# Patient Record
Sex: FEMALE | Race: BLACK OR AFRICAN AMERICAN | NOT HISPANIC OR LATINO | Employment: OTHER
[De-identification: names, ages, dates, MRNs, and addresses within clinical notes are randomized per-mention and may not be internally consistent; named-entity substitution may affect disease eponyms.]

---

## 2017-01-18 ENCOUNTER — RECORDS - HEALTHEAST (OUTPATIENT)
Dept: ADMINISTRATIVE | Facility: OTHER | Age: 62
End: 2017-01-18

## 2017-01-18 ENCOUNTER — OFFICE VISIT - HEALTHEAST (OUTPATIENT)
Dept: INTERNAL MEDICINE | Facility: CLINIC | Age: 62
End: 2017-01-18

## 2017-01-18 DIAGNOSIS — M25.552 PAIN OF LEFT HIP JOINT: ICD-10-CM

## 2017-01-18 ASSESSMENT — MIFFLIN-ST. JEOR: SCORE: 1541.06

## 2017-01-25 ENCOUNTER — COMMUNICATION - HEALTHEAST (OUTPATIENT)
Dept: INTERNAL MEDICINE | Facility: CLINIC | Age: 62
End: 2017-01-25

## 2017-01-25 DIAGNOSIS — M54.9 BACK PAIN: ICD-10-CM

## 2017-01-31 ENCOUNTER — OFFICE VISIT - HEALTHEAST (OUTPATIENT)
Dept: PHYSICAL THERAPY | Facility: REHABILITATION | Age: 62
End: 2017-01-31

## 2017-01-31 DIAGNOSIS — M25.552 LEFT HIP PAIN: ICD-10-CM

## 2017-01-31 DIAGNOSIS — E66.9 OBESITY, UNSPECIFIED: ICD-10-CM

## 2017-01-31 DIAGNOSIS — M54.42 ACUTE LEFT-SIDED LOW BACK PAIN WITH LEFT-SIDED SCIATICA: ICD-10-CM

## 2017-02-01 ENCOUNTER — OFFICE VISIT - HEALTHEAST (OUTPATIENT)
Dept: INTERNAL MEDICINE | Facility: CLINIC | Age: 62
End: 2017-02-01

## 2017-02-01 DIAGNOSIS — M25.559 HIP PAIN: ICD-10-CM

## 2017-02-01 DIAGNOSIS — J20.9 ACUTE BRONCHITIS, UNSPECIFIED ORGANISM: ICD-10-CM

## 2017-02-01 ASSESSMENT — MIFFLIN-ST. JEOR: SCORE: 1545.6

## 2017-02-15 ENCOUNTER — OFFICE VISIT - HEALTHEAST (OUTPATIENT)
Dept: PHYSICAL THERAPY | Facility: REHABILITATION | Age: 62
End: 2017-02-15

## 2017-02-15 DIAGNOSIS — E66.9 OBESITY, UNSPECIFIED: ICD-10-CM

## 2017-02-15 DIAGNOSIS — M25.552 LEFT HIP PAIN: ICD-10-CM

## 2017-02-15 DIAGNOSIS — M54.42 ACUTE LEFT-SIDED LOW BACK PAIN WITH LEFT-SIDED SCIATICA: ICD-10-CM

## 2017-02-17 ENCOUNTER — OFFICE VISIT - HEALTHEAST (OUTPATIENT)
Dept: PHYSICAL THERAPY | Facility: REHABILITATION | Age: 62
End: 2017-02-17

## 2017-02-17 DIAGNOSIS — M54.42 ACUTE LEFT-SIDED LOW BACK PAIN WITH LEFT-SIDED SCIATICA: ICD-10-CM

## 2017-02-17 DIAGNOSIS — E66.9 OBESITY, UNSPECIFIED: ICD-10-CM

## 2017-02-17 DIAGNOSIS — M25.552 LEFT HIP PAIN: ICD-10-CM

## 2017-03-23 ENCOUNTER — OFFICE VISIT - HEALTHEAST (OUTPATIENT)
Dept: PHYSICAL THERAPY | Facility: REHABILITATION | Age: 62
End: 2017-03-23

## 2017-03-23 DIAGNOSIS — E66.9 OBESITY, UNSPECIFIED: ICD-10-CM

## 2017-03-23 DIAGNOSIS — M54.42 ACUTE LEFT-SIDED LOW BACK PAIN WITH LEFT-SIDED SCIATICA: ICD-10-CM

## 2017-03-23 DIAGNOSIS — M25.552 LEFT HIP PAIN: ICD-10-CM

## 2017-03-27 ENCOUNTER — OFFICE VISIT - HEALTHEAST (OUTPATIENT)
Dept: BEHAVIORAL HEALTH | Facility: CLINIC | Age: 62
End: 2017-03-27

## 2017-03-27 DIAGNOSIS — F43.23 ADJUSTMENT DISORDER WITH MIXED ANXIETY AND DEPRESSED MOOD: ICD-10-CM

## 2017-03-27 DIAGNOSIS — F33.0 MAJOR DEPRESSIVE DISORDER, RECURRENT EPISODE, MILD (H): ICD-10-CM

## 2017-03-29 ENCOUNTER — OFFICE VISIT - HEALTHEAST (OUTPATIENT)
Dept: PHYSICAL THERAPY | Facility: REHABILITATION | Age: 62
End: 2017-03-29

## 2017-03-29 DIAGNOSIS — E66.9 OBESITY, UNSPECIFIED: ICD-10-CM

## 2017-03-29 DIAGNOSIS — M54.42 ACUTE LEFT-SIDED LOW BACK PAIN WITH LEFT-SIDED SCIATICA: ICD-10-CM

## 2017-03-29 DIAGNOSIS — M25.552 LEFT HIP PAIN: ICD-10-CM

## 2017-04-06 ENCOUNTER — OFFICE VISIT - HEALTHEAST (OUTPATIENT)
Dept: PHYSICAL THERAPY | Facility: REHABILITATION | Age: 62
End: 2017-04-06

## 2017-04-06 DIAGNOSIS — M54.42 ACUTE LEFT-SIDED LOW BACK PAIN WITH LEFT-SIDED SCIATICA: ICD-10-CM

## 2017-04-06 DIAGNOSIS — E66.9 OBESITY, UNSPECIFIED: ICD-10-CM

## 2017-04-06 DIAGNOSIS — M25.552 LEFT HIP PAIN: ICD-10-CM

## 2017-04-17 ENCOUNTER — OFFICE VISIT - HEALTHEAST (OUTPATIENT)
Dept: PHYSICAL THERAPY | Facility: REHABILITATION | Age: 62
End: 2017-04-17

## 2017-04-17 DIAGNOSIS — E66.9 OBESITY, UNSPECIFIED: ICD-10-CM

## 2017-04-17 DIAGNOSIS — M54.42 ACUTE LEFT-SIDED LOW BACK PAIN WITH LEFT-SIDED SCIATICA: ICD-10-CM

## 2017-04-17 DIAGNOSIS — M25.552 LEFT HIP PAIN: ICD-10-CM

## 2017-05-04 ENCOUNTER — OFFICE VISIT - HEALTHEAST (OUTPATIENT)
Dept: PHYSICAL THERAPY | Facility: REHABILITATION | Age: 62
End: 2017-05-04

## 2017-05-04 ENCOUNTER — OFFICE VISIT - HEALTHEAST (OUTPATIENT)
Dept: INTERNAL MEDICINE | Facility: CLINIC | Age: 62
End: 2017-05-04

## 2017-05-04 ENCOUNTER — RECORDS - HEALTHEAST (OUTPATIENT)
Dept: GENERAL RADIOLOGY | Facility: CLINIC | Age: 62
End: 2017-05-04

## 2017-05-04 DIAGNOSIS — E66.9 OBESITY, UNSPECIFIED: ICD-10-CM

## 2017-05-04 DIAGNOSIS — M25.552 LEFT HIP PAIN: ICD-10-CM

## 2017-05-04 DIAGNOSIS — M25.572 ACUTE LEFT ANKLE PAIN: ICD-10-CM

## 2017-05-04 DIAGNOSIS — M54.42 ACUTE LEFT-SIDED LOW BACK PAIN WITH LEFT-SIDED SCIATICA: ICD-10-CM

## 2017-05-04 DIAGNOSIS — M25.572 PAIN IN LEFT ANKLE AND JOINTS OF LEFT FOOT: ICD-10-CM

## 2017-05-04 DIAGNOSIS — F43.23 ADJUSTMENT DISORDER WITH MIXED ANXIETY AND DEPRESSED MOOD: ICD-10-CM

## 2017-05-04 DIAGNOSIS — M54.50 LOW BACK PAIN: ICD-10-CM

## 2017-05-04 ASSESSMENT — MIFFLIN-ST. JEOR: SCORE: 1531.99

## 2017-05-18 ENCOUNTER — OFFICE VISIT - HEALTHEAST (OUTPATIENT)
Dept: PHYSICAL THERAPY | Facility: REHABILITATION | Age: 62
End: 2017-05-18

## 2017-05-18 DIAGNOSIS — E66.9 OBESITY, UNSPECIFIED: ICD-10-CM

## 2017-05-18 DIAGNOSIS — M25.552 LEFT HIP PAIN: ICD-10-CM

## 2017-05-18 DIAGNOSIS — M54.42 ACUTE LEFT-SIDED LOW BACK PAIN WITH LEFT-SIDED SCIATICA: ICD-10-CM

## 2017-05-26 ENCOUNTER — COMMUNICATION - HEALTHEAST (OUTPATIENT)
Dept: SCHEDULING | Facility: CLINIC | Age: 62
End: 2017-05-26

## 2017-05-31 ENCOUNTER — COMMUNICATION - HEALTHEAST (OUTPATIENT)
Dept: INTERNAL MEDICINE | Facility: CLINIC | Age: 62
End: 2017-05-31

## 2017-06-29 ENCOUNTER — COMMUNICATION - HEALTHEAST (OUTPATIENT)
Dept: NURSING | Facility: CLINIC | Age: 62
End: 2017-06-29

## 2017-06-29 ENCOUNTER — OFFICE VISIT - HEALTHEAST (OUTPATIENT)
Dept: INTERNAL MEDICINE | Facility: CLINIC | Age: 62
End: 2017-06-29

## 2017-06-29 DIAGNOSIS — R53.83 TIREDNESS: ICD-10-CM

## 2017-06-29 DIAGNOSIS — M25.552 LEFT HIP PAIN: ICD-10-CM

## 2017-06-29 DIAGNOSIS — F43.23 ADJUSTMENT DISORDER WITH MIXED ANXIETY AND DEPRESSED MOOD: ICD-10-CM

## 2017-06-29 DIAGNOSIS — G47.30 SLEEP APNEA: ICD-10-CM

## 2017-06-29 DIAGNOSIS — I10 ESSENTIAL HYPERTENSION: ICD-10-CM

## 2017-06-29 ASSESSMENT — MIFFLIN-ST. JEOR: SCORE: 1522.92

## 2017-07-03 ENCOUNTER — COMMUNICATION - HEALTHEAST (OUTPATIENT)
Dept: CARE COORDINATION | Facility: CLINIC | Age: 62
End: 2017-07-03

## 2017-07-13 ENCOUNTER — COMMUNICATION - HEALTHEAST (OUTPATIENT)
Dept: NURSING | Facility: CLINIC | Age: 62
End: 2017-07-13

## 2017-07-14 ENCOUNTER — COMMUNICATION - HEALTHEAST (OUTPATIENT)
Dept: NURSING | Facility: CLINIC | Age: 62
End: 2017-07-14

## 2017-07-21 ENCOUNTER — AMBULATORY - HEALTHEAST (OUTPATIENT)
Dept: CARE COORDINATION | Facility: CLINIC | Age: 62
End: 2017-07-21

## 2017-07-28 ENCOUNTER — COMMUNICATION - HEALTHEAST (OUTPATIENT)
Dept: CARE COORDINATION | Facility: CLINIC | Age: 62
End: 2017-07-28

## 2017-08-16 ENCOUNTER — COMMUNICATION - HEALTHEAST (OUTPATIENT)
Dept: NURSING | Facility: CLINIC | Age: 62
End: 2017-08-16

## 2017-08-25 ENCOUNTER — COMMUNICATION - HEALTHEAST (OUTPATIENT)
Dept: NURSING | Facility: CLINIC | Age: 62
End: 2017-08-25

## 2017-08-25 ENCOUNTER — OFFICE VISIT - HEALTHEAST (OUTPATIENT)
Dept: INTERNAL MEDICINE | Facility: CLINIC | Age: 62
End: 2017-08-25

## 2017-08-25 DIAGNOSIS — G47.30 SLEEP APNEA: ICD-10-CM

## 2017-08-25 DIAGNOSIS — R21 RASH: ICD-10-CM

## 2017-08-25 DIAGNOSIS — J45.20 MILD INTERMITTENT ASTHMA WITHOUT COMPLICATION: ICD-10-CM

## 2017-08-25 DIAGNOSIS — D56.1 BETA THALASSEMIA (H): ICD-10-CM

## 2017-08-25 DIAGNOSIS — E66.9 OBESITY, UNSPECIFIED: ICD-10-CM

## 2017-08-25 ASSESSMENT — MIFFLIN-ST. JEOR: SCORE: 1509.31

## 2017-09-08 ENCOUNTER — COMMUNICATION - HEALTHEAST (OUTPATIENT)
Dept: NURSING | Facility: CLINIC | Age: 62
End: 2017-09-08

## 2017-09-08 ENCOUNTER — RECORDS - HEALTHEAST (OUTPATIENT)
Dept: ADMINISTRATIVE | Facility: OTHER | Age: 62
End: 2017-09-08

## 2017-09-08 ENCOUNTER — AMBULATORY - HEALTHEAST (OUTPATIENT)
Dept: INTERNAL MEDICINE | Facility: CLINIC | Age: 62
End: 2017-09-08

## 2017-09-08 ENCOUNTER — OFFICE VISIT - HEALTHEAST (OUTPATIENT)
Dept: INTERNAL MEDICINE | Facility: CLINIC | Age: 62
End: 2017-09-08

## 2017-09-08 DIAGNOSIS — M54.50 LOW BACK PAIN: ICD-10-CM

## 2017-09-08 DIAGNOSIS — D56.1 BETA THALASSEMIA (H): ICD-10-CM

## 2017-09-08 DIAGNOSIS — G47.30 SLEEP APNEA: ICD-10-CM

## 2017-09-08 DIAGNOSIS — E66.9 OBESITY, UNSPECIFIED: ICD-10-CM

## 2017-09-08 ASSESSMENT — MIFFLIN-ST. JEOR: SCORE: 1531.99

## 2017-09-19 ENCOUNTER — COMMUNICATION - HEALTHEAST (OUTPATIENT)
Dept: NURSING | Facility: CLINIC | Age: 62
End: 2017-09-19

## 2017-09-20 ENCOUNTER — COMMUNICATION - HEALTHEAST (OUTPATIENT)
Dept: SCHEDULING | Facility: CLINIC | Age: 62
End: 2017-09-20

## 2017-09-22 ENCOUNTER — COMMUNICATION - HEALTHEAST (OUTPATIENT)
Dept: NURSING | Facility: CLINIC | Age: 62
End: 2017-09-22

## 2017-09-29 ENCOUNTER — COMMUNICATION - HEALTHEAST (OUTPATIENT)
Dept: NURSING | Facility: CLINIC | Age: 62
End: 2017-09-29

## 2017-09-29 ENCOUNTER — OFFICE VISIT - HEALTHEAST (OUTPATIENT)
Dept: INTERNAL MEDICINE | Facility: CLINIC | Age: 62
End: 2017-09-29

## 2017-09-29 DIAGNOSIS — J20.9 ACUTE BRONCHITIS, UNSPECIFIED ORGANISM: ICD-10-CM

## 2017-09-29 DIAGNOSIS — Z23 NEED FOR PROPHYLACTIC VACCINATION AND INOCULATION AGAINST INFLUENZA: ICD-10-CM

## 2017-09-29 DIAGNOSIS — F43.23 ADJUSTMENT DISORDER WITH MIXED ANXIETY AND DEPRESSED MOOD: ICD-10-CM

## 2017-09-29 DIAGNOSIS — D56.1 BETA THALASSEMIA (H): ICD-10-CM

## 2017-09-29 DIAGNOSIS — G47.33 OBSTRUCTIVE SLEEP APNEA SYNDROME: ICD-10-CM

## 2017-09-29 DIAGNOSIS — E66.9 OBESITY, UNSPECIFIED: ICD-10-CM

## 2017-09-29 ASSESSMENT — MIFFLIN-ST. JEOR: SCORE: 1509.31

## 2017-10-03 ENCOUNTER — COMMUNICATION - HEALTHEAST (OUTPATIENT)
Dept: NURSING | Facility: CLINIC | Age: 62
End: 2017-10-03

## 2017-10-03 ENCOUNTER — RECORDS - HEALTHEAST (OUTPATIENT)
Dept: ADMINISTRATIVE | Facility: OTHER | Age: 62
End: 2017-10-03

## 2017-10-05 ENCOUNTER — AMBULATORY - HEALTHEAST (OUTPATIENT)
Dept: INTERNAL MEDICINE | Facility: CLINIC | Age: 62
End: 2017-10-05

## 2017-10-05 DIAGNOSIS — G47.33 OBSTRUCTIVE SLEEP APNEA SYNDROME: ICD-10-CM

## 2017-10-18 ENCOUNTER — RECORDS - HEALTHEAST (OUTPATIENT)
Dept: ADMINISTRATIVE | Facility: OTHER | Age: 62
End: 2017-10-18

## 2017-10-19 ENCOUNTER — COMMUNICATION - HEALTHEAST (OUTPATIENT)
Dept: NURSING | Facility: CLINIC | Age: 62
End: 2017-10-19

## 2017-10-24 ENCOUNTER — RECORDS - HEALTHEAST (OUTPATIENT)
Dept: ADMINISTRATIVE | Facility: OTHER | Age: 62
End: 2017-10-24

## 2017-10-25 ENCOUNTER — COMMUNICATION - HEALTHEAST (OUTPATIENT)
Dept: NURSING | Facility: CLINIC | Age: 62
End: 2017-10-25

## 2017-10-25 ENCOUNTER — AMBULATORY - HEALTHEAST (OUTPATIENT)
Dept: INTERNAL MEDICINE | Facility: CLINIC | Age: 62
End: 2017-10-25

## 2017-10-25 ENCOUNTER — COMMUNICATION - HEALTHEAST (OUTPATIENT)
Dept: INTERNAL MEDICINE | Facility: CLINIC | Age: 62
End: 2017-10-25

## 2017-10-25 DIAGNOSIS — J40 BRONCHITIS: ICD-10-CM

## 2017-11-01 ENCOUNTER — RECORDS - HEALTHEAST (OUTPATIENT)
Dept: ADMINISTRATIVE | Facility: OTHER | Age: 62
End: 2017-11-01

## 2017-11-02 ENCOUNTER — COMMUNICATION - HEALTHEAST (OUTPATIENT)
Dept: INTERNAL MEDICINE | Facility: CLINIC | Age: 62
End: 2017-11-02

## 2017-11-06 ENCOUNTER — COMMUNICATION - HEALTHEAST (OUTPATIENT)
Dept: NURSING | Facility: CLINIC | Age: 62
End: 2017-11-06

## 2017-11-20 ENCOUNTER — COMMUNICATION - HEALTHEAST (OUTPATIENT)
Dept: NURSING | Facility: CLINIC | Age: 62
End: 2017-11-20

## 2017-11-20 ENCOUNTER — RECORDS - HEALTHEAST (OUTPATIENT)
Dept: ADMINISTRATIVE | Facility: OTHER | Age: 62
End: 2017-11-20

## 2017-11-22 ENCOUNTER — HOSPITAL ENCOUNTER (OUTPATIENT)
Dept: PALLIATIVE MEDICINE | Facility: OTHER | Age: 62
Discharge: HOME | End: 2017-11-22
Attending: INTERNAL MEDICINE

## 2017-11-22 DIAGNOSIS — M25.552 HIP PAIN, CHRONIC, LEFT: ICD-10-CM

## 2017-11-22 DIAGNOSIS — G89.4 CHRONIC PAIN SYNDROME: ICD-10-CM

## 2017-11-22 DIAGNOSIS — G89.29 CHRONIC MIDLINE LOW BACK PAIN WITHOUT SCIATICA: ICD-10-CM

## 2017-11-22 DIAGNOSIS — G89.29 HIP PAIN, CHRONIC, LEFT: ICD-10-CM

## 2017-11-22 DIAGNOSIS — M54.50 CHRONIC MIDLINE LOW BACK PAIN WITHOUT SCIATICA: ICD-10-CM

## 2017-11-22 ASSESSMENT — MIFFLIN-ST. JEOR: SCORE: 1495.7

## 2017-11-27 ENCOUNTER — COMMUNICATION - HEALTHEAST (OUTPATIENT)
Dept: NURSING | Facility: CLINIC | Age: 62
End: 2017-11-27

## 2017-11-28 ENCOUNTER — COMMUNICATION - HEALTHEAST (OUTPATIENT)
Dept: INTERNAL MEDICINE | Facility: CLINIC | Age: 62
End: 2017-11-28

## 2017-12-06 ENCOUNTER — RECORDS - HEALTHEAST (OUTPATIENT)
Dept: ADMINISTRATIVE | Facility: OTHER | Age: 62
End: 2017-12-06

## 2017-12-13 ENCOUNTER — COMMUNICATION - HEALTHEAST (OUTPATIENT)
Dept: ONCOLOGY | Facility: HOSPITAL | Age: 62
End: 2017-12-13

## 2017-12-13 ENCOUNTER — OFFICE VISIT - HEALTHEAST (OUTPATIENT)
Dept: INTERNAL MEDICINE | Facility: CLINIC | Age: 62
End: 2017-12-13

## 2017-12-13 DIAGNOSIS — F43.23 ADJUSTMENT DISORDER WITH MIXED ANXIETY AND DEPRESSED MOOD: ICD-10-CM

## 2017-12-13 DIAGNOSIS — Z13.220 SCREENING FOR HYPERLIPIDEMIA: ICD-10-CM

## 2017-12-13 DIAGNOSIS — Z13.1 SCREENING FOR DIABETES MELLITUS: ICD-10-CM

## 2017-12-13 DIAGNOSIS — E66.9 OBESITY: ICD-10-CM

## 2017-12-13 DIAGNOSIS — R73.9 HYPERGLYCEMIA: ICD-10-CM

## 2017-12-13 DIAGNOSIS — L98.9 SKIN LESION: ICD-10-CM

## 2017-12-13 DIAGNOSIS — R73.03 PREDIABETES: ICD-10-CM

## 2017-12-13 DIAGNOSIS — G47.33 OBSTRUCTIVE SLEEP APNEA SYNDROME: ICD-10-CM

## 2017-12-13 DIAGNOSIS — Z00.00 ROUTINE GENERAL MEDICAL EXAMINATION AT A HEALTH CARE FACILITY: ICD-10-CM

## 2017-12-13 DIAGNOSIS — D56.1 BETA THALASSEMIA (H): ICD-10-CM

## 2017-12-13 DIAGNOSIS — M54.2 NECK PAIN ON RIGHT SIDE: ICD-10-CM

## 2017-12-13 LAB
CHOLEST SERPL-MCNC: 206 MG/DL
FASTING STATUS PATIENT QL REPORTED: YES
HBA1C MFR BLD: 6 % (ref 3.5–6)
HDLC SERPL-MCNC: 52 MG/DL
LDLC SERPL CALC-MCNC: 137 MG/DL
TRIGL SERPL-MCNC: 87 MG/DL

## 2017-12-13 ASSESSMENT — MIFFLIN-ST. JEOR: SCORE: 1513.84

## 2017-12-14 ENCOUNTER — AMBULATORY - HEALTHEAST (OUTPATIENT)
Dept: ONCOLOGY | Facility: HOSPITAL | Age: 62
End: 2017-12-14

## 2017-12-14 ENCOUNTER — COMMUNICATION - HEALTHEAST (OUTPATIENT)
Dept: ONCOLOGY | Facility: HOSPITAL | Age: 62
End: 2017-12-14

## 2017-12-21 ENCOUNTER — COMMUNICATION - HEALTHEAST (OUTPATIENT)
Dept: ONCOLOGY | Facility: HOSPITAL | Age: 62
End: 2017-12-21

## 2018-01-03 ENCOUNTER — AMBULATORY - HEALTHEAST (OUTPATIENT)
Dept: ONCOLOGY | Facility: HOSPITAL | Age: 63
End: 2018-01-03

## 2018-01-15 ENCOUNTER — COMMUNICATION - HEALTHEAST (OUTPATIENT)
Dept: ONCOLOGY | Facility: HOSPITAL | Age: 63
End: 2018-01-15

## 2018-01-23 ENCOUNTER — COMMUNICATION - HEALTHEAST (OUTPATIENT)
Dept: NURSING | Facility: CLINIC | Age: 63
End: 2018-01-23

## 2018-02-27 ENCOUNTER — COMMUNICATION - HEALTHEAST (OUTPATIENT)
Dept: FAMILY MEDICINE | Facility: CLINIC | Age: 63
End: 2018-02-27

## 2018-03-07 ENCOUNTER — COMMUNICATION - HEALTHEAST (OUTPATIENT)
Dept: FAMILY MEDICINE | Facility: CLINIC | Age: 63
End: 2018-03-07

## 2018-03-08 ENCOUNTER — RECORDS - HEALTHEAST (OUTPATIENT)
Dept: ADMINISTRATIVE | Facility: OTHER | Age: 63
End: 2018-03-08

## 2018-03-13 ENCOUNTER — RECORDS - HEALTHEAST (OUTPATIENT)
Dept: GENERAL RADIOLOGY | Facility: CLINIC | Age: 63
End: 2018-03-13

## 2018-03-13 ENCOUNTER — OFFICE VISIT - HEALTHEAST (OUTPATIENT)
Dept: INTERNAL MEDICINE | Facility: CLINIC | Age: 63
End: 2018-03-13
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
## 2018-03-13 DIAGNOSIS — M25.551 PAIN IN RIGHT HIP: ICD-10-CM

## 2018-03-13 DIAGNOSIS — D56.1 BETA THALASSEMIA (H): ICD-10-CM

## 2018-03-13 DIAGNOSIS — M25.551 RIGHT HIP PAIN: ICD-10-CM

## 2018-03-13 DIAGNOSIS — L98.9 SKIN LESION: ICD-10-CM

## 2018-03-13 ASSESSMENT — MIFFLIN-ST. JEOR: SCORE: 1536.52

## 2018-03-16 ENCOUNTER — COMMUNICATION - HEALTHEAST (OUTPATIENT)
Dept: SCHEDULING | Facility: CLINIC | Age: 63
End: 2018-03-16

## 2018-03-16 ENCOUNTER — OFFICE VISIT - HEALTHEAST (OUTPATIENT)
Dept: INTERNAL MEDICINE | Facility: CLINIC | Age: 63
End: 2018-03-16

## 2018-03-16 DIAGNOSIS — M25.512 BILATERAL SHOULDER PAIN: ICD-10-CM

## 2018-03-16 DIAGNOSIS — M25.511 BILATERAL SHOULDER PAIN: ICD-10-CM
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
## 2018-03-16 DIAGNOSIS — M54.2 NECK PAIN: ICD-10-CM

## 2018-03-16 ASSESSMENT — MIFFLIN-ST. JEOR: SCORE: 1536.52

## 2018-03-22 ENCOUNTER — COMMUNICATION - HEALTHEAST (OUTPATIENT)
Dept: INTERNAL MEDICINE | Facility: CLINIC | Age: 63
End: 2018-03-22

## 2018-03-26 ENCOUNTER — RECORDS - HEALTHEAST (OUTPATIENT)
Dept: ADMINISTRATIVE | Facility: OTHER | Age: 63
End: 2018-03-26

## 2018-03-27 ENCOUNTER — COMMUNICATION - HEALTHEAST (OUTPATIENT)
Dept: NURSING | Facility: CLINIC | Age: 63
End: 2018-03-27

## 2018-03-27 ENCOUNTER — AMBULATORY - HEALTHEAST (OUTPATIENT)
Dept: CARE COORDINATION | Facility: CLINIC | Age: 63
End: 2018-03-27

## 2018-04-05 ENCOUNTER — HOSPITAL ENCOUNTER (OUTPATIENT)
Dept: PALLIATIVE MEDICINE | Facility: OTHER | Age: 63
Discharge: HOME | End: 2018-04-05
Attending: PHYSICIAN ASSISTANT

## 2018-04-05 DIAGNOSIS — M47.816 LUMBAR FACET ARTHROPATHY: ICD-10-CM

## 2018-04-05 DIAGNOSIS — S13.4XXD WHIPLASH INJURY TO NECK, SUBSEQUENT ENCOUNTER: ICD-10-CM

## 2018-04-05 DIAGNOSIS — M47.812 CERVICAL SPONDYLOSIS: ICD-10-CM

## 2018-04-05 DIAGNOSIS — M54.81 BILATERAL OCCIPITAL NEURALGIA: ICD-10-CM

## 2018-04-05 DIAGNOSIS — G89.29 CHRONIC LEFT SI JOINT PAIN: ICD-10-CM

## 2018-04-05 DIAGNOSIS — M53.3 CHRONIC LEFT SI JOINT PAIN: ICD-10-CM

## 2018-04-05 ASSESSMENT — MIFFLIN-ST. JEOR: SCORE: 1536.52

## 2018-04-09 ENCOUNTER — RECORDS - HEALTHEAST (OUTPATIENT)
Dept: ADMINISTRATIVE | Facility: OTHER | Age: 63
End: 2018-04-09

## 2018-04-10 ENCOUNTER — COMMUNICATION - HEALTHEAST (OUTPATIENT)
Dept: PALLIATIVE MEDICINE | Facility: OTHER | Age: 63
End: 2018-04-10

## 2018-04-11 ENCOUNTER — HOSPITAL ENCOUNTER (OUTPATIENT)
Dept: PALLIATIVE MEDICINE | Facility: OTHER | Age: 63
Discharge: HOME | End: 2018-04-11
Attending: PHYSICIAN ASSISTANT | Admitting: PAIN MEDICINE

## 2018-04-11 DIAGNOSIS — G89.29 CHRONIC LEFT SI JOINT PAIN: ICD-10-CM

## 2018-04-11 DIAGNOSIS — M53.3 CHRONIC LEFT SI JOINT PAIN: ICD-10-CM

## 2018-04-11 ASSESSMENT — MIFFLIN-ST. JEOR: SCORE: 1536.52

## 2018-04-12 ENCOUNTER — COMMUNICATION - HEALTHEAST (OUTPATIENT)
Dept: PALLIATIVE MEDICINE | Facility: OTHER | Age: 63
End: 2018-04-12

## 2018-04-16 ENCOUNTER — COMMUNICATION - HEALTHEAST (OUTPATIENT)
Dept: PALLIATIVE MEDICINE | Facility: OTHER | Age: 63
End: 2018-04-16

## 2018-04-19 ENCOUNTER — OFFICE VISIT - HEALTHEAST (OUTPATIENT)
Dept: INTERNAL MEDICINE | Facility: CLINIC | Age: 63
End: 2018-04-19

## 2018-04-19 DIAGNOSIS — R55 POSTURAL DIZZINESS WITH PRESYNCOPE: ICD-10-CM

## 2018-04-19 DIAGNOSIS — R73.03 PREDIABETES: ICD-10-CM

## 2018-04-19 DIAGNOSIS — G47.33 OBSTRUCTIVE SLEEP APNEA SYNDROME: ICD-10-CM

## 2018-04-19 DIAGNOSIS — R63.1 EXCESSIVE THIRST: ICD-10-CM

## 2018-04-19 DIAGNOSIS — D56.1 BETA THALASSEMIA (H): ICD-10-CM

## 2018-04-19 DIAGNOSIS — R00.2 PALPITATIONS: ICD-10-CM

## 2018-04-19 DIAGNOSIS — Z12.31 VISIT FOR SCREENING MAMMOGRAM: ICD-10-CM

## 2018-04-19 DIAGNOSIS — R35.0 FREQUENT URINATION: ICD-10-CM

## 2018-04-19 DIAGNOSIS — R42 POSTURAL DIZZINESS WITH PRESYNCOPE: ICD-10-CM

## 2018-04-19 LAB
ALBUMIN UR-MCNC: NEGATIVE MG/DL
APPEARANCE UR: CLEAR
BILIRUB UR QL STRIP: NEGATIVE
COLOR UR AUTO: YELLOW
FASTING STATUS PATIENT QL REPORTED: YES
GLUCOSE BLD-MCNC: 99 MG/DL (ref 70–125)
GLUCOSE UR STRIP-MCNC: NEGATIVE MG/DL
HBA1C MFR BLD: 6.4 % (ref 3.5–6)
HGB UR QL STRIP: NEGATIVE
KETONES UR STRIP-MCNC: NEGATIVE MG/DL
LEUKOCYTE ESTERASE UR QL STRIP: NEGATIVE
NITRATE UR QL: NEGATIVE
PH UR STRIP: 8 [PH] (ref 5–8)
SP GR UR STRIP: 1.01 (ref 1–1.03)
UROBILINOGEN UR STRIP-ACNC: NORMAL

## 2018-04-19 ASSESSMENT — MIFFLIN-ST. JEOR: SCORE: 1536.52

## 2018-04-23 ENCOUNTER — HOSPITAL ENCOUNTER (OUTPATIENT)
Dept: CARDIOLOGY | Facility: CLINIC | Age: 63
Discharge: HOME | End: 2018-04-23
Attending: INTERNAL MEDICINE

## 2018-04-23 DIAGNOSIS — D56.1 BETA THALASSEMIA (H): ICD-10-CM

## 2018-04-23 DIAGNOSIS — R55 POSTURAL DIZZINESS WITH PRESYNCOPE: ICD-10-CM

## 2018-04-23 DIAGNOSIS — R42 POSTURAL DIZZINESS WITH PRESYNCOPE: ICD-10-CM

## 2018-04-23 DIAGNOSIS — R00.2 PALPITATIONS: ICD-10-CM

## 2018-04-23 LAB
AORTIC ROOT: 2.6 CM
AORTIC VALVE MEAN VELOCITY: 119 CM/S
AV DIMENSIONLESS INDEX VTI: 0.5
AV MEAN GRADIENT: 7 MMHG
AV PEAK GRADIENT: 13.8 MMHG
AV VALVE AREA: 1.3 CM2
AV VELOCITY RATIO: 0.4
BSA FOR ECHO PROCEDURE: 2.13 M2
CV BLOOD PRESSURE: NORMAL MMHG
CV ECHO HEIGHT: 62 IN
CV ECHO WEIGHT: 230 LBS
DOP CALC AO PEAK VEL: 186 CM/S
DOP CALC AO VTI: 41.9 CM
DOP CALC LVOT AREA: 2.83 CM2
DOP CALC LVOT DIAMETER: 1.9 CM
DOP CALC LVOT PEAK VEL: 78.5 CM/S
DOP CALC LVOT STROKE VOLUME: 53.6 CM3
DOP CALCLVOT PEAK VEL VTI: 18.9 CM
EJECTION FRACTION: 72 % (ref 55–75)
FRACTIONAL SHORTENING: 42.6 % (ref 28–44)
INTERVENTRICULAR SEPTUM IN END DIASTOLE: 1.3 CM (ref 0.6–0.9)
IVS/PW RATIO: 1.2
LA AREA 1: 23.8 CM2
LA AREA 2: 18.1 CM2
LEFT ATRIUM LENGTH: 5.53 CM
LEFT ATRIUM SIZE: 4.2 CM
LEFT ATRIUM VOLUME INDEX: 31.1 ML/M2
LEFT ATRIUM VOLUME: 66.2 ML
LEFT VENTRICLE CARDIAC INDEX: 2 L/MIN/M2
LEFT VENTRICLE CARDIAC OUTPUT: 4.3 L/MIN
LEFT VENTRICLE DIASTOLIC VOLUME INDEX: 30 CM3/M2 (ref 34–74)
LEFT VENTRICLE DIASTOLIC VOLUME: 64 CM3 (ref 46–106)
LEFT VENTRICLE HEART RATE: 80 BPM
LEFT VENTRICLE MASS INDEX: 99.5 G/M2
LEFT VENTRICLE SYSTOLIC VOLUME INDEX: 8.5 CM3/M2 (ref 11–31)
LEFT VENTRICLE SYSTOLIC VOLUME: 18 CM3 (ref 14–42)
LEFT VENTRICULAR INTERNAL DIMENSION IN DIASTOLE: 4.7 CM (ref 3.8–5.2)
LEFT VENTRICULAR INTERNAL DIMENSION IN SYSTOLE: 2.7 CM (ref 2.2–3.5)
LEFT VENTRICULAR MASS: 212 G
LEFT VENTRICULAR OUTFLOW TRACT MEAN GRADIENT: 1 MMHG
LEFT VENTRICULAR OUTFLOW TRACT MEAN VELOCITY: 53.1 CM/S
LEFT VENTRICULAR OUTFLOW TRACT PEAK GRADIENT: 2 MMHG
LEFT VENTRICULAR POSTERIOR WALL IN END DIASTOLE: 1.1 CM (ref 0.6–0.9)
LV STROKE VOLUME INDEX: 25.1 ML/M2
MITRAL VALVE E/A RATIO: 1
MV AVERAGE E/E' RATIO: 11.6 CM/S
MV DECELERATION TIME: 232 MS
MV E'TISSUE VEL-LAT: 11 CM/S
MV E'TISSUE VEL-MED: 6.43 CM/S
MV LATERAL E/E' RATIO: 9.2
MV MEDIAL E/E' RATIO: 15.7
MV PEAK A VELOCITY: 103 CM/S
MV PEAK E VELOCITY: 101 CM/S
NUC REST DIASTOLIC VOLUME INDEX: 3680 LBS
NUC REST SYSTOLIC VOLUME INDEX: 62 IN
TRICUSPID REGURGITATION PEAK PRESSURE GRADIENT: 27.5 MMHG
TRICUSPID VALVE ANULAR PLANE SYSTOLIC EXCURSION: 2 CM
TRICUSPID VALVE PEAK REGURGITANT VELOCITY: 262 CM/S

## 2018-04-23 ASSESSMENT — MIFFLIN-ST. JEOR: SCORE: 1536.52

## 2018-04-27 ENCOUNTER — RECORDS - HEALTHEAST (OUTPATIENT)
Dept: ADMINISTRATIVE | Facility: OTHER | Age: 63
End: 2018-04-27

## 2018-05-02 ENCOUNTER — COMMUNICATION - HEALTHEAST (OUTPATIENT)
Dept: PALLIATIVE MEDICINE | Facility: OTHER | Age: 63
End: 2018-05-02

## 2018-05-03 ENCOUNTER — HOSPITAL ENCOUNTER (OUTPATIENT)
Dept: PALLIATIVE MEDICINE | Facility: OTHER | Age: 63
Discharge: HOME | End: 2018-05-03
Attending: PHYSICIAN ASSISTANT

## 2018-05-03 DIAGNOSIS — M47.812 CERVICAL SPONDYLOSIS: ICD-10-CM

## 2018-05-09 ENCOUNTER — COMMUNICATION - HEALTHEAST (OUTPATIENT)
Dept: PALLIATIVE MEDICINE | Facility: OTHER | Age: 63
End: 2018-05-09

## 2018-05-09 ENCOUNTER — HOSPITAL ENCOUNTER (OUTPATIENT)
Dept: MAMMOGRAPHY | Facility: CLINIC | Age: 63
Discharge: HOME | End: 2018-05-09
Attending: INTERNAL MEDICINE

## 2018-05-09 DIAGNOSIS — Z12.31 VISIT FOR SCREENING MAMMOGRAM: ICD-10-CM

## 2018-05-10 ENCOUNTER — HOSPITAL ENCOUNTER (OUTPATIENT)
Dept: PALLIATIVE MEDICINE | Facility: OTHER | Age: 63
Discharge: HOME | End: 2018-05-10
Attending: PHYSICIAN ASSISTANT | Admitting: PAIN MEDICINE

## 2018-05-10 ENCOUNTER — RECORDS - HEALTHEAST (OUTPATIENT)
Dept: ADMINISTRATIVE | Facility: OTHER | Age: 63
End: 2018-05-10

## 2018-05-10 DIAGNOSIS — M47.812 CERVICAL SPONDYLOSIS: ICD-10-CM

## 2018-05-10 ASSESSMENT — MIFFLIN-ST. JEOR: SCORE: 1536.52

## 2018-05-11 ENCOUNTER — COMMUNICATION - HEALTHEAST (OUTPATIENT)
Dept: SCHEDULING | Facility: CLINIC | Age: 63
End: 2018-05-11

## 2018-05-11 ENCOUNTER — COMMUNICATION - HEALTHEAST (OUTPATIENT)
Dept: PALLIATIVE MEDICINE | Facility: OTHER | Age: 63
End: 2018-05-11

## 2018-05-14 ENCOUNTER — COMMUNICATION - HEALTHEAST (OUTPATIENT)
Dept: PALLIATIVE MEDICINE | Facility: OTHER | Age: 63
End: 2018-05-14

## 2018-05-14 DIAGNOSIS — M47.812 CERVICAL SPONDYLOSIS: ICD-10-CM

## 2018-05-15 ENCOUNTER — COMMUNICATION - HEALTHEAST (OUTPATIENT)
Dept: PALLIATIVE MEDICINE | Facility: OTHER | Age: 63
End: 2018-05-15

## 2018-05-29 ENCOUNTER — AMBULATORY - HEALTHEAST (OUTPATIENT)
Dept: INTERNAL MEDICINE | Facility: CLINIC | Age: 63
End: 2018-05-29

## 2018-05-29 DIAGNOSIS — F43.23 ADJUSTMENT DISORDER WITH MIXED ANXIETY AND DEPRESSED MOOD: ICD-10-CM

## 2018-05-30 ENCOUNTER — COMMUNICATION - HEALTHEAST (OUTPATIENT)
Dept: PALLIATIVE MEDICINE | Facility: OTHER | Age: 63
End: 2018-05-30

## 2018-05-31 ENCOUNTER — HOSPITAL ENCOUNTER (OUTPATIENT)
Dept: PALLIATIVE MEDICINE | Facility: OTHER | Age: 63
Discharge: HOME | End: 2018-05-31
Attending: PAIN MEDICINE | Admitting: PAIN MEDICINE

## 2018-05-31 DIAGNOSIS — M47.812 FACET ARTHROPATHY, CERVICAL: ICD-10-CM

## 2018-05-31 DIAGNOSIS — M47.812 CERVICAL SPONDYLOSIS: ICD-10-CM

## 2018-05-31 ASSESSMENT — MIFFLIN-ST. JEOR: SCORE: 1536.52

## 2018-06-01 ENCOUNTER — COMMUNICATION - HEALTHEAST (OUTPATIENT)
Dept: PALLIATIVE MEDICINE | Facility: OTHER | Age: 63
End: 2018-06-01

## 2018-06-05 ENCOUNTER — COMMUNICATION - HEALTHEAST (OUTPATIENT)
Dept: NURSING | Facility: CLINIC | Age: 63
End: 2018-06-05

## 2018-06-07 ENCOUNTER — COMMUNICATION - HEALTHEAST (OUTPATIENT)
Dept: INTERNAL MEDICINE | Facility: CLINIC | Age: 63
End: 2018-06-07

## 2018-06-11 ENCOUNTER — HOSPITAL ENCOUNTER (OUTPATIENT)
Dept: MAMMOGRAPHY | Facility: CLINIC | Age: 63
Discharge: HOME | End: 2018-06-11
Attending: INTERNAL MEDICINE

## 2018-06-11 DIAGNOSIS — N64.89 BREAST ASYMMETRY: ICD-10-CM

## 2018-06-20 ENCOUNTER — COMMUNICATION - HEALTHEAST (OUTPATIENT)
Dept: NURSING | Facility: CLINIC | Age: 63
End: 2018-06-20

## 2018-06-28 ENCOUNTER — COMMUNICATION - HEALTHEAST (OUTPATIENT)
Dept: NURSING | Facility: CLINIC | Age: 63
End: 2018-06-28

## 2018-07-13 ENCOUNTER — COMMUNICATION - HEALTHEAST (OUTPATIENT)
Dept: NURSING | Facility: CLINIC | Age: 63
End: 2018-07-13

## 2018-07-18 ENCOUNTER — COMMUNICATION - HEALTHEAST (OUTPATIENT)
Dept: PALLIATIVE MEDICINE | Facility: OTHER | Age: 63
End: 2018-07-18

## 2018-07-19 ENCOUNTER — HOSPITAL ENCOUNTER (OUTPATIENT)
Dept: PALLIATIVE MEDICINE | Facility: OTHER | Age: 63
Discharge: HOME | End: 2018-07-19
Attending: PAIN MEDICINE | Admitting: PAIN MEDICINE

## 2018-07-19 DIAGNOSIS — M47.812 FACET ARTHROPATHY, CERVICAL: ICD-10-CM

## 2018-07-19 DIAGNOSIS — M12.88 OTHER SPECIFIC ARTHROPATHIES, NOT ELSEWHERE CLASSIFIED, OTHER SPECIFIED SITE: ICD-10-CM

## 2018-07-19 ASSESSMENT — MIFFLIN-ST. JEOR: SCORE: 1536.52

## 2018-07-20 ENCOUNTER — COMMUNICATION - HEALTHEAST (OUTPATIENT)
Dept: NURSING | Facility: CLINIC | Age: 63
End: 2018-07-20

## 2018-07-20 ENCOUNTER — COMMUNICATION - HEALTHEAST (OUTPATIENT)
Dept: PALLIATIVE MEDICINE | Facility: OTHER | Age: 63
End: 2018-07-20

## 2018-07-23 ENCOUNTER — AMBULATORY - HEALTHEAST (OUTPATIENT)
Dept: NURSING | Facility: CLINIC | Age: 63
End: 2018-07-23

## 2018-08-01 ENCOUNTER — COMMUNICATION - HEALTHEAST (OUTPATIENT)
Dept: NURSING | Facility: CLINIC | Age: 63
End: 2018-08-01

## 2018-08-10 ENCOUNTER — AMBULATORY - HEALTHEAST (OUTPATIENT)
Dept: NURSING | Facility: CLINIC | Age: 63
End: 2018-08-10

## 2018-08-14 ENCOUNTER — OFFICE VISIT - HEALTHEAST (OUTPATIENT)
Dept: INTERNAL MEDICINE | Facility: CLINIC | Age: 63
End: 2018-08-14

## 2018-08-14 DIAGNOSIS — E66.01 SEVERE OBESITY (H): ICD-10-CM

## 2018-08-14 DIAGNOSIS — M54.50 LOW BACK PAIN: ICD-10-CM

## 2018-08-14 DIAGNOSIS — R73.09 OTHER ABNORMAL GLUCOSE: ICD-10-CM

## 2018-08-14 DIAGNOSIS — G47.33 OSA (OBSTRUCTIVE SLEEP APNEA): ICD-10-CM

## 2018-08-14 DIAGNOSIS — M54.2 NECK PAIN: ICD-10-CM

## 2018-08-14 ASSESSMENT — MIFFLIN-ST. JEOR: SCORE: 1545.6

## 2018-08-16 ENCOUNTER — COMMUNICATION - HEALTHEAST (OUTPATIENT)
Dept: NURSING | Facility: CLINIC | Age: 63
End: 2018-08-16

## 2018-08-20 ENCOUNTER — COMMUNICATION - HEALTHEAST (OUTPATIENT)
Dept: PALLIATIVE MEDICINE | Facility: OTHER | Age: 63
End: 2018-08-20

## 2018-08-21 ENCOUNTER — HOSPITAL ENCOUNTER (OUTPATIENT)
Dept: PALLIATIVE MEDICINE | Facility: OTHER | Age: 63
Discharge: HOME | End: 2018-08-21
Attending: PAIN MEDICINE | Admitting: PAIN MEDICINE

## 2018-08-21 DIAGNOSIS — M47.812 CERVICAL SPONDYLOSIS: ICD-10-CM

## 2018-08-21 ASSESSMENT — MIFFLIN-ST. JEOR: SCORE: 1536.52

## 2018-08-22 ENCOUNTER — COMMUNICATION - HEALTHEAST (OUTPATIENT)
Dept: PALLIATIVE MEDICINE | Facility: OTHER | Age: 63
End: 2018-08-22

## 2018-08-27 ENCOUNTER — OFFICE VISIT - HEALTHEAST (OUTPATIENT)
Dept: PHYSICAL THERAPY | Facility: REHABILITATION | Age: 63
End: 2018-08-27

## 2018-08-27 DIAGNOSIS — M54.12 CERVICAL RADICULITIS: ICD-10-CM

## 2018-08-27 DIAGNOSIS — M62.81 GENERALIZED MUSCLE WEAKNESS: ICD-10-CM

## 2018-08-27 DIAGNOSIS — M54.5 ACUTE BILATERAL LOW BACK PAIN, WITH SCIATICA PRESENCE UNSPECIFIED: ICD-10-CM

## 2018-08-27 DIAGNOSIS — M54.2 NECK PAIN: ICD-10-CM

## 2018-08-27 DIAGNOSIS — M25.60 STIFFNESS OF MULTIPLE JOINTS: ICD-10-CM

## 2018-09-06 ENCOUNTER — COMMUNICATION - HEALTHEAST (OUTPATIENT)
Dept: NURSING | Facility: CLINIC | Age: 63
End: 2018-09-06

## 2018-09-12 ENCOUNTER — OFFICE VISIT - HEALTHEAST (OUTPATIENT)
Dept: PHYSICAL THERAPY | Facility: REHABILITATION | Age: 63
End: 2018-09-12

## 2018-09-12 DIAGNOSIS — M25.60 STIFFNESS OF MULTIPLE JOINTS: ICD-10-CM

## 2018-09-12 DIAGNOSIS — M62.81 GENERALIZED MUSCLE WEAKNESS: ICD-10-CM

## 2018-09-12 DIAGNOSIS — M54.2 NECK PAIN: ICD-10-CM

## 2018-09-12 DIAGNOSIS — M54.5 ACUTE BILATERAL LOW BACK PAIN, WITH SCIATICA PRESENCE UNSPECIFIED: ICD-10-CM

## 2018-09-12 DIAGNOSIS — M54.12 CERVICAL RADICULITIS: ICD-10-CM

## 2018-09-17 ENCOUNTER — COMMUNICATION - HEALTHEAST (OUTPATIENT)
Dept: NURSING | Facility: CLINIC | Age: 63
End: 2018-09-17

## 2018-09-17 ENCOUNTER — OFFICE VISIT - HEALTHEAST (OUTPATIENT)
Dept: PHYSICAL THERAPY | Facility: REHABILITATION | Age: 63
End: 2018-09-17

## 2018-09-17 DIAGNOSIS — M54.12 CERVICAL RADICULITIS: ICD-10-CM

## 2018-09-17 DIAGNOSIS — M25.552 LEFT HIP PAIN: ICD-10-CM

## 2018-09-17 DIAGNOSIS — M54.5 ACUTE BILATERAL LOW BACK PAIN, WITH SCIATICA PRESENCE UNSPECIFIED: ICD-10-CM

## 2018-09-17 DIAGNOSIS — M25.60 STIFFNESS OF MULTIPLE JOINTS: ICD-10-CM

## 2018-09-17 DIAGNOSIS — M54.2 NECK PAIN: ICD-10-CM

## 2018-09-24 ENCOUNTER — OFFICE VISIT - HEALTHEAST (OUTPATIENT)
Dept: PHYSICAL THERAPY | Facility: REHABILITATION | Age: 63
End: 2018-09-24

## 2018-09-24 DIAGNOSIS — M54.12 CERVICAL RADICULITIS: ICD-10-CM

## 2018-09-24 DIAGNOSIS — M25.60 STIFFNESS OF MULTIPLE JOINTS: ICD-10-CM

## 2018-09-24 DIAGNOSIS — M62.81 GENERALIZED MUSCLE WEAKNESS: ICD-10-CM

## 2018-09-24 DIAGNOSIS — M54.2 NECK PAIN: ICD-10-CM

## 2018-09-24 DIAGNOSIS — M25.552 LEFT HIP PAIN: ICD-10-CM

## 2018-09-24 DIAGNOSIS — M54.5 ACUTE BILATERAL LOW BACK PAIN, WITH SCIATICA PRESENCE UNSPECIFIED: ICD-10-CM

## 2018-09-25 ENCOUNTER — COMMUNICATION - HEALTHEAST (OUTPATIENT)
Dept: PALLIATIVE MEDICINE | Facility: OTHER | Age: 63
End: 2018-09-25

## 2018-09-25 ENCOUNTER — OFFICE VISIT - HEALTHEAST (OUTPATIENT)
Dept: SLEEP MEDICINE | Facility: CLINIC | Age: 63
End: 2018-09-25

## 2018-09-25 DIAGNOSIS — G47.33 OSA (OBSTRUCTIVE SLEEP APNEA): ICD-10-CM

## 2018-09-25 ASSESSMENT — MIFFLIN-ST. JEOR: SCORE: 1554.67

## 2018-10-01 ENCOUNTER — OFFICE VISIT - HEALTHEAST (OUTPATIENT)
Dept: PHYSICAL THERAPY | Facility: REHABILITATION | Age: 63
End: 2018-10-01

## 2018-10-01 DIAGNOSIS — M62.81 GENERALIZED MUSCLE WEAKNESS: ICD-10-CM

## 2018-10-01 DIAGNOSIS — M54.2 NECK PAIN: ICD-10-CM

## 2018-10-01 DIAGNOSIS — M54.12 CERVICAL RADICULITIS: ICD-10-CM

## 2018-10-01 DIAGNOSIS — M25.60 STIFFNESS OF MULTIPLE JOINTS: ICD-10-CM

## 2018-10-01 DIAGNOSIS — M54.5 ACUTE BILATERAL LOW BACK PAIN, WITH SCIATICA PRESENCE UNSPECIFIED: ICD-10-CM

## 2018-10-02 ENCOUNTER — COMMUNICATION - HEALTHEAST (OUTPATIENT)
Dept: NURSING | Facility: CLINIC | Age: 63
End: 2018-10-02

## 2018-10-08 ENCOUNTER — OFFICE VISIT - HEALTHEAST (OUTPATIENT)
Dept: PHYSICAL THERAPY | Facility: REHABILITATION | Age: 63
End: 2018-10-08

## 2018-10-08 DIAGNOSIS — M54.12 CERVICAL RADICULITIS: ICD-10-CM

## 2018-10-08 DIAGNOSIS — M54.2 NECK PAIN: ICD-10-CM

## 2018-10-08 DIAGNOSIS — M62.81 GENERALIZED MUSCLE WEAKNESS: ICD-10-CM

## 2018-10-08 DIAGNOSIS — M54.5 ACUTE BILATERAL LOW BACK PAIN, WITH SCIATICA PRESENCE UNSPECIFIED: ICD-10-CM

## 2018-10-08 DIAGNOSIS — M25.60 STIFFNESS OF MULTIPLE JOINTS: ICD-10-CM

## 2018-10-18 ENCOUNTER — COMMUNICATION - HEALTHEAST (OUTPATIENT)
Dept: SLEEP MEDICINE | Facility: CLINIC | Age: 63
End: 2018-10-18

## 2018-10-18 ENCOUNTER — RECORDS - HEALTHEAST (OUTPATIENT)
Dept: ADMINISTRATIVE | Facility: OTHER | Age: 63
End: 2018-10-18

## 2018-10-24 ENCOUNTER — OFFICE VISIT - HEALTHEAST (OUTPATIENT)
Dept: PHYSICAL THERAPY | Facility: REHABILITATION | Age: 63
End: 2018-10-24

## 2018-10-24 DIAGNOSIS — M54.2 NECK PAIN: ICD-10-CM

## 2018-10-24 DIAGNOSIS — M62.81 GENERALIZED MUSCLE WEAKNESS: ICD-10-CM

## 2018-10-24 DIAGNOSIS — M54.12 CERVICAL RADICULITIS: ICD-10-CM

## 2018-10-24 DIAGNOSIS — M25.60 STIFFNESS OF MULTIPLE JOINTS: ICD-10-CM

## 2018-10-24 DIAGNOSIS — M54.5 ACUTE BILATERAL LOW BACK PAIN, WITH SCIATICA PRESENCE UNSPECIFIED: ICD-10-CM

## 2018-10-29 ENCOUNTER — OFFICE VISIT - HEALTHEAST (OUTPATIENT)
Dept: PHYSICAL THERAPY | Facility: REHABILITATION | Age: 63
End: 2018-10-29

## 2018-10-29 DIAGNOSIS — M62.81 GENERALIZED MUSCLE WEAKNESS: ICD-10-CM

## 2018-10-29 DIAGNOSIS — M54.5 ACUTE BILATERAL LOW BACK PAIN, WITH SCIATICA PRESENCE UNSPECIFIED: ICD-10-CM

## 2018-10-29 DIAGNOSIS — M54.2 NECK PAIN: ICD-10-CM

## 2018-10-29 DIAGNOSIS — M54.12 CERVICAL RADICULITIS: ICD-10-CM

## 2018-10-29 DIAGNOSIS — M25.60 STIFFNESS OF MULTIPLE JOINTS: ICD-10-CM

## 2018-10-31 ENCOUNTER — OFFICE VISIT - HEALTHEAST (OUTPATIENT)
Dept: PHYSICAL THERAPY | Facility: REHABILITATION | Age: 63
End: 2018-10-31

## 2018-10-31 ENCOUNTER — COMMUNICATION - HEALTHEAST (OUTPATIENT)
Dept: INTERNAL MEDICINE | Facility: CLINIC | Age: 63
End: 2018-10-31

## 2018-10-31 DIAGNOSIS — M54.5 ACUTE BILATERAL LOW BACK PAIN, WITH SCIATICA PRESENCE UNSPECIFIED: ICD-10-CM

## 2018-10-31 DIAGNOSIS — R06.2 WHEEZING: ICD-10-CM

## 2018-10-31 DIAGNOSIS — M54.12 CERVICAL RADICULITIS: ICD-10-CM

## 2018-10-31 DIAGNOSIS — M54.2 NECK PAIN: ICD-10-CM

## 2018-10-31 DIAGNOSIS — M62.81 GENERALIZED MUSCLE WEAKNESS: ICD-10-CM

## 2018-10-31 DIAGNOSIS — M25.60 STIFFNESS OF MULTIPLE JOINTS: ICD-10-CM

## 2018-11-05 ENCOUNTER — OFFICE VISIT - HEALTHEAST (OUTPATIENT)
Dept: PHYSICAL THERAPY | Facility: REHABILITATION | Age: 63
End: 2018-11-05

## 2018-11-05 DIAGNOSIS — M54.5 ACUTE BILATERAL LOW BACK PAIN, WITH SCIATICA PRESENCE UNSPECIFIED: ICD-10-CM

## 2018-11-05 DIAGNOSIS — M62.81 GENERALIZED MUSCLE WEAKNESS: ICD-10-CM

## 2018-11-05 DIAGNOSIS — M54.2 NECK PAIN: ICD-10-CM

## 2018-11-05 DIAGNOSIS — M25.60 STIFFNESS OF MULTIPLE JOINTS: ICD-10-CM

## 2018-11-05 DIAGNOSIS — M54.12 CERVICAL RADICULITIS: ICD-10-CM

## 2018-11-07 ENCOUNTER — COMMUNICATION - HEALTHEAST (OUTPATIENT)
Dept: INTERNAL MEDICINE | Facility: CLINIC | Age: 63
End: 2018-11-07

## 2018-11-08 ENCOUNTER — OFFICE VISIT - HEALTHEAST (OUTPATIENT)
Dept: INTERNAL MEDICINE | Facility: CLINIC | Age: 63
End: 2018-11-08

## 2018-11-08 DIAGNOSIS — M54.50 CHRONIC MIDLINE LOW BACK PAIN WITHOUT SCIATICA: ICD-10-CM

## 2018-11-08 DIAGNOSIS — Z13.220 SCREENING FOR HYPERLIPIDEMIA: ICD-10-CM

## 2018-11-08 DIAGNOSIS — G47.33 OSA (OBSTRUCTIVE SLEEP APNEA): ICD-10-CM

## 2018-11-08 DIAGNOSIS — Z23 NEED FOR PROPHYLACTIC VACCINATION AND INOCULATION AGAINST INFLUENZA: ICD-10-CM

## 2018-11-08 DIAGNOSIS — R73.09 OTHER ABNORMAL GLUCOSE: ICD-10-CM

## 2018-11-08 DIAGNOSIS — E66.01 SEVERE OBESITY (H): ICD-10-CM

## 2018-11-08 DIAGNOSIS — G89.29 CHRONIC MIDLINE LOW BACK PAIN WITHOUT SCIATICA: ICD-10-CM

## 2018-11-08 LAB
CHOLEST SERPL-MCNC: 199 MG/DL
FASTING STATUS PATIENT QL REPORTED: YES
FASTING STATUS PATIENT QL REPORTED: YES
GLUCOSE BLD-MCNC: 100 MG/DL (ref 70–125)
HBA1C MFR BLD: 6 % (ref 3.5–6)
HDLC SERPL-MCNC: 57 MG/DL
LDLC SERPL CALC-MCNC: 132 MG/DL
TRIGL SERPL-MCNC: 50 MG/DL

## 2018-11-08 ASSESSMENT — MIFFLIN-ST. JEOR: SCORE: 1640.85

## 2018-11-12 ENCOUNTER — COMMUNICATION - HEALTHEAST (OUTPATIENT)
Dept: NURSING | Facility: CLINIC | Age: 63
End: 2018-11-12

## 2018-11-20 ENCOUNTER — COMMUNICATION - HEALTHEAST (OUTPATIENT)
Dept: NURSING | Facility: CLINIC | Age: 63
End: 2018-11-20

## 2018-11-26 ENCOUNTER — COMMUNICATION - HEALTHEAST (OUTPATIENT)
Dept: SCHEDULING | Facility: CLINIC | Age: 63
End: 2018-11-26

## 2018-12-04 ENCOUNTER — COMMUNICATION - HEALTHEAST (OUTPATIENT)
Dept: INTERNAL MEDICINE | Facility: CLINIC | Age: 63
End: 2018-12-04

## 2018-12-06 ENCOUNTER — OFFICE VISIT - HEALTHEAST (OUTPATIENT)
Dept: INTERNAL MEDICINE | Facility: CLINIC | Age: 63
End: 2018-12-06

## 2018-12-06 DIAGNOSIS — S06.0X0D CONCUSSION WITHOUT LOSS OF CONSCIOUSNESS, SUBSEQUENT ENCOUNTER: ICD-10-CM

## 2018-12-06 DIAGNOSIS — R51.9 NONINTRACTABLE HEADACHE, UNSPECIFIED CHRONICITY PATTERN, UNSPECIFIED HEADACHE TYPE: ICD-10-CM
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
## 2018-12-06 ASSESSMENT — MIFFLIN-ST. JEOR: SCORE: 1522.92

## 2018-12-11 ENCOUNTER — AMBULATORY - HEALTHEAST (OUTPATIENT)
Dept: NURSING | Facility: CLINIC | Age: 63
End: 2018-12-11

## 2018-12-11 ENCOUNTER — COMMUNICATION - HEALTHEAST (OUTPATIENT)
Dept: INTERNAL MEDICINE | Facility: CLINIC | Age: 63
End: 2018-12-11

## 2018-12-11 ENCOUNTER — COMMUNICATION - HEALTHEAST (OUTPATIENT)
Dept: NURSING | Facility: CLINIC | Age: 63
End: 2018-12-11

## 2019-01-02 ENCOUNTER — COMMUNICATION - HEALTHEAST (OUTPATIENT)
Dept: NURSING | Facility: CLINIC | Age: 64
End: 2019-01-02

## 2019-02-05 ENCOUNTER — COMMUNICATION - HEALTHEAST (OUTPATIENT)
Dept: NURSING | Facility: CLINIC | Age: 64
End: 2019-02-05

## 2019-02-05 ENCOUNTER — COMMUNICATION - HEALTHEAST (OUTPATIENT)
Dept: INTERNAL MEDICINE | Facility: CLINIC | Age: 64
End: 2019-02-05

## 2019-02-18 ENCOUNTER — COMMUNICATION - HEALTHEAST (OUTPATIENT)
Dept: INTERNAL MEDICINE | Facility: CLINIC | Age: 64
End: 2019-02-18

## 2019-02-19 ENCOUNTER — OFFICE VISIT - HEALTHEAST (OUTPATIENT)
Dept: INTERNAL MEDICINE | Facility: CLINIC | Age: 64
End: 2019-02-19

## 2019-02-19 DIAGNOSIS — D56.1 BETA THALASSEMIA (H): ICD-10-CM

## 2019-02-19 DIAGNOSIS — R06.2 WHEEZING: ICD-10-CM

## 2019-02-19 DIAGNOSIS — K92.1 HEMATOCHEZIA: ICD-10-CM

## 2019-02-19 DIAGNOSIS — R10.84 GENERALIZED ABDOMINAL PAIN: ICD-10-CM

## 2019-02-19 DIAGNOSIS — E66.01 SEVERE OBESITY (H): ICD-10-CM

## 2019-02-19 DIAGNOSIS — G47.33 OSA (OBSTRUCTIVE SLEEP APNEA): ICD-10-CM

## 2019-02-19 LAB
ERYTHROCYTE [DISTWIDTH] IN BLOOD BY AUTOMATED COUNT: 13.4 % (ref 11–14.5)
HCT VFR BLD AUTO: 35.2 % (ref 35–47)
HGB BLD-MCNC: 11.7 G/DL (ref 12–16)
MCH RBC QN AUTO: 24.7 PG (ref 27–34)
MCHC RBC AUTO-ENTMCNC: 33.3 G/DL (ref 32–36)
MCV RBC AUTO: 74 FL (ref 80–100)
PLATELET # BLD AUTO: 267 THOU/UL (ref 140–440)
PMV BLD AUTO: 9.3 FL (ref 7–10)
RBC # BLD AUTO: 4.74 MILL/UL (ref 3.8–5.4)
WBC: 4.3 THOU/UL (ref 4–11)

## 2019-02-19 ASSESSMENT — MIFFLIN-ST. JEOR: SCORE: 1536.52

## 2019-03-08 ENCOUNTER — COMMUNICATION - HEALTHEAST (OUTPATIENT)
Dept: NURSING | Facility: CLINIC | Age: 64
End: 2019-03-08

## 2019-03-19 ENCOUNTER — OFFICE VISIT - HEALTHEAST (OUTPATIENT)
Dept: INTERNAL MEDICINE | Facility: CLINIC | Age: 64
End: 2019-03-19

## 2019-03-19 DIAGNOSIS — G47.33 OSA (OBSTRUCTIVE SLEEP APNEA): ICD-10-CM

## 2019-03-19 DIAGNOSIS — R41.3 MEMORY LOSS: ICD-10-CM

## 2019-03-19 DIAGNOSIS — R30.9 PAINFUL URINATION: ICD-10-CM

## 2019-03-19 DIAGNOSIS — E66.01 MORBID OBESITY (H): ICD-10-CM

## 2019-03-19 DIAGNOSIS — E11.9 TYPE 2 DIABETES MELLITUS WITHOUT COMPLICATION, WITHOUT LONG-TERM CURRENT USE OF INSULIN (H): ICD-10-CM

## 2019-03-19 DIAGNOSIS — D56.1 BETA THALASSEMIA (H): ICD-10-CM

## 2019-03-19 LAB
ALBUMIN UR-MCNC: NEGATIVE MG/DL
APPEARANCE UR: CLEAR
BILIRUB UR QL STRIP: NEGATIVE
COLOR UR AUTO: YELLOW
GLUCOSE UR STRIP-MCNC: NEGATIVE MG/DL
HBA1C MFR BLD: 6 % (ref 3.5–6)
HGB UR QL STRIP: NEGATIVE
KETONES UR STRIP-MCNC: NEGATIVE MG/DL
LEUKOCYTE ESTERASE UR QL STRIP: NEGATIVE
NITRATE UR QL: NEGATIVE
PH UR STRIP: 6 [PH] (ref 5–8)
SP GR UR STRIP: 1.02 (ref 1–1.03)
UROBILINOGEN UR STRIP-ACNC: NORMAL

## 2019-03-19 ASSESSMENT — MIFFLIN-ST. JEOR: SCORE: 1522.92

## 2019-03-20 LAB
CREAT UR-MCNC: 145.2 MG/DL
MICROALBUMIN UR-MCNC: 1.19 MG/DL (ref 0–1.99)
MICROALBUMIN/CREAT UR: 8.2 MG/G

## 2019-03-28 ENCOUNTER — AMBULATORY - HEALTHEAST (OUTPATIENT)
Dept: INTERNAL MEDICINE | Facility: CLINIC | Age: 64
End: 2019-03-28

## 2019-03-28 ENCOUNTER — COMMUNICATION - HEALTHEAST (OUTPATIENT)
Dept: INTERNAL MEDICINE | Facility: CLINIC | Age: 64
End: 2019-03-28

## 2019-03-28 DIAGNOSIS — S06.0X0D CONCUSSION WITHOUT LOSS OF CONSCIOUSNESS, SUBSEQUENT ENCOUNTER: ICD-10-CM

## 2019-03-28 DIAGNOSIS — S06.9XAA MILD TBI (H): ICD-10-CM

## 2019-04-09 ENCOUNTER — COMMUNICATION - HEALTHEAST (OUTPATIENT)
Dept: NURSING | Facility: CLINIC | Age: 64
End: 2019-04-09

## 2019-04-18 ENCOUNTER — COMMUNICATION - HEALTHEAST (OUTPATIENT)
Dept: NURSING | Facility: CLINIC | Age: 64
End: 2019-04-18

## 2019-04-26 ENCOUNTER — COMMUNICATION - HEALTHEAST (OUTPATIENT)
Dept: NURSING | Facility: CLINIC | Age: 64
End: 2019-04-26

## 2019-05-31 ENCOUNTER — COMMUNICATION - HEALTHEAST (OUTPATIENT)
Dept: NURSING | Facility: CLINIC | Age: 64
End: 2019-05-31

## 2019-06-13 ENCOUNTER — OFFICE VISIT - HEALTHEAST (OUTPATIENT)
Dept: INTERNAL MEDICINE | Facility: CLINIC | Age: 64
End: 2019-06-13

## 2019-06-13 DIAGNOSIS — F43.23 ADJUSTMENT DISORDER WITH MIXED ANXIETY AND DEPRESSED MOOD: ICD-10-CM

## 2019-06-13 DIAGNOSIS — F07.81 POSTCONCUSSION SYNDROME: ICD-10-CM

## 2019-06-13 DIAGNOSIS — G47.33 OSA (OBSTRUCTIVE SLEEP APNEA): ICD-10-CM

## 2019-06-13 DIAGNOSIS — E66.01 MORBID OBESITY (H): ICD-10-CM

## 2019-06-13 DIAGNOSIS — R10.84 GENERALIZED ABDOMINAL PAIN: ICD-10-CM

## 2019-06-13 DIAGNOSIS — Z12.31 VISIT FOR SCREENING MAMMOGRAM: ICD-10-CM

## 2019-06-13 ASSESSMENT — MIFFLIN-ST. JEOR: SCORE: 1550.13

## 2019-07-10 ENCOUNTER — COMMUNICATION - HEALTHEAST (OUTPATIENT)
Dept: NURSING | Facility: CLINIC | Age: 64
End: 2019-07-10

## 2019-07-17 ENCOUNTER — COMMUNICATION - HEALTHEAST (OUTPATIENT)
Dept: NURSING | Facility: CLINIC | Age: 64
End: 2019-07-17

## 2019-07-31 ENCOUNTER — RECORDS - HEALTHEAST (OUTPATIENT)
Dept: ADMINISTRATIVE | Facility: OTHER | Age: 64
End: 2019-07-31

## 2019-08-08 ENCOUNTER — COMMUNICATION - HEALTHEAST (OUTPATIENT)
Dept: CARE COORDINATION | Facility: CLINIC | Age: 64
End: 2019-08-08

## 2019-08-15 ENCOUNTER — OFFICE VISIT - HEALTHEAST (OUTPATIENT)
Dept: INTERNAL MEDICINE | Facility: CLINIC | Age: 64
End: 2019-08-15

## 2019-08-15 ENCOUNTER — COMMUNICATION - HEALTHEAST (OUTPATIENT)
Dept: NURSING | Facility: CLINIC | Age: 64
End: 2019-08-15

## 2019-08-15 DIAGNOSIS — M54.5 LOW BACK PAIN, UNSPECIFIED BACK PAIN LATERALITY, UNSPECIFIED CHRONICITY, WITH SCIATICA PRESENCE UNSPECIFIED: ICD-10-CM

## 2019-08-15 DIAGNOSIS — S09.90XS HEADACHES DUE TO OLD HEAD INJURY: ICD-10-CM

## 2019-08-15 DIAGNOSIS — E66.01 MORBID OBESITY (H): ICD-10-CM

## 2019-08-15 DIAGNOSIS — G44.309 HEADACHES DUE TO OLD HEAD INJURY: ICD-10-CM

## 2019-08-15 DIAGNOSIS — F43.23 ADJUSTMENT DISORDER WITH MIXED ANXIETY AND DEPRESSED MOOD: ICD-10-CM

## 2019-08-15 DIAGNOSIS — F07.81 POSTCONCUSSION SYNDROME: ICD-10-CM

## 2019-08-15 ASSESSMENT — MIFFLIN-ST. JEOR: SCORE: 1518.38

## 2019-08-19 ENCOUNTER — HOSPITAL ENCOUNTER (OUTPATIENT)
Dept: MAMMOGRAPHY | Facility: CLINIC | Age: 64
Discharge: HOME | End: 2019-08-19
Attending: INTERNAL MEDICINE

## 2019-08-19 DIAGNOSIS — Z12.31 VISIT FOR SCREENING MAMMOGRAM: ICD-10-CM

## 2019-08-20 ENCOUNTER — COMMUNICATION - HEALTHEAST (OUTPATIENT)
Dept: PHYSICAL MEDICINE AND REHAB | Facility: CLINIC | Age: 64
End: 2019-08-20

## 2019-08-20 ENCOUNTER — HOSPITAL ENCOUNTER (OUTPATIENT)
Dept: PHYSICAL MEDICINE AND REHAB | Facility: CLINIC | Age: 64
Discharge: HOME | End: 2019-08-20
Attending: INTERNAL MEDICINE

## 2019-08-20 DIAGNOSIS — M54.16 LUMBAR RADICULITIS: ICD-10-CM
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
## 2019-08-23 ENCOUNTER — COMMUNICATION - HEALTHEAST (OUTPATIENT)
Dept: NURSING | Facility: CLINIC | Age: 64
End: 2019-08-23

## 2019-08-28 ENCOUNTER — COMMUNICATION - HEALTHEAST (OUTPATIENT)
Dept: NURSING | Facility: CLINIC | Age: 64
End: 2019-08-28

## 2019-08-29 ENCOUNTER — COMMUNICATION - HEALTHEAST (OUTPATIENT)
Dept: NURSING | Facility: CLINIC | Age: 64
End: 2019-08-29

## 2019-08-29 ENCOUNTER — AMBULATORY - HEALTHEAST (OUTPATIENT)
Dept: NURSING | Facility: CLINIC | Age: 64
End: 2019-08-29

## 2019-09-03 ENCOUNTER — COMMUNICATION - HEALTHEAST (OUTPATIENT)
Dept: NURSING | Facility: CLINIC | Age: 64
End: 2019-09-03

## 2019-09-11 ENCOUNTER — COMMUNICATION - HEALTHEAST (OUTPATIENT)
Dept: CARE COORDINATION | Facility: CLINIC | Age: 64
End: 2019-09-11

## 2019-09-27 ENCOUNTER — COMMUNICATION - HEALTHEAST (OUTPATIENT)
Dept: NURSING | Facility: CLINIC | Age: 64
End: 2019-09-27

## 2019-10-11 ENCOUNTER — COMMUNICATION - HEALTHEAST (OUTPATIENT)
Dept: INTERNAL MEDICINE | Facility: CLINIC | Age: 64
End: 2019-10-11

## 2019-10-11 ENCOUNTER — OFFICE VISIT - HEALTHEAST (OUTPATIENT)
Dept: INTERNAL MEDICINE | Facility: CLINIC | Age: 64
End: 2019-10-11

## 2019-10-11 DIAGNOSIS — E11.9 TYPE 2 DIABETES MELLITUS WITHOUT COMPLICATION, WITHOUT LONG-TERM CURRENT USE OF INSULIN (H): ICD-10-CM

## 2019-10-11 DIAGNOSIS — F43.23 ADJUSTMENT DISORDER WITH MIXED ANXIETY AND DEPRESSED MOOD: ICD-10-CM

## 2019-10-11 DIAGNOSIS — M79.671 CHRONIC HEEL PAIN, RIGHT: ICD-10-CM

## 2019-10-11 DIAGNOSIS — G89.29 OTHER CHRONIC PAIN: ICD-10-CM

## 2019-10-11 DIAGNOSIS — G89.29 CHRONIC HEEL PAIN, RIGHT: ICD-10-CM

## 2019-10-11 DIAGNOSIS — F33.1 MODERATE EPISODE OF RECURRENT MAJOR DEPRESSIVE DISORDER (H): ICD-10-CM

## 2019-10-11 DIAGNOSIS — Z23 NEED FOR PROPHYLACTIC VACCINATION AND INOCULATION AGAINST INFLUENZA: ICD-10-CM

## 2019-10-11 DIAGNOSIS — J30.1 ALLERGIC RHINITIS DUE TO POLLEN, UNSPECIFIED SEASONALITY: ICD-10-CM

## 2019-10-11 DIAGNOSIS — R06.2 WHEEZING: ICD-10-CM

## 2019-10-11 ASSESSMENT — MIFFLIN-ST. JEOR: SCORE: 1541.06

## 2019-10-17 ENCOUNTER — COMMUNICATION - HEALTHEAST (OUTPATIENT)
Dept: NURSING | Facility: CLINIC | Age: 64
End: 2019-10-17

## 2019-10-24 ENCOUNTER — OFFICE VISIT - HEALTHEAST (OUTPATIENT)
Dept: INTERNAL MEDICINE | Facility: CLINIC | Age: 64
End: 2019-10-24

## 2019-10-24 DIAGNOSIS — F07.81 POST CONCUSSION SYNDROME: ICD-10-CM

## 2019-10-24 DIAGNOSIS — E11.9 TYPE 2 DIABETES MELLITUS WITHOUT COMPLICATION, WITHOUT LONG-TERM CURRENT USE OF INSULIN (H): ICD-10-CM

## 2019-10-24 DIAGNOSIS — M54.50 CHRONIC MIDLINE LOW BACK PAIN WITHOUT SCIATICA: ICD-10-CM

## 2019-10-24 DIAGNOSIS — F32.1 MODERATE MAJOR DEPRESSION (H): ICD-10-CM

## 2019-10-24 DIAGNOSIS — E66.01 MORBID OBESITY (H): ICD-10-CM

## 2019-10-24 DIAGNOSIS — G89.29 CHRONIC MIDLINE LOW BACK PAIN WITHOUT SCIATICA: ICD-10-CM

## 2019-10-24 ASSESSMENT — MIFFLIN-ST. JEOR: SCORE: 1545.6

## 2019-10-24 ASSESSMENT — PATIENT HEALTH QUESTIONNAIRE - PHQ9: SUM OF ALL RESPONSES TO PHQ QUESTIONS 1-9: 2

## 2019-11-04 ENCOUNTER — RECORDS - HEALTHEAST (OUTPATIENT)
Dept: ADMINISTRATIVE | Facility: OTHER | Age: 64
End: 2019-11-04

## 2019-11-08 ENCOUNTER — COMMUNICATION - HEALTHEAST (OUTPATIENT)
Dept: CARE COORDINATION | Facility: CLINIC | Age: 64
End: 2019-11-08

## 2019-11-12 ENCOUNTER — HOSPITAL ENCOUNTER (OUTPATIENT)
Dept: MRI IMAGING | Facility: CLINIC | Age: 64
Discharge: HOME | End: 2019-11-12
Attending: PHYSICIAN ASSISTANT
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
## 2019-11-12 DIAGNOSIS — M54.16 LUMBAR RADICULITIS: ICD-10-CM

## 2019-11-14 ENCOUNTER — COMMUNICATION - HEALTHEAST (OUTPATIENT)
Dept: PHYSICAL MEDICINE AND REHAB | Facility: CLINIC | Age: 64
End: 2019-11-14

## 2019-11-18 ENCOUNTER — HOSPITAL ENCOUNTER (OUTPATIENT)
Dept: PHYSICAL MEDICINE AND REHAB | Facility: CLINIC | Age: 64
Discharge: HOME | End: 2019-11-18
Attending: PHYSICIAN ASSISTANT
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
## 2019-11-18 DIAGNOSIS — M47.816 LUMBAR FACET ARTHROPATHY: ICD-10-CM

## 2019-11-18 DIAGNOSIS — M54.16 LUMBAR RADICULITIS: ICD-10-CM

## 2019-11-18 ASSESSMENT — MIFFLIN-ST. JEOR: SCORE: 1540.15

## 2019-11-22 ENCOUNTER — RECORDS - HEALTHEAST (OUTPATIENT)
Dept: ADMINISTRATIVE | Facility: OTHER | Age: 64
End: 2019-11-22

## 2019-11-26 ENCOUNTER — COMMUNICATION - HEALTHEAST (OUTPATIENT)
Dept: NURSING | Facility: CLINIC | Age: 64
End: 2019-11-26

## 2019-12-16 ENCOUNTER — OFFICE VISIT - HEALTHEAST (OUTPATIENT)
Dept: PHYSICAL THERAPY | Facility: CLINIC | Age: 64
End: 2019-12-16

## 2019-12-16 DIAGNOSIS — G89.29 CHRONIC RIGHT-SIDED LOW BACK PAIN WITH RIGHT-SIDED SCIATICA: ICD-10-CM

## 2019-12-16 DIAGNOSIS — M54.41 CHRONIC RIGHT-SIDED LOW BACK PAIN WITH RIGHT-SIDED SCIATICA: ICD-10-CM

## 2019-12-16 DIAGNOSIS — M53.86 DECREASED ROM OF LUMBAR SPINE: ICD-10-CM

## 2019-12-30 ENCOUNTER — COMMUNICATION - HEALTHEAST (OUTPATIENT)
Dept: NURSING | Facility: CLINIC | Age: 64
End: 2019-12-30

## 2020-01-06 ENCOUNTER — OFFICE VISIT - HEALTHEAST (OUTPATIENT)
Dept: PHYSICAL THERAPY | Facility: CLINIC | Age: 65
End: 2020-01-06

## 2020-01-06 DIAGNOSIS — G89.29 CHRONIC RIGHT-SIDED LOW BACK PAIN WITH RIGHT-SIDED SCIATICA: ICD-10-CM

## 2020-01-06 DIAGNOSIS — M53.86 DECREASED ROM OF LUMBAR SPINE: ICD-10-CM

## 2020-01-06 DIAGNOSIS — M54.41 CHRONIC RIGHT-SIDED LOW BACK PAIN WITH RIGHT-SIDED SCIATICA: ICD-10-CM

## 2020-01-07 ENCOUNTER — OFFICE VISIT - HEALTHEAST (OUTPATIENT)
Dept: INTERNAL MEDICINE | Facility: CLINIC | Age: 65
End: 2020-01-07

## 2020-01-07 DIAGNOSIS — D56.1 BETA THALASSEMIA (H): ICD-10-CM

## 2020-01-07 DIAGNOSIS — F32.1 MODERATE MAJOR DEPRESSION (H): ICD-10-CM

## 2020-01-07 DIAGNOSIS — M72.2 PLANTAR FASCIITIS: ICD-10-CM

## 2020-01-07 DIAGNOSIS — E66.01 MORBID OBESITY (H): ICD-10-CM

## 2020-01-07 DIAGNOSIS — E11.9 TYPE 2 DIABETES MELLITUS WITHOUT COMPLICATION, WITHOUT LONG-TERM CURRENT USE OF INSULIN (H): ICD-10-CM

## 2020-01-07 ASSESSMENT — MIFFLIN-ST. JEOR: SCORE: 1540.6

## 2020-01-08 ENCOUNTER — COMMUNICATION - HEALTHEAST (OUTPATIENT)
Dept: CARE COORDINATION | Facility: CLINIC | Age: 65
End: 2020-01-08

## 2020-01-09 ENCOUNTER — COMMUNICATION - HEALTHEAST (OUTPATIENT)
Dept: CARE COORDINATION | Facility: CLINIC | Age: 65
End: 2020-01-09

## 2020-01-09 ENCOUNTER — COMMUNICATION - HEALTHEAST (OUTPATIENT)
Dept: PHYSICAL MEDICINE AND REHAB | Facility: CLINIC | Age: 65
End: 2020-01-09

## 2020-01-09 DIAGNOSIS — M54.16 LUMBAR RADICULAR PAIN: ICD-10-CM

## 2020-01-10 ENCOUNTER — HOSPITAL ENCOUNTER (OUTPATIENT)
Dept: PHYSICAL MEDICINE AND REHAB | Facility: CLINIC | Age: 65
Discharge: HOME | End: 2020-01-10
Attending: PHYSICIAN ASSISTANT

## 2020-01-10 DIAGNOSIS — M47.816 LUMBAR FACET ARTHROPATHY: ICD-10-CM

## 2020-01-13 ENCOUNTER — OFFICE VISIT - HEALTHEAST (OUTPATIENT)
Dept: PHYSICAL THERAPY | Facility: CLINIC | Age: 65
End: 2020-01-13

## 2020-01-13 ENCOUNTER — COMMUNICATION - HEALTHEAST (OUTPATIENT)
Dept: PHYSICAL MEDICINE AND REHAB | Facility: CLINIC | Age: 65
End: 2020-01-13

## 2020-01-13 DIAGNOSIS — M53.86 DECREASED ROM OF LUMBAR SPINE: ICD-10-CM

## 2020-01-13 DIAGNOSIS — G89.29 CHRONIC RIGHT-SIDED LOW BACK PAIN WITH RIGHT-SIDED SCIATICA: ICD-10-CM

## 2020-01-13 DIAGNOSIS — M54.41 CHRONIC RIGHT-SIDED LOW BACK PAIN WITH RIGHT-SIDED SCIATICA: ICD-10-CM

## 2020-01-13 DIAGNOSIS — M54.2 NECK PAIN: ICD-10-CM

## 2020-01-23 ENCOUNTER — COMMUNICATION - HEALTHEAST (OUTPATIENT)
Dept: INTERNAL MEDICINE | Facility: CLINIC | Age: 65
End: 2020-01-23

## 2020-01-24 ENCOUNTER — COMMUNICATION - HEALTHEAST (OUTPATIENT)
Dept: NURSING | Facility: CLINIC | Age: 65
End: 2020-01-24

## 2020-01-26 ASSESSMENT — MIFFLIN-ST. JEOR: SCORE: 1495.24

## 2020-01-29 ENCOUNTER — ANESTHESIA - HEALTHEAST (OUTPATIENT)
Dept: SURGERY | Facility: CLINIC | Age: 65
End: 2020-01-29

## 2020-01-29 ENCOUNTER — SURGERY - HEALTHEAST (OUTPATIENT)
Dept: SURGERY | Facility: CLINIC | Age: 65
End: 2020-01-29

## 2020-01-30 ASSESSMENT — MIFFLIN-ST. JEOR: SCORE: 1496.14

## 2020-01-31 ENCOUNTER — COMMUNICATION - HEALTHEAST (OUTPATIENT)
Dept: NURSING | Facility: CLINIC | Age: 65
End: 2020-01-31

## 2020-01-31 ENCOUNTER — COMMUNICATION - HEALTHEAST (OUTPATIENT)
Dept: INTERNAL MEDICINE | Facility: CLINIC | Age: 65
End: 2020-01-31

## 2020-02-03 ENCOUNTER — AMBULATORY - HEALTHEAST (OUTPATIENT)
Dept: PHARMACY | Facility: CLINIC | Age: 65
End: 2020-02-03

## 2020-02-03 ENCOUNTER — COMMUNICATION - HEALTHEAST (OUTPATIENT)
Dept: CARE COORDINATION | Facility: CLINIC | Age: 65
End: 2020-02-03

## 2020-02-03 ENCOUNTER — HOSPITAL ENCOUNTER (OUTPATIENT)
Dept: NEUROLOGY | Facility: CLINIC | Age: 65
Setting detail: THERAPIES SERIES
Discharge: STILL ADMITTED OR ADMINISTRATIVE | End: 2020-02-03
Attending: INTERNAL MEDICINE

## 2020-02-03 DIAGNOSIS — K59.00 CONSTIPATION, UNSPECIFIED CONSTIPATION TYPE: ICD-10-CM

## 2020-02-03 DIAGNOSIS — E55.9 VITAMIN D DEFICIENCY: ICD-10-CM

## 2020-02-03 DIAGNOSIS — G60.9 HEREDITARY AND IDIOPATHIC PERIPHERAL NEUROPATHY: ICD-10-CM

## 2020-02-03 DIAGNOSIS — H81.399 PERIPHERAL VERTIGO, UNSPECIFIED LATERALITY: ICD-10-CM

## 2020-02-03 DIAGNOSIS — E11.9 TYPE 2 DIABETES MELLITUS WITHOUT COMPLICATION, WITHOUT LONG-TERM CURRENT USE OF INSULIN (H): ICD-10-CM

## 2020-02-03 DIAGNOSIS — F32.1 MODERATE MAJOR DEPRESSION (H): ICD-10-CM

## 2020-02-03 DIAGNOSIS — R06.02 SOB (SHORTNESS OF BREATH): ICD-10-CM

## 2020-02-07 ENCOUNTER — OFFICE VISIT - HEALTHEAST (OUTPATIENT)
Dept: INTERNAL MEDICINE | Facility: CLINIC | Age: 65
End: 2020-02-07

## 2020-02-07 DIAGNOSIS — H81.10 BENIGN PAROXYSMAL POSITIONAL VERTIGO, UNSPECIFIED LATERALITY: ICD-10-CM

## 2020-02-07 DIAGNOSIS — E78.5 HYPERLIPIDEMIA: ICD-10-CM

## 2020-02-07 DIAGNOSIS — R51.9 NONINTRACTABLE EPISODIC HEADACHE, UNSPECIFIED HEADACHE TYPE: ICD-10-CM

## 2020-02-07 DIAGNOSIS — E11.9 TYPE 2 DIABETES MELLITUS WITHOUT COMPLICATION, WITHOUT LONG-TERM CURRENT USE OF INSULIN (H): ICD-10-CM

## 2020-02-07 LAB
CHOLEST SERPL-MCNC: 133 MG/DL
FASTING STATUS PATIENT QL REPORTED: YES
HBA1C MFR BLD: 6.3 % (ref 3.5–6)
HDLC SERPL-MCNC: 49 MG/DL
LDLC SERPL CALC-MCNC: 73 MG/DL
TRIGL SERPL-MCNC: 56 MG/DL

## 2020-02-07 ASSESSMENT — MIFFLIN-ST. JEOR: SCORE: 1508.84

## 2020-02-12 ENCOUNTER — COMMUNICATION - HEALTHEAST (OUTPATIENT)
Dept: NURSING | Facility: CLINIC | Age: 65
End: 2020-02-12

## 2020-02-20 ENCOUNTER — COMMUNICATION - HEALTHEAST (OUTPATIENT)
Dept: NURSING | Facility: CLINIC | Age: 65
End: 2020-02-20

## 2020-02-25 ENCOUNTER — HOSPITAL ENCOUNTER (OUTPATIENT)
Dept: NEUROLOGY | Facility: CLINIC | Age: 65
Setting detail: THERAPIES SERIES
Discharge: STILL ADMITTED OR ADMINISTRATIVE | End: 2020-02-25
Attending: INTERNAL MEDICINE

## 2020-02-25 DIAGNOSIS — R53.83 FATIGUE, UNSPECIFIED TYPE: ICD-10-CM

## 2020-02-25 DIAGNOSIS — F07.81 POST CONCUSSION SYNDROME: ICD-10-CM

## 2020-02-25 DIAGNOSIS — R41.840 ATTENTION AND CONCENTRATION DEFICIT: ICD-10-CM

## 2020-02-25 DIAGNOSIS — G44.309 POST-CONCUSSION HEADACHE: ICD-10-CM

## 2020-02-25 DIAGNOSIS — Z76.89 RETURN TO WORK EVALUATION: ICD-10-CM

## 2020-02-25 DIAGNOSIS — R42 DIZZINESS: ICD-10-CM

## 2020-02-25 DIAGNOSIS — G47.00 INSOMNIA, UNSPECIFIED TYPE: ICD-10-CM

## 2020-02-25 DIAGNOSIS — R41.3 MEMORY DIFFICULTY: ICD-10-CM

## 2020-02-25 DIAGNOSIS — R68.89 SENSITIVITY TO LIGHT: ICD-10-CM

## 2020-02-25 DIAGNOSIS — F06.4 ANXIETY DISORDER DUE TO MEDICAL CONDITION: ICD-10-CM

## 2020-02-25 DIAGNOSIS — F07.81 POSTCONCUSSION SYNDROME: ICD-10-CM

## 2020-03-11 ENCOUNTER — OFFICE VISIT - HEALTHEAST (OUTPATIENT)
Dept: OCCUPATIONAL THERAPY | Facility: REHABILITATION | Age: 65
End: 2020-03-11

## 2020-03-11 DIAGNOSIS — Z78.9 DECREASED ACTIVITIES OF DAILY LIVING (ADL): ICD-10-CM

## 2020-03-11 DIAGNOSIS — R26.81 UNSTEADINESS ON FEET: ICD-10-CM

## 2020-03-11 DIAGNOSIS — R42 DIZZINESS: ICD-10-CM

## 2020-03-13 ENCOUNTER — HOSPITAL ENCOUNTER (OUTPATIENT)
Dept: PHYSICAL THERAPY | Age: 65
Setting detail: THERAPIES SERIES
Discharge: STILL ADMITTED OR ADMINISTRATIVE | End: 2020-03-13
Attending: NURSE PRACTITIONER

## 2020-03-13 ENCOUNTER — COMMUNICATION - HEALTHEAST (OUTPATIENT)
Dept: NURSING | Facility: CLINIC | Age: 65
End: 2020-03-13

## 2020-03-13 DIAGNOSIS — R26.89 UNSTABLE BALANCE: ICD-10-CM

## 2020-03-13 DIAGNOSIS — R29.898 DECREASED STRENGTH OF LOWER EXTREMITY: ICD-10-CM

## 2020-03-13 DIAGNOSIS — R42 DIZZINESS: ICD-10-CM

## 2020-03-13 DIAGNOSIS — F07.81 POST CONCUSSION SYNDROME: ICD-10-CM

## 2020-03-20 ENCOUNTER — COMMUNICATION - HEALTHEAST (OUTPATIENT)
Dept: SCHEDULING | Facility: CLINIC | Age: 65
End: 2020-03-20

## 2020-03-20 ENCOUNTER — COMMUNICATION - HEALTHEAST (OUTPATIENT)
Dept: INTERNAL MEDICINE | Facility: CLINIC | Age: 65
End: 2020-03-20

## 2020-03-20 DIAGNOSIS — H81.399 PERIPHERAL VERTIGO, UNSPECIFIED LATERALITY: ICD-10-CM

## 2020-03-25 ENCOUNTER — COMMUNICATION - HEALTHEAST (OUTPATIENT)
Dept: NURSING | Facility: CLINIC | Age: 65
End: 2020-03-25

## 2020-03-30 ENCOUNTER — COMMUNICATION - HEALTHEAST (OUTPATIENT)
Dept: INTERNAL MEDICINE | Facility: CLINIC | Age: 65
End: 2020-03-30

## 2020-03-31 ENCOUNTER — HOSPITAL ENCOUNTER (OUTPATIENT)
Dept: NEUROLOGY | Facility: CLINIC | Age: 65
Setting detail: THERAPIES SERIES
Discharge: STILL ADMITTED OR ADMINISTRATIVE | End: 2020-03-31
Attending: NURSE PRACTITIONER

## 2020-04-02 ENCOUNTER — COMMUNICATION - HEALTHEAST (OUTPATIENT)
Dept: INTERNAL MEDICINE | Facility: CLINIC | Age: 65
End: 2020-04-02

## 2020-04-02 ENCOUNTER — COMMUNICATION - HEALTHEAST (OUTPATIENT)
Dept: NURSING | Facility: CLINIC | Age: 65
End: 2020-04-02

## 2020-04-02 ENCOUNTER — OFFICE VISIT - HEALTHEAST (OUTPATIENT)
Dept: INTERNAL MEDICINE | Facility: CLINIC | Age: 65
End: 2020-04-02

## 2020-04-02 DIAGNOSIS — R06.2 WHEEZING: ICD-10-CM

## 2020-04-21 ENCOUNTER — COMMUNICATION - HEALTHEAST (OUTPATIENT)
Dept: CARE COORDINATION | Facility: CLINIC | Age: 65
End: 2020-04-21

## 2020-04-22 ENCOUNTER — HOSPITAL ENCOUNTER (OUTPATIENT)
Dept: NEUROLOGY | Facility: CLINIC | Age: 65
Setting detail: THERAPIES SERIES
Discharge: STILL ADMITTED OR ADMINISTRATIVE | End: 2020-04-22
Attending: NURSE PRACTITIONER

## 2020-04-22 ENCOUNTER — COMMUNICATION - HEALTHEAST (OUTPATIENT)
Dept: INTERNAL MEDICINE | Facility: CLINIC | Age: 65
End: 2020-04-22

## 2020-04-22 DIAGNOSIS — F33.1 MAJOR DEPRESSIVE DISORDER, RECURRENT EPISODE, MODERATE (H): ICD-10-CM

## 2020-04-23 ENCOUNTER — OFFICE VISIT - HEALTHEAST (OUTPATIENT)
Dept: INTERNAL MEDICINE | Facility: CLINIC | Age: 65
End: 2020-04-23

## 2020-04-23 ENCOUNTER — COMMUNICATION - HEALTHEAST (OUTPATIENT)
Dept: INTERNAL MEDICINE | Facility: CLINIC | Age: 65
End: 2020-04-23

## 2020-04-23 ENCOUNTER — COMMUNICATION - HEALTHEAST (OUTPATIENT)
Dept: NURSING | Facility: CLINIC | Age: 65
End: 2020-04-23

## 2020-04-23 DIAGNOSIS — E11.9 TYPE 2 DIABETES MELLITUS WITHOUT COMPLICATION, WITHOUT LONG-TERM CURRENT USE OF INSULIN (H): ICD-10-CM

## 2020-04-23 DIAGNOSIS — E66.01 MORBID OBESITY (H): ICD-10-CM

## 2020-04-23 DIAGNOSIS — F06.4 ANXIETY DISORDER DUE TO MEDICAL CONDITION: ICD-10-CM

## 2020-04-23 DIAGNOSIS — G47.33 OSA (OBSTRUCTIVE SLEEP APNEA): ICD-10-CM

## 2020-04-23 DIAGNOSIS — F32.1 MODERATE MAJOR DEPRESSION (H): ICD-10-CM

## 2020-04-23 DIAGNOSIS — H81.399 PERIPHERAL VERTIGO, UNSPECIFIED LATERALITY: ICD-10-CM

## 2020-04-23 DIAGNOSIS — J44.1 COPD EXACERBATION (H): ICD-10-CM

## 2020-04-23 ASSESSMENT — PATIENT HEALTH QUESTIONNAIRE - PHQ9: SUM OF ALL RESPONSES TO PHQ QUESTIONS 1-9: 0

## 2020-04-24 ENCOUNTER — AMBULATORY - HEALTHEAST (OUTPATIENT)
Dept: NEUROLOGY | Facility: CLINIC | Age: 65
End: 2020-04-24

## 2020-04-29 ENCOUNTER — HOSPITAL ENCOUNTER (OUTPATIENT)
Dept: NEUROLOGY | Facility: CLINIC | Age: 65
Setting detail: THERAPIES SERIES
Discharge: STILL ADMITTED OR ADMINISTRATIVE | End: 2020-04-29
Attending: NURSE PRACTITIONER

## 2020-04-29 DIAGNOSIS — F33.1 MAJOR DEPRESSIVE DISORDER, RECURRENT EPISODE, MODERATE (H): ICD-10-CM

## 2020-05-06 ENCOUNTER — HOSPITAL ENCOUNTER (OUTPATIENT)
Dept: NEUROLOGY | Facility: CLINIC | Age: 65
Setting detail: THERAPIES SERIES
Discharge: STILL ADMITTED OR ADMINISTRATIVE | End: 2020-05-06
Attending: NURSE PRACTITIONER

## 2020-05-06 DIAGNOSIS — F33.1 MAJOR DEPRESSIVE DISORDER, RECURRENT EPISODE, MODERATE (H): ICD-10-CM

## 2020-05-12 ENCOUNTER — COMMUNICATION - HEALTHEAST (OUTPATIENT)
Dept: NURSING | Facility: CLINIC | Age: 65
End: 2020-05-12

## 2020-05-21 ENCOUNTER — COMMUNICATION - HEALTHEAST (OUTPATIENT)
Dept: NURSING | Facility: CLINIC | Age: 65
End: 2020-05-21

## 2020-05-26 ENCOUNTER — OFFICE VISIT - HEALTHEAST (OUTPATIENT)
Dept: INTERNAL MEDICINE | Facility: CLINIC | Age: 65
End: 2020-05-26

## 2020-05-26 DIAGNOSIS — E11.9 TYPE 2 DIABETES MELLITUS WITHOUT COMPLICATION, WITHOUT LONG-TERM CURRENT USE OF INSULIN (H): ICD-10-CM

## 2020-05-26 DIAGNOSIS — G47.33 OSA (OBSTRUCTIVE SLEEP APNEA): ICD-10-CM

## 2020-05-26 DIAGNOSIS — F32.1 MODERATE MAJOR DEPRESSION (H): ICD-10-CM

## 2020-05-26 DIAGNOSIS — H81.399 PERIPHERAL VERTIGO, UNSPECIFIED LATERALITY: ICD-10-CM

## 2020-05-26 DIAGNOSIS — E66.01 MORBID OBESITY (H): ICD-10-CM

## 2020-05-26 DIAGNOSIS — J44.1 COPD EXACERBATION (H): ICD-10-CM

## 2020-06-05 ENCOUNTER — COMMUNICATION - HEALTHEAST (OUTPATIENT)
Dept: NURSING | Facility: CLINIC | Age: 65
End: 2020-06-05

## 2020-06-30 ENCOUNTER — COMMUNICATION - HEALTHEAST (OUTPATIENT)
Dept: CARE COORDINATION | Facility: CLINIC | Age: 65
End: 2020-06-30

## 2020-07-02 ENCOUNTER — COMMUNICATION - HEALTHEAST (OUTPATIENT)
Dept: NURSING | Facility: CLINIC | Age: 65
End: 2020-07-02

## 2020-07-10 ENCOUNTER — NURSE TRIAGE (OUTPATIENT)
Dept: NURSING | Facility: CLINIC | Age: 65
End: 2020-07-10

## 2020-07-10 NOTE — TELEPHONE ENCOUNTER
Seen in [facility] ER on June 7th for chest pain. They found nothing. She now has a swollen left ankle. She will contact her MD to get in for evaluation.  Janet Nascimento RN  [facility]      Additional Information    Negative: Chest pain    Negative: Followed an ankle injury    Negative: Ankle pain is main symptom    Negative: Swelling of both ankles (i.e., pedal edema)    Negative: Swelling of calf or leg    Negative: Difficulty breathing    Negative: Entire foot is cool or blue in comparison to other side    Negative: Fever    Negative: Patient sounds very sick or weak to the triager    Negative: [1] SEVERE pain (e.g., excruciating, unable to walk) AND [2] not improved after 2 hours of pain medicine     Pain at 2    Negative: [1] Can't move swollen joint at all AND [2] no fever    Negative: [1] Redness AND [2] painful when touched AND [3] no fever    Negative: [1] Red area or streak [2] large (> 2 in. or 5 cm)    Negative: [1] Thigh or calf pain AND [2] only 1 side AND [3] present > 1 hour    Negative: [1] Thigh, calf, or ankle swelling AND [2] only 1 side    Negative: [1] Thigh, calf, or ankle swelling AND [2] bilateral AND [3] 1 side is more swollen    Negative: [1] Very swollen joint AND [2] no fever    Negative: Looks like a boil, infected sore, deep ulcer or other infected rash (spreading redness, pus)    Negative: [1] MODERATE pain (e.g., interferes with normal activities, limping) AND [2] present > 3 days    Swollen ankle joint  (Exception: area of localized swelling which is itchy)    Protocols used: ANKLE SWELLING-A-AH

## 2020-07-13 ENCOUNTER — OFFICE VISIT - HEALTHEAST (OUTPATIENT)
Dept: INTERNAL MEDICINE | Facility: CLINIC | Age: 65
End: 2020-07-13

## 2020-07-13 DIAGNOSIS — M79.672 LEFT FOOT PAIN: ICD-10-CM

## 2020-07-16 ENCOUNTER — COMMUNICATION - HEALTHEAST (OUTPATIENT)
Dept: NURSING | Facility: CLINIC | Age: 65
End: 2020-07-16

## 2020-07-21 ENCOUNTER — AMBULATORY - HEALTHEAST (OUTPATIENT)
Dept: INTERNAL MEDICINE | Facility: CLINIC | Age: 65
End: 2020-07-21

## 2020-07-27 ENCOUNTER — OFFICE VISIT - HEALTHEAST (OUTPATIENT)
Dept: INTERNAL MEDICINE | Facility: CLINIC | Age: 65
End: 2020-07-27

## 2020-07-27 DIAGNOSIS — M79.672 LEFT FOOT PAIN: ICD-10-CM

## 2020-08-03 ENCOUNTER — HOSPITAL ENCOUNTER (OUTPATIENT)
Dept: MRI IMAGING | Facility: CLINIC | Age: 65
Discharge: HOME | End: 2020-08-03
Attending: INTERNAL MEDICINE

## 2020-08-03 DIAGNOSIS — M79.672 LEFT FOOT PAIN: ICD-10-CM

## 2020-08-05 ENCOUNTER — COMMUNICATION - HEALTHEAST (OUTPATIENT)
Dept: INTERNAL MEDICINE | Facility: CLINIC | Age: 65
End: 2020-08-05

## 2020-08-13 ENCOUNTER — COMMUNICATION - HEALTHEAST (OUTPATIENT)
Dept: NURSING | Facility: CLINIC | Age: 65
End: 2020-08-13

## 2020-08-13 ENCOUNTER — COMMUNICATION - HEALTHEAST (OUTPATIENT)
Dept: INTERNAL MEDICINE | Facility: CLINIC | Age: 65
End: 2020-08-13

## 2020-08-13 DIAGNOSIS — F06.4 ANXIETY DISORDER DUE TO MEDICAL CONDITION: ICD-10-CM

## 2020-09-01 ENCOUNTER — COMMUNICATION - HEALTHEAST (OUTPATIENT)
Dept: NURSING | Facility: CLINIC | Age: 65
End: 2020-09-01

## 2020-09-01 ENCOUNTER — COMMUNICATION - HEALTHEAST (OUTPATIENT)
Dept: CARE COORDINATION | Facility: CLINIC | Age: 65
End: 2020-09-01

## 2020-09-09 ENCOUNTER — COMMUNICATION - HEALTHEAST (OUTPATIENT)
Dept: INTERNAL MEDICINE | Facility: CLINIC | Age: 65
End: 2020-09-09

## 2020-09-09 DIAGNOSIS — E11.9 TYPE 2 DIABETES MELLITUS WITHOUT COMPLICATION, WITHOUT LONG-TERM CURRENT USE OF INSULIN (H): ICD-10-CM

## 2020-09-09 DIAGNOSIS — J44.1 COPD EXACERBATION (H): ICD-10-CM

## 2020-09-10 ENCOUNTER — OFFICE VISIT - HEALTHEAST (OUTPATIENT)
Dept: INTERNAL MEDICINE | Facility: CLINIC | Age: 65
End: 2020-09-10

## 2020-09-10 DIAGNOSIS — G89.29 CHRONIC MIDLINE LOW BACK PAIN WITHOUT SCIATICA: ICD-10-CM

## 2020-09-10 DIAGNOSIS — M54.50 CHRONIC MIDLINE LOW BACK PAIN WITHOUT SCIATICA: ICD-10-CM

## 2020-09-10 DIAGNOSIS — M79.672 LEFT FOOT PAIN: ICD-10-CM

## 2020-09-10 DIAGNOSIS — E66.01 MORBID OBESITY (H): ICD-10-CM

## 2020-09-10 DIAGNOSIS — E11.9 TYPE 2 DIABETES MELLITUS WITHOUT COMPLICATION, WITHOUT LONG-TERM CURRENT USE OF INSULIN (H): ICD-10-CM

## 2020-09-10 DIAGNOSIS — M25.551 HIP PAIN, RIGHT: ICD-10-CM

## 2020-09-11 RX ORDER — AMPICILLIN TRIHYDRATE 500 MG
CAPSULE ORAL
Qty: 90 CAPSULE | Refills: 3 | Status: SHIPPED | OUTPATIENT
Start: 2020-09-11 | End: 2022-08-08

## 2020-09-22 ENCOUNTER — COMMUNICATION - HEALTHEAST (OUTPATIENT)
Dept: NURSING | Facility: CLINIC | Age: 65
End: 2020-09-22

## 2020-09-23 ENCOUNTER — COMMUNICATION - HEALTHEAST (OUTPATIENT)
Dept: NURSING | Facility: CLINIC | Age: 65
End: 2020-09-23

## 2020-09-23 ASSESSMENT — ACTIVITIES OF DAILY LIVING (ADL): DEPENDENT_IADLS:: INDEPENDENT

## 2020-09-24 ENCOUNTER — COMMUNICATION - HEALTHEAST (OUTPATIENT)
Dept: CARE COORDINATION | Facility: CLINIC | Age: 65
End: 2020-09-24

## 2020-09-29 ENCOUNTER — COMMUNICATION - HEALTHEAST (OUTPATIENT)
Dept: CARE COORDINATION | Facility: CLINIC | Age: 65
End: 2020-09-29

## 2020-10-09 ENCOUNTER — COMMUNICATION - HEALTHEAST (OUTPATIENT)
Dept: CARE COORDINATION | Facility: CLINIC | Age: 65
End: 2020-10-09

## 2020-10-13 ENCOUNTER — COMMUNICATION - HEALTHEAST (OUTPATIENT)
Dept: CARE COORDINATION | Facility: CLINIC | Age: 65
End: 2020-10-13

## 2020-10-22 ENCOUNTER — COMMUNICATION - HEALTHEAST (OUTPATIENT)
Dept: CARE COORDINATION | Facility: CLINIC | Age: 65
End: 2020-10-22

## 2020-10-26 ENCOUNTER — COMMUNICATION - HEALTHEAST (OUTPATIENT)
Dept: NURSING | Facility: CLINIC | Age: 65
End: 2020-10-26

## 2020-11-04 ENCOUNTER — COMMUNICATION - HEALTHEAST (OUTPATIENT)
Dept: CARE COORDINATION | Facility: CLINIC | Age: 65
End: 2020-11-04

## 2020-11-10 ENCOUNTER — COMMUNICATION - HEALTHEAST (OUTPATIENT)
Dept: CARE COORDINATION | Facility: CLINIC | Age: 65
End: 2020-11-10

## 2020-11-23 ENCOUNTER — COMMUNICATION - HEALTHEAST (OUTPATIENT)
Dept: CARE COORDINATION | Facility: CLINIC | Age: 65
End: 2020-11-23

## 2020-12-01 ENCOUNTER — COMMUNICATION - HEALTHEAST (OUTPATIENT)
Dept: INTERNAL MEDICINE | Facility: CLINIC | Age: 65
End: 2020-12-01

## 2020-12-01 DIAGNOSIS — F43.23 ADJUSTMENT DISORDER WITH MIXED ANXIETY AND DEPRESSED MOOD: ICD-10-CM

## 2020-12-03 ENCOUNTER — COMMUNICATION - HEALTHEAST (OUTPATIENT)
Dept: NURSING | Facility: CLINIC | Age: 65
End: 2020-12-03

## 2020-12-03 ENCOUNTER — COMMUNICATION - HEALTHEAST (OUTPATIENT)
Dept: CARE COORDINATION | Facility: CLINIC | Age: 65
End: 2020-12-03

## 2020-12-04 ENCOUNTER — COMMUNICATION - HEALTHEAST (OUTPATIENT)
Dept: CARE COORDINATION | Facility: CLINIC | Age: 65
End: 2020-12-04

## 2020-12-08 ENCOUNTER — COMMUNICATION - HEALTHEAST (OUTPATIENT)
Dept: INTERNAL MEDICINE | Facility: CLINIC | Age: 65
End: 2020-12-08

## 2020-12-11 ENCOUNTER — COMMUNICATION - HEALTHEAST (OUTPATIENT)
Dept: CARE COORDINATION | Facility: CLINIC | Age: 65
End: 2020-12-11

## 2020-12-17 ENCOUNTER — COMMUNICATION - HEALTHEAST (OUTPATIENT)
Dept: CARE COORDINATION | Facility: CLINIC | Age: 65
End: 2020-12-17

## 2020-12-29 ENCOUNTER — COMMUNICATION - HEALTHEAST (OUTPATIENT)
Dept: CARE COORDINATION | Facility: CLINIC | Age: 65
End: 2020-12-29

## 2021-01-07 ENCOUNTER — COMMUNICATION - HEALTHEAST (OUTPATIENT)
Dept: CARE COORDINATION | Facility: CLINIC | Age: 66
End: 2021-01-07

## 2021-01-11 ENCOUNTER — COMMUNICATION - HEALTHEAST (OUTPATIENT)
Dept: INTERNAL MEDICINE | Facility: CLINIC | Age: 66
End: 2021-01-11

## 2021-01-11 ENCOUNTER — COMMUNICATION - HEALTHEAST (OUTPATIENT)
Dept: CARE COORDINATION | Facility: CLINIC | Age: 66
End: 2021-01-11

## 2021-01-11 DIAGNOSIS — H81.399 PERIPHERAL VERTIGO, UNSPECIFIED LATERALITY: ICD-10-CM

## 2021-01-11 DIAGNOSIS — F06.4 ANXIETY DISORDER DUE TO MEDICAL CONDITION: ICD-10-CM

## 2021-01-11 DIAGNOSIS — E11.9 TYPE 2 DIABETES MELLITUS WITHOUT COMPLICATION, WITHOUT LONG-TERM CURRENT USE OF INSULIN (H): ICD-10-CM

## 2021-01-11 DIAGNOSIS — K59.01 SLOW TRANSIT CONSTIPATION: ICD-10-CM

## 2021-01-11 DIAGNOSIS — F43.23 ADJUSTMENT DISORDER WITH MIXED ANXIETY AND DEPRESSED MOOD: ICD-10-CM

## 2021-01-11 DIAGNOSIS — M54.16 LUMBAR RADICULAR PAIN: ICD-10-CM

## 2021-01-11 DIAGNOSIS — R06.2 WHEEZING: ICD-10-CM

## 2021-01-11 DIAGNOSIS — J44.1 COPD EXACERBATION (H): ICD-10-CM

## 2021-01-11 RX ORDER — BUPROPION HYDROCHLORIDE 150 MG/1
150 TABLET ORAL DAILY
Qty: 30 TABLET | Refills: 3 | Status: SHIPPED | OUTPATIENT
Start: 2021-01-11 | End: 2021-07-23

## 2021-01-11 RX ORDER — MECLIZINE HYDROCHLORIDE 25 MG/1
25 TABLET ORAL 4 TIMES DAILY PRN
Qty: 90 TABLET | Refills: 3 | Status: SHIPPED | OUTPATIENT
Start: 2021-01-11 | End: 2022-08-08

## 2021-01-11 RX ORDER — POLYETHYLENE GLYCOL 3350 17 G/17G
17 POWDER, FOR SOLUTION ORAL DAILY
Qty: 255 G | Refills: 3 | Status: SHIPPED | OUTPATIENT
Start: 2021-01-11 | End: 2022-08-08

## 2021-01-11 RX ORDER — ALBUTEROL SULFATE 90 UG/1
AEROSOL, METERED RESPIRATORY (INHALATION)
Qty: 3 INHALER | Refills: 1 | Status: SHIPPED | OUTPATIENT
Start: 2021-01-11 | End: 2022-08-08

## 2021-01-11 RX ORDER — GABAPENTIN 100 MG/1
100 CAPSULE ORAL 2 TIMES DAILY
Qty: 180 CAPSULE | Refills: 1 | Status: SHIPPED | OUTPATIENT
Start: 2021-01-11 | End: 2022-05-23

## 2021-01-11 RX ORDER — ALBUTEROL SULFATE 0.83 MG/ML
2.5 SOLUTION RESPIRATORY (INHALATION) EVERY 6 HOURS PRN
Qty: 90 VIAL | Refills: 11 | Status: SHIPPED | OUTPATIENT
Start: 2021-01-11 | End: 2022-08-08

## 2021-01-11 RX ORDER — DULOXETIN HYDROCHLORIDE 30 MG/1
30 CAPSULE, DELAYED RELEASE ORAL 2 TIMES DAILY
Qty: 180 CAPSULE | Refills: 0 | Status: SHIPPED | OUTPATIENT
Start: 2021-01-11 | End: 2021-07-23

## 2021-01-11 RX ORDER — ATORVASTATIN CALCIUM 20 MG/1
20 TABLET, FILM COATED ORAL DAILY
Qty: 90 TABLET | Refills: 3 | Status: SHIPPED | OUTPATIENT
Start: 2021-01-11 | End: 2022-02-17

## 2021-01-12 ENCOUNTER — COMMUNICATION - HEALTHEAST (OUTPATIENT)
Dept: NURSING | Facility: CLINIC | Age: 66
End: 2021-01-12

## 2021-01-14 ENCOUNTER — COMMUNICATION - HEALTHEAST (OUTPATIENT)
Dept: INTERNAL MEDICINE | Facility: CLINIC | Age: 66
End: 2021-01-14

## 2021-01-27 ENCOUNTER — COMMUNICATION - HEALTHEAST (OUTPATIENT)
Dept: CARE COORDINATION | Facility: CLINIC | Age: 66
End: 2021-01-27

## 2021-02-15 ENCOUNTER — COMMUNICATION - HEALTHEAST (OUTPATIENT)
Dept: NURSING | Facility: CLINIC | Age: 66
End: 2021-02-15

## 2021-02-18 ENCOUNTER — COMMUNICATION - HEALTHEAST (OUTPATIENT)
Dept: CARE COORDINATION | Facility: CLINIC | Age: 66
End: 2021-02-18

## 2021-02-22 ENCOUNTER — OFFICE VISIT - HEALTHEAST (OUTPATIENT)
Dept: INTERNAL MEDICINE | Facility: CLINIC | Age: 66
End: 2021-02-22

## 2021-02-22 DIAGNOSIS — Z11.59 NEED FOR HEPATITIS C SCREENING TEST: ICD-10-CM

## 2021-02-22 DIAGNOSIS — Z00.00 ROUTINE GENERAL MEDICAL EXAMINATION AT A HEALTH CARE FACILITY: ICD-10-CM

## 2021-02-22 DIAGNOSIS — Z71.89 ADVANCED CARE PLANNING/COUNSELING DISCUSSION: ICD-10-CM

## 2021-02-22 DIAGNOSIS — E66.01 MORBID OBESITY (H): ICD-10-CM

## 2021-02-22 DIAGNOSIS — Z12.11 SCREEN FOR COLON CANCER: ICD-10-CM

## 2021-02-22 DIAGNOSIS — Z78.0 ASYMPTOMATIC POSTMENOPAUSAL ESTROGEN DEFICIENCY: ICD-10-CM

## 2021-02-22 DIAGNOSIS — G47.33 OSA (OBSTRUCTIVE SLEEP APNEA): ICD-10-CM

## 2021-02-22 DIAGNOSIS — E11.42 DIABETIC POLYNEUROPATHY ASSOCIATED WITH TYPE 2 DIABETES MELLITUS (H): ICD-10-CM

## 2021-02-22 DIAGNOSIS — Z12.31 VISIT FOR SCREENING MAMMOGRAM: ICD-10-CM

## 2021-02-22 DIAGNOSIS — R51.9 NONINTRACTABLE EPISODIC HEADACHE, UNSPECIFIED HEADACHE TYPE: ICD-10-CM

## 2021-02-22 DIAGNOSIS — E11.9 TYPE 2 DIABETES MELLITUS WITHOUT COMPLICATION, WITHOUT LONG-TERM CURRENT USE OF INSULIN (H): ICD-10-CM

## 2021-02-22 DIAGNOSIS — F32.1 MODERATE MAJOR DEPRESSION (H): ICD-10-CM

## 2021-02-22 DIAGNOSIS — D56.1 BETA THALASSEMIA (H): ICD-10-CM

## 2021-02-22 DIAGNOSIS — E55.9 VITAMIN D DEFICIENCY: ICD-10-CM

## 2021-02-22 LAB
ALBUMIN SERPL-MCNC: 3.6 G/DL (ref 3.5–5)
ALP SERPL-CCNC: 81 U/L (ref 45–120)
ALT SERPL W P-5'-P-CCNC: 13 U/L (ref 0–45)
ANION GAP SERPL CALCULATED.3IONS-SCNC: 9 MMOL/L (ref 5–18)
AST SERPL W P-5'-P-CCNC: 15 U/L (ref 0–40)
BILIRUB SERPL-MCNC: 0.6 MG/DL (ref 0–1)
BUN SERPL-MCNC: 11 MG/DL (ref 8–22)
CALCIUM SERPL-MCNC: 9.7 MG/DL (ref 8.5–10.5)
CHLORIDE BLD-SCNC: 105 MMOL/L (ref 98–107)
CHOLEST SERPL-MCNC: 209 MG/DL
CO2 SERPL-SCNC: 28 MMOL/L (ref 22–31)
CREAT SERPL-MCNC: 0.65 MG/DL (ref 0.6–1.1)
CREAT UR-MCNC: 149.5 MG/DL
ERYTHROCYTE [DISTWIDTH] IN BLOOD BY AUTOMATED COUNT: 13.9 % (ref 11–14.5)
FASTING STATUS PATIENT QL REPORTED: YES
GFR SERPL CREATININE-BSD FRML MDRD: >60 ML/MIN/1.73M2
GLUCOSE BLD-MCNC: 90 MG/DL (ref 70–125)
HBA1C MFR BLD: 5.8 %
HCT VFR BLD AUTO: 35.8 % (ref 35–47)
HDLC SERPL-MCNC: 50 MG/DL
HGB BLD-MCNC: 11.3 G/DL (ref 12–16)
LDLC SERPL CALC-MCNC: 145 MG/DL
MCH RBC QN AUTO: 24.8 PG (ref 27–34)
MCHC RBC AUTO-ENTMCNC: 31.6 G/DL (ref 32–36)
MCV RBC AUTO: 79 FL (ref 80–100)
MICROALBUMIN UR-MCNC: 1.79 MG/DL (ref 0–1.99)
MICROALBUMIN/CREAT UR: 12 MG/G
PLATELET # BLD AUTO: 292 THOU/UL (ref 140–440)
PMV BLD AUTO: 10.8 FL (ref 7–10)
POTASSIUM BLD-SCNC: 4 MMOL/L (ref 3.5–5)
PROT SERPL-MCNC: 7.2 G/DL (ref 6–8)
RBC # BLD AUTO: 4.56 MILL/UL (ref 3.8–5.4)
SODIUM SERPL-SCNC: 142 MMOL/L (ref 136–145)
TRIGL SERPL-MCNC: 71 MG/DL
WBC: 4.5 THOU/UL (ref 4–11)

## 2021-02-22 ASSESSMENT — PATIENT HEALTH QUESTIONNAIRE - PHQ9: SUM OF ALL RESPONSES TO PHQ QUESTIONS 1-9: 15

## 2021-02-22 ASSESSMENT — MIFFLIN-ST. JEOR: SCORE: 1435.71

## 2021-02-23 LAB
25(OH)D3 SERPL-MCNC: 32.2 NG/ML (ref 30–80)
25(OH)D3 SERPL-MCNC: 32.2 NG/ML (ref 30–80)
HCV AB SERPL QL IA: NEGATIVE

## 2021-02-26 ENCOUNTER — RECORDS - HEALTHEAST (OUTPATIENT)
Dept: ADMINISTRATIVE | Facility: OTHER | Age: 66
End: 2021-02-26

## 2021-02-27 ENCOUNTER — AMBULATORY - HEALTHEAST (OUTPATIENT)
Dept: GASTROENTEROLOGY | Facility: CLINIC | Age: 66
End: 2021-02-27

## 2021-02-27 DIAGNOSIS — Z11.59 ENCOUNTER FOR SCREENING FOR OTHER VIRAL DISEASES: ICD-10-CM

## 2021-03-02 ENCOUNTER — COMMUNICATION - HEALTHEAST (OUTPATIENT)
Dept: CARE COORDINATION | Facility: CLINIC | Age: 66
End: 2021-03-02

## 2021-03-15 ENCOUNTER — RECORDS - HEALTHEAST (OUTPATIENT)
Dept: MAMMOGRAPHY | Facility: CLINIC | Age: 66
End: 2021-03-15

## 2021-03-15 ENCOUNTER — AMBULATORY - HEALTHEAST (OUTPATIENT)
Dept: NURSING | Facility: CLINIC | Age: 66
End: 2021-03-15

## 2021-03-15 DIAGNOSIS — Z12.31 ENCOUNTER FOR SCREENING MAMMOGRAM FOR MALIGNANT NEOPLASM OF BREAST: ICD-10-CM

## 2021-03-16 ENCOUNTER — COMMUNICATION - HEALTHEAST (OUTPATIENT)
Dept: CARE COORDINATION | Facility: CLINIC | Age: 66
End: 2021-03-16

## 2021-03-23 ENCOUNTER — COMMUNICATION - HEALTHEAST (OUTPATIENT)
Dept: NURSING | Facility: CLINIC | Age: 66
End: 2021-03-23

## 2021-03-24 ENCOUNTER — COMMUNICATION - HEALTHEAST (OUTPATIENT)
Dept: CARE COORDINATION | Facility: CLINIC | Age: 66
End: 2021-03-24

## 2021-03-24 ASSESSMENT — ACTIVITIES OF DAILY LIVING (ADL): DEPENDENT_IADLS:: INDEPENDENT

## 2021-04-05 ENCOUNTER — AMBULATORY - HEALTHEAST (OUTPATIENT)
Dept: NURSING | Facility: CLINIC | Age: 66
End: 2021-04-05

## 2021-04-07 ENCOUNTER — COMMUNICATION - HEALTHEAST (OUTPATIENT)
Dept: CARE COORDINATION | Facility: CLINIC | Age: 66
End: 2021-04-07

## 2021-04-12 ENCOUNTER — COMMUNICATION - HEALTHEAST (OUTPATIENT)
Dept: CARE COORDINATION | Facility: CLINIC | Age: 66
End: 2021-04-12

## 2021-04-14 ENCOUNTER — COMMUNICATION - HEALTHEAST (OUTPATIENT)
Dept: INTERNAL MEDICINE | Facility: CLINIC | Age: 66
End: 2021-04-14

## 2021-04-15 ENCOUNTER — COMMUNICATION - HEALTHEAST (OUTPATIENT)
Dept: CARE COORDINATION | Facility: CLINIC | Age: 66
End: 2021-04-15

## 2021-04-16 ENCOUNTER — AMBULATORY - HEALTHEAST (OUTPATIENT)
Dept: LAB | Facility: CLINIC | Age: 66
End: 2021-04-16

## 2021-04-16 DIAGNOSIS — Z11.59 ENCOUNTER FOR SCREENING FOR OTHER VIRAL DISEASES: ICD-10-CM

## 2021-04-17 LAB
SARS-COV-2 PCR COMMENT: NORMAL
SARS-COV-2 RNA SPEC QL NAA+PROBE: NEGATIVE
SARS-COV-2 VIRUS SPECIMEN SOURCE: NORMAL

## 2021-04-18 ENCOUNTER — COMMUNICATION - HEALTHEAST (OUTPATIENT)
Dept: SCHEDULING | Facility: CLINIC | Age: 66
End: 2021-04-18

## 2021-04-19 ENCOUNTER — RECORDS - HEALTHEAST (OUTPATIENT)
Dept: ADMINISTRATIVE | Facility: OTHER | Age: 66
End: 2021-04-19

## 2021-04-20 ENCOUNTER — AMBULATORY - HEALTHEAST (OUTPATIENT)
Dept: SURGERY | Facility: CLINIC | Age: 66
End: 2021-04-20

## 2021-04-20 ENCOUNTER — RECORDS - HEALTHEAST (OUTPATIENT)
Dept: ADMINISTRATIVE | Facility: OTHER | Age: 66
End: 2021-04-20

## 2021-04-20 DIAGNOSIS — Z11.59 ENCOUNTER FOR SCREENING FOR OTHER VIRAL DISEASES: ICD-10-CM

## 2021-04-29 ENCOUNTER — COMMUNICATION - HEALTHEAST (OUTPATIENT)
Dept: NURSING | Facility: CLINIC | Age: 66
End: 2021-04-29

## 2021-04-30 ENCOUNTER — OFFICE VISIT - HEALTHEAST (OUTPATIENT)
Dept: INTERNAL MEDICINE | Facility: CLINIC | Age: 66
End: 2021-04-30

## 2021-04-30 DIAGNOSIS — G47.33 OSA (OBSTRUCTIVE SLEEP APNEA): ICD-10-CM

## 2021-04-30 DIAGNOSIS — E66.01 MORBID OBESITY (H): ICD-10-CM

## 2021-04-30 DIAGNOSIS — Z12.11 SCREENING FOR COLON CANCER: ICD-10-CM

## 2021-04-30 DIAGNOSIS — M54.6 ACUTE BILATERAL THORACIC BACK PAIN: ICD-10-CM

## 2021-04-30 DIAGNOSIS — E11.9 TYPE 2 DIABETES MELLITUS WITHOUT COMPLICATION, WITHOUT LONG-TERM CURRENT USE OF INSULIN (H): ICD-10-CM

## 2021-04-30 DIAGNOSIS — Z12.11 SCREEN FOR COLON CANCER: ICD-10-CM

## 2021-04-30 RX ORDER — TIZANIDINE HYDROCHLORIDE 4 MG/1
4 CAPSULE, GELATIN COATED ORAL 3 TIMES DAILY PRN
Qty: 30 CAPSULE | Refills: 5 | Status: SHIPPED | OUTPATIENT
Start: 2021-04-30 | End: 2021-07-23

## 2021-04-30 RX ORDER — ACETAMINOPHEN 500 MG
1000 TABLET ORAL
Status: SHIPPED | COMMUNITY
Start: 2021-04-20 | End: 2022-08-29

## 2021-04-30 RX ORDER — NAPROXEN SODIUM 550 MG/1
550 TABLET ORAL 2 TIMES DAILY PRN
Qty: 60 TABLET | Refills: 1 | Status: SHIPPED | OUTPATIENT
Start: 2021-04-30 | End: 2021-07-23

## 2021-04-30 ASSESSMENT — MIFFLIN-ST. JEOR: SCORE: 1457.54

## 2021-05-04 ENCOUNTER — COMMUNICATION - HEALTHEAST (OUTPATIENT)
Dept: CARE COORDINATION | Facility: CLINIC | Age: 66
End: 2021-05-04

## 2021-05-07 ENCOUNTER — HOSPITAL ENCOUNTER (OUTPATIENT)
Dept: PHYSICAL MEDICINE AND REHAB | Facility: CLINIC | Age: 66
Discharge: HOME | End: 2021-05-07
Attending: PHYSICIAN ASSISTANT

## 2021-05-07 ENCOUNTER — RECORDS - HEALTHEAST (OUTPATIENT)
Dept: RADIOLOGY | Facility: CLINIC | Age: 66
End: 2021-05-07

## 2021-05-07 DIAGNOSIS — M62.838 MUSCLE SPASM: ICD-10-CM

## 2021-05-07 DIAGNOSIS — M54.6 PAIN IN THORACIC SPINE: ICD-10-CM

## 2021-05-07 DIAGNOSIS — M54.50 LUMBAR SPINE PAIN: ICD-10-CM

## 2021-05-07 ASSESSMENT — MIFFLIN-ST. JEOR: SCORE: 1466.22

## 2021-05-10 ENCOUNTER — COMMUNICATION - HEALTHEAST (OUTPATIENT)
Dept: INTERNAL MEDICINE | Facility: CLINIC | Age: 66
End: 2021-05-10

## 2021-05-11 ENCOUNTER — COMMUNICATION - HEALTHEAST (OUTPATIENT)
Dept: NURSING | Facility: CLINIC | Age: 66
End: 2021-05-11

## 2021-05-11 DIAGNOSIS — G47.33 OSA (OBSTRUCTIVE SLEEP APNEA): ICD-10-CM

## 2021-05-17 ENCOUNTER — OFFICE VISIT - HEALTHEAST (OUTPATIENT)
Dept: PHYSICAL THERAPY | Facility: CLINIC | Age: 66
End: 2021-05-17

## 2021-05-17 DIAGNOSIS — M54.50 CHRONIC MIDLINE LOW BACK PAIN WITHOUT SCIATICA: ICD-10-CM

## 2021-05-17 DIAGNOSIS — M79.18 MYOFASCIAL PAIN: ICD-10-CM

## 2021-05-17 DIAGNOSIS — G89.29 CHRONIC MIDLINE LOW BACK PAIN WITHOUT SCIATICA: ICD-10-CM

## 2021-05-20 ENCOUNTER — AMBULATORY - HEALTHEAST (OUTPATIENT)
Dept: NEUROLOGY | Facility: CLINIC | Age: 66
End: 2021-05-20

## 2021-05-20 ENCOUNTER — OFFICE VISIT - HEALTHEAST (OUTPATIENT)
Dept: PHYSICAL THERAPY | Facility: CLINIC | Age: 66
End: 2021-05-20

## 2021-05-20 DIAGNOSIS — M79.18 MYOFASCIAL PAIN: ICD-10-CM

## 2021-05-20 DIAGNOSIS — G89.29 CHRONIC MIDLINE LOW BACK PAIN WITHOUT SCIATICA: ICD-10-CM

## 2021-05-20 DIAGNOSIS — M54.50 CHRONIC MIDLINE LOW BACK PAIN WITHOUT SCIATICA: ICD-10-CM

## 2021-05-24 ENCOUNTER — RECORDS - HEALTHEAST (OUTPATIENT)
Dept: ADMINISTRATIVE | Facility: CLINIC | Age: 66
End: 2021-05-24

## 2021-05-25 ENCOUNTER — RECORDS - HEALTHEAST (OUTPATIENT)
Dept: ADMINISTRATIVE | Facility: CLINIC | Age: 66
End: 2021-05-25

## 2021-05-25 ENCOUNTER — COMMUNICATION - HEALTHEAST (OUTPATIENT)
Dept: CARE COORDINATION | Facility: CLINIC | Age: 66
End: 2021-05-25

## 2021-05-26 ENCOUNTER — RECORDS - HEALTHEAST (OUTPATIENT)
Dept: ADMINISTRATIVE | Facility: CLINIC | Age: 66
End: 2021-05-26

## 2021-05-26 VITALS
WEIGHT: 224 LBS | SYSTOLIC BLOOD PRESSURE: 134 MMHG | HEART RATE: 90 BPM | OXYGEN SATURATION: 97 % | DIASTOLIC BLOOD PRESSURE: 80 MMHG | BODY MASS INDEX: 40.97 KG/M2

## 2021-05-26 ASSESSMENT — PATIENT HEALTH QUESTIONNAIRE - PHQ9: SUM OF ALL RESPONSES TO PHQ QUESTIONS 1-9: 2

## 2021-05-27 ENCOUNTER — RECORDS - HEALTHEAST (OUTPATIENT)
Dept: ADMINISTRATIVE | Facility: CLINIC | Age: 66
End: 2021-05-27

## 2021-05-27 ENCOUNTER — OFFICE VISIT - HEALTHEAST (OUTPATIENT)
Dept: PHYSICAL THERAPY | Facility: CLINIC | Age: 66
End: 2021-05-27

## 2021-05-27 VITALS — WEIGHT: 225 LBS | HEIGHT: 59 IN | BODY MASS INDEX: 45.36 KG/M2

## 2021-05-27 DIAGNOSIS — M79.18 MYOFASCIAL PAIN: ICD-10-CM

## 2021-05-27 DIAGNOSIS — M54.50 CHRONIC MIDLINE LOW BACK PAIN WITHOUT SCIATICA: ICD-10-CM

## 2021-05-27 DIAGNOSIS — G89.29 CHRONIC MIDLINE LOW BACK PAIN WITHOUT SCIATICA: ICD-10-CM
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
## 2021-05-27 NOTE — PROGRESS NOTES
Attempt 1-Care Guide called patient.  If this patient is returning our call please transfer to Nancy at ext. [phone]    Next outreach due: 4/16/19

## 2021-05-27 NOTE — PROGRESS NOTES
Attempt 2-Care Guide called patient.  If this patient is returning our call please transfer to Nancy at ext. [phone].     Next outreach due: 4/25/19

## 2021-05-28 ENCOUNTER — HOSPITAL ENCOUNTER (OUTPATIENT)
Dept: PHYSICAL MEDICINE AND REHAB | Facility: CLINIC | Age: 66
Discharge: HOME | End: 2021-05-28
Attending: PHYSICIAN ASSISTANT
Payer: COMMERCIAL

## 2021-05-28 DIAGNOSIS — M54.50 RIGHT-SIDED LOW BACK PAIN WITHOUT SCIATICA, UNSPECIFIED CHRONICITY: ICD-10-CM

## 2021-05-28 DIAGNOSIS — M79.18 MYOFASCIAL PAIN: ICD-10-CM

## 2021-05-28 DIAGNOSIS — M47.816 LUMBAR FACET ARTHROPATHY: ICD-10-CM

## 2021-05-28 ASSESSMENT — MIFFLIN-ST. JEOR: SCORE: 1509.31

## 2021-05-28 NOTE — PROGRESS NOTES
Care Guide called patient.  If this patient is returning our call please transfer to Nancy at ext. [phone].   I have called (patient) 3 times over the past two weeks and have been unsuccessful in reaching them. This patient has also not returned any of my messages.     I will continue attempting to reach out to this patient in one month. I will also check this patient s chart for upcoming appointments, ER reports that may contain a new phone number, or any other recent activity.     Next outreach due: 5/29/19

## 2021-05-29 NOTE — PROGRESS NOTES
Care Guide called patient.  If this patient is returning our call please transfer to Nancy at ext. [phone]  I have called this patient and have been unsuccessful in reaching this patient for 2 months now.  This patient has also not returned any of my messages.    I will continue attempting to reach out to this patient in one month. I will also check this patient s chart for upcoming appointments, ER reports that may contain a new phone number, or any other recent activity    Overdue for office visit     Next outreach due: 7/1/19

## 2021-05-29 NOTE — PROGRESS NOTES
Office Visit - Follow Up   Isabel Biggs   64 y.o. female    Date of Visit: 6/13/2019    Chief Complaint   Patient presents with     Diabetes     Dizziness        Assessment and Plan   1. Adjustment disorder with mixed anxiety and depressed mood  Generally stable- LORazepam (ATIVAN) 1 MG tablet; Take 0.5 tablets (0.5 mg total) by mouth 3 (three) times a day as needed for anxiety.  Dispense: 15 tablet; Refill: 0    2. Postconcussion syndrome  Continue with the concussion clinic, some issues with attention and memory    3. Generalized abdominal pain  Stable  - ondansetron (ZOFRAN) 4 MG tablet; Take 1 tablet (4 mg total) by mouth every 6 (six) hours.  Dispense: 12 tablet; Refill: 0    4. WILFRIDO (obstructive sleep apnea)  Discussed that a lot of her tiredness and cloudiness could be related to untreated obstructive sleep apnea.  Previously note Dr. Shane Isaacs would like to see someone in the [facility] clinic.  Her machine is not working.  - Ambulatory referral to Sleep Medicine    5. Visit for screening mammogram  - Mammo Screening Bilateral; Future    6. Morbid obesity (H)  The following high BMI interventions were performed this visit: encouragement to exercise and lifestyle education regarding diet    Return in about 3 months (around 9/13/2019) for recheck.     History of Present Illness   This 64 y.o. old woman comes in for follow-up.  Her main concern is that she feels tired all the time.  She does have obstructive sleep apnea and is not using CPAP.  She has had multiple concussions and is followed in the concussion clinic.  She is still having some issues with attention and memory.  She is working with the concussion clinic.  She otherwise is generally doing okay.  Has gained some weight.  Trying to eat less and exercise more.  Chest pain or shortness of breath.    Review of Systems: A comprehensive review of systems was negative except as noted.     Medications, Allergies and Problem List   Reviewed,  "reconciled and updated     Physical Exam   General Appearance:   No acute distress    /60 (Patient Site: Left Arm, Patient Position: Sitting, Cuff Size: Adult Regular)   Pulse 72   Ht 5' 2\" (1.575 m)   Wt (!) 233 lb (105.7 kg)   LMP 06/16/1996   SpO2 99%   BMI 42.62 kg/m      HEENT exam is unremarkable  Neck supple no thyromegaly or nodule palpable  Lymphatic no cervical lymphadenopathy  Cardiovascular regular rate and rhythm no murmur gallop or rub  Pulmonary lungs are clear to auscultation bilaterally  Gastrointestinall abdomen soft nontender nondistended no organomegaly  Neurologic exam is non focal  Psychiatric pleasant, no confusion or agitation        Additional Information   Current Outpatient Medications   Medication Sig Dispense Refill     albuterol (VENTOLIN HFA) 90 mcg/actuation inhaler INHALE 1 TO 2 PUFFS BY MOUTH EVERY 4 HOURS AS NEEDED FOR WHEEZING 18 g 3     aspirin 81 MG EC tablet Take 1 tablet (81 mg total) by mouth daily.  0     atorvastatin (LIPITOR) 20 MG tablet Take 1 tablet (20 mg total) by mouth daily. 90 tablet 3     cholecalciferol, vitamin D3, (VITAMIN D3) 1,000 unit capsule Take 1 capsule (1,000 Units total) by mouth daily. 90 capsule 3     fluticasone (FLONASE) 50 mcg/actuation nasal spray 1 spray into each nostril daily. 16 g 11     LORazepam (ATIVAN) 1 MG tablet Take 0.5 tablets (0.5 mg total) by mouth 3 (three) times a day as needed for anxiety. 15 tablet 0     nebulizer accessories Kit Provide one nebulizer and accessories putting tubing and mask 1 kit 0     ondansetron (ZOFRAN) 4 MG tablet Take 1 tablet (4 mg total) by mouth every 6 (six) hours. 12 tablet 0     polyethylene glycol (GLYCOLAX) 17 gram/dose powder Take 17 g by mouth daily. Use as needed. 255 g 3     No current facility-administered medications for this visit.      Allergies   Allergen Reactions     Chloroquine Phosphate      Social History     Tobacco Use     Smoking status: Never Smoker     Smokeless " tobacco: Never Used   Substance Use Topics     Alcohol use: No     Drug use: No       Review and/or order of clinical lab tests:  Review and/or order of radiology tests:  Review and/or order of medicine tests:  Discussion of test results with performing physician:  Decision to obtain old records and/or obtain history from someone other than the patient:  Review and summarization of old records and/or obtaining history from someone other than the patient and.or discussion of case with another health care provider:  Independent visualization of image, tracing or specimen itself:    Time:      Cj Georges MD

## 2021-05-30 ENCOUNTER — RECORDS - HEALTHEAST (OUTPATIENT)
Dept: ADMINISTRATIVE | Facility: CLINIC | Age: 66
End: 2021-05-30

## 2021-05-30 VITALS — WEIGHT: 231 LBS | BODY MASS INDEX: 42.51 KG/M2 | HEIGHT: 62 IN

## 2021-05-30 VITALS — HEIGHT: 62 IN | WEIGHT: 232 LBS | BODY MASS INDEX: 42.69 KG/M2

## 2021-05-30 NOTE — PROGRESS NOTES
[employer] Upgrade    Open Encounter today.  Episodes created, care management status validated and encounters linked
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
## 2021-05-31 VITALS — WEIGHT: 229 LBS | HEIGHT: 62 IN | BODY MASS INDEX: 42.14 KG/M2

## 2021-05-31 VITALS — BODY MASS INDEX: 41.22 KG/M2 | WEIGHT: 224 LBS | HEIGHT: 62 IN

## 2021-05-31 VITALS — WEIGHT: 229 LBS | BODY MASS INDEX: 42.14 KG/M2 | HEIGHT: 62 IN

## 2021-05-31 VITALS — WEIGHT: 224 LBS | BODY MASS INDEX: 41.22 KG/M2 | HEIGHT: 62 IN

## 2021-05-31 VITALS — WEIGHT: 221 LBS | HEIGHT: 62 IN | BODY MASS INDEX: 40.67 KG/M2

## 2021-05-31 VITALS — HEIGHT: 62 IN | BODY MASS INDEX: 41.77 KG/M2 | WEIGHT: 227 LBS

## 2021-05-31 VITALS — HEIGHT: 62 IN | WEIGHT: 225 LBS | BODY MASS INDEX: 41.41 KG/M2
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
## 2021-05-31 NOTE — TELEPHONE ENCOUNTER
Call placed to pt. Pt states she would like to talk through this with her PCP first. She will call back if she would like a referral to be placed.
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
## 2021-05-31 NOTE — PROGRESS NOTES
Scheduled Follow Up Call: Attempt 1 Community [facility] Worker called and left a message for the patient. If the patient is returning my call, please transfer the patient to Nancy at ext. [phone].    Next outreach due: 8/21/19
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
## 2021-05-31 NOTE — TELEPHONE ENCOUNTER
----- Message from Tiesha Fiore PA-C sent at 8/20/2019  2:39 PM CDT -----  Regarding: behavioral health?  This patient checked that she would be interested in behavioral health services on her new patient paperwork.  Can you please call the patient and find out if she already has an individual psychotherapist?  If she does not, I would refer her to the [facility] pain clinic for behavioral health.

## 2021-05-31 NOTE — PATIENT INSTRUCTIONS - HE
Gabapentin 100mg Dosing Chart    DATE  MORNING AFTERNOON BEDTIME    Day 1 0 0 1    Day 2 0 0 1    Day 3 0 0 1    Day 4 1 0 1    Day 5 1 0 1    Day 6 1 0 1    Day 7 1 1 1    Day 8 1 1 1    Day 9 1 1 1    Day 10 1 1 2    Day 11 1 1 2    Day 12 1 1 2    Day 13 2 1 2    Day 14 2 1 2    Day 15 2 1 2    Day 16 2 2 2    Day 17 2 2 2    Day 18 2 2 2    Day 19 2 2 3    Day 20 2 2 3    Day 21 2 2 3    Day 22 3 2 3    Day 23 3 2 3    Day 24 3 2 3    Day 25 3 3 3    Day 26 3 3 3    Day 27 3 3 3     Continue medication, taking 3 capsules three times daily    Please call if you have any questions regarding how to take your medication  Clinic Phone # [phone]
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
## 2021-05-31 NOTE — PROGRESS NOTES
Clinic Care Coordination Contact  Care Team Conversations     Pt was a no show for CCC SW visit.  Please reschedule at next outreach if needed.

## 2021-06-01 VITALS — HEIGHT: 62 IN | WEIGHT: 230 LBS | BODY MASS INDEX: 42.33 KG/M2

## 2021-06-01 VITALS — WEIGHT: 230 LBS | HEIGHT: 62 IN | BODY MASS INDEX: 42.33 KG/M2

## 2021-06-01 VITALS — WEIGHT: 230 LBS | BODY MASS INDEX: 42.33 KG/M2 | HEIGHT: 62 IN

## 2021-06-01 VITALS — BODY MASS INDEX: 42.33 KG/M2 | HEIGHT: 62 IN | WEIGHT: 230 LBS

## 2021-06-01 VITALS — BODY MASS INDEX: 42.69 KG/M2 | HEIGHT: 62 IN | WEIGHT: 232 LBS

## 2021-06-01 VITALS — BODY MASS INDEX: 42.33 KG/M2 | WEIGHT: 230 LBS | HEIGHT: 62 IN

## 2021-06-01 VITALS — HEIGHT: 62 IN | BODY MASS INDEX: 42.33 KG/M2 | WEIGHT: 230 LBS

## 2021-06-01 NOTE — PROGRESS NOTES
Clinic Care Coordination Contact    Situation: Patient chart reviewed by care coordinator.    Background: SW reviewed SW delegations from last encounter    Assessment: SW was going to place [facility] referral if patient qualified for MA because she reported she wanted PCA. Upon review, it appears patient declined applying for MA with [facility] therefore PCA referral cannot be completed.    Plan/Recommendations: SW removed self from care team and will be available if needed in the future.

## 2021-06-01 NOTE — PROGRESS NOTES
Scheduled Follow Up Call: Attempt 1 Community Health Worker called and left a message for the patient. If the patient is returning my call, please transfer the patient to Nancy at ext. [phone].    Next outreach due: 10/4/19

## 2021-06-02 ENCOUNTER — RECORDS - HEALTHEAST (OUTPATIENT)
Dept: ADMINISTRATIVE | Facility: CLINIC | Age: 66
End: 2021-06-02

## 2021-06-02 VITALS — HEIGHT: 62 IN | BODY MASS INDEX: 46.56 KG/M2 | WEIGHT: 253 LBS

## 2021-06-02 VITALS — WEIGHT: 227 LBS | BODY MASS INDEX: 41.77 KG/M2 | HEIGHT: 62 IN

## 2021-06-02 VITALS — BODY MASS INDEX: 41.77 KG/M2 | WEIGHT: 227 LBS | HEIGHT: 62 IN

## 2021-06-02 VITALS — WEIGHT: 234 LBS | BODY MASS INDEX: 43.06 KG/M2 | HEIGHT: 62 IN

## 2021-06-02 VITALS — WEIGHT: 230 LBS | HEIGHT: 62 IN | BODY MASS INDEX: 42.33 KG/M2

## 2021-06-02 NOTE — TELEPHONE ENCOUNTER
RN cannot approve Refill Request    RN can NOT refill this medication Refilled 10/11/2019  . Last office visit: 10/11/2019 Cj Georges MD Last Physical: 12/13/2017 Last MTM visit: Visit date not found Last visit same specialty: 10/11/2019 Cj Georges MD.  Next visit within 3 mo: Visit date not found  Next physical within 3 mo: Visit date not found      Giovanny Araujo, [facility] Connection Triage/Med Refill 10/13/2019    Requested Prescriptions   Pending Prescriptions Disp Refills     DULoxetine (CYMBALTA) 30 MG capsule [Pharmacy Med Name: DULOXETINE DR 30MG CAPSULES] 180 capsule 2     Sig: TAKE 1 CAPSULE BY MOUTH TWICE DAILY       Tricyclics/Misc Antidepressant/Antianxiety Meds Refill Protocol Passed - 10/11/2019  4:05 PM        Passed - PCP or prescribing provider visit in last year     Last office visit with prescriber/PCP: 10/11/2019 Cj Georges MD OR same dept: 10/11/2019 Cj Georges MD OR same specialty: 10/11/2019 Cj Georges MD  Last physical: 12/13/2017 Last MTM visit: Visit date not found   Next visit within 3 mo: Visit date not found  Next physical within 3 mo: Visit date not found  Prescriber OR PCP: Cj Georges MD  Last diagnosis associated with med order: 1. Adjustment disorder with mixed anxiety and depressed mood  - DULoxetine (CYMBALTA) 30 MG capsule [Pharmacy Med Name: DULOXETINE DR 30MG CAPSULES]; TAKE 1 CAPSULE BY MOUTH TWICE DAILY  Dispense: 180 capsule; Refill: 2    If protocol passes may refill for 12 months if within 3 months of last provider visit (or a total of 15 months).
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
## 2021-06-02 NOTE — PROGRESS NOTES
Office Visit - Follow Up   Isabel Bowie   64 y.o. female    Date of Visit: 10/11/2019    Chief Complaint   Patient presents with     Diabetes     Back Pain     Headache        Assessment and Plan   1. Moderate episode of recurrent major depressive disorder (H)  I would like to treat her with medication.  Because of her chronic pain I have recommended Cymbalta starting at 30 mg twice a day.  Would like to see her back in 2 weeks.  Discussed side effects.  - DULoxetine (CYMBALTA) 30 MG capsule; Take 1 capsule (30 mg total) by mouth 2 (two) times a day.  Dispense: 60 capsule; Refill: 2    2. Other chronic pain  As above, also suspect her weight and sleep apnea are contributing    3. Chronic heel pain, right  Not mentioned below, she has heel pain when she walks especially in the morning I suspect this is plantar fasciitis  - Ambulatory referral to Podiatry    4. Type 2 diabetes mellitus without complication, without long-term current use of insulin (H)  Diet controlled  - cholecalciferol, vitamin D3, (VITAMIN D3) 1,000 unit capsule; Take 1 capsule (1,000 Units total) by mouth daily.  Dispense: 90 capsule; Refill: 3  - atorvastatin (LIPITOR) 20 MG tablet; Take 1 tablet (20 mg total) by mouth daily.  Dispense: 90 tablet; Refill: 3  - aspirin 81 MG EC tablet; Take 1 tablet (81 mg total) by mouth daily.  Dispense: 90 tablet; Refill: 3    5. Need for prophylactic vaccination and inoculation against influenza  - Influenza, Seasonal Quad, PF =/> 6months    6. Wheezing  - albuterol (VENTOLIN HFA) 90 mcg/actuation inhaler; INHALE 1 TO 2 PUFFS BY MOUTH EVERY 4 HOURS AS NEEDED FOR WHEEZING  Dispense: 18 g; Refill: 3    7. Allergic rhinitis due to pollen, unspecified seasonality  - fluticasone propionate (FLONASE) 50 mcg/actuation nasal spray; 1 spray into each nostril daily.  Dispense: 16 g; Refill: 11      The following high BMI interventions were performed this visit: encouragement to exercise and lifestyle education  "regarding diet    Return in about 2 weeks (around 10/25/2019) for recheck.     History of Present Illness   This 64 y.o. old woman comes in for follow-up.  She continues to have aching throughout her body back arms legs etc.  She continues to have a low mood with symptoms of depression.  She has lack of energy.  She does have sleep apnea.  Is not entirely clear how reliable she has with CPAP.  She does not have a lot of anxiety.  Chest pain no shortness of breath.    Review of Systems: A comprehensive review of systems was negative except as noted.     Medications, Allergies and Problem List   Reviewed, reconciled and updated  Post Discharge Medication Reconciliation Status:      Physical Exam   General Appearance:   No acute distress    /86 (Patient Site: Left Arm, Patient Position: Sitting, Cuff Size: Adult Regular)   Pulse 87   Ht 5' 2\" (1.575 m)   Wt (!) 231 lb (104.8 kg)   LMP 06/16/1996   SpO2 95%   BMI 42.25 kg/m      HEENT exam is unremarkable  Neck supple no thyromegaly or nodule palpable  Lymphatic no cervical lymphadenopathy  Cardiovascular regular rate and rhythm no murmur gallop or rub  Pulmonary lungs are clear to auscultation bilaterally  Gastrointestinal abdomen soft nontender nondistended no organomegaly  Neurologic exam is non focal  Psychiatric pleasant, no confusion or agitation        Additional Information   Current Outpatient Medications   Medication Sig Dispense Refill     albuterol (VENTOLIN HFA) 90 mcg/actuation inhaler INHALE 1 TO 2 PUFFS BY MOUTH EVERY 4 HOURS AS NEEDED FOR WHEEZING 18 g 3     aspirin 81 MG EC tablet Take 1 tablet (81 mg total) by mouth daily. 90 tablet 3     atorvastatin (LIPITOR) 20 MG tablet Take 1 tablet (20 mg total) by mouth daily. 90 tablet 3     cholecalciferol, vitamin D3, (VITAMIN D3) 1,000 unit capsule Take 1 capsule (1,000 Units total) by mouth daily. 90 capsule 3     fluticasone propionate (FLONASE) 50 mcg/actuation nasal spray 1 spray into each " nostril daily. 16 g 11     nebulizer accessories Kit Provide one nebulizer and accessories putting tubing and mask 1 kit 0     polyethylene glycol (GLYCOLAX) 17 gram/dose powder Take 17 g by mouth daily. Use as needed. 255 g 3     DULoxetine (CYMBALTA) 30 MG capsule Take 1 capsule (30 mg total) by mouth 2 (two) times a day. 60 capsule 2     No current facility-administered medications for this visit.      Allergies   Allergen Reactions     Chloroquine Phosphate      Social History     Tobacco Use     Smoking status: Never Smoker     Smokeless tobacco: Never Used   Substance Use Topics     Alcohol use: No     Drug use: No       Review and/or order of clinical lab tests:  Review and/or order of radiology tests:  Review and/or order of medicine tests:  Discussion of test results with performing physician:  Decision to obtain old records and/or obtain history from someone other than the patient:  Review and summarization of old records and/or obtaining history from someone other than the patient and.or discussion of case with another health care provider:  Independent visualization of image, tracing or specimen itself:    Time:      Cj Georges MD

## 2021-06-03 ENCOUNTER — COMMUNICATION - HEALTHEAST (OUTPATIENT)
Dept: TELEHEALTH | Facility: CLINIC | Age: 66
End: 2021-06-03
Payer: COMMERCIAL

## 2021-06-03 VITALS — BODY MASS INDEX: 41.59 KG/M2 | WEIGHT: 226 LBS | HEIGHT: 62 IN

## 2021-06-03 VITALS — WEIGHT: 233 LBS | HEIGHT: 62 IN | BODY MASS INDEX: 42.88 KG/M2

## 2021-06-03 VITALS
SYSTOLIC BLOOD PRESSURE: 128 MMHG | WEIGHT: 231 LBS | OXYGEN SATURATION: 95 % | HEART RATE: 87 BPM | BODY MASS INDEX: 42.51 KG/M2 | DIASTOLIC BLOOD PRESSURE: 86 MMHG | HEIGHT: 62 IN

## 2021-06-03 VITALS — WEIGHT: 230.5 LBS | BODY MASS INDEX: 42.16 KG/M2

## 2021-06-03 VITALS
BODY MASS INDEX: 42.69 KG/M2 | HEART RATE: 85 BPM | HEIGHT: 62 IN | OXYGEN SATURATION: 97 % | DIASTOLIC BLOOD PRESSURE: 84 MMHG | WEIGHT: 232 LBS | SYSTOLIC BLOOD PRESSURE: 128 MMHG

## 2021-06-03 NOTE — PROGRESS NOTES
Clinic Care Coordination Contact    Follow Up Progress Note      Assessment: CCC RN attempted to reach patient for follow-up from recent ED visit, but was unable to leave a message as her voicemail was full. CCC team will continue to try to reach out to patient to ensure needs and concerns are being addressed.

## 2021-06-03 NOTE — TELEPHONE ENCOUNTER
Phone call to patient to review results and provider's recommendations. Results given and explained. Discussed she could either start the [facility] PT and come back in 4 weeks OR return to clinic to review the findings as well as be reevaluated by PSP. She would like to come back for a follow up appointment. She will call back to make this appointment when has calendar available.

## 2021-06-03 NOTE — TELEPHONE ENCOUNTER
----- Message from Tiesha Fiore PA-C sent at 11/12/2019  4:40 PM CST -----  Please call the patient let her know that I reviewed her MRI lumbar spine.  This shows minimal degenerative change.  There is no focal disc protrusion.  There is slight narrowing around the nerves at L4-5.  There is also mild to moderate arthritis in the joints.  I recommended the patient begin medics physical therapy.  It has been 3 months since I saw this patient.  I am happy to see the patient back in the clinic to reevaluate and review these results.  Otherwise, patient can start the physical therapy and follow-up with me in 4 weeks.
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
## 2021-06-04 VITALS — WEIGHT: 230.8 LBS | BODY MASS INDEX: 42.47 KG/M2 | HEIGHT: 62 IN

## 2021-06-04 NOTE — PROGRESS NOTES
Patient is having some heel pain in the morning or when she gets up to use the restroom during the night. She states it gets better as the day goes on but it's very painful in the morning. She is going to talk to PT about it and if they think she should see PCP she will call to schedule appointment.    Patient does not check her blood sugars or take any meds for diabetes. She manages her blood sugars with diet and exercise.     Next outreach due: 1/20/20
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
## 2021-06-05 NOTE — PROGRESS NOTES
Clinic Care Coordination Contact    Follow Up Progress Note      Assessment: CCC RN attempted to contact patient to follow up after recent ED visit, but was unable to leave a message due to voice mail being full. CCC RN will continue to try to reach out to patient to ensure needs and concerns are being addressed. Per chart review, patient has a follow up appointment with her PCP on 2/7/20 and a scheduled MTM phone appointment on 2/3/20.

## 2021-06-05 NOTE — PROGRESS NOTES
Scheduled Follow Up Call: Attempt 1 Community Health Worker called and left a message for the patient. If the patient is returning my call, please transfer the patient to Nancy at ext. [phone].    Next outreach due: 1/31/2020

## 2021-06-05 NOTE — PROGRESS NOTES
Clinic Care Coordination Contact    Follow Up Progress Note      Assessment: Attempted to reach patient today to discuss current goal, but was only able to leave a message. Per chart review, patient had follow with PCP yesterday for complaints of heal pain/plantar fascitis and has been referred to Podiatry. CCC RN will continue to monitor and will be available as nursing needs arise.

## 2021-06-05 NOTE — PROGRESS NOTES
"  Office Visit - Follow Up   Isabel Bowie   65 y.o. female    Date of Visit: 1/7/2020    Chief Complaint   Patient presents with     Heel Pain     right     Hip Pain     right        Assessment and Plan   1. Plantar fasciitis  I gave her information on home care  - Ambulatory referral to Podiatry    2. Moderate major depression (H)  Stable continue current plan    3. Beta thalassemia (H)  Has been stable, I have previously recommended hematology evaluation    4. Type 2 diabetes mellitus without complication, without long-term current use of insulin (H)  Diet controlled continue current plan    5. Morbid obesity (H)  The following high BMI interventions were performed this visit: encouragement to exercise and lifestyle education regarding diet    Return in about 3 months (around 4/7/2020) for recheck.     History of Present Illness   This 65 y.o. old woman comes in for follow-up.  Overall doing well.  Working with therapy for back and hip pain.  Seems to be improving.  Now has right heel pain.  Worse after she has been sitting especially bad when she wakes up in the morning.  No specific injury.  Has had borderline blood sugars.  Not on medication.  No polyuria or polydipsia.  Trying to lose weight.  Mood generally stable.    Review of Systems: A comprehensive review of systems was negative except as noted.     Medications, Allergies and Problem List   Reviewed, reconciled and updated  Post Discharge Medication Reconciliation Status:      Physical Exam   General Appearance:   No acute distress    /68 (Patient Site: Left Arm, Patient Position: Sitting, Cuff Size: Adult Regular)   Pulse 86   Ht 5' 2\" (1.575 m)   Wt (!) 232 lb (105.2 kg)   LMP 06/16/1996   SpO2 98%   BMI 42.43 kg/m      HEENT exam is unremarkable  Neck supple no thyromegaly or nodule palpable  Lymphatic no cervical lymphadenopathy  Cardiovascular regular rate and rhythm no murmur gallop or rub  Pulmonary lungs are clear to auscultation " bilaterally  Gastrointestinal abdomen soft nontender nondistended no organomegaly  Neurologic exam is non focal  Psychiatric pleasant, no confusion or agitation   Pain with palpation over the plantar fascia of the right heel     Additional Information   Current Outpatient Medications   Medication Sig Dispense Refill     albuterol (VENTOLIN HFA) 90 mcg/actuation inhaler INHALE 1 TO 2 PUFFS BY MOUTH EVERY 4 HOURS AS NEEDED FOR WHEEZING 18 g 3     aspirin 81 MG EC tablet Take 1 tablet (81 mg total) by mouth daily. 90 tablet 3     atorvastatin (LIPITOR) 20 MG tablet Take 1 tablet (20 mg total) by mouth daily. 90 tablet 3     cholecalciferol, vitamin D3, (VITAMIN D3) 1,000 unit capsule Take 1 capsule (1,000 Units total) by mouth daily. 90 capsule 3     DULoxetine (CYMBALTA) 30 MG capsule TAKE 1 CAPSULE BY MOUTH TWICE DAILY 180 capsule 2     fluticasone propionate (FLONASE) 50 mcg/actuation nasal spray 1 spray into each nostril daily. 16 g 11     gabapentin (NEURONTIN) 100 MG capsule Take 100 mg by mouth 2 (two) times a day.       nebulizer accessories Kit Provide one nebulizer and accessories putting tubing and mask 1 kit 0     polyethylene glycol (GLYCOLAX) 17 gram/dose powder Take 17 g by mouth daily. Use as needed. 255 g 3     No current facility-administered medications for this visit.      Allergies   Allergen Reactions     Chloroquine Phosphate      Social History     Tobacco Use     Smoking status: Never Smoker     Smokeless tobacco: Never Used   Substance Use Topics     Alcohol use: No     Drug use: No            Cj Georges MD

## 2021-06-05 NOTE — TELEPHONE ENCOUNTER
Who is calling:  Patient   Reason for Call:  Caller is wanting clarification on what she is suppose to do. Caller stated that she does not remember why she's needing a referral and will like for Cj Georges MD to contact her back to see what is needing to be done first.   Date of last appointment with primary care: n/a  Okay to leave a detailed message: Yes  [phone]

## 2021-06-05 NOTE — TELEPHONE ENCOUNTER
"Voicemail received from pt giving status update post-injection. Pt reports her pain has minimized. It is now a 3/10. \"Thank you so much\".   "

## 2021-06-05 NOTE — TELEPHONE ENCOUNTER
PT STOPPED BY AND WOULD LIKE DR ACRE TO FILL OUT PAPERWORK FOR A LEAVE FROM WORK PLEASE FAX PAPERWORK TO PT -[phone] ATT; STEPHANIE FORDE AND PT WILL ALSO LIKE TO  COPY GIVING PAPERS TO SANDY

## 2021-06-05 NOTE — ANESTHESIA POSTPROCEDURE EVALUATION
Patient: Isabel CAMPBELL Tubman  Procedure(s):  BIOPSY, ARTERY, TEMPORAL (Bilateral)  Anesthesia type: MAC    Patient location: Phase II Recovery  Last vitals:   Vitals Value Taken Time   /81 1/29/2020  5:00 PM   Temp 36.9  C (98.4  F) 1/29/2020  3:56 PM   Pulse 72 1/29/2020  5:00 PM   Resp 16 1/29/2020  4:30 PM   SpO2 100 % 1/29/2020  3:56 PM     Post vital signs: stable  Level of consciousness: awake and responds to simple questions  Post-anesthesia pain: pain controlled  Post-anesthesia nausea and vomiting: no  Pulmonary: unassisted, return to baseline  Cardiovascular: stable and blood pressure at baseline  Hydration: adequate  Anesthetic events: no    QCDR Measures:  ASA# 11 - Maryana-op Cardiac Arrest: ASA11B - Patient did NOT experience unanticipated cardiac arrest  ASA# 12 - Maryana-op Mortality Rate: ASA12B - Patient did NOT die  ASA# 13 - PACU Re-Intubation Rate: NA - No ETT / LMA used for case  ASA# 10 - Composite Anes Safety: ASA10A - No serious adverse event    Additional Notes:

## 2021-06-05 NOTE — ANESTHESIA CARE TRANSFER NOTE
Last vitals:   Vitals:    01/29/20 1515   BP: 142/66   Pulse: 77   Resp: 16   Temp: 36.2  C (97.2  F)   SpO2: 100%     Patient's level of consciousness is drowsy  Spontaneous respirations: yes  Maintains airway independently: yes  Dentition unchanged: yes  Oropharynx: oropharynx clear of all foreign objects    QCDR Measures:  ASA# 20 - Surgical Safety Checklist: WHO surgical safety checklist completed prior to induction    PQRS# 430 - Adult PONV Prevention: 4558F - Pt received => 2 anti-emetic agents (different classes) preop & intraop  ASA# 8 - Peds PONV Prevention: NA - Not pediatric patient, not GA or 2 or more risk factors NOT present  PQRS# 424 - Maryana-op Temp Management: 4559F - At least one body temp DOCUMENTED => 35.5C or 95.9F within required timeframe  PQRS# 426 - PACU Transfer Protocol:NA - Patient did not go to PACU  ASA# 14 - Acute Post-op Pain: ASA14B - Patient did NOT experience pain >= 7 out of 10

## 2021-06-05 NOTE — TELEPHONE ENCOUNTER
Returned call to patient to ask for more information to what she was asking specifically about. Patient stated that she didn't understand what the MTM appointment was for on Monday, February 3rd. I informed the patient what the MTM is, and that this is a fantastic resource providers use to help manage patient's medications. Patient verbalized understanding and had no further questions. Patient did ask for orders for shower chair, walker, and gai belt. Will request for these orders.   Claribel Conner

## 2021-06-05 NOTE — PROGRESS NOTES
MTM Transition of Care Encounter  Assessment & Plan                                                     Post Discharge Medication Reconciliation Status: discharge medications reconciled and changed, per note/orders (see AVS)    Peripheral Vertigo: Some improvement, still occurring. Patient will be seeing [place]. Encouraged her to try meclizine PRN, especially before she sees PCP. Will resend rx since it was sent as OTC.   PLAN:   1. Try meclizine 26 mg four times a day PRN -- new rx sent    Type 2 Diabetes:  well controlled. A1C was at goal of <7%. Due for A1c and microalbuminuria in March. Recommend also checking lipids.   PLAN:   1. Future lipids.     Depression: Patient to continue current regimen. If depression worsens as conditions improve, consider dose adjustment or additional medication.     Neuropathy:  Per chart review patient, neuropathy is on her problem list along with low back pain. Recommend continuing at this time. Reviewed indications.     shortness of breath/Allergies: Stable. Recommended to continue current regimen and use PRN.     Vitamin D Deficiency: Last Vitamin D level was low. Continue current supplement, but recheck in the future to ensure if patient needs more supplementation.   PLAN:   1. Future Vitamin D level     Constipation: Needs improvement. Encouraged her to continue to use Miralax daily, back off if loose stools.     Follow Up  As needed with MTM     Subjective & Objective                                                       Isabel Biggs is a 65 y.o. female called for a transitions of care visit. she was discharged from [facility] on 1/30/20 for peripheral vertigo.    Chief Complaint: reports she is better but not completely.     Peripheral Vertigo: Admitted with vertigo.  She had a negative CT in MRI of the head.  It was felt that she had peripheral vertigo.  She is seen by PT and OT but did not really do well initially with therapy because she could not tolerate it.  She  is complaining of right-sided headache and had a mildly elevated sed rate.  Temporal artery biopsy was performed which was negative for arteritis. Still has the headache but it comes and goes - using APAP and Aleve. Discharged on meclizine 25 mg four times a day PRN -- has not tried meclizine yet, needs someone to get it from the pharmacy. Reports the symptoms are there but she is still having some weakness and loss of balance, blurry vision. Was told it would take some time. No falls, using walker. She missed her appointment today at [place].     Type 2 Diabetes: Currently taking no medication.  Last A1c checked 3/19/19 = 6%.   Microalbumin checked 3/19/19  Is taking atorvastatin 20 mg daily. Last lipids checked 11/8/18  Is taking aspirin 81 mg for primary prevention.  No significant bleeding, except for a hemorroid    Depression: Taking duloxetine 30 mg two times a day. Most of the she does feel sad and wants to be well. No suicidal thoughts.     Neuropathy:  Taking duloxetine 30 mg two times a day and gabapentin 100 mg two times a day. Denies neuropathy and thinks started for pain in her hip. Had an injection and going well.     shortness of breath/Allergies: Taking albuterol HFA and fluticasone nasal spray. Uses albuterol and fluticasone as needed and none since hospital.     Vitamin D Deficiency: Taking Vitamin D 1000 IU daily.   Vitamin D, Total (25-Hydroxy)   Date Value Ref Range Status   06/16/2014 20.5 (L) 30.0 - 80.0 ng/mL Final     Constipation: Taking Miralax daily PRN. Since home bowels are not good, mostly constipated and needs to strain. She used this morning.     PMH: reviewed in EPIC   Allergies/ADRs: reviewed in EPIC   Alcohol: reviewed in EPIC  Tobacco:   Social History     Tobacco Use   Smoking Status Never Smoker   Smokeless Tobacco Never Used     Recent Vitals:   BP Readings from Last 3 Encounters:   01/30/20 132/66   01/10/20 126/76   01/07/20 124/68      Wt Readings from Last 3 Encounters:    01/30/20 (!) 222 lb 3.2 oz (100.8 kg)   01/07/20 (!) 232 lb (105.2 kg)   11/18/19 (!) 230 lb 12.8 oz (104.7 kg)     ----------------    The patient declined an after visit summary    I spent 15 minutes with this patient today;  . All changes were made via collaborative practice agreement with Cj Georges MD. A copy of the visit note was provided to the patient's provider.     Meghann Moody, Pharm.D., BCACP  Medication Therapy Management Pharmacist  [facility]     Current Outpatient Medications   Medication Sig Dispense Refill     albuterol (VENTOLIN HFA) 90 mcg/actuation inhaler INHALE 1 TO 2 PUFFS BY MOUTH EVERY 4 HOURS AS NEEDED FOR WHEEZING 18 g 3     aspirin 81 MG EC tablet Take 1 tablet (81 mg total) by mouth daily. 90 tablet 3     atorvastatin (LIPITOR) 20 MG tablet Take 1 tablet (20 mg total) by mouth daily. 90 tablet 3     cholecalciferol, vitamin D3, (VITAMIN D3) 1,000 unit capsule Take 1 capsule (1,000 Units total) by mouth daily. 90 capsule 3     DULoxetine (CYMBALTA) 30 MG capsule TAKE 1 CAPSULE BY MOUTH TWICE DAILY 180 capsule 2     fluticasone propionate (FLONASE) 50 mcg/actuation nasal spray 1 spray into each nostril daily. (Patient taking differently: 1 spray into each nostril daily as needed. ) 16 g 11     gabapentin (NEURONTIN) 100 MG capsule Take 100 mg by mouth 2 (two) times a day. 180 capsule 1     meclizine (ANTIVERT) 25 mg tablet Take 1 tablet (25 mg total) by mouth 4 (four) times a day as needed for dizziness.  0     nebulizer accessories Kit Provide one nebulizer and accessories putting tubing and mask 1 kit 0     polyethylene glycol (GLYCOLAX) 17 gram/dose powder Take 17 g by mouth daily. Use as needed. 255 g 3     No current facility-administered medications for this visit.

## 2021-06-05 NOTE — ANESTHESIA PREPROCEDURE EVALUATION
Anesthesia Evaluation      Patient summary reviewed   No history of anesthetic complications     Airway   Mallampati: III   Pulmonary - normal exam   (+) sleep apnea on CPAP, ,                          Cardiovascular - negative ROS and normal exam  Exercise tolerance: > or = 4 METS  Rhythm: regular  Rate: normal,         Neuro/Psych    (+) depression,     Endo/Other    (+) diabetes mellitus well controlled, obesity,      GI/Hepatic/Renal - negative ROS      Other findings: Results for DERRELL ROSA (MRN [id number]) as of 1/29/2020 13:50    1/26/2020 09:37  Sodium: 140  Potassium: 3.8  Chloride: 103  CO2: 27  Anion Gap, Calculation: 10  BUN: 16  Creatinine: 0.73  GFR MDRD Af Amer: >60  GFR MDRD Non Af Amer: >60  Results for DERRELL ROSA (MRN [id number]) as of 1/29/2020 13:50    1/26/2020 09:38  WBC: 5.6  RBC: 4.80  Hemoglobin: 11.8 (L)  Hematocrit: 38.4  MCV: 80  MCH: 24.6 (L)  MCHC: 30.7 (L)  RDW: 14.3  Platelets: 304        Dental - normal exam                        Anesthesia Plan  Planned anesthetic: MAC  Planned anesthetic: MAC  GA prn  FiO2 less than 30%  Propofol ggt  Decadron/zofran  ASA 3     Anesthetic plan and risks discussed with: patient and spouse    Post-op plan: routine recovery

## 2021-06-05 NOTE — PATIENT INSTRUCTIONS - HE
DISCHARGE INSTRUCTIONS    During office hours (8:00 a.m.- 4:00 p.m.) questions or concerns may be answered  by calling [facility] Navigation Nurses at  [phone].  Messages received after hours will be returned the following business day.      In the case of an emergency, please dial 911 or seek assistance at the nearest Emergency Room/Urgent Care facility.     All Patients:    ? You may experience an increase in your symptoms for the first 2 days (It may take anywhere between 2 days- 2 weeks for the steroid to have maximum effect).    ? You may use ice on the injection site, as frequently as 20 minutes each hour if needed.    ? You may take your pain medicine.    ? You may continue taking your regular medication after your injection. If you have had a Medial Branch Block you may resume pain medication once your pain diary is completed.    ? You may shower. No swimming, tub bath or hot tub for 48 hours.  You may remove your bandaid/bandage as soon as you are home.    ? You may resume light activities, as tolerated.    ? Resume your usual diet as tolerated.    ? It is strongly advised that you do not drive for 1-3 hours post injection.    ? If you have had oral sedation:  Do not drive for 8 hours post injection.      ? If you have had IV sedation:  Do not drive for 24 hours post injection.  Do not operate hazardous machinery or make important personal/business decisions for 24 hours.      POSSIBLE STEROID SIDE EFFECTS (If steroid/cortisone was used for your procedure)    -If you experience these symptoms, it should only last for a short period      Swelling of the legs                Skin redness (flushing)       Mouth (oral) irritation     Blood sugar (glucose) levels              Sweats                      Mood changes    Headache    Sleeplessness         POSSIBLE PROCEDURE SIDE EFFECTS  -Call the Spine Center if you are concerned    Increased Pain             Increased numbness/tingling         Nausea/Vomiting            Bruising/bleeding at site        Redness or swelling                                                Difficulty walking        Weakness             Fever greater than 100.5    *In the event of a severe headache after an epidural steroid injection that is relieved by lying down, please call the [facility] to speak with a clinical staff member*

## 2021-06-05 NOTE — PROGRESS NOTES
Clinic Care Coordination Contact  Care Team Conversations     SW attempted to reach [place] twice, no answer, left voicemail if she would like to reschedule to please call [facility].

## 2021-06-05 NOTE — PROGRESS NOTES
Office Visit - Follow Up   Isabel Biggs   65 y.o. female    Date of Visit: 2/7/2020    Chief Complaint   Patient presents with     Hospital Visit Follow Up     vertigo        Assessment and Plan   1. Benign paroxysmal positional vertigo, unspecified laterality  She has an appointment at [facility] at the end of the month, I like to get her in sooner given her ongoing symptoms, continue meclizine  - Ambulatory referral to PT/OT    2. Type 2 diabetes mellitus without complication, without long-term current use of insulin (H)  Diet controlled, continue aspirin statin, excellent diabetic foot care and annual diabetic eye exam  - Glycosylated Hemoglobin A1c    3. Hyperlipidemia  Continue atorvastatin  - Lipid Profile    4. Nonintractable episodic headache, unspecified headache type  Reviewed temporal artery pathology, no evidence of arteritis    The following high BMI interventions were performed this visit: encouragement to exercise and lifestyle education regarding diet    Return in about 4 weeks (around 3/6/2020) for recheck.     History of Present Illness   This 65 y.o. old woman comes in for follow-up of hospitalization.  She was admitted with vertigo which was fairly incapacitating.  She also had a headache.  Vertigo was felt to be peripheral.  Brain imaging looked okay.  She had temporal artery biopsy with mild elevation in sed rate.  She is now discharged back to [facility]. follow-up with the dizzy imbalance clinic at [facility].  She continues to have vertigo.  She would like to do some therapy.  Otherwise feeling okay.  Recently lost housing and now living with family.    Review of Systems: A comprehensive review of systems was negative except as noted.     Medications, Allergies and Problem List   Reviewed, reconciled and updated  Post Discharge Medication Reconciliation Status: discharge medications reconciled, continue medications without change     Physical Exam   General Appearance:   No acute  "distress    /76 (Patient Site: Right Arm, Patient Position: Sitting, Cuff Size: Adult Regular)   Pulse 87   Ht 5' 2\" (1.575 m)   Wt (!) 225 lb (102.1 kg)   LMP 06/16/1996   SpO2 99%   BMI 41.15 kg/m      HEENT exam is unremarkable  Neck supple no thyromegaly or nodule palpable  Lymphatic no cervical lymphadenopathy  Cardiovascular regular rate and rhythm no murmur gallop or rub  Pulmonary lungs are clear to auscultation bilaterally  Gastrointestinal abdomen soft nontender nondistended no organomegaly  Neurologic exam is non focal  Psychiatric pleasant, no confusion or agitation        Additional Information   Current Outpatient Medications   Medication Sig Dispense Refill     albuterol (VENTOLIN HFA) 90 mcg/actuation inhaler INHALE 1 TO 2 PUFFS BY MOUTH EVERY 4 HOURS AS NEEDED FOR WHEEZING 18 g 3     aspirin 81 MG EC tablet Take 1 tablet (81 mg total) by mouth daily. 90 tablet 3     atorvastatin (LIPITOR) 20 MG tablet Take 1 tablet (20 mg total) by mouth daily. 90 tablet 3     cholecalciferol, vitamin D3, (VITAMIN D3) 1,000 unit capsule Take 1 capsule (1,000 Units total) by mouth daily. 90 capsule 3     DULoxetine (CYMBALTA) 30 MG capsule TAKE 1 CAPSULE BY MOUTH TWICE DAILY 180 capsule 2     fluticasone propionate (FLONASE) 50 mcg/actuation nasal spray 1 spray into each nostril daily. (Patient taking differently: 1 spray into each nostril daily as needed. ) 16 g 11     gabapentin (NEURONTIN) 100 MG capsule Take 100 mg by mouth 2 (two) times a day. 180 capsule 1     meclizine (ANTIVERT) 25 mg tablet Take 1 tablet (25 mg total) by mouth 4 (four) times a day as needed for dizziness. 28 tablet 0     nebulizer accessories Kit Provide one nebulizer and accessories putting tubing and mask 1 kit 0     polyethylene glycol (GLYCOLAX) 17 gram/dose powder Take 17 g by mouth daily. Use as needed. 255 g 3     No current facility-administered medications for this visit.      Allergies   Allergen Reactions     " Chloroquine Phosphate Other (See Comments)     Blurry vision, itchy skin     Social History     Tobacco Use     Smoking status: Never Smoker     Smokeless tobacco: Never Used   Substance Use Topics     Alcohol use: No     Drug use: No       Review and/or order of clinical lab tests:  Review and/or order of radiology tests:  Review and/or order of medicine tests:  Discussion of test results with performing physician:  Decision to obtain old records and/or obtain history from someone other than the patient:  Review and summarization of old records and/or obtaining history from someone other than the patient and.or discussion of case with another health care provider:  Independent visualization of image, tracing or specimen itself:    Time:      Cj Georges MD
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
## 2021-06-06 NOTE — PROGRESS NOTES
Vertigo:  Patient has not scheduled with the dizzy and balance clinic yet. They called her today and she needs to check her calendar before she can schedule.    She is scheduled to see the neurologist at [facility] on 2/25/20    Housing:  She is currently living with her daughter until June when her grandchildren are done with school. She is looking to move to the [place] or [place] area to be closer to her other children.    Next outreach due: 3/12/2020
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
## 2021-06-06 NOTE — PROGRESS NOTES
Clinic Care Coordination Contact  [facility]/Voicemail       Clinical Data: Care Coordinator Outreach  Outreach attempted x 1.  Left message on patient's voicemail with call back information and requested return call.  Plan:  Care Coordinator will try to reach patient again in 3-5 business days.  Next outreach due: 3/18/2020
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
## 2021-06-06 NOTE — PATIENT INSTRUCTIONS - HE
Employees recovering from concussions often exhibit cognitive symptoms that make attending work and learning difficult. They may not be able to attend work or only partial days. Some symptoms that could affect performance in the work environment  include: sensitivity to light and noise, headache, trouble focusing, concentrating, or remembering, and difficulty looking at a screen. The accommodations below often help reduce the symptoms and allow them to return to work quicker. Compliance with these accommodations allows the brain to recover more quickly even if it appears the employee is symptom free.     1. Limit exposure to computer screens  2. Allow patient to take 10 minute breaks every 60 minutes   3. Allow patient to take a break if she starts to have symptoms, If symptoms continue or worsen go to sleep  4. Allow employee to wear sunglasses and hat to help patient's sensitivity to lights  .  5. Please put a screen filter on the patient's computer   6. If patient wakes with severe headache please allow patient to start work later, it symptoms worsen patient should not attempt to work.    Amitriptyline take 1-2 tablets, I want you to use 1-2 tabs, take less if you feel medicated in the morning, more if you wake feeling still tired.  Wellbutrin one in morning    Stop meds if you don't feel good on them    Light jamal colored glasses for when at home and night time riding in a car    Dark jamal tint glasses for when ezekiel and when it snows
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
## 2021-06-07 NOTE — PROGRESS NOTES
Clinic Care Coordination Contact    Follow Up Progress Note      Assessment: Talked to patient who is living with her daughter and her  in [place].  She has custody of two grand sons, ages 15 and 18. They are not living with her currently and are missing a lot of school. She needs to find housing that can accommodate them. She can afford $800- $1,000. They had lived in public housing, but they need accessible housing now. She wants to remain in the [place] area to be near her two daughters. She would like a three bedroom and has not gotten on any waiting lists.  She does have email and can have family help her with connecting to resources.      Called [employer] and left message asking for a call back as they do have a senior apartment building in her area. Want to learn if patient could live there with her grandsons.  Call back from [employer] and if the building uses [employer] money, she could move there with grandsons.  Sent her options for senior buildings.    Goals addressed this encounter:   Goals Addressed                 This Visit's Progress      Functional   On track     Goal Statement- I would like to have resources for more accessible housing for myself and my son in the next 2 months.     Action steps to achieve this goal    1. I will speak with the [facility] MSW at scheduled phone visit to discuss resources housing resources.   2. I will look for market value apartments on my own in the area I would like to move to.   3. I will stay with my daughter until my grandchildren are done with the school year.       Date goal set:  1/8/20              Intervention/Education provided during outreach: Sent this email to patient:  1. [employer] has buildings and townhomes throughout the area.  This one is in [place] which wouldn t be too far from [place] and they have 2 and 3 bedrooms, and an open waiting list.       [email or web address]_[email or web address]_[email or web address]&_[id number]    2. [facility] has multiple apartments throughout the area:   [address]  [address] [place], [address]  [address]--Get Directions  [phone]  Website   [email or web address]    Description: Affordable rental units in multifamily properties for low-income individuals. Call for availability and eligibility requirements.    [email or web address]  [address] [employer] has multiple apartments in the area:      [email or web address]://[email or web address]/searchlisting.aspx?ftst=&cmbBeds=[email or web address]&txtDistance=50&LocationGeoId=0&zoom=10&GeoLatitude=[email or web address]&GeoLongitude=-[phone]&autoCompleteCorpPropSearchlen=3&renewpg=1&    4. [facility] has several options in the area.    [email or web address]    [email or web address]_search&post_type=rental&custom_search_key_1=3&custom_search_key_3=3  5. [employer] apartments is the last option I could find.    [email or web address]/find-apartment.[email or web address]     Second email sent with senior housing options:  [email or web address]  senior building- has 2-3 bedrooms in [facility].  2 bedrooms  [phone]    [facility] [place], [phone] 2 and 3 bedrooms    [facility]  [email or web address]://[email or web address]/departments/community-development/housing/senior-housing/[facility]    [place]  http://"[employer]/housing-portfolio/property-list/[place]/         Plan:   Will defer to CHW who can contact SW if support is needed.  Will follow up with patient if she can move into [facility] in that area.   Angela Rogel, [facility]  Clinic Care Coordinator  [phone]

## 2021-06-07 NOTE — TELEPHONE ENCOUNTER
I do not think going to the emergency room is unreasonable but she has had peripheral vertigo.  She could try calling [place] dizzy imbalance clinic to see if they have any recommendations prior to heading to the emergency room.  Otherwise she could do some of her exercises for BPPV.

## 2021-06-07 NOTE — PROGRESS NOTES
"Isabel Biggs is a 65 y.o. female who is being evaluated via a billable telephone visit.      The patient has been notified of following:     \"This telephone visit will be conducted via a call between you and your physician/provider. We have found that certain health care needs can be provided without the need for a physical exam.  This service lets us provide the care you need with a short phone conversation.  If a prescription is necessary we can send it directly to your pharmacy.  If lab work is needed we can place an order for that and you can then stop by our lab to have the test done at a later time.    If during the course of the call the physician/provider feels a telephone visit is not appropriate, you will not be charged for this service.\"     Patient has given verbal consent to a Telephone visit? Yes    Isabel Biggs complains of    Chief Complaint   Patient presents with     Sore Throat       sore throat for 3 days nasal drainage   no fever and no cough       I have reviewed and updated the patient's Past Medical History, Social History, Family History and Medication List.    ALLERGIES  Chloroquine phosphate    Additional provider notes: Sore throat    No known drug allergy to penicillin or Z-Bucky.    Slight wheezing the patient denies headache fever or cough.    No blood in stool or urine medication list reviewed well-tolerated normal effects.    No definite symptoms that sound like coronavirus 19.    Assessment/Plan:  Sore throat with slight wheezing without fever cough or headache.  Try Z-Bucky.  No allergy to Z-Bucky or penicillin.      Phone call duration:  11 minutes    Bettina Rg, [facility]  "

## 2021-06-07 NOTE — TELEPHONE ENCOUNTER
"Patient was called for [facility] Follow Up    Patient states she called a few days ago for symptoms of a sinus infection which she gets often with seasonal allergies and was referred to call the [facility] number. She says she doesn't remember if she called or not. She doesn't believe it's COVID-19.  Patient complaining of a very sore throat, she can feel sinus drainage going down the back of her throat. She does not have a fever, she states she is blowing green and brown nasal discharge. Her ears feel \"full\" or plugged up. She doesn't feel like she has facial pain.   She would like a prescription for the infection and the pain, difficult to swallow.    Pls advise  "

## 2021-06-07 NOTE — TELEPHONE ENCOUNTER
RN triage   Call from pt   No travels - no known exposures   Pt states she has had vertigo since yesterday   Was hospitalized w/ vertigo in February -- given meclizine -  Has not taken meclizine today or yesterday -- pt states her medication is all gone   Pt has not fallen or fainted   No chest pain or palpitations   No one side numb/weakness  Pt had headache last night - some better now   Has some nausea - no vomiting   Pt usually uses walker -- feeling more unsteady -- even w/ walker   - pt feels like she might fall -- cannot get around   Per protocol = should be to ED - check w/ PCP first  Please advise = when and where do you want pt seen /  Ayah White RN [facility] RN triage      Reason for Disposition    SEVERE dizziness (e.g., unable to stand, requires support to walk, feels like passing out now)    Protocols used: DIZZINESS-A-[place]
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
## 2021-06-07 NOTE — TELEPHONE ENCOUNTER
Who is calling:  Patient   Reason for Call:  Patient states she have an video appointment today at 8:30 am she got a call but she is unable to operate  microphone . Caller requested a call form clinic .  Date of last appointment with primary care: unknown   Okay to leave a detailed message: No

## 2021-06-07 NOTE — TELEPHONE ENCOUNTER
Patient Returning Call  Reason for call:  Return call  Information relayed to patient:  Patient was informed of the message below. Patient verbalized understanding and has no further questions or concerns at this time. Patient transferred to scheduling.  Patient has additional questions:  No  If YES, what are your questions/concerns:  n/a  Okay to leave a detailed message?: No call back needed

## 2021-06-07 NOTE — TELEPHONE ENCOUNTER
Symptom    Describe your symptoms:   Sore Throat  Mouth pain  Headaches  Brownish mucous  Low grade fever    Any pain: yes PS 5    New/Ongoing: New     How long have you been having symptoms: 3  Day(s)    Have you been seen for this:  No     Appointment offered?: Patient declines     Triage offered?: Yes, declined     Home remedies tried: None    Requested Pharmacy: [id number] - [facility] [address] AT [facility]     Okay to leave a detailed message? Yes     Please call patient with the treatment plan.

## 2021-06-07 NOTE — PROGRESS NOTES
Psychology Progress Note    Date: April 22, 2020    Time length and type of treatment: 45 minutes (1:07 PM to 1:52 PM), individual therapy    After review of the patient's situation, this visit was changed from an in-person visit to a  video visit via [employer] to reduce the risk of COVID 19 exposure. Patient was informed that policies and procedures that govern in-person sessions would also apply to  video sessions. Patient was also informed that  video sessions would be discontinued when COVID 19 exposure is no longer a concern (as determined by [facility]).     Patient location: Patient home in [place], [place]  Provider location: [facility] - Concussion [facility], [place], [place]    Patient was in agreement with proceeding with a  video session.      Necessity: This session is necessary to establish rapport and obtain preliminary information regarding patient concerns.    Intervention: This writer utilized motivational interviewing, active listening, reassurance and support in the context of cognitive behavioral therapy to address the above.      Mental Status:   Grooming: Within broad normal limits  Attire: Unknown due to videocamera focusing on patient face  Age: Appears Stated  Behavior Towards Examiner: Cooperative  Motor Activity: Patient was reclining on a bed throughout the session  Eye Contact: Appropriate  Mood: Depressed  Affect: Tearful  Speech/Language: Soft  Attention: Distractible  Concentration: Brief  Thought Process: Tangential  Thought Content: No evidence of delusions or hallucinations  Orientation: Appeared oriented to person, place, and time, though not formally established  Memory: No Evidence of Impairment  Judgement: No Evidence of Impairment  Estimated Intelligence: Average  Demonstrated Insight: Adequate  Fund of Knowledge: adequate      Progress:   The patient reported she has struggled with depression for many years, but this has worsened since both her son's and  her disabilities meant they needed to move, and she lost multiple friends to COVID-19.  The patient explained that until recently, she was sharing an apartment with her , her disabled son, and another son's two boys.  She explained that her grandsons, who are now 14 and 18, have lived with her since they were 3 and 6 years old, but when they vacated their apartment, they had nowhere to live and all split up.  She is now residing with her daughter and her daughter's family, her  is residing with his son, her disabled son is living with her mother, and her 2 grandchildren are temporarily living with their father (her son).  The patient stated she misses having her family lived together and, even though her sons are living only a short distance away, it is very painful to not see them on a daily basis and have them living with her.  The patient was struggling to adapt to this change when she received word that a large group of her friends have all passed away due to COVID-19.  She explained that this group of friends she has known since college all lived in the [place] area, and did not survive piter COVID-19.  The patient's father was decapitated during the [place] war when the patient was only 14 years old, and she acknowledged that losing so many friends in the [place] within such a short time has been triggering for her.  The patient was very tearful and the poor quality of our video connection interfered with the ability to complete a diagnostic assessment, but this will be completed at the patient's next appointment.  The patient has been on disability for depression since 2006, and the medical record notes a diagnosis of Major Depressive Disorder, recurrent, moderate.  This diagnosis will be maintained by history until a diagnostic assessment can be completed.     Plan: We will meet again in 1 week to complete a diagnostic assessment    Diagnosis:    Major Depressive Disorder, recurrent,  moderate, by history

## 2021-06-07 NOTE — PROGRESS NOTES
Neuropsychology Note    Due to the ongoing COVID-19 pandemic that prevents current in-person visits, Ms. Biggs was contacted regarding Tele-Health options for her neuropsychological evaluations originally scheduled for April 22nd. She opted to wait for an in person appointment. We explained that we are maintaining a wait list arranged such that individuals will be prioritized based on their original appointment date. She was assured that we will contact her as soon as it is safe to begin conducting in-person evaluations.     If Ms. Biggs has any questions or concerns in the meantime, or should a provider require information regarding her current cognitive status sooner (a limited Tele-Health screen can be conducted to inform immediate treatment planning, if needed), our service can be reached at [phone].

## 2021-06-07 NOTE — PROGRESS NOTES
Clinic Care Coordination Contact  [facility]/Voicemail    Returning patient's call     Clinical Data: Care Coordinator Outreach  Outreach attempted x 1.  Left message on patient's voicemail with call back information and requested return call.  Plan:  Care Coordinator will try to reach patient again in 3-5 business days.  Next outreach due: 3/31/2020
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
## 2021-06-07 NOTE — TELEPHONE ENCOUNTER
Patient notified- she verbalized understanding but would like a refill on her meclizine in the meantime. Rx teed to auth

## 2021-06-07 NOTE — TELEPHONE ENCOUNTER
Prior Authorization Request  Who s requesting:  Pharmacy  Pharmacy Name and Location: [facility] #[id number]  Medication Name: amitriptyline (ELAVIL) 25 MG tablet   Insurance Plan: No info under the Verify Rx Benefits tab.   Insurance Member ID Number:  No info under the Verify Rx Benefits tab.   CoverMyMeds Key: [id number]  Informed patient that prior authorizations can take up to 10 business days for response:   NA  Okay to leave a detailed message: No

## 2021-06-07 NOTE — PROGRESS NOTES
Called patient and went to voice mail, left message to call clinic if she would like to reschedule

## 2021-06-07 NOTE — PROGRESS NOTES
Clinic Care Coordination Contact    Follow Up Progress Note      Assessment: Spoke with patient today to discuss current goals. Patient stated she would like speak with the  about housing resources. She stated she is still looking for housing herself and her son that is handicapped accessible. She said she has found housing for herself and son in a building that accepts 55 and older, but she said they have a 14 year old in the family that does not meet criteria for the lease. Patient was scheduled with the [facility] LSW on 4/22/20 to discuss possible housing resources.     Patient reported her sore throat is better this week. She said the antibiotics prescribed by Dr. Martinez worked well. Patient continues to have issues with her sinuses. She stated she was going to schedule a tele-visit with Dr. Georges today.     Goals addressed this encounter:      Intervention/Education provided during outreach: Discussed the importance of handwashing, not touching her face and engaging in social distancing.           Plan: CCC RN will continue monitor and will be available as nursing needs arise.     Care Coordinator will follow up in 45 days.

## 2021-06-07 NOTE — TELEPHONE ENCOUNTER
Who is calling:  Patient    Reason for Call:    Calling to confirm apt for 04/23/2020.  Confirmed time with patient and she verbalize an understanding.    Date of last appointment with primary care: 04/02/2020    Okay to leave a detailed message: Yes

## 2021-06-07 NOTE — PROGRESS NOTES
Patient spoke with CCC RN on 4/21 and scheduled patient with CCC SW 4/23. No current CHW delegations.    CHW will outreach in 2 weeks    Next outreach due: 5/7/2020

## 2021-06-07 NOTE — TELEPHONE ENCOUNTER
Central PA team  [phone]  Pool: HE PA MED ([id number])          PA has been initiated.       PA form completed and faxed insurance via Cover My Meds     Key:  : [id number] - PA Case ID: [id number] - Rx #: [id number]     Medication:  Amitriptyline HCl 25MG tablets    Insurance:  [employer]        Response will be received via fax and may take up to 5-10 business days depending on plan

## 2021-06-07 NOTE — PROGRESS NOTES
"Isabel Biggs is a 65 y.o. female who is being evaluated via a billable video visit.      The patient has been notified of following:     \"This video visit will be conducted via a call between you and your physician/provider. We have found that certain health care needs can be provided without the need for an in-person physical exam.  This service lets us provide the care you need with a video conversation.  If a prescription is necessary we can send it directly to your pharmacy.  If lab work is needed we can place an order for that and you can then stop by our lab to have the test done at a later time.    Video visits are billed at different rates depending on your insurance coverage. Please reach out to your insurance provider with any questions.    If during the course of the call the physician/provider feels a video visit is not appropriate, you will not be charged for this service.\"    Patient has given verbal consent to a Video visit? Yes    Patient would like to receive their AVS by AVS Preference: Mail a copy.    Patient would like the video invitation sent by: Text to cell phone: [phone]    Will anyone else be joining your video visit? No    Video Start Time:9:42 AM     Additional provider notes: GENERAL: healthy, alert and no distress, EYES: Eyes grossly normal to inspection, conjunctivae and sclerae normal, RESP: no audible wheeze, cough, or visible cyanosis.  No visible retractions or increased work of breathing.  Able to speak fully in complete sentences., NEURO: Cranial nerves grossly intact, mentation intact and speech normal and PSYCH: mentation appears normal, affect normal/bright, judgement and insight intact, normal speech and appearance well-groomed      Video-Visit Details    Type of service:  Video Visit    Video End Time (time video stopped): 9:53 AM  Originating Location (pt. Location): Home    Distant Location (provider location):  [facility] INTER[facility] MEDICINE     Mode of Communication:  " Video Conference via [employer]    Cj Georges MD       Video Visit - Follow Up   Isabel Biggs   65 y.o. female    Date of Visit: 4/23/2020    Chief Complaint   Patient presents with     Fever     Medication Refill     Insomnia        Assessment and Plan   1. Peripheral vertigo, unspecified laterality  Stable continue with home exercises and meclizine  - meclizine (ANTIVERT) 25 mg tablet; Take 1 tablet (25 mg total) by mouth 4 (four) times a day as needed for dizziness.  Dispense: 90 tablet; Refill: 3    2. WILFRIDO (obstructive sleep apnea)  Encouraged use    3. Moderate major depression (H)  Slight worsening with social distancing but doing okay, continue same    4. Type 2 diabetes mellitus without complication, without long-term current use of insulin (H)  Continue with reduction in calories, carbohydrates, regular exercise and modest weight loss    5. Anxiety disorder due to medical condition  Amitriptyline does seem to be helping with sleep, refill  - amitriptyline (ELAVIL) 25 MG tablet; Take 1 tablet (25 mg total) by mouth at bedtime.  Dispense: 90 tablet; Refill: 3    6. COPD exacerbation (H)  Catalina with her that she should be isolated when she nebulizes given risk of aerosolization cases happens to be coronavirus  - albuterol (PROVENTIL) 2.5 mg /3 mL (0.083 %) nebulizer solution; Take 3 mL (2.5 mg total) by nebulization every 6 (six) hours as needed for wheezing.  Dispense: 90 vial; Refill: 11  - nebulizer accessories Kit; Provide one nebulizer and accessories putting tubing and mask  Dispense: 1 kit; Refill: 0    7. Morbid obesity (H)  The following high BMI interventions were performed this visit: encouragement to exercise and lifestyle education regarding diet    Return in about 4 weeks (around 5/21/2020) for video visit.     History of Present Illness   This 65 y.o. old woman is seen via video conference for follow-up of numerous medical problems.  She has peripheral vertigo and seems to be really  well controlled with the physical therapy she is doing so some cognitive behavioral type therapy as well as meclizine.  She has had some moderate increase in her depressive symptoms with social distancing from current public health situation.  This is manageable and she is not suicidal or has no ideations of self-harm.  She has not been checking her blood sugar.  She has been having low-grade fevers around 99 Fahrenheit which improved with acetaminophen.  She does have some wheezing and has albuterol but would like nebulization.    Review of Systems: A comprehensive review of systems was negative except as noted.     Medications, Allergies and Problem List   Reviewed, reconciled and updated  Post Discharge Medication Reconciliation Status:      Additional Information   Current Outpatient Medications   Medication Sig Dispense Refill     albuterol (PROAIR HFA;PROVENTIL HFA;VENTOLIN HFA) 90 mcg/actuation inhaler INHALE 1 TO 2 PUFFS BY MOUTH INTO THE LUNGS EVERY 4 HOURS AS NEEDED FOR WHEEZING 3 Inhaler 1     amitriptyline (ELAVIL) 25 MG tablet Take 1 tablet (25 mg total) by mouth at bedtime. 90 tablet 3     aspirin 81 MG EC tablet Take 1 tablet (81 mg total) by mouth daily. 90 tablet 3     atorvastatin (LIPITOR) 20 MG tablet Take 1 tablet (20 mg total) by mouth daily. 90 tablet 3     buPROPion (WELLBUTRIN XL) 150 MG 24 hr tablet Take 1 tablet (150 mg total) by mouth daily. 30 tablet 3     cholecalciferol, vitamin D3, (VITAMIN D3) 1,000 unit capsule Take 1 capsule (1,000 Units total) by mouth daily. 90 capsule 3     DULoxetine (CYMBALTA) 30 MG capsule TAKE 1 CAPSULE BY MOUTH TWICE DAILY 180 capsule 2     fluticasone propionate (FLONASE) 50 mcg/actuation nasal spray 1 spray into each nostril daily. (Patient taking differently: 1 spray into each nostril daily as needed. ) 16 g 11     gabapentin (NEURONTIN) 100 MG capsule Take 100 mg by mouth 2 (two) times a day. 180 capsule 1     meclizine (ANTIVERT) 25 mg tablet Take 1  tablet (25 mg total) by mouth 4 (four) times a day as needed for dizziness. 90 tablet 3     nebulizer accessories Kit Provide one nebulizer and accessories putting tubing and mask 1 kit 0     polyethylene glycol (GLYCOLAX) 17 gram/dose powder Take 17 g by mouth daily. Use as needed. 255 g 3     albuterol (PROVENTIL) 2.5 mg /3 mL (0.083 %) nebulizer solution Take 3 mL (2.5 mg total) by nebulization every 6 (six) hours as needed for wheezing. 90 vial 11     No current facility-administered medications for this visit.      Allergies   Allergen Reactions     Chloroquine Phosphate Other (See Comments)     Blurry vision, itchy skin     Social History     Tobacco Use     Smoking status: Never Smoker     Smokeless tobacco: Never Used   Substance Use Topics     Alcohol use: No     Drug use: No        Cj Georges MD

## 2021-06-08 NOTE — PROGRESS NOTES
Psychology Progress Note    Date: May 6, 2020    Time length and type of treatment: 18 minutes (3:00 PM to 3:18 PM), individual therapy    After review of the patient's situation, this visit was changed from an in-person visit to a  telephone visit to reduce the risk of COVID 19 exposure. Patient was informed that policies and procedures that govern in-person sessions would also apply to  telephone sessions. Patient was also informed that  telephone sessions would be discontinued when COVID 19 exposure is no longer a concern (as determined by [facility]).     Patient location: Patient home in [place], [place]  Provider location:  [facility] - [facility], [place], [place]    Patient was in agreement with proceeding with a  telephone session.  She reports she does not have the technological capability to engage in a video visit      Necessity: This session is necessary to address his depression.  Today we focus on developing the patient's treatment plan.  The reader is invited to review the patient's full treatment plan in the Media section of the patient's Epic medical record.    Intervention: This writer utilized motivational interviewing, active listening, reassurance and support in the context of cognitive behavioral therapy to address the above.      Mental Status:   Grooming: Unable to observe due to telephone visit  Attire: Unable to observe due to telephone visit  Age: Unable to observe due to telephone visit  Behavior Towards Examiner: Cooperative  Motor Activity: Unable to observe due to telephone visit  Eye Contact: Unable to observe due to telephone visit  Mood: Depressed  Affect: Blunted  Speech/Language: Soft  Attention: Within normal  Concentration: Within normal  Thought Process: Slow  Thought Content: No evidence of delusions or hallucinations  Orientation: Appeared oriented to person, place, and time, but not formally established  Memory: No Evidence of Impairment  Judgement:  No Evidence of Impairment  Estimated Intelligence: Average  Demonstrated Insight: Adequate  Fund of Knowledge: adequate      Progress:  Patient appeared very tired and admitted that she had not slept last night, and had been taking a nap when I called.  We reviewed the patient's diagnostic assessment and jointly completed her treatment plan.  Patient then admitted she was very tired and requested we discontinue for today.  This was agreed upon and patient was rescheduled for next week.    Plan:   We will meet again in 1 week to address his depression.  Estimated duration of treatment is 4-6 individual therapy sessions (38536) at twice monthly intervals. Treatment is expected to be completed by his 2020.     Diagnosis:  Major Depressive Disorder, recurrent, moderate

## 2021-06-08 NOTE — PROGRESS NOTES
"Isabel Biggs is a 65 y.o. female who is being evaluated via a billable video visit.      The patient has been notified of following:     \"This video visit will be conducted via a call between you and your physician/provider. We have found that certain health care needs can be provided without the need for an in-person physical exam.  This service lets us provide the care you need with a video conversation.  If a prescription is necessary we can send it directly to your pharmacy.  If lab work is needed we can place an order for that and you can then stop by our lab to have the test done at a later time.    Video visits are billed at different rates depending on your insurance coverage. Please reach out to your insurance provider with any questions.    If during the course of the call the physician/provider feels a video visit is not appropriate, you will not be charged for this service.\"    Patient has given verbal consent to a Video visit? Yes    Patient would like to receive their AVS by AVS Preference: Mail a copy.    Patient would like the video invitation sent by: Text to cell phone: [phone]    Will anyone else be joining your video visit? No    Video Start Time: 9:52 AM    Additional provider notes: GENERAL: Healthy, alert and no distress  EYES: Eyes grossly normal to inspection. No discharge or erythema, or obvious scleral/conjunctival abnormalities.  RESP: No audible wheeze, cough, or visible cyanosis.  No visible retractions or increased work of breathing.    NEURO: Cranial nerves grossly intact. Mentation and speech appropriate for age.  PSYCH: Mentation appears normal, affect normal/bright, judgement and insight intact, normal speech and appearance well-groomed      Video-Visit Details    Type of service:  Video Visit    Video End Time (time video stopped): 10:03 AM  Originating Location (pt. Location): Home    Distant Location (provider location):  [facility] INTERNAL MEDICINE     Platform used for Video " Visit: Clif Georges MD       Video Visit - Follow Up   Isabel Biggs   65 y.o. female    Date of Visit: 5/26/2020    Chief Complaint   Patient presents with     Cough        Assessment and Plan   1. COPD exacerbation (H)  This is resolved, doing much better    2. Type 2 diabetes mellitus without complication, without long-term current use of insulin (H)  Diet controlled last A1c 6.3%    3. Peripheral vertigo, unspecified laterality  Stable is working with therapy, occasional meclizine    4. Moderate major depression (H)  Mood is stable getting some fresh air    5. WILFRIDO - not using CPAP (2020)  Working with the CPAP company to obtain her CPAP machine    6. Morbid obesity (H)  Has lost some weight, excellent  The following high BMI interventions were performed this visit: encouragement to exercise and lifestyle education regarding diet    Return in about 3 months (around 8/26/2020) for recheck.     History of Present Illness   This 65 y.o. old is seen via video visit for follow-up of numerous issues.  Last time I saw her she was having some breathing difficulty consistent with asthma/COPD.  We treated her as such and she is feeling much better.  No fever chills.  She states she has been a little bit stressed with the current public health crisis but that she is getting outside and walking and cooking some good food which is helpful.  She is actually lost a little bit of weight and she is pleased about this.  She does not have her CPAP machine yet but continues to wake up and get restless sleep.  She is working with the company and thinks she might get one in June.  Her vertigo symptoms are generally stable and only needing meclizine occasionally.  She is not checking her blood sugars but is pleased with her weight loss.    Review of Systems: A comprehensive review of systems was negative except as noted.     Medications, Allergies and Problem List   Reviewed, reconciled and updated  Post  Discharge Medication Reconciliation Status:      Additional Information   Current Outpatient Medications   Medication Sig Dispense Refill     albuterol (PROAIR HFA;PROVENTIL HFA;VENTOLIN HFA) 90 mcg/actuation inhaler INHALE 1 TO 2 PUFFS BY MOUTH INTO THE LUNGS EVERY 4 HOURS AS NEEDED FOR WHEEZING 3 Inhaler 1     albuterol (PROVENTIL) 2.5 mg /3 mL (0.083 %) nebulizer solution Take 3 mL (2.5 mg total) by nebulization every 6 (six) hours as needed for wheezing. 90 vial 11     amitriptyline (ELAVIL) 25 MG tablet Take 1 tablet (25 mg total) by mouth at bedtime. 90 tablet 3     aspirin 81 MG EC tablet Take 1 tablet (81 mg total) by mouth daily. 90 tablet 3     atorvastatin (LIPITOR) 20 MG tablet Take 1 tablet (20 mg total) by mouth daily. 90 tablet 3     buPROPion (WELLBUTRIN XL) 150 MG 24 hr tablet Take 1 tablet (150 mg total) by mouth daily. 30 tablet 3     cholecalciferol, vitamin D3, (VITAMIN D3) 1,000 unit capsule Take 1 capsule (1,000 Units total) by mouth daily. 90 capsule 3     DULoxetine (CYMBALTA) 30 MG capsule TAKE 1 CAPSULE BY MOUTH TWICE DAILY 180 capsule 2     fluticasone propionate (FLONASE) 50 mcg/actuation nasal spray 1 spray into each nostril daily. (Patient taking differently: 1 spray into each nostril daily as needed. ) 16 g 11     gabapentin (NEURONTIN) 100 MG capsule Take 100 mg by mouth 2 (two) times a day. 180 capsule 1     meclizine (ANTIVERT) 25 mg tablet Take 1 tablet (25 mg total) by mouth 4 (four) times a day as needed for dizziness. 90 tablet 3     nebulizer accessories Kit Provide one nebulizer and accessories putting tubing and mask 1 kit 0     polyethylene glycol (GLYCOLAX) 17 gram/dose powder Take 17 g by mouth daily. Use as needed. 255 g 3     No current facility-administered medications for this visit.      Allergies   Allergen Reactions     Chloroquine Phosphate Other (See Comments)     Blurry vision, itchy skin     Social History     Tobacco Use     Smoking status: Never Smoker      Smokeless tobacco: Never Used   Substance Use Topics     Alcohol use: No     Drug use: No       Review and/or order of clinical lab tests:  Review and/or order of radiology tests:  Review and/or order of medicine tests:  Discussion of test results with performing physician:  Decision to obtain old records and/or obtain history from someone other than the patient:  Review and summarization of old records and/or obtaining history from someone other than the patient and.or discussion of case with another health care provider:  Independent visualization of image, tracing or specimen itself:    Time:      Cj Georges MD
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
## 2021-06-08 NOTE — PROGRESS NOTES
[facility] Daily Progress     Patient Name: Isabel Bowie  Date: 2017  Visit #: 3/12  PTA visit #:  2  Referral Diagnosis: acute left back pain with left sided sciatia  Referring provider: Cj English  Visit Diagnosis:     ICD-10-CM    1. Left hip pain M25.552    2. Obesity E66.9    3. Acute left-sided low back pain with left-sided sciatica M54.42          Assessment:   Cues for posture/HEP  Complaint with HEP      Goals:  Ongoing  No Data Recorded  No Data Recorded    Plan / Patient Education:     Continue POC  Progress ex as tolerated      Subjective:     Pain Ratin.5/10 back and left hip pain intermittent  Leg pain intermittent increased pain with ex reported  Ex 1x/day        Objective:     Treadmill .6MPH for 3mins, standing hip flex 10x, standing hip ABD left 5x pain  KT to HNP/ITB instructions and precautions discussed   IFC with CP right side lying  to lumbar and left hip  Reports  pain following ES  MT was tried but too sensitive at this time    Treatment Today  2017    TREATMENT MINUTES COMMENTS   Evaluation     Self-care/ Home management     Manual therapy     Neuromuscular Re-education     Therapeutic Activity     Therapeutic Exercises 10 HEP reviewed/ posture reviewed/KT HNP and ITB instructions/precautions discussed HO issued   Gait training     Modality__________________ 20  IFC, High, scan intensity at 21# with CP to lumbar and left leg  sidelying on right              Total 30    Blank areas are intentional and mean the treatment did not include these items.       Marla Burch  2017

## 2021-06-08 NOTE — PROGRESS NOTES
[facility] Daily Progress     Patient Name: Isabel Bowie  Date: 2/15/2017  Visit #:   PTA visit #:  1  Referral Diagnosis: acute left back pain with left sided sciatia  Referring provider: Cj English  Visit Diagnosis:     ICD-10-CM    1. Left hip pain M25.552    2. Acute left-sided low back pain with left-sided sciatica M54.42    3. Obesity E66.9          Assessment:   Cues for posture/HEP  Complaint with HEP      Goals:  Ongoing  No Data Recorded  No Data Recorded    Plan / Patient Education:     Continue POC  Progress ex as tolerated  Assess TENS  Add treadmill    Subjective:     Pain Ratin/10 back and left hip pain intermittent  Leg pain intermittent   Ex 1x/day        Objective:   Cues for HEP/posture  Reviewed HEP   Added single knee to chest, standing ext, and walking program.  TENS to lumbar and left hip  Reports  pain following ES    Treatment Today  2/15/2017    TREATMENT MINUTES COMMENTS   Evaluation     Self-care/ Home management     Manual therapy     Neuromuscular Re-education     Therapeutic Activity     Therapeutic Exercises 10 HEP reviewed/ posture reviewed   Gait training     Modality__________________ 20  TENS to lumbar and left posterior hip intensity 6#              Total 30    Blank areas are intentional and mean the treatment did not include these items.       Marla Burch  2/15/2017

## 2021-06-08 NOTE — PROGRESS NOTES
Clinic Care Coordination Contact  [facility] [place]/Voicemail       Clinical Data: Care Coordinator Outreach  Outreach attempted x 2.  Left message on patient's voicemail with call back information and requested return call.  Plan: Care Coordinator will send unable to contact letter with care coordinator contact information via [employer]. Care Coordinator will try to reach patient again in 3-5 business days.  Next outreach due: 6/1/2020

## 2021-06-08 NOTE — TELEPHONE ENCOUNTER
CHW called patient for CCC follow up during the call she reports that she is more depressed which she discussed in her visit but she is still not sleeping well.  Patient would like to know if PCP can increase the dose of her amitriptyline 25mg?    Pls have clinic staff call patient with advise.

## 2021-06-08 NOTE — PROGRESS NOTES
"  Office Visit - Follow Up   Isabel Bowie   62 y.o. female    Date of Visit: 1/18/2017    Chief Complaint   Patient presents with     Fall     Hip Pain     Left        Assessment and Plan   1. Pain of left hip joint  We'll get an MRI of the hip to evaluate for occult fracture, if negative start PT  - MR Hip Without Contrast Left; Future      The following high BMI interventions were performed this visit: lifestyle education regarding diet    Return in about 1 week (around 1/25/2017) for recheck.     History of Present Illness   This 62 y.o. old woman who slipped and fallen [place] landing on her left hip back.  She ended up in the emergency room and had x-ray of her pelvis which was unremarkable, no fracture, head CT unremarkable.  Since that time she's had significant left-sided hip pain.  She has difficulty with ambulation.  She's been using acetaminophen for pain control.  She saw Dr. Cj English.  She's not had any improvement.     Review of Systems: A comprehensive review of systems was negative except as noted.     Medications, Allergies and Problem List   Reviewed and updated     Physical Exam   General Appearance:   No acute distress    Visit Vitals     /80 (Patient Site: Right Arm, Patient Position: Sitting, Cuff Size: Adult Regular)     Pulse 94     Ht 5' 2\" (1.575 m)     Wt (!) 231 lb (104.8 kg)     LMP 06/16/1996     SpO2 98%     BMI 42.25 kg/m2       Pain with palpation along her hip laterally pain was flexion and internal/external range of motion of the hip      Additional Information   Current Outpatient Prescriptions   Medication Sig Dispense Refill     buPROPion (WELLBUTRIN XL) 150 MG 24 hr tablet Take 1 tablet (150 mg total) by mouth every morning. 30 tablet 2     cholecalciferol, vitamin D3, (CHOLECALCIFEROL) 1,000 unit tablet Take 1 tablet (1,000 Units total) by mouth daily. 90 tablet 3     hydroCHLOROthiazide (HYDRODIURIL) 25 MG tablet Take 1 tablet (25 mg total) by mouth daily. " 14 tablet 0     polyethylene glycol (GLYCOLAX) 17 gram/dose powder Take 17 g by mouth daily. Use as needed. 255 g 3     No current facility-administered medications for this visit.      Allergies   Allergen Reactions     Chloroquine Phosphate      Social History   Substance Use Topics     Smoking status: Never Smoker     Smokeless tobacco: None     Alcohol use No       Review and/or order of clinical lab tests:  Review and/or order of radiology tests:  Review and/or order of medicine tests:  Discussion of test results with performing physician:  Decision to obtain old records and/or obtain history from someone other than the patient:  Review and summarization of old records and/or obtaining history from someone other than the patient and.or discussion of case with another health care provider:  Independent visualization of image, tracing or specimen itself:    Time:      Cj Georges MD

## 2021-06-08 NOTE — PROGRESS NOTES
[facility] Certification Request    February 20, 2017      Patient: Isabel Bowie  MR Number: [id number]  YOB: 1955  Date of Visit: 1/31/2017      Dear Dr. English:    Thank you for this referral.   We are seeing Isabel Bowie for Physical Therapy of Back pain.    Medicare and/or Medicaid requires physician review and approval of the treatment plan. Please review the plan of care and verify that you agree with the therapy plan of care by co-signing this note.      Plan of Care  Authorization / Certification Start Date: 01/31/17  Authorization / Certification End Date: 05/01/17  Authorization / Certification Number of Visits: 12  Communication with: Referral Source  Patient Related Instruction: Nature of Condition;Treatment plan and rationale;Self Care instruction;Basis of treatment;Body mechanics;Posture;Precautions;Next steps;Expected outcome  Number of Visits: up to 12 visits  Therapeutic Exercise: ROM;Stretching;Strengthening  Neuromuscular Reeducation: kinesio tape;posture;core  Manual Therapy: soft tissue mobilization;joint mobilization;myofascial release  Modalities: traction;TENS;hot pack  Equipment: theraband    Goals:  Pt. will demonstrate/verbalize independence in self-management of condition in : 6 weeks  Pt. will be independent with home exercise program in : 6 weeks  Pt. will be able to walk : 30 minutes;on even surfaces;on uneven/inclined surfaces;with less pain;with less difficulty;for household mobility;for community mobility;for work;in 6 weeks  Patient will twist/turn : in bed;with less pain;with less difficulty;for bed mobilitiy;in 6 weeks  Patient will decrease : LINETTE score;by _ points;for improved quality of function;for improved quality of life;in 6 weeks  by ___ points: 30%      If you have any questions or concerns, please don't hesitate to call.    Sincerely,      Vera Portillo, PT        Physician recommendation:     ___ Follow therapist's recommendation        ___  Modify therapy      *Physician co-signature indicates they certify the need for these services furnished within this plan and while under their care.        [facility]   [facility]-Pel[facility] Initial Evaluation    Patient Name: Isabel Bowie  Date of evaluation: 2/20/2017  Referral Diagnosis: Back pain  Referring provider: Cj English MD  Visit Diagnosis:     ICD-10-CM    1. Left hip pain M25.552    2. Acute left-sided low back pain with left-sided sciatica M54.42    3. Obesity E66.9        Assessment:       Isabel is a 62 year old female presenting with c/o of right hip and low back pain after a fall on 12/24/16. Pt demonstrates significant decrease in lumbar and (R) hip ROM due to pain. Also demonstrating significant tenderness along the (L) lateral hip and buttock. Limited tolerance to evaluation today due to pain. Functional limitations include standing for meal prep, transfers, negotiating stairs in her home, and community ambulation for work. Pt will benefit from skilled therapy in order to decrease pain and improve her functional mobility and strength of the low back and hip for improved tolerance to her functional activities.   Patient responded well to TENS and HP to (L) hip/buttock.  Barriers to achieving goals as noted in the assessment section may affect outcome.  Prognosis to achieve goals is  good   Pt. is appropriate for skilled PT intervention as outlined in the Plan of Care (POC).  Pt. is a good candidate for skilled PT services to improve pain levels and function.    Goals:  Pt. will demonstrate/verbalize independence in self-management of condition in : 6 weeks  Pt. will be independent with home exercise program in : 6 weeks  Pt. will be able to walk : 30 minutes;on even surfaces;on uneven/inclined surfaces;with less pain;with less difficulty;for household mobility;for community mobility;for work;in 6 weeks  Patient will twist/turn : in bed;with less pain;with less difficulty;for bed  mobilitiy;in 6 weeks  Patient will decrease : LINETTE score;by _ points;for improved quality of function;for improved quality of life;in 6 weeks  by ___ points: 30%    Patient's expectations/goals are realistic.    Barriers to Learning or Achieving Goals: Severity of pain       Plan / Patient Instructions:        Plan of Care:   Authorization / Certification Start Date: 01/31/17  Authorization / Certification End Date: 05/01/17  Authorization / Certification Number of Visits: 12  Communication with: Referral Source  Patient Related Instruction: Nature of Condition;Treatment plan and rationale;Self Care instruction;Basis of treatment;Body mechanics;Posture;Precautions;Next steps;Expected outcome  Number of Visits: up to 12 visits  Therapeutic Exercise: ROM;Stretching;Strengthening  Neuromuscular Reeducation: kinesio tape;posture;core  Manual Therapy: soft tissue mobilization;joint mobilization;myofascial release  Modalities: traction;TENS;hot pack  Equipment: theraband    Plan for next visit: Continue with POC, progress exercises next visit as able, add (L) hip mobility stretches including SKTC, piriformis stretch, and HS stretch. Add core and hip strengthening as able.      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 female who presents to therapy today with complaints of back, left hip pain, and left shoulder pain. Date of onset/duration of symptoms is 12/24/16. Pt reports she was getting back into the car after getting gas. Her right leg was in the car when the left foot started to slide on some ice. Pt reports she fell onto the left side and required help from her grand kids to get up. after her slipped and fell on the ice. Pt reports she went to the ER that day. She was given extra strength tylenol and  "OxyContin. Pt reports the pain medication has only given her temporary relief.Pain continues to progressively worsen. Pt reports pain is constant but worse at night. Pt reports trying to get into bed or turn in bed is very challenging. Pt reports if she stands for a longer period of time (> 30-35 minutes). Pt reports sitting for too long also bothers her. Pt reports she can really tolerate walking indoors because of pain. Pain relief with pain medication and heating pad. Pt's goal for therapy it to \"get better and feel well\".     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    *Exam limited today due to pain    Patient Outcome Measures :    Modified Oswestry Low Back Pain Disablity Questionnaire  in %: 46   Scores range from 0-100%, where a score of 0% represents minimal pain and maximal function. The minimal clinically important difference is a score reduction of 12%.    Examination  1. Left hip pain     2. Acute left-sided low back pain with left-sided sciatica     3. Obesity       Involved side: Left  Posture Observation:      Lumbopelvic complex: Moderately increased lumbar lordosis  Lower extremity:  Knee:  Bilateral genu valgus.    Lumbar ROM:    Date: 2017     *Indicate scale AROM AROM AROM   Lumbar " Flexion Max restrict, LBP     Lumbar Extension Max restrict, LBP      Right Left Right Left Right Left   Lumbar Sidebending Mod  Restrict, (L) LB and hip pain Mod restrict, (L) LB and hip pain       Lumbar Rotation Max restrict, LBP Max restrict, LBP       Thoracic Flexion      Thoracic Extension      Thoracic Sidebending         Thoracic Rotation           Pt unable to complete hip AROM d/t pain, PROM significantly limited in all directions d/t pain  (L) LE strength difficult to fully assess due to pain     Palpation: Diffuse TTP (L) lateral hip and buttock region     Special Tests:   (+) Slump on L  Neg SLR (B) with limitations on (L) due to posterior hip pain       Passive Mobility - Joint Integrity:  Not tested      Treatment Today     TREATMENT MINUTES COMMENTS   Evaluation 19    Self-care/ Home management     Manual therapy     Neuromuscular Re-education     Therapeutic Activity     Therapeutic Exercises 8 Educated patient about PT diagnosis, prognosis, POC,and HEP; see exercise flow sheet for HEP    Gait training     Modality__________________ 18 TENS to (L) buttock/hip with HP, intensity 19 on high modulation mode               Total 45    Blank areas are intentional and mean the treatment did not include these items.     PT Evaluation Code: (Please list factors)  Patient History/Comorbidities: Obesity   Examination: Low back, left hip   Clinical Presentation: Stable  Clinical Decision Making: Low    Patient History/  Comorbidities Examination  (body structures and functions, activity limitations, and/or participation restrictions) Clinical Presentation Clinical Decision Making (Complexity)   No documented Comorbidities or personal factors 1-2 Elements Stable and/or uncomplicated Low   1-2 documented comorbidities or personal factor 3 Elements Evolving clinical presentation with changing characteristics Moderate   3-4 documented comorbidities or personal factors 4 or more Unstable and unpredictable High                 Vera Portillo  2/20/2017  2:26 PM

## 2021-06-08 NOTE — PROGRESS NOTES
"  Office Visit - Follow Up   Isabel Bowie   62 y.o. female    Date of Visit: 2/1/2017    Chief Complaint   Patient presents with     Cough        Assessment and Plan   1. Acute bronchitis, unspecified organism  Treat with prednisone, albuterol and azithromycin    2. Hip pain  We reviewed her MRI, no evidence of fracture, physical therapy and over-the-counter analgesics      The following high BMI interventions were performed this visit: encouragement to exercise and lifestyle education regarding diet    Return in about 4 weeks (around 3/1/2017) for recheck.     History of Present Illness   This 62 y.o. old woman who comes in for evaluation of a cough.  This is been going on for over a week.  Cough is productive, she short of breath, no fever or rigors.  No significant chest pain except for some tightness when she is coughing.  Mild sore throat.  No ear pain or popping no significant sinus congestion.  She does not smoke.  No prior history of asthma or COPD.  She does have sleep apnea.    Review of Systems: A comprehensive review of systems was negative except as noted.     Medications, Allergies and Problem List   Reviewed and updated     Physical Exam   General Appearance:   No acute distress    Visit Vitals     /78 (Patient Site: Right Arm, Patient Position: Sitting, Cuff Size: Adult Regular)     Pulse 90     Ht 5' 2\" (1.575 m)     Wt (!) 232 lb (105.2 kg)     LMP 06/16/1996     SpO2 98%     BMI 42.43 kg/m2       HEENT exam is unremarkable, neck supple, cardiovascular regular rate and rhythm no murmur gallop or rub.  Lungs with diffuse expiratory wheezing and scattered rhonchi no bronchial lung sounds abdomen obese nontender nondistended no organomegaly hip pain significantly reduced nontender to palpation.  Range of motion      Additional Information   Current Outpatient Prescriptions   Medication Sig Dispense Refill     buPROPion (WELLBUTRIN XL) 150 MG 24 hr tablet Take 1 tablet (150 mg total) by mouth " every morning. 90 tablet 2     cholecalciferol, vitamin D3, (CHOLECALCIFEROL) 1,000 unit tablet Take 1 tablet (1,000 Units total) by mouth daily. 90 tablet 3     hydroCHLOROthiazide (HYDRODIURIL) 25 MG tablet Take 1 tablet (25 mg total) by mouth daily. 90 tablet 3     polyethylene glycol (GLYCOLAX) 17 gram/dose powder Take 17 g by mouth daily. Use as needed. 255 g 3     albuterol (PROVENTIL HFA;VENTOLIN HFA) 90 mcg/actuation inhaler Inhale 1-2 puffs every 4 (four) hours as needed for wheezing. 1 Inhaler 11     azithromycin (ZITHROMAX) 250 MG tablet Take 2 tablets by mouth day one and 1 tablet by mouth days 2-5 6 tablet 0     predniSONE (DELTASONE) 20 MG tablet Take 2 tablets (40 mg total) by mouth daily for 5 days. 10 tablet 0     No current facility-administered medications for this visit.      Allergies   Allergen Reactions     Chloroquine Phosphate      Social History   Substance Use Topics     Smoking status: Never Smoker     Smokeless tobacco: Not on file     Alcohol use No       Review and/or order of clinical lab tests:  Review and/or order of radiology tests:  Review and/or order of medicine tests:  Discussion of test results with performing physician:  Decision to obtain old records and/or obtain history from someone other than the patient:  Review and summarization of old records and/or obtaining history from someone other than the patient and.or discussion of case with another health care provider:  Independent visualization of image, tracing or specimen itself:    Time:      Cj Georges MD

## 2021-06-08 NOTE — PROGRESS NOTES
Clinic Care Coordination Contact  [facility]/Voicemail       Clinical Data: Care Coordinator Outreach    Outreach attempted x 1.  Left message on patient's voicemail with call back information and requested return call.    Plan:     Care Coordinator will try to reach patient again in 3-5 business days.    Next outreach due: 5/19/2020

## 2021-06-09 NOTE — PROGRESS NOTES
3/27/2017    Start time: 11:00    Stop Time: 12:00 Session # 1 this year    Isabel Bowie is a 62 y.o. female is being seen today for    Chief Complaint   Patient presents with      Follow Up   .   Follow up in regards to ongoing symptom management of anxiety and depression.     New symptoms or complaints: None    Functional Impairment:   Personal: 3  Family: 3  Social: 3  Work: 0    Clinical assessment of mental status:   Isabel Bowie presented on time.   She was oriented x3, open and cooperative, and dressed appropriately for this session and weather. Her memory was Normal cognitive functioning .  Her speech was  Within normal.  Language was appropriate.  Concentration and focus is Within normal. Psychosis is not noted or reported. She reports her mood is Congruent w/content of speech.  Affect is congruent with speech and is Congruent w/content of speech.  Fund of knowledge is adequate. Insight is adequate for therapy.    Suicidal/Homicidal Ideation present: Patient denies suicidal and homicidal ideations/means or plan.  Patient's impression of their current status: Patient presented the therapy after many months away from therapy. She report having been doing well and having been working full time. She has been handling well family issues but stated she is still working on how she can put herself first. Patient wants to be seen every months. Does not want to close her case. After having reviewed the informed consent with this writer patient expressed that she would like to remain in therapy and would like to be seen monthly.     Therapist impression of patients current state: This 62 y.o. Black or  female presents in a non apparent stress. Writer reviewed with the patient the clinic policy on cancellation and non shows. Writer also discussed with the patient whether to close today. Patient wanted to be seen once a month which this writer did not find therapeutic. Writer guided the patient with  other options including being seen at least once a month. Patient wants to work on putting self ahead.     Assessment tools used today include: How do you feel today?    Type of psychotherapeutic technique provided: Client centered    Progress toward short term goals:Poor progress, inconsistence in the therapy.    Review of long term goals: Not done at today's visit; patient has not been in therapy since 10/26/2016. Writer gave her a time to share her feelings today. Writer and patient will update the plan at the next visit.     Diagnosis:   1. Major depressive disorder, recurrent episode, mild    2. Adjustment disorder with mixed anxiety and depressed mood     Improving    Plan and Follow-up:  1. Patient plans to continue taking the medication as prescribed.   2. Patient plans to follow up with therapy in a  week.     Discharge Criteria/Planning: Client has chronic symptoms and ongoing therapy for maintenance stability recommended.    Performed and documented by RUBIO Cooper; [facility] 3/27/2017    This note has been dictated using voice recognition software. Any grammatical or context distortions are unintentional and inherent to the software.

## 2021-06-09 NOTE — PROGRESS NOTES
Clinic Care Coordination Contact  [facility]/Voicemail       Clinical Data: Care Coordinator Outreach  Outreach attempted x 1.  Left message on patient's voicemail with call back information and requested return call.  Plan:  Care Coordinator will try to reach patient again in 3-5 business days.  Next outreach due: 7/9/2020

## 2021-06-09 NOTE — TELEPHONE ENCOUNTER
She complains of diffuse itching?  Does she have a mosquito bite that is itching?  Maybe she needs to schedule a virtual appointment to discuss

## 2021-06-09 NOTE — TELEPHONE ENCOUNTER
Dr. Georges,  Patient complains of itching.  States that she doesn't recall getting any sort of bug bite.  Please advise.  Thank you.  Amarilis MCKEON, TOM/MARLY....................8:54 AM

## 2021-06-09 NOTE — PROGRESS NOTES
Clinic Care Coordination Contact    Follow Up Progress Note      Assessment: CCC RN attempted to contact patient today to follow up on current goals, but was unable to reach patient as her voice mail was full. CCC RN will continue to try to reach out to patient to ensure needs and concerns are being addressed.     Goals addressed this encounter:   Goals Addressed    None          Intervention/Education provided during outreach: N/A        Plan: CCC RN will continue to monitor and will be available to assist as nursing needs arise.     Care Coordinator will follow up in two weeks.

## 2021-06-09 NOTE — TELEPHONE ENCOUNTER
CHW called patient for outreach and relayed message regarding her x-ray.  She is unable to access her [employer] and will call to get her password reset.    Patient does not recall an injury but did report that she has had some itching the past few days so she could have possibly gotten a bug bite.   Pls advise next steps.

## 2021-06-09 NOTE — TELEPHONE ENCOUNTER
Spoke with the patient and relayed message below from Dr. Georges.  Patient states that it's not itching, but swelling in her left foot for the past 2 weeks.  She states that she already has an appointment to see  on Monday and she would like to keep that appointment.  Patient had no further questions at this time.  Amarilis MCKEON, TOM/CMT....................2:56 PM

## 2021-06-09 NOTE — PROGRESS NOTES
[facility] Daily Progress     Patient Name: Isabel Bowie  Date: 3/23/2017  Visit #:   PTA visit #:  2  Referral Diagnosis: acute left back pain with left sided sciatia  Referring provider: Cj English  Visit Diagnosis:     ICD-10-CM    1. Left hip pain M25.552    2. Acute left-sided low back pain with left-sided sciatica M54.42    3. Obesity E66.9          Assessment:   Fair compliance with HEP  Limited progression of exercises today due to pain.   Pt reports 7/10 pain after IFC and ice pack to (L) hip and LB      Goals:  Ongoing  Pt. will demonstrate/verbalize independence in self-management of condition in : 6 weeks  Pt. will be independent with home exercise program in : 6 weeks  Pt. will be able to walk : 30 minutes;on even surfaces;on uneven/inclined surfaces;with less pain;with less difficulty;for household mobility;for community mobility;for work;in 6 weeks  Patient will twist/turn : in bed;with less pain;with less difficulty;for bed mobilitiy;in 6 weeks  Patient will decrease : LINETTE score;by _ points;for improved quality of function;for improved quality of life;in 6 weeks  by ___ points: 30%    Plan / Patient Education:   Discussed importance of compliance with HEP as well as with therapy sessions. Pt agreeable to coming to therapy consistently for another couple of weeks. If pain has still not improved, will plan to refer the patient back to her physician.       Subjective:     Pain Ratin.5-8/10 back and left hip pain intermittent  Pt reports pain last night pain was an 8/10 she took some Tylenol and now its a 7.5/10.   Pt reports pain has not really improved over the last few weeks       Objective:   Limited tolerance to exercises today. Tried clamshells but patient only able to complete 5 repetitions due to pain   Limited tolerance manual therapy.       Treatment Today  3/23/2017  TREATMENT MINUTES COMMENTS   Evaluation     Self-care/ Home management     Manual therapy 5 Grade II  side-lying lumbo-pelvic harmonics to (L) side    Neuromuscular Re-education     Therapeutic Activity     Therapeutic Exercises 10 Reviewed exercises    Gait training     Modality__________________ 15 IFC, High, scan intensity at 24# with CP to lumbar and left leg  sidelying on right              Total 30    Blank areas are intentional and mean the treatment did not include these items.       Vera Portillo  3/23/2017

## 2021-06-09 NOTE — PROGRESS NOTES
[facility] Daily Progress     Patient Name: Isabel Bowie  Date: 2017  Visit #:   PTA visit #:  3  Referral Diagnosis: acute left back pain with left sided sciatia  Referring provider: Cj English  Visit Diagnosis:     ICD-10-CM    1. Left hip pain M25.552    2. Acute left-sided low back pain with left-sided sciatica M54.42    3. Obesity E66.9          Assessment:   Good compliance with HEP  Able to add gentle core strengthening today, but overall limited tolerance to progression of gluteal strengthening exercises  Pt continuing to benefit from skilled therapy and making good progress towards her goals       Goals:  Ongoing  Pt. will demonstrate/verbalize independence in self-management of condition in : 6 weeks  Pt. will be independent with home exercise program in : 6 weeks  Pt. will be able to walk : 30 minutes;on even surfaces;on uneven/inclined surfaces;with less pain;with less difficulty;for household mobility;for community mobility;for work;in 6 weeks  Patient will twist/turn : in bed;with less pain;with less difficulty;for bed mobilitiy;in 6 weeks  Patient will decrease : LINETTE score;by _ points;for improved quality of function;for improved quality of life;in 6 weeks  by ___ points: 30%    Plan / Patient Education:   Progress exercises as tolerated. Add bridging and try clamshells if able.     Subjective:     Pain Ratin/10 back and left hip pain intermittent  Pt reports she can walk about 15-20 minutes   Pt reports rolling over in bed, going up and down stairs, and standing after sitting for a long period of time is still challenging.      Objective:   Able to add PPT exercise to HEP  Pt reports pain relief with IFC and CP to left hip       Treatment Today  2017  TREATMENT MINUTES COMMENTS   Evaluation     Self-care/ Home management     Manual therapy 3 Grade II side-lying lumbo-pelvic harmonics to (L) side    Neuromuscular Re-education     Therapeutic Activity     Therapeutic  Exercises 8 Reviewed exercises    Gait training     Modality__________________ 15 IFC, High, scan intensity at 24# with CP to lumbar and left leg  sidelying on right              Total 26    Blank areas are intentional and mean the treatment did not include these items.       Vera Portillo  4/6/2017
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
## 2021-06-09 NOTE — PROGRESS NOTES
[facility] Daily Progress     Patient Name: Isabel Bowie  Date: 3/29/2017  Visit #:   PTA visit #:  3  Referral Diagnosis: acute left back pain with left sided sciatia  Referring provider: Cj English  Visit Diagnosis:     ICD-10-CM    1. Left hip pain M25.552    2. Acute left-sided low back pain with left-sided sciatica M54.42    3. Obesity E66.9          Assessment:   Fair compliance with HEP  Limited progression of exercises today due to pain.   Reports 60% improvement      Goals:  Ongoing  Pt. will demonstrate/verbalize independence in self-management of condition in : 6 weeks  Pt. will be independent with home exercise program in : 6 weeks  Pt. will be able to walk : 30 minutes;on even surfaces;on uneven/inclined surfaces;with less pain;with less difficulty;for household mobility;for community mobility;for work;in 6 weeks  Patient will twist/turn : in bed;with less pain;with less difficulty;for bed mobilitiy;in 6 weeks  Patient will decrease : LINETTE score;by _ points;for improved quality of function;for improved quality of life;in 6 weeks  by ___ points: 30%    Plan / Patient Education:   Discussed importance of compliance with HEP as well as with therapy sessions.  Progress ex as able      Subjective:     Pain Ratin.5-8/10 back and left and right t hip pain intermittent reports 60% improvement  States she is able to walk 10-15 mins   Try to ex daily      Objective:   Limited tolerance to exercises today.    Limited tolerance manual therapy.  Moderate tenderance left hip and ITB  Relief with IFC and CP       Treatment Today  3/29/2017  TREATMENT MINUTES COMMENTS   Evaluation     Self-care/ Home management     Manual therapy 5 Grade II side-lying lumbo-pelvic harmonics to (L) side    Neuromuscular Re-education     Therapeutic Activity     Therapeutic Exercises 10 Reviewed exercises    Gait training     Modality__________________ 15 IFC, High, scan intensity at 24# with CP to lumbar and left  leg  sidelying on right              Total 30    Blank areas are intentional and mean the treatment did not include these items.       Marla Burch  3/29/2017

## 2021-06-10 NOTE — PROGRESS NOTES
Discharge Summary  Patient Name: Isabel Biggs  Date: 8/20/2020  Referral Diagnosis: vertigo  Referring provider: Cj Georges MD  Visit Diagnosis:   1. Dizziness     2. Unsteadiness on feet     3. Decreased activities of daily living (ADL)         Goal status: Unable to assess as patient did not return.    Patient was seen for 1 visit. The patient discontinued therapy, did not return.    The patient will need a new referral to resume.    Thank you for your referral.  Vita Fuentes  8/20/2020  8:54 PM

## 2021-06-10 NOTE — TELEPHONE ENCOUNTER
Patient is still c/o some swelling in her left  foot since. She is icing it and elevating it and in the morning the swelling is gone but by the afternoon it is swollen again. Still uncomfortable to walk. Is there a brace or anything that she would benefit from wearing?     She is calling the pharmacy for refills today, she has no additional refills left on her Buproprian and would like a new rx sent to her pharmacy.     Has appointment scheduled in a little over a week.

## 2021-06-10 NOTE — PROGRESS NOTES
[facility] Daily Progress     Patient Name: Isabel Bowie  Date: 2017  Visit #:   PTA visit #:  3  Referral Diagnosis: acute left back pain with left sided sciatia  Referring provider: Cj English  Visit Diagnosis:     ICD-10-CM    1. Left hip pain M25.552    2. Acute left-sided low back pain with left-sided sciatica M54.42    3. Obesity E66.9          Assessment:   Good compliance with HEP  Overall limited progress towards goals since starting therapy due to left hip pain. Limited improvement in lumbar and hip ROM due to pain and overall limited tolerance to progression of exercises due to pain with only temporary pain relief with gentle manual therapy and IFC. At this time, it is best for the patient to follow up with her referral to orthopedic surgery for further evaluation of the left hip.     Goals:  Ongoing  Pt. will demonstrate/verbalize independence in self-management of condition in : 6 weeks  Pt. will be independent with home exercise program in : 6 weeks; MET  Pt. will be able to walk : 30 minutes;on even surfaces;on uneven/inclined surfaces;with less pain;with less difficulty;for household mobility;for community mobility;for work;in 6 weeks; PARTIALLY MET- able to walk 15-20 minutes without pain  Patient will twist/turn : in bed;with less pain;with less difficulty;for bed mobilitiy;in 6 weeks; NOT MET  Patient will decrease : LINETTE score;by _ points;for improved quality of function;for improved quality of life;in 6 weeks  by ___ points: 30%, NOT MET     Plan / Patient Education:   Due to limited progress, patient instructed to follow up with referral to orthopedics.     Subjective:     Pain Ratin-5.5/10 back and left hip pain intermittent  Pt reports pain at its best is a 4/10, which is usually only after she has taken pain medication. Pain its worst is 10/10.   Pt reports walking tolerance about 15-20 minutes  Still very challenging to roll over in bed.     Modified Oswestry Low  Back Pain Disablity Questionnaire  in %: 40 (46% at IE on 1/31/17)      Objective:     Lumbar ROM:    Date: 2/20/2017 5/18/2017    *Indicate scale AROM AROM AROM   Lumbar Flexion Max restrict, LBP Fingers to mid shin with use of hands to get back to standing, (L) buttock pain    Lumbar Extension Max restrict, LBP Mod restrict, NE     Right Left Right Left Right Left   Lumbar Sidebending Mod  Restrict, (L) LB and hip pain Mod restrict, (L) LB and hip pain Mod restrict, mild (R) buttock pain Max restrict, significant (L) buttock pain     Lumbar Rotation Max restrict, LBP Max restrict, LBP Mod restrict, NE Mod restrict, NE     Thoracic Flexion      Thoracic Extension      Thoracic Sidebending         Thoracic Rotation           Limited (L) passive hip flexion and IR with lateral hip pain    Diffuse tenderness (L) gluteal and lateral hip     Treatment Today  5/18/2017  TREATMENT MINUTES COMMENTS   Evaluation     Self-care/ Home management     Manual therapy 6 Gentle STM to (L) gluteal region   Neuromuscular Re-education     Therapeutic Activity     Therapeutic Exercises 8 See exercise flow sheet; ROM reassessment    Gait training     Modality__________________ 12 IFC, High, scan intensity at 21# with CP to left glute sidelying on right              Total 26    Blank areas are intentional and mean the treatment did not include these items.       Vera TISH Bandar  5/18/2017    [facility] Discharge Summary  Patient Name: Isabel Bowie  Date: 6/14/2017  Referral Diagnosis: Back pain  Referring provider: Cj Georges MD  Visit Diagnosis:   1. Left hip pain     2. Acute left-sided low back pain with left-sided sciatica     3. Obesity         Goals:  Pt. will demonstrate/verbalize independence in self-management of condition in : 6 weeks  Pt. will be independent with home exercise program in : 6 weeks; MET  Pt. will be able to walk : 30 minutes;on even surfaces;on uneven/inclined surfaces;with less pain;with  less difficulty;for household mobility;for community mobility;for work;in 6 weeks; PARTIALLY MET- able to walk 15-20 minutes without pain  Patient will twist/turn : in bed;with less pain;with less difficulty;for bed mobilitiy;in 6 weeks; NOT MET  Patient will decrease : LINETTE score;by _ points;for improved quality of function;for improved quality of life;in 6 weeks  by ___ points: 30%, NOT MET     Patient was seen for 9 visits from 1/31/17 to 5/18/17 with 0 missed appointments.  Due to limited progress in therapy, patient was instructed to proceed with orthopedic referral provided by her PCP     Therapy will be discontinued at this time.  The patient will need a new referral to resume.    Thank you for your referral.  Vera Portillo  6/14/2017  12:06 PM

## 2021-06-10 NOTE — TELEPHONE ENCOUNTER
Spoke with the patient and relayed message below from Dr. Clayton.  She verbalized understanding and had no further questions at this time.  Amarilis MCKEON, TOM/CMT....................9:56 AM

## 2021-06-10 NOTE — TELEPHONE ENCOUNTER
Please let Ms. Biggs know that if her symptoms do not improve with RICE and ibuprofen, she should let us know.  I can place a PT and/or orthopedics referral at that point.  Thank you

## 2021-06-10 NOTE — TELEPHONE ENCOUNTER
Patient Returning Call  Reason for call:  Call back  Information relayed to patient:  Writer relayed message to Pt: Hi Ms Biggs, the MRI shows that you have inflammation of tendon of your foot but, which is resulting in the swelling.  However your symptoms right now?     Typically the first treatment options are what we have discussed including rest, ice, compression and elevation.  Anti-inflammatories can be helpful as long as he can take them on an empty stomach, examples include ibuprofen.  It can take some time for the tendon to heal.  Sometimes splinting or immobilizing the ankle/foot is a possibility, and this can be achieved with the walking boot.   Sometimes this is last resort if these interventions do not work, some patients received steroid injections to the foot, which would be done at her orthopedic clinic.   How would you like to proceed?     Pt states she is still experiencing swelling and pain, but will try the first step of treatment of RICE and Ibuprofen.     Patient has additional questions:  No  If YES, what are your questions/concerns:  N/A  Okay to leave a detailed message?: No call back needed

## 2021-06-10 NOTE — PROGRESS NOTES
[facility] Daily Progress     Patient Name: Isabel Bowie  Date: 2017  Visit #:   PTA visit #:  3  Referral Diagnosis: acute left back pain with left sided sciatia  Referring provider: Cj English  Visit Diagnosis:     ICD-10-CM    1. Left hip pain M25.552    2. Acute left-sided low back pain with left-sided sciatica M54.42    3. Obesity E66.9          Assessment:   Good compliance with HEP  Limited tolerance to hip/gluteal stretching as well as strengthening. Difficult to progress HEP today. Able to add supine hip adductor strengthening. Pt continues to report pain relief with IFC and ice to (L) hip/gluteal region   Pt continuing to benefit from skilled therapy and making good progress towards her goals       Goals:  Ongoing  Pt. will demonstrate/verbalize independence in self-management of condition in : 6 weeks  Pt. will be independent with home exercise program in : 6 weeks  Pt. will be able to walk : 30 minutes;on even surfaces;on uneven/inclined surfaces;with less pain;with less difficulty;for household mobility;for community mobility;for work;in 6 weeks  Patient will twist/turn : in bed;with less pain;with less difficulty;for bed mobilitiy;in 6 weeks  Patient will decrease : LINETTE score;by _ points;for improved quality of function;for improved quality of life;in 6 weeks  by ___ points: 30%    Plan / Patient Education:   Progress exercises as tolerated. Try piriformis stretching again next visit     Subjective:     Pain Ratin/10 back and left hip pain intermittent  Pt reports also noticing some swelling and pain in the left ankle recently. Plans to go to see her doctor regards the left ankle. Pt reports pain is reducing in the left hip. Pt reports she can move a little easier. Sitting long period of time really bother the hip as well as walking long periods of time.       Objective:   Attempted to add (L) hip ER/piriformis stretch to HEP, but patient unable to tolerated  Moderate  tolerance to gentle STM to (L) gluteal region   Added supine hip adduction with pillow squeeze to HEP       Treatment Today  4/17/2017  TREATMENT MINUTES COMMENTS   Evaluation     Self-care/ Home management     Manual therapy 8 Gentle STM to (L) gluteal   Neuromuscular Re-education     Therapeutic Activity     Therapeutic Exercises 5 Reviewed exercises, added supine hip adduction with pillow squeeze   Gait training     Modality__________________ 12 IFC, High, scan intensity at 27# with CP to left glute sidelying on right              Total 25    Blank areas are intentional and mean the treatment did not include these items.       Vera Portillo  4/17/2017

## 2021-06-10 NOTE — PROGRESS NOTES
Office Visit - Follow Up   Isabel Bowie   62 y.o. female    Date of Visit: 5/4/2017    Chief Complaint   Patient presents with     Leg Pain        Assessment and Plan   1. Acute left ankle pain  I suspect this is pain related to her edema which is related to her obesity, dependent edema, obstructive sleep apnea, offer compression garments she declined recommended she elevate her legs every day for at least a half hour  - XR Ankle Left 3 or More VWS; Future    2. Left hip pain  Not improving with physical therapy, I wonder if she has trochanteric bursitis, may be radiating from her low back.  She is previously seen [facility] will have her revisit with them and continue physical therapy  - Ambulatory referral to Orthopedic Surgery    3. Low back pain  See above    4. Adjustment disorder with mixed anxiety and depressed mood  Continue Wellbutrin, refilled    5. Obesity  The following high BMI interventions were performed this visit: encouragement to exercise and lifestyle education regarding diet    Return in about 4 weeks (around 6/1/2017) for recheck.     History of Present Illness   This 62 y.o. old woman comes in for follow-up of numerous medical problems.  She fell this past winter and injured her left hip.  We have done extensive evaluation including x-ray and MRI both of which have not shown any specific derangement.  She has been doing physical therapy but still has lateral hip pain and pain that radiates from her buttocks down the side of her leg.  She does have chronic low back pain and she follows with Dr. fierro.  She has swelling in her legs especially at the end of the day and she has left ankle pain which is worse at night.  No known injury.  She does notice swelling.  It hurts to walk especially in the evening.  She continues to take Wellbutrin for her mood and it is helpful.    Review of Systems: A comprehensive review of systems was negative except as noted.     Medications, Allergies  "and Problem List   Reviewed and updated     Physical Exam   General Appearance:   No acute distress, obese    /70 (Patient Site: Left Arm, Patient Position: Sitting, Cuff Size: Adult Regular)  Pulse 89  Ht 5' 2\" (1.575 m)  Wt (!) 229 lb (103.9 kg)  LMP 06/16/1996  SpO2 100%  BMI 41.88 kg/m2    HEENT exam unremarkable neck supple cardiovascular regular rate and rhythm no murmur gallop or rub lungs are clear to auscultation bilaterally she has pain with palpation laterally over the hip probably over the trochanteric bursa, normal strength sensation reflexes in lower extremity.  She has pitting edema bilaterally dependently.  This seems to account for ankle swelling.  She has no pain with range of motion of the ankle or the toes.       Additional Information   Current Outpatient Prescriptions   Medication Sig Dispense Refill     albuterol (PROAIR HFA;PROVENTIL HFA;VENTOLIN HFA) 90 mcg/actuation inhaler Inhale 1-2 puffs every 4 (four) hours as needed for wheezing. 1 Inhaler 11     buPROPion (WELLBUTRIN XL) 150 MG 24 hr tablet Take 1 tablet (150 mg total) by mouth every morning. 90 tablet 2     cholecalciferol, vitamin D3, (CHOLECALCIFEROL) 1,000 unit tablet Take 1 tablet (1,000 Units total) by mouth daily. 90 tablet 3     hydroCHLOROthiazide (HYDRODIURIL) 25 MG tablet Take 1 tablet (25 mg total) by mouth daily. 90 tablet 3     polyethylene glycol (GLYCOLAX) 17 gram/dose powder Take 17 g by mouth daily. Use as needed. 255 g 3     No current facility-administered medications for this visit.      Allergies   Allergen Reactions     Chloroquine Phosphate      Social History   Substance Use Topics     Smoking status: Never Smoker     Smokeless tobacco: None     Alcohol use No        Cj Georges MD  "
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
## 2021-06-10 NOTE — PROGRESS NOTES
[facility] Daily Progress     Patient Name: Isabel Bowie  Date: 2017  Visit #:   PTA visit #:  3  Referral Diagnosis: acute left back pain with left sided sciatia  Referring provider: Cj English  Visit Diagnosis:     ICD-10-CM    1. Left hip pain M25.552    2. Acute left-sided low back pain with left-sided sciatica M54.42    3. Obesity E66.9          Assessment:   Good compliance with HEP  Limited tolerance to hip/gluteal stretching as well as strengthening. Difficult to progress HEP today. Able to add supine bridge with about 10% of ROM to HEP. Pt continues to report pain relief with IFC and ice to (L) hip/gluteal region   Pt continuing to benefit from skilled therapy and making good progress towards her goals       Goals:  Ongoing  Pt. will demonstrate/verbalize independence in self-management of condition in : 6 weeks  Pt. will be independent with home exercise program in : 6 weeks  Pt. will be able to walk : 30 minutes;on even surfaces;on uneven/inclined surfaces;with less pain;with less difficulty;for household mobility;for community mobility;for work;in 6 weeks  Patient will twist/turn : in bed;with less pain;with less difficulty;for bed mobilitiy;in 6 weeks  Patient will decrease : LINETTE score;by _ points;for improved quality of function;for improved quality of life;in 6 weeks  by ___ points: 30%    Plan / Patient Education:   Progress exercises as tolerated. Try piriformis stretching again next visit     Subjective:     Pain Ratin/10 back and left hip pain intermittent  Pt reports pain at its best is a 4/10. If she has had a really busy day with a lot of activity, pain can be up to an 8-9/10.       Objective:   Add bridge to HEP  Moderate TTP (L) gluteal region      Treatment Today  2017  TREATMENT MINUTES COMMENTS   Evaluation     Self-care/ Home management     Manual therapy 8 IANSM with Nilsa, grade II feathering to (L) gluteal followed by Grade IV-VI for relaxation to  (L) gluteal    Neuromuscular Re-education     Therapeutic Activity     Therapeutic Exercises 7 See exercise flow sheet    Gait training     Modality__________________ 12 IFC, High, scan intensity at 34# with CP to left glute sidelying on right              Total 27    Blank areas are intentional and mean the treatment did not include these items.       Vera Portillo  5/4/2017

## 2021-06-11 NOTE — PROGRESS NOTES
Clinic Care Coordination Contact    Community Health Worker Follow Up    Goals:   Goals Addressed    None         CHW Next Follow-up: 2-3 weeks    CHW Plan: Schedule patient for CCC SW appointment for new resources and to refresh goals/action steps    Discussion: Patient is staying very positive through the pandemic through her strong debbie.   She would very much like to move to [facility] to be closer to the rest of her family.    Next outreach due: 10/13/2020

## 2021-06-11 NOTE — PROGRESS NOTES
Attempt 1: Care Guide called patient.  If this patient is returning my call, please transfer to Analilia at ext [phone].    Pt was scheduled to meet with CG and Dr Georges on 07/14/2017 at 1:20pm, however, pt missed her RN Assessment on 07/03/2017 with our [facility] RN.  Appt for 07/14/2017 must be rescheduled for after pt is assessed by the [facility] RN.  If pt still needs to see Dr Georegs for any other reason, that is fine, but we will be unable to do her goal setting on 07/14/2017.
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
## 2021-06-11 NOTE — PROGRESS NOTES
Clinic Care Coordination Contact    Situation: Patient chart reviewed by care coordinator.    Background: RN CC was lead CC. Changed to SW CC to be lead.     Assessment: will ask CHW to set up reassessment for patient at next outreach.    Plan/Recommendations: SW CC available to complete reassessment, and will do oversight in 45 days. Delegating to CHW to contact in less than 30 days and to set up reassessment with SW CC at that time.     KAN English  Clinic Care Coordinator  [phone]

## 2021-06-11 NOTE — TELEPHONE ENCOUNTER
RN cannot approve Refill Request    RN can NOT refill this medication med is not covered by policy/route to provider. Last office visit: 2/7/2020 Cj Georges MD Last Physical: 12/13/2017 Last MTM visit: Visit date not found Last visit same specialty: 7/27/2020 Owen Clayton MD.  Next visit within 3 mo: Visit date not found  Next physical within 3 mo: Visit date not found      Mary Robbins, [facility] [place] Connection Triage/Med Refill 9/10/2020    Requested Prescriptions   Pending Prescriptions Disp Refills     nebulizer accessories (ADULT AEROSOL MASK) Misc [Pharmacy Med Name: ADULT AEROSOL MASK] 1 each 0     Sig: USE 1 AS DIRECTED.       There is no refill protocol information for this order        VITAMIN D3 25 mcg (1,000 unit) capsule [Pharmacy Med Name: VITAMIN D 1,000IU H/PTNCY CAPSULES] 90 capsule 3     Sig: TAKE 1 CAPSULE BY MOUTH EVERY DAY       There is no refill protocol information for this order

## 2021-06-11 NOTE — PROGRESS NOTES
"  Office Visit - Follow Up   Isabel Bowie   62 y.o. female    Date of Visit: 6/29/2017    Chief Complaint   Patient presents with     Hypertension     high BP x 1 week ago, L hip pain, poor appetite, poor sleep        Assessment and Plan   1. Left hip pain  Improving slowly, continue with physical therapy and chiropractic care, meloxicam prescribed in place of other NSAIDs    2. Sleep apnea  She really would benefit from CPAP and I think this would be helpful.  I had her meet with her healthcare home to see if they can help with some of the social and financial issues    3. Tiredness  See above    4. Essential hypertension  Blood pressures controlled    5. Adjustment disorder with mixed anxiety and depressed mood  - Ambulatory referral to Psychiatry    The following high BMI interventions were performed this visit: encouragement to exercise and lifestyle education regarding diet    Return in about 4 weeks (around 7/27/2017) for recheck.     History of Present Illness   This 62 y.o. old woman comes in for numerous medical problems.  She has ongoing left hip pain after a fall.  We have had an extensive evaluation including MRI which was generally unremarkable.  She seen orthopedic surgery and on physical therapy.  She has had a nice improvement with physical therapy and is now seen a chiropractor and continues to have improvement.  She is feeling tired and has obstructive sleep apnea and is not using CPAP.  She is not using CPAP because she cannot afford a pill that was assessed to her.  Apparently this went to collections and she is having all sorts of problems.  She has had increased anxiety.  She is not losing weight.    Review of Systems: A comprehensive review of systems was negative except as noted.     Medications, Allergies and Problem List   Reviewed and updated     Physical Exam   General Appearance:   No acute distress    /76  Pulse 86  Ht 5' 2\" (1.575 m)  Wt (!) 227 lb (103 kg)  LMP 06/16/1996  " SpO2 99%  BMI 41.52 kg/m2    HEENT exam is unremarkable, neck supple, no cervical lymphadenopathy, cardiovascular regular rate and rhythm no murmur gallop or rub, lungs are clear to auscultation bilaterally, abdomen soft nontender nondistended no organomegaly, neurologic exam is nonfocal, psychiatric pleasant, no confusion or agitation        Additional Information   Current Outpatient Prescriptions   Medication Sig Dispense Refill     albuterol (PROAIR HFA;PROVENTIL HFA;VENTOLIN HFA) 90 mcg/actuation inhaler Inhale 1-2 puffs every 4 (four) hours as needed for wheezing. 1 Inhaler 11     buPROPion (WELLBUTRIN XL) 150 MG 24 hr tablet Take 1 tablet (150 mg total) by mouth every morning. 90 tablet 2     cholecalciferol, vitamin D3, (CHOLECALCIFEROL) 1,000 unit tablet Take 1 tablet (1,000 Units total) by mouth daily. 90 tablet 3     hydroCHLOROthiazide (HYDRODIURIL) 25 MG tablet Take 1 tablet (25 mg total) by mouth daily. 90 tablet 3     polyethylene glycol (GLYCOLAX) 17 gram/dose powder Take 17 g by mouth daily. Use as needed. 255 g 3     meloxicam (MOBIC) 7.5 MG tablet Take 1 tablet (7.5 mg total) by mouth daily. 30 tablet 0     No current facility-administered medications for this visit.      Allergies   Allergen Reactions     Chloroquine Phosphate      Social History   Substance Use Topics     Smoking status: Never Smoker     Smokeless tobacco: None     Alcohol use No       Review and/or order of clinical lab tests:  Review and/or order of radiology tests:  Review and/or order of medicine tests:  Discussion of test results with performing physician:  Decision to obtain old records and/or obtain history from someone other than the patient:  Review and summarization of old records and/or obtaining history from someone other than the patient and.or discussion of case with another health care provider:  Independent visualization of image, tracing or specimen itself:    Time:      Cj Georges MD

## 2021-06-11 NOTE — PROGRESS NOTES
Clinic Care Coordination Contact  Program: MA/ASHA CARE/SNAP/CASH  County:  [facility] Case #:  [facility] Worker:   Carola #:   PMI #:   Date Applied:   Renewal Month:       Outreach:   9/24/20: Outreach attempted x 1. Left message on voicemail with call back information and requested return call.  Plan: FRW will call again within one week.       Referral/Screening:   Screening Questions:   1. Have you recently applied recently or are you do for a renewal? If so, when? no  2. How many people in your household? Unsure, she is living with her daughter as she is homeless.   3. Do you file taxes, who do you claim?  Don't think so  4. What is the monthly gross income for the household (wages, social security, workers comp, and pension)? She gets 500 from Social Security, no savings.         Any other information for FRW?  She is paying for Medicare premiums and looks like she would be eligible for MA.  They are moving their benefits from [place] to [facility] as they don't live in [place] any longer.  Patient has not applied for SNAP or MA before.           Goals Addressed:      Goals Addressed    None

## 2021-06-11 NOTE — PROGRESS NOTES
"Isabel Biggs is a 65 y.o. female who is being evaluated via a billable telephone visit.      The patient has been notified of following:     \"This telephone visit will be conducted via a call between you and your physician/provider. We have found that certain health care needs can be provided without the need for a physical exam.  This service lets us provide the care you need with a short phone conversation.  If a prescription is necessary we can send it directly to your pharmacy.  If lab work is needed we can place an order for that and you can then stop by our lab to have the test done at a later time.    Telephone visits are billed at different rates depending on your insurance coverage. During this emergency period, for some insurers they may be billed the same as an in-person visit.  Please reach out to your insurance provider with any questions.    If during the course of the call the physician/provider feels a telephone visit is not appropriate, you will not be charged for this service.\"    Patient has given verbal consent to a Telephone visit? Yes    What phone number would you like to be contacted at? [phone]    Patient would like to receive their AVS by AVS Preference: Mail a copy.    Additional provider notes: I spoke with all of by phone.  She had some left foot swelling and pain and saw partner Dr. Rausch for showing and he recommended some conservative treatment which has helped significantly.  She is now complaining of right sided lateral hip pain which radiates into the buttocks and down the side of the leg.  She does have some known lumbar spine disease.  She is not checking her blood sugars.  Weight is fairly stable per her report.  Mood is generally okay vertigo is intermittent but improving    Assessment/Plan:  1. Left foot pain  Improving    2. Hip pain, right  It sounds like this might be a trochanteric bursitis or lumbar radiculopathy and have asked her to follow-up with orthopedic " surgery  - Ambulatory referral to Orthopedic Surgery    3. Type 2 diabetes mellitus without complication, without long-term current use of insulin (H)  Continue with low carbohydrate, reduced calorie diet regular exercise and modest weight loss    4. Chronic midline low back pain without sciatica  As above    5. Morbid obesity (H)  As above      Phone call duration:  13 minutes    Cj Georges MD

## 2021-06-11 NOTE — PROGRESS NOTES
Pt left a message stating that she's sorry she missed her appt and would be able to come in on 07/21/17 anytime between 1 and 2pm.    Attempt 1: Care Guide called patient.  If this patient is returning my call, please transfer to [place] at ext [phone].    Voice mail box was full and no answer.  Since pt left a date and time that does work for her, CG was able to schedule pt for 07/21/2017 at 1pm.  Pt is not aware of this appt yet.  She is on mychart but CG will call pt again tomorrow.

## 2021-06-11 NOTE — PROGRESS NOTES
Attempt 2: Care Guide called patient.  If this patient is returning my call, please transfer to [facility] at ext [phone].    Does pt need her appt today with Dr Georges?  If not, cancel.  Will the appt on 07/21/2017 at 1:00pm still work?

## 2021-06-11 NOTE — PROGRESS NOTES
Clinic Care Coordination Contact    Situation: Patient chart reviewed by care coordinator.    Background: CCC is currently supporting patient with a financial goal and a housing goal.     Assessment: CCC CHW continues monthly outreach calls to patient. Since her current goals are more social service in nature, CCC RN will place CCC LSW as lead care coordinator at this time.     Plan/Recommendations: CCC RN will remove himself from the care team, but will be available to assist as nursing needs arise.

## 2021-06-11 NOTE — PROGRESS NOTES
The Clinic Care Guide met with the patient in clinic today at the request of the PCP to discuss possible clinic care coordination enrollment. Clinic care coordination was described to the patient and immediate needs were discussed. The patient agreed to enrollment in clinic care coordination and future appointments were scheduled for an RN care coordination assessment and an enrollment visit with the primary care physician and care guide. The patient was provided with an informational packet describing clinic care coordination and given contact information for the clinic care guide.    PCAM 07/03/2017 @ 1pm  Goal Setting 07/14/2017 @ 1:20pm    Pt would like to work on her depression, managing hip pain & sleep issues.  Pt currently having financial hardship and can't afford her cpap machine.  Pt was being seen by [facility], but pt expressed that she no longer wished to be seen there.   notified Dr Georges and a referral was placed for mental health outside of [facility].  Pt then met with Specialty Scheduling and was given 3 brochures for mental health clinics ([facility], [facility] and [facility]).  Specialty  asked pt to review the brochures and call the clinic that looks like it would best suit her needs and to then call the Specialty Schedulers back to let them know which clinic she had scheduled with.  Specialty Schedulers also told pt that if she feels she needs more assistance with scheduling this appt, she is welcome to call them once brochures have been reviewed.    Next Outreach: 07/14/2017

## 2021-06-12 NOTE — PROGRESS NOTES
Attempt 1: Care Guide called patient.  If this patient is returning my call, please transfer to [facility] at ext [phone].    **Needs goal setting appt**    Care Guide Will     Due Date Delegation   Within 2 weeks from the RN assessment, done 7/21/17,  visit, by 8/4/17 Help the patient to coordinate goal setting visit if not already coordinated         Next Outreach: 08/23/2017  
Yes
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
## 2021-06-12 NOTE — PROGRESS NOTES
Office Visit - Follow Up   Isabel Bowie   62 y.o. female    Date of Visit: 8/25/2017    Chief Complaint   Patient presents with     Rash     on right arm        Assessment and Plan   1. Rash  Probably bug bites, I told her to keep a look out for bedbugs.  I gave her some triamcinolone.    2. Sleep apnea  She should be on CPAP this would help her with her sleep, I am going to have our healthcare home program meet with her    3. Mild intermittent asthma without complication  Continue albuterol as needed    4. Obesity  The following high BMI interventions were performed this visit: encouragement to exercise and lifestyle education regarding diet    Return in about 4 weeks (around 9/22/2017) for recheck.     History of Present Illness   This 62 y.o. old woman comes in for evaluation of a rash on her right arm, follow-up of sleep apnea, asthma, obesity.  She has some red bumps on her arms which are intensely pruritic.  She scratches them and then develops areas of redness.  She thinks she is getting bitten by something but she does not know what.  She does not believe she has any bedbugs.  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  She is not sure if there are bed bugs there.  She has never seen bedbugs.  It is really only her right arm.  Additionally, she would like a refill on her albuterol.  Breathing generally stable.  She is not sleeping well at night.  She is waking up gasping for air.  She is not feeling rested in the morning.  She had CPAP after a sleep study was diagnostic of obstructive sleep apnea.  For some reason she had her machine taken away because of an unpaid bill.  The specifics are unclear to me.    Review of Systems: A comprehensive review of systems was negative except as noted.     Medications, Allergies and Problem List   Reviewed and updated     Physical Exam   General Appearance:   No acute distress    /74 (Patient Site: Left Arm, Patient Position: Sitting,  "Cuff Size: Adult Regular)  Pulse 88  Ht 5' 2\" (1.575 m)  Wt (!) 224 lb (101.6 kg)  LMP 06/16/1996  SpO2 98%  BMI 40.97 kg/m2    HEENT exam is unremarkable  Neck supple no thyromegaly or nodule palpable  Lymphatic no cervical lymphadenopathy  Cardiovascular regular rate and rhythm no murmur gallop or rub  Pulmonary lungs are clear to auscultation bilaterally  Gastrointestinall abdomen soft nontender nondistended no organomegaly  Neurologic exam is non focal  Psychiatric pleasant, no confusion or agitation   Skin she does have red papules with excoriation on her right arm       Additional Information   Current Outpatient Prescriptions   Medication Sig Dispense Refill     albuterol (PROAIR HFA;PROVENTIL HFA;VENTOLIN HFA) 90 mcg/actuation inhaler Inhale 1-2 puffs every 4 (four) hours as needed for wheezing. 1 Inhaler 11     buPROPion (WELLBUTRIN XL) 150 MG 24 hr tablet Take 1 tablet (150 mg total) by mouth every morning. 90 tablet 2     cholecalciferol, vitamin D3, (CHOLECALCIFEROL) 1,000 unit tablet Take 1 tablet (1,000 Units total) by mouth daily. 90 tablet 3     hydroCHLOROthiazide (HYDRODIURIL) 25 MG tablet Take 1 tablet (25 mg total) by mouth daily. 90 tablet 3     meloxicam (MOBIC) 7.5 MG tablet Take 1 tablet (7.5 mg total) by mouth daily. 30 tablet 0     polyethylene glycol (GLYCOLAX) 17 gram/dose powder Take 17 g by mouth daily. Use as needed. 255 g 3     triamcinolone (KENALOG) 0.1 % cream Apply topically 2 (two) times a day. 80 g 2     No current facility-administered medications for this visit.      Allergies   Allergen Reactions     Chloroquine Phosphate      Social History   Substance Use Topics     Smoking status: Never Smoker     Smokeless tobacco: None     Alcohol use No       Review and/or order of clinical lab tests:  Review and/or order of radiology tests:  Review and/or order of medicine tests:  Discussion of test results with performing physician:  Decision to obtain old records and/or obtain " history from someone other than the patient:  Review and summarization of old records and/or obtaining history from someone other than the patient and.or discussion of case with another health care provider:  Independent visualization of image, tracing or specimen itself:    Time:      Cj Georges MD
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
## 2021-06-12 NOTE — PROGRESS NOTES
7/28/2017: Checked [place]. Patient is inactive. Patient may have missed renewal.  7/31/2017: Attempted to call patient x1      This subscriber has eligibility for [place]: Medical Assistance.  Elig Type AA: Parent of a dependent child  Eligibility Begin Date: 07/01/2016  Eligibility End Date: 06/30/2017      Referral:    Tyson Sawyer,           This james lady doesn't think her insurance is active. May be r/t immigration?? Please check it out.     Also, she has some outstanding bills at [facility]. Is that the [facility] area too?          Thanks
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
## 2021-06-12 NOTE — PROGRESS NOTES
Patient spoke with [facility] SW on 10/22  CHW delegations: Outreach to patient in 30 days.    Next outreach due: 11/20/2020

## 2021-06-12 NOTE — PROGRESS NOTES
I saw Isabel Bowie with our care management program today.  It was a goalsetting session.  She outlined 3 goals which are discussed in the care management note.  She is working on her immigration status.  She is trying to get her CPAP machine and there are some insurance and payment issues.  She would like to see the pain clinic for chronic pain and would like to revisit with physical therapy.    Total time saw the patient today is 25 minutes of which current 50% was spent in counseling coronation of care    Cj Georges
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
## 2021-06-12 NOTE — PROGRESS NOTES
CG was asked by PCP to meet with pt today as pt is having issues with her CPAP.      Pt reports that she had a sleep study over a year ago (Report in procedure tab dated 02/19/2015), with [facility], to get a CPAP machine and insurance didn't cover the CPAP.  Pt is not understanding why they didn't cover the equipment.  On top of that, pt states that she had issues with the machine not giving enough oxygen to her while using the machine so she brought it back to [facility] to be repaired and was told it is unrepairable.  Pt never went back to  the machine because it can't be used the way it is.  Pt states that she has now been sent to collections for this bill that is over $1,000.  CG checked the [employer] website for the date of service for the study and pt did not have active coverage.  CG not sure what type of ins patient had at that visit.  CG has left a message for the billing department at [facility] to find out what is going on.  CG had pt sign 4 blank SONIDO's in case these are needed to talk with anyone, i.e. Insurance, medical supply, Dr Montejo's office etc.  Pt was also rescheduled for her goal setting appt in first avail for pt on 09/08/2017 at 10:20am.    Next Outreach: 09/08/2017

## 2021-06-13 NOTE — PROGRESS NOTES
Pt left a voice message stating that she is having a lot of hip and back pain and would like to schedule with the pain clinic.  Pt thought that she may need a new order.      Pt received an order to the pain clinic on 09/08/2017.  Pain clinic had mailed out a new pt intake packet on 09/11/2017.  CG left pt a message stating that she will need to complete the packet and mail back to be reviewed by Pain Clinic staff before she is able to schedule.  According to the order notes, in chart, Pain Clinic spoke to pt on 11/03/2017 and mailed out a new packet.  CG also left pain clinic's telephone number on pt's voice mail to call them directly.    Next Outreach: 11/08/2017

## 2021-06-13 NOTE — PROGRESS NOTES
"09/22/2017 CG spoke to [facility] who stated that this needs a prior authorization as it is not covered at [facility] through Medicare Part D.  [facility] to send info needed to PCP now to get PA started.  [facility] pharmacist also stated that since it was not covered at [facility] they RX was sent to [facility].  CG contacted [facility] and Eric states it went to their [facility] DME providers.  Eric then transferred CG to [facility] with [facility].  [facility] states that Medicare part D pt's can only go to specific pharmacies for the nebulizers.  Pt would need to call the number on the back of her medicare card to find out where she can take RX to have this covered by the insurance plan.  Or pt can check www.medicare.gov for a list of pharmacies. CG was able to go to the website and found that there is only one pharmacy, [facility], that pt can get this equipment at.  That info is below.  CG left pt a voice message and will wait to hear back.    [facility]     [address] [place], [place] [id number] [phone]  No toll-free number provided        09/21/2017 Pt left CG a message stating that she is having trouble picking up her nebulizer.  Pt stated that pharmacy told pt this is not covered by insurance because it is \"disposable\" and pt is not understanding why this is.  Would like CG to talk to pharmacy.    Next Outreach: 09/26/2017      "

## 2021-06-13 NOTE — PROGRESS NOTES
09/29/2017  Jenn with [facility] returned CG's call and asked that CG call Jenn at [phone] (direct dial) then changed her mind asking that CG call main number and ask for them to get her on the line in case she's busy with something else.      10/03/2017 CG spoke to Jenn with [facility] who stated that pt was set up with her new CPAP on 10/02/2017.  Jenn is now waiting to hear back from the collection agency on how they will work out what pt has already paid them and what to do going forward.  Jenn stated she will call pt directly to discuss this further.  CG then spoke to pt who thanked CG for all her help with the new CPAP.  Pt did report that the new machine did not have a mask in it, so CG will stop into [facility] to discuss the mask and how pt can get that.  CG will let pt know.  Pt is aware that she is waiting on a call from Jenn about the bill and will let CG know how it worked out afterward.    CG stopped down to [facility] and talked with Flora about pt's mask.  Flora stated that pt needs new order for unlimited CPAP supplies sent to [facility], due to pt's ins.  This order needs a copy of pt's sleep study and diagnosis on it.  CG then brought copy of sleep study and instructions to Dr Georges's CA, Hetal to get that ordered.   Spoke to pt and she is aware orders are to be sent in and that [facility] will call her about pt picking up or delivering.  Pt also stated that she drove over to [facility] on 10/02/2017 to try to  her nebulizer and they didn't have it ready for pt yet.  Pt confirms that once it's ready, they will mail out to pt.      Next Outreach: 11/03/2017
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
## 2021-06-13 NOTE — PROGRESS NOTES
Pt left message stating that she received a letter from [facility] for pt to report to jury duty 11/27/2017.  Pt stated that due to her depression she should not sit in and help deliberate.  Pt is requesting that Dr Georges write a letter excusing pt from jury duty to be turned in no later than 11/15/2017.  CG has sent a message to PCP.  CG then notified pt that someone will contact her when letter is ready for .  Pt also reports that she has yet to receive her nebulizer.  Pt reports that she has received the tubing, but nothing else.  Pt asked that CG contact [facility] to find out the status.    CG spoke to Amauri with [facility] ([phone]) who stated that the orders they received were for tubing/supplies only, not for the actual machine.  Message sent to Dr Georges's team to get new orders sent to [facility], also verbally spoke to Dr José Manuel Fong's CA about needing this order.    CG contacted pt to let her know the status.  No other needs at this time.    Next Outreach: 11/22/2017

## 2021-06-13 NOTE — PROGRESS NOTES
Jenn returned CG's call with a resolution.    CG returned Jenn's call and Jenn states that they are now going to give pt a whole new machine, no extra charge.  Pt will need to schedule an appt to get it as it is not the same machine and she will need to be trained on how to use it.  Previously pt had mentioned that she never wants to deal with [facility] ever again due to issues when she brought the machine in to be looked at.  Jenn reports the issue was that pt was upset that [employer] wanted to charge pt a $75 service fee, which is why the pt left the machine there and never went back for it.  Per Jenn, if pt prefers not to work with [facility], she will need this appt at the [facility].      CG left message for pt to discuss.    Pt returned CG's call and CG was able to discuss all of this with pt.  Pt just kept thanking CG for all of the work put into the situation.  Pt is willing to give [facility] another chance and willing to have her appt at the [facility] location.  Pt is thankful to only have to pay $450.00 total but is concerned that she has already been making payment to the credit agency and is wondering how that will work.  Pt is wondering if she'll have to pay even less since she has been paying the credit agency $100/month already.  Pt is also concerned that this is going to be on her credit report.  CG suggested that pt call CG after she is released from [facility] and we could call Jenn with [employer] together.  Pt agreed to this plan.    Next Outreach: 09/25/2017

## 2021-06-13 NOTE — PROGRESS NOTES
"  Office Visit - Follow Up   Isabel Bowie   62 y.o. female    Date of Visit: 9/29/2017    Chief Complaint   Patient presents with     Hospital Visit Follow Up        Assessment and Plan   1. Need for prophylactic vaccination and inoculation against influenza  - Influenza, Seasonal Quad, Preservative Free 36+ Months    2. Acute bronchitis, unspecified organism  She is done well.  She does have some ongoing nasal congestion and I did recommend nasal Flonase    3. Adjustment disorder with mixed anxiety and depressed mood  Stable, continue Wellbutrin    4. Obstructive sleep apnea syndrome  She is working to get her CPAP machine    5. Beta thalassemia  Stable blood    6. Obesity  The following high BMI interventions were performed this visit: encouragement to exercise and lifestyle education regarding diet    Return in about 4 weeks (around 10/27/2017) for recheck.     History of Present Illness   This 62 y.o. old woman comes in for post hospital follow-up.  She is admitted with bronchitis.  She was treated with intravenous steroids, antibiotics and bronchodilators and did well.  Since discharge her breathing is generally been stable.  She is now done with medication.  She does have a lot of nasal congestion which makes it hard to breathe through her nose.  No fever chills, no facial pain or tooth pain.  She still has not worked on her CPAP and she wants to meet with her healthcare home team.  She otherwise is feeling okay.  She brings her green card with her and is quite happy to not be a permanent resident.    Review of Systems: A comprehensive review of systems was negative except as noted.     Medications, Allergies and Problem List   Reviewed and updated     Physical Exam   General Appearance:   No acute distress    /88 (Patient Site: Left Arm, Patient Position: Sitting, Cuff Size: Adult Regular)  Pulse 95  Ht 5' 2\" (1.575 m)  Wt (!) 224 lb (101.6 kg)  LMP 06/16/1996  SpO2 98%  BMI 40.97 kg/m2    HEENT " exam is unremarkable  Neck supple no thyromegaly or nodule palpable  Lymphatic no cervical lymphadenopathy  Cardiovascular regular rate and rhythm no murmur gallop or rub  Pulmonary lungs are clear to auscultation bilaterally  Gastrointestinall abdomen soft nontender nondistended no organomegaly  Neurologic exam is non focal  Psychiatric pleasant, no confusion or agitation        Additional Information   Current Outpatient Prescriptions   Medication Sig Dispense Refill     albuterol (PROAIR HFA;PROVENTIL HFA;VENTOLIN HFA) 90 mcg/actuation inhaler Inhale 1-2 puffs every 4 (four) hours as needed for wheezing. 1 Inhaler 11     albuterol (PROVENTIL) 2.5 mg /3 mL (0.083 %) nebulizer solution Take 3 mL (2.5 mg total) by nebulization every 4 (four) hours as needed for wheezing. 25 vial 2     buPROPion (WELLBUTRIN XL) 150 MG 24 hr tablet Take 1 tablet (150 mg total) by mouth every morning. 90 tablet 3     cholecalciferol, vitamin D3, (CHOLECALCIFEROL) 1,000 unit tablet Take 1 tablet (1,000 Units total) by mouth daily. 90 tablet 3     meloxicam (MOBIC) 7.5 MG tablet Take 7.5 mg by mouth daily as needed for pain.       nebulizer accessories Kit Provide one nebulizer and accessories putting tubing and mask 1 kit 0     polyethylene glycol (GLYCOLAX) 17 gram/dose powder Take 17 g by mouth daily. Use as needed. 255 g 3     triamcinolone (KENALOG) 0.1 % cream Apply topically 2 (two) times a day. (Patient taking differently: Apply topically 2 (two) times a day as needed (to rash on arms as needed). to rash on arms as needed) 80 g 2     fluticasone (FLONASE) 50 mcg/actuation nasal spray 2 sprays into each nostril daily. 16 g 11     No current facility-administered medications for this visit.      Allergies   Allergen Reactions     Chloroquine Phosphate      Social History   Substance Use Topics     Smoking status: Never Smoker     Smokeless tobacco: Never Used     Alcohol use No       Review and/or order of clinical lab tests:  Reviewed  Review and/or order of radiology tests: Reviewed  Review and/or order of medicine tests:  Discussion of test results with performing physician:  Decision to obtain old records and/or obtain history from someone other than the patient:  Review and summarization of old records and/or obtaining history from someone other than the patient and.or discussion of case with another health care provider: Reviewed her discharge summary summarized above  Independent visualization of image, tracing or specimen itself:    Time:      Cj Georges MD

## 2021-06-13 NOTE — PROGRESS NOTES
CG met with pt today while she was in clinic seeing PCP.  Pt was still concerned about her account with [employer] being in collections.  CG explained to pt that we had discussed that pt would call CG after leaving in patient hospital stay so we could make a 3 way call to get all of pt's questions answered.  CG was able to leave a message for [employer] asking to call back to discuss with pt.  CG was also asked pt if she was able to get her nebulizer and pt stated she didn't.  CG contacted [facility] with pt in office.  [facility] stated all they need is the order and then pt can either pick this up or they will ship to pt.  CG talked with Dr José Manuel Fong's CA and order was sent to [facility].      Next Outreach: 10/02/2017
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
## 2021-06-13 NOTE — PROGRESS NOTES
Attempt 1: Care Guide called patient.  If this patient is returning my call, please transfer to [facility] at ext [phone].    Pt left CG a voice message stating that she has been admitted to [facility] for a high fever and cough.  Dr Georges was able to stop into pt's room this morning for a visit.  Pt also reports that she did get a green care and states that she feels beyond blessed.     then contacted Jenn, billing with [employer] to discuss pt's CPAP bill.  Jenn states that machine was given to pt in 2015 and [employer] was billed for the machine.  [facility] had denied the claim and at that time, pt was told to call the insurance company to find out why.  Then in 10/2016 pt updated [employer] with new insurance info, which was Medicare and BC MA.  [employer] is not in network with BC so they (BC) also denied the claim.  Jenn stated that they are willing to work with pt on the $1000.00 bill that is in collections.  Jenn stated that the rental fee is $200.00 per month and [place] is willing to reduce that monthly fee to $90 per month, which is the left over amount after what most insurance plans would cover.  Jenn states that pt was biilled for a total of 5 months and that would bring pt's out of pocket cost to $450.00.  Jenn also stated that they do not have pt's CPAP machine and that pt has it.   explained to Jenn that pt was insistant that she brought it in to the [facility] location and asked [facility] to have it fixed and [facility] told pt that it is not repairable and [facility] kept the machine. Jenn was unable to locate any notes regarding this in the [employer] system and CG was then put on hold while Jenn connected with [facility].  Jenn did speak to [facility] and she that they do, in fact, have pt's CPAP machine at this location, but at the time wasn't able to get more info than that from [facility].  Jenn will discuss with [facility] the reason behind pt not having machine and get back to CG.  CG will hold off on  calling pt about this info until Jenn calls back on he machine itself.  However, if pt returns CG's call, CG will discuss the payment option that was given to CG by Jenn.  CG asked that Jenn not make those changes to the bill until CG is able to talk it over with the pt.    Next Outreach: 10/09/2017

## 2021-06-13 NOTE — PROGRESS NOTES
09/28/17 Patient listed on patient panel to check on medical, checked mnits and appears she has been active since 08/17:  [id number] OLIVE C KARIME   This subscriber is eligible for JJ: [facility].  Elig Type M2: [facility]  Eligibility Begin Date: 08/01/2017  Eligibility End Date: --/--/----  This subscriber is eligible for the following service types: Medical Care , Chiropractic , Dental Care , Hospital , Hospital - Inpatient , Hospital - Outpatient , Emergency Services , Pharmacy , Professional (Physician) Visit - Office , Vision (Optometry) , Mental Health , Urgent Care     Will contact [facility] CG and offer to assist with medical renewal in 12/17 for 01/2018.     Closing encounter at this time please refer the patient to FRG if patient has a new need prior to medical renewal.   Hanna Beasley, Phone: [phone], Fax: [phone]
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
## 2021-06-13 NOTE — PROGRESS NOTES
10/17/2017  Pt left CG a message stating that she hasn't heard back from [employer] about her collections.  Stated this is still on her credit report and she's frustrated.    Spoke to Jenn, with [employer].  She states that she was able to get this taken care of and to give it a couple days before pt will see changes.    Pt has been notified.
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
## 2021-06-13 NOTE — PROGRESS NOTES
Clinic Care Coordination Contact     Situation: Patient chart reviewed by care coordinator.     Background: Pts initial assessment and enrollment to Care Coordination was 9-.   Patient centered goals were developed with participation from patient.  SW CC handed patient off to CHW for continued outreach every 30 days.      Assessment: CHW has been in contact with patient monthly. Patient has made progress to goals. CHW and FRW have talked with her this past week. They have moved to their new apartment.       Plan/Recommendations: CHW will involve SW CC as needed or if patient is ready to move to maintenance.  BRIGHT CC will continue to monitor progress to goals and CHW outreaches every 6 weeks.     Angela Rogel, [facility]  Clinic Care Coordinator  [phone]
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
## 2021-06-14 NOTE — PROGRESS NOTES
Office Visit - Physical   [place] ADRIAN Bowie   62 y.o.  female    Date of visit: 12/13/2017  Physician: Cj Georges MD     Assessment and Plan   1. Routine general medical examination at a health care facility  This is a 62-year-old woman who comes in for annual physical.  She is up-to-date on her cancer screening and other screening tests are recommended as below.  Healthy diet regular exercise and modest weight loss discussed and recommended.    2. Screening for diabetes mellitus    3. Screening for hyperlipidemia  - Lipid Cascade    4. Adjustment disorder with mixed anxiety and depressed mood  She is on Wellbutrin we discussed increasing this but elected to continue on 50 mg daily    5. Obstructive sleep apnea syndrome  Better with CPAP    6. Beta thalassemia  We will check labs as below and have her see hematology  - 1(CBC and Differential)  - Ferritin  - Iron and Transferrin Iron Binding Capacity  - Thyroid Cascade  - Comprehensive Metabolic Panel  - Ambulatory referral to Hematology  - 1 (CBC with Diff)    7. Obesity  See below    9. Hyperglycemia  - Glycosylated Hemoglobin A1c    10. Neck pain on right side  Seems musculoskeletal strain, I gave her some isometric exercises and range of motion exercises to do and she can also work with the spine clinic.  She is on meloxicam and recommended additional acetaminophen as needed    The following high BMI interventions were performed this visit: encouragement to exercise and lifestyle education regarding diet    Return in about 4 weeks (around 1/10/2018) for recheck.     Chief Complaint   Chief Complaint   Patient presents with     Annual Wellness Visit     Shoulder Pain     right        Patient Profile   Social History     Social History Narrative    She is originally from [place]. 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  She was recently  a second time in 07/2014 to a man named Janiya. She is  from her first  and has 7 adult children and many grandchildren.                Past Medical History   Patient Active Problem List   Diagnosis     Adult Sleep Apnea - CPAP, Dr. FOSTER Isaacs     Obesity     Prediabetes     Peripheral Neuropathy     Avitaminosis D     Beta thalassemia     Low back pain - Dr. GRIFFIN Leblanc [place]     Adjustment disorder       Past Surgical History  She has a past surgical history that includes Hysterectomy (1996); Oophorectomy (1996); and Rotator cuff repair (Right).     History of Present Illness   This 62 y.o. old woman comes in for annual physical as well as evaluation of right-sided neck pain and anemia.  Overall she is doing okay.  She does have obstructive sleep apnea and now has a CPAP machine and this is improved her sleep and energy level.  She has some frustration at home and is a bit irritable because her  does not help with household work or finances.  He is quite dependent on her.  She reports that for the past couple of days she has had right-sided neck and shoulder pain.  She is not sure what brought this on.  Pain radiates up and to her neck and down her right back.  Pain with range of motion of the shoulder.  She is seen the spine clinic for chronic low back pain.  Additionally, she has been anemic and hemoglobin electrophoresis showed beta thalassemia.  She is interested in seeing a hematologist.  Her hemoglobin is generally been stable around 10.    Review of Systems: A comprehensive review of systems was negative except as noted.     Medications and Allergies   Current Outpatient Prescriptions   Medication Sig Dispense Refill     albuterol (PROAIR HFA;PROVENTIL HFA;VENTOLIN HFA) 90 mcg/actuation inhaler Inhale 1-2 puffs every 4 (four) hours as needed for wheezing. 1 Inhaler 11     albuterol (PROVENTIL) 2.5 mg /3 mL (0.083 %)  nebulizer solution Take 3 mL (2.5 mg total) by nebulization every 4 (four) hours as needed for wheezing. 25 vial 2     buPROPion (WELLBUTRIN XL) 150 MG 24 hr tablet Take 1 tablet (150 mg total) by mouth every morning. 90 tablet 3     fluticasone (FLONASE) 50 mcg/actuation nasal spray 2 sprays into each nostril daily. 16 g 11     meloxicam (MOBIC) 7.5 MG tablet Take 2 tablets (15 mg total) by mouth daily as needed for pain. 30 tablet 0     nebulizer accessories Kit Provide one nebulizer and accessories putting tubing and mask 1 kit 0     nebulizer and compressor (VIOS AEROSOL DELIVERY SYSTEM) Sunita Use daily as needed. 1 each 0     polyethylene glycol (GLYCOLAX) 17 gram/dose powder Take 17 g by mouth daily. Use as needed. 255 g 3     triamcinolone (KENALOG) 0.1 % cream Apply topically 2 (two) times a day. (Patient taking differently: Apply topically 2 (two) times a day as needed (to rash on arms as needed). to rash on arms as needed) 80 g 2     No current facility-administered medications for this visit.      Allergies   Allergen Reactions     Chloroquine Phosphate         Family and Social History   Family History   Problem Relation Age of Onset     Breast cancer Maternal Aunt 70     Hypertension Mother      Diabetes Mother      No Medical Problems Father      killed in war     No Medical Problems Sister      one  sudden death (37), others living     No Medical Problems Brother      9 living, 1  diabetes, others  in war     No Medical Problems Daughter      No Medical Problems Son      BRCA 1/2 Neg Hx      Cancer Neg Hx      Colon cancer Neg Hx      Endometrial cancer Neg Hx      Ovarian cancer Neg Hx         Social History   Substance Use Topics     Smoking status: Never Smoker     Smokeless tobacco: Never Used     Alcohol use No        Physical Exam   General Appearance:   No acute distress, obese    /62 (Patient Site: Left Arm, Patient Position: Sitting, Cuff Size: Adult Regular)  Pulse 94  Ht  "5' 2\" (1.575 m)  Wt (!) 225 lb (102.1 kg)  LMP 06/16/1996  SpO2 100%  BMI 41.15 kg/m2    EYES: Eyelids, conjunctiva, and sclera were normal. Pupils were normal. Cornea, iris, and lens were normal bilaterally.  HEAD, EARS, NOSE, MOUTH, AND THROAT: Head and face were normal. Hearing was normal to voice and the ears were normal to external exam. Nose appearance was normal and there was no discharge. Oropharynx was normal.  NECK: Neck appearance was normal. There were no neck masses and the thyroid was not enlarged.  RESPIRATORY: Breathing pattern was normal and the chest moved symmetrically.  Percussion/auscultatory percussion was normal.  Lung sounds were normal and there were no abnormal sounds.  CARDIOVASCULAR: Heart rate and rhythm were normal.  S1 and S2 were normal and there were no extra sounds or murmurs. Peripheral pulses in arms and legs were normal.  Jugular venous pressure was normal.  There was no peripheral edema.  GASTROINTESTINAL: The abdomen was normal in contour.  Bowel sounds were present.  Percussion detected no organ enlargement or tenderness.  Palpation detected no tenderness, mass, or enlarged organs.   MUSCULOSKELETAL: Skeletal configuration was normal and muscle mass was normal for age. Joint appearance was overall normal.  She does have pain with palpation along the paraspinous muscles on the right and some pain with overhead range of motion the right shoulder  LYMPHATIC: There were no enlarged nodes.  SKIN/HAIR/NAILS: Skin color was normal.  There were no skin lesions.  Hair and nails were normal.  NEUROLOGIC: The patient was alert and oriented to person, place, time, and circumstance. Speech was normal. Cranial nerves were normal. Motor strength was normal for age. The patient was normally coordinated.  PSYCHIATRIC:  Mood and affect were normal and the patient had normal recent and remote memory. The patient's judgment and insight were normal.       Additional Information        Cj " Garrett Georges MD  Internal Medicine  Contact me at [phone]

## 2021-06-14 NOTE — PROGRESS NOTES
Attempt 1: Care Guide called patient.  If this patient is returning my call, please transfer to Analilia at ext [phone].    Next Outreach: 11/27/2017

## 2021-06-14 NOTE — PROGRESS NOTES
Patient spoke with [facility] SW and FRW on 1/7 and 1/11.     CHW delegations:  Delegating to CHW to call in less than 30 days    Next outreach due: 2/8/21
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
## 2021-06-14 NOTE — PROGRESS NOTES
In Basket message received for Hematology Consult,refering physician Dr Cj Georges.  Placed call to Isabel to set up appointment. Same scheduled for Monday January 15th at 3:00pm with , with a nurse apt at  2:30 pm. Packet sent via [employer]  to Olive with informational letter, MD bio card, [facility] intake form, medication and allergy list, and appointment letter. Work up for Beta Thalessemia has been done at [facility]. States she has all of her Health care within the [facility] system.   Medical records will collect any outside records.      I explained that Isabel would becoming to a cancer center and that the doctors are both cancer and blood doctors. Explained the appointment process of arriving early and bringing his Health History and medication allergy list along to his appointment.     Given [facility] Number to call if Isabel  has further questions or concerns. [phone]

## 2021-06-14 NOTE — PROGRESS NOTES
Pt left message stating that she received a letter to report to jury duty in [facility].  Pt reports that due to her depression and chronic pain, she will not be able to be a juror.  Pt has asked that Dr Georges write her a letter excusing her from duty that be faxed attn: [employer] at [phone].    CG spoke with pt to let her know that a message would be sent to Dr Georges and that Dr Georges is out of the office today.  Pt asked that covering provider complete and fax this in today.  Message sent and marked urgent.  No other needs at this time.    Next Outreach: 12/27/2017

## 2021-06-15 NOTE — PROGRESS NOTES
Clinic Care Coordination Contact     Situation: Patient chart reviewed by care coordinator.     Background: Pts initial assessment and enrollment to Care Coordination was 9-.   Patient centered goals were developed with participation from patient.  SW CC handed patient off to CHW for continued outreach every 30 days.      Assessment: CHW has been in contact with patient monthly, lastly on 2-. Patient also working with FRW on goal to get SNAP. Patient has made progress to goals.       Plan/Recommendations: CHW will involve SW CC as needed or if patient is ready to move to maintenance.  SW CC will continue to monitor progress to goals and CHW outreaches every 6 weeks.     Care Plan updated and mailed to patient: yes
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
## 2021-06-15 NOTE — PROGRESS NOTES
Attempt 1: Care Guide called patient.  If this patient is returning my call, please transfer to Analilia at ext [phone].    Next Outreach: 01/30/2018
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
## 2021-06-15 NOTE — PROGRESS NOTES
Received call from Isabel this AM wondering when her appointment with hematology is, she had not received ethe welcome packet sent out on 12/14 or misplaced same. Offered to send out new packet, she is agreeable to same . New patient packet sent out to Isabel. Informed Isabel that her appointment was on 1/15/2018 at 2:30 pm. Again thankful for information and that a new packet will be sent out.

## 2021-06-16 PROBLEM — E66.01 MORBID OBESITY (H): Status: ACTIVE | Noted: 2018-11-08

## 2021-06-16 PROBLEM — H81.399 PERIPHERAL VERTIGO: Status: ACTIVE | Noted: 2020-01-26

## 2021-06-16 NOTE — PROGRESS NOTES
Wellness Plan:    Emergency Plan Recommendation:    When to Use the Emergency Department (ED)  An emergency means you could die if you don t get care quickly. Or you could be hurt permanently (disabled). Read below to know when to use -- and when not to use -- an emergency department (also called ED).    Dangers to your life  Here are examples of emergencies. These need immediate care:  A hard time breathing  Severe chest pain  Choking  Severe bleeding  Suddenly not able to move or speak  Blacking out (fainting)`  Poisoning    Dangers of permanent injuries  Here are other emergencies. These also need immediate care:  Deep cuts or severe burns  Broken bones, or sudden severe pain and swelling in a joint    When it s an emergency  If you have an emergency, follow these steps:    1. Go to the nearest emergency department  If you can, go to the hospital ED closest to you right away.  If you cannot get there right away, or if it is not safe to take yourself, call 911 or your police emergency number.  2. Call your primary care doctor  Tell your doctor about the emergency. Call within 24 hours of going to the ED.  If you cannot call, have someone call for you.  Go to your doctor (not the ED) for any follow-up care.    When it s not an emergency  If a problem is not an emergency, follow these steps:    1. Call your primary care doctor  If you don t know the name of your doctor, call your health plan.  If you cannot call, have someone call for you.  2. Follow instructions  Your doctor will tell you what you should do.  You may be told to see your doctor right away. You may be told to go to the ED. Or you may be told to go to an urgent care center.  Follow your doctor s advice.
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
## 2021-06-16 NOTE — PROGRESS NOTES
"My Clinic Care Coordination Wellness Plan    [facility]  [facility] [address]  [address]  [address] [place]  [address]  [phone]    My Preferred Method of Contact:  Phone: [phone]    My Primary/Preferred Language:  English    Preferred Learning Style:  Reading information, Face to face discussion, Pictures/Diagrams and Hands on teaching    Emergency Contact: Extended Emergency Contact Information  Primary Emergency Contact: Farrah Bowie   [facility]  Home Phone: [phone]  Relation: Child  Secondary Emergency Contact: Qi Bowie   [facility]  Home Phone: [phone]  Relation: Child     PCP:  Cj Georges MD  Specialists:    Care Team            Cj Georges MD PCP - General, Internal Medicine    [phone]     [facility] (Merged)    Radha Dodd Registered Nurse, Clinic Care Coordination    Clinic Care Coordination RN    Shane Isaacs MD Physician, Neurology    [phone]     Jose Rome Chiropractic Medicine    Ph: [phone]    Claudia Joseph MD Physician, Hematology and Oncology    [phone]     Cj Georges MD Internal Medicine    [phone]     Merged    Nancy Batres [facility] Clinic Care Coordination Care Guide, Clinic Care Coordination    [facility] Ph: [phone] Fax: [phone]        Accomplishments:  Goals       COMPLETED:  I have received a new CPAP machine. (pt-stated)            Action steps to achieve this goal  1. My care guide helped me get a new CPAP with no additional charges.  2. My care guide also helped get my bill with [facility] down from $1000.00 to $450.00.  3. My care guide helped me get the outstanding charges, for the CPAP, off of my credit report.        Date goal set:  7/21/17        COMPLETED: CCC-Patient Stated \"I want to find out if my health insurance is active.\" (pt-stated)            Action steps to achieve this goal  1. "    2.    3.      Date goal set: 7/21/17        COMPLETED: I am working with a  to get permanent residency. (pt-stated)            Action steps to achieve this goal  1. I am working with a  and will continue to do so until I am a permanent resident.  2. I have a payment plan to pay my  $100 per month for 12 months for his help on my case.   3. I have obtained a green card. (09/19/2017)     Date Updated: 09/19/2017  Date goal set:  07/21/2017            Advanced Directive/Living Will: The patient was given information regarding Adanced Directives/Living Will    Clinical Emergency Plan    Emergency Plan Recommendation:     When to Use the Emergency Department (ED)  An emergency means you could die if you don t get care quickly. Or you could be hurt permanently (disabled). Read below to know when to use -- and when not to use -- an emergency department (also called ED).     Dangers to your life  Here are examples of emergencies. These need immediate care:  A hard time breathing  Severe chest pain  Choking  Severe bleeding  Suddenly not able to move or speak  Blacking out (fainting)`  Poisoning     Dangers of permanent injuries  Here are other emergencies. These also need immediate care:  Deep cuts or severe burns  Broken bones, or sudden severe pain and swelling in a joint     When it s an emergency  If you have an emergency, follow these steps:     1. Go to the nearest emergency department  If you can, go to the hospital ED closest to you right away.  If you cannot get there right away, or if it is not safe to take yourself, call 911 or your police emergency number.  2. Call your primary care doctor  Tell your doctor about the emergency. Call within 24 hours of going to the ED.  If you cannot call, have someone call for you.  Go to your doctor (not the ED) for any follow-up care.     When it s not an emergency  If a problem is not an emergency, follow these steps:     1. Call your primary care  doctor  If you don t know the name of your doctor, call your health plan.  If you cannot call, have someone call for you.  2. Follow instructions  Your doctor will tell you what you should do.  You may be told to see your doctor right away. You may be told to go to the ED. Or you may be told to go to an urgent care center.  Follow your doctor s advice.    All [facility] clinic patients have access to a Nurse 24 hours a day, 7 days a week.  If you have questions or want advice from a Nurse, please know [facility] is here for you.  You can call your clinic and they will connect you or you can call [facility] at [phone].  [facility] also has Walk In Care clinics in multiple locations.  Call the number listed above for more information about our Walk In Care clinics or visit the [facility] website at [email or web address].

## 2021-06-16 NOTE — PROGRESS NOTES
Attempt 2: Care Guide called patient.  If this patient is returning my call, please transfer to Analilia at ext [phone].     Next outreach: 03/15/18
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
## 2021-06-16 NOTE — PROGRESS NOTES
Attempt 1: Care Guide called patient.  If this patient is returning my call, please transfer to Analilia at ext [phone].    Next outrach: 03/7/18
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
## 2021-06-16 NOTE — PROGRESS NOTES
Called patient for follow up on CCC goals. Patient has no active goals at this time, staff message sent to RN pool for Maintenance emergency plan to transition to maintenance and then graduate in 3 months.
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
## 2021-06-17 NOTE — PATIENT INSTRUCTIONS - HE
Patient Instructions by Sandeep Damian PT at 1/13/2020 11:30 AM     Author: Sandeep Damian PT Service: -- Author Type: Physical Therapist    Filed: 1/13/2020 11:50 AM Encounter Date: 1/13/2020 Status: Signed    : Sandeep Damian PT (Physical Therapist)        MULTIFIDUS STRENGTH - STANDING    Attach exercise band to doorknob.  Stand facing the door, feet shoulder width apart, one foot slightly in front of the other.  Hold exercise band in both hands out in front of your body.  Tighten your abdominals by pulling your belly button back towards your spine.  Pull band in towards your belly button.    Hold 5 seconds.  10-20X  1X/day          HIP ABDUCTION - STANDING     While standing, raise your leg out to the side. Keep your knee straight and maintain your toes pointed forward the entire time.      Use your arms for support if needed for balance and safety.     X 10-20  1X/day      SIT TO STAND    While making sure you bend at the hip, SLOWLY sit down    Your chest may come forward over your toes, but your spine should stay in neutral as shown.    X 10-20  1X/day

## 2021-06-17 NOTE — TELEPHONE ENCOUNTER
Telephone Encounter by Monika Jenkins at 4/24/2020 10:00 AM     Author: Monika Jenkins Service: -- Author Type: --    Filed: 4/24/2020 10:02 AM Encounter Date: 4/23/2020 Status: Signed    : Monika Jenkins APPROVED:    Approval start date: 04/24/2020  Approval end date:  12/31/2020    Pharmacy has been notified of approval and will contact patient when medication is ready for pickup.

## 2021-06-17 NOTE — PATIENT INSTRUCTIONS - HE
Patient Instructions by Sandeep Damian PT at 1/6/2020 11:00 AM     Author: Sandeep Damian PT Service: -- Author Type: Physical Therapist    Filed: 1/6/2020 11:24 AM Encounter Date: 1/6/2020 Status: Signed    : Sandeep Damian PT (Physical Therapist)        STRAIGHT LEG RAISE - SLR    While lying or sitting, raise up your leg with a straight knee.  Keep the opposite knee bent with the foot planted to the ground.    X 10-20  1X/day      PIRIFORMIS STRETCH    While lying on your back with both knee bent, cross your affected leg on the other knee.     Next, hold your unaffected thigh and pull it up towards your chest until a stretch is felt in the buttock.    Hold for 30 seconds  2 times per side  2 times per day          HIP ABDUCTION - STANDING     While standing, raise your leg out to the side. Keep your knee straight and maintain your toes pointed forward the entire time.      Use your arms for support if needed for balance and safety.     X 10-20  1X/day

## 2021-06-17 NOTE — TELEPHONE ENCOUNTER
Reason for Call:  Other call back      Detailed comments: Pt stating has been leaving message for her CCC - has not heard back     Please advise    Phone Number Patient can be reached at: Cell number on file:    Telephone Information:   Mobile [phone]       Best Time: anytime    Can we leave a detailed message on this number?: Yes    Call taken on 5/10/2021 at 12:07 PM by Noris Cantrell
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
## 2021-06-17 NOTE — PROGRESS NOTES
The patient did have a dramatic effect of losing her balance with the numbing of the upper cervical medial branches.  She was unable to stand from the chair for almost an hour.  This quickly did resolve and she was gradually able to get up and walk further.  At the time of discharge she was able to ambulate by herself.  It was unnecessary to transport her to the emergency department as the Marcaine had worn off.

## 2021-06-17 NOTE — PROGRESS NOTES
[facility] Rehabilitation Daily Progress     Patient Name: Isabel Biggs  Date: 5/20/2021  Visit #: 2/12  Auth: thru 8/17  Referral Diagnosis: low back pain  Referring provider: Cj Georges MD  Visit Diagnosis:     ICD-10-CM    1. Chronic midline low back pain without sciatica  M54.5     G89.29    2. Myofascial pain  M79.18        Assessment:     Today patient returns after Monday's initial visit feeling much better, moving around more without less pain. She found most benefit from TENS unit, so we used it again today. Patient will be getting one for home use! Patient required Max verbal and visual cuing for performing HEP correctly.     From Eval:  Patient is a 66 y.o. female that presents with signs and symptoms consistent with low back pain secondary to possible disc herniation with a strong myofascial component. Patient demonstrates impairments including decreased lumbar joint mobility and flexibility, with poor core stability and postural awareness, including + SLR and SLUMP bilaterally R>L, leading to impaired functional mobility. Patient's functional limitations include bending lifting twisting, stooping and performing daily household chores without increased symptoms or difficulty. Today patient responded well to patient education, therapeutic exercise and TENS unit - patient found relief with treatment today and would benefit from one for home use.      HEP/POC compliance is  good .  Patient demonstrates understanding/independence with home program.  Patient is appropriate to continue with skilled physical therapy intervention, as indicated by initial plan of care.    Goal Status:  Pt. will be independent with home exercise program in : 6 weeks    Pt will: be able to demonstrate a SLR of at least 60 deg without increased pain symptoms by 6 weeks.  Pt will: be able to stand for about 30 minutes in order to cook dinner without increased symptoms by 6 weeks.  Pt will: be able to bend forwards  towards the floor without increased pain symptoms or difficulty by 6 weeks.        Plan / Patient Education:     Continue with initial plan of care.  Progress with home program as tolerated.    Plan for next visit: review HEP, TENS unit again if indicated, manual tx if indicated, bridging, sidelying hip abd/clams, lumbar strengthening, prone laying and prone press ups, standing hip abd/ext and mini squats, HF and piriformis stretch    Subjective:     Pain Ratin  Patient reports she is doing pretty well, Feeling better! She is feeling some pain in her L leg and the R low back. Exercises are going well.     Functional limitations are described as occurring with:   bending  performing routine daily activities  sleeping  standing for longer periods of time  twisting or turning trunk    Objective:     Palpation: TTP at R>L distal lumbar paraspinals and QL, piriformis  Flexibility: decreased of hamstrings, QL, piriformis    Treatment Today         Exercises:  Exercise #1: hip rolls - 20 reps  Comment #1: supine marching - 10 reps bilat  Exercise #2: supine hip adduction with progression to gluteal squeeze - hold 5 sec x 10  Comment #2: seated hamstring stretch - hold 20 sec or 10 reps each side for more neural mobility        TREATMENT MINUTES COMMENTS   Evaluation     Self-care/ Home management     Manual therapy     Neuromuscular Re-education     Therapeutic Activity     Therapeutic Exercises 10 See above flowsheet  Nustep 5 min   Gait training     Modality__TENS__________ 15 10 + 5 patient sidelying on L - quad set up on thoracolumbar paraspinals               Total 25    Blank areas are intentional and mean the treatment did not include these items.       Pepper Gerard, PT  2021

## 2021-06-17 NOTE — PROGRESS NOTES
Patient became dizzy after injection . See blood pressures. Patient stated she tends to run higher with pain. She agreed would have blood pressure evaluated if has been up. Increased dizziness lasted 30 minutes and was able to sit up and dizziness lessened but still present. Patient feels like rocking back and forth and very tired. Dr Aguilar checked on patient twice before discharge.
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
## 2021-06-17 NOTE — PATIENT INSTRUCTIONS - HE
Patient Instructions by Sandeep Damian PT at 12/16/2019 10:30 AM     Author: Sandeep Damian PT Service: -- Author Type: Physical Therapist    Filed: 12/16/2019 11:15 AM Encounter Date: 12/16/2019 Status: Signed    : Sandeep Damian PT (Physical Therapist)        LOWER TRUNK ROTATIONS - LTR    Lying on your back with your knees bent, gently move your knees side-to-side.    10 repetitions  2x/day      PELVIC TILT    While lying on your back, use your stomach muscles to press your spine downwards towards the ground. Do not move into a painful position.    10 repetitions  2x/day      SEATED LOW BACK STRETCH    While sitting in a chair, slowly bend forward and reach your hands for the floor. Bend your trunk and head forward and down.    10 repetitions  2x/day

## 2021-06-17 NOTE — PROGRESS NOTES
Neuropsychology Note     Ms. Biggs was originally referred for a neuropsychological evaluation on 4/22/2020 by RHEA Sosa and was scheduled for an appointment with us in April 2020. However, due to the ongoing COVID-19 pandemic that prevented in-person visits at that time, her appointment was canceled. Now that we are able to accommodate in-person visits again, our schedulers reached out her to re-schedule her evaluation. However, the patient reported that she is no longer interested but will call us back if she changes her mind.      Ms. Biggs is welcome to contact our service at any time ([phone]) should she wish to schedule this evaluation in the future.          Ynes Banda PsyD, LP  Licensed Clinical Neuropsychologist  [facility]  [address], [address]  [place] [address]  Phone: [phone]

## 2021-06-17 NOTE — PROGRESS NOTES
Office Visit - Follow Up   Isabel Biggs   66 y.o. female    Date of Visit: 4/30/2021    Chief Complaint   Patient presents with     Hospital Visit Follow Up     ED, [facility], 4/20/21, back pain     Colon Cancer Screening     patient due, orders pending        Assessment and Plan   1. Acute bilateral thoracic back pain  Continue with current plan, revisit with the spine clinic  - Ambulatory referral to [facility]  - TiZANidine (ZANAFLEX) 4 MG capsule; Take 1 capsule (4 mg total) by mouth 3 (three) times a day as needed for muscle spasms.  Dispense: 30 capsule; Refill: 5  - naproxen sodium (ANAPROX) 550 MG tablet; Take 1 tablet (550 mg total) by mouth 2 (two) times a day as needed.  Dispense: 60 tablet; Refill: 1    2. Screen for colon cancer  3. Screening for colon cancer  - Cologuard    4. WILFRIDO (obstructive sleep apnea)  - CPAP    5. Type 2 diabetes mellitus without complication, without long-term current use of insulin (H)  Been well controlled continue same    6. Morbid obesity (H)  The following high BMI interventions were performed this visit: encouragement to exercise and lifestyle education regarding diet    Return in about 4 weeks (around 5/28/2021) for recheck.     History of Present Illness   This 66 y.o. old woman comes in for post ER follow-up.  She is seen in the ER with thoracic back pain.  X-ray did not show any acute abnormalities.  She does have some degenerative changes of the thoracic and lumbar spine.  She has been a patient in the spine clinic previously.  She was put on muscle relaxant naproxen and acetaminophen which do give transient benefit.  Pain is worse with movement improved with rest but then when she tries to get up is quite painful.  She is reporting sleepiness and is not using CPAP and needs a new machine.  Last sleep study 2015 which showed an 80 HI greater than 40 and benefit of CPAP at 10 cm of water.    Review of Systems: A comprehensive review of systems was  "negative except as noted.     Medications, Allergies and Problem List   Reviewed, reconciled and updated  Post Discharge Medication Reconciliation Status:      Physical Exam   General Appearance:   No acute distress    /80 (Patient Site: Left Arm, Patient Position: Sitting, Cuff Size: Adult Regular)   Pulse 84   Ht 4' 11\" (1.499 m)   Wt (!) 224 lb 3 oz (101.7 kg)   LMP 06/16/1996   SpO2 97%   BMI 45.28 kg/m      HEENT exam is unremarkable  Cardiovascular regular rate and rhythm no murmur gallop or rub  Pulmonary lungs are clear to auscultation bilaterally  Gastrointestinal abdomen soft nontender nondistended no organomegaly  Neurologic exam is non focal  Psychiatric pleasant, no confusion or agitation   No pain with palpation of the thoracic vertebral bodies       Additional Information   Current Outpatient Medications   Medication Sig Dispense Refill     acetaminophen (TYLENOL) 500 MG tablet Take 1,000 mg by mouth.       albuterol (PROAIR HFA;PROVENTIL HFA;VENTOLIN HFA) 90 mcg/actuation inhaler INHALE 1 TO 2 PUFFS BY MOUTH INTO THE LUNGS EVERY 4 HOURS AS NEEDED FOR WHEEZING 3 Inhaler 1     albuterol (PROVENTIL) 2.5 mg /3 mL (0.083 %) nebulizer solution Take 3 mL (2.5 mg total) by nebulization every 6 (six) hours as needed for wheezing. 90 vial 11     amitriptyline (ELAVIL) 25 MG tablet Take 1 tablet (25 mg total) by mouth at bedtime. 90 tablet 3     aspirin 81 MG EC tablet Take 1 tablet (81 mg total) by mouth daily. 90 tablet 3     atorvastatin (LIPITOR) 20 MG tablet Take 1 tablet (20 mg total) by mouth daily. 90 tablet 3     buPROPion (WELLBUTRIN XL) 150 MG 24 hr tablet Take 1 tablet (150 mg total) by mouth daily. 30 tablet 3     DULoxetine (CYMBALTA) 30 MG capsule Take 1 capsule (30 mg total) by mouth 2 (two) times a day. 180 capsule 0     fluticasone propionate (FLONASE) 50 mcg/actuation nasal spray 1 spray into each nostril daily. (Patient taking differently: 1 spray into each nostril daily as " needed. ) 16 g 11     gabapentin (NEURONTIN) 100 MG capsule Take 100 mg by mouth 2 (two) times a day. 180 capsule 1     meclizine (ANTIVERT) 25 mg tablet Take 1 tablet (25 mg total) by mouth 4 (four) times a day as needed for dizziness. 90 tablet 3     naproxen sodium (ANAPROX) 550 MG tablet Take 1 tablet (550 mg total) by mouth 2 (two) times a day as needed. 60 tablet 1     nebulizer accessories (ADULT AEROSOL MASK) [facility] USE 1 AS DIRECTED. 1 each 0     polyethylene glycol (GLYCOLAX) 17 gram/dose powder Take 17 g by mouth daily. Use as needed. 255 g 3     TiZANidine (ZANAFLEX) 4 MG capsule Take 1 capsule (4 mg total) by mouth 3 (three) times a day as needed for muscle spasms. 30 capsule 5     VITAMIN D3 25 mcg (1,000 unit) capsule TAKE 1 CAPSULE BY MOUTH EVERY DAY 90 capsule 3     No current facility-administered medications for this visit.      Allergies   Allergen Reactions     Chloroquine Phosphate Other (See Comments)     Blurry vision, itchy skin     Social History     Tobacco Use     Smoking status: Never Smoker     Smokeless tobacco: Never Used   Substance Use Topics     Alcohol use: No     Drug use: No       Review and/or order of clinical lab tests:  Review and/or order of radiology tests:  Review and/or order of medicine tests:  Discussion of test results with performing physician:  Decision to obtain old records and/or obtain history from someone other than the patient:  Review and summarization of old records and/or obtaining history from someone other than the patient and.or discussion of case with another health care provider:  Independent visualization of image, tracing or specimen itself:    Time:      Cj Georges MD

## 2021-06-17 NOTE — PROGRESS NOTES
Office Visit - Follow Up   Isabel Biggs   63 y.o. female    Date of Visit: 4/19/2018    Chief Complaint   Patient presents with     Fatigue        Assessment and Plan   1. Visit for screening mammogram  - Mammo Screening Bilateral; Future    2. Obstructive sleep apnea syndrome  Fatigue from obstructive sleep apnea possible, encourage compliance    3. Palpitations / Postural dizziness with presyncope /weakness /fatigue/flushing  Her spells are somewhat nonspecific but seem to involve palpitations, lightheadedness, weakness, flushing.  We will check an EKG and a Holter monitor as well as an echocardiogram.  She does have beta thalassemia presumably but no evidence of iron overload.  She does not have any exertional chest pain or tightness and no shortness of breath.  Consider cardiac stress test.  She is not hypertensive.  - Electrocardiogram Perform - Clinic  - Echo Complete; Future  - Holter Monitor; Future    5. Beta thalassemia  - Echo Complete; Future  - Holter Monitor; Future    6. Frequent urination  We will make sure she is not hyperglycemic or spilling glucose  - Urinalysis-UC if Indicated    7. Excessive thirst  - Urinalysis-UC if Indicated    8. Prediabetes  - Glucose  - Glycosylated Hemoglobin A1c  - Urinalysis-UC if Indicated    The following high BMI interventions were performed this visit: encouragement to exercise and lifestyle education regarding diet    Return in about 4 weeks (around 5/17/2018) for recheck.     History of Present Illness   This 63 y.o. old woman comes in for evaluation of spell like symptoms.  She states that almost daily now she will have episodes where she feels palpitations she will get hot and sweaty she will feel lightheaded and exhausted.  She feels exhausted than for much of the day.  She is not sure what triggers these.  She has not noticed any double vision.  She does not have any significant neck pain.  No focal weakness just feels completely weak in her body arms and  "legs.  She does have obstructive sleep apnea and has questionable compliance.  She obese.  She has had moderate hyperglycemia with an A1c of 6%.  Her blood pressure has been controlled.  He does have probable beta thalassemia.  There is been no evidence of iron overload however I have asked her to see hematology and she has not yet kept this appointment.    Review of Systems: A comprehensive review of systems was negative except as noted.     Medications, Allergies and Problem List   Reviewed and updated     Physical Exam   General Appearance:   No acute distress    /88 (Patient Site: Left Arm, Patient Position: Sitting, Cuff Size: Adult Regular)  Pulse 91  Ht 5' 2\" (1.575 m)  Wt (!) 230 lb (104.3 kg)  LMP 06/16/1996  SpO2 98%  BMI 42.07 kg/m2    HEENT exam is unremarkable  Neck supple, large  Lymphatic no cervical lymphadenopathy  Cardiovascular regular rate and rhythm no murmur gallop or rub  Pulmonary lungs are clear to auscultation bilaterally  Gastrointestinall abdomen soft nontender nondistended no organomegaly  Neurologic exam is non focal including normal cranial nerves normal strength sensation reflexes in the upper and lower extremity normal coordination  Psychiatric pleasant, no confusion or agitation        Additional Information   Current Outpatient Prescriptions   Medication Sig Dispense Refill     acetaminophen-codeine (TYLENOL #3) 300-30 mg per tablet Take 2 tablets by mouth every 4 (four) hours as needed for pain. 20 tablet 0     albuterol (PROAIR HFA;PROVENTIL HFA;VENTOLIN HFA) 90 mcg/actuation inhaler Inhale 1-2 puffs every 4 (four) hours as needed for wheezing. 1 Inhaler 11     albuterol (PROVENTIL) 2.5 mg /3 mL (0.083 %) nebulizer solution Take 3 mL (2.5 mg total) by nebulization every 4 (four) hours as needed for wheezing. 25 vial 2     buPROPion (WELLBUTRIN XL) 150 MG 24 hr tablet Take 1 tablet (150 mg total) by mouth every morning. 90 tablet 3     nebulizer accessories Kit Provide " one nebulizer and accessories putting tubing and mask 1 kit 0     nebulizer and compressor (VIOS AEROSOL DELIVERY SYSTEM) [employer] Use daily as needed. 1 each 0     polyethylene glycol (GLYCOLAX) 17 gram/dose powder Take 17 g by mouth daily. Use as needed. 255 g 3     No current facility-administered medications for this visit.      Allergies   Allergen Reactions     Chloroquine Phosphate      Social History   Substance Use Topics     Smoking status: Never Smoker     Smokeless tobacco: Never Used     Alcohol use No       Review and/or order of clinical lab tests: See orders  Review and/or order of radiology tests: See orders  Review and/or order of medicine tests: See orders  Discussion of test results with performing physician:  Decision to obtain old records and/or obtain history from someone other than the patient:  Review and summarization of old records and/or obtaining history from someone other than the patient and.or discussion of case with another health care provider:  Independent visualization of image, tracing or specimen itself: Personally reviewed EKG normal sinus rhythm    Time:      Cj Geogres MD
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
## 2021-06-17 NOTE — PATIENT INSTRUCTIONS - HE
An order for physical therapy has been provided today.  Someone will call you to schedule physical therapy or you can call [phone] to schedule physical therapy.  It will be very important for you to do your physical therapy exercises on a regular basis to decrease your pain and prevent future flares of pain.     You can try salon pas or icyhot patches as well.

## 2021-06-17 NOTE — PROGRESS NOTES
Assessment:   Isabel Biggs is a 66 y.o. y.o. female with past medical history significant for obstructive sleep apnea, type 2 diabetes mellitus with polyneuropathy, depression, anxiety, morbid obesity who presents today for follow-up regarding acute (3 weeks) history of right greater than left mid and lower back pain without radicular symptoms.  X-rays thoracic and lumbar spine April 23, 2021 were negative for fracture.  Pain seems largely myofascial in nature.  She is neurologically intact.       Plan:     A shared decision making plan was used.  The patient's values and choices were respected.  The following represents what was discussed and decided upon by the physician assistant and the patient.      1.  DIAGNOSTIC TESTS: I reviewed the  x-rays of the thoracic and lumbar spine from [facility] dated April 20, 2021.  If pain fails to improve over the next 3 weeks I would recommend obtaining MRI scans of the thoracic and lumbar spine for further evaluation.  No changes are made to the patient's medications.      2.  PHYSICAL THERAPY: Entered a referral for physical therapy.    3.  MEDICATIONS:   --Patient will continue the [id number] mg twice daily as needed.  -Patient will continue tizanidine 4 mg three times daily as needed.  -Patient will continue gabapentin 100 mg twice daily.  -Patient will continue Tylenol 1000 mg as needed.  -I offered for the patient to try hydrocodone/acetaminophen.  She declined.  She states that she has had GI bleeding associate with hydrocodone in the past.  -I recommend she tried over-the-counter topical pain relieving patches such as Salonpas or icy hot.    4.  INTERVENTIONS: No interventions were ordered.    5.  PATIENT EDUCATION: Patient is in agreement the above plan.  All questions were answered.    6.  FOLLOW-UP: Patient will follow up with me in 3 weeks.  If she has any questions or concerns in the meantime, she should not hesitate to call.    Subjective:     Isabel Biggs is a  "66 y.o. female who presents today for follow-up regarding a 3-week history of right groin left mid and lower back pain.  Patient denies any injury or event to cause the pain.  It got progressively worse.  She was seen in the ED at [facility] April 20, 2021.  X-rays negative for fracture.  She was put on a muscle relaxer, naproxen, and acetaminophen which were helpful.  She followed with her primary care provider April 30 and he recommended she follow-up with us.  Patient reports that since her ED visit her pain has improved.  She states that it is \"on and off.\"  She states that this pain feels different than the pain she had when I saw her previously in two thousand nineteen.  That pain was in the lower back.  She states this pain is higher up.  Is more of a sharp pain.    Patient complains of right greater than left back pain.  Pain begins between the lower shoulder blades.  Pain extends down both sides of the lower back to the lower lumbar region.  She denies any pain radiating into the buttocks or down the legs.  Denies pain wrapping around her chest/abdomen.  Denies any numbness, tingling, weakness.  Denies loss of bowel or bladder control.  Denies any  fevers.  She rates her pain today as an eight or 9 out of 10.  At its worst it is a 10 out of 10.  At its best it is a 6 out of 10.  Pain is aggravated with twisting, getting out of bed, getting in the car.  Pain is alleviated with sitting and lying down.    Patient is in physical therapy in two thousand nineteen.  She has not had any more recent physical therapy.  She is taking Tylenol 1000 mg as needed.  She takes gabapentin 100 mg twice daily.  She uses naproxen 550 mg twice daily as needed.  She also takes tizanidine 4 mg three times daily as needed.  Medications are somewhat helpful.    Review of Systems:  Negative for numbness/tingling, weakness, loss of bowel/bladder control, inability to urinate, headache, pain much worse at night, trip/stumble/falls medically " swallowing, difficulty with hand skills, fevers, unintentional weight loss.     Objective:   CONSTITUTIONAL:  Vital signs as above.  No acute distress.  The patient is well nourished and well groomed.    PSYCHIATRIC:  The patient is awake, alert, oriented to person, place and time.  The patient is answering questions appropriately with clear speech.  Normal affect.  HEENT: Normocephalic, atraumatic.  Sclera clear.    SKIN:  Skin over the face, posterior torso, bilateral upper and lower extremities is clean, dry, intact without rashes.  VASCULAR: No significant lower extremity edema.  MUSCULOSKELETAL:  Gait is non-antalgic.  The patient is able to heel and toe walk without any difficulty.  Significant tenderness palpation mid to lower thoracic and lumbar paraspinous muscles.  Patient is some hypertonicity right greater than left thoracic paraspinous muscles.  Lumbar flexion moderately restricted.  Lumbar extension moderately restricted.   The patient has 5/5 strength for the bilateral hip flexors, knee flexors/extensors, ankle dorsiflexors/plantar flexors, ankle evertors/invertors.    NEUROLOGICAL: 2+ patellar, 1+ achilles reflexes which are symmetric bilaterally.  No ankle clonus bilaterally.  Sensation to light touch is intact in the bilateral L4, L5, and S1 dermatomes.       RESULTS:    X-ray thoracic and lumbar spine from [facility] dated April 20, 2021 shows no acute fracture.  There is mild rightward mid thoracic convexity curve and mild leftward upper lumbar curve.  There is lower lumbar facet arthropathy and mild interspace narrowing at several lumbar levels.

## 2021-06-18 NOTE — PROGRESS NOTES
PCP would like patient to be removed from maintenance and restarted as active [facility] patient. Needs new RN assessment     Attempt 1-Care Guide called patient.  If this patient is returning our call please transfer to Nancy at ext. [phone].    Next outreach due: 6/12/18

## 2021-06-18 NOTE — PROGRESS NOTES
Attempt 2-Care Guide called patient.  If this patient is returning our call please transfer to Nancy at ext. [phone].    Next outreach due: 6/27/18

## 2021-06-18 NOTE — PROGRESS NOTES
Patient was on maintenance for [facility]. PCP feels that patient has new problems that need to be addressed, stress, pain management, etc.  Appointment scheduled with [facility] RN 7/2 to have a new RN assessment to determine new goals.    Next outreach 7/9/18

## 2021-06-19 NOTE — PROGRESS NOTES
Patient does not have active goals at this time.  Care guide scheduled patient for a new RN assessment on 7/23/18 by phone.     Next outreach due: 7/26/18
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
## 2021-06-19 NOTE — PROGRESS NOTES
"  Office Visit - Follow Up   Isabel Biggs   63 y.o. female    Date of Visit: 8/14/2018    Chief Complaint   Patient presents with     Follow-up     pt states her neck is getting a little better-mid to lower back is giving her pain, looking for PT referral-pt states that she is having a hard time sleeping, pain from her back is keeping her up a lot        Assessment and Plan   1. WILFRIDO (obstructive sleep apnea)  Her machine is not working and I think she would overall feel better if she used CPAP and slept better.  We will have her see the sleep medicine clinic  - Ambulatory referral to Sleep Medicine    2. Neck pain  Continue with pain clinic, start physical therapy  - Ambulatory referral to PT/OT    3. Low back pain  As above  - Ambulatory referral to PT/OT    4. Prediabetes  Check A1c next visit, discussed reduction in calories, carbohydrates, weight, increase physical activity    5. Severe obesity (H)  The following high BMI interventions were performed this visit: encouragement to exercise and lifestyle education regarding diet    Return in about 4 weeks (around 9/11/2018).     History of Present Illness   This 63 y.o. old woman comes in for follow-up.  Overall she has been doing okay.  She has been working with the pain clinic for neck and back pain.  Received an injection.  She is hopeful to get another injection as was helpful.  She is working with a chiropractor.  She is wanted to do some physical therapy for neck and low back pain.  She does have obstructive sleep apnea.  She has not been using her CPAP machine because the device does not seem to be working.  She is not exercising much.  No chest pain or shortness of breath    Review of Systems: A comprehensive review of systems was negative except as noted.     Medications, Allergies and Problem List   Reviewed and updated     Physical Exam   General Appearance:   No acute distress    /76  Pulse 87  Resp 16  Ht 5' 2\" (1.575 m)  Wt (!) 232 lb " (105.2 kg)  LMP 06/16/1996  SpO2 96% Comment: RA  BMI 42.43 kg/m2    HEENT exam is unremarkable  Neck supple no thyromegaly or nodule palpable  Lymphatic no cervical lymphadenopathy  Cardiovascular regular rate and rhythm no murmur gallop or rub  Pulmonary lungs are clear to auscultation bilaterally  Gastrointestinall abdomen soft nontender nondistended no organomegaly  Neurologic exam is non focal  Psychiatric pleasant, no confusion or agitation        Additional Information   Current Outpatient Prescriptions   Medication Sig Dispense Refill     acetaminophen-codeine (TYLENOL #3) 300-30 mg per tablet Take 2 tablets by mouth every 4 (four) hours as needed for pain. 20 tablet 0     albuterol (PROAIR HFA;PROVENTIL HFA;VENTOLIN HFA) 90 mcg/actuation inhaler Inhale 1-2 puffs every 4 (four) hours as needed for wheezing. 1 Inhaler 11     albuterol (PROVENTIL) 2.5 mg /3 mL (0.083 %) nebulizer solution Take 3 mL (2.5 mg total) by nebulization every 4 (four) hours as needed for wheezing. 25 vial 2     buPROPion (WELLBUTRIN XL) 150 MG 24 hr tablet Take 1 tablet (150 mg total) by mouth every morning. 90 tablet 3     nebulizer accessories Kit Provide one nebulizer and accessories putting tubing and mask 1 kit 0     nebulizer and compressor (VIOS AEROSOL DELIVERY SYSTEM) Sunita Use daily as needed. 1 each 0     polyethylene glycol (GLYCOLAX) 17 gram/dose powder Take 17 g by mouth daily. Use as needed. 255 g 3     No current facility-administered medications for this visit.      Allergies   Allergen Reactions     Chloroquine Phosphate      Social History   Substance Use Topics     Smoking status: Never Smoker     Smokeless tobacco: Never Used     Alcohol use No       Review and/or order of clinical lab tests:  Review and/or order of radiology tests:  Review and/or order of medicine tests:  Discussion of test results with performing physician:  Decision to obtain old records and/or obtain history from someone other than the  patient:  Review and summarization of old records and/or obtaining history from someone other than the patient and.or discussion of case with another health care provider:  Independent visualization of image, tracing or specimen itself:    Time:      Cj Georges MD

## 2021-06-19 NOTE — LETTER
Letter by Nancy Batres CHW at      Author: Nancy Batres CHW Service: -- Author Type: --    Filed:  Encounter Date: 10/17/2019 Status: Signed         October 17, 2019            Isabel Bowie  [address]  [place] [address]        Dear Isabel,                                                                 You enrolled with the [facility] Care Coordination Services.  In order for us provide guidance we need to connect on a monthly bases.  We have tried calling you 2 times in the last month and have been unsuccessful in reaching you. Please call me at [phone] at your earliest convenience.  If you reach my voicemail, please leave a message with your daytime telephone number and a date and time that I can return your call.                                                                            PS: Your voicemail is full I am unable to leave you a message    Sincerely,        YUE Cruz                                                                     Clinic Care Coordination                                          [facility]
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
## 2021-06-19 NOTE — LETTER
Letter by Cj Georges MD at      Author: Cj Georegs MD Service: -- Author Type: --    Filed:  Encounter Date: 8/15/2019 Status: (Other)         August 15, 2019     Patient: Isabel Biggs   YOB: 1955   Date of Visit: 8/15/2019       To Whom it May Concern:    Isabel Biggs was seen in my clinic on 8/15/2019.    If you have any questions or concerns, please don't hesitate to call.    Sincerely,         Electronically signed by Cj Georges MD

## 2021-06-19 NOTE — LETTER
Letter by Cj Georges MD at      Author: Cj Georges MD Service: -- Author Type: --    Filed:  Encounter Date: 8/15/2019 Status: (Other)         August 15, 2019     Patient: Isabel Biggs   YOB: 1955   Date of Visit: 8/15/2019       To Whom It May Concern:    It is my medical opinion that Isabel Biggs may return to light duty immediately with the following restrictions: Max 6 hours per shift and 24 hours per week.  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    If you have any questions or concerns, please don't hesitate to call.    Sincerely,        Electronically signed by Cj Georges MD
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
## 2021-06-19 NOTE — LETTER
Letter by Cj Georges MD at      Author: Cj Georges MD Service: -- Author Type: --    Filed:  Encounter Date: 10/11/2019 Status: Signed         October 11, 2019     Patient: Isabel Bowie   YOB: 1955   Date of Visit: 10/11/2019       To Whom It May Concern:    It is my medical opinion that Isabel Bowie remain out of work until we can lift her restrictions.    If you have any questions or concerns, please don't hesitate to call.    Sincerely,        Electronically signed by Cj Georges MD
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
## 2021-06-20 ENCOUNTER — HEALTH MAINTENANCE LETTER (OUTPATIENT)
Age: 66
End: 2021-06-20
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
## 2021-06-20 NOTE — PROGRESS NOTES
Attempt 1-Care Guide called patient.  If this patient is returning our call please transfer to Nancy at ext. [phone]    Next outreach due: 9/13/18

## 2021-06-20 NOTE — LETTER
Letter by Nancy Batres CHW at      Author: Nancy Batres CHW Service: -- Author Type: --    Filed:  Encounter Date: 5/21/2020 Status: (Other)         May 22, 2020            Isabel Biggs  [address] [address]  [place] [address]        Dear Jono Hall enrolled with the [facility] Care Coordination Services.  In order for us to provide guidance we need to connect on a monthly bases.  We have tried calling you 2 times in the last month and have been unsuccessful in reaching you. Please call me at [phone] at your earliest convenience.  If you reach my voicemail, please leave a message with your daytime telephone number and a date and time that I can return your call.                                                                            Sincerely,        YUE Cruz                                                                     Clinic Care Coordination                                          [facility]
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
## 2021-06-20 NOTE — LETTER
Letter by Yumiko Hurtado FNP at      Author: Yumiko Hurtado FNP Service: -- Author Type: --    Filed:  Date of Service:  Status: (Other)         February 25, 2020     Patient: Isabel Biggs   YOB: 1955   Date of Visit: 2/25/2020       To Whom It May Concern:    It is my medical opinion that Isabel Biggs should remain out of work until 4/10/2020. She will return to clinic and I will reassess her ability to return to work.    If you have any questions or concerns, please don't hesitate to call.    Sincerely,        Electronically signed by RHEA Sosa

## 2021-06-20 NOTE — PROGRESS NOTES
Attempt 2-Care Guide called patient.  If this patient is returning our call please transfer to Nancy at ext. [phone]    Next outreach due: 9/24/18

## 2021-06-21 NOTE — PROGRESS NOTES
Patient returned care guides call to discuss options to help her pay off some of her medical bills, care guide scheduled patient to see [facility].    Patient did recently obtain her green card and will be able to apply for citizenship in 5 years.     Next outreach due: 12/11/18

## 2021-06-21 NOTE — PROGRESS NOTES
"  Office Visit - Follow Up   Isabel Biggs   63 y.o. female    Date of Visit: 11/8/2018    Chief Complaint   Patient presents with     Back Pain     follow up     Neck Pain     follow up        Assessment and Plan   1. Chronic midline low back pain without sciatica  Doing well, return to work note given continue with current plan    2. Need for prophylactic vaccination and inoculation against influenza  - Influenza, Recombinant, Inj, Quadrivalent, PF, 18+YRS    3. Prediabetes  - Glycosylated Hemoglobin A1c  - Glucose    4. WILFRIDO (obstructive sleep apnea)  Continue working with sleep medicine as well as dental clinic    5. Screening for hyperlipidemia  - Lipid Cascade    6. Severe obesity (H)  The following high BMI interventions were performed this visit: encouragement to exercise and lifestyle education regarding diet    Return in about 3 months (around 2/8/2019) for recheck.     History of Present Illness   This 63 y.o. old woman comes in for follow-up.  Since I last saw her her neck and back pain have improved significantly.  She is been working with physical therapy, chiropractic and it sounds like facet injections.  She is been working with sleep medicine in a dental clinic and sleep apnea.  She is still trying to figure this out.  She said no chest pain or shortness of breath.  She would like some labs to follow-up on her mildly elevated A1c of 6.4%.  Her mood is been stable.    Review of Systems: A comprehensive review of systems was negative except as noted.     Medications, Allergies and Problem List   Reviewed, reconciled and updated     Physical Exam   General Appearance:   No acute distress    /74 (Patient Site: Left Arm, Patient Position: Sitting, Cuff Size: Adult Regular)  Pulse 86  Ht 5' 2\" (1.575 m)  Wt (!) 253 lb (114.8 kg)  LMP 06/16/1996  SpO2 99%  BMI 46.27 kg/m2    HEENT exam is unremarkable  Neck supple no thyromegaly or nodule palpable  Lymphatic no cervical " lymphadenopathy  Cardiovascular regular rate and rhythm no murmur gallop or rub  Pulmonary lungs are clear to auscultation bilaterally  Gastrointestinall abdomen soft nontender nondistended no organomegaly  Neurologic exam is non focal  Psychiatric pleasant, no confusion or agitation        Additional Information   Current Outpatient Prescriptions   Medication Sig Dispense Refill     acetaminophen-codeine (TYLENOL #3) 300-30 mg per tablet Take 2 tablets by mouth every 4 (four) hours as needed for pain. 20 tablet 0     buPROPion (WELLBUTRIN XL) 150 MG 24 hr tablet Take 1 tablet (150 mg total) by mouth every morning. 90 tablet 3     diazePAM (VALIUM) 2 MG tablet Take 1 tablet (2 mg total) by mouth every 6 (six) hours as needed (Vertigo). 15 tablet 0     meclizine (ANTIVERT) 25 mg tablet Take 1 tablet (25 mg total) by mouth 3 (three) times a day as needed. 30 tablet 0     nebulizer accessories Kit Provide one nebulizer and accessories putting tubing and mask 1 kit 0     nebulizer and compressor (Pluralsight AEROSOL DELIVERY SYSTEM) Sunita Use daily as needed. 1 each 0     polyethylene glycol (GLYCOLAX) 17 gram/dose powder Take 17 g by mouth daily. Use as needed. 255 g 3     VENTOLIN HFA 90 mcg/actuation inhaler INHALE 1 TO 2 PUFFS BY MOUTH EVERY 4 HOURS AS NEEDED FOR WHEEZING 18 g 3     albuterol (PROVENTIL) 2.5 mg /3 mL (0.083 %) nebulizer solution Take 3 mL (2.5 mg total) by nebulization every 4 (four) hours as needed for wheezing. 25 vial 2     aspirin 81 MG EC tablet Take 1 tablet (81 mg total) by mouth daily.  0     atorvastatin (LIPITOR) 20 MG tablet Take 1 tablet (20 mg total) by mouth daily. 90 tablet 3     fluticasone (FLONASE) 50 mcg/actuation nasal spray 1 spray into each nostril daily. 16 g 11     No current facility-administered medications for this visit.      Allergies   Allergen Reactions     Chloroquine Phosphate      Social History   Substance Use Topics     Smoking status: Never Smoker     Smokeless tobacco:  Never Used     Alcohol use No       Review and/or order of clinical lab tests:  Review and/or order of radiology tests:  Review and/or order of medicine tests:  Discussion of test results with performing physician:  Decision to obtain old records and/or obtain history from someone other than the patient:  Review and summarization of old records and/or obtaining history from someone other than the patient and.or discussion of case with another health care provider:  Independent visualization of image, tracing or specimen itself:    Time:      Cj Georges MD

## 2021-06-21 NOTE — PROGRESS NOTES
Attempt 1-Care Guide called patient.  If this patient is returning our call please transfer to Nancy at ext. [phone]    Next outreach due: 11/19/18
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
## 2021-06-22 NOTE — PROGRESS NOTES
Patient has a visit with [facility] SW today, she needed to change it to a phone visit because she is not able to make it to the clinic today.     Next outreach due: 1/2/19

## 2021-06-22 NOTE — PROGRESS NOTES
Patient now has Part D insurance through [email or web address] and is able to get her prescriptions refilled.  Care guide called [facility] and asked that they refill her meds that Dr Georges recently prescribed and patient will call Dr Keene's office to refill her other meds.    Patient feels like she is getting along ok at home at this time with the help that she has.     Next outreach due: 1/31/19

## 2021-06-23 NOTE — TELEPHONE ENCOUNTER
Patient needs her Vit D refilled (she believes its 1000 iu) and also her Glycolax.   Send to [facility] on [address]

## 2021-06-23 NOTE — PROGRESS NOTES
Patient now has Part D Medicare and can get her prescriptions.  She was in need of Vit D and her Glucolax, request sent to PCP for refills.    Next outreach due: 3/5/19

## 2021-06-24 NOTE — PROGRESS NOTES
Office Visit - Follow Up   Isabel Biggs   64 y.o. female    Date of Visit: 2019    Chief Complaint   Patient presents with     Abdominal Pain     Rectal Bleeding        Assessment and Plan   1. Hematochezia  She is almost due for colon cancer screening.  Given her abdominal pain, blood in the stool change in bowel habits and difficult to assess anemia given her thalassemia we will obtain a diagnostic colonoscopy now.  - HM2(CBC w/o Differential)  - Ambulatory referral for Colonoscopy    2. Generalized abdominal pain  As above    3. Beta thalassemia (H)  Recheck labs as above  - HM2(CBC w/o Differential)    4. Wheezing   albuterol (VENTOLIN HFA) 90 mcg/actuation inhaler; INHALE 1 TO 2 PUFFS BY MOUTH EVERY 4 HOURS AS NEEDED FOR WHEEZING  Dispense: 18 g; Refill: 3    5. Severe obesity (H)  See below    6. WILFRIDO (obstructive sleep apnea)  Continue CPAP, weight loss    The following high BMI interventions were performed this visit: encouragement to exercise and lifestyle education regarding diet    Return in about 4 weeks (around 3/19/2019) for recheck.     History of Present Illness   This 64 y.o. old woman comes in for follow-up.  Her sister  suddenly after she fell on the ice.  This is been quite stressful for her.  Concurrent with this she has had generalized abdominal pain and bloating.  She has not lost any weight.  She has had blood mixed with her stool.  She occasionally has difficult to pass stool.  She has had no nausea or vomiting.  No diarrhea.  She does have chronic anemia from thalassemia.  Some worsening of her mood with the death of her sister.  Breathing is generally been stable would like a refill of inhaler.    Review of Systems: A comprehensive review of systems was negative except as noted.     Medications, Allergies and Problem List   Reviewed, reconciled and updated     Physical Exam   General Appearance:   No acute distress    /76 (Patient Site: Left Arm, Patient Position:  "Sitting, Cuff Size: Adult Regular)   Pulse 88   Ht 5' 2\" (1.575 m)   Wt (!) 230 lb (104.3 kg)   LMP 06/16/1996   SpO2 98%   BMI 42.07 kg/m      HEENT exam is unremarkable  Neck supple no thyromegaly or nodule palpable  Lymphatic no cervical lymphadenopathy  Cardiovascular regular rate and rhythm no murmur gallop or rub  Pulmonary lungs are clear to auscultation bilaterally  Gastrointestinall abdomen soft nontender nondistended no organomegaly  Neurologic exam is non focal  Psychiatric pleasant, no confusion or agitation        Additional Information   Current Outpatient Medications   Medication Sig Dispense Refill     albuterol (VENTOLIN HFA) 90 mcg/actuation inhaler INHALE 1 TO 2 PUFFS BY MOUTH EVERY 4 HOURS AS NEEDED FOR WHEEZING 18 g 3     aspirin 81 MG EC tablet Take 1 tablet (81 mg total) by mouth daily.  0     atorvastatin (LIPITOR) 20 MG tablet Take 1 tablet (20 mg total) by mouth daily. 90 tablet 3     buPROPion (WELLBUTRIN XL) 150 MG 24 hr tablet Take 1 tablet (150 mg total) by mouth every morning. 90 tablet 3     cholecalciferol, vitamin D3, (VITAMIN D3) 1,000 unit capsule Take 1 capsule (1,000 Units total) by mouth daily. 90 capsule 3     diazePAM (VALIUM) 2 MG tablet Take 1 tablet (2 mg total) by mouth every 6 (six) hours as needed (Vertigo). 15 tablet 0     fluticasone (FLONASE) 50 mcg/actuation nasal spray 1 spray into each nostril daily. 16 g 11     LORazepam (ATIVAN) 1 MG tablet Take 0.5 tablets (0.5 mg total) by mouth 3 (three) times a day as needed for anxiety. 15 tablet 0     meclizine (ANTIVERT) 25 mg tablet Take 1 tablet (25 mg total) by mouth 3 (three) times a day as needed. 30 tablet 0     nebulizer accessories Kit Provide one nebulizer and accessories putting tubing and mask 1 kit 0     nebulizer and compressor (VIOS AEROSOL DELIVERY SYSTEM) Sunita Use daily as needed. 1 each 0     ondansetron (ZOFRAN) 4 MG tablet Take 1 tablet (4 mg total) by mouth every 6 (six) hours. 12 tablet 0     " polyethylene glycol (GLYCOLAX) 17 gram/dose powder Take 17 g by mouth daily. Use as needed. 255 g 3     albuterol (PROVENTIL) 2.5 mg /3 mL (0.083 %) nebulizer solution Take 3 mL (2.5 mg total) by nebulization every 4 (four) hours as needed for wheezing. 25 vial 2     No current facility-administered medications for this visit.      Allergies   Allergen Reactions     Chloroquine Phosphate      Social History     Tobacco Use     Smoking status: Never Smoker     Smokeless tobacco: Never Used   Substance Use Topics     Alcohol use: No     Drug use: No       Review and/or order of clinical lab tests:  Review and/or order of radiology tests:  Review and/or order of medicine tests:  Discussion of test results with performing physician:  Decision to obtain old records and/or obtain history from someone other than the patient:  Review and summarization of old records and/or obtaining history from someone other than the patient and.or discussion of case with another health care provider:  Independent visualization of image, tracing or specimen itself:    Time:      Cj Georges MD

## 2021-06-24 NOTE — TELEPHONE ENCOUNTER
Refill request not appropriate  Last refill was 11/8/18, 1 nasal spray , 11 refills  Bing Nuñez, RN, [facility] RN Triage/Med Refills

## 2021-06-24 NOTE — PROGRESS NOTES
Patient has not been feeling well, she has had a respiratory illness. She has a follow up appointment with PCP on 3/19    She would like to discuss some memory issues she has been having at her next visit.    She has been getting more medical bills and it is getting more and more confusing.   Discussed seeing the [facility] SW again if she feels the need to in the next few weeks, will discuss at next outreach .    Next outreach due: 4/9/19

## 2021-06-25 ENCOUNTER — COMMUNICATION - HEALTHEAST (OUTPATIENT)
Dept: FAMILY MEDICINE | Facility: CLINIC | Age: 66
End: 2021-06-25

## 2021-06-25 DIAGNOSIS — I10 BENIGN ESSENTIAL HYPERTENSION: ICD-10-CM
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
## 2021-06-25 NOTE — PROGRESS NOTES
"  Office Visit - Follow Up   Isabel Biggs   64 y.o. female    Date of Visit: 3/19/2019    Chief Complaint   Patient presents with     Nasal Congestion     urine     change in color and painful urination.        Assessment and Plan   1. Painful urination  UA unremarkable  - Urinalysis-UC if Indicated    2. Type 2 diabetes mellitus without complication, without long-term current use of insulin (H)  Has been diet controlled, continue aspirin statin, annual diabetic eye exam and excellent diabetic foot care.  Recommend microalbumin today as well.  - Glycosylated Hemoglobin A1c    3. Memory loss  She will bring in test review, offered neurology referral which she declined    4. Beta thalassemia (H)  CASSI globin stable, have recommended hematology evaluation    5. WILFRIDO (obstructive sleep apnea)  Continue CPAP    6. Morbid obesity (H)  The following high BMI interventions were performed this visit: encouragement to exercise and lifestyle education regarding diet    Return in about 4 weeks (around 4/16/2019) for recheck.     History of Present Illness   This 64 y.o. old woman comes in for follow-up.  Overall she been doing okay.  Had what sounds like an influenza-like illness which is now resolved.  Has a very small twinge when she urinates.  No fever chills no abdominal pain.  No change in low back pain.  But she has been doing some occupational physical therapy and there is concern about some memory issues with occupational therapy.  She forgot to bring in the results.  She would like to discuss further evaluation.  Otherwise feeling okay.    Review of Systems: A comprehensive review of systems was negative except as noted.     Medications, Allergies and Problem List   Reviewed, reconciled and updated     Physical Exam   General Appearance:   No acute distress    /74 (Patient Site: Right Arm, Patient Position: Sitting, Cuff Size: Adult Regular)   Pulse 84   Ht 5' 2\" (1.575 m)   Wt (!) 227 lb (103 kg)   LMP " 06/16/1996   SpO2 100%   BMI 41.52 kg/m      HEENT exam is unremarkable  Neck supple no thyromegaly or nodule palpable  Lymphatic no cervical lymphadenopathy  Cardiovascular regular rate and rhythm no murmur gallop or rub  Pulmonary lungs are clear to auscultation bilaterally  Gastrointestinall abdomen soft nontender nondistended no organomegaly  Neurologic exam is non focal  Psychiatric pleasant, no confusion or agitation        Additional Information   Current Outpatient Medications   Medication Sig Dispense Refill     albuterol (VENTOLIN HFA) 90 mcg/actuation inhaler INHALE 1 TO 2 PUFFS BY MOUTH EVERY 4 HOURS AS NEEDED FOR WHEEZING 18 g 3     aspirin 81 MG EC tablet Take 1 tablet (81 mg total) by mouth daily.  0     atorvastatin (LIPITOR) 20 MG tablet Take 1 tablet (20 mg total) by mouth daily. 90 tablet 3     cholecalciferol, vitamin D3, (VITAMIN D3) 1,000 unit capsule Take 1 capsule (1,000 Units total) by mouth daily. 90 capsule 3     fluticasone (FLONASE) 50 mcg/actuation nasal spray 1 spray into each nostril daily. 16 g 11     LORazepam (ATIVAN) 1 MG tablet Take 0.5 tablets (0.5 mg total) by mouth 3 (three) times a day as needed for anxiety. 15 tablet 0     meclizine (ANTIVERT) 25 mg tablet Take 1 tablet (25 mg total) by mouth 3 (three) times a day as needed. 30 tablet 0     nebulizer accessories Kit Provide one nebulizer and accessories putting tubing and mask 1 kit 0     ondansetron (ZOFRAN) 4 MG tablet Take 1 tablet (4 mg total) by mouth every 6 (six) hours. 12 tablet 0     polyethylene glycol (GLYCOLAX) 17 gram/dose powder Take 17 g by mouth daily. Use as needed. 255 g 3     No current facility-administered medications for this visit.      Allergies   Allergen Reactions     Chloroquine Phosphate      Social History     Tobacco Use     Smoking status: Never Smoker     Smokeless tobacco: Never Used   Substance Use Topics     Alcohol use: No     Drug use: No       Review and/or order of clinical lab  tests:  Review and/or order of radiology tests:  Review and/or order of medicine tests:  Discussion of test results with performing physician:  Decision to obtain old records and/or obtain history from someone other than the patient:  Review and summarization of old records and/or obtaining history from someone other than the patient and.or discussion of case with another health care provider:  Independent visualization of image, tracing or specimen itself:    Time:      Cj Georges MD

## 2021-06-25 NOTE — PROGRESS NOTES
[facility] Rehabilitation Discharge Summary  Patient Name: Isabel Biggs  Date: 6/21/2021  Referral Diagnosis: low back pain  Referring provider: Cj Georges MD  Visit Diagnosis:   1. Chronic midline low back pain without sciatica     2. Myofascial pain         Goals:  Pt. will be independent with home exercise program in : 6 weeks    Pt will: be able to demonstrate a SLR of at least 60 deg without increased pain symptoms by 6 weeks.  Pt will: be able to stand for about 30 minutes in order to cook dinner without increased symptoms by 6 weeks.  Pt will: be able to bend forwards towards the floor without increased pain symptoms or difficulty by 6 weeks.      Patient was seen for 3 visits for physical therapy of low back pain from 5/17/21 to 5/27/21 with canceled follow up appointments.   The patient met goals and has demonstrated understanding of and independence in the home program for self-care, and progression to next steps.  She will initiate contact if questions or concerns arise.  The patient reports feeling better and did not wish to schedule further therapy at this time.    Therapy will be discontinued at this time.  The patient will need a new referral to resume physical therapy treatment. Please see below for patient's current status.    Thank you for your referral.  Pepper Gerard, PT, DPT  6/21/2021   8:33 AM      [facility] Daily Progress     Patient Name: Isabel Biggs  Date: 5/27/2021  Visit #: 3/12  Auth: thru 8/17  Referral Diagnosis: low back pain  Referring provider: Cj Georges MD  Visit Diagnosis:     ICD-10-CM    1. Chronic midline low back pain without sciatica  M54.5     G89.29    2. Myofascial pain  M79.18        Assessment:     Today patient returns for 2nd follow up and reports she is feeling much better, moving around more without less pain and more flexibility. Patient will be getting TENS for home use! Patient required Max verbal and  visual cuing for performing HEP correctly. Added lumbar/hip strengthening to HEP today.    From Eval:  Patient is a 66 y.o. female that presents with signs and symptoms consistent with low back pain secondary to possible disc herniation with a strong myofascial component. Patient demonstrates impairments including decreased lumbar joint mobility and flexibility, with poor core stability and postural awareness, including + SLR and SLUMP bilaterally R>L, leading to impaired functional mobility. Patient's functional limitations include bending lifting twisting, stooping and performing daily household chores without increased symptoms or difficulty. Today patient responded well to patient education, therapeutic exercise and TENS unit - patient found relief with treatment today and would benefit from one for home use.      HEP/POC compliance is  good .  Patient demonstrates understanding/independence with home program.  Patient is appropriate to continue with skilled physical therapy intervention, as indicated by initial plan of care.    Goal Status:  Pt. will be independent with home exercise program in : 6 weeks    Pt will: be able to demonstrate a SLR of at least 60 deg without increased pain symptoms by 6 weeks.  Pt will: be able to stand for about 30 minutes in order to cook dinner without increased symptoms by 6 weeks.  Pt will: be able to bend forwards towards the floor without increased pain symptoms or difficulty by 6 weeks.        Plan / Patient Education:     Continue with initial plan of care.  Progress with home program as tolerated.    Plan for next visit: review HEP, TENS unit again if indicated, manual tx if indicated, lumbar strengthening, prone laying and prone press ups, standing hip abd/ext and mini squats, HF and piriformis stretch    Subjective:     Pain Ratin   Patient reporting less pain and more flexibility for her! Sometimes feeling L leg and R low back pains but is getting better.      Functional limitations are described as occurring with:   bending  performing routine daily activities  sleeping  standing for longer periods of time  twisting or turning trunk    Objective:     Palpation: TTP at R>L distal lumbar paraspinals and QL, piriformis  Flexibility: decreased of hamstrings, QL, piriformis    Treatment Today         Exercises:  Exercise #1: hip rolls - 20 reps  Comment #1: supine marching - 10 reps bilat  Exercise #2: supine hip adduction with progression to gluteal squeeze - hold 5 sec x 10 - progress to bridge  Comment #2: sidelying hip abduction and clamshells - 10 reps  Exercise #3: supine SLR - 10 reps        TREATMENT MINUTES COMMENTS   Evaluation     Self-care/ Home management     Manual therapy 10 SL on R, MFR of R>L QL, lumbar paraspinals   Neuromuscular Re-education     Therapeutic Activity     Therapeutic Exercises 15 See above flowsheet  Nustep 5 min   Gait training     Modality__TENS__________  10 + 5 patient sidelying on L - quad set up on thoracolumbar paraspinals               Total 25    Blank areas are intentional and mean the treatment did not include these items.       Pepper Gerard, PT  5/27/2021

## 2021-06-25 NOTE — PROGRESS NOTES
Assessment:   Isabel Biggs is a 66 y.o. y.o. female with past medical history significant for obstructive sleep apnea, type 2 diabetes mellitus with polyneuropathy, depression, anxiety, morbid obesity who presents today for follow-up regarding 6-week history of right greater than left mid and lower back pain without radicular symptoms.  X-rays thoracic and lumbar spine April 23, 2021 were negative for fracture.  Pain seems largely myofascial in nature.  She has had 3 sessions of physical therapy with 50% improvement in her pain.  Pain is now localized to the right lower lumbar region.  Suspect she is symptomatic from lumbar facet arthropathy and/or sacroiliac joint dysfunction.  She has secondary myofascial pain.       Plan:     A shared decision making plan was used.  The patient's values and choices were respected.  The following represents what was discussed and decided upon by the physician assistant and the patient.      1.  DIAGNOSTIC TESTS: I reviewed the  x-rays of the thoracic and lumbar spine from [facility] dated April 20, 2021.  No additional diagnostic tests were ordered.  If pain fails to improve further or flares back up and fails to respond to conservative measures, I would recommend getting an MRI lumbar spine for further evaluation.      2.  PHYSICAL THERAPY: Patient is currently in physical therapy.  She has had 3 sessions of far.  Encouraged her to continue with physical therapy and the home exercises.    3.  MEDICATIONS: No changes made to the patient's medications.  --Patient will continue the [id number] mg twice daily as needed.  -Patient will continue tizanidine 4 mg three times daily as needed.  -Patient will continue gabapentin 100 mg twice daily as needed.  This medication causes dry mouth so she prefers to take it as little as possible.  -Patient will continue Tylenol 1000 mg as needed.      4.  INTERVENTIONS: No interventions were ordered.    5.  PATIENT EDUCATION: Patient is in agreement  the above plan.  All questions were answered.    6.  FOLLOW-UP: Patient will follow up as needed.  If she has questions or concerns, she should not hesitate to call.    Subjective:     Isabel Biggs is a 66 y.o. female who presents today for follow-up regarding a 6-week history of right greater than left mid and lower back pain.  I last saw the patient May 7, 2021.  I referred the patient to physical therapy.  She has had 3 sessions of physical therapy.  Patient reports 50% improvement in her pain.    Patient complains of pain now localized to the right lower back.  She denies any pain radiating down the leg.  Denies any numbness, tingling, weakness down the leg.  She rates her pain today is a 1 out of 10.  At its worst it is a 2 out of 10.  At its best it is a 1 out of 10.  Pain is aggravated with sitting too long or standing too long.  Pain is alleviated with lying down, walking, and taking medications.  She denies any new symptoms that she was last seen.    Patient is currently in physical therapy.  She has had 3 sessions.  She is doing her home exercises.   She is taking Tylenol 1000 mg as needed.  She takes gabapentin 100 mg twice daily as needed but this causes dry mouth.  She uses naproxen 550 mg twice daily as needed.  She also takes tizanidine 4 mg three times daily as needed.  Medications are helpful.    Review of Systems:  Negative for numbness/tingling, weakness, loss of bowel/bladder control, inability to urinate, headache, pain much worse at night, trip/stumble/falls medically swallowing, difficulty with hand skills, fevers, unintentional weight loss.     Objective:   CONSTITUTIONAL:  Vital signs as above.  No acute distress.  The patient is well nourished and well groomed.    PSYCHIATRIC:  The patient is awake, alert, oriented to person, place and time.  The patient is answering questions appropriately with clear speech.  Normal affect.  HEENT: Normocephalic, atraumatic.  Sclera clear.    SKIN:  Skin  over the face, posterior torso, bilateral upper and lower extremities is clean, dry, intact without rashes.  VASCULAR: No significant lower extremity edema.  MUSCULOSKELETAL:  Gait is non-antalgic.  The patient is able to heel and toe walk without any difficulty.  Mild tenderness palpation right lower lumbar paraspinous muscles and right sacroiliac joint.  Hypertonicity right lower lumbar paraspinous muscles.  Lumbar range of motion is within normal limits with flexion and extension.  Patient does report increased pain at end lumbar flexion and extension.  The patient has 5/5 strength for the bilateral hip flexors, knee flexors/extensors, ankle dorsiflexors/plantar flexors, ankle evertors/invertors.    NEUROLOGICAL: 2+ patellar, 1+ achilles reflexes which are symmetric bilaterally.  No ankle clonus bilaterally.  Sensation to light touch is intact in the bilateral L4, L5, and S1 dermatomes.       RESULTS:    X-ray thoracic and lumbar spine from [facility] dated April 20, 2021 shows no acute fracture.  There is mild rightward mid thoracic convexity curve and mild leftward upper lumbar curve.  There is lower lumbar facet arthropathy and mild interspace narrowing at several lumbar levels.

## 2021-06-25 NOTE — PROGRESS NOTES
"Progress Notes by Carol Huntley PA-C at 11/22/2017  8:45 AM     Author: Carol Huntley PA-C Service: -- Author Type: Physician Assistant    Filed: 11/28/2017 10:26 AM Date of Service: 11/22/2017  8:45 AM Status: Signed    : Carol Huntley PA-C (Physician Assistant)       [facility]  New Patient Consultation        HPI  Isabel Bowie 62 y.o. is here today, sent to me by  to discuss   Chief Complaint   Patient presents with   ? Hip Pain     Left   ? Back Pain     Lower, radiating from hip     Comorbid conditions include obesity, depression.    Pain Story:     She reports pain in her left hip and lower back after fall on the ice on 12/24/16.  She states this occurred at a gas station and litigation is pending.  Reviewed ED visit from 12/24/16 Dr. Luis Barron: \"ED COURSE & MEDICAL DECISION MAKING    Findings consistent with multiple contusions after a fall.  Patient discharged home with instructions for ice, ibuprofen, Percocet and we will start her on hydrochlorothiazide once daily until she can see her doctor and have her blood pressure rechecked.  Patient aware the findings and plan and agrees.     FINAL IMPRESSION    Hypertension.  Multiple contusions status post fall\"   She states she was seen by PCP and had left hip x-ray (negative) and left hip CT scan (negative).  She denies lumbar imaging.  She states she was seen by orthopedic specialist (unsure name or organization) and that they could do nothing for her and to see a pain provider.    She states lower back pain is radiating from her hip.  She states she feels burning and throbbing in left hip.  She states prolonged sitting or standing over 25-30 minutes feels pain to walk.  Left buttock pain which stops at her posterior thigh.  She denies numbness, tingling, weakness in bilateral lower extremities.  She states sleeping at night is difficult as she cannot lay on her stomach and tries to lay on back or side and " turning left leg painful. She denies bowel or bladder incontinence, fever, chills, lethargy, weight loss.  She denies cervical or thoracic spine pain, other joint pains.    She states her  is helping her put on underwear, pants, stockings as bending is difficult.  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 She is driving and tries to do some cooking as long as not standing long.  Her mother sometimes helps with family.      She reports pain is moderate to severe and interferes with daily activities, sleep, recreational activities, and concentration.  Her pain is constant sharp, shooting, localized, throbbing, pressure, and tender in the left hip and left lower back.      Alleviating factors: Medications for brief period  Aggravating factors: worse with standing, sitting, walking, laying down, twisting, squatting, bending, stretching, going up and down stairs, and getting out of car or bed.   Pain level today: On a scale of 1-10, the patient rates their pain at a 8.  Pain ratings vary between 7/10 and 9/10.  Function Ratin meaning pain affects the following:     3 - Enjoy     4 - Work, Enjoy     5 - Active, Mood, Work, Enjoy     6 - Sleep, Active, Mood, Work, Enjoy     7 - Walk, Sleep Active, Mood, Work, Enjoy     8 - Relate, Walk, Sleep, Active, Mood, Work, Enjoy  Associated Symptoms: pain interferes with daily activities, sleep, recreational activities, concentration  Previous Diagnostics & Treatments for Pain:  Surgery - Denies   Injections - Denies  Imaging - x-ray, CT scan  Physical Therapy - yes at [facility], didn't help.   Last visit on 17 reviewed today:  Assessment:   Good compliance with HEP  Overall limited progress towards goals since starting therapy due to left hip pain. Limited improvement in lumbar and hip ROM due to pain and overall limited tolerance to progression of exercises due to pain with only  temporary pain relief with gentle manual therapy and IFC. At this time, it is best for the patient to follow up with her referral to orthopedic surgery for further evaluation of the left hip.      Goals:  Ongoing  Pt. will demonstrate/verbalize independence in self-management of condition in : 6 weeks  Pt. will be independent with home exercise program in : 6 weeks; MET  Pt. will be able to walk : 30 minutes;on even surfaces;on uneven/inclined surfaces;with less pain;with less difficulty;for household mobility;for community mobility;for work;in 6 weeks; PARTIALLY MET- able to walk 15-20 minutes without pain  Patient will twist/turn : in bed;with less pain;with less difficulty;for bed mobilitiy;in 6 weeks; NOT MET  Patient will decrease : LINETTE score;by _ points;for improved quality of function;for improved quality of life;in 6 weeks  by ___ points: 30%, NOT MET     Chiropractor/Acupuncture - yes, didn't help  Massage - Denies  TENS or bracing - Denies  Pain Psychology or biofeedback - Denies  Other - heat/ice, not very helpful    Past Medical History:   Diagnosis Date   ? Anemia    ? Avitaminosis D    ? Bronchitis    ? Headache    ? Obesity    ? Peripheral neuropathy    ? Rotator cuff tendonitis    ? Sleep apnea      Past Surgical History:   Procedure Laterality Date   ? HYSTERECTOMY  1996   ? OOPHORECTOMY  1996   ? TX TOTAL ABDOM HYSTERECTOMY      Description: Total Abdominal Hysterectomy;  Proc Date: 01/01/1996;  Comments: with bso, for ovarian cyst       Social  Family History   Problem Relation Age of Onset   ? Breast cancer Maternal Aunt 70   ? No Medical Problems Mother    ? No Medical Problems Father    ? No Medical Problems Sister    ? No Medical Problems Daughter    ? No Medical Problems Maternal Grandmother    ? No Medical Problems Maternal Grandfather    ? No Medical Problems Paternal Grandmother    ? No Medical Problems Paternal Grandfather    ? No Medical Problems Paternal Aunt    ? BRCA 1/2 Neg Hx    ?  Cancer Neg Hx    ? Colon cancer Neg Hx    ? Endometrial cancer Neg Hx    ? Ovarian cancer Neg Hx      History   Smoking Status   ? Never Smoker   Smokeless Tobacco   ? Never Used     History   Alcohol Use No     History   Drug Use No     History   Sexual Activity   ? Sexual activity: Not on file       Chemical Dependency History: Patient Denies tobacco use, alcohol use, illicit substance use including THC.  Denies any chemical dependency evaluation or treatment in the past.  Denies any legal issues related to substance use.    Psychiatric History:  Depression, taking Wellbutrin describes her mood as good.  States pain limits her enjoyment of activities. Patient reports no current psychiatrist or psychologist, used to see [facility].  Denies any suicidal ideation.  Denies any hospitalizations for mental illness.    Pertinent Pain Medications:  She currently takes Meloxicam 7.5 mg daily helpful but makes her feel drowsy.   She reports constipation treated with Miralax.    Previously tried medications:    NSAIDs: Naproxen, Ibuprofen, prednisone, indomethacin    Acetaminophen: Tylenol    Antidepressants: Denies cymbalta, savella    Gabapentinoids: Denies Gabapentin, Lyrica    Opioids: Percocet 12/24/16 status post fall    Topicals: Denies    Supplements: Denies    Muscle Relaxants: Denies      Current Outpatient Prescriptions:   ?  albuterol (PROAIR HFA;PROVENTIL HFA;VENTOLIN HFA) 90 mcg/actuation inhaler, Inhale 1-2 puffs every 4 (four) hours as needed for wheezing., Disp: 1 Inhaler, Rfl: 11  ?  albuterol (PROVENTIL) 2.5 mg /3 mL (0.083 %) nebulizer solution, Take 3 mL (2.5 mg total) by nebulization every 4 (four) hours as needed for wheezing., Disp: 25 vial, Rfl: 2  ?  buPROPion (WELLBUTRIN XL) 150 MG 24 hr tablet, Take 1 tablet (150 mg total) by mouth every morning., Disp: 90 tablet, Rfl: 3  ?  fluticasone (FLONASE) 50 mcg/actuation nasal spray, 2 sprays into each nostril daily., Disp: 16 g, Rfl: 11  ?   "meloxicam (MOBIC) 7.5 MG tablet, Take 7.5 mg by mouth daily as needed for pain., Disp: , Rfl:   ?  nebulizer accessories Kit, Provide one nebulizer and accessories putting tubing and mask, Disp: 1 kit, Rfl: 0  ?  nebulizer and compressor (VIOS AEROSOL DELIVERY SYSTEM) Sunita, Use daily as needed., Disp: 1 each, Rfl: 0  ?  polyethylene glycol (GLYCOLAX) 17 gram/dose powder, Take 17 g by mouth daily. Use as needed., Disp: 255 g, Rfl: 3  ?  triamcinolone (KENALOG) 0.1 % cream, Apply topically 2 (two) times a day. (Patient taking differently: Apply topically 2 (two) times a day as needed (to rash on arms as needed). to rash on arms as needed), Disp: 80 g, Rfl: 2      Review of Systems:  12 point systems were reviewed with pt as documented on pt health form of 11/22/2017. ROS was positive for sleep disturbance due to pain, left hip pain, low back pain, depression the rest of the systems were pertinent negative.  (General, Cardiac, Pulmonary, Integumentary, Musculoskeletal, Neurological,GI, , GYN, Endocrine, Hematological and Psychological)    Exam  Vitals:    11/22/17 0902   BP: 136/69   Patient Site: Right Arm   Patient Position: Sitting   Pulse: 76   Resp: 18   Weight: 221 lb (100.2 kg)   Height: 5' 2\" (1.575 m)       Constitutional-General:  Well nourished, well developed, well groomed, healthy appearing individual.  Weight is obese, appears stated age and presents alone.  Psych-Mental Status: A & O in no acute distress. Speech is fluent. Thought process normal. Recent and remote memory are intact.  Attention span and concentration are normal. Displays appropriate mood and affect.   H,E,N,T- Symmetrical, Eyes- Perrla, Nares- patents bilaterally, throat- trachea is midline, airway is patent, unlabored respiratory effort.  Lymph-cervical lymph system (anterior and posterior) without palpable nodules or tenderness throughout. Supraclavicular chain without palpable nodules or tenderness throughout. "   Musckuloskeletal  Gait:  Gait is abnormal, left antalgia.  Ambulates independently.  Difficult getting from sitting to standing position due to pain.  Gross Motor: Unable to tip-toe walking, heel walking.  Able to tandem walk for balance.  Strength:  Bilateral upper and lower extremity strength is normal and symmetric 5/5. No atrophy or tone abnormalities noted.   Sensation:  No loss of sensation noted to light touch in both upper and lower extremities. No dermatomal abnormal distributions noted.  Spine ROM:  Lumbar ROM flexion WNL without pain, extension limited due to pain, lateral bending right and left normal without pain.  Provocative Maneuvers:  SLR test was negative bilaterally.  Negative Dudley-Tina test bilaterally.  Spine Palpation:  Inspection and palpatory examination of the lumbar spine reveals tenderness to palpation L4-S1, left SI joint.  Denies pain to palpation right SI joint.  No pain to palpation thoracic spine.    Joint Exam: No visual abnormalities to left hip joint, no deformity, swelling, erythema, ecchymosis present.  Pain to palpation left hip, denies pain to palpation right hip.  Left hip AROM limited with flexion, extension, abduction/adduction, internal and external rotation.  Audibly groans with each movement, holds hand over left hip with movement.   Normal AROM right hip in all planes.  Normal bilateral knee AROM in all planes.  Neuro:  Bilateral upper and lower extremity coordination and muscle stretch reflexes are physiologic and symmetric 2/4.  Babinski responses are downgoing.  No clonus bilaterally. Negative hoffmans sign bilaterally.   Integumentary: no rashes or breaks in the skin, no open wounds. Exposed skin is clean, dry, intact to inspection and palpation.    Lab:  None today.      Imaging:  XR HIP RIGHT 2 OR MORE VWS  12/24/2016 3:09 PM     INDICATION: Hip pain.      COMPARISON: None.     FINDINGS: No fractures or dislocations to the pelvis or right hip. Mild primary  osteoarthritis both hips.     MRI Left hip and Pelvis 01/18/17      :  Dated 11/22/2017 was reviewed with the patient and no recent opioid fills.    Assessment :  Patient presents with left hip pain status post fall 12/24/16, negative left hip x-ray and MRI. She has not improved with PT.  She is reporting left lower back pain, and we review that lumbar spine changes may cause hip pain as she is describing.  Further work up needed to assess lumbar spine with x-ray ordered today, will review if MRI needed to further assess any findings but currently patient not experiencing any red flag or neurologic findings.  We discussed possible benefits and detriments of physical therapy, chiropractic care, medication management, behavorial therapy, anti-inflammatory diet and other alternative treatments.  We also discussed future possible treatment options in a stepwise fashion, including interventional pain procedures and injections.      Interventions: If lumbar spine x-rays are negative, consider trigger point to surrounding left hip muscles to ease pain.  If lumbar spine x-rays demonstrate facet changes, will review facet or LMBB procedures in follow-up.  Physical Medicine and rehabilitations:  Patient participated at [facility] without benefit.  Discuss role of warm pool PT and patient is interested.  Goal to have longer standing/walking tolerance, some weight reduction, and assist with chronic pain.  Non Opioid Management: We discussed medication options to manage the patient. After discussion of potential adverse effects versus benefits, the patient agreed to proceed with a trial of increasing meloxicam to max daily dose 15 mg with food, she is tolerating 7.5 mg without much benefit.  May also consider other medication such as Cymbalta for joint/spine pain dual benefit with depression management.  Also recommend topical compound for pain relief as her pain is fairly localized if she can  afford.  Behavioral Health: We discussed the body mind process of pain. We discussed the importance of cognitive behavioral therapy to help patient to control pain, address any psychiatric condition that may contribute to patient's experience of pain. The patient agreed to be referred for a behavioral health diagnostic assessment.  Her expectations to be pain free need to be evaluated further.    Patient set goals today in the office to achieve with the pain centers help. They are:    1. To become pain free  2. Go to the mall, go to the gym, etc.    Plan recommended today:    Follow up in 3-4 weeks with Carol AVILA    Continue your current regimen of alleviating factors as reviewed today    Behavioral Therapy- recommended to schedule with Janey or Fredy here to help with mood changes associated with chronic pain. Discussed realistic expectations about pain management and goal setting.  Discussed goal to improve function and abilities at work and home.  Intake packet must be completed prior to scheduling with them.    Physical and Occupational Therapy- Recommend warm pool therapy at [facility].  Referral given today, call to make an appointment    Injections- will review in more detail after lumbar imaging completed.      Non-opoid medical management includes- recommend increasing meloxicam to 15 mg daily, monitoring for side effects, take with food.  Also may start Cymbalta to help with chronic joint/back pain and also compounded cream to apply to painful area - fill at [facility].      Education was provided today in regards to the patient's specific diagnosis    Sign a release for [facility] records    Schedule lumbar x-rays at [facility] facility, will review results next visit     Diagnosis  1. Chronic pain syndrome    2. Chronic midline low back pain without sciatica    3. Hip pain, chronic, left      The patient agrees to the plan and has no further questions, if questions arise the patient knows to call  [phone].     Thank you for this consult and opportunity to assist with this patients care.    Carol Huntley PA-C  [facility]  [address]  [place] [address]  Ph: [phone]  Fax: [phone]

## 2021-06-26 NOTE — PROGRESS NOTES
Progress Notes by Carey Connors PT at 10/8/2018  2:30 PM     Author: Carey Connors PT Service: -- Author Type: Physical Therapist    Filed: 10/8/2018  4:41 PM Encounter Date: 10/8/2018 Status: Signed    : Carey Connors PT (Physical Therapist)       [facility] Daily Progress     Patient Name: Isabel Biggs  Date: 10/8/2018  Visit #: 5/12  PTA visit #:  2  Date of evaluation: 8/27/2018  Referral Diagnosis: Neck pain; low back pain     Referring provider: Cj Georges MD      Precautions / Restrictions : adjustment disorder; prediabetic; peripheral neuropathy     Visit Diagnosis:     ICD-10-CM    1. Neck pain M54.2    2. Cervical radiculitis M54.12    3. Stiffness of multiple joints M25.60    4. Acute bilateral low back pain, with sciatica presence unspecified M54.5    5. Generalized muscle weakness M62.81             Assessment:   Pt showing improvement in pain levels and function.  Improved stability with transitional movements.  Needs work on strength and ROM through the hips and low back.  Continued PT appropriate.   HEP/POC compliance is  good .  Patient demonstrates understanding/independence with home program.  Patient is appropriate to continue with skilled physical therapy intervention, as indicated by initial plan of care.    Goal Status:  Pt. will demonstrate/verbalize independence in self-management of condition in : 12 weeks;Progressing toward  Pt. will be independent with home exercise program in : 12 weeks;Progressing toward  Pt. will report decreased intensity, frequency of : Pain;Headache;in 12 weeks;Met  Pt. will have improved quality of sleep: waking less times/night;in 12 weeks;Met  Pt. will bend: to dress;with less pain;with less difficulty;in 12 weeks;Met  Patient will transfer: sit/stand;supine/sit;with less pain;with less difficulty;in 12 weeks;Met  Patient Turn Head: with partial ROM;with less difficulty;in 12 weeks;Progressing toward  Patient will  look up / down: with partial ROM;with less pain;with less difficulty;in 12 weeks;Progressing toward      Plan for next visits:   Continue PT x 4 more visits  Advance exercises Start in the gym: (heel raises, hip ROM, step ups)  Continue MT as needed            Subjective:    Still reports she is improving.   Most troublesome area today  is intermittent low back and left hip 2/10.  It bothers with stairs and sitting. 2/10 in right shoulder and 0/10 in neck.  Doing exercises regularly.    Patient Outcome Measures: NDI  Initial (8/27/2018) SCORE: 72%  10/8/2018: Neck Disability Score in %: 44%    Objective:     Lumbar ROM:     Date: 8/27/2018 9/24/2018  10/8/2018    *Indicate scale  MAL= Maximally limited  MOL=Moderately limited  MIL= Minimally limited  - = no pain  += minimal pain  ++= moderate pain  +++= maximal pain AROM AROM AROM   Lumbar Flexion MOL++  Gowers sign with return WNL+   Able to return without support WNL+    Lumbar Extension MIL+++ MIL+  WNL-     Right Left Right Left Right Left   Lumbar Sidebending MOL++ MIL+ MIL+  MIL+  MIL-  MIL-   Lumbar Rotation MIL++ MIL+     MIL+  MIL-       Cervical ROM  Date: 8/27/2018 9/24/2018  10/8/2018    *Indicate scale AROM AROM AROM   Cervical Flexion MOL+ WNL+  WNL+    Cervical Extension MIL++  MIL- WNL++      Right Left Right Left Right Left   Cervical Sidebending MIL- MIL+     MIL-  MIL-    Cervical Rotation MOL+ MOL++ MiL+  MIL-  MIL-  MIL+    Cervical Protraction MIL+   MIL+    Cervical Retraction MIL+   MIL+    Thoracic Flexion MIL++   MIL-    Thoracic Extension MOL++   MOL+       Shoulder/Elbow ROM  Date: 8/27/2018 9/24/2018  10/8/2018    Shoulder and Elbow ROM ( ) AROM in degrees AROM in degrees AROM in degrees    Right Left Right Left Right Left   Shoulder Flexion (0-180 ) 130 150  130 150  155  160    Shoulder Abduction (0-180 ) 90 130     125  130    Shoulder Extension (0-60 )               Shoulder ER (0-90 ) 40 50      50 50    Shoulder IR (0-70 )                Shoulder IR/EXT Sacrum T10     Sacrum  T10      PROM in degrees PROM in degrees PROM in degrees     Right Left Right Left Right Left   Shoulder Flexion (0-180 ) 150             Shoulder Abduction (0-180 )               Shoulder Extension (0-60 )               Shoulder ER (0-90 )  50             Shoulder IR (0-70 )               Elbow Flexion (150 )               Elbow Extension (0 )                     STRENGTH:  Upper Extremity strength:  Painful with resistance on the right in the shoulder  Lower Extremity Strength:   Quads weak bilaterally; DF/PF weak but equal  Abdominals 3-4/5  4/5              Palpation:  Tender SI Bilaterally; lumbar spine, upper thoracic spine, cervical paraspinals; B OA      Treatment Today      TREATMENT MINUTES COMMENTS   Evaluation     Self-care/ Home management     Manual therapy 15  Prone: STM/MFR to lower back ; Grade I-II lumbar mobs.  Supine AAROM to hips   Neuromuscular Re-education 0 Worked on activation of TrA with transfers, standing and walking.   Therapeutic Activity     Therapeutic Exercises 10  Demo'd exercises    Reassessed ROM  Exercise #1: LTR  Comment #1: HEP  Exercise #2: Seated lumbar flexion  Comment #2: HEP  Exercise #3: Seated shoulder rolls  Comment #3: HEP  Exercise #4: Home traction  Comment #4: omit  Exercise #5: Supine abd sets  Comment #5: HEP  Exercise #6: Neck stretch  Comment #6: HEP  Exercise #7: Wall slide for shoulders  Comment #7: HEP  See flowsheet for date performed.   Gait training     Modality__________________                Total 25    Blank areas are intentional and mean the treatment did not include these items.     Carey Tolbert PT, CLT  10/8/2018

## 2021-06-26 NOTE — PROGRESS NOTES
Progress Notes by Carey Connors PT at 9/17/2018  3:00 PM     Author: Carey Connors PT Service: -- Author Type: Physical Therapist    Filed: 9/17/2018  4:41 PM Encounter Date: 9/17/2018 Status: Signed    : Carey Connors PT (Physical Therapist)       [facility] Daily Progress     Patient Name: Isabel Biggs  Date: 9/17/2018  Visit #: 3/12  PTA visit #:  2  Date of evaluation: 8/27/2018  Referral Diagnosis: Neck pain; low back pain     Referring provider: Cj Georges MD      Precautions / Restrictions : adjustment disorder; prediabetic; peripheral neuropathy     Visit Diagnosis:     ICD-10-CM    1. Neck pain M54.2    2. Cervical radiculitis M54.12    3. Acute bilateral low back pain, with sciatica presence unspecified M54.5    4. Stiffness of multiple joints M25.60    5. Left hip pain M25.552             Assessment:   Pt has tenderness anterior left and right shoulder; limited ROM right> left shoulder.  Low back ROM improving.  HEP/POC compliance is  good .  Patient demonstrates understanding/independence with home program.  Patient is appropriate to continue with skilled physical therapy intervention, as indicated by initial plan of care.    Goal Status:  Pt. will demonstrate/verbalize independence in self-management of condition in : 12 weeks;Progressing toward  Pt. will be independent with home exercise program in : 12 weeks;Progressing toward  Pt. will report decreased intensity, frequency of : Pain;Headache;in 12 weeks;Progressing toward  Pt. will have improved quality of sleep: waking less times/night;in 12 weeks;Progressing toward  Pt. will bend: to dress;with less pain;with less difficulty;in 12 weeks;Progressing toward  Patient will transfer: sit/stand;supine/sit;with less pain;with less difficulty;in 12 weeks;Progressing toward  Patient Turn Head: with partial ROM;with less difficulty;in 12 weeks;Progressing toward  Patient will look up / down: with partial  ROM;with less pain;with less difficulty;in 12 weeks;Progressing toward      Plan for next visits:   Continue MT (shoulders right/left; neck)  Advance exercises (heel raises, hip ROM)  Check goals/ROM  Check use of TrA with movement    Subjective:   Improving pain and dizziness.  Shoulders bothering with reaching.    Pain Rating: 3/10 in back and neck and in shoulder.  Exercise getting easier.        Patient Outcome Measures: NDI  Initial (8/27/2018) SCORE: 72%    Objective:     Lumbar ROM:     Date: 8/27/2018       *Indicate scale  MAL= Maximally limited  MOL=Moderately limited  MIL= Minimally limited  - = no pain  += minimal pain  ++= moderate pain  +++= maximal pain AROM AROM AROM   Lumbar Flexion MOL++  Gowers sign with return       Lumbar Extension MIL+++         Right Left Right Left Right Left   Lumbar Sidebending MOL++ MIL+           Lumbar Rotation MIL++ MIL+              Cervical ROM  Date: 8/27/2018       *Indicate scale AROM AROM AROM   Cervical Flexion MOL+       Cervical Extension MIL++         Right Left Right Left Right Left   Cervical Sidebending MIL- MIL+           Cervical Rotation MOL+ MOL++           Cervical Protraction MIL+       Cervical Retraction MIL+       Thoracic Flexion MIL++       Thoracic Extension MOL++          Shoulder/Elbow ROM  Date: 8/27/2018       Shoulder and Elbow ROM ( ) AROM in degrees AROM in degrees AROM in degrees    Right Left Right Left Right Left   Shoulder Flexion (0-180 ) 130 150           Shoulder Abduction (0-180 ) 90 130           Shoulder Extension (0-60 )               Shoulder ER (0-90 ) 40 50           Shoulder IR (0-70 )               Shoulder IR/EXT Sacrum T10             PROM in degrees PROM in degrees PROM in degrees     Right Left Right Left Right Left   Shoulder Flexion (0-180 ) 150             Shoulder Abduction (0-180 )               Shoulder Extension (0-60 )               Shoulder ER (0-90 )  50             Shoulder IR (0-70 )               Elbow  Flexion (150 )               Elbow Extension (0 )                     STRENGTH:  Upper Extremity strength:  Painful with resistance on the right in the shoulder  Lower Extremity Strength:   Quads weak bilaterally; DF/PF weak but equal  Abdominals 3-4/5                Palpation:  Tender SI Bilaterally; lumbar spine, upper thoracic spine, cervical paraspinals; B OA      Treatment Today      TREATMENT MINUTES COMMENTS   Evaluation     Self-care/ Home management     Manual therapy 12 CFM right and left anterior shoulder; distraction; MET in PNF patterns Bilateral shoulders;STM/MFR to upper back and neck   Neuromuscular Re-education 3 Worked on activation of TrA with transfers, standing and walking.   Therapeutic Activity     Therapeutic Exercises 10  Demo'd exercises  Added  Shoulder exercises     Exercise #1: LTR  Comment #1: HEP  Exercise #2: Seated lumbar flexion  Comment #2: HEP  Exercise #3: Seated shoulder rolls  Comment #3: HEP  Exercise #4: Home traction  Comment #4: omit  Exercise #5: Supine abd sets  Comment #5: HEP  See flowsheet for date performed.   Gait training     Modality__________________                Total 25    Blank areas are intentional and mean the treatment did not include these items.     Carey Tolbert PT, CLT  9/17/2018

## 2021-06-26 NOTE — PROGRESS NOTES
Progress Notes by Carey Connors PT at 10/31/2018 10:30 AM     Author: Carey Connors PT Service: -- Author Type: Physical Therapist    Filed: 10/31/2018 11:14 AM Encounter Date: 10/31/2018 Status: Signed    : Carey Connors PT (Physical Therapist)       [facility] Daily Progress     Patient Name: Isabel Biggs  Date: 10/31/2018  Visit #: 8/12  PTA visit #:  2  Date of evaluation: 8/27/2018  Referral Diagnosis: Neck pain; low back pain     Referring provider: Cj Georges MD      Precautions / Restrictions : adjustment disorder; prediabetic; peripheral neuropathy     Visit Diagnosis:     ICD-10-CM    1. Neck pain M54.2    2. Cervical radiculitis M54.12    3. Stiffness of multiple joints M25.60    4. Acute bilateral low back pain, with sciatica presence unspecified M54.5    5. Generalized muscle weakness M62.81             Assessment:    Pain is down and pt is motivated to keep moving and get stronger.   HEP/POC compliance is  good .  Patient demonstrates understanding/independence with home program.  Patient is appropriate to continue with skilled physical therapy intervention, as indicated by initial plan of care.    Goal Status:  Pt. will demonstrate/verbalize independence in self-management of condition in : 12 weeks;Progressing toward  Pt. will be independent with home exercise program in : 12 weeks;Progressing toward  Pt. will report decreased intensity, frequency of : Pain;Headache;in 12 weeks;Met  Pt. will have improved quality of sleep: waking less times/night;in 12 weeks;Met  Pt. will bend: to dress;with less pain;with less difficulty;in 12 weeks;Met  Patient will transfer: sit/stand;supine/sit;with less pain;with less difficulty;in 12 weeks;Met  Patient Turn Head: with partial ROM;with less difficulty;in 12 weeks;Met  Patient will look up / down: with partial ROM;with less pain;with less difficulty;in 12 weeks;Met      Plan for next visits:   Continue PT x 1 more  visit  Next steps; post d/c POC  Try stairs  Ask about left hip tenderness            Subjective:   Left hip and low back continue to be painful 2/10.   Feels it most with stairs and transfers.   Shoulder and neck doing well.  Neck and shoulder pain 1/10  Left hip and low back 2/10       Patient Outcome Measures: NDI  Initial (8/27/2018) SCORE: 72%  10/8/2018: Neck Disability Score in %: 44%    Objective:     Lumbar ROM:     Date: 8/27/2018 9/24/2018  10/8/2018    *Indicate scale  MAL= Maximally limited  MOL=Moderately limited  MIL= Minimally limited  - = no pain  += minimal pain  ++= moderate pain  +++= maximal pain AROM AROM AROM   Lumbar Flexion MOL++  Gowers sign with return WNL+   Able to return without support WNL+    Lumbar Extension MIL+++ MIL+  WNL-     Right Left Right Left Right Left   Lumbar Sidebending MOL++ MIL+ MIL+  MIL+  MIL-  MIL-   Lumbar Rotation MIL++ MIL+     MIL+  MIL-       Cervical ROM  Date: 8/27/2018 9/24/2018  10/8/2018    *Indicate scale AROM AROM AROM   Cervical Flexion MOL+ WNL+  WNL+    Cervical Extension MIL++  MIL- WNL++      Right Left Right Left Right Left   Cervical Sidebending MIL- MIL+     MIL-  MIL-    Cervical Rotation MOL+ MOL++ MiL+  MIL-  MIL-  MIL+    Cervical Protraction MIL+   MIL+    Cervical Retraction MIL+   MIL+    Thoracic Flexion MIL++   MIL-    Thoracic Extension MOL++   MOL+       Shoulder/Elbow ROM: 10/24/2018:  WNL and equal bilaterally      Date: 8/27/2018 9/24/2018  10/8/2018    Shoulder and Elbow ROM ( ) AROM in degrees AROM in degrees AROM in degrees    Right Left Right Left Right Left   Shoulder Flexion (0-180 ) 130 150  130 150  155  160    Shoulder Abduction (0-180 ) 90 130     125  130    Shoulder Extension (0-60 )               Shoulder ER (0-90 ) 40 50      50 50    Shoulder IR (0-70 )               Shoulder IR/EXT Sacrum T10     Sacrum T10      PROM in degrees PROM in degrees PROM in degrees     Right Left Right Left Right Left   Shoulder Flexion  (0-180 ) 150             Shoulder Abduction (0-180 )               Shoulder Extension (0-60 )               Shoulder ER (0-90 )  50             Shoulder IR (0-70 )               Elbow Flexion (150 )               Elbow Extension (0 )                     STRENGTH:  Upper Extremity strength:  Painful with resistance on the right in the shoulder  Lower Extremity Strength:   Quads weak bilaterally; DF/PF weak but equal  Abdominals 3-4/5  4/5              Palpation:  Tender SI Bilaterally; lumbar spine, upper thoracic spine, cervical paraspinals; B OA      Treatment Today      TREATMENT MINUTES COMMENTS   Evaluation     Self-care/ Home management     Manual therapy      Neuromuscular Re-education 0 Worked on activation of TrA with transfers, standing and walking.   Therapeutic Activity     Therapeutic Exercises 35   Added to HEP    Started with some standing ROM exercises:  Lumbar flexion, extension, rotation and side bend.    Exercise #1: LTR  Comment #1: x20  Exercise #2: Seated lumbar flexion  Comment #2: x5  Exercise #3: Seated shoulder rolls  Comment #3: x10  Exercise #4: Home traction  Comment #4: omit  Exercise #5: Supine abd sets  Comment #5: x10  Exercise #6: Neck stretch  Comment #6: x1  Exercise #7: Wall slide for shoulders  Comment #7: review  Exercise #8: bridges  Comment #8:  HEP  Exercise #9: Chest and shoulder stretch  Comment #9:  HEP  Exercise #10: Heel/toe raises  Comment #10: Instructed for HEP  Exercise #11: Standing hip abduction  Comment #11: Instructed for HEP  Exercise #12: UE Level 1  Comment #12: x 3 minutes  Exercise #13: Hip circles  Comment #13: HEP  Exercise #14: Row  Comment #14: Blue band issued  HEP  Exercise #15: Wall push ups  Comment #15: HEP  See flowsheet for date performed.   Gait training     Modality__________________                Total 35    Blank areas are intentional and mean the treatment did not include these items.     Carey Tolbert PT, CLT  10/31/2018

## 2021-06-26 NOTE — PROGRESS NOTES
Progress Notes by Carey Connors PT at 10/1/2018  3:30 PM     Author: Carey Connors PT Service: -- Author Type: Physical Therapist    Filed: 10/1/2018  4:30 PM Encounter Date: 10/1/2018 Status: Signed    : Carey Connors PT (Physical Therapist)       [facility] Daily Progress     Patient Name: Isabel Biggs  Date: 10/1/2018  Visit #: 4/12  PTA visit #:  2  Date of evaluation: 8/27/2018  Referral Diagnosis: Neck pain; low back pain     Referring provider: Cj Georges MD      Precautions / Restrictions : adjustment disorder; prediabetic; peripheral neuropathy     Visit Diagnosis:     ICD-10-CM    1. Neck pain M54.2    2. Cervical radiculitis M54.12    3. Acute bilateral low back pain, with sciatica presence unspecified M54.5    4. Stiffness of multiple joints M25.60    5. Generalized muscle weakness M62.81             Assessment:   Pt showing improvement in pain levels and function.  Improved stability with transitional movements.  She feels she may not need much more PT.   HEP/POC compliance is  good .  Patient demonstrates understanding/independence with home program.  Patient is appropriate to continue with skilled physical therapy intervention, as indicated by initial plan of care.    Goal Status:  Pt. will demonstrate/verbalize independence in self-management of condition in : 12 weeks;Progressing toward  Pt. will be independent with home exercise program in : 12 weeks;Progressing toward  Pt. will report decreased intensity, frequency of : Pain;Headache;in 12 weeks;Met  Pt. will have improved quality of sleep: waking less times/night;in 12 weeks;Met  Pt. will bend: to dress;with less pain;with less difficulty;in 12 weeks;Met  Patient will transfer: sit/stand;supine/sit;with less pain;with less difficulty;in 12 weeks;Met  Patient Turn Head: with partial ROM;with less difficulty;in 12 weeks;Progressing toward  Patient will look up / down: with partial ROM;with less  "pain;with less difficulty;in 12 weeks;Progressing toward      Plan for next visits:   Continue MT (shoulders right/left; neck)  Advance exercises (heel raises, hip ROM)  Recheck NDI/ROM  Assess need for more PT       Subjective:   \"I am feeling better.\"  The neck is the most problematic yet.  Right shoulder is better with exercises.  No dizziness last week.  Shoulders better with reaching.  Continues to be more aware of TrA activation. Sleeping better. Looking up still painful, down OK. Rotations are better. Left with slight pain.  Transfers with less pain.  Pain Rating: 3/10 in back and neck and in shoulder.  Exercise getting easier.    Can bend with minimal pain.    Patient Outcome Measures: NDI  Initial (8/27/2018) SCORE: 72%    Objective:     Lumbar ROM:     Date: 8/27/2018 9/24/2018      *Indicate scale  MAL= Maximally limited  MOL=Moderately limited  MIL= Minimally limited  - = no pain  += minimal pain  ++= moderate pain  +++= maximal pain AROM AROM AROM   Lumbar Flexion MOL++  Gowers sign with return WNL+   Able to return without support     Lumbar Extension MIL+++ MIL+        Right Left Right Left Right Left   Lumbar Sidebending MOL++ MIL+ MIL+  MIL+        Lumbar Rotation MIL++ MIL+              Cervical ROM  Date: 8/27/2018 9/24/2018      *Indicate scale AROM AROM AROM   Cervical Flexion MOL+ WNL+      Cervical Extension MIL++  MIL-       Right Left Right Left Right Left   Cervical Sidebending MIL- MIL+           Cervical Rotation MOL+ MOL++ MiL+  MIL-        Cervical Protraction MIL+       Cervical Retraction MIL+       Thoracic Flexion MIL++       Thoracic Extension MOL++          Shoulder/Elbow ROM  Date: 8/27/2018 9/24/2018      Shoulder and Elbow ROM ( ) AROM in degrees AROM in degrees AROM in degrees    Right Left Right Left Right Left   Shoulder Flexion (0-180 ) 130 150  130 150        Shoulder Abduction (0-180 ) 90 130           Shoulder Extension (0-60 )               Shoulder ER (0-90 ) 40 50     "       Shoulder IR (0-70 )               Shoulder IR/EXT Sacrum T10             PROM in degrees PROM in degrees PROM in degrees     Right Left Right Left Right Left   Shoulder Flexion (0-180 ) 150             Shoulder Abduction (0-180 )               Shoulder Extension (0-60 )               Shoulder ER (0-90 )  50             Shoulder IR (0-70 )               Elbow Flexion (150 )               Elbow Extension (0 )                     STRENGTH:  Upper Extremity strength:  Painful with resistance on the right in the shoulder  Lower Extremity Strength:   Quads weak bilaterally; DF/PF weak but equal  Abdominals 3-4/5  4/5              Palpation:  Tender SI Bilaterally; lumbar spine, upper thoracic spine, cervical paraspinals; B OA      Treatment Today      TREATMENT MINUTES COMMENTS   Evaluation     Self-care/ Home management     Manual therapy 20 CFM right and left anterior shoulder; distraction; MET in PNF patterns Bilateral shoulders;STM/MFR to upper back and neck   Neuromuscular Re-education 0 Worked on activation of TrA with transfers, standing and walking.   Therapeutic Activity     Therapeutic Exercises 10  Demo'd exercises        Exercise #1: LTR  Comment #1: HEP  Exercise #2: Seated lumbar flexion  Comment #2: HEP  Exercise #3: Seated shoulder rolls  Comment #3: HEP  Exercise #4: Home traction  Comment #4: omit  Exercise #5: Supine abd sets  Comment #5: HEP  Exercise #6: Neck stretch  Comment #6: HEP  Exercise #7: Wall slide for shoulders  Comment #7: HEP  See flowsheet for date performed.   Gait training     Modality__________________                Total 30    Blank areas are intentional and mean the treatment did not include these items.     Carey Tolbert PT, CLT  10/1/2018

## 2021-06-26 NOTE — PROGRESS NOTES
Progress Notes by Carey Connors PT at 9/12/2018  1:30 PM     Author: Carey Connors PT Service: -- Author Type: Physical Therapist    Filed: 9/12/2018  4:43 PM Encounter Date: 9/12/2018 Status: Signed    : Carey Connors PT (Physical Therapist)       [facility] Daily Progress     Patient Name: Isabel Biggs  Date: 9/12/2018  Visit #: 2/12  PTA visit #:  2  Date of evaluation: 8/27/2018  Referral Diagnosis: Neck pain; low back pain     Referring provider: Cj Georges MD      Precautions / Restrictions : adjustment disorder; prediabetic; peripheral neuropathy     Visit Diagnosis:     ICD-10-CM    1. Neck pain M54.2    2. Cervical radiculitis M54.12    3. Acute bilateral low back pain, with sciatica presence unspecified M54.5    4. Stiffness of multiple joints M25.60    5. Generalized muscle weakness M62.81             Assessment:   Pt noted some improvement today.  Pt noted less pain with sit to stand with TrA engaged.  HEP/POC compliance is  good .  Patient demonstrates understanding/independence with home program.  Patient is appropriate to continue with skilled physical therapy intervention, as indicated by initial plan of care.    Goal Status:  Pt. will demonstrate/verbalize independence in self-management of condition in : 12 weeks;Progressing toward  Pt. will be independent with home exercise program in : 12 weeks;Progressing toward  Pt. will report decreased intensity, frequency of : Pain;Headache;in 12 weeks;Progressing toward  Pt. will have improved quality of sleep: waking less times/night;in 12 weeks;Progressing toward  Pt. will bend: to dress;with less pain;with less difficulty;in 12 weeks;Progressing toward  Patient will transfer: sit/stand;supine/sit;with less pain;with less difficulty;in 12 weeks;Progressing toward  Patient Turn Head: with partial ROM;with less difficulty;in 12 weeks;Progressing toward  Patient will look up / down: with partial ROM;with less  pain;with less difficulty;in 12 weeks;Progressing toward      Plan for next visits:     Continue MT  Advance exercises    Subjective:   Having increased dizziness and went to ER the past weekend.  Neck is feeling better. Pain with turning.  Back/neck pain/ shoulder pain with dressing.  She is not able to put socks on.  Transfers painful (but less). Sleeping is a little better.  Pain Ratin in back and neck and 4/10 in shoulder.  Exercise getting easier.  Tried home traction with  pulling towel but it didn't work.       Patient Outcome Measures: NDI  Initial SCORE: 72%    Objective:   Pt 15 minutes late for appointment.    Lumbar ROM:     Date: 2018       *Indicate scale  MAL= Maximally limited  MOL=Moderately limited  MIL= Minimally limited  - = no pain  += minimal pain  ++= moderate pain  +++= maximal pain AROM AROM AROM   Lumbar Flexion MOL++  Gowers sign with return       Lumbar Extension MIL+++         Right Left Right Left Right Left   Lumbar Sidebending MOL++ MIL+           Lumbar Rotation MIL++ MIL+              Cervical ROM  Date: 2018       *Indicate scale AROM AROM AROM   Cervical Flexion MOL+       Cervical Extension MIL++         Right Left Right Left Right Left   Cervical Sidebending MIL- MIL+           Cervical Rotation MOL+ MOL++           Cervical Protraction MIL+       Cervical Retraction MIL+       Thoracic Flexion MIL++       Thoracic Extension MOL++          Shoulder/Elbow ROM  Date: 2018       Shoulder and Elbow ROM ( ) AROM in degrees AROM in degrees AROM in degrees    Right Left Right Left Right Left   Shoulder Flexion (0-180 ) 130 150           Shoulder Abduction (0-180 ) 90 130           Shoulder Extension (0-60 )               Shoulder ER (0-90 ) 40 50           Shoulder IR (0-70 )               Shoulder IR/EXT Sacrum T10             PROM in degrees PROM in degrees PROM in degrees     Right Left Right Left Right Left   Shoulder Flexion (0-180 ) 150              Shoulder Abduction (0-180 )               Shoulder Extension (0-60 )               Shoulder ER (0-90 )  50             Shoulder IR (0-70 )               Elbow Flexion (150 )               Elbow Extension (0 )                     STRENGTH:  Upper Extremity strength:  Painful with resistance on the right in the shoulder  Lower Extremity Strength:   Quads weak bilaterally; DF/PF weak but equal  Abdominals 3-4/5                Palpation:  Tender SI Bilaterally; lumbar spine, upper thoracic spine, cervical paraspinals; B OA      Treatment Today      TREATMENT MINUTES COMMENTS   Evaluation     Self-care/ Home management     Manual therapy     Neuromuscular Re-education 8 Worked on activation of TrA with transfers, standing and walking.   Therapeutic Activity     Therapeutic Exercises 15  Demo'd exercises  Added abd sets     Exercise #1: LTR  Comment #1: HEP  Exercise #2: Seated lumbar flexion  Comment #2: HEP  Exercise #3: Seated shoulder rolls  Comment #3: HEP  Exercise #4: Home traction  Comment #4: omit  Exercise #5: Supine abd sets  Comment #5: HEP  See flowsheet for date performed.   Gait training     Modality__________________                Total 23    Blank areas are intentional and mean the treatment did not include these items.     Carey Tolbert PT, CLT  9/12/2018
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
## 2021-06-26 NOTE — PROGRESS NOTES
Progress Notes by Carey Connors PT at 10/24/2018 10:00 AM     Author: Carey Connors PT Service: -- Author Type: Physical Therapist    Filed: 10/24/2018 10:56 AM Encounter Date: 10/24/2018 Status: Signed    : Carey Connors PT (Physical Therapist)       [facility] Daily Progress     Patient Name: Isabel Biggs  Date: 10/24/2018  Visit #: 6/12  PTA visit #:  2  Date of evaluation: 8/27/2018  Referral Diagnosis: Neck pain; low back pain     Referring provider: Cj Georges MD      Precautions / Restrictions : adjustment disorder; prediabetic; peripheral neuropathy     Visit Diagnosis:     ICD-10-CM    1. Neck pain M54.2    2. Cervical radiculitis M54.12    3. Stiffness of multiple joints M25.60    4. Acute bilateral low back pain, with sciatica presence unspecified M54.5    5. Generalized muscle weakness M62.81             Assessment:   Pt continues to show improvement in pain levels and function.  Pt sleeping better. Improved stability with transitional movements.  Needs work on strength and ROM through the hips and low back.  Continued PT appropriate.   HEP/POC compliance is  good .  Patient demonstrates understanding/independence with home program.  Patient is appropriate to continue with skilled physical therapy intervention, as indicated by initial plan of care.    Goal Status:  Pt. will demonstrate/verbalize independence in self-management of condition in : 12 weeks;Progressing toward  Pt. will be independent with home exercise program in : 12 weeks;Progressing toward  Pt. will report decreased intensity, frequency of : Pain;Headache;in 12 weeks;Met  Pt. will have improved quality of sleep: waking less times/night;in 12 weeks;Met  Pt. will bend: to dress;with less pain;with less difficulty;in 12 weeks;Met  Patient will transfer: sit/stand;supine/sit;with less pain;with less difficulty;in 12 weeks;Met  Patient Turn Head: with partial ROM;with less difficulty;in 12  weeks;Met  Patient will look up / down: with partial ROM;with less pain;with less difficulty;in 12 weeks;Met      Plan for next visits:   Continue PT x 3 more visits  Advance exercises Start in the gym: (heel raises, hip ROM, step ups)  Continue MT as needed            Subjective:    Yesterday and today more sore.  Did some stretching and it helps.Still reports she is improving.   Most troublesome area today  is intermittent low back and left hip 3/10.  It bothers with stairs and sitting. 1/10 in right shoulder and 0/10 in neck.  Doing exercises regularly.    Patient Outcome Measures: NDI  Initial (8/27/2018) SCORE: 72%  10/8/2018: Neck Disability Score in %: 44%    Objective:     Lumbar ROM:     Date: 8/27/2018 9/24/2018  10/8/2018    *Indicate scale  MAL= Maximally limited  MOL=Moderately limited  MIL= Minimally limited  - = no pain  += minimal pain  ++= moderate pain  +++= maximal pain AROM AROM AROM   Lumbar Flexion MOL++  Gowers sign with return WNL+   Able to return without support WNL+    Lumbar Extension MIL+++ MIL+  WNL-     Right Left Right Left Right Left   Lumbar Sidebending MOL++ MIL+ MIL+  MIL+  MIL-  MIL-   Lumbar Rotation MIL++ MIL+     MIL+  MIL-       Cervical ROM  Date: 8/27/2018 9/24/2018  10/8/2018    *Indicate scale AROM AROM AROM   Cervical Flexion MOL+ WNL+  WNL+    Cervical Extension MIL++  MIL- WNL++      Right Left Right Left Right Left   Cervical Sidebending MIL- MIL+     MIL-  MIL-    Cervical Rotation MOL+ MOL++ MiL+  MIL-  MIL-  MIL+    Cervical Protraction MIL+   MIL+    Cervical Retraction MIL+   MIL+    Thoracic Flexion MIL++   MIL-    Thoracic Extension MOL++   MOL+       Shoulder/Elbow ROM: 10/24/2018:  WNL and equal bilaterally      Date: 8/27/2018 9/24/2018  10/8/2018    Shoulder and Elbow ROM ( ) AROM in degrees AROM in degrees AROM in degrees    Right Left Right Left Right Left   Shoulder Flexion (0-180 ) 130 150  130 150  155  160    Shoulder Abduction (0-180 ) 90 130      125  130    Shoulder Extension (0-60 )               Shoulder ER (0-90 ) 40 50      50 50    Shoulder IR (0-70 )               Shoulder IR/EXT Sacrum T10     Sacrum T10      PROM in degrees PROM in degrees PROM in degrees     Right Left Right Left Right Left   Shoulder Flexion (0-180 ) 150             Shoulder Abduction (0-180 )               Shoulder Extension (0-60 )               Shoulder ER (0-90 )  50             Shoulder IR (0-70 )               Elbow Flexion (150 )               Elbow Extension (0 )                     STRENGTH:  Upper Extremity strength:  Painful with resistance on the right in the shoulder  Lower Extremity Strength:   Quads weak bilaterally; DF/PF weak but equal  Abdominals 3-4/5  4/5              Palpation:  Tender SI Bilaterally; lumbar spine, upper thoracic spine, cervical paraspinals; B OA      Treatment Today      TREATMENT MINUTES COMMENTS   Evaluation     Self-care/ Home management     Manual therapy 20  Prone: STM/MFR to lower back ; Grade I-II lumbar mobs.  Supine AAROM to hips   Neuromuscular Re-education 0 Worked on activation of TrA with transfers, standing and walking.   Therapeutic Activity     Therapeutic Exercises 25   Added to HEP    Started with some standing ROM exercises:  Lumbar flexion, extension, rotation and side bend.    Exercise #1: LTR  Comment #1: x20  Exercise #2: Seated lumbar flexion  Comment #2: x5  Exercise #3: Seated shoulder rolls  Comment #3: x10  Exercise #4: Home traction  Comment #4: omit  Exercise #5: Supine abd sets  Comment #5: x10  Exercise #6: Neck stretch  Comment #6: x1  Exercise #7: Wall slide for shoulders  Comment #7: review  Exercise #8: bridges  Comment #8: instructed for HEP  Exercise #9: Chest and shoulder stretch  Comment #9: Instructed for HEP  See flowsheet for date performed.   Gait training     Modality__________________                Total 45    Blank areas are intentional and mean the treatment did not include these items.      Carey Tolbert PT, CLT  10/24/2018
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
## 2021-06-26 NOTE — PROGRESS NOTES
Progress Notes by Carey Connors PT at 11/5/2018 11:00 AM     Author: Carey Connors PT Service: -- Author Type: Physical Therapist    Filed: 11/5/2018 11:41 AM Encounter Date: 11/5/2018 Status: Signed    : Carey Connors PT (Physical Therapist)       [facility] Daily Progress/DISCHARGE NOTE    Patient Name: Isabel Biggs  Date: 11/5/2018  Visit #: 9/12  PTA visit #:  2  Date of evaluation: 8/27/2018  Referral Diagnosis: Neck pain; low back pain     Referring provider: Cj Georges MD      Precautions / Restrictions : adjustment disorder; prediabetic; peripheral neuropathy     Visit Diagnosis:     ICD-10-CM    1. Neck pain M54.2    2. Cervical radiculitis M54.12    3. Stiffness of multiple joints M25.60    4. Acute bilateral low back pain, with sciatica presence unspecified M54.5    5. Generalized muscle weakness M62.81             Assessment:    Pain is down and pt is motivated to keep moving and get stronger.  She feels she can continue on her own.   HEP/POC compliance is  good .  Patient demonstrates understanding/independence with home program.  Pt ready for d/c.       Goal Status:  Pt. will demonstrate/verbalize independence in self-management of condition in : 12 weeks;Met  Pt. will be independent with home exercise program in : 12 weeks;Met  Pt. will report decreased intensity, frequency of : Pain;Headache;in 12 weeks;Met  Pt. will have improved quality of sleep: waking less times/night;in 12 weeks;Met  Pt. will bend: to dress;with less pain;with less difficulty;in 12 weeks;Met  Patient will transfer: sit/stand;supine/sit;with less pain;with less difficulty;in 12 weeks;Met  Patient Turn Head: with partial ROM;with less difficulty;in 12 weeks;Met  Patient will look up / down: with partial ROM;with less pain;with less difficulty;in 12 weeks;Met      Plan for next visits:   D/C PT        Subjective:   Left hip and low back continue to be slightly painful 1/10.    Exercise really helping.  Shoulder and neck doing well.  Neck and shoulder pain 0/10  Left hip and low back 1/10       Patient Outcome Measures: NDI  Initial (8/27/2018) SCORE: 72%  10/8/2018: Neck Disability Score in %: 44%    Objective:   Stairs: goes up/ down one at a time.  Leads with right up and down.  Lumbar ROM:    WNL: no pain     Cervical ROM: WNL: no pain     Shoulder/Elbow ROM: 10/24/2018:  WNL and equal bilaterally; no pain    STRENGTH:  Upper Extremity strength:  No pain with resistance   Lower Extremity Strength:   Quads weak bilaterally; DF/PF WNL  Abdominals 3-4/5  4/5                  Treatment Today      TREATMENT MINUTES COMMENTS   Evaluation     Self-care/ Home management 8 Reviewed self care; post d/c POC and next steps.   Manual therapy      Neuromuscular Re-education 0     Therapeutic Activity     Therapeutic Exercises 30   Pt demonstrated her HEP with reps as note         Exercise #1: LTR  Comment #1: x20  Exercise #2: Seated lumbar flexion  Comment #2: x10  Exercise #3: Seated shoulder rolls  Comment #3: x10    Exercise #5: Supine abd sets  Comment #5: x10  Exercise #6: Neck stretch  Comment #6: x2B  Exercise #7: Wall slide for shoulders  Comment #7: x1  Exercise #8: bridges  Comment #8: x10  Exercise #9: Chest and shoulder stretch  Comment #9: x1  Exercise #10: Heel/toe raises  Comment #10: x10  Exercise #11: Standing hip abduction  Comment #11:  x10 B  Exercise #12: Nustep Level 4  Comment #12: x 3 minutes with a few rests  Exercise #13: Hip circles  Comment #13: HEP  Exercise #14: Row  Comment #14: x10  Exercise #15: Wall push ups  Comment #15: x10  See flowsheet for date performed.   Gait training     Modality__________________                Total 33    Blank areas are intentional and mean the treatment did not include these items.     Carey Tolbert PT, CLT  11/5/2018

## 2021-06-26 NOTE — PROGRESS NOTES
Progress Notes by Carey Connors PT at 10/29/2018 10:30 AM     Author: Carey Connors PT Service: -- Author Type: Physical Therapist    Filed: 10/29/2018 11:26 AM Encounter Date: 10/29/2018 Status: Signed    : Carey Connors PT (Physical Therapist)       [facility] Daily Progress     Patient Name: Isabel Biggs  Date: 10/29/2018  Visit #: 7/12  PTA visit #:  2  Date of evaluation: 8/27/2018  Referral Diagnosis: Neck pain; low back pain     Referring provider: Cj Georges MD      Precautions / Restrictions : adjustment disorder; prediabetic; peripheral neuropathy     Visit Diagnosis:     ICD-10-CM    1. Neck pain M54.2    2. Cervical radiculitis M54.12    3. Stiffness of multiple joints M25.60    4. Acute bilateral low back pain, with sciatica presence unspecified M54.5    5. Generalized muscle weakness M62.81             Assessment:    Tenderness left hip musculature.  Improved with MT.  Improved stability with transitional movements.  Needs work on strength and ROM through the hips and low back.  Continued PT appropriate.   HEP/POC compliance is  good .  Patient demonstrates understanding/independence with home program.  Patient is appropriate to continue with skilled physical therapy intervention, as indicated by initial plan of care.    Goal Status:  Pt. will demonstrate/verbalize independence in self-management of condition in : 12 weeks;Progressing toward  Pt. will be independent with home exercise program in : 12 weeks;Progressing toward  Pt. will report decreased intensity, frequency of : Pain;Headache;in 12 weeks;Met  Pt. will have improved quality of sleep: waking less times/night;in 12 weeks;Met  Pt. will bend: to dress;with less pain;with less difficulty;in 12 weeks;Met  Patient will transfer: sit/stand;supine/sit;with less pain;with less difficulty;in 12 weeks;Met  Patient Turn Head: with partial ROM;with less difficulty;in 12 weeks;Met  Patient will look up /  down: with partial ROM;with less pain;with less difficulty;in 12 weeks;Met      Plan for next visits:   Continue PT x 2 more visits  Start in the gym  Continue MT as needed            Subjective:    Continues to improve.  Left hip and low back continue to be painful 3/10.   Feels it most with stairs and transfers.   Shoulder and neck doing well.         Patient Outcome Measures: NDI  Initial (8/27/2018) SCORE: 72%  10/8/2018: Neck Disability Score in %: 44%    Objective:     Lumbar ROM:     Date: 8/27/2018 9/24/2018  10/8/2018    *Indicate scale  MAL= Maximally limited  MOL=Moderately limited  MIL= Minimally limited  - = no pain  += minimal pain  ++= moderate pain  +++= maximal pain AROM AROM AROM   Lumbar Flexion MOL++  Gowers sign with return WNL+   Able to return without support WNL+    Lumbar Extension MIL+++ MIL+  WNL-     Right Left Right Left Right Left   Lumbar Sidebending MOL++ MIL+ MIL+  MIL+  MIL-  MIL-   Lumbar Rotation MIL++ MIL+     MIL+  MIL-       Cervical ROM  Date: 8/27/2018 9/24/2018  10/8/2018    *Indicate scale AROM AROM AROM   Cervical Flexion MOL+ WNL+  WNL+    Cervical Extension MIL++  MIL- WNL++      Right Left Right Left Right Left   Cervical Sidebending MIL- MIL+     MIL-  MIL-    Cervical Rotation MOL+ MOL++ MiL+  MIL-  MIL-  MIL+    Cervical Protraction MIL+   MIL+    Cervical Retraction MIL+   MIL+    Thoracic Flexion MIL++   MIL-    Thoracic Extension MOL++   MOL+       Shoulder/Elbow ROM: 10/24/2018:  WNL and equal bilaterally      Date: 8/27/2018 9/24/2018  10/8/2018    Shoulder and Elbow ROM ( ) AROM in degrees AROM in degrees AROM in degrees    Right Left Right Left Right Left   Shoulder Flexion (0-180 ) 130 150  130 150  155  160    Shoulder Abduction (0-180 ) 90 130     125  130    Shoulder Extension (0-60 )               Shoulder ER (0-90 ) 40 50      50 50    Shoulder IR (0-70 )               Shoulder IR/EXT Sacrum T10     Sacrum T10      PROM in degrees PROM in degrees PROM in  degrees     Right Left Right Left Right Left   Shoulder Flexion (0-180 ) 150             Shoulder Abduction (0-180 )               Shoulder Extension (0-60 )               Shoulder ER (0-90 )  50             Shoulder IR (0-70 )               Elbow Flexion (150 )               Elbow Extension (0 )                     STRENGTH:  Upper Extremity strength:  Painful with resistance on the right in the shoulder  Lower Extremity Strength:   Quads weak bilaterally; DF/PF weak but equal  Abdominals 3-4/5  4/5              Palpation:  Tender SI Bilaterally; lumbar spine, upper thoracic spine, cervical paraspinals; B OA      Treatment Today      TREATMENT MINUTES COMMENTS   Evaluation     Self-care/ Home management     Manual therapy 20  Prone: STM/MFR to lower back ; Grade I-II lumbar mobs.  Supine AAROM to hips in prone and supine; Leg pull B x 2; STM/MFR to left hip in right side lying.   Neuromuscular Re-education 0 Worked on activation of TrA with transfers, standing and walking.   Therapeutic Activity     Therapeutic Exercises 25   Added to HEP    Started with some standing ROM exercises:  Lumbar flexion, extension, rotation and side bend.    Exercise #1: LTR  Comment #1: x20  Exercise #2: Seated lumbar flexion  Comment #2: x5  Exercise #3: Seated shoulder rolls  Comment #3: x10  Exercise #4: Home traction  Comment #4: omit  Exercise #5: Supine abd sets  Comment #5: x10  Exercise #6: Neck stretch  Comment #6: x1  Exercise #7: Wall slide for shoulders  Comment #7: review  Exercise #8: bridges  Comment #8:  HEP  Exercise #9: Chest and shoulder stretch  Comment #9:  HEP  Exercise #10: Heel/toe raises  Comment #10: Instructed for HEP  Exercise #11: Standing hip abduction  Comment #11: Instructed for HEP  See flowsheet for date performed.   Gait training     Modality__________________                Total 45    Blank areas are intentional and mean the treatment did not include these items.     Carey Tolbert PT,  CLT  10/29/2018

## 2021-06-26 NOTE — PROGRESS NOTES
Progress Notes by Carey Connors PT at 9/24/2018 11:00 AM     Author: Carey Connors PT Service: -- Author Type: Physical Therapist    Filed: 9/24/2018 12:11 PM Encounter Date: 9/24/2018 Status: Signed    : Carey oCnnors PT (Physical Therapist)       [facility] Daily Progress     Patient Name: Isabel Biggs  Date: 9/24/2018  Visit #: 3/12  PTA visit #:  2  Date of evaluation: 8/27/2018  Referral Diagnosis: Neck pain; low back pain     Referring provider: Cj Georges MD      Precautions / Restrictions : adjustment disorder; prediabetic; peripheral neuropathy     Visit Diagnosis:     ICD-10-CM    1. Neck pain M54.2    2. Cervical radiculitis M54.12    3. Acute bilateral low back pain, with sciatica presence unspecified M54.5    4. Stiffness of multiple joints M25.60    5. Left hip pain M25.552    6. Generalized muscle weakness M62.81             Assessment:   Pt showing improvement in lumbar and cervical ROM; shoulders still painful with movement; especially the right one.  Improved stability with transitional movements.   HEP/POC compliance is  good .  Patient demonstrates understanding/independence with home program.  Patient is appropriate to continue with skilled physical therapy intervention, as indicated by initial plan of care.    Goal Status:  Pt. will demonstrate/verbalize independence in self-management of condition in : 12 weeks;Progressing toward  Pt. will be independent with home exercise program in : 12 weeks;Progressing toward  Pt. will report decreased intensity, frequency of : Pain;Headache;in 12 weeks;Progressing toward  Pt. will have improved quality of sleep: waking less times/night;in 12 weeks;Progressing toward  Pt. will bend: to dress;with less pain;with less difficulty;in 12 weeks;Progressing toward  Patient will transfer: sit/stand;supine/sit;with less pain;with less difficulty;in 12 weeks;Progressing toward  Patient Turn Head: with partial ROM;with  "less difficulty;in 12 weeks;Progressing toward  Patient will look up / down: with partial ROM;with less pain;with less difficulty;in 12 weeks;Progressing toward      Plan for next visits:   Continue MT (shoulders right/left; neck)  Advance exercises (heel raises, hip ROM)  Check goals/ROM       Subjective:   Improving pain  In the legs and back; still having pain in neck and mid back; but it is better.  Still has more pain with activity.  Improving dizziness.  Shoulders better with reaching.  More aware of TrA activation \" it really helps\"    Pain Rating: 3/10 in back and neck and in shoulder.  Exercise getting easier.        Patient Outcome Measures: NDI  Initial (8/27/2018) SCORE: 72%    Objective:     Lumbar ROM:     Date: 8/27/2018 9/24/2018      *Indicate scale  MAL= Maximally limited  MOL=Moderately limited  MIL= Minimally limited  - = no pain  += minimal pain  ++= moderate pain  +++= maximal pain AROM AROM AROM   Lumbar Flexion MOL++  Gowers sign with return WNL+   Able to return without support     Lumbar Extension MIL+++ MIL+        Right Left Right Left Right Left   Lumbar Sidebending MOL++ MIL+ MIL+  MIL+        Lumbar Rotation MIL++ MIL+              Cervical ROM  Date: 8/27/2018 9/24/2018      *Indicate scale AROM AROM AROM   Cervical Flexion MOL+ WNL+      Cervical Extension MIL++  MIL-       Right Left Right Left Right Left   Cervical Sidebending MIL- MIL+           Cervical Rotation MOL+ MOL++ MiL+  MIL-        Cervical Protraction MIL+       Cervical Retraction MIL+       Thoracic Flexion MIL++       Thoracic Extension MOL++          Shoulder/Elbow ROM  Date: 8/27/2018 9/24/2018      Shoulder and Elbow ROM ( ) AROM in degrees AROM in degrees AROM in degrees    Right Left Right Left Right Left   Shoulder Flexion (0-180 ) 130 150  130 150        Shoulder Abduction (0-180 ) 90 130           Shoulder Extension (0-60 )               Shoulder ER (0-90 ) 40 50           Shoulder IR (0-70 )             "   Shoulder IR/EXT Sacrum T10             PROM in degrees PROM in degrees PROM in degrees     Right Left Right Left Right Left   Shoulder Flexion (0-180 ) 150             Shoulder Abduction (0-180 )               Shoulder Extension (0-60 )               Shoulder ER (0-90 )  50             Shoulder IR (0-70 )               Elbow Flexion (150 )               Elbow Extension (0 )                     STRENGTH:  Upper Extremity strength:  Painful with resistance on the right in the shoulder  Lower Extremity Strength:   Quads weak bilaterally; DF/PF weak but equal  Abdominals 3-4/5  4/5              Palpation:  Tender SI Bilaterally; lumbar spine, upper thoracic spine, cervical paraspinals; B OA      Treatment Today      TREATMENT MINUTES COMMENTS   Evaluation     Self-care/ Home management     Manual therapy 25 CFM right and left anterior shoulder; distraction; MET in PNF patterns Bilateral shoulders;STM/MFR to upper back and neck   Neuromuscular Re-education 3 Worked on activation of TrA with transfers, standing and walking.   Therapeutic Activity     Therapeutic Exercises 10  Demo'd exercises  Added  Shoulder/neck exercises     Exercise #1: LTR  Comment #1: HEP  Exercise #2: Seated lumbar flexion  Comment #2: HEP  Exercise #3: Seated shoulder rolls  Comment #3: HEP  Exercise #4: Home traction  Comment #4: omit  Exercise #5: Supine abd sets  Comment #5: HEP  Exercise #6: Neck stretch  Comment #6: HEP  Exercise #7: Wall slide for shoulders  Comment #7: HEP  See flowsheet for date performed.   Gait training     Modality__________________                Total 25    Blank areas are intentional and mean the treatment did not include these items.     Carey Tolbert PT, CLT  9/24/2018

## 2021-06-27 ENCOUNTER — ANESTHESIA - HEALTHEAST (OUTPATIENT)
Dept: SURGERY | Facility: CLINIC | Age: 66
End: 2021-06-27

## 2021-06-27 ENCOUNTER — COMMUNICATION - HEALTHEAST (OUTPATIENT)
Dept: SCHEDULING | Facility: CLINIC | Age: 66
End: 2021-06-27

## 2021-06-28 ENCOUNTER — TRANSFERRED RECORDS (OUTPATIENT)
Dept: MULTI SPECIALTY CLINIC | Facility: CLINIC | Age: 66
End: 2021-06-28
Payer: COMMERCIAL

## 2021-06-28 ENCOUNTER — RECORDS - HEALTHEAST (OUTPATIENT)
Dept: ADMINISTRATIVE | Facility: OTHER | Age: 66
End: 2021-06-28

## 2021-06-28 ENCOUNTER — SURGERY - HEALTHEAST (OUTPATIENT)
Dept: SURGERY | Facility: CLINIC | Age: 66
End: 2021-06-28
Payer: COMMERCIAL

## 2021-06-28 ASSESSMENT — MIFFLIN-ST. JEOR: SCORE: 1463.49

## 2021-06-28 NOTE — PROGRESS NOTES
Progress Notes by Vita Fuentes OT at 3/11/2020 12:30 PM     Author: Vita Fuentes OT Service: -- Author Type: Occupational Therapist    Filed: 3/11/2020  1:38 PM Encounter Date: 3/11/2020 Status: Attested    : Vita Fuentes OT (Occupational Therapist) Cosigner: Cj Georges MD at 3/11/2020  2:23 PM    Attestation signed by Cj Georges MD at 3/11/2020  2:23 PM                        Rehabilitation Certification Request    March 11, 2020      Patient: Isabel Biggs  MR Number: [id number]  YOB: 1955  Date of Visit: 3/11/2020      Dear Dr. Cj Georges:    Thank you for this referral.   We are seeing Isabel Biggs in Occupational Therapy for vertigo.    Medicare and/or Medicaid requires physician review and approval of the treatment plan. Please review the plan of care and verify that you agree with the therapy plan of care by co-signing this note.      Plan of Care  Authorization / Certification Start Date: 03/11/20  Authorization / Certification End Date: 06/09/20  Authorization / Certification Number of Visits: 12  Communication with: Referral Source  Patient Related Instruction: Nature of Condition;Treatment plan and rationale;Basis of treatment;Expected outcome  Times per Week: 1  Number of Weeks: 12  Number of Visits: 12  Neuromuscular Reeducation: vestibular  Functional Training (ADL's): compensatory training;ADL's      Goals:  Patient will be independent with home exercise program in: 4 weeks  Patient will be able to walk: with head motion;without loss of balance;in 12 weeks  Patient will bend: to dress  Patient able to perform bed mobility: without vertigo;in 12 weeks  Patient will turn head: for conversation;without dizziness;in 12 weeks  Patient will look up / down: for drinking;without dizziness;in 12 weeks        If you have any questions or concerns, please don't hesitate to call.    Sincerely,      Vita Fuentes OT        Physician  recommendation:                                ___ Follow therapist's recommendation                                                                                                    ___ Modify therapy                                                                                                      Physician Signature:_____________________                                                                                                                                        Date:___________________________    *Physician co-signature indicates they certify the need for these services furnished within this plan and while under their care.         Vestibular Initial Evaluation    Patient Name: Isabel Biggs  Date of evaluation: 3/11/2020  Referral Diagnosis: BPPV  Referring provider: Cj Georges MD  Visit Diagnosis:     ICD-10-CM    1. Dizziness  R42    2. Unsteadiness on feet  R26.81    3. Decreased activities of daily living (ADL)  Z78.9        Assessment:      Symptoms with unclear etiology. Patient has significant dizziness with head right motions. She has some mild positive tests indicating possible irritative right vestibular issue. Instructed patient on adaptation and substitution exercises and will see her back in 1 week.    Goals:  Patient will be independent with home exercise program in: 4 weeks  Patient will be able to walk: with head motion;without loss of balance;in 12 weeks  Patient will bend: to dress  Patient able to perform bed mobility: without vertigo;in 12 weeks  Patient will turn head: for conversation;without dizziness;in 12 weeks  Patient will look up / down: for drinking;without dizziness;in 12 weeks    Patient's expectations/goals are realistic.    Barriers to Learning or Achieving Goals:  No Barriers.       Plan / Patient Instructions:        Plan of Care:   Authorization / Certification Start Date: 03/11/20  Authorization / Certification End Date: 06/09/20  Authorization /  Certification Number of Visits: 12  Communication with: Referral Source  Patient Related Instruction: Nature of Condition;Treatment plan and rationale;Basis of treatment;Expected outcome  Times per Week: 1  Number of Weeks: 12  Number of Visits: 12  Neuromuscular Reeducation: vestibular  Functional Training (ADL's): compensatory training;ADL's         Subjective:         History of Present Illness:    Isabel is a 65 y.o. female who presents to therapy today with complaints of vertigo, headaches, right high pitch tinnitus and unsteadiness. Patient had sudden onset of symptoms in 2020 with a ER visit. She was admitted to the hospital for 4 days and had imaging and a temporal artery biopsy which was negative for arteritis.  Symptoms are getting better. She denies history of similar symptoms. Patient denies ear pain. Patient denies hearing changes. Functional limitations are described as occurring with balance, bending, bed mobility, head turns, looking up or down, stepping over curbs.    Pain Ratin         Objective:      Note: Items left blank indicates the item was not performed or not indicated at the time of the evaluation.    Patient Outcome Measures:   No data recorded     Vestibular Disorder Examination  1. Dizziness     2. Unsteadiness on feet     3. Decreased activities of daily living (ADL)         Precautions/Restrictions: None    Posture Observation: General standing posture is normal.    ROM:  Not Tested    Strength: Not Tested    Sensation: Patient reports normal sensation    Functional Mobility: fair      Modified CTSIB:   Normal Surface Eyes Open- Moderate sway  Normal Surface Eyes Closed-severe sway  Perturbed Surface Eyes Open- NT  Perturbed Surface Eyes Closed- NT    The remainder of the tests are performed using video frenLiveU goggles.    Oculomotor Assessment:   Spontaneous Nystagmus with fixation: Negative  Spontaneous Nystagmus without fixation: subtle right beat nystagmus  Gaze-evoked  nystagmus: Negative  Smooth pursuit: Poor quality  Saccades: Normal  VOR to slow movement: Negative  Rapid head shake: right beat nystagmus  VOR Head Thrust:NT  VOR Cancellation: NT  OPK's: NT  Static Visual Acuity: NT  Dynamic Visual Acuity: NT    Positional Tests:  Hallpike Right:  Negative  Hallpike Left:  Negative  Head Roll Right: NT  Head Roll Left:  NT    Plan for next visit: repeat positional tests and progress HEP    Treatment Today:  Patient instructed on adaptation and substitution exercises today to facilitate vestibular compensation.  X1 viewing-5 seconds-instructed  Normal surface eyes closed-instructed  TREATMENT MINUTES COMMENTS   Evaluation 30    Self-care/ Home management     Neuromuscular Re-education 25    Canalith repositioning procedure           Total 55    Blank areas are intentional and mean the treatment did not include these items.     GOALS AND PLAN OF CARE WERE ESTABLISHED IN COOPERATION WITH THE PATIENT    OT Evaluation Code: (Please list factors)   Comorbidities:   Patient Active Problem List   Diagnosis   ? WILFRIDO (obstructive sleep apnea)   ? Type 2 diabetes mellitus without complication, without long-term current use of insulin (H)   ? Peripheral Neuropathy   ? Avitaminosis D   ? Beta thalassemia (H)   ? Low back pain - Dr. GRIFFIN Leblanc Randolph   ? Moderate major depression (H)   ? Morbid obesity (H)   ? Peripheral vertigo   ? Vertigo   ? Nonintractable episodic headache, unspecified headache type   ? Elevated sed rate   ? Dizziness       Profile/History Review: Brief    Need for eval modification: No    # Treatment options: Limited    Clinical Decision Making:  Low      Occupational Profile/ Medical and Therapy History and Comorbidities Occupational Performance Clinical Decision Making   (Complexity)   brief history with review of medical/therapy records related to the presenting problem.  No comorbidities 1-3 Performance deficits that result in activity limitations and/or participation  restrictions.    No Assessment Modification  Low complexity, which includes  problem-focused assessments, and consideration of a limited number of treatment options.      expanded review of medical/therapy records and additional review of physical, cognitive and psychosocial history.    May have comorbidities 3-5 Performance deficits that result in activity limitations and/or participation restrictions.    Minimal to moderate modification of assessment Moderate complexity, which includes analysis of data from detailed assessments, and consideration of several treatment options.         Review of medical/therapy records and extensive additional review of physical, cognitive and psychosocial history.  Comorbidities affect occupational performance 5 or more Performance deficits that result in activity limitations and/or participation restrictions.    Significant modification of assessment High complexity, analysis of  Occupational profile and data,  Comprehensive assessments, multiple treatment options.            Vita Fuentes  3/11/2020  12:34 PM

## 2021-06-29 NOTE — PROGRESS NOTES
Progress Notes by Owen Clayton MD at 7/13/2020  8:20 AM     Author: Owen Clayton MD Service: -- Author Type: Physician    Filed: 7/13/2020  8:45 AM Encounter Date: 7/13/2020 Status: Signed    : Owen Clayton MD (Physician)       [facility] Note  Isabel Biggs   65 y.o. female    Date of Visit: 7/13/2020  Chief Complaint   Patient presents with   ? Leg Swelling     X1 week - LT foot - no know injury - pain with walking     Assessment/Plan  1. Left foot pain  Concern is for fracture of metatarsals, navicular or cuboid bone.  Unlikely related to an ankle sprain.  Ligamentous injury is also possibility.  Counseled to continue with elevation, rest, ice and heat therapy with as needed use of acetaminophen/NSAID.  Will update based on image results  - XR Foot Left 3 or More VWS     Ice/heat, elevation,   Much or all of the text in this note was generated through the use of Dragon Dictate voice-to-text software. Errors in spelling or words which seem out of context are unintentional. Sound alike errors, in particular, may have escaped editing  Owen Clayton MD    Return if symptoms worsen or fail to improve.    Subjective  This 65 y.o. old female. Acute left foot swelling X 1 week. Never happened before. Has pain with walking, federico on the top of the foot. A few weeks ago, she was going for a walk, and as sh was stepping on a curb, she fell forward and did not feel any pain/notice any swelling. No head trauma. No f/s/c. Gabapentin and APAP help for the pain. 9/10 in severity. Elevation and sitting makes it better.     ROS A comprehensive review of systems was performed and was otherwise negative    Medications, allergies, and problem list were reviewed and updated    Exam  General appearance: Pleasant, nontoxic-appearing, no acute distress, alert and oriented x4  Vitals:    07/13/20 0818   BP: 134/80   Pulse: 90   SpO2: 97%   MUSCULOSKELETAL:  Diffuse nonpitting edema below the ankle on the left foot with pain with eversion and inversion.  Also pain with dorsi and plantar flexion.  Tender throughout metatarsal/navicular/cuboid.            Additional Information   Current Outpatient Medications   Medication Sig Dispense Refill   ? albuterol (PROAIR HFA;PROVENTIL HFA;VENTOLIN HFA) 90 mcg/actuation inhaler INHALE 1 TO 2 PUFFS BY MOUTH INTO THE LUNGS EVERY 4 HOURS AS NEEDED FOR WHEEZING 3 Inhaler 1   ? albuterol (PROVENTIL) 2.5 mg /3 mL (0.083 %) nebulizer solution Take 3 mL (2.5 mg total) by nebulization every 6 (six) hours as needed for wheezing. 90 vial 11   ? amitriptyline (ELAVIL) 25 MG tablet Take 1 tablet (25 mg total) by mouth at bedtime. 90 tablet 3   ? aspirin 81 MG EC tablet Take 1 tablet (81 mg total) by mouth daily. 90 tablet 3   ? atorvastatin (LIPITOR) 20 MG tablet Take 1 tablet (20 mg total) by mouth daily. 90 tablet 3   ? buPROPion (WELLBUTRIN XL) 150 MG 24 hr tablet Take 1 tablet (150 mg total) by mouth daily. 30 tablet 3   ? cholecalciferol, vitamin D3, (VITAMIN D3) 1,000 unit capsule Take 1 capsule (1,000 Units total) by mouth daily. 90 capsule 3   ? DULoxetine (CYMBALTA) 30 MG capsule TAKE 1 CAPSULE BY MOUTH TWICE DAILY 180 capsule 2   ? fluticasone propionate (FLONASE) 50 mcg/actuation nasal spray 1 spray into each nostril daily. (Patient taking differently: 1 spray into each nostril daily as needed. ) 16 g 11   ? gabapentin (NEURONTIN) 100 MG capsule Take 100 mg by mouth 2 (two) times a day. 180 capsule 1   ? meclizine (ANTIVERT) 25 mg tablet Take 1 tablet (25 mg total) by mouth 4 (four) times a day as needed for dizziness. 90 tablet 3   ? nebulizer accessories Kit Provide one nebulizer and accessories putting tubing and mask 1 kit 0   ? polyethylene glycol (GLYCOLAX) 17 gram/dose powder Take 17 g by mouth daily. Use as needed. 255 g 3     No current facility-administered medications for this visit.      Allergies   Allergen Reactions    ? Chloroquine Phosphate Other (See Comments)     Blurry vision, itchy skin     Social History     Social History Narrative    She is originally from [place]. 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  Now working in [employer].  She was rec    ently  a second time in 2014 to a man named Janiya. She is  from her first  and has 7 adult children and many grandchildren.         Social History     Tobacco Use   ? Smoking status: Never Smoker   ? Smokeless tobacco: Never Used   Substance Use Topics   ? Alcohol use: No   ? Drug use: No     Family History   Problem Relation Age of Onset   ? Breast cancer Maternal Aunt 70   ? Hypertension Mother    ? Diabetes Mother    ? No Medical Problems Father         killed in war   ? No Medical Problems Sister         one  sudden death (37), others living   ? No Medical Problems Brother         9 living, 1  diabetes, others  in war   ? No Medical Problems Daughter    ? No Medical Problems Son    ? BRCA 1/2 Neg Hx    ? Cancer Neg Hx    ? Colon cancer Neg Hx    ? Endometrial cancer Neg Hx    ? Ovarian cancer Neg Hx      Past Surgical History:   Procedure Laterality Date   ? HYSTERECTOMY      with bso, for ovarian cyst   ? OOPHORECTOMY     ? KS TEMPORAL ARTERY LIGATN OR BX Bilateral 2020    Procedure: BIOPSY, ARTERY, TEMPORAL;  Surgeon: Eric Alejandro MD;  Location: [facility];  Service: General   ? ROTATOR CUFF REPAIR Right    Time: total time spent with the patient was 15 minutes of which >50% was spent in counseling and coordination of care
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
## 2021-07-03 NOTE — ADDENDUM NOTE
Addendum Note by Cj Arce MD at 12/13/2017  2:01 PM     Author: Cj Arce MD Service: -- Author Type: Physician    Filed: 12/13/2017  2:01 PM Encounter Date: 12/13/2017 Status: Signed    : Cj Arce MD (Physician)    Addended by: CJ ARCE on: 12/13/2017 02:01 PM        Modules accepted: Orders

## 2021-07-03 NOTE — ADDENDUM NOTE
Addendum Note by Ariana Mehta RN at 7/19/2018  4:07 PM     Author: Ariana Mehta RN Service: -- Author Type: Registered Nurse    Filed: 7/19/2018  4:07 PM Date of Service: 7/19/2018  4:07 PM Status: Signed    : Ariana Mehta RN (Registered Nurse)    Encounter addended by: Ariana Mehta RN on: 7/19/2018  4:07 PM<BR>     Actions taken: Sign clinical note

## 2021-07-03 NOTE — ADDENDUM NOTE
Addendum Note by Carol Huntley PA-C at 4/5/2018 11:59 PM     Author: Carol Huntley PA-C Service: -- Author Type: Physician Assistant    Filed: 4/11/2018  7:30 AM Date of Service: 4/5/2018 11:59 PM Status: Signed    : Carol Huntley PA-C (Physician Assistant)    Encounter addended by: Carol Huntley PA-C on: 4/11/2018  7:30 AM<BR>     Actions taken:  activity accessed, Order Reconciliation Section accessed, Sign clinical note

## 2021-07-03 NOTE — ADDENDUM NOTE
Addendum Note by Gustavo Arguello CMA at 11/18/2019  1:20 PM     Author: Gustavo Arguello CMA Service: -- Author Type: Certified Medical Assistant    Filed: 11/18/2019  2:13 PM Date of Service: 11/18/2019  1:20 PM Status: Signed    : Gustavo Arguello CMA (Certified Medical Assistant)    Encounter addended by: Gustavo Arguello CMA on: 11/18/2019  2:13 PM      Actions taken: Vitals modified

## 2021-07-06 VITALS — BODY MASS INDEX: 41.22 KG/M2 | HEIGHT: 62 IN | WEIGHT: 224 LBS

## 2021-07-06 VITALS — BODY MASS INDEX: 44.84 KG/M2 | WEIGHT: 222 LBS | BODY MASS INDEX: 43.36 KG/M2 | HEIGHT: 60 IN

## 2021-07-07 ENCOUNTER — OFFICE VISIT (OUTPATIENT)
Dept: OPHTHALMOLOGY | Facility: CLINIC | Age: 66
End: 2021-07-07
Payer: COMMERCIAL

## 2021-07-07 DIAGNOSIS — H40.003 GLAUCOMA SUSPECT OF BOTH EYES: ICD-10-CM

## 2021-07-07 DIAGNOSIS — H52.03 HYPERMETROPIA OF BOTH EYES: Primary | ICD-10-CM

## 2021-07-07 DIAGNOSIS — H53.10 SUBJECTIVE VISION DISTURBANCE: ICD-10-CM

## 2021-07-07 DIAGNOSIS — E11.9 DIABETES MELLITUS TYPE 2 WITHOUT RETINOPATHY (H): ICD-10-CM

## 2021-07-07 DIAGNOSIS — H25.093 AGE-RELATED INCIPIENT CATARACT OF BOTH EYES: ICD-10-CM

## 2021-07-07 PROCEDURE — 92015 DETERMINE REFRACTIVE STATE: CPT | Performed by: OPTOMETRIST

## 2021-07-07 PROCEDURE — 92133 CPTRZD OPH DX IMG PST SGM ON: CPT | Performed by: OPTOMETRIST

## 2021-07-07 PROCEDURE — 92004 COMPRE OPH EXAM NEW PT 1/>: CPT | Performed by: OPTOMETRIST

## 2021-07-07 ASSESSMENT — REFRACTION_MANIFEST
OS_CYLINDER: SPHERE
OS_ADD: +2.50
OS_SPHERE: +1.50
OD_ADD: +2.50
OD_SPHERE: +1.75
OD_CYLINDER: SPHERE

## 2021-07-07 ASSESSMENT — EXTERNAL EXAM - LEFT EYE: OS_EXAM: NORMAL

## 2021-07-07 ASSESSMENT — REFRACTION_WEARINGRX
OS_SPHERE: +1.25
OD_SPHERE: +1.75
OD_CYLINDER: SPHERE
OS_ADD: +2.50
OS_CYLINDER: SPHERE
OD_ADD: +2.50
SPECS_TYPE: PAL

## 2021-07-07 ASSESSMENT — SLIT LAMP EXAM - LIDS
COMMENTS: NORMAL
COMMENTS: NORMAL

## 2021-07-07 ASSESSMENT — VISUAL ACUITY
OD_CC: 20/25
CORRECTION_TYPE: GLASSES
OS_CC: 20/25
OS_CC: 20/25
OD_CC: 20/30
METHOD: SNELLEN - LINEAR
OD_CC+: -2

## 2021-07-07 ASSESSMENT — CUP TO DISC RATIO
OS_RATIO: 0.7
OD_RATIO: 0.75

## 2021-07-07 ASSESSMENT — TONOMETRY
IOP_METHOD: ICARE
OS_IOP_MMHG: 21
OD_IOP_MMHG: 14

## 2021-07-07 ASSESSMENT — EXTERNAL EXAM - RIGHT EYE: OD_EXAM: NORMAL

## 2021-07-07 ASSESSMENT — CONF VISUAL FIELD
OD_NORMAL: 1
OS_NORMAL: 1
METHOD: COUNTING FINGERS

## 2021-07-07 NOTE — LETTER
"  July 7, 2021          Your patient, Isabel Biggs, was examined in the Ophthalmology Clinic today for a comprehensive eye exam.     Her eye exam was largely normal with no evidence of diabetic retinopathy.    She complained of a 15 minute episode of bilateral \"foggy vision.\" While this could possibly be related to a migraine aura, my concern given her medical history would also be a transient ischemic attack (TIA), although for the symptoms to be equal bilaterally is atypical as well.    I wanted to bring this to your attention in the event you would like to see her or order more testing.    Thank you for allowing us to participate in the care of this patient.     Sincerely,    Juma Wilkerson, OD, FAAO  "

## 2021-07-07 NOTE — ANESTHESIA PREPROCEDURE EVALUATION
Anesthesia Evaluation      Patient summary reviewed   No history of anesthetic complications     Airway   Mallampati: I  Neck ROM: full   Pulmonary - normal exam   (+) sleep apnea on no CPAP, ,   (-) not a smoker                         Cardiovascular - normal exam     ROS comment: Beta Thal and anemia.     Neuro/Psych    (+) neuromuscular disease,  depression, anxiety/panic attacks,     Comments: Headache NOS.    Endo/Other    (+) diabetes mellitus type 2, obesity,      GI/Hepatic/Renal - negative ROS           Dental - normal exam                        Anesthesia Plan  Planned anesthetic: MAC    ASA 3     Anesthetic plan and risks discussed with: patient  Anesthesia plan special considerations: antiemetics,   Post-op plan: routine recovery

## 2021-07-07 NOTE — ANESTHESIA POSTPROCEDURE EVALUATION
Patient: Isabel CAMPBELL Tubman  Procedure(s):  COLONOSCOPY with polypectomy   Anesthesia type: MAC    Patient location: Phase II Recovery  Last vitals:   Vitals Value Taken Time   /58 06/28/21 0840   Temp 36.7  C (98  F) 06/28/21 0840   Pulse 81 06/28/21 0840   Resp 15 06/28/21 0840   SpO2 98 % 06/28/21 0840     Post vital signs: stable  Level of consciousness: awake and responds to simple questions  Post-anesthesia pain: pain controlled  Post-anesthesia nausea and vomiting: no  Pulmonary: unassisted, return to baseline  Cardiovascular: stable and blood pressure at baseline  Hydration: adequate  Anesthetic events: no    QCDR Measures:  ASA# 11 - Maryana-op Cardiac Arrest: ASA11B - Patient did NOT experience unanticipated cardiac arrest  ASA# 12 - Maryana-op Mortality Rate: ASA12B - Patient did NOT die  ASA# 13 - PACU Re-Intubation Rate: NA - No ETT / LMA used for case  ASA# 10 - Composite Anes Safety: ASA10A - No serious adverse event    Additional Notes:

## 2021-07-07 NOTE — ANESTHESIA CARE TRANSFER NOTE
Last vitals:   Vitals:    06/28/21 0840   BP: 117/58   Pulse: 81   Resp: 15   Temp: 36.7  C (98  F)   SpO2: 98%     Patient's level of consciousness is drowsy  Spontaneous respirations: yes  Maintains airway independently: yes  Dentition unchanged: yes  Oropharynx: oropharynx clear of all foreign objects    QCDR Measures:  ASA# 20 - Surgical Safety Checklist: WHO surgical safety checklist completed prior to induction    PQRS# 430 - Adult PONV Prevention: NA - Not adult patient, not GA or 3 or more risk factors NOT present  ASA# 8 - Peds PONV Prevention: NA - Not pediatric patient, not GA or 2 or more risk factors NOT present  PQRS# 424 - Maryana-op Temp Management: NA - MAC anesthesia or case < 60 minutes  PQRS# 426 - PACU Transfer Protocol:NA - Patient did not go to PACU  ASA# 14 - Acute Post-op Pain: ASA14B - Patient did NOT experience pain >= 7 out of 10

## 2021-07-07 NOTE — NURSING NOTE
"Chief Complaints and History of Present Illnesses   Patient presents with     COMPREHENSIVE EYE EXAM     Chief Complaint(s) and History of Present Illness(es)     COMPREHENSIVE EYE EXAM     Laterality: both eyes    Onset: gradual    Onset: 2 days ago    Quality: blurred vision    Severity: moderate    Frequency: intermittently    Context: distance vision    Associated symptoms: floaters (stable - no change), itching, discharge (sometimes mattery) and tearing (during reading or focused eyes water).  Negative for eye pain, flashes, previous episodes, dryness and burning    Treatments tried: glasses and no treatments    Pain scale: 0/10              Comments     New pPt here today for comprehensive eye exam.  OMARI was 2.5 yrs ago in [place]. Glaucoma eye concerns.   Pt states had an episode of foggy vision yesterday that lasted 15 minutes while working out.  Pt was in lying position when foggy episode happened.    Pt denies any medication use for eyes. Pt states she is \"pre-diabetic\"    No results found for: A1C    Augusto BELL, WILFRIDO 1:53 PM 07/07/2021                   "

## 2021-07-13 ENCOUNTER — RECORDS - HEALTHEAST (OUTPATIENT)
Dept: ADMINISTRATIVE | Facility: CLINIC | Age: 66
End: 2021-07-13

## 2021-07-21 ENCOUNTER — RECORDS - HEALTHEAST (OUTPATIENT)
Dept: ADMINISTRATIVE | Facility: CLINIC | Age: 66
End: 2021-07-21

## 2021-07-22 ENCOUNTER — ALLIED HEALTH/NURSE VISIT (OUTPATIENT)
Dept: OPHTHALMOLOGY | Facility: CLINIC | Age: 66
End: 2021-07-22
Attending: OPTOMETRIST
Payer: COMMERCIAL

## 2021-07-22 ENCOUNTER — RECORDS - HEALTHEAST (OUTPATIENT)
Dept: INTERNAL MEDICINE | Facility: CLINIC | Age: 66
End: 2021-07-22

## 2021-07-22 DIAGNOSIS — H53.10 SUBJECTIVE VISION DISTURBANCE: ICD-10-CM

## 2021-07-22 DIAGNOSIS — Z12.31 OTHER SCREENING MAMMOGRAM: ICD-10-CM

## 2021-07-22 PROCEDURE — 92083 EXTENDED VISUAL FIELD XM: CPT

## 2021-07-22 PROCEDURE — 92083 EXTENDED VISUAL FIELD XM: CPT | Mod: 26 | Performed by: OPTOMETRIST

## 2021-07-22 ASSESSMENT — REFRACTION_WEARINGRX
OD_CYLINDER: SPHERE
SPECS_TYPE: PAL
OD_SPHERE: +1.75
OD_ADD: +2.50
OS_SPHERE: +1.25
OS_CYLINDER: SPHERE
OS_ADD: +2.50

## 2021-07-23 ENCOUNTER — OFFICE VISIT (OUTPATIENT)
Dept: INTERNAL MEDICINE | Facility: CLINIC | Age: 66
End: 2021-07-23
Payer: COMMERCIAL

## 2021-07-23 VITALS
BODY MASS INDEX: 43.29 KG/M2 | HEIGHT: 60 IN | OXYGEN SATURATION: 99 % | DIASTOLIC BLOOD PRESSURE: 82 MMHG | HEART RATE: 94 BPM | SYSTOLIC BLOOD PRESSURE: 130 MMHG | WEIGHT: 220.5 LBS

## 2021-07-23 DIAGNOSIS — G47.33 OSA (OBSTRUCTIVE SLEEP APNEA): ICD-10-CM

## 2021-07-23 DIAGNOSIS — H53.8 BLURRED VISION: ICD-10-CM

## 2021-07-23 DIAGNOSIS — E66.01 MORBID OBESITY (H): ICD-10-CM

## 2021-07-23 DIAGNOSIS — F43.23 ADJUSTMENT DISORDER WITH MIXED ANXIETY AND DEPRESSED MOOD: ICD-10-CM

## 2021-07-23 DIAGNOSIS — R51.9 NONINTRACTABLE EPISODIC HEADACHE, UNSPECIFIED HEADACHE TYPE: Primary | ICD-10-CM

## 2021-07-23 DIAGNOSIS — F06.4 ANXIETY DISORDER DUE TO MEDICAL CONDITION: ICD-10-CM

## 2021-07-23 DIAGNOSIS — M54.6 ACUTE BILATERAL THORACIC BACK PAIN: ICD-10-CM

## 2021-07-23 PROCEDURE — 99214 OFFICE O/P EST MOD 30 MIN: CPT | Mod: 25 | Performed by: INTERNAL MEDICINE

## 2021-07-23 PROCEDURE — G0009 ADMIN PNEUMOCOCCAL VACCINE: HCPCS | Performed by: INTERNAL MEDICINE

## 2021-07-23 PROCEDURE — 90732 PPSV23 VACC 2 YRS+ SUBQ/IM: CPT | Performed by: INTERNAL MEDICINE

## 2021-07-23 RX ORDER — TIZANIDINE HYDROCHLORIDE 4 MG/1
4 CAPSULE, GELATIN COATED ORAL 3 TIMES DAILY PRN
Qty: 30 CAPSULE | Refills: 5 | Status: SHIPPED | OUTPATIENT
Start: 2021-07-23 | End: 2022-02-21

## 2021-07-23 RX ORDER — DULOXETIN HYDROCHLORIDE 30 MG/1
30 CAPSULE, DELAYED RELEASE ORAL 2 TIMES DAILY
Qty: 180 CAPSULE | Refills: 0 | Status: SHIPPED | OUTPATIENT
Start: 2021-07-23 | End: 2022-08-08

## 2021-07-23 RX ORDER — BUPROPION HYDROCHLORIDE 150 MG/1
150 TABLET ORAL DAILY
Qty: 30 TABLET | Refills: 3 | Status: SHIPPED | OUTPATIENT
Start: 2021-07-23 | End: 2021-12-30

## 2021-07-23 ASSESSMENT — MIFFLIN-ST. JEOR: SCORE: 1461.68

## 2021-07-23 NOTE — PROGRESS NOTES
Office Visit - Follow Up   Isabel Biggs   66 year old female    Date of Visit: 7/23/2021    Chief Complaint   Patient presents with     RECHECK     hypertension        Assessment and Plan   1. Nonintractable episodic headache, unspecified headache type  I wonder about radicular pain from cervical spine arthritis.  Also wonder about a hemicrania type headache.  I would like her to resume amitriptyline and Cymbalta and have recommended she see neurology  - Adult Neurology Referral; Future    2. Blurred vision  This was transient.  She has had recent evaluation including MRI/MRA of the head and neck CTA of the head and neck MRI of the brain, echocardiogram, Holter monitor and EKGs.  She is on aspirin and statin and antihypertensive medication and her diabetes is well controlled.  Her ophthalmologist was concerned that this could be a transient ischemic attack which is possible but fairly atypical.  Given her recent evaluations as well as lack of recurrence I would like to continue with the current plan with excellent blood pressure control excellent control of diabetes and use of statin aspirin.    3. Anxiety disorder due to medical condition  - buPROPion (WELLBUTRIN XL) 150 MG 24 hr tablet; Take 1 tablet (150 mg) by mouth daily  Dispense: 30 tablet; Refill: 3  - amitriptyline (ELAVIL) 25 MG tablet; Take 1 tablet (25 mg) by mouth At Bedtime  Dispense: 90 tablet; Refill: 3    4. Adjustment disorder with mixed anxiety and depressed mood  - DULoxetine (CYMBALTA) 30 MG capsule; Take 1 capsule (30 mg) by mouth 2 times daily  Dispense: 180 capsule; Refill: 0    5. Acute bilateral thoracic back pain  - tiZANidine (ZANAFLEX) 4 MG capsule; Take 1 capsule (4 mg) by mouth 3 times daily as needed  Dispense: 30 capsule; Refill: 5    6. WILFRIDO - not using CPAP (2020)  We will have an upcoming sleep evaluation    7. Morbid obesity (H)  The following high BMI interventions were performed this visit: encouragement to exercise and  lifestyle education regarding diet    Return in about 3 months (around 10/23/2021).     History of Present Illness   This 66 year old woman comes in for follow-up.  Overall doing well.  She has been exercising regularly and feels really good about this.  She recently saw an ophthalmologist and has no diabetic retinopathy.  She did explain an episode weeks ago where she was lifting weights and she lied flat and was lifting weights above her head and when she sat up she had some cloudy vision for a few minutes.  This resolved and she has had no symptoms since.  She otherwise felt well and had no speech difficulty no double vision no numbness no weakness.  She has been having headaches on the right side of her scalp.  This is mainly positional at night when she lies on that side or when she moves her head.  Occasionally she will have some during the day.  She has no temporal headache or tenderness.  And apart from the episode of cloudy vision when she was lifting weights she has had no vision issues.  Again she had a normal eye exam.  Her CRP was normal when she initially complained about this headache.  Her sed rate was minimally elevated at 29.  This does need to be adjusted for her anemia and is essentially normal.  She will be having a sleep evaluation coming up later this summer.  She has stopped some of her medications because she ran out of prescriptions including duloxetine and amitriptyline.  She is only using gabapentin as needed    Review of Systems: A comprehensive review of systems was negative except as noted.     Medications, Allergies and Problem List   Reviewed, reconciled and updated  Post Discharge Medication Reconciliation Status:      Physical Exam   General Appearance:   No acute distress    /82 (BP Location: Left arm, Patient Position: Sitting, Cuff Size: Adult Large)   Pulse 94   Ht 1.524 m (5')   Wt 100 kg (220 lb 8 oz)   SpO2 99%   BMI 43.06 kg/m      She has no temporal artery  tenderness and good pulsation.  She has normal strength sensation reflexes in the upper and lower extremities.  Cardiovascular regular rate and rhythm no murmur gallop or rub her lungs are clear to auscultation bilaterally     Additional Information   Current Outpatient Medications   Medication Sig Dispense Refill     acetaminophen (TYLENOL) 500 MG tablet [ACETAMINOPHEN (TYLENOL) 500 MG TABLET] Take 1,000 mg by mouth.       albuterol (PROAIR HFA;PROVENTIL HFA;VENTOLIN HFA) 90 mcg/actuation inhaler [ALBUTEROL (PROAIR HFA;PROVENTIL HFA;VENTOLIN HFA) 90 MCG/ACTUATION INHALER] INHALE 1 TO 2 PUFFS BY MOUTH INTO THE LUNGS EVERY 4 HOURS AS NEEDED FOR WHEEZING 3 Inhaler 1     albuterol (PROVENTIL) 2.5 mg /3 mL (0.083 %) nebulizer solution [ALBUTEROL (PROVENTIL) 2.5 MG /3 ML (0.083 %) NEBULIZER SOLUTION] Take 3 mL (2.5 mg total) by nebulization every 6 (six) hours as needed for wheezing. 90 vial 11     amitriptyline (ELAVIL) 25 MG tablet Take 1 tablet (25 mg) by mouth At Bedtime 90 tablet 3     amLODIPine (NORVASC) 2.5 MG tablet [AMLODIPINE (NORVASC) 2.5 MG TABLET] Take 1 tablet (2.5 mg total) by mouth daily. 30 tablet 3     aspirin 81 MG EC tablet [ASPIRIN 81 MG EC TABLET] Take 1 tablet (81 mg total) by mouth daily. 90 tablet 3     atorvastatin (LIPITOR) 20 MG tablet [ATORVASTATIN (LIPITOR) 20 MG TABLET] Take 1 tablet (20 mg total) by mouth daily. 90 tablet 3     buPROPion (WELLBUTRIN XL) 150 MG 24 hr tablet Take 1 tablet (150 mg) by mouth daily 30 tablet 3     DULoxetine (CYMBALTA) 30 MG capsule Take 1 capsule (30 mg) by mouth 2 times daily 180 capsule 0     fluticasone propionate (FLONASE) 50 mcg/actuation nasal spray [FLUTICASONE PROPIONATE (FLONASE) 50 MCG/ACTUATION NASAL SPRAY] 1 spray into each nostril daily. 16 g 11     gabapentin (NEURONTIN) 100 MG capsule [GABAPENTIN (NEURONTIN) 100 MG CAPSULE] Take 100 mg by mouth 2 (two) times a day. 180 capsule 1     meclizine (ANTIVERT) 25 mg tablet [MECLIZINE (ANTIVERT) 25 MG  TABLET] Take 1 tablet (25 mg total) by mouth 4 (four) times a day as needed for dizziness. 90 tablet 3     nebulizer accessories (ADULT AEROSOL MASK) Misc [NEBULIZER ACCESSORIES (ADULT AEROSOL MASK) MISC] USE 1 AS DIRECTED. 1 each 0     polyethylene glycol (GLYCOLAX) 17 gram/dose powder [POLYETHYLENE GLYCOL (GLYCOLAX) 17 GRAM/DOSE POWDER] Take 17 g by mouth daily. Use as needed. 255 g 3     tiZANidine (ZANAFLEX) 4 MG capsule Take 1 capsule (4 mg) by mouth 3 times daily as needed 30 capsule 5     VITAMIN D3 25 mcg (1,000 unit) capsule [VITAMIN D3 25 MCG (1,000 UNIT) CAPSULE] TAKE 1 CAPSULE BY MOUTH EVERY DAY 90 capsule 3     Allergies   Allergen Reactions     Chloroquine Other (See Comments), Itching and Visual Disturbance     Blurry vision, itchy skin  Blurry vision, itchy skin       Chlorquinaldol Dermatitis and Other (See Comments)     also blurry vision     Social History     Tobacco Use     Smoking status: Never Smoker     Smokeless tobacco: Never Used   Substance Use Topics     Alcohol use: Never     Drug use: None       Review and/or order of clinical lab tests:  Review and/or order of radiology tests:  Review and/or order of medicine tests:  Discussion of test results with performing physician:  Decision to obtain old records and/or obtain history from someone other than the patient:  Review and summarization of old records and/or obtaining history from someone other than the patient and.or discussion of case with another health care provider:  Independent visualization of image, tracing or specimen itself:    Time:      Cj Georges MD

## 2021-07-31 ENCOUNTER — PATIENT OUTREACH (OUTPATIENT)
Dept: CARE COORDINATION | Facility: CLINIC | Age: 66
End: 2021-07-31

## 2021-07-31 NOTE — PROGRESS NOTES
Contact   Chart Review     Situation: Patient chart reviewed by .    Assessment: updated chart after epic integration.   Plan/Recommendations: SW CC to follow up 8-12. Delegation to [facility] in place.     Angela Rogel,   [facility]  [phone]

## 2021-08-03 NOTE — PROGRESS NOTES
Assessment/Plan  (H53.10) Subjective vision disturbance  Comment: Reporting transient blurring both eyes with exercise  Plan:  Educated patient on clinical findings. Baseline visual field obtained. Scattered defects of no specific pattern in either eye. Continue management with primary care provider. Monitor annually.    Complete documentation of historical and exam elements from today's encounter can  be found in the full encounter summary report (not reduplicated in this progress  note). I personally obtained the chief complaint(s) and history of present illness. I  confirmed and edited as necessary the review of systems, past medical/surgical  history, family history, social history, and examination findings as documented by  others; and I examined the patient myself. I personally reviewed the relevant tests,  images, and reports as documented above. I formulated and edited as necessary the  assessment and plan and discussed the findings and management plan with the  patient and family.    Juma Wilkerson OD, FAAO

## 2021-08-06 NOTE — PATIENT INSTRUCTIONS - HE
Patient Instructions by Cj Georges MD at 2/22/2021  3:00 PM     Author: Cj Georges MD Service: -- Author Type: Physician    Filed: 2/22/2021  3:32 PM Encounter Date: 2/22/2021 Status: Signed    : Cj Georges MD (Physician)         Patient Education     Exercise for a Healthier Heart  You may wonder how you can improve the health of your heart. If youre thinking about exercise, youre on the right track. You dont need to become an athlete, but you do need a certain amount of brisk exercise to help strengthen your heart. If you have been diagnosed with a heart condition, your doctor may recommend exercise to help stabilize your condition. To help make exercise a habit, choose safe, fun activities.       Be sure to check with your health care provider before starting an exercise program.    Why exercise?  Exercising regularly offers many healthy rewards. It can help you do all of the following:    Improve your blood cholesterol levels to help prevent further heart trouble    Lower your blood pressure to help prevent a stroke or heart attack    Control diabetes, or reduce your risk of getting this disease    Improve your heart and lung function    Reach and maintain a healthy weight    Make your muscles stronger and more limber so you can stay active    Prevent falls and fractures by slowing the loss of bone mass (osteoporosis)    Manage stress better  Exercise tips  Ease into your routine. Set small goals. Then build on them.  Exercise on most days. Aim for a total of 150 or more minutes of moderate to  vigorous intensity activity each week. Consider 40 minutes, 3 to 4 times a week. For best results, activity should last for 40 minutes on average. It is OK to work up to the 40 minute period over time. Examples of moderate-intensity activity is walking one mile in 15 minutes or 30 to 45 minutes of yard work.  Step up your daily activity level. Along with your exercise program, try  being more active throughout the day. Walk instead of drive. Do more household tasks or yard work.  Choose one or more activities you enjoy. Walking is one of the easiest things you can do. You can also try swimming, riding a bike, or taking an exercise class.  Stop exercising and call your doctor if you:    Have chest pain or feel dizzy or lightheaded    Feel burning, tightness, pressure, or heaviness in your chest, neck, shoulders, back, or arms    Have unusual shortness of breath    Have increased joint or muscle pain    Have palpitations or an irregular heartbeat      3411-9196 [employer]. [address] [place] [address]. All rights reserved. This information is not intended as a substitute for professional medical care. Always follow your healthcare professional's instructions.         Patient Education   Instrumental Activities of Daily Living  Your Health Risk Assessment indicates you have difficulties with instrumental activities of daily living which include laundry, housekeeping, banking, shopping, using the telephone, food preparation, transportation, or taking your own medications. Please make a follow up appointment for us to address this issue in more detail.    [employer] has resources available on the following website: [email or web address]     Also, here is a local agency that provides help with meals and other assistance:   [facility] Line: [phone]     Patient Education   Urinary Incontinence, Female (Adult)  Urinary incontinence means loss of control of the bladder. This problem affects many women, especially as they get older. If you have incontinence, you may be embarrassed to ask for help. But know that this problem can be treated.  Types of Incontinence  There are different types of incontinence. Two of the main types are described here. You can have more than one type.    Stress incontinence. With this type, urine leaks when pressure  (stress) is put on the bladder. This may happen when you cough, sneeze, or laugh. Stress incontinence most often occurs because the pelvic floor muscles that support the bladder and urethra are weak. This can happen after pregnancy and vaginal childbirth or a hysterectomy. It can also be due to excess body weight or hormone changes.    Urge incontinence (also called overactive bladder). With this type, a sudden urge to urinate is felt often. This may happen even though there may not be much urine in the bladder. The need to urinate often during the night is common. Urge incontinence most often occurs because of bladder spasms. This may be due to bladder irritation or infection. Damage to bladder nerves or pelvic muscles, constipation, and certain medicines can also lead to urge incontinence.  Treatment of urinary incontinence depends on the cause. Further evaluation is needed to find the type you have. This will likely include an exam and certain tests. Based on the results, you and your healthcare provider can then plan treatment. Until a diagnosis is made, the home care tips below can help relieve symptoms.  Home care    Do pelvic floor muscle exercises, if they are prescribed. The pelvic floor muscles help support the bladder and urethra. Many women find that their symptoms improve when doing special exercises that strengthen these muscles. To do the exercises contract the muscles you would use to stop your stream of urine, but do this when youre not urinating. Hold for 10 seconds, then relax. Repeat 10 to 20 times in a row, at least 3 times a day. Your provider may give you other instructions for how to do the exercises and how often.    Keep a bladder diary. This helps track how often and how much you urinate over a set period of time. Bring this diary with you to your next visit with the provider. The information can help your provider learn more about your bladder problem.    Lose weight, if advised to by  your provider. Excess weight puts pressure on the bladder. Your provider can help you create a weight-loss plan thats right for you. This may include exercising more and making certain diet changes.    Don't consume foods and drinks that may irritate the bladder. These can include alcohol and caffeinated drinks.    Quit smoking. Smoking and other tobacco use can lead to chronic cough that strains the pelvic floor muscles. Smoking may also damage the bladder and urethra. Talk with your provider about treatments or methods you can use to quit smoking.    If drinking large amounts of fluid causes you to have symptoms, you may be advised to limit your fluid intake. You may also be advised to drink most of your fluids during the day and to limit fluids at night.    If youre worried about urine leakage or accidents, you may wear absorbent pads to catch urine. Change the pads often. This helps reduce discomfort. It may also reduce the risk of skin or bladder infections.  Follow-up care  Follow up with your healthcare provider, or as directed. It may take some to find the right treatment for your problem. Your treatment plan may include special therapies or medicines. Certain procedures or surgery may also be options. Be sure to discuss any questions you have with your provider.  When to seek medical advice  Call the healthcare provider right away if any of these occur:    Fever of 100.4 F (38 C) or higher, or as directed by your provider    Bladder pain or fullness    Abdominal swelling    Nausea or vomiting    Back pain    Weakness, dizziness or fainting  Date Last Reviewed: 10/1/2017    6787-0423 [employer]. [address] [place] [address]. All rights reserved. This information is not intended as a substitute for professional medical care. Always follow your healthcare professional's instructions.     Patient Education   Your Health Risk Assessment indicates you feel you are not in good emotional  health.    Recreation   Recreation is not limited to sports and team events. It includes any activity that provides relaxation, interest, enjoyment, and exercise. Recreation provides an outlet for physical, mental, and social energy. It can give a sense of worth and achievement. It can help you stay healthy.    Mental Exercise and Social Involvement  Mental and emotional health is as important as physical health. Keep in touch with friends and family. Stay as active as possible. Continue to learn and challenge yourself.   Things you can do to stay mentally active are:    Learn something new, like a foreign language or musical instrument.     Play SCRABBLE or do crossword puzzles. If you cannot find people to play these games with you at home, you can play them with others on your computer through the Internet.     Join a games club--anything from card games to chess or checkers or lawn bowling.     Start a new hobby.     Go back to school.     Volunteer.     Read.     Keep up with world events.       Patient Education   Depression and Suicide in Older Adults  Nearly 2 million older Americans have some type of depression. Sadly, some of them even take their own lives. Yet depression among older adults is often ignored. Learn the warning signs. You may help spare a loved one needless pain. You may also save a life.       What Is Depression?  Depression is a mood disorder that affects the way you think and feel. The most common symptom is a feeling of deep sadness. People who are depressed also may seem tired and listless. And nothing seems to give them pleasure. Its normal to grieve or be sad sometimes. But sadness lessens or passes with time. Depression rarely goes away or improves on its own. Other symptoms of depression are:    Sleeping more or less than normal    Eating more or less than normal    Having headaches, stomachaches, or other pains that dont go away    Feeling nervous, empty, or worthless    Crying a  great deal    Thinking or talking about suicide or death    Feeling confused or forgetful  What Causes It?  The causes of depression arent fully known. Certain chemicals in the brain play a role. Depression does run in families. And life stresses can also trigger depression in some people. That may be the case with older adults. They often face great burdens, such as the death of friends or a spouse. They may have failing health. And they are more likely to be alone, lonely, or poor.  How You Can Help  Often, depressed people may not want to ask for help. When they do, they may be ignored. Or, they may receive the wrong treatment. You can help by showing parents and older friends love and support. If they seem depressed, help them find the right treatment. Talk to your doctor. Or contact a local mental health center, social service agency, or hospital. With modern treatment, no one has to suffer from depression.  Resources:    National [place] of Mental Health  822.553.9335  www.nimh.nih.gov    National [place] on Mental Illness  927.175.5322  www.kathie.org    [facility] [place]  743.315.5813  www.[facility].org    National Suicide Hotline  163.796.4238 (800-SUICIDE)      1253-7378 [employer]. [address] [place] [address], [place] [address]. All rights reserved. This information is not intended as a substitute for professional medical care. Always follow your healthcare professional's instructions.         Patient Education   Understanding Advance Care Planning  Advance care planning is the process of deciding ones own future medical care. It helps ensure that if you cant speak for yourself, your wishes can still be carried out. The plan is a series of legal documents that note a persons wishes. The documents vary by state. Advance care planning may be done when a person has a serious illness that is expected to get worse. It may be done before major surgery. And it can help you and your family be prepared  in case of a major illness or injury. Advance care planning helps with making decisions at these times.       A health care proxy is a person who acts as the voice of a patient when the patient cant speak for himself or herself. The name of this role varies by state. It may be called a Durable Medical Power of  or Durable Power of  for Healthcare. It may be called an agent, surrogate, or advocate. Or it may be called a representative or decision maker. It is an official duty that is identified by a legal document. The document also varies by state.    Why Is Advance Care Planning Important?  If a person communicates their healthcare wishes:    They will be given medical care that matches their values and goals.    Their family members will not be forced to make decisions in a crisis with no guidance.  Creating a Plan  Making an advance care plan is often done in 3 steps:    Thinking about ones wishes. To create an advance care plan, you should think about what kind of medical treatment you would want if you lose the ability to communicate. Are there any situations in which you would refuse or stop treatment? Are there therapies you would want or not want? And whom do you want to make decisions for you? There are many places to learn more about how to plan for your care. Ask your doctor or  for resources.    Picking a health care proxy. This means choosing a trusted person to speak for you only when you cant speak for yourself. When you cannot make medical decisions, your proxy makes sure the instructions in your advance care plan are followed. A proxy does not make decisions based on his or her own opinions. They must put aside those opinions and values if needed, and carry out your wishes.    Filling out the legal documents. There are several kinds of legal documents for advance care planning. Each one tells health care providers your wishes. The documents may vary by state. They must  be signed and may need to be witnessed or notarized. You can cancel or change them whenever you wish. Depending on your state, the documents may include a Healthcare Proxy form, Living Will, Durable Medical Power of , Advance Directive, or others.  The Familys Role  The best help a family can give is to support their loved ones wishes. Open and honest communication is vital. Family should express any concerns they have about the patients choices while the patient can still make decisions.    4465-7588 [employer]. [address] [place] [address]. All rights reserved. This information is not intended as a substitute for professional medical care. Always follow your healthcare professional's instructions.         Also, [facility] [employer] [place] offers a free, downloadable health care directive that allows you to share your treatment choices and personal preferences if you cannot communicate your wishes. It also allows you to appoint another person (called a health care agent) to make health care decisions if you are unable to do so. You can download an advance directive by going here: [email or web address]     Patient Education   Personalized Prevention Plan  You are due for the preventive services outlined below.  Your care team is available to assist you in scheduling these services.  If you have already completed any of these items, please share that information with your care team to update in your medical record.  Health Maintenance   Topic Date Due   ? HEPATITIS C SCREENING  1955   ? DIABETIC EYE EXAM  1955   ? DEXA SCAN  1955   ? Pneumococcal Vaccine: Pediatrics (0 to 5 Years) and At-Risk Patients (6 to 64 Years) (1 of 2 - PPSV23) 01/02/1961   ? Pneumococcal Vaccine: 65+ Years (1 of 2 - PPSV23) 01/02/1961   ? ZOSTER VACCINES (1 of 2) 01/25/2017   ? COLORECTAL CANCER SCREENING  03/05/2020   ? MICROALBUMIN  03/19/2020   ? ADVANCE CARE  PLANNING  05/12/2020   ? A1C  08/07/2020   ? BMP  01/26/2021   ? LIPID  02/07/2021   ? MAMMOGRAM  08/19/2021   ? MEDICARE ANNUAL WELLNESS VISIT  02/22/2022   ? DIABETIC FOOT EXAM  02/22/2022   ? FALL RISK ASSESSMENT  02/22/2022   ? TD 18+ HE  10/23/2023   ? DEPRESSION ACTION PLAN  Completed   ? INFLUENZA VACCINE RULE BASED  Completed

## 2021-08-10 ENCOUNTER — VIRTUAL VISIT (OUTPATIENT)
Dept: SLEEP MEDICINE | Facility: CLINIC | Age: 66
End: 2021-08-10
Payer: COMMERCIAL

## 2021-08-10 DIAGNOSIS — G47.33 OSA (OBSTRUCTIVE SLEEP APNEA): Primary | ICD-10-CM

## 2021-08-10 PROCEDURE — 99203 OFFICE O/P NEW LOW 30 MIN: CPT | Mod: TEL | Performed by: NURSE PRACTITIONER

## 2021-08-10 NOTE — PROGRESS NOTES
"Isabel Biggs is a 66 year old female being evaluated via a billable telephone visit.     \"This telephone visit will be conducted via a call between you and your physician/provider. We have found that certain health care needs can be provided without the need for an in-person visit or physical exam.  This service lets us provide the care you need with a telephone conversation.  If a prescription is necessary we can send it directly to your pharmacy.  If lab work is needed we can place an order for that and you can then stop by our lab to have the test done at a later time.\"    Telephone visits are billed at different rates depending on your insurance coverage.  Please reach out to your insurance provider with any questions.    Patient has given verbal consent for  a Telephone visit? Yes    What telephone number would you like your provider to contact at at:  [phone]    How would you like to obtain your AVS? Lea Lopez MA    Telephone Visit Details:     Telephone Visit Start Time: 10:35 AM    Telephone Visit End Time:11:05 AM      Sleep Consultation:    Date on this visit: 8/10/2021    Isabel Biggs is sent by No ref. provider found for a sleep consultation regarding WILFRIDO, needs new CPAP device.    Primary Physician: Cj Georges     Isabel Biggs is a pleasant 66-year-old female with a PMH pertinent for T2DM, diabetic polyneuropathy, headaches, beta thalassemia, major depression, morbid obesity, and severe WILFRIDO who presents today with reports of nightly snoring, gasping, choking, snorting and poor quality of sleep for at least 2 years.  Her CPAP is broken and needs a new one. She reports not sleeping well. She also reports having headaches and blurred vision as well recently. She has been referred to neurology for this.  She was last seen in sleep consultation on 9/25/2018 with Dr. Markell Woodruff through [facility] sleep center.     Isabel goes to sleep at 10:00 PM during the week. She wakes " up at 12:00 PM without an alarm. She falls asleep in 60 minutes.  Isabel has difficulty falling asleep.  She wakes up 2-3 times a night for 30-35 minutes before falling back to sleep.  Isabel wakes up to uncertain reasons and occasional pain or position changes.  On weekends, Isabel goes to sleep at about the same time.  Patient gets an average of 4 hours of sleep per night.  She occasionally uses melatonin to help her fall asleep.    Patient does watch TV in bed and does not use electronics in bed, worry in bed about anything and read in bed.     Isabel does not do shift work.  She works day shifts on the weekends, recently started new job .  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    Isabel does snore every night and snoring is loud. Patient does not have a regular bed partner. There is report of snoring, gasping, choking, snorting and poor quality of sleep.  She does have witnessed apneas. They frequently sleep separately.  Patient sleeps on her back, side and stomach. She has occasional snort arousals, morning dry mouth, morning headaches and morning confusion, denies occasional restless legs. Isabel has occasional bites her tongue and bruxism and denies any sleep walking, sleep talking, dream enactment, sleep paralysis, cataplexy and hypnogogic/hypnopompic hallucinations.    She denies sleep walking, sleep talking, enuresis and sleep terrors as a child.  Isabel has reflux at night and heartburn, denies difficulty breathing through her nose, claustrophobia and depression.      Isabel has lost 20 pounds in the last year.  Patient describes themself as a night person. Patient's [place]ien[place] Sleepiness score 18/24 consistent with excessive daytime sleepiness.      Isabel naps 3-4 times per week for 30 minutes, feels sometimes refreshed after naps. She takes some inadvertant naps.  She denies closing eyes, dozing and falling asleep while driving.  Patient was counseled on the importance of driving while alert, to pull over if  drowsy, or nap before getting into the vehicle if sleepy.  She uses 1 cups/day of tea.     Previous Study Results: [facility] PSG/Titration  Date: 2/19/2015.  Weight - pounds.  AHI: 31/hr. RDI 53/hr. O2 harlan 83%.  CPAP presssure: 10 cm H2O    Allergies:    Allergies   Allergen Reactions     Chloroquine Other (See Comments), Itching and Visual Disturbance     Blurry vision, itchy skin  Blurry vision, itchy skin       Chlorquinaldol Dermatitis and Other (See Comments)     also blurry vision       Medications:    Current Outpatient Medications   Medication Sig Dispense Refill     acetaminophen (TYLENOL) 500 MG tablet [ACETAMINOPHEN (TYLENOL) 500 MG TABLET] Take 1,000 mg by mouth.       albuterol (PROAIR HFA;PROVENTIL HFA;VENTOLIN HFA) 90 mcg/actuation inhaler [ALBUTEROL (PROAIR HFA;PROVENTIL HFA;VENTOLIN HFA) 90 MCG/ACTUATION INHALER] INHALE 1 TO 2 PUFFS BY MOUTH INTO THE LUNGS EVERY 4 HOURS AS NEEDED FOR WHEEZING 3 Inhaler 1     albuterol (PROVENTIL) 2.5 mg /3 mL (0.083 %) nebulizer solution [ALBUTEROL (PROVENTIL) 2.5 MG /3 ML (0.083 %) NEBULIZER SOLUTION] Take 3 mL (2.5 mg total) by nebulization every 6 (six) hours as needed for wheezing. 90 vial 11     amitriptyline (ELAVIL) 25 MG tablet Take 1 tablet (25 mg) by mouth At Bedtime 90 tablet 3     amLODIPine (NORVASC) 2.5 MG tablet [AMLODIPINE (NORVASC) 2.5 MG TABLET] Take 1 tablet (2.5 mg total) by mouth daily. 30 tablet 3     aspirin 81 MG EC tablet [ASPIRIN 81 MG EC TABLET] Take 1 tablet (81 mg total) by mouth daily. 90 tablet 3     atorvastatin (LIPITOR) 20 MG tablet [ATORVASTATIN (LIPITOR) 20 MG TABLET] Take 1 tablet (20 mg total) by mouth daily. 90 tablet 3     buPROPion (WELLBUTRIN XL) 150 MG 24 hr tablet Take 1 tablet (150 mg) by mouth daily 30 tablet 3     DULoxetine (CYMBALTA) 30 MG capsule Take 1 capsule (30 mg) by mouth 2 times daily 180 capsule 0     fluticasone propionate (FLONASE) 50 mcg/actuation nasal spray [FLUTICASONE PROPIONATE (FLONASE) 50  MCG/ACTUATION NASAL SPRAY] 1 spray into each nostril daily. 16 g 11     gabapentin (NEURONTIN) 100 MG capsule [GABAPENTIN (NEURONTIN) 100 MG CAPSULE] Take 100 mg by mouth 2 (two) times a day. 180 capsule 1     meclizine (ANTIVERT) 25 mg tablet [MECLIZINE (ANTIVERT) 25 MG TABLET] Take 1 tablet (25 mg total) by mouth 4 (four) times a day as needed for dizziness. 90 tablet 3     nebulizer accessories (ADULT AEROSOL MASK) Misc [NEBULIZER ACCESSORIES (ADULT AEROSOL MASK) MISC] USE 1 AS DIRECTED. 1 each 0     polyethylene glycol (GLYCOLAX) 17 gram/dose powder [POLYETHYLENE GLYCOL (GLYCOLAX) 17 GRAM/DOSE POWDER] Take 17 g by mouth daily. Use as needed. 255 g 3     tiZANidine (ZANAFLEX) 4 MG capsule Take 1 capsule (4 mg) by mouth 3 times daily as needed 30 capsule 5     VITAMIN D3 25 mcg (1,000 unit) capsule [VITAMIN D3 25 MCG (1,000 UNIT) CAPSULE] TAKE 1 CAPSULE BY MOUTH EVERY DAY 90 capsule 3       Problem List:  Patient Active Problem List    Diagnosis Date Noted     Diabetic polyneuropathy associated with type 2 diabetes mellitus (H)      Priority: Medium     Created by Conversion  Replacement Utility updated for latest IMO load         WILFRIDO - not using CPAP (2020)      Priority: Medium     Replacement Utility updated for latest IMO load         Nonintractable episodic headache, unspecified headache type      Priority: Medium     Peripheral vertigo 01/26/2020     Priority: Medium     Type 2 diabetes mellitus without complication, without long-term current use of insulin (H)      Priority: Medium     Created by Conversion         Morbid obesity (H) 11/08/2018     Priority: Medium     Avitaminosis D      Priority: Medium     Created by Conversion  Replacement Utility updated for latest IMO load         Beta thalassemia (H) 05/12/2015     Priority: Medium     Low back pain - Dr. GRIFFIN Leblanc [place] 05/12/2015     Priority: Medium     Moderate major depression (H) 05/12/2015     Priority: Medium        Past Medical/Surgical  History:  Past Medical History:   Diagnosis Date     Anemia      Avitaminosis D      Bronchitis      Depression      Diabetes (H)      Glaucoma (increased eye pressure)      Headache      Obesity      Peripheral neuropathy      Rotator cuff tendonitis      Sleep apnea      Past Surgical History:   Procedure Laterality Date     ARTHROSCOPY SHOULDER ROTATOR CUFF REPAIR Right      COLONOSCOPY W/ BIOPSIES N/A 6/28/2021    Procedure: COLONOSCOPY with polypectomy ;  Surgeon: Ned Zamudio MD;  Location: [facility];  Service: Gastroenterology     HYSTERECTOMY  1996    with bso, for ovarian cyst     OOPHORECTOMY  1996     CA TEMPORAL ARTERY LIGATN OR BX Bilateral 1/29/2020    Procedure: BIOPSY, ARTERY, TEMPORAL;  Surgeon: Eric Alejandro MD;  Location: [facility];  Service: General       Social History:  Social History     Socioeconomic History     Marital status:      Spouse name: Not on file     Number of children: Not on file     Years of education: Not on file     Highest education level: Not on file   Occupational History     Not on file   Tobacco Use     Smoking status: Never Smoker     Smokeless tobacco: Never Used   Substance and Sexual Activity     Alcohol use: Never     Drug use: Not on file     Sexual activity: Not on file   Other Topics Concern     Not on file   Social History Narrative     Not on file     Social Determinants of Health     Financial Resource Strain:      Difficulty of Paying Living Expenses:    Food Insecurity:      Worried About Running Out of Food in the Last Year:      Ran Out of Food in the Last Year:    Transportation Needs:      Lack of Transportation (Medical):      Lack of Transportation (Non-Medical):    Physical Activity:      Days of Exercise per Week:      Minutes of Exercise per Session:    Stress:      Feeling of Stress :    Social Connections:      Frequency of Communication with Friends and Family:      Frequency of Social Gatherings with Friends and  Family:      Attends [employer] Services:      Active Member of Clubs or Organizations:      Attends Club or Organization Meetings:      Marital Status:    Intimate Partner Violence:      Fear of Current or Ex-Partner:      Emotionally Abused:      Physically Abused:      Sexually Abused:        Family History:  Family History   Problem Relation Age of Onset     Hypertension Mother      Diabetes Mother      Cancer Other      Breast Cancer Maternal Aunt 70.00     No Known Problems Father         killed in war     No Known Problems Sister         one  sudden death (37), others living     No Known Problems Brother         9 living, 1  diabetes, others  in war     No Known Problems Daughter      No Known Problems Son      Hereditary Breast and Ovarian Cancer Syndrome No family hx of      Cancer No family hx of      Colon Cancer No family hx of      Endometrial Cancer No family hx of      Ovarian Cancer No family hx of        Review of Systems:  A complete review of systems reviewed by me is negative with the exeption of what has been mentioned in the history of present illness.  CONSTITUTIONAL: NEGATIVE for weight gain/loss, fever, chills, sweats or night sweats, drug allergies.  EYES: NEGATIVE for blind spots, double vision.  EYES:  POSITIVE for changes in vision  ENT: NEGATIVE for ear pain, sore throat, sinus pain, post-nasal drip, runny nose, bloody nose  CARDIAC: NEGATIVE for fast heartbeats or fluttering in chest, chest pain or pressure, breathlessness when lying flat, swollen legs  CARDIAC:  POSITIVE for swollen left foot  NEUROLOGIC: NEGATIVE weakness or numbness in the arms or legs.  NEUROLOGIC:  POSITIVE for headaches  DERMATOLOGIC: NEGATIVE for rashes, new moles or change in mole(s)  PULMONARY: NEGATIVE SOB at rest, SOB with activity, dry cough, productive cough, coughing up blood, wheezing or whistling when breathing.    GASTROINTESTINAL: NEGATIVE for nausea or vomitting, loose or watery stools,  fat or grease in stools, constipation, abdominal pain, bowel movements black in color or blood noted.  GENITOURINARY: NEGATIVE for pain during urination, blood in urine, urinating more frequently than usual, irregular menstrual periods.  MUSCULOSKELETAL: NEGATIVE for muscle pain, bone or joint pain, swollen joints.  ENDOCRINE: NEGATIVE for increased thirst or urination, diabetes.  LYMPHATIC: NEGATIVE for swollen lymph nodes, lumps or bumps in the breasts or nipple discharge.    Physical Examination:  Vitals: There were no vitals taken for this visit.  BMI=   Estimated body mass index is 43.06 kg/m  as calculated from the following:    Height as of 7/23/21: 1.524 m (5').    Weight as of 7/23/21: 100 kg (220 lb 8 oz).       Monette Total Score 8/10/2021   Total score - Monette 18       YVONNE Total Score: 17 (08/10/21 0800)    GENERAL APPEARANCE: healthy, alert, no distress and cooperative  RESP: Unlabored, easy breathing with normal conversational speech  NEURO: Alert and oriented x3, mentation intact and speech normal  PSYCH: mentation appears normal and affect normal/bright  Mallampati Class: Unable to examine  Tonsillar Stage: Unable to examine    Estimated body mass index is 43.06 kg/m  as calculated from the following:    Height as of 7/23/21: 1.524 m (5').    Weight as of 7/23/21: 100 kg (220 lb 8 oz).    Impression/Plan:  1. WILFRIDO (obstructive sleep apnea)  - Comprehensive DME    This is a pleasant 66-year-old female with a PMH pertinent for T2DM, diabetic polyneuropathy, headaches, beta thalassemia, major depression, morbid obesity, and severe WILFRIDO who presents today with reports of nightly snoring, gasping, choking, snorting and poor quality of sleep for at least 2 years.  Her CPAP is broken and needs a new one as she has been unable to use it. She reports not sleeping well and her symptoms are nearly identical to when she was evaluated at her initial sleep consultation.  Her Monette score today is 18/24  consistent with excessive daytime somnolence.  Her Insomnia Severity Index score is 17 today.  She also reports having headaches and blurred vision as well recently. She has been referred to neurology for this.  Her last PSG was 2/19/2015 through [facility] which showed severe WILFRIDO with AHI of 31/h.  She had been treated with CPAP with fixed pressure setting of 10 cm H2O which she found to be of benefit and improved her symptoms.  Based on our discussion and review of her medical record, I do not believe that a reevaluation of her sleep is necessary at this stage and will recommend restarting CPAP therapy.  A comprehensive DME order was placed for APAP device with pressure setting 8-15 cm H2O, new mask and CPAP supplies sent to [facility].  We discussed the ongoing nationwide [employer] PAP device recall and its effects on current CPAP supply.  We also discussed the usual use/compliance requirements associated with new PAP device.    Literature provided regarding sleep apnea.      She will follow up with me in approximately two weeks after her sleep study has been competed to review the results and discuss plan of care.       Her last polysomnography results were reviewed.  Obstructive sleep apnea reviewed.  Complications of untreated sleep apnea were reviewed.    40 minutes were spent on the date of the encounter doing chart review, history and exam, documentation and further activities as noted above.       DAYSI Padron CNP  Sleep Medicine    CC: No ref. provider found      This note was written with the assistance of the Dragon voice-dictation technology software. The final document, although reviewed, may contain errors. For corrections, please contact the office.

## 2021-08-10 NOTE — PATIENT INSTRUCTIONS
"  MY INFORMATION ON SLEEP APNEA-  Isabel Biggs    DOCTOR : DAYSI Padron CNP  SLEEP CENTER :      MY CONTACT NUMBER:   [facility]  [phone]  [facility]   [phone]  [facility]   [phone]      [facility]  [phone]  [facility]   [phone]      [place] Points:  1. What is Obstructive Sleep Apnea (WILFRIDO)? WILFRIDO is the most common type of sleep apnea. Apnea literally means, \"without breath.\" It is characterized by repetitive pauses in breathing, despite continued effort to breathe, and is usually associated with a reduction in blood oxygen saturation. Apneas can last 10 to over 60 seconds. It is caused by narrowing or collapse of the upper airway as muscles relax during sleep.   2. What are the consequences of WILFRIDO? Symptoms include: daytime sleepiness- possibly increasing the risk of falling asleep while driving, unrefreshing/restless sleep, snoring, insomnia, waking frequently to urinate, waking with heartburn or reflux, reduced concentration and memory, and morning headaches. Other health consequences may include development of high blood pressure. Untreated WILFRIDO also can contribute to heart disease, stroke and diabetes.   3. What are the treatment options? In most situations, sleep apnea is a lifelong disease that must be managed with daily therapy. Continuous Positive Airway (CPAP) is the most reliable treatment. A mouthguard to hold your jaw forward is usually the next most reliable option. Other options include postioning devices (to keep you off your back), nasal valves, tongue retaining device, weight loss, surgery. There is more detail about these options toward the end of this document.  4. What are the most important things to remember about using CPAP?     WHERE CAN I FIND MORE INFORMATION?    American Academy of Sleep Medicine Patient information on sleep " disorders:  http://yoursleep.aasmnet.org    CPAP -  WHY AND HOW?                 Continuous positive airway pressure, or CPAP, is the most effective treatment for obstructive sleep apnea. It works by blowing room air, through a mask, to hold your throat open. A decision to use CPAP is a major step forward in the pursuit of a healthier life. The successful use of CPAP will help you breathe easier, sleep better and live healthier. Using CPAP can be a positive experience if you keep these bruner points in mind:  1. Commitment  CPAP is not a quick fix for your problem. It involves a long-term commitment to improve your sleep and your health.    2. Communication  Stay in close communication with both your sleep doctor and your CPAP supplier. Ask lots of questions and seek help when you need it.    3. Consistency  Use CPAP all night, every night and for every nap. You will receive the maximum health benefits from CPAP when you use it every time that you sleep. This will also make it easier for your body to adjust to the treatment.    4. Correction  The first machine and mask that you try may not be the best ones for you. Work with your sleep doctor and your CPAP supplier to make corrections to your equipment selection. Ask about trying a different type of machine or mask if you have ongoing problems. Make sure that your mask is a good fit and learn to use your equipment properly.    5. Challenge  Tell a family member or close friend to ask you each morning if you used your CPAP the previous night. Have someone to challenge you to give it your best effort.    6. Connection   Your adjustment to CPAP will be easier if you are able to connect with others who use the same treatment. Ask your sleep doctor if there is a support group in your area for people who have sleep apnea, or look for one on the Internet.  7. Comfort   Increase your level of comfort by using a saline spray, decongestant or heated humidifier if CPAP irritates  "your nose, mouth or throat. Use your unit's \"ramp\" setting to slowly get used to the air pressure level. There may be soft pads you can buy that will fit over your mask straps. Look on www.CPAP.com for accessories that can help make CPAP use more comfortable.  8. Cleaning   Clean your mask, tubing and headgear on a regular basis. Put this time in your schedule so that you don't forget to do it. Check and replace the filters for your CPAP unit and humidifier.    9. Completion   Although you are never finished with CPAP therapy, you should reward yourself by celebrating the completion of your first month of treatment. Expect this first month to be your hardest period of adjustment. It will involve some trial and error as you find the machine, mask and pressure settings that are right for you.    10. Continuation  After your first month of treatment, continue to make a daily commitment to use your CPAP all night, every night and for every nap.    CPAP-Tips to starting with success:  Begin using your CPAP for short periods of time during the day while you watch TV or read.    Use CPAP every night and for every nap. Using it less often reduces the health benefits and makes it harder for your body to get used to it.    Newer CPAP models are virtually silent; however, if you find the sound of your CPAP machine to be bothersome, place the unit under your bed to dampen the sound.     Make small adjustments to your mask, tubing, straps and headgear until you get the right fit. Tightening the mask may actually worsen the leak.  If it leaves significant marks on your face or irritates the bridge of your nose, it may not be the best mask for you.  Speak with the person who supplied the mask and consider trying other masks. Insurances will allow you to try different masks during the first month of starting CPAP.  Insurance also covers a new mask, hose and filter about every 6 months.    Use a saline nasal spray to ease mild nasal " congestion. Neti-Pot or saline nasal rinses may also help. Nasal gel sprays can help reduce nasal dryness.  Biotene mouthwash can be helpful to protect your teeth if you experience frequent dry mouth.  Dry mouth may be a sign of air escaping out of your mouth or out of the mask in the case of a full face mask.  Speak with your provider if you expect that is the case.     Take a nasal decongestant to relieve more severe nasal or sinus congestion.  Do not use Afrin (oxymetazoline) nasal spray more than 3 days in a row.  Speak with your sleep doctor if your nasal congestion is chronic.    Use a heated humidifier that fits your CPAP model to enhance your breathing comfort. Adjust the heat setting up if you get a dry nose or throat, down if you get condensation in the hose or mask.  Position the CPAP lower than you so that any condensation in the hose drains back into the machine rather than towards the mask.    Try a system that uses nasal pillows if traditional masks give you problems.    Clean your mask, tubing and headgear once a week. Make sure the equipment dries fully.    Regularly check and replace the filters for your CPAP unit and humidifier.    Work closely with your sleep provider and your CPAP supplier to make sure that you have the machine, mask and air pressure setting that works best for you. It is better to stop using it and call your provider to solve problems than to lay awake all night frustrated with the device.    BESIDES CPAP, WHAT OTHER THERAPIES ARE THERE?    Postioning devices if you only have the snoring or apnea while on your back    Dental devices if your condition is mild    Nasal valves may be effective though experience is limited    Weight loss if you are overweight    Surgery in limited cases where devices are not acceptable or there are problems with structures in the nose and throat  If treated with one of these alternative options, further evaluation is necessary to ensure that the  therapy is effective. This may require some form of repeat testing.      Healthy Lifestyle:  Healthy diet, exercise and limit alcohol: Not only will excessive alcohol increase your weight over time, but it irritates the throat tissues and make them swell, shrinking the airway and causing snoring. Drinking alcohol should be limited and stopped within 3-4 hours before going to bed.   Stop smoking: (Red swollen throat, heat, nicotine), also irritates and swells the airway, among numerous other negative health consequences.    Positioning Device  This example shows a pillow that straps around the waist. It may be appropriate for those whose sleep study shows milder sleep apnea that occurs primarily when lying flat on one's back. Preliminary studies have shown benefit but effectiveness at home should be verified.                      Oral Appliance  These are examples of two of many custom-made devices that are more likely to work in mild sleep apnea   Oral appliances are dental mouth pieces that fit very much like a sports mouth guards or removable orthodontic retainers. They are used to treat snoring and obstructive sleep apnea . The device prevents the airway from collapsing by either supporting the jaw in a forward position. Since oral appliances are non-invasive and easy to use, they may be considered as an early treatment option. Oral appliance therapy (OAT) involves the customization, selection, fabrication, fitting, adjustments and long-term follow-up care.  Custom made oral appliances are proven to be more effective than over-the-counter devices. Therefore, the over-the-counter devices are recommended not to be used as a screening tool nor as a therapeutic option.     Who gets a dental device?  Oral appliance therapy can be used as an alternative to CPAP therapy for the treatment of mild to moderate sleep apnea and for those patients who prefer OAT to CPAP. Oral appliance therapy is a first line therapy for the  treatment of primary snoring. Additionally, OAT is an option for those that cannot tolerate CPAP as therapy or who have experienced insufficient surgical results.                 Possible side effects?  Frequent but minor side effects include: excessive salivation, dry mouth, discomfort of teeth and jaw and temporary changes in the patient s bite.  Potential complications include: jaw pain, permanent occlusal changes and TMJ symptoms.  The above mentioned side effects and complications can be recognized and managed by dentists trained in dental sleep medicine.   Finding a dentist that practices dental sleep medicine  Specific training is available through the American Academy of Dental Sleep Medicine for dentists interested in working in the field of sleep. To find a dentist who is educated in the field of sleep and the use of oral appliances, near you, visit the Web site of the American Academy of Dental Sleep Medicine; also see http://www.accpstorage.org/newOrganization/patients/oralAppliances.pdf   To search for a dentist certified in these practices:  Http://aadsm.org/FindADentist.aspx?1  Nasal Valves              Nasal valves may be an option for mild apnea if other options are not well tolerated. The efficacy of these devices is generally less than CPAP or oral appliances.They may not be effective if you have frequent nasal congestion or have difficulty breathing through your nose.   Weight Loss:    Weight loss decreases severity of sleep apnea in most people with obesity. For those with mild obesity who have developed snoring with weight gain, even 15-30 pound weight loss can improve and occasionally eliminate sleep apnea.  Structured and life-long dietary and health habits are necessary to lose weight and keep healthier weight levels.     Though there are significant health benefits from weight loss, long-term weight loss is very difficult to achieve- studies show success with dietary management in less than  10% of people. In addition, substantial weight loss may require years of dietary control and may be difficult if patients have severe obesity. In these cases, surgical management may be considered.    If you are interested in methods for weight loss, you should review the options discussed at the National Institutes of Health patient information sites:     http://win.niddk.nih.gov/publications/index.htm  http:/www.health.nih.gov/topic/WeightLossDieting    Bariatric programs offer counseling in all methods of weight loss:    Http:/www.[facility].[email or web address]/Specialties/WeightLossSurgeryandMedicalMgmt/htm    Your BMI is 43.06 kg/m     Weight management plan: Patient was referred to their PCP to discuss a diet and exercise plan.    Body mass index (BMI) is one way to tell whether you are at a healthy weight, overweight, or obese. It measures your weight in relation to your height.  A BMI of 18.5 to 24.9 is in the healthy range. A person with a BMI of 25 to 29.9 is considered overweight, and someone with a BMI of 30 or greater is considered obese.  Another way to find out if you are at risk for health problems caused by overweight and obesity is to measure your waist. If you are a woman and your waist is more than 35 inches, or if you are a man and your waist is more than 40 inches, your risk of disease may be higher.  More than two-thirds of American adults are considered overweight or obese. Being overweight or obese increases the risk for further weight gain.  Excess weight may lead to heart disease and diabetes. Creating and following plans for healthy eating and physical activity may help you improve your health.    Methods for maintaining or losing weight.    Weight control is part of healthy lifestyle and includes exercise, emotional health, and healthy eating habits.  Careful eating habits lifelong is the mainstay of weight control.  Though there are significant health benefits from weight loss, long-term weight  "loss with diet alone may be very difficult to achieve- studies show long-term success with dietary management in less than 10% of people. Attaining a healthy weight may be especially difficult to achieve in those with severe obesity. In some cases, medications, devices and surgical management might be considered.    What can you do?    If you are overweight or obese and are interested in methods for weight loss, you should discuss this with your provider. In addition, we recommend that you review healthy life styles and methods for weight loss available through the National Institutes of Health patient information sites:     http://win.niddk.nih.gov/publications/index.htm      Surgery:  There are a number of surgeries that have been attempted to treat apnea. In general, surgical options are usually reserved for cases in which there is a physical abnormality contributing to obstruction or other treatment options are ineffective or not tolerated. Most surgical options are either unreliable or quite invasive. One of the more common procedures is:  Uvulopalatopharyngoplasty: In this procedure, the uvula (the finger-like tissue that hangs in the back of the throat), part of the soft palate (the tissue that the uvula is attached to), and sometimes the tonsils or adenoids are removed. The efficacy of this surgery is around 30-50% .  After surgery, complications may include:  Sleepiness and sleep apnea related to post-surgery medication   Swelling, infection and bleeding   A sore throat and/or difficulty swallowing   Drainage of secretions into the nose and a nasal quality to the voice. English language speech does not seem to be affected by this surgery.   Narrowing of the airway in the nose and throat (hence constricting breathing) snoring and even iatrogenically caused sleep apnea. By cutting the tissues, excess scar tissue can \"tighten\" the airway and make it even smaller than it was before UPPP.  Patients who have had " the uvula removed will become unable to correctly speak [place]mese or any other language that has a uvular 'r' phoneme.    Surgeries to help resolve nasal congestion may help reduce the severity of apnea slightly. Nasal congestion does not cause apnea on its own, so these surgeries are usually not performed just for WILFRIDO.  They may be worth considering if the nasal congestion is significantly bothersome independent of apnea.

## 2021-08-15 ENCOUNTER — PRE VISIT (OUTPATIENT)
Dept: NEUROLOGY | Facility: CLINIC | Age: 66
End: 2021-08-15

## 2021-08-15 NOTE — TELEPHONE ENCOUNTER
FUTURE VISIT INFORMATION      FUTURE VISIT INFORMATION:    Date: 9/9/2021    Time: 1030am    Location: [facility] [place]  REFERRAL INFORMATION:    Referring provider:  Dr. Georges     Referring providers clinic:  [facility]     Reason for visit/diagnosis  Headaches     RECORDS REQUESTED FROM:       Clinic name Comments Records Status Imaging Status   Internal Dr. Georges-7/23/2021    MR Brain-1/26/2020    CTA Head/Neck-1/26/2020    CT Head-11/26/2018 Epic PACS

## 2021-08-17 ENCOUNTER — PATIENT OUTREACH (OUTPATIENT)
Dept: CARE COORDINATION | Facility: CLINIC | Age: 66
End: 2021-08-17

## 2021-08-17 NOTE — PROGRESS NOTES
RUST/Voicemail       Clinical Data: Care Coordinator Outreach  Outreach attempted x 1.  Left message on patient's voicemail with call back information and requested return call.  Plan:   Care Coordinator will try to reach patient again in 3-5 business days.  Next outreach due: 8/24/21

## 2021-08-18 ENCOUNTER — PATIENT OUTREACH (OUTPATIENT)
Dept: CARE COORDINATION | Facility: CLINIC | Age: 66
End: 2021-08-18

## 2021-08-18 NOTE — PROGRESS NOTES
Care Coordination Clinician Chart Review  Situation: Patient chart reviewed by care coordinator.       Background: Care Coordination initial assessment and enrollment to Care Coordination was 9-.   Patient centered goals were developed with participation from patient.  BRIGHT CC handed patient off to CHW for continued outreach every 30 days.        Assessment: Per chart review, patient outreach attempted by CC CHW yesterday leaving a  .  Patient is actively working to accomplish goal.  Patient's goal remains appropriate and relevant at this time.   Patient is not due for updated Complex Care Plan.  Annual assessment will be due 9-.      Goals        Patient Stated       Functional (pt-stated)       Goal Statement: I will have new Cpap machine within 5 months.   Date Goal set: July 2, 2021   Barriers: none identified.   Strengths: want to get new one.   Date to Achieve By: Dec 2,2021   Patient expressed understanding of goal: yes   Action steps to achieve this goal:   1. I will contact [facility] to schedule appt.   2. I will work with sleep center to get cpap.   3. I will report progress towards this goal at scheduled outreach telephone calls from the CCC team.                 Plan/Recommendations: The patient will continue working with Care Coordination to achieve goal as above.  CHW will involve BRIGHT [facility] as needed or if patient is ready to move to maintenance.  BRIGHT CC will continue to monitor progress to goals and CHW outreaches every 6 weeks.     Patient had sleep evaluation appt on 8-10 and next step is to set up sleep study.  Care Plan updated and mailed to patient: No, will wait until new assessment is completed in one month.

## 2021-09-14 ENCOUNTER — OFFICE VISIT (OUTPATIENT)
Dept: URGENT CARE | Facility: URGENT CARE | Age: 66
End: 2021-09-14
Payer: COMMERCIAL

## 2021-09-14 VITALS
WEIGHT: 216.13 LBS | RESPIRATION RATE: 20 BRPM | BODY MASS INDEX: 42.21 KG/M2 | HEART RATE: 83 BPM | SYSTOLIC BLOOD PRESSURE: 173 MMHG | DIASTOLIC BLOOD PRESSURE: 82 MMHG

## 2021-09-14 DIAGNOSIS — I10 ESSENTIAL HYPERTENSION: ICD-10-CM

## 2021-09-14 DIAGNOSIS — M76.61 ACHILLES TENDINITIS OF RIGHT LOWER EXTREMITY: Primary | ICD-10-CM

## 2021-09-14 PROCEDURE — 99214 OFFICE O/P EST MOD 30 MIN: CPT | Performed by: NURSE PRACTITIONER

## 2021-09-14 RX ORDER — AMLODIPINE BESYLATE 5 MG/1
5 TABLET ORAL DAILY
Qty: 30 TABLET | Refills: 0 | Status: SHIPPED | OUTPATIENT
Start: 2021-09-14 | End: 2021-12-20

## 2021-09-14 RX ORDER — NAPROXEN 500 MG/1
500 TABLET ORAL 2 TIMES DAILY WITH MEALS
Qty: 28 TABLET | Refills: 0 | Status: SHIPPED | OUTPATIENT
Start: 2021-09-14 | End: 2021-09-28

## 2021-09-14 ASSESSMENT — ENCOUNTER SYMPTOMS
PSYCHIATRIC NEGATIVE: 1
NEUROLOGICAL NEGATIVE: 1
EYES NEGATIVE: 1
RESPIRATORY NEGATIVE: 1
CONSTITUTIONAL NEGATIVE: 1

## 2021-09-14 NOTE — PROGRESS NOTES
SUBJECTIVE:   Isabel Biggs is a 66 year old female presenting with a chief complaint of   Chief Complaint   Patient presents with     Heel pain     started yesterday morning. right heel pain.        She is an established patient of [facility].    Heel and achilles pain    Onset of symptoms was 1 day(s) ago.  Location: right heel and achilles pain  Course of symptoms is worsening.    Severity moderate  Current and Associated symptoms: Pain on the right heel and going up to Achilles tendon. And Decreased range of motion  Denies  Bruising, Warmth and Redness  Aggravating Factors: walking and weight-bearing  Therapies to improve symptoms include: ibuprofen and Tylenol  This is not the first time this type of problem has occurred for this patient.   Noted elevated blood pressure before.    Review of Systems   Constitutional: Negative.    HENT: Negative.    Eyes: Negative.    Respiratory: Negative.    Cardiovascular:        Reports he is taking 2.5mg amlodipine daily   Musculoskeletal:        R heel pain   Skin: Negative.    Neurological: Negative.    Psychiatric/Behavioral: Negative.        Past Medical History:   Diagnosis Date     Anemia      Avitaminosis D      Bronchitis      Depression      Diabetes (H)      Glaucoma (increased eye pressure)      Headache      Obesity      Peripheral neuropathy      Rotator cuff tendonitis      Sleep apnea      Family History   Problem Relation Age of Onset     Hypertension Mother      Diabetes Mother      Cancer Other      Breast Cancer Maternal Aunt 70.00     No Known Problems Father         killed in war     No Known Problems Sister         one  sudden death (37), others living     No Known Problems Brother         9 living, 1  diabetes, others  in war     No Known Problems Daughter      No Known Problems Son      Hereditary Breast and Ovarian Cancer Syndrome No family hx of      Cancer No family hx of      Colon Cancer No family hx of      Endometrial Cancer No  family hx of      Ovarian Cancer No family hx of      Current Outpatient Medications   Medication Sig Dispense Refill     amLODIPine (NORVASC) 5 MG tablet Take 1 tablet (5 mg) by mouth daily 30 tablet 0     naproxen (NAPROSYN) 500 MG tablet Take 1 tablet (500 mg) by mouth 2 times daily (with meals) for 14 days 28 tablet 0     acetaminophen (TYLENOL) 500 MG tablet [ACETAMINOPHEN (TYLENOL) 500 MG TABLET] Take 1,000 mg by mouth.       albuterol (PROAIR HFA;PROVENTIL HFA;VENTOLIN HFA) 90 mcg/actuation inhaler [ALBUTEROL (PROAIR HFA;PROVENTIL HFA;VENTOLIN HFA) 90 MCG/ACTUATION INHALER] INHALE 1 TO 2 PUFFS BY MOUTH INTO THE LUNGS EVERY 4 HOURS AS NEEDED FOR WHEEZING 3 Inhaler 1     albuterol (PROVENTIL) 2.5 mg /3 mL (0.083 %) nebulizer solution [ALBUTEROL (PROVENTIL) 2.5 MG /3 ML (0.083 %) NEBULIZER SOLUTION] Take 3 mL (2.5 mg total) by nebulization every 6 (six) hours as needed for wheezing. 90 vial 11     amitriptyline (ELAVIL) 25 MG tablet Take 1 tablet (25 mg) by mouth At Bedtime 90 tablet 3     amLODIPine (NORVASC) 2.5 MG tablet [AMLODIPINE (NORVASC) 2.5 MG TABLET] Take 1 tablet (2.5 mg total) by mouth daily. 30 tablet 3     aspirin 81 MG EC tablet [ASPIRIN 81 MG EC TABLET] Take 1 tablet (81 mg total) by mouth daily. 90 tablet 3     atorvastatin (LIPITOR) 20 MG tablet [ATORVASTATIN (LIPITOR) 20 MG TABLET] Take 1 tablet (20 mg total) by mouth daily. 90 tablet 3     buPROPion (WELLBUTRIN XL) 150 MG 24 hr tablet Take 1 tablet (150 mg) by mouth daily 30 tablet 3     DULoxetine (CYMBALTA) 30 MG capsule Take 1 capsule (30 mg) by mouth 2 times daily 180 capsule 0     fluticasone propionate (FLONASE) 50 mcg/actuation nasal spray [FLUTICASONE PROPIONATE (FLONASE) 50 MCG/ACTUATION NASAL SPRAY] 1 spray into each nostril daily. 16 g 11     gabapentin (NEURONTIN) 100 MG capsule [GABAPENTIN (NEURONTIN) 100 MG CAPSULE] Take 100 mg by mouth 2 (two) times a day. 180 capsule 1     meclizine (ANTIVERT) 25 mg tablet [MECLIZINE  (ANTIVERT) 25 MG TABLET] Take 1 tablet (25 mg total) by mouth 4 (four) times a day as needed for dizziness. 90 tablet 3     nebulizer accessories (ADULT AEROSOL MASK) Misc [NEBULIZER ACCESSORIES (ADULT AEROSOL MASK) MISC] USE 1 AS DIRECTED. 1 each 0     polyethylene glycol (GLYCOLAX) 17 gram/dose powder [POLYETHYLENE GLYCOL (GLYCOLAX) 17 GRAM/DOSE POWDER] Take 17 g by mouth daily. Use as needed. 255 g 3     tiZANidine (ZANAFLEX) 4 MG capsule Take 1 capsule (4 mg) by mouth 3 times daily as needed 30 capsule 5     VITAMIN D3 25 mcg (1,000 unit) capsule [VITAMIN D3 25 MCG (1,000 UNIT) CAPSULE] TAKE 1 CAPSULE BY MOUTH EVERY DAY 90 capsule 3     Social History     Tobacco Use     Smoking status: Never Smoker     Smokeless tobacco: Never Used   Substance Use Topics     Alcohol use: Never       OBJECTIVE  BP (!) 173/82 (BP Location: Left arm, Patient Position: Sitting, Cuff Size: Adult Large)   Pulse 83   Resp 20   Wt 98 kg (216 lb 2 oz)   BMI 42.21 kg/m      Physical Exam  Vitals (elevated BP) and nursing note reviewed.   Constitutional:       Appearance: Normal appearance.   HENT:      Head: Normocephalic.      Nose: Nose normal.      Mouth/Throat:      Mouth: Mucous membranes are moist.   Eyes:      Pupils: Pupils are equal, round, and reactive to light.   Cardiovascular:      Rate and Rhythm: Normal rate.      Pulses: Normal pulses.   Pulmonary:      Effort: Pulmonary effort is normal.      Breath sounds: Normal breath sounds.   Musculoskeletal:      Cervical back: Normal range of motion.   Skin:     Capillary Refill: Capillary refill takes less than 2 seconds.   Neurological:      Mental Status: She is alert.             ASSESSMENT:      ICD-10-CM    1. Achilles tendinitis of right lower extremity  M76.61 naproxen (NAPROSYN) 500 MG tablet     Orthopedic  Referral   2. Essential hypertension  I10 amLODIPine (NORVASC) 5 MG tablet          PLAN:  . Treatments include:     Rest. You should stop or change the  activity that caused the injury. The tendon will then have time to heal.    Cold or heat pack. These help reduce pain and swelling.    Prescription or over-the-counter medicines. These help reduce pain and swelling.    Shoe inserts. These devices can reduce strain on the Achilles tendon when you move. You may then feel less pain.    Stretching and strengthening exercises. Certain exercises can help you regain flexibility and strength in your Achilles tendon.  [place] to follow up with Primary Care provider regarding elevated blood pressure.  Increased amlodipine to 5 mg daily.  Followup:    If not improving or if condition worsens, follow up with your Primary Care Provider    Patient Instructions     Patient Education     Understanding Achilles Tendonitis    Achilles tendonitis is an overuse injury. It causes inflammation of the Achilles tendon. This tendon is found on the back of the ankle. It links the calf muscle to the heel bone. It helps you do pushing-off movements like running or standing on your toes.    How to say it  uh-KILL-eez ten-dun-I-tis   What causes Achilles tendonitis?  Achilles tendonitis can happen if you do an activity like running, walking, or jumping too much. This overuse can strain, or pull, the tendon. It may lead to minor tearing of the tendon. An injury to the lower leg or foot can also cause it.   A tight calf can cause it so if you don t warm up before taking part in sports such as basketball, you are more likely to suffer from this condition. You are also more prone to it if you do too much of such an activity too quickly. Proper training and rest can help prevent it.   Symptoms of Achilles tendonitis   The main symptom of Achilles tendonitis is pain. This pain mostly happens when you move the ankle. The tendon may also feel stiff after a period of no activity, such as sleeping. It may also become swollen. You may hear a crackling sound when you move your ankle. Then tendon becomes thick  and a bone spur may occur.   Treatment for Achilles tendonitis  Symptoms often get better after starting treatment. A full recovery may take several months. Treatments include:     Rest. You should stop or change the activity that caused the injury. The tendon will then have time to heal.    Cold or heat pack. These help reduce pain and swelling.    Prescription or over-the-counter medicines. These help reduce pain and swelling.    Shoe inserts. These devices can reduce strain on the Achilles tendon when you move. You may then feel less pain.    Stretching and strengthening exercises. Certain exercises can help you regain flexibility and strength in your Achilles tendon.    Surgery. This option can fix the injured tendon. But you don t often need it unless other treatments don t work.  When to call your healthcare provider   Call your healthcare provider right away if you have any of these:    Fever of 100.4 F (38 C) or higher, or as directed by your provider    Chills    Pain that gets worse    Symptoms that don t get better, or get worse    New symptoms   [employer] last reviewed this educational content on 3/1/2020    0638-6559 [employer]. All rights reserved. This information is not intended as a substitute for professional medical care. Always follow your healthcare professional's instructions.           Patient Education     Controlling High Blood Pressure   High blood pressure (hypertension) is often called the silent killer. This is because many people who have it, don t know it. It can be very dangerous. High blood pressure can raise your risk of heart attack, stroke, heart disease, and heart failure. Controlling your blood pressure can decrease your risk of these problems. It's important to know the appropriate blood pressure range and remember to check your blood pressure regularly. Doing so can save your life.   Blood pressure measurements are given as 2 numbers. Systolic blood pressure is  the upper number. This is the pressure when the heart contracts. Diastolic blood pressure is the lower number. This is the pressure when the heart relaxes between beats.   Blood pressure is categorized as normal, elevated, or stage 1 or stage 2 high blood pressure:     Normal blood pressure is systolic of less than 120 and diastolic of less than 80 (120/80)    Elevated blood pressure is systolic of 120 to 129 and diastolic less than 80    Stage 1 high blood pressure is systolic of 130 to 139 or diastolic between 80 to 89    Stage 2 high blood pressure is when systolic is 140 or higher or the diastolic is 90 or higher  A heart-healthy lifestyle can help you control your blood pressure without medicines. Here are some things you can do to pursue a heart-healthy lifestyle:     Choose heart-healthy foods     Select low-salt, low-fat foods. Limit sodium intake to 2,400 mg per day or the amount suggested by your healthcare provider.    Limit canned, dried, cured, packaged, and fast foods. These can contain a lot of salt.    Eat 8 to 10 servings of fruits and vegetables every day.    Choose lean meats, fish, or chicken.    Eat whole-grain pasta, brown rice, and beans.    Eat 2 to 3 servings of low-fat or fat-free dairy products.    Ask your doctor about the DASH eating plan. This plan helps reduce blood pressure.    When you go to a restaurant, ask that your meal be prepared with no added salt.    Stay at a healthy weight     Ask your healthcare provider how many calories to eat a day. Then stick to that number.    Ask your healthcare provider what weight range is healthiest for you. If you are overweight, a weight loss of only 3% to 5% of your body weight can help lower blood pressure. Generally, a good weight loss goal is to lose 10% of your body weight in a year.    Limit snacks and sweets.    Get regular exercise.    Get up and get active     Find activities you enjoy that can be done alone or with friends or family.  Such activities might include bicycling, dancing, walking, or jogging.    Park farther away from building entrances to walk more.    Use stairs instead of the elevator.    When you can, walk or bike instead of driving.    [place] leaves, garden, or do household repairs.    Be active at a moderate to vigorous level of physical activity for at least 40 minutes for a minimum of 3 to 4 days a week.     Manage stress     Make time to relax and enjoy life. Find time to laugh.    Communicate your concerns with your loved ones and your healthcare provider.    Visit with family and friends, and keep up with hobbies.    Limit alcohol and quit smoking     Men should have no more than 2 drinks per day.    Women should have no more than 1 drink per day.    Talk with your healthcare provider about quitting smoking. Smoking significantly increases your risk for heart disease and stroke. Ask your healthcare provider about community smoking cessation programs and other options.    Medicines  If lifestyle changes aren t enough, your healthcare provider may prescribe high blood pressure medicine. Take all medicines as prescribed. If you have any questions about your medicines, ask your healthcare provider before stopping or changing them.   [employer] last reviewed this educational content on 6/1/2019 2000-2021 [employer]. All rights reserved. This information is not intended as a substitute for professional medical care. Always follow your healthcare professional's instructions.

## 2021-09-14 NOTE — PATIENT INSTRUCTIONS
Patient Education     Understanding Achilles Tendonitis    Achilles tendonitis is an overuse injury. It causes inflammation of the Achilles tendon. This tendon is found on the back of the ankle. It links the calf muscle to the heel bone. It helps you do pushing-off movements like running or standing on your toes.    How to say it  uh-KILL-eez ten-dun-I-tis   What causes Achilles tendonitis?  Achilles tendonitis can happen if you do an activity like running, walking, or jumping too much. This overuse can strain, or pull, the tendon. It may lead to minor tearing of the tendon. An injury to the lower leg or foot can also cause it.   A tight calf can cause it so if you don t warm up before taking part in sports such as basketball, you are more likely to suffer from this condition. You are also more prone to it if you do too much of such an activity too quickly. Proper training and rest can help prevent it.   Symptoms of Achilles tendonitis   The main symptom of Achilles tendonitis is pain. This pain mostly happens when you move the ankle. The tendon may also feel stiff after a period of no activity, such as sleeping. It may also become swollen. You may hear a crackling sound when you move your ankle. Then tendon becomes thick and a bone spur may occur.   Treatment for Achilles tendonitis  Symptoms often get better after starting treatment. A full recovery may take several months. Treatments include:     Rest. You should stop or change the activity that caused the injury. The tendon will then have time to heal.    Cold or heat pack. These help reduce pain and swelling.    Prescription or over-the-counter medicines. These help reduce pain and swelling.    Shoe inserts. These devices can reduce strain on the Achilles tendon when you move. You may then feel less pain.    Stretching and strengthening exercises. Certain exercises can help you regain flexibility and strength in your Achilles tendon.    Surgery. This option  can fix the injured tendon. But you don t often need it unless other treatments don t work.  When to call your healthcare provider   Call your healthcare provider right away if you have any of these:    Fever of 100.4 F (38 C) or higher, or as directed by your provider    Chills    Pain that gets worse    Symptoms that don t get better, or get worse    New symptoms   [employer] last reviewed this educational content on 3/1/2020    7693-9025 [employer]. All rights reserved. This information is not intended as a substitute for professional medical care. Always follow your healthcare professional's instructions.           Patient Education     Controlling High Blood Pressure   High blood pressure (hypertension) is often called the silent killer. This is because many people who have it, don t know it. It can be very dangerous. High blood pressure can raise your risk of heart attack, stroke, heart disease, and heart failure. Controlling your blood pressure can decrease your risk of these problems. It's important to know the appropriate blood pressure range and remember to check your blood pressure regularly. Doing so can save your life.   Blood pressure measurements are given as 2 numbers. Systolic blood pressure is the upper number. This is the pressure when the heart contracts. Diastolic blood pressure is the lower number. This is the pressure when the heart relaxes between beats.   Blood pressure is categorized as normal, elevated, or stage 1 or stage 2 high blood pressure:     Normal blood pressure is systolic of less than 120 and diastolic of less than 80 (120/80)    Elevated blood pressure is systolic of 120 to 129 and diastolic less than 80    Stage 1 high blood pressure is systolic of 130 to 139 or diastolic between 80 to 89    Stage 2 high blood pressure is when systolic is 140 or higher or the diastolic is 90 or higher  A heart-healthy lifestyle can help you control your blood pressure without  medicines. Here are some things you can do to pursue a heart-healthy lifestyle:     Choose heart-healthy foods     Select low-salt, low-fat foods. Limit sodium intake to 2,400 mg per day or the amount suggested by your healthcare provider.    Limit canned, dried, cured, packaged, and fast foods. These can contain a lot of salt.    Eat 8 to 10 servings of fruits and vegetables every day.    Choose lean meats, fish, or chicken.    Eat whole-grain pasta, brown rice, and beans.    Eat 2 to 3 servings of low-fat or fat-free dairy products.    Ask your doctor about the DASH eating plan. This plan helps reduce blood pressure.    When you go to a restaurant, ask that your meal be prepared with no added salt.    Stay at a healthy weight     Ask your healthcare provider how many calories to eat a day. Then stick to that number.    Ask your healthcare provider what weight range is healthiest for you. If you are overweight, a weight loss of only 3% to 5% of your body weight can help lower blood pressure. Generally, a good weight loss goal is to lose 10% of your body weight in a year.    Limit snacks and sweets.    Get regular exercise.    Get up and get active     Find activities you enjoy that can be done alone or with friends or family. Such activities might include bicycling, dancing, walking, or jogging.    Park farther away from building entrances to walk more.    Use stairs instead of the elevator.    When you can, walk or bike instead of driving.    [place] leaves, garden, or do household repairs.    Be active at a moderate to vigorous level of physical activity for at least 40 minutes for a minimum of 3 to 4 days a week.     Manage stress     Make time to relax and enjoy life. Find time to laugh.    Communicate your concerns with your loved ones and your healthcare provider.    Visit with family and friends, and keep up with hobbies.    Limit alcohol and quit smoking     Men should have no more than 2 drinks per  day.    Women should have no more than 1 drink per day.    Talk with your healthcare provider about quitting smoking. Smoking significantly increases your risk for heart disease and stroke. Ask your healthcare provider about community smoking cessation programs and other options.    Medicines  If lifestyle changes aren t enough, your healthcare provider may prescribe high blood pressure medicine. Take all medicines as prescribed. If you have any questions about your medicines, ask your healthcare provider before stopping or changing them.   [employer] last reviewed this educational content on 6/1/2019 2000-2021 [employer]. All rights reserved. This information is not intended as a substitute for professional medical care. Always follow your healthcare professional's instructions.

## 2021-09-16 ENCOUNTER — PATIENT OUTREACH (OUTPATIENT)
Dept: NURSING | Facility: CLINIC | Age: 66
End: 2021-09-16

## 2021-09-16 ENCOUNTER — TELEPHONE (OUTPATIENT)
Dept: SLEEP MEDICINE | Facility: CLINIC | Age: 66
End: 2021-09-16

## 2021-09-16 NOTE — TELEPHONE ENCOUNTER
Tried calling pt to update her on transfer. We need pap/supply info and her sleep study interp to be signed by provider. No answer and lvm.

## 2021-09-16 NOTE — PROGRESS NOTES
Community Health Worker Follow Up    Care Gaps:     Health Maintenance Due   Topic Date Due     DEXA  Never done     DIABETIC FOOT EXAM  Never done     SPIROMETRY  Never done     ZOSTER IMMUNIZATION (1 of 2) Never done     COLORECTAL CANCER SCREENING  03/05/2020     A1C  08/22/2021     PHQ-9  08/22/2021     INFLUENZA VACCINE (1) 09/01/2021       Will discuss at next PCP visit    Goals:   Goals Addressed as of 9/16/2021 at 3:35 PM        Functional (pt-stated)     Added 7/31/21 by Angela Rogel LSW      Goal Statement: I will have new Cpap machine within 5 months.   Date Goal set: July 2, 2021   Barriers: none identified.   Strengths: want to get new one.   Date to Achieve By: Dec 2,2021   Patient expressed understanding of goal: yes   Action steps to achieve this goal:   1. I will contact [facility] to schedule appt.   2. I will work with sleep center to get cpap.   3. I will report progress towards this goal at scheduled outreach telephone calls from the [facility] team.     Discussed 9/16/21            Intervention and Education during outreach: Patient missed an appointment today but did go to the ER for her heel pain and was referred to Ortho. She has an appointment with them in 2 weeks and will go to [facility] if the pain is too unbearable before then.     She has not gotten her replacement Cpap machine but did see the sleep doctor last month. Order was sent to [facility], [facility] called and followed up with them. They have received the order and are waiting for previous provider that did her last sleep study to send a signed copy of the study. New sleep study has not been done since 2015. They attempted to reach patient on 8/17 and were unsuccessful, they will call her with an update on the status of her order.    [facility] Plan: [facility] will continue to support patient with goals through routine scheduled outreach.       Next outreach due: 10/14/21
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
## 2021-09-23 ENCOUNTER — PATIENT OUTREACH (OUTPATIENT)
Dept: CARE COORDINATION | Facility: CLINIC | Age: 66
End: 2021-09-23

## 2021-09-23 NOTE — PROGRESS NOTES
Care Coordination Clinician Chart Review  Situation: Patient chart reviewed by care coordinator.       Background: Care Coordination initial assessment and enrollment to Care Coordination was 9-.   Patient centered goals were developed with participation from patient.  BRIGHT CC handed patient off to CHW for continued outreach every 30 days.        Assessment: Per chart review, patient outreach completed by CC CHW on 9-16.  Patient is actively working to accomplish goals.  Patient's goals remain appropriate and relevant at this time.   Patient is due for updated Plan of Care.  Annual assessment will be due now.      Goals        Patient Stated       Functional (pt-stated)       Goal Statement: I will have new Cpap machine within 5 months.   Date Goal set: July 2, 2021   Barriers: none identified.   Strengths: want to get new one.   Date to Achieve By: Dec 2,2021   Patient expressed understanding of goal: yes   Action steps to achieve this goal:   1. I will contact [facility] to schedule appt.   2. I will work with sleep center to get cpap.   3. I will report progress towards this goal at scheduled outreach telephone calls from the CCC team.     Discussed 9/16/21                Plan/Recommendations: The patient will continue working with Care Coordination to achieve goals as above.  CHW will involve BRIGHT MCCORMACK as needed or if patient is ready to move to maintenance.  BRIGHT CC will continue to monitor progress to goals and CHW outreaches every 6 weeks.   Plan of Care updated and mailed to patient: No    Delegating to CHW to set up reassessment at next outreach.  Angela Rogel,   [facility]  [phone]

## 2021-10-02 ENCOUNTER — OFFICE VISIT (OUTPATIENT)
Dept: ORTHOPEDICS | Facility: CLINIC | Age: 66
End: 2021-10-02
Payer: COMMERCIAL

## 2021-10-02 DIAGNOSIS — M76.61 ACHILLES TENDINITIS OF RIGHT LOWER EXTREMITY: ICD-10-CM

## 2021-10-02 PROCEDURE — 99203 OFFICE O/P NEW LOW 30 MIN: CPT | Performed by: FAMILY MEDICINE

## 2021-10-02 RX ORDER — MELOXICAM 15 MG/1
15 TABLET ORAL DAILY
Qty: 30 TABLET | Refills: 0 | Status: SHIPPED | OUTPATIENT
Start: 2021-10-02 | End: 2021-12-30

## 2021-10-02 ASSESSMENT — PAIN SCALES - GENERAL: PAINLEVEL: EXTREME PAIN (8)

## 2021-10-02 NOTE — PATIENT INSTRUCTIONS
Thanks for coming today.  Ortho/Sports Medicine Clinic  [address] [place] [address]    To schedule future appointments in Ortho Clinic, you may call [phone].    To schedule ordered imaging by your provider:   Call Central Imaging [phone]    To schedule an injection ordered by your provider:  Call Central Imaging Injection scheduling line: [phone]  [facility] available online at:  [email or web address]    Please call if any further questions or concerns ([phone]).  Clinic hours 8 am to 5 pm.    Return to clinic (call) if symptoms worsen or fail to improve.

## 2021-10-02 NOTE — PROGRESS NOTES
[facility]  SPORTS MEDICINE CLINIC VISIT     Oct 2, 2021        ASSESSMENT & PLAN    66-year-old with chronic Achilles tendinopathy of the right heel    Reviewed imaging and assessment with patient in detail  Have recommended course of meloxicam once daily for the next 2 to 4 weeks  She is provided with gel heel cups to be used as needed for comfort.  If pain progresses or becomes more severe may consider dedicated period of immobilization in walking boot.  She is provided with home exercises as well as a referral to physical therapy.    Paresh Mccabe MD  [facility] SPORTS MEDICINE CLINIC [place]    -----  Chief Complaint   Patient presents with     Ankle Problem     Right heel pain for several years.        SUBJECTIVE  Isabel Biggs is a/an 66 year old female who is seen today for evaluation of  Right heel and ankle pain.     The patient is seen by themselves.  The patient is  handed    Onset: Several years(s) ago. Reports insidious onset without acute precipitating event.  Worsening recently  Location of Pain: right ankle/heel  Worsened by: Walking/movement  Better with: Medication/rest  Treatments tried: Has tried multiple topical medications and intermittent ibuprofen.  No prior bracing, immobilization or therapy  Associated symptoms: no distal numbness or tingling; denies swelling or warmth      REVIEW OF SYSTEMS:    Do you have fever, chills, weight loss? No    Do you have any vision problems? No    Do you have any chest pain or edema? No    Do you have any shortness of breath or wheezing?  No    Do you have stomach problems? No    Do you have any numbness or focal weakness? No    Do you have diabetes? No    Do you have problems with bleeding or clotting? No    Do you have an rashes or other skin lesions? No    OBJECTIVE:  There were no vitals taken for this visit.     General: Alert, pleasant, no distress  Right foot: warm, well perfused, SILT throughout     Inspection: Some diffuse  swelling in the retrocalcaneal area.  No ecchymosis.  No deformity     ROM: Symmetric but pain with passive dorsiflexion     Strength: Pain with plantarflexion against resistance.     Palpation: TTP over the distal half of the Achilles tendon as well as the insertion.  Less TTP in the retrocalcaneal area.  No TTP of the musculotendinous junction.     Special Tests: None      RADIOLOGY:    No imaging this visit

## 2021-10-02 NOTE — LETTER
10/2/2021         RE: Isabel Biggs  [address]  [place] [facility] [address]        Dear Colleague,    Thank you for referring your patient, Isabel Biggs, to the [facility] SPORTS MEDICINE CLINIC [place]. Please see a copy of my visit note below.      [facility]  SPORTS MEDICINE CLINIC VISIT     Oct 2, 2021        ASSESSMENT & PLAN    66-year-old with chronic Achilles tendinopathy of the right heel    Reviewed imaging and assessment with patient in detail  Have recommended course of meloxicam once daily for the next 2 to 4 weeks  She is provided with gel heel cups to be used as needed for comfort.  If pain progresses or becomes more severe may consider dedicated period of immobilization in walking boot.  She is provided with home exercises as well as a referral to physical therapy.    Paresh Mccabe MD  [facility] SPORTS MEDICINE CLINIC [place]    -----  Chief Complaint   Patient presents with     Ankle Problem     Right heel pain for several years.        SUBJECTIVE  Isabel Biggs is a/an 66 year old female who is seen today for evaluation of  Right heel and ankle pain.     The patient is seen by themselves.  The patient is  handed    Onset: Several years(s) ago. Reports insidious onset without acute precipitating event.  Worsening recently  Location of Pain: right ankle/heel  Worsened by: Walking/movement  Better with: Medication/rest  Treatments tried: Has tried multiple topical medications and intermittent ibuprofen.  No prior bracing, immobilization or therapy  Associated symptoms: no distal numbness or tingling; denies swelling or warmth      REVIEW OF SYSTEMS:    Do you have fever, chills, weight loss? No    Do you have any vision problems? No    Do you have any chest pain or edema? No    Do you have any shortness of breath or wheezing?  No    Do you have stomach problems? No    Do you have any numbness or focal weakness? No    Do you have diabetes? No    Do  you have problems with bleeding or clotting? No    Do you have an rashes or other skin lesions? No    OBJECTIVE:  There were no vitals taken for this visit.     General: Alert, pleasant, no distress  Right foot: warm, well perfused, SILT throughout     Inspection: Some diffuse swelling in the retrocalcaneal area.  No ecchymosis.  No deformity     ROM: Symmetric but pain with passive dorsiflexion     Strength: Pain with plantarflexion against resistance.     Palpation: TTP over the distal half of the Achilles tendon as well as the insertion.  Less TTP in the retrocalcaneal area.  No TTP of the musculotendinous junction.     Special Tests: None      RADIOLOGY:    No imaging this visit             Again, thank you for allowing me to participate in the care of your patient.        Sincerely,        Paresh Mccabe MD

## 2021-10-10 ENCOUNTER — HEALTH MAINTENANCE LETTER (OUTPATIENT)
Age: 66
End: 2021-10-10

## 2021-10-12 NOTE — PROGRESS NOTES
MD to review and advise if there are any labs needed at this time. I don't see any recent notes indicating patient was to have labs completed by PCP.

## 2021-10-12 NOTE — PROGRESS NOTES
I am not sure what labs she is looking for.  Am happy to sign any orders.  Please contact her to refresh her memory about the labs she would like.

## 2021-10-12 NOTE — PROGRESS NOTES
Attempted to call patient x 3 times - VM is full and unable to leave message on what labs are needed/requested

## 2021-10-14 ENCOUNTER — PATIENT OUTREACH (OUTPATIENT)
Dept: CARE COORDINATION | Facility: CLINIC | Age: 66
End: 2021-10-14

## 2021-10-14 NOTE — PROGRESS NOTES
[facility]/Voicemail       Clinical Data: Care Coordinator Outreach  Outreach attempted x 1.  Left message on patient's voicemail with call back information and requested return call.  Plan:   Care Coordinator will try to reach patient again in 3-5 business days.  Next outreach due: 10/22/21

## 2021-10-25 ENCOUNTER — TELEPHONE (OUTPATIENT)
Dept: SLEEP MEDICINE | Facility: CLINIC | Age: 66
End: 2021-10-25

## 2021-10-25 ENCOUNTER — PATIENT OUTREACH (OUTPATIENT)
Dept: NURSING | Facility: CLINIC | Age: 66
End: 2021-10-25

## 2021-10-25 NOTE — PROGRESS NOTES
Community Health Worker Follow Up    Care Gaps:     Health Maintenance Due   Topic Date Due     DEXA  Never done     DIABETIC FOOT EXAM  Never done     SPIROMETRY  Never done     ZOSTER IMMUNIZATION (1 of 2) Never done     COLORECTAL CANCER SCREENING  03/05/2020     A1C  08/22/2021     PHQ-9  08/22/2021     INFLUENZA VACCINE (1) 09/01/2021       Will schedule flu shot and COVID booster in the next few weeks. Will discuss other care gaps with PCP at next visit.    Goals:   Goals Addressed as of 10/25/2021 at 11:54 AM                    Today       Functional (pt-stated)   40%    Added 7/31/21 by Angela Rogel LSW      Goal Statement: I will have new Cpap machine within 5 months.   Date Goal set: July 2, 2021   Barriers: none identified.   Strengths: want to get new one.   Date to Achieve By: Dec 2,2021   Patient expressed understanding of goal: yes   Action steps to achieve this goal:   1. I will contact [facility] to schedule appt.   2. I will work with sleep center to get cpap.   3. I will report progress towards this goal at scheduled outreach telephone calls from the CCC team.     Discussed 10/25/21            Intervention and Education during outreach: Patient does not have her Cpap machine yet. CHW placed call to [facility] and talked with Casie. She tried to reach patient about supplies needed and will call her back today to get her on the wait list for a new machine. There is currently a shortage of Cpap machines.    CHW Plan: CHW will continue to support patient with goals through routine scheduled outreach.     Next outreach due: 11/26/21

## 2021-10-25 NOTE — NURSING NOTE
Isabel Biggs  [address]   [place] [address]       2021      Dear Ms. Biggs,      Your provider has placed an order for you to get a new PAP device. Your medical equipment company should contact you in approximately 1 week to schedule your set up (Please be advised, due to a shortage of machines, this may take longer to receive call). Once you know the date of this appointment, please contact our office to schedule a follow up visit with your provider. This appointment should be scheduled 30-60 days from the day that you will be set up on your new device.      [employer] contact information:     [facility]: [phone]  [place]: [phone]  [place]: [phone]  [phone]  [place]: [phone]  [place]: [phone]        [facility]   Schedule line: [phone]    Sincerely,    Anahi Mack, [facility]  Sleep Medicine  10/25/2021 2:46 PM

## 2021-10-25 NOTE — TELEPHONE ENCOUNTER
Tried calling pt in regards to transfer of care for CPAP/supplies.  We are needing information regarding their current machine and supplies. No answer and lvm.

## 2021-10-29 ENCOUNTER — PATIENT OUTREACH (OUTPATIENT)
Dept: CARE COORDINATION | Facility: CLINIC | Age: 66
End: 2021-10-29

## 2021-10-29 NOTE — PROGRESS NOTES
Care Coordination Clinician Chart Review  Situation: Patient chart reviewed by care coordinator.       Background: Care Coordination initial assessment and enrollment to Care Coordination was 9-.   Patient centered goals were developed with participation from patient.  BRIGHT CC handed patient off to CHW for continued outreach every 30 days.        Assessment: Per chart review, patient outreach completed by CC CHW on 10-.  Patient is actively working to accomplish goal.  Patient's goal remains appropriate and relevant at this time.   Patient is due for updated Plan of Care.  Annual assessment will be due now.      Goals        Patient Stated       Functional (pt-stated)       Goal Statement: I will have new Cpap machine within 5 months.   Date Goal set: July 2, 2021   Barriers: none identified.   Strengths: want to get new one.   Date to Achieve By: Dec 2,2021   Patient expressed understanding of goal: yes   Action steps to achieve this goal:   1. I will contact [facility] to schedule appt.   2. I will work with sleep center to get cpap.   3. I will report progress towards this goal at scheduled outreach telephone calls from the CCC team.     Discussed 10/25/21                Plan/Recommendations: The patient will continue working with Care Coordination to achieve goal as above.  CHW will involve BRIGHT MCCORMACK as needed or if patient is ready to move to maintenance.  BRIGHT CC will continue to monitor progress to goals and CHW outreaches every 6 weeks.   Plan of Care updated and mailed to patient: No, will send out after new assessment.  Delegating to CHW to set up new assessment at next outreach if patient wants to continue with care coordination.    .ign

## 2021-12-02 ENCOUNTER — PATIENT OUTREACH (OUTPATIENT)
Dept: NURSING | Facility: CLINIC | Age: 66
End: 2021-12-02
Payer: COMMERCIAL

## 2021-12-06 NOTE — PROGRESS NOTES
Community Health Worker Follow Up    Care Gaps:     Health Maintenance Due   Topic Date Due     DEXA  Never done     DIABETIC FOOT EXAM  Never done     SPIROMETRY  Never done     ZOSTER IMMUNIZATION (1 of 2) Never done     COLORECTAL CANCER SCREENING  03/05/2020     A1C  08/22/2021     PHQ-9  08/22/2021     INFLUENZA VACCINE (1) 09/01/2021     COVID-19 Vaccine (3 - Booster for Pfizer series) 10/05/2021       Will discuss at next PCP visit    Goals:   Goals Addressed as of 12/6/2021 at 11:10 AM                    12/2/21    10/25/21       Functional (pt-stated)   60%  40%    Added 7/31/21 by Angela Rogel LSW      Goal Statement: I will have new Cpap machine within 5 months.   Date Goal set: July 2, 2021   Barriers: none identified.   Strengths: want to get new one.   Date to Achieve By: Dec 2,2021   Patient expressed understanding of goal: yes   Action steps to achieve this goal:   1. I will contact [facility] to schedule appt.   2. I will work with sleep center to get cpap.   3. I will report progress towards this goal at scheduled outreach telephone calls from the CCC team.     Discussed 12/2/21            Intervention and Education during outreach: Patient is still waiting for her CPap machine. She is currently on the waiting list which was caused by the recall earlier this year. She will follow up with the [employer] company to find out how much longer she will be waiting.    CHW Plan: CHW will continue to support patient with goals through routine scheduled outreach.     Next outreach due: 1/3/21

## 2021-12-09 ENCOUNTER — PATIENT OUTREACH (OUTPATIENT)
Dept: CARE COORDINATION | Facility: CLINIC | Age: 66
End: 2021-12-09
Payer: COMMERCIAL

## 2021-12-09 NOTE — PROGRESS NOTES
Contact  [facility]/Voicemail    Referral Source: Care Team  Clinical Data:  Outreach    Patient due for new  Outreach attempted x 1.  Left message on voicemail with call back information and requested return call.    Call back from patient.  She is trying to find boosters and not having luck.  Encouraged her to stop by local pharmacy as she lives near [place] [facility].  If she has trouble, she will call back. She wants to continue with care coordination and will call back in one week to set up phone assessment.    Plan: Patient to call in one week to set up phone assessment. CC to call her if no call back in 10 days.   Angela Rogel,   [facility]  [phone]

## 2021-12-19 DIAGNOSIS — I10 ESSENTIAL HYPERTENSION: ICD-10-CM

## 2021-12-20 ENCOUNTER — TRANSFERRED RECORDS (OUTPATIENT)
Dept: HEALTH INFORMATION MANAGEMENT | Facility: CLINIC | Age: 66
End: 2021-12-20

## 2021-12-20 ENCOUNTER — OFFICE VISIT (OUTPATIENT)
Dept: URGENT CARE | Facility: URGENT CARE | Age: 66
End: 2021-12-20
Payer: COMMERCIAL

## 2021-12-20 VITALS
HEART RATE: 80 BPM | SYSTOLIC BLOOD PRESSURE: 157 MMHG | RESPIRATION RATE: 24 BRPM | OXYGEN SATURATION: 100 % | TEMPERATURE: 98.8 F | BODY MASS INDEX: 42.58 KG/M2 | DIASTOLIC BLOOD PRESSURE: 107 MMHG | WEIGHT: 218 LBS

## 2021-12-20 DIAGNOSIS — R53.1 WEAKNESS GENERALIZED: ICD-10-CM

## 2021-12-20 DIAGNOSIS — R10.9 ACUTE RIGHT FLANK PAIN: ICD-10-CM

## 2021-12-20 DIAGNOSIS — R00.2 PALPITATIONS: ICD-10-CM

## 2021-12-20 DIAGNOSIS — R07.89 CHEST TIGHTNESS: Primary | ICD-10-CM

## 2021-12-20 DIAGNOSIS — R42 DIZZINESS: ICD-10-CM

## 2021-12-20 LAB
ALT SERPL-CCNC: 20 U/L (ref 12–68)
AST SERPL-CCNC: 33 U/L (ref 15–37)
CREATININE (EXTERNAL): 0.64 MG/DL (ref 0.55–1.02)
GFR ESTIMATED (EXTERNAL): >60 ML/MIN
GFR ESTIMATED (IF AFRICAN AMERICAN) (EXTERNAL): >60 ML/MIN
GLUCOSE (EXTERNAL): 102 MG/DL (ref 70–110)
POTASSIUM (EXTERNAL): 4.2 MMOL/L (ref 3.5–5.1)

## 2021-12-20 PROCEDURE — 93000 ELECTROCARDIOGRAM COMPLETE: CPT | Performed by: PHYSICIAN ASSISTANT

## 2021-12-20 PROCEDURE — 99214 OFFICE O/P EST MOD 30 MIN: CPT | Performed by: PHYSICIAN ASSISTANT

## 2021-12-20 RX ORDER — AMLODIPINE BESYLATE 5 MG/1
5 TABLET ORAL DAILY
Qty: 30 TABLET | Refills: 0 | Status: SHIPPED | OUTPATIENT
Start: 2021-12-20 | End: 2021-12-21

## 2021-12-20 ASSESSMENT — ENCOUNTER SYMPTOMS
FLANK PAIN: 1
SHORTNESS OF BREATH: 0
FATIGUE: 1
RHINORRHEA: 0
DYSURIA: 0
CHILLS: 0
COUGH: 0
DIZZINESS: 1
GASTROINTESTINAL NEGATIVE: 1
CHEST TIGHTNESS: 1
FREQUENCY: 0
SORE THROAT: 0
HEMATURIA: 0
WHEEZING: 0
SINUS PAIN: 0
SINUS PRESSURE: 0
FEVER: 0
PALPITATIONS: 1

## 2021-12-20 NOTE — PROGRESS NOTES
Andreina Hall is a 66 year old who presents for the following health issues  HPI   Chest tightness, palpitations  Onset/Duration: 1week  Description:   Location: entire chest, R flank region  Character: sharp, tightness  Radiation: back  Duration: constant   Intensity: moderate  Progression of Symptoms: worsening  Accompanying Signs & Symptoms:  Shortness of breath: no  Sweating: no  Nausea/vomiting: no  Lightheadedness: YES  Palpitations: YES along with weakness  Fever/Chills: no, no URI symptoms  Cough: no           Heartburn: No abdominal pain, n/v, constipation, diarrhea, bloody or black tarry stools.  No fever, chills or sweats.  History:   Family history of heart disease: no  Tobacco use: no  Previous similar symptoms: no   Precipitating factors:   Worse with exertion: YES  Worse with deep breaths: YES           Related to eating: YES           Better with burping: no  Alleviating factors: none  Therapies tried and outcome: rest, fluids, ASA with minimal relief    Patient Active Problem List   Diagnosis     WILFRIDO - not using CPAP (2020)     Type 2 diabetes mellitus without complication, without long-term current use of insulin (H)     Diabetic polyneuropathy associated with type 2 diabetes mellitus (H)     Avitaminosis D     Beta thalassemia (H)     Low back pain - Dr. GRIFFIN Leblanc [place]     Moderate major depression (H)     Morbid obesity (H)     Peripheral vertigo     Nonintractable episodic headache, unspecified headache type     Current Outpatient Medications   Medication     acetaminophen (TYLENOL) 500 MG tablet     albuterol (PROAIR HFA;PROVENTIL HFA;VENTOLIN HFA) 90 mcg/actuation inhaler     albuterol (PROVENTIL) 2.5 mg /3 mL (0.083 %) nebulizer solution     amitriptyline (ELAVIL) 25 MG tablet     amLODIPine (NORVASC) 2.5 MG tablet     amLODIPine (NORVASC) 5 MG tablet     aspirin 81 MG EC tablet     atorvastatin (LIPITOR) 20 MG tablet     buPROPion (WELLBUTRIN XL) 150 MG 24 hr tablet     DULoxetine  (CYMBALTA) 30 MG capsule     fluticasone propionate (FLONASE) 50 mcg/actuation nasal spray     gabapentin (NEURONTIN) 100 MG capsule     meclizine (ANTIVERT) 25 mg tablet     meloxicam (MOBIC) 15 MG tablet     nebulizer accessories (ADULT AEROSOL MASK) Misc     polyethylene glycol (GLYCOLAX) 17 gram/dose powder     tiZANidine (ZANAFLEX) 4 MG capsule     VITAMIN D3 25 mcg (1,000 unit) capsule     No current facility-administered medications for this visit.        Allergies   Allergen Reactions     Chloroquine Other (See Comments), Itching and Visual Disturbance     Blurry vision, itchy skin  Blurry vision, itchy skin       Chlorquinaldol Dermatitis and Other (See Comments)     also blurry vision       Review of Systems   Constitutional: Positive for fatigue. Negative for chills and fever.   HENT: Negative for congestion, ear discharge, ear pain, hearing loss, rhinorrhea, sinus pressure, sinus pain and sore throat.    Respiratory: Positive for chest tightness. Negative for cough, shortness of breath and wheezing.    Cardiovascular: Positive for palpitations. Negative for chest pain and peripheral edema.   Gastrointestinal: Negative.    Genitourinary: Positive for flank pain. Negative for dysuria, frequency, hematuria, urgency, vaginal bleeding and vaginal discharge.   Neurological: Positive for dizziness.   All other systems reviewed and are negative.           Objective    BP (!) 157/107 (BP Location: Left arm, Patient Position: Sitting, Cuff Size: Adult Large)   Pulse 80   Temp 98.8  F (37.1  C) (Tympanic)   Resp 24   Wt 98.9 kg (218 lb)   SpO2 100%   BMI 42.58 kg/m    Body mass index is 42.58 kg/m .  Physical Exam  Vitals and nursing note reviewed.   Constitutional:       General: She is not in acute distress.     Appearance: Normal appearance. She is well-developed. She is obese. She is not ill-appearing.   HENT:      Head: Normocephalic and atraumatic.      Comments: TMs are intact without any erythema or  bulging bilaterally.  Airway is patent.     Nose: Nose normal.      Mouth/Throat:      Lips: Pink.      Mouth: Mucous membranes are moist.      Pharynx: Oropharynx is clear. Uvula midline. No pharyngeal swelling, oropharyngeal exudate or posterior oropharyngeal erythema.      Tonsils: No tonsillar exudate.   Eyes:      General: No scleral icterus.     Extraocular Movements: Extraocular movements intact.      Conjunctiva/sclera: Conjunctivae normal.      Pupils: Pupils are equal, round, and reactive to light.   Neck:      Thyroid: No thyromegaly.   Cardiovascular:      Rate and Rhythm: Normal rate and regular rhythm.      Pulses: Normal pulses.      Heart sounds: Normal heart sounds, S1 normal and S2 normal. No murmur heard.  No friction rub. No gallop.    Pulmonary:      Effort: Pulmonary effort is normal. No accessory muscle usage, respiratory distress or retractions.      Breath sounds: Normal breath sounds and air entry. No stridor. No decreased breath sounds, wheezing, rhonchi or rales.   Abdominal:      General: Abdomen is protuberant. Bowel sounds are normal.      Palpations: Abdomen is soft. There is no hepatomegaly or splenomegaly.      Tenderness: There is abdominal tenderness (R flank). There is right CVA tenderness. There is no left CVA tenderness, guarding or rebound. Negative signs include Villafana's sign, Rovsing's sign, McBurney's sign, psoas sign and obturator sign.   Musculoskeletal:      Cervical back: Normal range of motion and neck supple.   Lymphadenopathy:      Cervical: No cervical adenopathy.   Skin:     General: Skin is warm and dry.      Nails: There is no clubbing.   Neurological:      Mental Status: She is alert and oriented to person, place, and time.      Gait: Gait abnormal (uses walker).   Psychiatric:         Mood and Affect: Mood normal.         Behavior: Behavior normal.         Thought Content: Thought content normal.         Judgment: Judgment normal.     EKG:  NSR, normal axis,  74bpm.  No ST-T wave abnormalities.  No acute changes.  No old EKGs for comparison.  Personally reviewed.      Assessment/Plan:  Chest tightness:  Along with palpitations, R flank pain, dizziness, weakness.  EKG was normal without acute changes.  H&P is concerning for cardiac vs gallbladder vs kidney/bladder infection.  Due to the severity of her symptoms, recommend further evaluation and management in the ER.  Will most likely need further workup with labs and/or imaging.  Patient has declined transportation via ambulance and will have family drive her/him.  Understands risks and benefits of ambulance transfer and s/he has declined.  Call 911 if worsening symptoms.  S/he plans to go to [facility].  S/he left in stable condition with AVS in hand.  F/u with PCP after ER visit.     Palpitations  -     EKG 12-lead complete w/read - Clinics    Acute right flank pain    Dizziness    Weakness generalized        Adele See STEFAN Hamilton

## 2021-12-21 DIAGNOSIS — I10 ESSENTIAL HYPERTENSION: ICD-10-CM

## 2021-12-21 RX ORDER — AMLODIPINE BESYLATE 5 MG/1
5 TABLET ORAL DAILY
Qty: 90 TABLET | Refills: 2 | Status: SHIPPED | OUTPATIENT
Start: 2021-12-21 | End: 2022-12-08

## 2021-12-22 ENCOUNTER — PATIENT OUTREACH (OUTPATIENT)
Dept: NURSING | Facility: CLINIC | Age: 66
End: 2021-12-22
Payer: COMMERCIAL

## 2021-12-22 NOTE — LETTER
Tyson Hall, I did some calling around to find some providers that can bill Medicare. As I told you, there a only a few.      [facility] has a provider located in [place] who could do phone or virtual appointments and has availability- Sheela Banda, call [phone] with your insurance information.    [facility]- may have openings the end of January or in February and they have a [place] location or they would do it virtually- call [phone]    [facility] may have providers but the  would need to contact that providers to see if they would take on a new patient.  She is happy to do that if you are interested, [phone]. They have virtual appts and have several locations of clinics.      I see you have appointment with our transition staff on 12-27 and hope that these conversations can help until we get you connected with ongoing therapy.      Thanks and let me know if these options don't work as I will look for more.  Angela

## 2021-12-23 ENCOUNTER — TELEPHONE (OUTPATIENT)
Dept: INTERNAL MEDICINE | Facility: CLINIC | Age: 66
End: 2021-12-23

## 2021-12-23 ENCOUNTER — TELEPHONE (OUTPATIENT)
Dept: BEHAVIORAL HEALTH | Facility: CLINIC | Age: 66
End: 2021-12-23
Payer: COMMERCIAL

## 2021-12-23 SDOH — ECONOMIC STABILITY: TRANSPORTATION INSECURITY
IN THE PAST 12 MONTHS, HAS THE LACK OF TRANSPORTATION KEPT YOU FROM MEDICAL APPOINTMENTS OR FROM GETTING MEDICATIONS?: NO

## 2021-12-23 SDOH — ECONOMIC STABILITY: INCOME INSECURITY: IN THE LAST 12 MONTHS, WAS THERE A TIME WHEN YOU WERE NOT ABLE TO PAY THE MORTGAGE OR RENT ON TIME?: NO

## 2021-12-23 SDOH — ECONOMIC STABILITY: TRANSPORTATION INSECURITY
IN THE PAST 12 MONTHS, HAS LACK OF TRANSPORTATION KEPT YOU FROM MEETINGS, WORK, OR FROM GETTING THINGS NEEDED FOR DAILY LIVING?: NO

## 2021-12-23 NOTE — PROGRESS NOTES
Contact   Chart Review     Situation: Patient chart reviewed by     Assessment: Patient met with therapist for phone appointment on 12-27. She was given the counseling options that were sent to her on My Chart as patient had not seen that yet.  They will meet while she establishes with permanent counseling support.     Plan/Recommendations: Delegating to CHW to contact patient in 3 weeks to assess needs and goal progression.  SW CC available as needed and in 6 weeks.     Clinic Care Coordination Contact    Follow Up Progress Note      Assessment: Patient in ED at [facility] on 12-:  Isabel Biggs is a 66 y.o. female with a past medical history of high cholesterol and hypertension who presents to the emergency department from Urgent Care for evaluation of dizziness. The patient reports that over the weekend, she began to experience dizziness and weakness. The patient also endorses pain to her right flank that worsens with twisting movements. She states that her right sided pain began one month ago, subsided, and then flared up again over the weekend. She endorses vertigo and states that her dizziness feels similar. The patient was seen at Urgent Care in [place] and sent to the emergency department due to previous symptoms as well as chest tightness and palpitations. Here, the patient reports that she has been experienced increasing amounts of stress in her life due to her grandchild running away for 2 weeks and  moving out of their home. The patient recently had to return to work after being retired. She states that she has difficulty sleeping due to her stress. The patient also endorses swelling to her left foot, fatigue, decreased appetite, and increased urinary urgency and frequency over the past two weeks. She denies shortness of breath, fever, chills, vomiting, diarrhea, unexpected weight change, and suicidal thoughts.     Has PCP appt on 12-30.     Talked to her and she is doing ok, but recognizes that she needs more support and is willing to talk to a counselor.  She agreed that [facility] can send her options on My Chart to call and she will set up counseling.  Described the Transition Clinic through [facility] and she is interested in having that support in the mean time.  Sent [employer] message to [facility] Clinic and they will reach out to her.    Set up goal for mental health management.     Care Gaps:    Health Maintenance Due   Topic Date Due     DEXA  Never done     DIABETIC FOOT EXAM  Never done     SPIROMETRY  Never done     ZOSTER IMMUNIZATION (1 of 2) Never done     COLORECTAL CANCER SCREENING  03/05/2020     A1C  08/22/2021     PHQ-9  08/22/2021     INFLUENZA VACCINE (1) 09/01/2021     COVID-19 Vaccine (3 - Booster for Pfizer series) 10/05/2021       Postponed to next PCP appointment.     Goals addressed this encounter:   Goals Addressed                    This Visit's Progress       Patient Stated       2. Mental Health Management (pt-stated)         Goal Statement: I will have counseling to help with family stressors and to reduce stress within 4 months.   Date Goal set: 12-  Barriers: wait times to start counseling, busy with family responsibilities.   Strengths: has insurance, willing to call and set up appointment.   Date to Achieve By: 4-  Patient expressed understanding of goal: yes  Action steps to achieve this goal:  1. I will set up appointment with a counselor.  2. I will talk with transition clinic counselors until established with my own counselor  3. I will report progress towards this goal at scheduled outreach telephone calls from the CCC team.                 Intervention/Education provided during outreach: Help with locating counseling.    Sent Via My Chart:  Tyson Hall, I did some calling around to find some providers that can bill Medicare. As I told you, there a only a few.      [facility] has a provider  located in [place] who could do phone or virtual appointments and has availability- Sheela Banda, call [phone] with your insurance information.    [facility]- may have openings the end of January or in February and they have a [place] location or they would do it virtually- call [phone]    [facility] may have providers but the  would need to contact that providers to see if they would take on a new patient.  She is happy to do that if you are interested, [phone]. They have virtual appts and have several locations of clinics.      I see you have appointment with our transition staff on 12-27 and hope that these conversations can help until we get you connected with ongoing therapy.      Thanks and let me know if these options don't work as I will look for more.  Angela  Outreach Frequency: monthly    Plan:   Sent mental health counseling centers that accept Medicare via My Chart.   Care Coordinator will follow up in one week.

## 2021-12-23 NOTE — TELEPHONE ENCOUNTER
This coordinator spoke with pt and scheduled appointment with TC for 12/27/2021 at 5pm. Coordinator will ronen referral as complete and will inform referral source of scheduled appointment.       ----- Message from KAN English sent at 12/23/2021  8:49 AM CST -----  Regarding: referral TC  Transition Clinic Referral     Referring Provider Name: KAN English    Referring Provider Contact Phone Number: [phone]    Reason for Transition Clinic Referral: needs support until she can connect with ongoing counseling.    Next Level of Care Patient Will Be Transitioned To: NA, at home.    Start Date for Next Level of Care: is getting options to call to set up ongoing counseling.     Type of Clinical Assessment Completed (Crisis, DA, PADDY, etc.): NA, went to urgent care, sent to ED for a variety of symptoms- determined it is stress related, family stressors    Date Clinical Assessment was Completed: HAYLEE    Diagnosis: stress    What Would Be Helpful from the Transition Clinic: Intelligent woman, from [facility], is a  in her Taoism, lives with extended family and provides care for disabled grandson.  See recent ED evaluation for more details.       Needs: haylee    Does Patient Have Access to Technology: yes    Patient E-mail Address: [email or web address]    Current Patient Phone Number: [phone]    Clinician Gender Preference (if applicable): HAYLEE

## 2021-12-27 ENCOUNTER — VIRTUAL VISIT (OUTPATIENT)
Dept: BEHAVIORAL HEALTH | Facility: CLINIC | Age: 66
End: 2021-12-27
Payer: COMMERCIAL

## 2021-12-27 DIAGNOSIS — F43.23 ADJUSTMENT DISORDER WITH MIXED ANXIETY AND DEPRESSED MOOD: Primary | ICD-10-CM

## 2021-12-27 ASSESSMENT — ANXIETY QUESTIONNAIRES
IF YOU CHECKED OFF ANY PROBLEMS ON THIS QUESTIONNAIRE, HOW DIFFICULT HAVE THESE PROBLEMS MADE IT FOR YOU TO DO YOUR WORK, TAKE CARE OF THINGS AT HOME, OR GET ALONG WITH OTHER PEOPLE: NOT DIFFICULT AT ALL
3. WORRYING TOO MUCH ABOUT DIFFERENT THINGS: NOT AT ALL
5. BEING SO RESTLESS THAT IT IS HARD TO SIT STILL: NOT AT ALL
1. FEELING NERVOUS, ANXIOUS, OR ON EDGE: NOT AT ALL
7. FEELING AFRAID AS IF SOMETHING AWFUL MIGHT HAPPEN: NOT AT ALL
6. BECOMING EASILY ANNOYED OR IRRITABLE: NOT AT ALL
2. NOT BEING ABLE TO STOP OR CONTROL WORRYING: NOT AT ALL
GAD7 TOTAL SCORE: 0

## 2021-12-27 ASSESSMENT — PATIENT HEALTH QUESTIONNAIRE - PHQ9
SUM OF ALL RESPONSES TO PHQ QUESTIONS 1-9: 9
SUM OF ALL RESPONSES TO PHQ QUESTIONS 1-9: 9
10. IF YOU CHECKED OFF ANY PROBLEMS, HOW DIFFICULT HAVE THESE PROBLEMS MADE IT FOR YOU TO DO YOUR WORK, TAKE CARE OF THINGS AT HOME, OR GET ALONG WITH OTHER PEOPLE: NOT DIFFICULT AT ALL
5. POOR APPETITE OR OVEREATING: NOT AT ALL

## 2021-12-28 ASSESSMENT — ANXIETY QUESTIONNAIRES: GAD7 TOTAL SCORE: 0

## 2021-12-28 NOTE — PROGRESS NOTES
"                                           Progress Note    Patient Name: Isabel Biggs  Date: 2021         Service Type: Individual      Session Start Time: 5:08pm  Session End Time: 5:41pm     Session Length: 33 mins    Session #: 1    Attendees: Client    Service Modality:  Phone Visit: pt video/link was not working.      Provider verified identity through the following two step process.  Patient provided:  Patient  and Patient address    The patient has been notified of the following:      \"We have found that certain health care needs can be provided without the need for a face to face visit.  This service lets us provide the care you need with a phone conversation.       I will have full access to your [facility] medical record during this entire phone call.   I will be taking notes for your medical record.      Since this is like an office visit, we will bill your insurance company for this service.       There are potential benefits and risks of telephone visits (e.g. limits to patient confidentiality) that differ from in-person visits.?Confidentiality still applies for telephone services, and nobody will record the visit.  It is important to be in a quiet, private space that is free of distractions (including cell phone or other devices) during the visit.??      If during the course of the call I believe a telephone visit is not appropriate, you will not be charged for this service\"     Consent has been obtained for this service by care team member: Yes      Treatment Plan Last Reviewed: n/a - initial session  PHQ-9 / ALFONSO-7 :   PHQ 2021   PHQ-9 Total Score 15 9 7   Q9: Thoughts of better off dead/self-harm past 2 weeks Not at all Not at all Not at all     ALFONSO-7 SCORE 2021   Total Score 0       DATA  Interactive Complexity: No  Crisis: No       Progress Since Last Session (Related to Symptoms / Goals / Homework):   Symptoms: n/a - initial session     Homework: " n/a - initial sesion      Episode of Care Goals: Satisfactory progress - CONTEMPLATION (Considering change and yet undecided); Intervened by assessing the negative and positive thinking (ambivalence) about behavior change     Current / Ongoing Stressors and Concerns:   Pt was recently seen in Urgent Care and ED on 12/20/2021 due to stress related symptoms, pt was recommended to discharge with outpatient therapy follow-up. Pt referred to Transition Clinic to bridge gap in service until she is established with outpatient therapist. No next level of care has been scheduled yet, pt was provided resources via [employer] by clinic care coordinator. Pt reports she has not logged on to [employer] to look at referral information. Additional therapy referrals and crisis resources provided to pt today. Pt endorsed having experienced a lot of stress in her life recently, but reports things feel manageable at the moment. Pt reports biggest stressor at this time is her health and her relationship - stating that her  walked away from their 30 year marriage 3 months ago. Pt reports her adult children, mother, brother, sister, and Shinto group has all been extremely supportive. Pt denies any depressed mood or anxiety at this time, she denies any SI/SIB/HI. Pt reports feeling safe at home. Reviewed coping strategies, crisis resources, and use of support network.     Treatment Objective(s) Addressed in This Session:   Identify stressors that contribute to feeling anxious/depressed  Identify strategies to more effectively address stressors  Increase interest, engagement, and pleasure in doing things  Discuss motivation / ambivalence about a referral to specialty mental health services (Therapy, Day Tx, PHP)     Intervention:   Motivational Interviewing: Build rapport, active listening, validation, reassurance, and psychoeducation   Emotion Focused Therapy: focus on pt's emotions and identifying triggers that increase  sx       ASSESSMENT: Current Emotional / Mental Status (status of significant symptoms):   Risk status (Self / Other harm or suicidal ideation)   Patient denies current fears or concerns for personal safety.   Patient denies current or recent suicidal ideation or behaviors.   Patient denies current or recent homicidal ideation or behaviors.   Patient denies current or recent self injurious behavior or ideation.   Patient denies other safety concerns.   Patient reports there has been no change in risk factors since their last session.     Patient reports there has been no change in protective factors since their last session.     Recommended that patient call 911 or go to the local ED should there be a change in any of these risk factors.     Appearance:   Unable to assess via phone    Eye Contact:   Unable to assess via phone    Psychomotor Behavior: Normal    Attitude:   Cooperative  Friendly   Orientation:   All   Speech    Rate / Production: Normal/ Responsive    Volume:  Normal    Mood:    Anxious  Normal   Affect:    Appropriate    Thought Content:  Clear  Rumination    Thought Form:  Coherent  Logical    Insight:    Good      Medication Review:   No changes to current psychiatric medication(s)     Medication Compliance:   Yes     Changes in Health Issues:   None reported     Chemical Use Review:   Substance Use: Chemical use reviewed, no active concerns identified      Tobacco Use: No current tobacco use.      Diagnosis:  1. Adjustment disorder with mixed anxiety and depressed mood        Collateral Reports Completed:   Chart Review.    PLAN: (Patient Tasks / Therapist Tasks / Other)  Pt will continue to take medications as prescribed, pt will utilize support network and engage in meaningful activities. No next level of care scheduled yet, pt will check MyChart and follow-up with therapy referrals provided by clinic care coordinator. Pt will follow-up with Transition Clinic weekly until established with  outpatient therapist.        Kitty Ornelas, [facility]   12/27/2021

## 2021-12-28 NOTE — PATIENT INSTRUCTIONS
[facility] (24/7 adult crisis line): [phone]    Please follow-up with therapy referrals provided by clinic care coordinator.     *You may also call the number on the back of your insurance card to request referrals that are within network.     Below are additional therapy referrals if needed:    [facility]  [address]  [place] [address]  [phone]  *Multiple locations, Virtual appointments available    [facility]  [address] [place] [address]  [phone]  *Virtual appointments available    [facility]  [address]  [place], [place] [id number] [phone]  *Multiple locations, Virtual appointments available    [facility]  [address]  [place], [place] [id number] [phone]  *Multiple locations, Virtual appointments available    [facility]  [address]   [place] [address]  [phone]  *Multiple locations, Virtual appointments available

## 2021-12-30 ENCOUNTER — OFFICE VISIT (OUTPATIENT)
Dept: INTERNAL MEDICINE | Facility: CLINIC | Age: 66
End: 2021-12-30
Payer: COMMERCIAL

## 2021-12-30 VITALS
BODY MASS INDEX: 43.3 KG/M2 | WEIGHT: 221.7 LBS | OXYGEN SATURATION: 97 % | HEART RATE: 90 BPM | SYSTOLIC BLOOD PRESSURE: 116 MMHG | DIASTOLIC BLOOD PRESSURE: 66 MMHG

## 2021-12-30 DIAGNOSIS — E66.01 MORBID OBESITY (H): ICD-10-CM

## 2021-12-30 DIAGNOSIS — H81.393 PERIPHERAL VERTIGO OF BOTH EARS: ICD-10-CM

## 2021-12-30 DIAGNOSIS — Z23 HIGH PRIORITY FOR 2019-NCOV VACCINE: ICD-10-CM

## 2021-12-30 DIAGNOSIS — E11.9 TYPE 2 DIABETES MELLITUS WITHOUT COMPLICATION, WITHOUT LONG-TERM CURRENT USE OF INSULIN (H): ICD-10-CM

## 2021-12-30 DIAGNOSIS — G47.33 OSA (OBSTRUCTIVE SLEEP APNEA): ICD-10-CM

## 2021-12-30 DIAGNOSIS — E11.42 DIABETIC POLYNEUROPATHY ASSOCIATED WITH TYPE 2 DIABETES MELLITUS (H): ICD-10-CM

## 2021-12-30 DIAGNOSIS — F33.1 MODERATE EPISODE OF RECURRENT MAJOR DEPRESSIVE DISORDER (H): Primary | ICD-10-CM

## 2021-12-30 DIAGNOSIS — M54.6 CHRONIC RIGHT-SIDED THORACIC BACK PAIN: ICD-10-CM

## 2021-12-30 DIAGNOSIS — G89.29 CHRONIC RIGHT-SIDED THORACIC BACK PAIN: ICD-10-CM

## 2021-12-30 DIAGNOSIS — D56.1 BETA THALASSEMIA (H): ICD-10-CM

## 2021-12-30 LAB — HBA1C MFR BLD: 5.9 % (ref 0–5.6)

## 2021-12-30 PROCEDURE — 99214 OFFICE O/P EST MOD 30 MIN: CPT | Mod: 25 | Performed by: INTERNAL MEDICINE

## 2021-12-30 PROCEDURE — 90662 IIV NO PRSV INCREASED AG IM: CPT | Performed by: INTERNAL MEDICINE

## 2021-12-30 PROCEDURE — G0008 ADMIN INFLUENZA VIRUS VAC: HCPCS | Performed by: INTERNAL MEDICINE

## 2021-12-30 PROCEDURE — 91305 COVID-19,PF,PFIZER (12+ YRS): CPT | Performed by: INTERNAL MEDICINE

## 2021-12-30 PROCEDURE — 0054A COVID-19,PF,PFIZER (12+ YRS): CPT | Performed by: INTERNAL MEDICINE

## 2021-12-30 PROCEDURE — 36415 COLL VENOUS BLD VENIPUNCTURE: CPT | Performed by: INTERNAL MEDICINE

## 2021-12-30 PROCEDURE — 83036 HEMOGLOBIN GLYCOSYLATED A1C: CPT | Performed by: INTERNAL MEDICINE

## 2021-12-30 RX ORDER — MELOXICAM 15 MG/1
15 TABLET ORAL DAILY
Qty: 90 TABLET | Refills: 3 | Status: SHIPPED | OUTPATIENT
Start: 2021-12-30 | End: 2023-01-14

## 2021-12-30 RX ORDER — BUPROPION HYDROCHLORIDE 150 MG/1
150 TABLET ORAL DAILY
Qty: 90 TABLET | Refills: 3 | Status: SHIPPED | OUTPATIENT
Start: 2021-12-30 | End: 2022-08-29

## 2021-12-30 ASSESSMENT — ASTHMA QUESTIONNAIRES
QUESTION_4 LAST FOUR WEEKS HOW OFTEN HAVE YOU USED YOUR RESCUE INHALER OR NEBULIZER MEDICATION (SUCH AS ALBUTEROL): ONE OR TWO TIMES PER DAY
QUESTION_1 LAST FOUR WEEKS HOW MUCH OF THE TIME DID YOUR ASTHMA KEEP YOU FROM GETTING AS MUCH DONE AT WORK, SCHOOL OR AT HOME: SOME OF THE TIME
ACT_TOTALSCORE: 15
QUESTION_2 LAST FOUR WEEKS HOW OFTEN HAVE YOU HAD SHORTNESS OF BREATH: NOT AT ALL
QUESTION_3 LAST FOUR WEEKS HOW OFTEN DID YOUR ASTHMA SYMPTOMS (WHEEZING, COUGHING, SHORTNESS OF BREATH, CHEST TIGHTNESS OR PAIN) WAKE YOU UP AT NIGHT OR EARLIER THAN USUAL IN THE MORNING: TWO OR THREE NIGHTS A WEEK
QUESTION_5 LAST FOUR WEEKS HOW WOULD YOU RATE YOUR ASTHMA CONTROL: SOMEWHAT CONTROLLED

## 2021-12-30 ASSESSMENT — ANXIETY QUESTIONNAIRES
IF YOU CHECKED OFF ANY PROBLEMS ON THIS QUESTIONNAIRE, HOW DIFFICULT HAVE THESE PROBLEMS MADE IT FOR YOU TO DO YOUR WORK, TAKE CARE OF THINGS AT HOME, OR GET ALONG WITH OTHER PEOPLE: SOMEWHAT DIFFICULT
3. WORRYING TOO MUCH ABOUT DIFFERENT THINGS: NOT AT ALL
1. FEELING NERVOUS, ANXIOUS, OR ON EDGE: SEVERAL DAYS
7. FEELING AFRAID AS IF SOMETHING AWFUL MIGHT HAPPEN: NOT AT ALL
6. BECOMING EASILY ANNOYED OR IRRITABLE: NOT AT ALL
4. TROUBLE RELAXING: NEARLY EVERY DAY
2. NOT BEING ABLE TO STOP OR CONTROL WORRYING: NOT AT ALL
GAD7 TOTAL SCORE: 4
5. BEING SO RESTLESS THAT IT IS HARD TO SIT STILL: NOT AT ALL

## 2021-12-30 ASSESSMENT — PATIENT HEALTH QUESTIONNAIRE - PHQ9: SUM OF ALL RESPONSES TO PHQ QUESTIONS 1-9: 11

## 2021-12-30 NOTE — PROGRESS NOTES
Office Visit - Follow Up   Isabel Biggs   66 year old female    Date of Visit: 12/30/2021    Chief Complaint   Patient presents with     Recheck Medication     Fatigue     Other     rib pain right side and abdominal pain feels lot inside- follow up ER from 12-20-21- go over the labs results     Imm/Inj     COVID-19 VACCINE        Assessment and Plan   1. Moderate episode of recurrent major depressive disorder (H)  Resume Wellbutrin, continue duloxetine    2. Chronic right-sided thoracic back pain  Recommend she follow-up in the spine clinic and she is an established patient.  - XR Thoracic Spine 2 Views; Future    3. Peripheral vertigo of both ears  Chronic, has had significant evaluation, encouraged her to see neurology    4. WILFRIDO - not using CPAP (2020)  She has a diagnosis of sleep apnea and I encouraged her to follow-up with the sleep medicine clinic for CPAP    5. Type 2 diabetes mellitus without complication, without long-term current use of insulin (H)  Diet controlled continue same  - Hemoglobin A1c; Future  - Hemoglobin A1c    7. Beta thalassemia (H)  Stable    8. High priority for 2019-nCoV vaccine  - COVID-19,PF,PFIZER (12+ Yrs GRAY LABEL)    9. Diabetic polyneuropathy associated with type 2 diabetes mellitus (H)  Stable continue current medications, gabapentin, duloxetine, excellent diabetes control    6. Morbid obesity (H)  The following high BMI interventions were performed this visit: encouragement to exercise and lifestyle education regarding diet    Return in about 4 weeks (around 1/27/2022) for Follow up.     History of Present Illness   This 66 year old woman comes in for follow-up.  She is not been feeling well.  She has had a lot of stress recently.  Her  left and her grandson has been having some mental health issues.  She has been trying to work but this has been a struggle especially with some chronic pain fatigue that she experiences.  She has been feeling more depressed.  Denies  thoughts of self-harm or harm to others.  She is been having some right-sided mid back pain.  Pain does not radiate.  Worse with movement.  Recently treated for a bladder infection in the ER, concern for early pyelonephritis.  This did not improve the pain.  She has not been using meloxicam.  She has not been taking Wellbutrin.  She is taking duloxetine which has helped and also amitriptyline at night.  She has obstructive sleep apnea but does not yet have a CPAP machine.  I previously recommended a neurology evaluation and she has not yet made that appointment.    Review of Systems: A comprehensive review of systems was negative except as noted.     Medications, Allergies and Problem List   Reviewed, reconciled and updated  Post Discharge Medication Reconciliation Status:      Physical Exam   General Appearance:   No acute distress    /66 (BP Location: Left arm, Patient Position: Sitting, Cuff Size: Adult Large)   Pulse 90   Wt 100.6 kg (221 lb 11.2 oz)   SpO2 97%   BMI 43.30 kg/m      HEENT exam is unremarkable  Neck supple no thyromegaly or nodule palpable  Lymphatic no cervical lymphadenopathy  Cardiovascular regular rate and rhythm no murmur gallop or rub  Pulmonary lungs are clear to auscultation bilaterally  Gastrointestinal abdomen soft nontender nondistended no organomegaly  Neurologic exam is non focal  Psychiatric pleasant, no confusion or agitation   She is tender to palpation along the paraspinous muscles of the thoracic spine on the right side     Additional Information   Current Outpatient Medications   Medication Sig Dispense Refill     acetaminophen (TYLENOL) 500 MG tablet [ACETAMINOPHEN (TYLENOL) 500 MG TABLET] Take 1,000 mg by mouth.       albuterol (PROAIR HFA;PROVENTIL HFA;VENTOLIN HFA) 90 mcg/actuation inhaler [ALBUTEROL (PROAIR HFA;PROVENTIL HFA;VENTOLIN HFA) 90 MCG/ACTUATION INHALER] INHALE 1 TO 2 PUFFS BY MOUTH INTO THE LUNGS EVERY 4 HOURS AS NEEDED FOR WHEEZING 3 Inhaler 1      albuterol (PROVENTIL) 2.5 mg /3 mL (0.083 %) nebulizer solution [ALBUTEROL (PROVENTIL) 2.5 MG /3 ML (0.083 %) NEBULIZER SOLUTION] Take 3 mL (2.5 mg total) by nebulization every 6 (six) hours as needed for wheezing. 90 vial 11     amitriptyline (ELAVIL) 25 MG tablet Take 1 tablet (25 mg) by mouth At Bedtime 90 tablet 3     amLODIPine (NORVASC) 5 MG tablet Take 1 tablet (5 mg) by mouth daily 90 tablet 2     aspirin 81 MG EC tablet [ASPIRIN 81 MG EC TABLET] Take 1 tablet (81 mg total) by mouth daily. 90 tablet 3     atorvastatin (LIPITOR) 20 MG tablet [ATORVASTATIN (LIPITOR) 20 MG TABLET] Take 1 tablet (20 mg total) by mouth daily. 90 tablet 3     buPROPion (WELLBUTRIN XL) 150 MG 24 hr tablet Take 1 tablet (150 mg) by mouth daily 90 tablet 3     DULoxetine (CYMBALTA) 30 MG capsule Take 1 capsule (30 mg) by mouth 2 times daily 180 capsule 0     fluticasone propionate (FLONASE) 50 mcg/actuation nasal spray [FLUTICASONE PROPIONATE (FLONASE) 50 MCG/ACTUATION NASAL SPRAY] 1 spray into each nostril daily. 16 g 11     gabapentin (NEURONTIN) 100 MG capsule [GABAPENTIN (NEURONTIN) 100 MG CAPSULE] Take 100 mg by mouth 2 (two) times a day. 180 capsule 1     meclizine (ANTIVERT) 25 mg tablet [MECLIZINE (ANTIVERT) 25 MG TABLET] Take 1 tablet (25 mg total) by mouth 4 (four) times a day as needed for dizziness. 90 tablet 3     meloxicam (MOBIC) 15 MG tablet Take 1 tablet (15 mg) by mouth daily 90 tablet 3     nebulizer accessories (ADULT AEROSOL MASK) Misc [NEBULIZER ACCESSORIES (ADULT AEROSOL MASK) MISC] USE 1 AS DIRECTED. 1 each 0     polyethylene glycol (GLYCOLAX) 17 gram/dose powder [POLYETHYLENE GLYCOL (GLYCOLAX) 17 GRAM/DOSE POWDER] Take 17 g by mouth daily. Use as needed. 255 g 3     tiZANidine (ZANAFLEX) 4 MG capsule Take 1 capsule (4 mg) by mouth 3 times daily as needed 30 capsule 5     VITAMIN D3 25 mcg (1,000 unit) capsule [VITAMIN D3 25 MCG (1,000 UNIT) CAPSULE] TAKE 1 CAPSULE BY MOUTH EVERY DAY 90 capsule 3     Allergies    Allergen Reactions     Chloroquine Other (See Comments), Itching and Visual Disturbance     Blurry vision, itchy skin  Blurry vision, itchy skin       Chlorquinaldol Dermatitis and Other (See Comments)     also blurry vision     Social History     Tobacco Use     Smoking status: Never Smoker     Smokeless tobacco: Never Used   Substance Use Topics     Alcohol use: Never     Drug use: None       Review and/or order of clinical lab tests:  Review and/or order of radiology tests:  Review and/or order of medicine tests:  Discussion of test results with performing physician:  Decision to obtain old records and/or obtain history from someone other than the patient:  Review and summarization of old records and/or obtaining history from someone other than the patient and.or discussion of case with another health care provider:  Independent visualization of image, tracing or specimen itself:    Time:      Cj Georges MD

## 2021-12-30 NOTE — LETTER
December 30, 2021      Isabel Biggs  [address]   [place] [address]        To Whom It May Concern:    Isabel Biggs  was seen on 12/30/2021.  At this point she does not plan to return to work due to medical conditions.        Sincerely,        Cj Georges MD

## 2021-12-31 ASSESSMENT — ASTHMA QUESTIONNAIRES: ACT_TOTALSCORE: 15

## 2022-01-04 ENCOUNTER — PATIENT OUTREACH (OUTPATIENT)
Dept: CARE COORDINATION | Facility: CLINIC | Age: 67
End: 2022-01-04
Payer: COMMERCIAL

## 2022-01-04 NOTE — PROGRESS NOTES
Patient spoke with CCC SW on 12/22/21 and discussed goals     CHW delegations: follow up in 3 weeks    Next outreach due: 1/12/22

## 2022-01-12 VITALS — WEIGHT: 222 LBS | HEIGHT: 60 IN | BODY MASS INDEX: 43.59 KG/M2

## 2022-01-18 ENCOUNTER — PATIENT OUTREACH (OUTPATIENT)
Dept: NURSING | Facility: CLINIC | Age: 67
End: 2022-01-18
Payer: COMMERCIAL

## 2022-01-18 VITALS
HEART RATE: 87 BPM | BODY MASS INDEX: 41.41 KG/M2 | HEIGHT: 62 IN | SYSTOLIC BLOOD PRESSURE: 136 MMHG | DIASTOLIC BLOOD PRESSURE: 76 MMHG | WEIGHT: 225 LBS | OXYGEN SATURATION: 99 %

## 2022-01-18 VITALS
DIASTOLIC BLOOD PRESSURE: 68 MMHG | HEIGHT: 62 IN | OXYGEN SATURATION: 98 % | BODY MASS INDEX: 42.69 KG/M2 | HEART RATE: 86 BPM | SYSTOLIC BLOOD PRESSURE: 124 MMHG | WEIGHT: 232 LBS

## 2022-01-18 VITALS
BODY MASS INDEX: 41.15 KG/M2 | WEIGHT: 225 LBS | OXYGEN SATURATION: 97 % | SYSTOLIC BLOOD PRESSURE: 148 MMHG | DIASTOLIC BLOOD PRESSURE: 86 MMHG | HEART RATE: 94 BPM

## 2022-01-18 VITALS
WEIGHT: 224.19 LBS | BODY MASS INDEX: 44.23 KG/M2 | DIASTOLIC BLOOD PRESSURE: 84 MMHG | OXYGEN SATURATION: 99 % | HEART RATE: 90 BPM | HEIGHT: 59 IN | HEIGHT: 59 IN | OXYGEN SATURATION: 97 % | DIASTOLIC BLOOD PRESSURE: 80 MMHG | SYSTOLIC BLOOD PRESSURE: 134 MMHG | HEART RATE: 84 BPM | WEIGHT: 219.38 LBS | SYSTOLIC BLOOD PRESSURE: 134 MMHG | BODY MASS INDEX: 45.2 KG/M2

## 2022-01-18 VITALS — BODY MASS INDEX: 40.89 KG/M2 | WEIGHT: 222.2 LBS | HEIGHT: 62 IN

## 2022-01-18 ASSESSMENT — PATIENT HEALTH QUESTIONNAIRE - PHQ9
SUM OF ALL RESPONSES TO PHQ QUESTIONS 1-9: 15
SUM OF ALL RESPONSES TO PHQ QUESTIONS 1-9: 0

## 2022-01-18 NOTE — PROGRESS NOTES
Community Health Worker Follow Up    Care Gaps:     Health Maintenance Due   Topic Date Due     DEXA  Never done     ZOSTER IMMUNIZATION (1 of 2) Never done     COLORECTAL CANCER SCREENING  03/05/2020       Will address at next PCP visit     Goals:   Goals Addressed as of 1/18/2022 at 10:47 AM                    Today    12/22/21       2. Mental Health Management (pt-stated)   20%  10%    Added 12/23/21 by Angela Rogel LSW      Goal Statement: I will have counseling to help with family stressors and to reduce stress within 4 months.   Date Goal set: 12-  Barriers: wait times to start counseling, busy with family responsibilities.   Strengths: has insurance, willing to call and set up appointment.   Date to Achieve By: 4-  Patient expressed understanding of goal: yes  Action steps to achieve this goal:  1. I will set up appointment with a counselor.  2. I will talk with transition clinic counselors until established with my own counselor  3. I will report progress towards this goal at scheduled outreach telephone calls from the [facility] team.  12- had first appointment on 12-27.    Discussed 1/18/22         Functional (pt-stated)   60%      Added 7/31/21 by Angela Rogel LSW      Goal Statement: I will have new Cpap machine within 5 months.   Date Goal set: July 2, 2021   Barriers: none identified.   Strengths: want to get new one.   Date to Achieve By: Dec 2,2021   Patient expressed understanding of goal: yes   Action steps to achieve this goal:   1. I will contact [facility] to schedule appt.   2. I will work with sleep center to get cpap.   3. I will report progress towards this goal at scheduled outreach telephone calls from the CCC team.     Discussed 1/18/22-on waiting list for Cpap machine             Intervention and Education during outreach: Patient is doing ok. She hasn't been able to get her Cpap machine yet and was given the phone number to call [facility] to see  where she is on the waiting list.     Her  recently left her, he has moved out. She is requesting help with applying for SNAP benefits. She lives with her mom who receives no benefits due to not being a citizen, her son and grandson also     CHW Plan: CHW will continue to support patient with goals through routine scheduled outreach.     Next outreach due: 2/18/22

## 2022-01-19 ENCOUNTER — PATIENT OUTREACH (OUTPATIENT)
Dept: CARE COORDINATION | Facility: CLINIC | Age: 67
End: 2022-01-19
Payer: COMMERCIAL

## 2022-01-19 NOTE — PROGRESS NOTES
Clinic Care Coordination Contact  Program:  County:  County Case #:  [facility] Worker:   Carola #:   Subscriber #:   Renewal:  Date Applied:     FRW Outreach:   1/19/2022:  Outreach attempted x 1 unable to leave message on voicemail due to mailbox being full with call back information and requested return call.  Plan: FRW will call again within one week  Health Insurance:      Referral/Screening:  FRW Screening    Row Name 01/18/22 1044         [facility] Benefits     Is patient requesting help applying for [facility] benefits? Yes         Have you recently applied for any [facility] benefits? No         How many people in your household? 4         Do you buy/eat food together? Yes         What is the monthly gross income for the household (wages, social security, workers comp, and pension)?  1900                   Insurance:     [place]? No         Is this an insurance renewal? No         Is this a new insurance application request? No                   OTHER     Is this a shawna care application? No         Any other information for the FRW? Household size decreased-spouse moved out               Goals Addressed:

## 2022-01-27 ENCOUNTER — PATIENT OUTREACH (OUTPATIENT)
Dept: CARE COORDINATION | Facility: CLINIC | Age: 67
End: 2022-01-27
Payer: COMMERCIAL

## 2022-02-07 ENCOUNTER — PATIENT OUTREACH (OUTPATIENT)
Dept: CARE COORDINATION | Facility: CLINIC | Age: 67
End: 2022-02-07
Payer: COMMERCIAL

## 2022-02-07 ENCOUNTER — PATIENT OUTREACH (OUTPATIENT)
Dept: NURSING | Facility: CLINIC | Age: 67
End: 2022-02-07
Payer: COMMERCIAL

## 2022-02-07 NOTE — PROGRESS NOTES
Clinic Care Coordination Contact    Follow Up Progress Note      Assessment: Patient really wants her C pap machine as she does not sleep well.  Gave her [facility] on [address] and she will call them to see if they have it ready.  Does not want to find a therapist at this time. Is having knee pain and is using home remedy. If she doesn't find relief, she will set up appointment.  She is having struggles yet.  She did not connect with FRW and still wants to see if they can get more benefits.  Gave her FRW name and phone number and she will call when she has time. Is watching baby grand child now.      Care Gaps:    Health Maintenance Due   Topic Date Due     DEXA  Never done     ZOSTER IMMUNIZATION (1 of 2) Never done     COLORECTAL CANCER SCREENING  03/05/2020     MEDICARE ANNUAL WELLNESS VISIT  02/22/2022     BMP  02/22/2022     LIPID  02/22/2022     MICROALBUMIN  02/22/2022       Declined due to other needs.     Goals addressed this encounter:   Goals Addressed                    This Visit's Progress       Patient Stated       2. Mental Health Management (pt-stated)   On Hold      Goal Statement: I will have counseling to help with family stressors and to reduce stress within 4 months.   Date Goal set: 12-  Barriers: wait times to start counseling, busy with family responsibilities.   Strengths: has insurance, willing to call and set up appointment.   Date to Achieve By: 4-  Patient expressed understanding of goal: yes  Action steps to achieve this goal:  1. I will set up appointment with a counselor.  2. I will talk with transition clinic counselors until established with my own counselor  3. I will report progress towards this goal at scheduled outreach telephone calls from the CCC team.    Discussed 2-8-2022         Functional (pt-stated)         Goal Statement: I will have new Cpap machine within 5 months.   Date Goal set: July 2, 2021   Barriers: none identified.   Strengths: want to get  new one.   Date to Achieve By: Dec 2,2021   Patient expressed understanding of goal: yes   Action steps to achieve this goal:   1. I will contact [facility] to schedule appt.   2. I will work with sleep center to get cpap.   3. I will report progress towards this goal at scheduled outreach telephone calls from the CCC team.     Discussed  2-7, no word, will call            Intervention/Education provided during outreach: Phone numbers for FRW and Nikki HO.    Outreach Frequency: monthly    Plan:   Delegating to CHW to call in less than 30 days and to schedule reassessment with BR[facility]ORM[facility] at next outreach.   Care Coordinator will follow up in 6 weeks and as needed.

## 2022-02-14 ENCOUNTER — TELEPHONE (OUTPATIENT)
Dept: INTERNAL MEDICINE | Facility: CLINIC | Age: 67
End: 2022-02-14
Payer: COMMERCIAL

## 2022-02-14 DIAGNOSIS — M54.6 CHRONIC RIGHT-SIDED THORACIC BACK PAIN: Primary | ICD-10-CM

## 2022-02-14 DIAGNOSIS — G89.29 CHRONIC RIGHT-SIDED THORACIC BACK PAIN: Primary | ICD-10-CM

## 2022-02-15 DIAGNOSIS — E11.9 TYPE 2 DIABETES MELLITUS WITHOUT COMPLICATION, WITHOUT LONG-TERM CURRENT USE OF INSULIN (H): ICD-10-CM

## 2022-02-15 DIAGNOSIS — F06.4 ANXIETY DISORDER DUE TO MEDICAL CONDITION: ICD-10-CM

## 2022-02-17 RX ORDER — ATORVASTATIN CALCIUM 20 MG/1
20 TABLET, FILM COATED ORAL DAILY
Qty: 90 TABLET | Refills: 3 | Status: SHIPPED | OUTPATIENT
Start: 2022-02-17 | End: 2023-04-26

## 2022-02-17 NOTE — TELEPHONE ENCOUNTER
"Warnings Override History for amitriptyline (ELAVIL) 25 MG tablet [[id number]]    Overridden by Cj Georges MD on Jul 23, 2021 12:27 PM   Drug-Drug   1. DULOXETINE / TRICYCLIC ANTIDEPRESSANTS [Level: Major] [Reason: Benefit outweighs risk]   Other Orders: DULoxetine (CYMBALTA) 30 MG capsule      2. DULOXETINE / TRICYCLIC ANTIDEPRESSANTS [Level: Major] [Reason: Benefit outweighs risk]   Other Orders: DULoxetine (CYMBALTA) 30 MG capsule        Last Written Prescription Date:  7/23/21  Last Fill Quantity: 90,  # refills: 0   Last office visit provider:  12/30/21     Last Written Prescription Date:  1/11/21  Last Fill Quantity: 90,  # refills: 3   Last office visit provider:  12/30/21     Requested Prescriptions   Pending Prescriptions Disp Refills     amitriptyline (ELAVIL) 25 MG tablet 90 tablet 3     Sig: Take 1 tablet (25 mg) by mouth At Bedtime       Tricyclic Agents ( Annual appt and no PHQ9) Passed - 2/15/2022  5:46 PM        Passed - Blood Pressure under 140/90 in past 12 mos     BP Readings from Last 3 Encounters:   12/30/21 116/66   12/20/21 (!) 157/107   09/14/21 (!) 173/82                 Passed - Recent (12 mo) or future (30 days) visit within authorizing provider's specialty     Patient has had an office visit with the authorizing provider or a provider within the authorizing providers department within the previous 12 mos or has a future within next 30 days. See \"Patient Info\" tab in inbasket, or \"Choose Columns\" in Meds & Orders section of the refill encounter.              Passed - Medication is active on med list        Passed - Patient is age 18 or older        Passed - Patient is not pregnant        Passed - No positive pregnancy test on record in past 12 mos           atorvastatin (LIPITOR) 20 MG tablet 90 tablet 3     Sig: Take 1 tablet (20 mg) by mouth daily       Statins Protocol Passed - 2/15/2022  5:46 PM        Passed - LDL on file in past 12 months     Recent Labs   Lab Test " "02/22/21  1519   *             Passed - No abnormal creatine kinase in past 12 months     Recent Labs   Lab Test 11/26/18  1823   CKT 89                Passed - Recent (12 mo) or future (30 days) visit within the authorizing provider's specialty     Patient has had an office visit with the authorizing provider or a provider within the authorizing providers department within the previous 12 mos or has a future within next 30 days. See \"Patient Info\" tab in inbasket, or \"Choose Columns\" in Meds & Orders section of the refill encounter.              Passed - Medication is active on med list        Passed - Patient is age 18 or older        Passed - No active pregnancy on record        Passed - No positive pregnancy test in past 12 months             Constanza Tay RN 02/17/22 3:50 PM  "

## 2022-02-20 NOTE — TELEPHONE ENCOUNTER
Insurance requesting patient try/fail formulary alternative - tizanidine tablet.  Is patient able to switch to tablets?  Please advise.

## 2022-02-20 NOTE — TELEPHONE ENCOUNTER
Central Prior Authorization Team   Phone: [phone]    PA Initiation    Medication: TIZANIDINE  Insurance Company:    Pharmacy Filling the Rx: [id number] - [place] - [address] AT [facility] & [address]  Filling Pharmacy Phone: [phone]  Filling Pharmacy Fax: [phone]  Start Date: 2/20/2022

## 2022-02-21 NOTE — TELEPHONE ENCOUNTER
Insurance covers tablets.  If patient is able to switch to tablets, please send new script to pharmacy.  If not, please send clinical reason to PA team if you would like to appeal.      PRIOR AUTHORIZATION DENIED    Medication: TIZANIDINE-DENIED    Denial Date: 2/20/2022    Denial Rational: PATIENT MUST TRY/FAIL FORMULARY ALTERNATIVES - TIZANIDINE TABLET.        Appeal Information:  IF PATIENT IS UNABLE TO TRY/FAIL ALTERNATIVE(S) PLEASE SUPPLY PA TEAM WITH A LETTER OF MEDICAL NECESSITY WITH CLINICAL REASON.

## 2022-02-23 ENCOUNTER — PATIENT OUTREACH (OUTPATIENT)
Dept: CARE COORDINATION | Facility: CLINIC | Age: 67
End: 2022-02-23
Payer: COMMERCIAL

## 2022-02-24 NOTE — PROGRESS NOTES
Patient spoke with CCC SW on 2/7/22 and discussed goals     CHW delegations: Delegating to CHW to call in less than 30 days and to schedule reassessment with SW CC at next outreach    Next outreach due: 3/7/22

## 2022-03-08 ENCOUNTER — PATIENT OUTREACH (OUTPATIENT)
Dept: CARE COORDINATION | Facility: CLINIC | Age: 67
End: 2022-03-08
Payer: COMMERCIAL

## 2022-03-08 NOTE — PROGRESS NOTES
Patient's phone busy x 2 attempts.  Will attempt outreach again in 3-5 business days.    Next outreach due: 3/11/22

## 2022-03-11 ENCOUNTER — PATIENT OUTREACH (OUTPATIENT)
Dept: CARE COORDINATION | Facility: CLINIC | Age: 67
End: 2022-03-11
Payer: COMMERCIAL

## 2022-03-11 NOTE — LETTER
[facility]  [address] [facility] [address]    March 11, 2022    Isabel Biggs  [address]   [place] [address]      Dear Isabel,    I have been attempting to reach you since our last contact. I would like to continue to work with you and provide any additional support you may need on achieving your health care related goals. I would appreciate if you would give me a call at [phone] to let me know if you would like to continue working together. I know that there are many things that can affect our ability to communicate and I hope we can continue to work together.    All of us at the [facility] are invested in your health and are here to assist you in meeting your goals.     Sincerely,    Nancy Community Health Worker    PS-Your phone line is repeatedly busy, you may be having issues with your phone line.

## 2022-03-11 NOTE — PROGRESS NOTES
[facility]       Clinical Data: Care Coordinator Outreach  Outreach attempted x 2.  Phone line has been busy at each outreach, she may be experiencing phone issues.   Plan: Care Coordinator will send unable to contact letter with care coordinator contact information via [employer]. Care Coordinator will try to reach patient again in 10 business days.  Next outreach due: 3/25/22

## 2022-03-21 ENCOUNTER — PATIENT OUTREACH (OUTPATIENT)
Dept: CARE COORDINATION | Facility: CLINIC | Age: 67
End: 2022-03-21
Payer: COMMERCIAL

## 2022-03-21 NOTE — LETTER
[facility]  [facility]    March 21, 2022    Isabel Biggs  [address]   [place] [address]      Dear Isabel,    I have been attempting to reach you since our last contact. I would like to continue to work with you and provide any additional support you may need on achieving your health care related goals. I would appreciate if you would give me a call at [phone] to let me know if you would like to continue working together. I know that there are many things that can affect our ability to communicate and I hope we can continue to work together.    All of us at the [facility] are invested in your health and are here to assist you in meeting your goals.     Sincerely,    KAN English

## 2022-03-21 NOTE — PROGRESS NOTES
Contact  [facility]/Voicemail    Referral Source: Care Team  Clinical Data:  Outreach  Outreach attempted x 3.  Fast busy.  Plan:  will send unable to contact letter with care coordinator contact information.Delegating to CHW to set up re assessment if able to reach patient in 10 business days. If no contact, sent disenrollment letter.   Angela Rogel,   [facility]  [phone]

## 2022-03-27 ENCOUNTER — HEALTH MAINTENANCE LETTER (OUTPATIENT)
Age: 67
End: 2022-03-27

## 2022-03-29 ENCOUNTER — OFFICE VISIT (OUTPATIENT)
Dept: INTERNAL MEDICINE | Facility: CLINIC | Age: 67
End: 2022-03-29
Payer: COMMERCIAL

## 2022-03-29 ENCOUNTER — ANCILLARY PROCEDURE (OUTPATIENT)
Dept: GENERAL RADIOLOGY | Facility: CLINIC | Age: 67
End: 2022-03-29
Attending: INTERNAL MEDICINE
Payer: COMMERCIAL

## 2022-03-29 VITALS
OXYGEN SATURATION: 99 % | BODY MASS INDEX: 43.59 KG/M2 | DIASTOLIC BLOOD PRESSURE: 84 MMHG | HEIGHT: 60 IN | WEIGHT: 222 LBS | HEART RATE: 86 BPM | SYSTOLIC BLOOD PRESSURE: 134 MMHG

## 2022-03-29 DIAGNOSIS — M25.562 CHRONIC PAIN OF LEFT KNEE: ICD-10-CM

## 2022-03-29 DIAGNOSIS — M23.92 KNEE LOCKING, LEFT: ICD-10-CM

## 2022-03-29 DIAGNOSIS — M23.52 RECURRENT LEFT KNEE INSTABILITY: Primary | ICD-10-CM

## 2022-03-29 DIAGNOSIS — G89.29 CHRONIC PAIN OF LEFT KNEE: ICD-10-CM

## 2022-03-29 DIAGNOSIS — M23.52 RECURRENT LEFT KNEE INSTABILITY: ICD-10-CM

## 2022-03-29 DIAGNOSIS — M25.562 LEFT KNEE PAIN, UNSPECIFIED CHRONICITY: ICD-10-CM

## 2022-03-29 PROCEDURE — 99214 OFFICE O/P EST MOD 30 MIN: CPT | Performed by: INTERNAL MEDICINE

## 2022-03-29 PROCEDURE — 73560 X-RAY EXAM OF KNEE 1 OR 2: CPT | Mod: TC | Performed by: RADIOLOGY

## 2022-03-29 NOTE — PROGRESS NOTES
Office Visit - Follow Up   Isabel Biggs   67 year old female    Date of Visit: 3/29/2022    Chief Complaint   Patient presents with     Leg Pain     L leg, x 1 mo        Assessment and Plan   1. Recurrent left knee instability  Over 6 weeks of knee pain, locking and instability.  We will obtain an x-ray and pending results I think she will need an MRI to evaluate for possible meniscal injury or other pathology.  - XR Knee Left 1/2 Views; Future  - MR Knee Left w/o Contrast; Future    2. Knee locking, left  - XR Knee Left 1/2 Views; Future  - MR Knee Left w/o Contrast; Future    3. Chronic pain of left knee  - XR Knee Left 1/2 Views; Future  - MR Knee Left w/o Contrast; Future    Return in about 4 weeks (around 4/26/2022) for Routine preventive.     History of Present Illness   This 67 year old woman comes in for evaluation of left knee pain, locking, clicking and instability.  This is been going on for about 6 to 8 weeks.  It is progressively getting worse.  She is having a hard time walking.  The pain is within the joint posteriorly and she has had just some knee swelling as well.  No specific injury.  No redness or increased warmth.    Review of Systems: A comprehensive review of systems was negative except as noted.     Medications, Allergies and Problem List   Reviewed, reconciled and updated  Post Discharge Medication Reconciliation Status:      Physical Exam   General Appearance:   No acute distress    /84 (BP Location: Left arm, Patient Position: Sitting, Cuff Size: Adult Regular)   Pulse 86   Ht 1.524 m (5')   Wt 100.7 kg (222 lb)   SpO2 99%   BMI 43.36 kg/m      She does have a bit of swelling of the left knee and some tenderness posteriorly.  Some pain with range of motion.  She ambulates with an antalgic gait     Additional Information   Current Outpatient Medications   Medication Sig Dispense Refill     acetaminophen (TYLENOL) 500 MG tablet [ACETAMINOPHEN (TYLENOL) 500 MG TABLET] Take  1,000 mg by mouth.       albuterol (PROAIR HFA;PROVENTIL HFA;VENTOLIN HFA) 90 mcg/actuation inhaler [ALBUTEROL (PROAIR HFA;PROVENTIL HFA;VENTOLIN HFA) 90 MCG/ACTUATION INHALER] INHALE 1 TO 2 PUFFS BY MOUTH INTO THE LUNGS EVERY 4 HOURS AS NEEDED FOR WHEEZING 3 Inhaler 1     albuterol (PROVENTIL) 2.5 mg /3 mL (0.083 %) nebulizer solution [ALBUTEROL (PROVENTIL) 2.5 MG /3 ML (0.083 %) NEBULIZER SOLUTION] Take 3 mL (2.5 mg total) by nebulization every 6 (six) hours as needed for wheezing. 90 vial 11     amitriptyline (ELAVIL) 25 MG tablet Take 1 tablet (25 mg) by mouth At Bedtime 90 tablet 3     amLODIPine (NORVASC) 5 MG tablet Take 1 tablet (5 mg) by mouth daily 90 tablet 2     aspirin 81 MG EC tablet [ASPIRIN 81 MG EC TABLET] Take 1 tablet (81 mg total) by mouth daily. 90 tablet 3     atorvastatin (LIPITOR) 20 MG tablet Take 1 tablet (20 mg) by mouth daily 90 tablet 3     buPROPion (WELLBUTRIN XL) 150 MG 24 hr tablet Take 1 tablet (150 mg) by mouth daily 90 tablet 3     DULoxetine (CYMBALTA) 30 MG capsule Take 1 capsule (30 mg) by mouth 2 times daily 180 capsule 0     fluticasone propionate (FLONASE) 50 mcg/actuation nasal spray [FLUTICASONE PROPIONATE (FLONASE) 50 MCG/ACTUATION NASAL SPRAY] 1 spray into each nostril daily. 16 g 11     gabapentin (NEURONTIN) 100 MG capsule [GABAPENTIN (NEURONTIN) 100 MG CAPSULE] Take 100 mg by mouth 2 (two) times a day. 180 capsule 1     meclizine (ANTIVERT) 25 mg tablet [MECLIZINE (ANTIVERT) 25 MG TABLET] Take 1 tablet (25 mg total) by mouth 4 (four) times a day as needed for dizziness. 90 tablet 3     meloxicam (MOBIC) 15 MG tablet Take 1 tablet (15 mg) by mouth daily 90 tablet 3     nebulizer accessories (ADULT AEROSOL MASK) Misc [NEBULIZER ACCESSORIES (ADULT AEROSOL MASK) MISC] USE 1 AS DIRECTED. 1 each 0     polyethylene glycol (GLYCOLAX) 17 gram/dose powder [POLYETHYLENE GLYCOL (GLYCOLAX) 17 GRAM/DOSE POWDER] Take 17 g by mouth daily. Use as needed. 255 g 3     tiZANidine  (ZANAFLEX) 4 MG tablet Take 1 tablet (4 mg) by mouth 3 times daily 30 tablet 4     VITAMIN D3 25 mcg (1,000 unit) capsule [VITAMIN D3 25 MCG (1,000 UNIT) CAPSULE] TAKE 1 CAPSULE BY MOUTH EVERY DAY 90 capsule 3     Allergies   Allergen Reactions     Chloroquine Other (See Comments), Itching and Visual Disturbance     Blurry vision, itchy skin  Blurry vision, itchy skin       Chlorquinaldol Dermatitis and Other (See Comments)     also blurry vision     Social History     Tobacco Use     Smoking status: Never Smoker     Smokeless tobacco: Never Used   Substance Use Topics     Alcohol use: Never     Drug use: None       Review and/or order of clinical lab tests:  Review and/or order of radiology tests:  Review and/or order of medicine tests:  Discussion of test results with performing physician:  Decision to obtain old records and/or obtain history from someone other than the patient:  Review and summarization of old records and/or obtaining history from someone other than the patient and.or discussion of case with another health care provider:  Independent visualization of image, tracing or specimen itself:    Time:      Cj Georges MD

## 2022-04-08 ENCOUNTER — PATIENT OUTREACH (OUTPATIENT)
Dept: CARE COORDINATION | Facility: CLINIC | Age: 67
End: 2022-04-08
Payer: COMMERCIAL

## 2022-04-08 NOTE — LETTER
[facility] COORDIN[facility]  [address] [facility] [address]  April 8, 2022    Isabel Biggs  [address]   [place] [address]      Dear Isabel,    I have been unsuccessful in reaching you since our last contact. At this time the Care Coordination team will make no further attempts to reach you, however this does not change your ability to continue receiving care from your providers at your primary care clinic. If you need additional support from a care coordinator in the future please contact Angela at [phone].    All of us at [facility] are invested in your health and are here to assist you in meeting your goals.     Sincerely,    Lakeshia Arizmendi  Community Health Worker  [facility] Care Coordination  [email or web address]  Office: [phone]

## 2022-04-08 NOTE — PROGRESS NOTES
Clinic Care Coordination Contact  [facility]       Clinical Data: Care Coordinator Outreach  Outreach attempted x 4. Phone was busy.    Plan: Care Coordinator will send disenrollment letter with care coordinator contact information via [employer]. Care Coordinator will do no further outreaches at this time.        Lakeshia Arizmendi  Community Health Worker  [facility] Care Coordination  [email or web address]  Office: [phone]

## 2022-04-21 ENCOUNTER — HOSPITAL ENCOUNTER (OUTPATIENT)
Dept: MRI IMAGING | Facility: HOSPITAL | Age: 67
Discharge: HOME | End: 2022-04-21
Attending: INTERNAL MEDICINE | Admitting: INTERNAL MEDICINE
Payer: COMMERCIAL

## 2022-04-21 DIAGNOSIS — M23.92 KNEE LOCKING, LEFT: ICD-10-CM

## 2022-04-21 DIAGNOSIS — M23.52 RECURRENT LEFT KNEE INSTABILITY: ICD-10-CM

## 2022-04-21 DIAGNOSIS — M25.562 CHRONIC PAIN OF LEFT KNEE: ICD-10-CM

## 2022-04-21 DIAGNOSIS — G89.29 CHRONIC PAIN OF LEFT KNEE: ICD-10-CM

## 2022-04-21 PROCEDURE — 73721 MRI JNT OF LWR EXTRE W/O DYE: CPT | Mod: LT

## 2022-05-03 ENCOUNTER — OFFICE VISIT (OUTPATIENT)
Dept: ORTHOPEDICS | Facility: CLINIC | Age: 67
End: 2022-05-03
Attending: INTERNAL MEDICINE
Payer: COMMERCIAL

## 2022-05-03 VITALS
SYSTOLIC BLOOD PRESSURE: 130 MMHG | BODY MASS INDEX: 43.59 KG/M2 | DIASTOLIC BLOOD PRESSURE: 84 MMHG | HEIGHT: 60 IN | WEIGHT: 222 LBS

## 2022-05-03 DIAGNOSIS — M17.12 PRIMARY OSTEOARTHRITIS OF LEFT KNEE: Primary | ICD-10-CM

## 2022-05-03 PROCEDURE — 99213 OFFICE O/P EST LOW 20 MIN: CPT | Performed by: PEDIATRICS
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
## 2022-05-03 NOTE — PATIENT INSTRUCTIONS
Imaging of the left knee primarily shows underlying degenerative change, or arthritis in the knee.  Icing, heat, over-the-counter medication as needed for soreness.  We discussed potential benefit from physical therapy.  May consider use of compression/support for the knee, if desired.  Reviewed medication options, including the meloxicam that you already have.  We also discussed injection options, including with steroid versus Visco supplement.  At this point, plan to set you up with one of my colleagues for ultrasound evaluation of the knee, with anticipated steroid injection for the knee, but with also possible popliteal cyst aspiration, if noted.  Additionally, we will go ahead and place the prior authorization for Visco supplement as well, and if covered, that is also an option.  We will leave follow-up open-ended, depending on course with the injection.  Contact clinic any questions or concerns.    If you have any further questions for your physician or physician s care team you can call [phone] and use option 3 to leave a voice message. Calls received during business hours will be returned same day.
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
## 2022-05-06 ENCOUNTER — TELEPHONE (OUTPATIENT)
Dept: ORTHOPEDICS | Facility: CLINIC | Age: 67
End: 2022-05-06
Payer: COMMERCIAL

## 2022-05-06 DIAGNOSIS — M17.12 PRIMARY OSTEOARTHRITIS OF LEFT KNEE: Primary | ICD-10-CM

## 2022-05-06 NOTE — TELEPHONE ENCOUNTER
PREFERRED PRODUCT  Received: Today  Antonia Sanchez Fsoc Be Jeff Roberts,     Synvisc one is not a preferred product for this patients insurance- can we switch to euflexxa, durolane or gelsyn 3?     Thank you,   Antonia Jules auth for durolane placed  Angie Bundy MS ATC

## 2022-05-17 ENCOUNTER — OFFICE VISIT (OUTPATIENT)
Dept: ORTHOPEDICS | Facility: CLINIC | Age: 67
End: 2022-05-17
Payer: COMMERCIAL

## 2022-05-17 DIAGNOSIS — M17.12 PRIMARY OSTEOARTHRITIS OF LEFT KNEE: Primary | ICD-10-CM

## 2022-05-17 PROCEDURE — 20611 DRAIN/INJ JOINT/BURSA W/US: CPT | Mod: LT | Performed by: FAMILY MEDICINE

## 2022-05-17 NOTE — LETTER
5/17/2022         RE: Isabel Biggs  [address]  [place] [address]        Dear Colleague,    Thank you for referring your patient, Isabel Biggs, to the [facility]. Please see a copy of my visit note below.    Large Joint Injection/Arthocentesis: L knee joint    Date/Time: 5/17/2022 3:15 PM  Performed by: Tenzin Chawla MD  Authorized by: Tenzin Chawla MD     Indications:  Pain  Needle Size:  25 G  Guidance: ultrasound    Approach:  Superolateral  Location:  Knee      Medications:  60 mg sodium hyaluronate 60 MG/3ML  Outcome:  Tolerated well, no immediate complications  Procedure discussed: discussed risks, benefits, and alternatives    Consent Given by:  Patient  Timeout: timeout called immediately prior to procedure    Prep: patient was prepped and draped in usual sterile fashion     Ultrasound images of procedure were permanently stored.      Referral from Dr. Olson    Patient tolerated left knee joint hyaluronic acid injection today.  Aftercare instructions given to patient.  Plan to follow-up as previously discussed with referring provider.  Ultrasound guided images were permanently stored.     Tenzin Chawla MD [facility]              Again, thank you for allowing me to participate in the care of your patient.        Sincerely,        Tenzin Chawla MD

## 2022-05-17 NOTE — PROGRESS NOTES
Large Joint Injection/Arthocentesis: L knee joint    Date/Time: 5/17/2022 3:15 PM  Performed by: Tenzin Chawla MD  Authorized by: Tenzin Chawla MD     Indications:  Pain  Needle Size:  25 G  Guidance: ultrasound    Approach:  Superolateral  Location:  Knee      Medications:  60 mg sodium hyaluronate 60 MG/3ML  Outcome:  Tolerated well, no immediate complications  Procedure discussed: discussed risks, benefits, and alternatives    Consent Given by:  Patient  Timeout: timeout called immediately prior to procedure    Prep: patient was prepped and draped in usual sterile fashion     Ultrasound images of procedure were permanently stored.      Referral from Dr. Olson    Patient tolerated left knee joint hyaluronic acid injection today.  Aftercare instructions given to patient.  Plan to follow-up as previously discussed with referring provider.  Ultrasound guided images were permanently stored.     Tenzin Chawla MD [facility]

## 2022-05-17 NOTE — PATIENT INSTRUCTIONS
[facility] [place] Discharge Instructions    Procedure: Left knee Durolane Injection    You may shower, however avoid swimming, tub baths or hot tubs for 24 hours following your procedure  You may have a mild to moderate increase in pain for several days following the injection.  It may take up to 14 days for the steroid medication to start working although you may feel the effect as early as a few days after the procedure.  You may use ice packs for 10-15 minutes, 3 to 4 times a day at the injection site for comfort  You may use anti-inflammatory medications (such as Ibuprofen or Aleve or Advil) or Tylenol for pain control if necessary  If you were fasting, you may resume your normal diet and medications after the procedure    If you experience any of the following, call [facility] @ [phone] or [phone]  -Fever over 100 degree F  -Swelling, bleeding, redness, drainage, warmth at the injection site  - New or worsening pain    It was great seeing you today!    Tenzin Chawla

## 2022-05-23 ENCOUNTER — OFFICE VISIT (OUTPATIENT)
Dept: INTERNAL MEDICINE | Facility: CLINIC | Age: 67
End: 2022-05-23
Payer: COMMERCIAL

## 2022-05-23 VITALS
SYSTOLIC BLOOD PRESSURE: 138 MMHG | WEIGHT: 223.3 LBS | DIASTOLIC BLOOD PRESSURE: 70 MMHG | HEIGHT: 59 IN | HEART RATE: 101 BPM | OXYGEN SATURATION: 98 % | BODY MASS INDEX: 45.02 KG/M2 | TEMPERATURE: 98.1 F

## 2022-05-23 DIAGNOSIS — Z78.0 ASYMPTOMATIC POSTMENOPAUSAL ESTROGEN DEFICIENCY: ICD-10-CM

## 2022-05-23 DIAGNOSIS — Z12.11 SCREEN FOR COLON CANCER: ICD-10-CM

## 2022-05-23 DIAGNOSIS — F33.1 MODERATE EPISODE OF RECURRENT MAJOR DEPRESSIVE DISORDER (H): ICD-10-CM

## 2022-05-23 DIAGNOSIS — G47.33 OSA (OBSTRUCTIVE SLEEP APNEA): ICD-10-CM

## 2022-05-23 DIAGNOSIS — Z23 HIGH PRIORITY FOR 2019-NCOV VACCINE: ICD-10-CM

## 2022-05-23 DIAGNOSIS — D56.1 BETA THALASSEMIA (H): ICD-10-CM

## 2022-05-23 DIAGNOSIS — E66.01 MORBID OBESITY (H): ICD-10-CM

## 2022-05-23 DIAGNOSIS — E11.9 TYPE 2 DIABETES MELLITUS WITHOUT COMPLICATION, WITHOUT LONG-TERM CURRENT USE OF INSULIN (H): ICD-10-CM

## 2022-05-23 DIAGNOSIS — M25.511 ACUTE PAIN OF RIGHT SHOULDER: ICD-10-CM

## 2022-05-23 DIAGNOSIS — Z00.00 ANNUAL PHYSICAL EXAM: Primary | ICD-10-CM

## 2022-05-23 DIAGNOSIS — E11.42 DIABETIC POLYNEUROPATHY ASSOCIATED WITH TYPE 2 DIABETES MELLITUS (H): ICD-10-CM

## 2022-05-23 DIAGNOSIS — G89.29 CHRONIC BILATERAL LOW BACK PAIN WITHOUT SCIATICA: ICD-10-CM

## 2022-05-23 DIAGNOSIS — H81.393 PERIPHERAL VERTIGO OF BOTH EARS: ICD-10-CM

## 2022-05-23 DIAGNOSIS — M54.50 CHRONIC BILATERAL LOW BACK PAIN WITHOUT SCIATICA: ICD-10-CM

## 2022-05-23 LAB
ALBUMIN SERPL-MCNC: 3.8 G/DL (ref 3.5–5)
ALP SERPL-CCNC: 84 U/L (ref 45–120)
ALT SERPL W P-5'-P-CCNC: 17 U/L (ref 0–45)
ANION GAP SERPL CALCULATED.3IONS-SCNC: 8 MMOL/L (ref 5–18)
AST SERPL W P-5'-P-CCNC: 19 U/L (ref 0–40)
BILIRUB SERPL-MCNC: 0.6 MG/DL (ref 0–1)
BUN SERPL-MCNC: 17 MG/DL (ref 8–22)
CALCIUM SERPL-MCNC: 9.4 MG/DL (ref 8.5–10.5)
CHLORIDE BLD-SCNC: 104 MMOL/L (ref 98–107)
CHOLEST SERPL-MCNC: 192 MG/DL
CO2 SERPL-SCNC: 29 MMOL/L (ref 22–31)
CREAT SERPL-MCNC: 0.68 MG/DL (ref 0.6–1.1)
CREAT UR-MCNC: 125 MG/DL
ERYTHROCYTE [DISTWIDTH] IN BLOOD BY AUTOMATED COUNT: 14.4 % (ref 10–15)
FASTING STATUS PATIENT QL REPORTED: NO
GFR SERPL CREATININE-BSD FRML MDRD: >90 ML/MIN/1.73M2
GLUCOSE BLD-MCNC: 96 MG/DL (ref 70–125)
HBA1C MFR BLD: 6.3 % (ref 0–5.6)
HCT VFR BLD AUTO: 36 % (ref 35–47)
HDLC SERPL-MCNC: 58 MG/DL
HGB BLD-MCNC: 11 G/DL (ref 11.7–15.7)
LDLC SERPL CALC-MCNC: 119 MG/DL
MCH RBC QN AUTO: 24.3 PG (ref 26.5–33)
MCHC RBC AUTO-ENTMCNC: 30.6 G/DL (ref 31.5–36.5)
MCV RBC AUTO: 80 FL (ref 78–100)
MICROALBUMIN UR-MCNC: 2.72 MG/DL (ref 0–1.99)
MICROALBUMIN/CREAT UR: 21.8 MG/G CR
PLATELET # BLD AUTO: 286 10E3/UL (ref 150–450)
POTASSIUM BLD-SCNC: 4 MMOL/L (ref 3.5–5)
PROT SERPL-MCNC: 7.8 G/DL (ref 6–8)
RBC # BLD AUTO: 4.53 10E6/UL (ref 3.8–5.2)
SODIUM SERPL-SCNC: 141 MMOL/L (ref 136–145)
TRIGL SERPL-MCNC: 73 MG/DL
WBC # BLD AUTO: 4.1 10E3/UL (ref 4–11)

## 2022-05-23 PROCEDURE — 85027 COMPLETE CBC AUTOMATED: CPT | Performed by: INTERNAL MEDICINE

## 2022-05-23 PROCEDURE — 80061 LIPID PANEL: CPT | Performed by: INTERNAL MEDICINE

## 2022-05-23 PROCEDURE — 36415 COLL VENOUS BLD VENIPUNCTURE: CPT | Performed by: INTERNAL MEDICINE

## 2022-05-23 PROCEDURE — 82043 UR ALBUMIN QUANTITATIVE: CPT | Performed by: INTERNAL MEDICINE

## 2022-05-23 PROCEDURE — 99397 PER PM REEVAL EST PAT 65+ YR: CPT | Mod: 25 | Performed by: INTERNAL MEDICINE

## 2022-05-23 PROCEDURE — 83036 HEMOGLOBIN GLYCOSYLATED A1C: CPT | Performed by: INTERNAL MEDICINE

## 2022-05-23 PROCEDURE — 80053 COMPREHEN METABOLIC PANEL: CPT | Performed by: INTERNAL MEDICINE

## 2022-05-23 PROCEDURE — 99214 OFFICE O/P EST MOD 30 MIN: CPT | Mod: 25 | Performed by: INTERNAL MEDICINE

## 2022-05-23 PROCEDURE — 91305 COVID-19,PF,PFIZER (12+ YRS): CPT | Performed by: INTERNAL MEDICINE

## 2022-05-23 PROCEDURE — 0054A COVID-19,PF,PFIZER (12+ YRS): CPT | Performed by: INTERNAL MEDICINE

## 2022-05-23 ASSESSMENT — PATIENT HEALTH QUESTIONNAIRE - PHQ9
SUM OF ALL RESPONSES TO PHQ QUESTIONS 1-9: 10
10. IF YOU CHECKED OFF ANY PROBLEMS, HOW DIFFICULT HAVE THESE PROBLEMS MADE IT FOR YOU TO DO YOUR WORK, TAKE CARE OF THINGS AT HOME, OR GET ALONG WITH OTHER PEOPLE: SOMEWHAT DIFFICULT
SUM OF ALL RESPONSES TO PHQ QUESTIONS 1-9: 10
SUM OF ALL RESPONSES TO PHQ QUESTIONS 1-9: 10

## 2022-05-23 NOTE — PROGRESS NOTES
SUBJECTIVE:   Isabel Biggs is a 67 year old female who presents for Preventive Visit.  This 67-year-old woman comes in for annual wellness visit as well as evaluation of a few issues.  Her main issue is over the past few weeks been having shoulder pain.  No specific injury.  She has a hard time raising the shoulder above her head and is significantly limited in range of motion.  Her back is doing better she had an injection in the knee which was helpful.  She states her mood is generally stable.  She still does not have a CPAP machine.  Breathing stable.    Patient has been advised of split billing requirements and indicates understanding: Yes  Are you in the first 12 months of your Medicare coverage?  No    Imm/Inj    History of Present Illness       Reason for visit:  Shoulder pain (right)  Symptom onset:  1-2 weeks ago  Symptoms include:  Throbbing pain, difficulty raising my arm,  Symptom intensity:  Severe  Symptom progression:  Staying the same  Had these symptoms before:  Yes  Has tried/received treatment for these symptoms:  Yes  Previous treatment was successful:  Yes  Prior treatment description:  Surgery  What makes it worse:  Lifting,trying take care of hygiene routine, stiffness  What makes it better:  Medications    She eats 2-3 servings of fruits and vegetables daily.She consumes 1 sweetened beverage(s) daily.She exercises with enough effort to increase her heart rate 20 to 29 minutes per day.  She exercises with enough effort to increase her heart rate 4 days per week. She is missing 1 dose(s) of medications per week.    Today's PHQ-9         PHQ-9 Total Score: 10    PHQ-9 Q9 Thoughts of better off dead/self-harm past 2 weeks :   Not at all    How difficult have these problems made it for you to do your work, take care of things at home, or get along with other people: Somewhat difficult    Do you feel safe in your environment? Yes    Have you ever done Advance Care Planning? (For example, a Health  Directive, POLST, or a discussion with a medical provider or your loved ones about your wishes): Yes, patient states has an Advance Care Planning document and will bring a copy to the clinic.       Fall risk  Fallen 2 or more times in the past year?: No  Any fall with injury in the past year?: No    Cognitive Screening   1) Repeat 3 items (Leader, Season, Table)    2) Clock draw: NORMAL  3) 3 item recall: Recalls 2 objects   Results: NORMAL clock, 1-2 items recalled: COGNITIVE IMPAIRMENT LESS LIKELY    Mini-CogTM Copyright S Micha. Licensed by the author for use in [facility]; reprinted with permission (sofya@[facility]). All rights reserved.      Do you have sleep apnea, excessive snoring or daytime drowsiness?: yes    Reviewed and updated as needed this visit by clinical staff   Tobacco   Meds                Reviewed and updated as needed this visit by Provider                   Social History     Tobacco Use     Smoking status: Never Smoker     Smokeless tobacco: Never Used   Substance Use Topics     Alcohol use: Never         No flowsheet data found.            Current providers sharing in care for this patient include:   Patient Care Team:  Cj Georges MD as PCP - General  Claudia Joseph MD as MD (Hematology & Oncology)  Meghann Moody, PharmD as Pharmacist (Pharmacist)  Cj Georges MD as Assigned PCP  Juma Wilkerson OD as MD (Optometry)  Juma Wilkerson OD as Assigned Surgical Provider  Denice Plasencia MD as MD (Neurology)  Cj Whaley APRN CNP as Assigned Sleep Provider  Paresh Mccabe MD as Assigned Musculoskeletal Provider    The following health maintenance items are reviewed in Epic and correct as of today:  Health Maintenance Due   Topic Date Due     DEXA  Never done     ZOSTER IMMUNIZATION (1 of 2) Never done     COLORECTAL CANCER SCREENING  03/05/2020     BMP  02/22/2022     LIPID  02/22/2022     MICROALBUMIN  02/22/2022           FHS-7:  "No flowsheet data found.      Pertinent mammograms are reviewed under the imaging tab.    Review of Systems  Constitutional, HEENT, cardiovascular, pulmonary, GI, , musculoskeletal, neuro, skin, endocrine and psych systems are negative, except as otherwise noted.    OBJECTIVE:   /70 (BP Location: Left arm, Patient Position: Sitting, Cuff Size: Adult Large)   Pulse 101   Temp 98.1  F (36.7  C) (Axillary)   Ht 1.499 m (4' 11\")   Wt 101.3 kg (223 lb 4.8 oz)   SpO2 98%   BMI 45.10 kg/m   Estimated body mass index is 45.1 kg/m  as calculated from the following:    Height as of this encounter: 1.499 m (4' 11\").    Weight as of this encounter: 101.3 kg (223 lb 4.8 oz).  Physical Exam  EYES: Eyelids, conjunctiva, and sclera were normal. Pupils were normal. Cornea, iris, and lens were normal bilaterally.  HEAD, EARS, NOSE, MOUTH, AND THROAT: Head and face were normal. Hearing was normal to voice and the ears were normal to external exam. Nose appearance was normal and there was no discharge. Oropharynx was normal.  NECK: Neck appearance was normal. There were no neck masses and the thyroid was not enlarged.  RESPIRATORY: Breathing pattern was normal and the chest moved symmetrically.  Percussion/auscultatory percussion was normal.  Lung sounds were normal and there were no abnormal sounds.  CARDIOVASCULAR: Heart rate and rhythm were normal.  S1 and S2 were normal and there were no extra sounds or murmurs. Peripheral pulses in arms and legs were normal.  Jugular venous pressure was normal.  There was no peripheral edema.  GASTROINTESTINAL: The abdomen was normal in contour.  Bowel sounds were present.  Percussion detected no organ enlargement or tenderness.  Palpation detected no tenderness, mass, or enlarged organs.   MUSCULOSKELETAL: Skeletal configuration was normal and muscle mass was normal for age. Joint appearance was overall normal.  She has impaired range of motion of the right shoulder in all " directions secondary to pain  LYMPHATIC: There were no enlarged nodes.  SKIN/HAIR/NAILS: Skin color was normal.  There were no skin lesions.  Hair and nails were normal.  NEUROLOGIC: The patient was alert and oriented to person, place, time, and circumstance. Speech was normal. Cranial nerves were normal. Motor strength was normal for age. The patient was normally coordinated.  PSYCHIATRIC:  Mood and affect were normal and the patient had normal recent and remote memory. The patient's judgment and insight were normal.        ASSESSMENT / PLAN:   1. Annual physical exam  This is a 67-year-old woman with issues as discussed below    2. Screen for colon cancer  She had a colonoscopy in 2021 which is not regarding health maintenance.  I will see if and get this updated    3. High priority for 2019-nCoV vaccine  - COVID-19,PF,PFIZER (12+ Yrs GRAY LABEL)    4. Acute pain of right shoulder  I suspect she has a frozen shoulder.  I recommend she consider a steroid injection and have sent her back to orthopedics for this consideration.  Also recommend some physical therapy  - Physical Therapy Referral; Future  - XR Shoulder Right G/E 3 Views; Future  - Orthopedic  Referral; Future    5. Moderate episode of recurrent major depressive disorder (H)  Stable continue same    6. Type 2 diabetes mellitus without complication, without long-term current use of insulin (H)  Recent A1c excellent, continue with annual diabetic eye exam and excellent diabetic foot care continue with current medication  - CBC with platelets; Future  - Comprehensive metabolic panel; Future  - Hemoglobin A1c; Future  - Lipid panel reflex to direct LDL Fasting; Future  - Albumin Random Urine Quantitative with Creat Ratio; Future  - CBC with platelets  - Comprehensive metabolic panel  - Hemoglobin A1c  - Lipid panel reflex to direct LDL Fasting  - Albumin Random Urine Quantitative with Creat Ratio    7. Diabetic polyneuropathy associated with type 2  "diabetes mellitus (H)  Stage    8. Beta thalassemia (H)  Stable    9. WILFRIDO - not using CPAP (2020)    10. Peripheral vertigo of both ears  Improved    11. Low back pain - Dr. GRIFFIN Leblanc Dolliver  Stable    12. Asymptomatic postmenopausal estrogen deficiency  - DX Hip/Pelvis/Spine; Future    13. Morbid obesity (H)  COUNSELING:    Estimated body mass index is 45.1 kg/m  as calculated from the following:    Height as of this encounter: 1.499 m (4' 11\").    Weight as of this encounter: 101.3 kg (223 lb 4.8 oz).    Weight management plan: Discussed healthy diet and exercise guidelines    She reports that she has never smoked. She has never used smokeless tobacco.      Appropriate preventive services were discussed with this patient, including applicable screening as appropriate for cardiovascular disease, diabetes, osteopenia/osteoporosis, and glaucoma.  As appropriate for age/gender, discussed screening for colorectal cancer, prostate cancer, breast cancer, and cervical cancer. Checklist reviewing preventive services available has been given to the patient.    Reviewed patients plan of care and provided an AVS. The Basic Care Plan (routine screening as documented in Health Maintenance) for Isabel meets the Care Plan requirement. This Care Plan has been established and reviewed with the Patient.    Counseling Resources:  ATP IV Guidelines  Pooled Cohorts Equation Calculator  Breast Cancer Risk Calculator  Breast Cancer: Medication to Reduce Risk  FRAX Risk Assessment  ICSI Preventive Guidelines  Dietary Guidelines for Americans, 2010  USDA's MyPlate  ASA Prophylaxis  Lung CA Screening    Cj Georges MD  [facility]    Identified Health Risks:  "

## 2022-06-13 ENCOUNTER — THERAPY VISIT (OUTPATIENT)
Dept: PHYSICAL THERAPY | Facility: CLINIC | Age: 67
End: 2022-06-13
Attending: INTERNAL MEDICINE
Payer: COMMERCIAL

## 2022-06-13 DIAGNOSIS — M25.511 ACUTE PAIN OF RIGHT SHOULDER: ICD-10-CM

## 2022-06-13 PROCEDURE — 97110 THERAPEUTIC EXERCISES: CPT | Mod: GP | Performed by: PHYSICAL THERAPIST

## 2022-06-13 PROCEDURE — 97161 PT EVAL LOW COMPLEX 20 MIN: CPT | Mod: GP | Performed by: PHYSICAL THERAPIST

## 2022-06-13 NOTE — PROGRESS NOTES
Physical Therapy Initial Evaluation  Subjective:  The history is provided by the patient. No  was used.   Patient Health History  Isabel Biggs being seen for R shoulder pain.     Date of Onset: about 2 mos ago.   Problem occurred: not sure   Pain is reported as 6/10 (increases to 9/10) on pain scale.  General health as reported by patient is good.  Pertinent medical history includes: concussions/dizziness and depression.   Red flags:  None as reported by patient.  Medical allergies: see epic chart.   Surgeries include:  Orthopedic surgery (R RCR 2012).    Current medications:  Anti-depressants and high blood pressure medication.    Current occupation is teacher/-retired.   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                  Therapist Generated HPI Evaluation  Problem details: Date of MD order for this episode was 5-23-22. Isabel states her R shoulder has been bothering her for about 2 mos, no known injury. She is R handed.  She did have a RCR on the R shoulder in 2012, she states she is having similar symptoms to prior to her surgery. Pain with reaching overhead or forward and feels very weak. .         Type of problem:  Right shoulder.    This is a new condition.  Condition occurred with:  Unknown cause.  Where condition occurred: for unknown reasons.  Patient reports pain:  In the joint.  Pain is described as aching and other (throbbing) and is constant.  Pain is the same all the time (difficult to sleep).  Since onset symptoms are unchanged.  Associated symptoms:  Loss of strength and loss of motion/stiffness. Symptoms are exacerbated by using arm at shoulder level, using arm overhead, using arm behind back, certain positions, lying on extremity, lifting and carrying  and relieved by ice, heat and NSAID's.  Special tests included:  X-ray (degenerative changes GHJ and ACJ).    Work activity restrictions: retired, but cares for grandkids 6 mos, 3,  2.  Barriers include:  None as reported by patient.                        Objective:  Standing Alignment:                  General deviations alignment: FH posture with UT subst with R shoulder movements.                           Shoulder Evaluation:  ROM:  AROM:  : standing shoulder flx R 91/L 145, abd R 50/L 150.  Flexion:  Right:  60    Abduction:  Right:  40    Internal Rotation:  Right:  Lateral hip in standing  External Rotation:  Right:  35                PROM:    Flexion:  Right: 70      Abduction:  Right:  60      External Rotation:  Right:  35                    Strength:    Flexion: Left:5/5   Pain:    Right: 3-/5     Pain:   Extension:  Left: 5/5    Pain:    Right: 3/5    Pain:  Abduction:  Left: 5/5  Pain:    Right: 3-/5     Pain:  Adduction:  Left: 5/5    Pain:    Right: 3-/5     Pain:  Internal Rotation:  Left:5/5     Pain:    Right: 3-/5     Pain:  External Rotation:   Left:5/5     Pain:   Right:3+/5     Pain:        Elbow Flexion:  Left:5/5     Pain:    Right:4/5     Pain:  Elbow Extension:  Left:5/5     Pain:    Right:4/5     Pain:    Special Tests:      Right shoulder positive for the following special tests:Impingement  Palpation:      Right shoulder tenderness present at: Clavicle; Sternoclavicular; Acrimioclavicular; Supraspinatus; Infraspinatus; Teres Minor; Subscapularis; Deltoid; Levator; Rhomboids and Upper Trap                                     General     ROS    Assessment/Plan:    Patient is a 67 year old female with right side shoulder complaints.    Patient has the following significant findings with corresponding treatment plan.                Diagnosis 1:  R shoulder pain  Pain -  hot/cold therapy, manual therapy, self management, education and home program  Decreased ROM/flexibility - manual therapy, therapeutic exercise and home program  Decreased strength - therapeutic exercise, therapeutic activities and home program  Impaired muscle performance - neuro re-education and home  program  Decreased function - therapeutic activities and home program  Impaired posture - neuro re-education and home program    Therapy Evaluation Codes:   1) History comprised of:   Personal factors that impact the plan of care:      Age, Past/current experiences, Time since onset of symptoms and caring for young grandkids.    Comorbidity factors that impact the plan of care are:      None.     Medications impacting care: Anti-depressant and High blood pressure.  2) Examination of Body Systems comprised of:   Body structures and functions that impact the plan of care:      Cervical spine and Shoulder.   Activity limitations that impact the plan of care are:      Bathing, Cooking, Dressing, Lifting, Sleeping, Laying down and reaching, caring for grandkids.  3) Clinical presentation characteristics are:   Stable/Uncomplicated.  4) Decision-Making    Low complexity using standardized patient assessment instrument and/or measureable assessment of functional outcome.  Cumulative Therapy Evaluation is: Low complexity.    Previous and current functional limitations:  (See Goal Flow Sheet for this information)    Short term and Long term goals: (See Goal Flow Sheet for this information)     Communication ability:  Patient appears to be able to clearly communicate and understand verbal and written communication and follow directions correctly.  Treatment Explanation - The following has been discussed with the patient:   RX ordered/plan of care  Anticipated outcomes  Possible risks and side effects  This patient would benefit from PT intervention to resume normal activities.   Rehab potential is good.    Frequency:  2 X week, once daily  Duration:  for 2 weeks tapering to 1 X a week over 6 weeks  Due to significant pain and lack of ROM will see how she progresses over next 2 wks and if not improving refer back to MD for further assessment  Discharge Plan:  Achieve all LTG.  Independent in home treatment program.  Reach  maximal therapeutic benefit.    Please refer to the daily flowsheet for treatment today, total treatment time and time spent performing 1:1 timed codes.

## 2022-06-13 NOTE — PROGRESS NOTES
[facility]    OUTPATIENT Physical Therapy ORTHOPEDIC EVALUATION  PLAN OF TREATMENT FOR OUTPATIENT REHABILITATION  (COMPLETE FOR INITIAL CLAIMS ONLY)  Patient's Last Name, First Name, M.I.  YOB: 1955  Isabel Biggs    Provider s Name:  [facility]   Medical Record No.  [id number]   Start of Care Date:  06/13/22   Onset Date:   05/23/22 (date of MD order)   Type:     _X__PT   ___OT Medical Diagnosis:    Encounter Diagnosis   Name Primary?    Acute pain of right shoulder         Treatment Diagnosis:  R shoulder pain        Goals:     06/13/22 0500   Body Part   Goals listed below are for R shoulder pain   Goal #1   Goal #1 reaching   Previous Functional Level No restrictions;overhead   Performance level painfree   Current Functional Level Can reach ;to shoulder level   Performance level 7/10PL   STG Target Performance Reach ;overhead   Performance level 4/10 PL or less   Rationale for bathing;for care of infant/toddler   Due date 07/04/22   LTG Target Performance Reach;overhead   Performance Level painfree   Rationale for bathing;for care of infant/toddler   Due date 07/25/22       Therapy Frequency:  2x/wk for 2 wks tapering to 1x/wk for 6 wks  Predicted Duration of Therapy Intervention:  10 wks    Christy Portillo, PT                 I CERTIFY THE NEED FOR THESE SERVICES FURNISHED UNDER        THIS PLAN OF TREATMENT AND WHILE UNDER MY CARE     (Physician attestation of this document indicates review and certification of the therapy plan).                     Certification Date From:  06/13/22   Certification Date To:  08/22/22    Referring Provider:  Cj Georges    Initial Assessment        See Epic Evaluation SOC Date: 06/13/22

## 2022-08-05 ENCOUNTER — OFFICE VISIT (OUTPATIENT)
Dept: URGENT CARE | Facility: URGENT CARE | Age: 67
End: 2022-08-05
Payer: COMMERCIAL

## 2022-08-05 VITALS
BODY MASS INDEX: 44.78 KG/M2 | SYSTOLIC BLOOD PRESSURE: 133 MMHG | DIASTOLIC BLOOD PRESSURE: 67 MMHG | WEIGHT: 221.7 LBS | HEART RATE: 85 BPM | TEMPERATURE: 98.3 F | OXYGEN SATURATION: 100 %

## 2022-08-05 DIAGNOSIS — E11.9 TYPE 2 DIABETES MELLITUS WITHOUT COMPLICATION, WITHOUT LONG-TERM CURRENT USE OF INSULIN (H): ICD-10-CM

## 2022-08-05 DIAGNOSIS — Z20.822 SUSPECTED 2019 NOVEL CORONAVIRUS INFECTION: ICD-10-CM

## 2022-08-05 DIAGNOSIS — R06.2 WHEEZING: ICD-10-CM

## 2022-08-05 DIAGNOSIS — J20.9 ACUTE BRONCHITIS WITH COEXISTING CONDITION REQUIRING PROPHYLACTIC TREATMENT: Primary | ICD-10-CM

## 2022-08-05 LAB
DEPRECATED S PYO AG THROAT QL EIA: NEGATIVE
FLUAV AG SPEC QL IA: NEGATIVE
FLUBV AG SPEC QL IA: NEGATIVE
GROUP A STREP BY PCR: NOT DETECTED

## 2022-08-05 PROCEDURE — U0003 INFECTIOUS AGENT DETECTION BY NUCLEIC ACID (DNA OR RNA); SEVERE ACUTE RESPIRATORY SYNDROME CORONAVIRUS 2 (SARS-COV-2) (CORONAVIRUS DISEASE [COVID-19]), AMPLIFIED PROBE TECHNIQUE, MAKING USE OF HIGH THROUGHPUT TECHNOLOGIES AS DESCRIBED BY CMS-2020-01-R: HCPCS | Performed by: PHYSICIAN ASSISTANT

## 2022-08-05 PROCEDURE — 99214 OFFICE O/P EST MOD 30 MIN: CPT | Mod: CS | Performed by: PHYSICIAN ASSISTANT

## 2022-08-05 PROCEDURE — 87651 STREP A DNA AMP PROBE: CPT | Performed by: PHYSICIAN ASSISTANT

## 2022-08-05 PROCEDURE — 87804 INFLUENZA ASSAY W/OPTIC: CPT | Performed by: PHYSICIAN ASSISTANT

## 2022-08-05 PROCEDURE — U0005 INFEC AGEN DETEC AMPLI PROBE: HCPCS | Performed by: PHYSICIAN ASSISTANT

## 2022-08-05 RX ORDER — PREDNISONE 20 MG/1
40 TABLET ORAL DAILY
Qty: 10 TABLET | Refills: 0 | Status: SHIPPED | OUTPATIENT
Start: 2022-08-05 | End: 2022-08-10

## 2022-08-05 RX ORDER — AZITHROMYCIN 250 MG/1
TABLET, FILM COATED ORAL
Qty: 6 TABLET | Refills: 0 | Status: SHIPPED | OUTPATIENT
Start: 2022-08-05 | End: 2022-08-10

## 2022-08-05 ASSESSMENT — ENCOUNTER SYMPTOMS
FEVER: 0
PALPITATIONS: 0
SORE THROAT: 1
RHINORRHEA: 0
LIGHT-HEADEDNESS: 0
WEAKNESS: 0
ALLERGIC/IMMUNOLOGIC NEGATIVE: 1
EYES NEGATIVE: 1
ARTHRALGIAS: 0
MYALGIAS: 0
CHILLS: 0
NECK PAIN: 0
MUSCULOSKELETAL NEGATIVE: 1
NECK STIFFNESS: 0
SHORTNESS OF BREATH: 0
DIARRHEA: 0
BACK PAIN: 0
HEADACHES: 0
CARDIOVASCULAR NEGATIVE: 1
WOUND: 0
JOINT SWELLING: 0
NAUSEA: 0
DIZZINESS: 0
COUGH: 1
VOMITING: 0
ENDOCRINE NEGATIVE: 1

## 2022-08-05 NOTE — PROGRESS NOTES
Chief Complaint:     Chief Complaint   Patient presents with     Pharyngitis     Possibly flu, fever comes at night, cough, headches, congestion, poor appetite, 3 days, pt has taken Naproxen, Tylenol  and lemon tea not feeling better. Pt would like to have flu, Covid and Strep test done       Results for orders placed or performed in visit on 08/05/22   Streptococcus A Rapid Screen w/Reflex to PCR - Clinic Collect     Status: Normal    Specimen: Throat; Swab   Result Value Ref Range    Group A Strep antigen Negative Negative   Influenza A & B Antigen - Clinic Collect     Status: Normal    Specimen: Nose; Swab   Result Value Ref Range    Influenza A antigen Negative Negative    Influenza B antigen Negative Negative    Narrative    Test results must be correlated with clinical data. If necessary, results should be confirmed by a molecular assay or viral culture.       Medical Decision Making:    Vital signs reviewed by Nick Mancilla PA-C  /67 (BP Location: Left arm, Patient Position: Sitting, Cuff Size: Adult Large)   Pulse 85   Temp 98.3  F (36.8  C) (Axillary)   Wt 100.6 kg (221 lb 11.2 oz)   SpO2 100%   BMI 44.78 kg/m      Differential Diagnosis:  URI Adult/Peds:  Bronchitis-viral, Pneumonia, Viral syndrome and Viral upper respiratory illness        ASSESSMENT    1. Acute bronchitis with coexisting condition requiring prophylactic treatment    2. Suspected 2019 novel coronavirus infection    3. Type 2 diabetes mellitus without complication, without long-term current use of insulin (H)    4. Wheezing        PLAN    Patient is in no acute distress.    Temp is 98.3 in clinic today, lung sounds were clear, and O2 sats at 100% on RA.    RST was negative.  We will call with PCR results only if positive.  influenza was negative.  COVID swab collected in clinic today.  Rx for prednisone, and Zithromax.  Rest, Push fluids, vaporizer, elevation of head of bed.  Ibuprofen and or Tylenol for any fever or body  aches.  Over the counter cough suppressant- PRN- as discussed.   Patient instructed to monitor blood sugars closely with illness and follow up with PCP for any DM medication changes if needed.  Last A1C was 6.2 on 5/23/22.  If symptoms worsen, recheck immediately otherwise follow up with your PCP in 1 week if symptoms are not improving.  Worrisome symptoms discussed with instructions to go to the ED.  Patient verbalized understanding and agreed with this plan.    Labs:    Results for orders placed or performed in visit on 08/05/22   Streptococcus A Rapid Screen w/Reflex to PCR - Clinic Collect     Status: Normal    Specimen: Throat; Swab   Result Value Ref Range    Group A Strep antigen Negative Negative   Influenza A & B Antigen - Clinic Collect     Status: Normal    Specimen: Nose; Swab   Result Value Ref Range    Influenza A antigen Negative Negative    Influenza B antigen Negative Negative    Narrative    Test results must be correlated with clinical data. If necessary, results should be confirmed by a molecular assay or viral culture.        Vital signs reviewed by Nick Mancilla PA-C  /67 (BP Location: Left arm, Patient Position: Sitting, Cuff Size: Adult Large)   Pulse 85   Temp 98.3  F (36.8  C) (Axillary)   Wt 100.6 kg (221 lb 11.2 oz)   SpO2 100%   BMI 44.78 kg/m      Current Meds      Current Outpatient Medications:      acetaminophen (TYLENOL) 500 MG tablet, [ACETAMINOPHEN (TYLENOL) 500 MG TABLET] Take 1,000 mg by mouth., Disp: , Rfl:      albuterol (PROAIR HFA;PROVENTIL HFA;VENTOLIN HFA) 90 mcg/actuation inhaler, [ALBUTEROL (PROAIR HFA;PROVENTIL HFA;VENTOLIN HFA) 90 MCG/ACTUATION INHALER] INHALE 1 TO 2 PUFFS BY MOUTH INTO THE LUNGS EVERY 4 HOURS AS NEEDED FOR WHEEZING, Disp: 3 Inhaler, Rfl: 1     albuterol (PROVENTIL) 2.5 mg /3 mL (0.083 %) nebulizer solution, [ALBUTEROL (PROVENTIL) 2.5 MG /3 ML (0.083 %) NEBULIZER SOLUTION] Take 3 mL (2.5 mg total) by nebulization every 6 (six) hours as  needed for wheezing., Disp: 90 vial, Rfl: 11     amitriptyline (ELAVIL) 25 MG tablet, Take 1 tablet (25 mg) by mouth At Bedtime, Disp: 90 tablet, Rfl: 3     amLODIPine (NORVASC) 5 MG tablet, Take 1 tablet (5 mg) by mouth daily, Disp: 90 tablet, Rfl: 2     aspirin 81 MG EC tablet, [ASPIRIN 81 MG EC TABLET] Take 1 tablet (81 mg total) by mouth daily., Disp: 90 tablet, Rfl: 3     atorvastatin (LIPITOR) 20 MG tablet, Take 1 tablet (20 mg) by mouth daily, Disp: 90 tablet, Rfl: 3     azithromycin (ZITHROMAX) 250 MG tablet, Take 2 tablets (500 mg) by mouth daily for 1 day, THEN 1 tablet (250 mg) daily for 4 days., Disp: 6 tablet, Rfl: 0     buPROPion (WELLBUTRIN XL) 150 MG 24 hr tablet, Take 1 tablet (150 mg) by mouth daily, Disp: 90 tablet, Rfl: 3     DULoxetine (CYMBALTA) 30 MG capsule, Take 1 capsule (30 mg) by mouth 2 times daily, Disp: 180 capsule, Rfl: 0     fluticasone propionate (FLONASE) 50 mcg/actuation nasal spray, [FLUTICASONE PROPIONATE (FLONASE) 50 MCG/ACTUATION NASAL SPRAY] 1 spray into each nostril daily., Disp: 16 g, Rfl: 11     meclizine (ANTIVERT) 25 mg tablet, [MECLIZINE (ANTIVERT) 25 MG TABLET] Take 1 tablet (25 mg total) by mouth 4 (four) times a day as needed for dizziness., Disp: 90 tablet, Rfl: 3     meloxicam (MOBIC) 15 MG tablet, Take 1 tablet (15 mg) by mouth daily, Disp: 90 tablet, Rfl: 3     nebulizer accessories (ADULT AEROSOL MASK) Misc, [NEBULIZER ACCESSORIES (ADULT AEROSOL MASK) MISC] USE 1 AS DIRECTED., Disp: 1 each, Rfl: 0     polyethylene glycol (GLYCOLAX) 17 gram/dose powder, [POLYETHYLENE GLYCOL (GLYCOLAX) 17 GRAM/DOSE POWDER] Take 17 g by mouth daily. Use as needed., Disp: 255 g, Rfl: 3     predniSONE (DELTASONE) 20 MG tablet, Take 2 tablets (40 mg) by mouth daily for 5 days, Disp: 10 tablet, Rfl: 0     tiZANidine (ZANAFLEX) 4 MG tablet, Take 1 tablet (4 mg) by mouth 3 times daily, Disp: 30 tablet, Rfl: 4     VITAMIN D3 25 mcg (1,000 unit) capsule, [VITAMIN D3 25 MCG (1,000 UNIT)  CAPSULE] TAKE 1 CAPSULE BY MOUTH EVERY DAY, Disp: 90 capsule, Rfl: 3    Current Facility-Administered Medications:      sodium hyaluronate (DUROLANE) injection 60 mg, 60 mg, , , Tenzin Chawla MD, 60 mg at 05/17/22 1515      Respiratory History    Reactive airway disease, on albuterol      SUBJECTIVE    HPI: Isabel Biggs is an 67 year old female who presents with aching, chest congestion, productive cough, fatigue, headache, nasal congestion and shortness of breath.  Symptoms began 2  days ago and have gotten worse. There is no chest pain, but her L ribs hurt when she coughs.  Patient is eating and drinking well.  She reports chills but no fever, nausea, vomiting, or diarrhea. She has been using her albuterol inhaler daily with some relief. Acetaminophen and Naproxen have helped with some body aches.    Patient denies any recent travel or exposure to known COVID positive tested individual.     ROS:     Review of Systems   Constitutional: Negative for chills and fever.   HENT: Positive for congestion and sore throat. Negative for ear pain and rhinorrhea.    Eyes: Negative.    Respiratory: Positive for cough. Negative for shortness of breath.    Cardiovascular: Negative.  Negative for chest pain and palpitations.   Gastrointestinal: Negative for diarrhea, nausea and vomiting.   Endocrine: Negative.    Genitourinary: Negative.    Musculoskeletal: Negative.  Negative for arthralgias, back pain, joint swelling, myalgias, neck pain and neck stiffness.   Skin: Negative.  Negative for rash and wound.   Allergic/Immunologic: Negative.  Negative for immunocompromised state.   Neurological: Negative for dizziness, weakness, light-headedness and headaches.         Family History   Family History   Problem Relation Age of Onset     Hypertension Mother      Diabetes Mother      Cancer Other      Breast Cancer Maternal Aunt 70.00     No Known Problems Father         killed in war     No Known Problems Sister         one   sudden death (37), others living     No Known Problems Brother         9 living, 1  diabetes, others  in war     No Known Problems Daughter      No Known Problems Son      Hereditary Breast and Ovarian Cancer Syndrome No family hx of      Cancer No family hx of      Colon Cancer No family hx of      Endometrial Cancer No family hx of      Ovarian Cancer No family hx of         Problem history  Patient Active Problem List   Diagnosis     WILFRIDO - not using CPAP ()     Type 2 diabetes mellitus without complication, without long-term current use of insulin (H)     Diabetic polyneuropathy associated with type 2 diabetes mellitus (H)     Beta thalassemia (H)     Low back pain - Dr. GRIFFIN Leblanc [place]     Moderate episode of recurrent major depressive disorder (H)     Morbid obesity (H)     Peripheral vertigo of both ears     Acute pain of right shoulder        Allergies  Allergies   Allergen Reactions     Chloroquine Other (See Comments), Itching and Visual Disturbance     Blurry vision, itchy skin  Blurry vision, itchy skin       Chlorquinaldol Dermatitis and Other (See Comments)     also blurry vision        Social History  Social History     Socioeconomic History     Marital status:      Spouse name: Not on file     Number of children: Not on file     Years of education: Not on file     Highest education level: Not on file   Occupational History     Not on file   Tobacco Use     Smoking status: Never Smoker     Smokeless tobacco: Never Used   Substance and Sexual Activity     Alcohol use: Never     Drug use: Never     Sexual activity: Not on file   Other Topics Concern     Not on file   Social History Narrative    She is originally from [place]. 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   She is  and has 7 adult children and many grandchildren.     Social  Determinants of Health     Financial Resource Strain: Not on file   Food Insecurity: Not on file   Transportation Needs: No Transportation Needs     Lack of Transportation (Medical): No     Lack of Transportation (Non-Medical): No   Physical Activity: Not on file   Stress: Not on file   Social Connections: Not on file   Intimate Partner Violence: Not on file   Housing Stability: Low Risk      Unable to Pay for Housing in the Last Year: No     Number of Places Lived in the Last Year: 2     Unstable Housing in the Last Year: No        OBJECTIVE     Vital signs reviewed by Nick Mancilla PA-C  /67 (BP Location: Left arm, Patient Position: Sitting, Cuff Size: Adult Large)   Pulse 85   Temp 98.3  F (36.8  C) (Axillary)   Wt 100.6 kg (221 lb 11.2 oz)   SpO2 100%   BMI 44.78 kg/m       Physical Exam  Vitals and nursing note reviewed.   Constitutional:       General: She is not in acute distress.     Appearance: She is well-developed. She is not ill-appearing, toxic-appearing or diaphoretic.   HENT:      Head: Normocephalic and atraumatic.      Right Ear: Hearing, tympanic membrane, ear canal and external ear normal. Tympanic membrane is not perforated, erythematous, retracted or bulging.      Left Ear: Hearing, tympanic membrane, ear canal and external ear normal. Tympanic membrane is not perforated, erythematous, retracted or bulging.      Nose: Congestion present. No mucosal edema or rhinorrhea.      Right Sinus: No maxillary sinus tenderness or frontal sinus tenderness.      Left Sinus: No maxillary sinus tenderness or frontal sinus tenderness.      Mouth/Throat:      Pharynx: Posterior oropharyngeal erythema present. No pharyngeal swelling, oropharyngeal exudate or uvula swelling.      Tonsils: No tonsillar exudate or tonsillar abscesses. 0 on the right. 0 on the left.   Eyes:      General:         Right eye: No discharge.         Left eye: No discharge.      Pupils: Pupils are equal, round, and reactive  to light.   Cardiovascular:      Rate and Rhythm: Normal rate and regular rhythm.      Heart sounds: Normal heart sounds. No murmur heard.    No friction rub. No gallop.   Pulmonary:      Effort: Pulmonary effort is normal. No respiratory distress.      Breath sounds: Normal breath sounds. No decreased breath sounds, wheezing, rhonchi or rales.   Chest:      Chest wall: No tenderness.   Abdominal:      General: Bowel sounds are normal. There is no distension.      Palpations: Abdomen is soft. There is no mass.      Tenderness: There is no abdominal tenderness. There is no guarding.   Musculoskeletal:      Cervical back: Normal range of motion and neck supple.   Lymphadenopathy:      Head:      Right side of head: No submental, submandibular, tonsillar, preauricular or posterior auricular adenopathy.      Left side of head: No submental, submandibular, tonsillar, preauricular or posterior auricular adenopathy.      Cervical: No cervical adenopathy.      Right cervical: No superficial or posterior cervical adenopathy.     Left cervical: No superficial or posterior cervical adenopathy.   Skin:     General: Skin is warm and dry.      Findings: No rash.   Neurological:      Mental Status: She is alert and oriented to person, place, and time.      Cranial Nerves: No cranial nerve deficit.      Deep Tendon Reflexes: Reflexes are normal and symmetric.   Psychiatric:         Behavior: Behavior normal. Behavior is cooperative.         Thought Content: Thought content normal.         Judgment: Judgment normal.           Nick Mancilla PA-C  8/5/2022, 2:05 PM  '

## 2022-08-06 LAB — SARS-COV-2 RNA RESP QL NAA+PROBE: NEGATIVE

## 2022-08-08 ENCOUNTER — MYC REFILL (OUTPATIENT)
Dept: INTERNAL MEDICINE | Facility: CLINIC | Age: 67
End: 2022-08-08

## 2022-08-08 DIAGNOSIS — J30.1 ALLERGIC RHINITIS DUE TO POLLEN, UNSPECIFIED SEASONALITY: ICD-10-CM

## 2022-08-08 DIAGNOSIS — J44.1 COPD EXACERBATION (H): ICD-10-CM

## 2022-08-08 DIAGNOSIS — H81.399 PERIPHERAL VERTIGO, UNSPECIFIED LATERALITY: ICD-10-CM

## 2022-08-08 DIAGNOSIS — E11.9 TYPE 2 DIABETES MELLITUS WITHOUT COMPLICATION, WITHOUT LONG-TERM CURRENT USE OF INSULIN (H): ICD-10-CM

## 2022-08-08 DIAGNOSIS — K59.01 SLOW TRANSIT CONSTIPATION: ICD-10-CM

## 2022-08-08 DIAGNOSIS — F43.23 ADJUSTMENT DISORDER WITH MIXED ANXIETY AND DEPRESSED MOOD: ICD-10-CM

## 2022-08-08 DIAGNOSIS — R06.2 WHEEZING: ICD-10-CM

## 2022-08-09 RX ORDER — AMPICILLIN TRIHYDRATE 500 MG
1 CAPSULE ORAL DAILY
Qty: 90 CAPSULE | Refills: 4 | Status: SHIPPED | OUTPATIENT
Start: 2022-08-09 | End: 2022-12-08

## 2022-08-09 RX ORDER — POLYETHYLENE GLYCOL 3350 17 G/17G
17 POWDER, FOR SOLUTION ORAL DAILY
Qty: 1530 G | Refills: 3 | Status: SHIPPED | OUTPATIENT
Start: 2022-08-09 | End: 2022-08-29

## 2022-08-09 RX ORDER — ALBUTEROL SULFATE 90 UG/1
1-2 AEROSOL, METERED RESPIRATORY (INHALATION) EVERY 4 HOURS PRN
Qty: 3 G | Refills: 11 | Status: SHIPPED | OUTPATIENT
Start: 2022-08-09 | End: 2022-12-29

## 2022-08-09 RX ORDER — FLUTICASONE PROPIONATE 50 MCG
1 SPRAY, SUSPENSION (ML) NASAL DAILY
Qty: 16 G | Refills: 11 | Status: SHIPPED | OUTPATIENT
Start: 2022-08-09 | End: 2023-01-14

## 2022-08-09 RX ORDER — DULOXETIN HYDROCHLORIDE 30 MG/1
30 CAPSULE, DELAYED RELEASE ORAL 2 TIMES DAILY
Qty: 180 CAPSULE | Refills: 0 | OUTPATIENT
Start: 2022-08-09

## 2022-08-09 RX ORDER — DULOXETIN HYDROCHLORIDE 30 MG/1
30 CAPSULE, DELAYED RELEASE ORAL 2 TIMES DAILY
Qty: 180 CAPSULE | Refills: 0 | Status: SHIPPED | OUTPATIENT
Start: 2022-08-09 | End: 2022-08-29

## 2022-08-09 RX ORDER — MECLIZINE HYDROCHLORIDE 25 MG/1
25 TABLET ORAL 4 TIMES DAILY PRN
Qty: 90 TABLET | Refills: 3 | Status: SHIPPED | OUTPATIENT
Start: 2022-08-09 | End: 2023-01-14

## 2022-08-09 RX ORDER — ALBUTEROL SULFATE 0.83 MG/ML
2.5 SOLUTION RESPIRATORY (INHALATION) EVERY 6 HOURS PRN
Qty: 90 ML | Refills: 4 | Status: SHIPPED | OUTPATIENT
Start: 2022-08-09 | End: 2023-01-14

## 2022-08-09 NOTE — TELEPHONE ENCOUNTER
"Routing refill request to provider for review/approval because:  Most recent PHQ9 was 10 in May 2022.  A potential break in medication    Last Written Prescription:       Last office visit provider: 5/23/22            Requested Prescriptions   Pending Prescriptions Disp Refills     DULoxetine (CYMBALTA) 30 MG capsule 180 capsule 0     Sig: Take 1 capsule (30 mg) by mouth 2 times daily       Serotonin-Norepinephrine Reuptake Inhibitors  Failed - 8/8/2022  4:33 PM        Failed - PHQ-9 score of less than 5 in past 6 months     Please review last PHQ-9 score.           Passed - Blood pressure under 140/90 in past 12 months     BP Readings from Last 3 Encounters:   08/05/22 133/67   05/23/22 138/70   05/03/22 130/84                 Passed - Medication is active on med list        Passed - Patient is age 18 or older        Passed - No active pregnancy on record        Passed - No positive pregnancy test in past 12 months        Passed - Recent (6 mo) or future (30 days) visit within the authorizing provider's specialty     Patient had office visit in the last 6 months or has a visit in the next 30 days with authorizing provider or within the authorizing provider's specialty.  See \"Patient Info\" tab in inbasket, or \"Choose Columns\" in Meds & Orders section of the refill encounter.                 Yasmeen Rick RN 08/09/22 1:33 AM  "

## 2022-08-09 NOTE — TELEPHONE ENCOUNTER
"Last Written Prescription Date:  1/11/21  Last Fill Quantity: 255 g,  # refills: 3   Last office visit provider:  5/23/22     Requested Prescriptions   Pending Prescriptions Disp Refills     FLUTICASONE PROPIONATE (NASAL) 50 MCG/ACT SUSP 16 g 11     Sig: Spray 1 spray in nostril daily       Nasal Allergy Protocol Passed - 8/8/2022  4:58 PM        Passed - Patient is age 12 or older        Passed - Recent (12 mo) or future (30 days) visit within the authorizing provider's specialty     Patient has had an office visit with the authorizing provider or a provider within the authorizing providers department within the previous 12 mos or has a future within next 30 days. See \"Patient Info\" tab in inbasket, or \"Choose Columns\" in Meds & Orders section of the refill encounter.              Passed - Medication is active on med list           Cholecalciferol (D 1000) 25 MCG (1000 UT) CAPS 90 capsule 3       Vitamin Supplements (Adult) Protocol Passed - 8/8/2022  4:58 PM        Passed - High dose Vitamin D not ordered        Passed - Recent (12 mo) or future (30 days) visit within the authorizing provider's specialty     Patient has had an office visit with the authorizing provider or a provider within the authorizing providers department within the previous 12 mos or has a future within next 30 days. See \"Patient Info\" tab in inbasket, or \"Choose Columns\" in Meds & Orders section of the refill encounter.              Passed - Medication is active on med list           albuterol (PROVENTIL) (2.5 MG/3ML) 0.083% neb solution       Sig: Take 1 vial (2.5 mg) by nebulization every 6 hours as needed       Asthma Maintenance Inhalers - Anticholinergics Passed - 8/8/2022  4:58 PM        Passed - Patient is age 12 years or older        Passed - Recent (12 mo) or future (30 days) visit within the authorizing provider's specialty     Patient has had an office visit with the authorizing provider or a provider within the authorizing " "providers department within the previous 12 mos or has a future within next 30 days. See \"Patient Info\" tab in inbasket, or \"Choose Columns\" in Meds & Orders section of the refill encounter.              Passed - Medication is active on med list       Short-Acting Beta Agonist Inhalers Protocol  Passed - 8/8/2022  4:58 PM        Passed - Patient is age 12 or older        Passed - Recent (12 mo) or future (30 days) visit within the authorizing provider's specialty     Patient has had an office visit with the authorizing provider or a provider within the authorizing providers department within the previous 12 mos or has a future within next 30 days. See \"Patient Info\" tab in inbasket, or \"Choose Columns\" in Meds & Orders section of the refill encounter.              Passed - Medication is active on med list           meclizine (ANTIVERT) 25 MG tablet 90 tablet 3     Sig: Take 1 tablet (25 mg) by mouth 4 times daily as needed        Antivertigo/Antiemetic Agents Passed - 8/8/2022  4:58 PM        Passed - Recent (12 mo) or future (30 days) visit within the authorizing provider's specialty     Patient has had an office visit with the authorizing provider or a provider within the authorizing providers department within the previous 12 mos or has a future within next 30 days. See \"Patient Info\" tab in inbasket, or \"Choose Columns\" in Meds & Orders section of the refill encounter.              Passed - Medication is active on med list        Passed - Patient is 18 years of age or older           albuterol (PROAIR HFA/PROVENTIL HFA/VENTOLIN HFA) 108 (90 Base) MCG/ACT inhaler         Asthma Maintenance Inhalers - Anticholinergics Passed - 8/8/2022  4:58 PM        Passed - Patient is age 12 years or older        Passed - Recent (12 mo) or future (30 days) visit within the authorizing provider's specialty     Patient has had an office visit with the authorizing provider or a provider within the authorizing providers department " "within the previous 12 mos or has a future within next 30 days. See \"Patient Info\" tab in inbasket, or \"Choose Columns\" in Meds & Orders section of the refill encounter.              Passed - Medication is active on med list       Short-Acting Beta Agonist Inhalers Protocol  Passed - 8/8/2022  4:58 PM        Passed - Patient is age 12 or older        Passed - Recent (12 mo) or future (30 days) visit within the authorizing provider's specialty     Patient has had an office visit with the authorizing provider or a provider within the authorizing providers department within the previous 12 mos or has a future within next 30 days. See \"Patient Info\" tab in inbasket, or \"Choose Columns\" in Meds & Orders section of the refill encounter.              Passed - Medication is active on med list           polyethylene glycol (MIRALAX) 17 GM/Dose powder 255 g 3     Sig: Take 17 g by mouth daily       Laxatives Protocol Passed - 8/8/2022  4:58 PM        Passed - Patient is age 6 or older        Passed - Recent (12 mo) or future (30 days) visit within the authorizing provider's specialty     Patient has had an office visit with the authorizing provider or a provider within the authorizing providers department within the previous 12 mos or has a future within next 30 days. See \"Patient Info\" tab in inbasket, or \"Choose Columns\" in Meds & Orders section of the refill encounter.              Passed - Medication is active on med list           UNABLE TO FIND 1 each 0     Sig: MEDICATION NAME:        There is no refill protocol information for this order      Refused Prescriptions Disp Refills     aspirin (ASA) 81 MG EC tablet 90 tablet 3     Sig: Take 1 tablet (81 mg) by mouth daily       Analgesics (Non-Narcotic Tylenol and ASA Only) Passed - 8/8/2022  4:58 PM        Passed - Recent (12 mo) or future (30 days) visit within the authorizing provider's specialty     Patient has had an office visit with the authorizing provider or a " "provider within the authorizing providers department within the previous 12 mos or has a future within next 30 days. See \"Patient Info\" tab in inbasket, or \"Choose Columns\" in Meds & Orders section of the refill encounter.              Passed - Patient is age 20 years or older     If ASA is flagged for ages under 20 years old. Forward to provider for confirmation Ryes Syndrome is not a concern.              Passed - Medication is active on med list           DULoxetine (CYMBALTA) 30 MG capsule 180 capsule 0     Sig: Take 1 capsule (30 mg) by mouth 2 times daily       Serotonin-Norepinephrine Reuptake Inhibitors  Failed - 8/8/2022  4:58 PM        Failed - PHQ-9 score of less than 5 in past 6 months     Please review last PHQ-9 score.           Passed - Blood pressure under 140/90 in past 12 months     BP Readings from Last 3 Encounters:   08/05/22 133/67   05/23/22 138/70   05/03/22 130/84                 Passed - Medication is active on med list        Passed - Patient is age 18 or older        Passed - No active pregnancy on record        Passed - No positive pregnancy test in past 12 months        Passed - Recent (6 mo) or future (30 days) visit within the authorizing provider's specialty     Patient had office visit in the last 6 months or has a visit in the next 30 days with authorizing provider or within the authorizing provider's specialty.  See \"Patient Info\" tab in inbasket, or \"Choose Columns\" in Meds & Orders section of the refill encounter.                 Kwabena Olivas RN 08/09/22 7:29 AM  "

## 2022-08-09 NOTE — TELEPHONE ENCOUNTER
"Routing refill request to provider for review/approval because:  A break in medication    Last Written Prescription Date:  10/11/2019  Last Fill Quantity: 90,  # refills: 3   Last office visit provider:  5/23/22     Requested Prescriptions   Pending Prescriptions Disp Refills     aspirin (ASA) 81 MG EC tablet 90 tablet 3     Sig: Take 1 tablet (81 mg) by mouth daily       Analgesics (Non-Narcotic Tylenol and ASA Only) Passed - 8/9/2022  7:19 AM        Passed - Recent (12 mo) or future (30 days) visit within the authorizing provider's specialty     Patient has had an office visit with the authorizing provider or a provider within the authorizing providers department within the previous 12 mos or has a future within next 30 days. See \"Patient Info\" tab in inbasket, or \"Choose Columns\" in Meds & Orders section of the refill encounter.              Passed - Patient is age 20 years or older     If ASA is flagged for ages under 20 years old. Forward to provider for confirmation Ryes Syndrome is not a concern.              Passed - Medication is active on med list             Kwabena Olivas RN 08/09/22 7:20 AM  "

## 2022-08-09 NOTE — TELEPHONE ENCOUNTER
"Routing refill request to provider for review/approval because:  Drug not on the G refill protocol   A break in medication  System error on mask - new script required    Last Written Prescription Date:  10/11/2019  Last Fill Quantity: 16 g,  # refills: 11   Last office visit provider:  5/23/22     Last Written Prescription Date:  9/11/2020  Last Fill Quantity: 90,  # refills: 3   Last office visit provider:  5/23/22    Last Written Prescription Date:  1/11/21  Last Fill Quantity: 90 vial,  # refills: 11   Last office visit provider:  5/23/22    Last Written Prescription Date:  1/11/21  Last Fill Quantity: 90,  # refills: 3   Last office visit provider:  5/23/22    Last Written Prescription Date:  1/11/21  Last Fill Quantity: 3,  # refills: 1   Last office visit provider:  5/23/22    Requested Prescriptions   Pending Prescriptions Disp Refills     FLUTICASONE PROPIONATE (NASAL) 50 MCG/ACT SUSP 16 g 11     Sig: Spray 1 spray in nostril daily       Nasal Allergy Protocol Passed - 8/8/2022  4:58 PM        Passed - Patient is age 12 or older        Passed - Recent (12 mo) or future (30 days) visit within the authorizing provider's specialty     Patient has had an office visit with the authorizing provider or a provider within the authorizing providers department within the previous 12 mos or has a future within next 30 days. See \"Patient Info\" tab in inbasket, or \"Choose Columns\" in Meds & Orders section of the refill encounter.              Passed - Medication is active on med list           Cholecalciferol (D 1000) 25 MCG (1000 UT) CAPS 90 capsule 3       Vitamin Supplements (Adult) Protocol Passed - 8/8/2022  4:58 PM        Passed - High dose Vitamin D not ordered        Passed - Recent (12 mo) or future (30 days) visit within the authorizing provider's specialty     Patient has had an office visit with the authorizing provider or a provider within the authorizing providers department within the previous 12 mos or " "has a future within next 30 days. See \"Patient Info\" tab in inbasket, or \"Choose Columns\" in Meds & Orders section of the refill encounter.              Passed - Medication is active on med list           albuterol (PROVENTIL) (2.5 MG/3ML) 0.083% neb solution       Sig: Take 1 vial (2.5 mg) by nebulization every 6 hours as needed       Asthma Maintenance Inhalers - Anticholinergics Passed - 8/8/2022  4:58 PM        Passed - Patient is age 12 years or older        Passed - Recent (12 mo) or future (30 days) visit within the authorizing provider's specialty     Patient has had an office visit with the authorizing provider or a provider within the authorizing providers department within the previous 12 mos or has a future within next 30 days. See \"Patient Info\" tab in inbasket, or \"Choose Columns\" in Meds & Orders section of the refill encounter.              Passed - Medication is active on med list       Short-Acting Beta Agonist Inhalers Protocol  Passed - 8/8/2022  4:58 PM        Passed - Patient is age 12 or older        Passed - Recent (12 mo) or future (30 days) visit within the authorizing provider's specialty     Patient has had an office visit with the authorizing provider or a provider within the authorizing providers department within the previous 12 mos or has a future within next 30 days. See \"Patient Info\" tab in inbasket, or \"Choose Columns\" in Meds & Orders section of the refill encounter.              Passed - Medication is active on med list           meclizine (ANTIVERT) 25 MG tablet 90 tablet 3     Sig: Take 1 tablet (25 mg) by mouth 4 times daily as needed        Antivertigo/Antiemetic Agents Passed - 8/8/2022  4:58 PM        Passed - Recent (12 mo) or future (30 days) visit within the authorizing provider's specialty     Patient has had an office visit with the authorizing provider or a provider within the authorizing providers department within the previous 12 mos or has a future within next 30 " "days. See \"Patient Info\" tab in inbasket, or \"Choose Columns\" in Meds & Orders section of the refill encounter.              Passed - Medication is active on med list        Passed - Patient is 18 years of age or older           albuterol (PROAIR HFA/PROVENTIL HFA/VENTOLIN HFA) 108 (90 Base) MCG/ACT inhaler         Asthma Maintenance Inhalers - Anticholinergics Passed - 8/8/2022  4:58 PM        Passed - Patient is age 12 years or older        Passed - Recent (12 mo) or future (30 days) visit within the authorizing provider's specialty     Patient has had an office visit with the authorizing provider or a provider within the authorizing providers department within the previous 12 mos or has a future within next 30 days. See \"Patient Info\" tab in inbasket, or \"Choose Columns\" in Meds & Orders section of the refill encounter.              Passed - Medication is active on med list       Short-Acting Beta Agonist Inhalers Protocol  Passed - 8/8/2022  4:58 PM        Passed - Patient is age 12 or older        Passed - Recent (12 mo) or future (30 days) visit within the authorizing provider's specialty     Patient has had an office visit with the authorizing provider or a provider within the authorizing providers department within the previous 12 mos or has a future within next 30 days. See \"Patient Info\" tab in inbasket, or \"Choose Columns\" in Meds & Orders section of the refill encounter.              Passed - Medication is active on med list           UNABLE TO FIND 1 each 0     Sig: MEDICATION NAME:        There is no refill protocol information for this order      Signed Prescriptions Disp Refills    polyethylene glycol (MIRALAX) 17 GM/Dose powder 1530 g 3     Sig: Take 17 g by mouth daily       Laxatives Protocol Passed - 8/8/2022  4:58 PM        Passed - Patient is age 6 or older        Passed - Recent (12 mo) or future (30 days) visit within the authorizing provider's specialty     Patient has had an office visit " "with the authorizing provider or a provider within the authorizing providers department within the previous 12 mos or has a future within next 30 days. See \"Patient Info\" tab in inbasket, or \"Choose Columns\" in Meds & Orders section of the refill encounter.              Passed - Medication is active on med list         Refused Prescriptions Disp Refills     aspirin (ASA) 81 MG EC tablet 90 tablet 3     Sig: Take 1 tablet (81 mg) by mouth daily       Analgesics (Non-Narcotic Tylenol and ASA Only) Passed - 8/8/2022  4:58 PM        Passed - Recent (12 mo) or future (30 days) visit within the authorizing provider's specialty     Patient has had an office visit with the authorizing provider or a provider within the authorizing providers department within the previous 12 mos or has a future within next 30 days. See \"Patient Info\" tab in inbasket, or \"Choose Columns\" in Meds & Orders section of the refill encounter.              Passed - Patient is age 20 years or older     If ASA is flagged for ages under 20 years old. Forward to provider for confirmation Ryes Syndrome is not a concern.              Passed - Medication is active on med list           DULoxetine (CYMBALTA) 30 MG capsule 180 capsule 0     Sig: Take 1 capsule (30 mg) by mouth 2 times daily       Serotonin-Norepinephrine Reuptake Inhibitors  Failed - 8/8/2022  4:58 PM        Failed - PHQ-9 score of less than 5 in past 6 months     Please review last PHQ-9 score.           Passed - Blood pressure under 140/90 in past 12 months     BP Readings from Last 3 Encounters:   08/05/22 133/67   05/23/22 138/70   05/03/22 130/84                 Passed - Medication is active on med list        Passed - Patient is age 18 or older        Passed - No active pregnancy on record        Passed - No positive pregnancy test in past 12 months        Passed - Recent (6 mo) or future (30 days) visit within the authorizing provider's specialty     Patient had office visit in the " "last 6 months or has a visit in the next 30 days with authorizing provider or within the authorizing provider's specialty.  See \"Patient Info\" tab in inbasket, or \"Choose Columns\" in Meds & Orders section of the refill encounter.                 Kwabena Olivas RN 08/09/22 7:31 AM  "

## 2022-08-15 ENCOUNTER — TELEPHONE (OUTPATIENT)
Dept: INTERNAL MEDICINE | Facility: CLINIC | Age: 67
End: 2022-08-15

## 2022-08-15 NOTE — TELEPHONE ENCOUNTER
General Call      Reason for Call:  Pt went to the pharmacy and they stated they did not have a medication for her .    DULoxetine (CYMBALTA) 30 MG capsule 180 capsule 0 8/9/2022  No   Sig - Route: Take 1 capsule (30 mg) by mouth 2 times daily - Oral   Sent to pharmacy as: DULoxetine HCl 30 MG Oral Capsule Delayed Release Particles (CYMBALTA)   Class: E-Prescribe   Order: [id number]   E-Prescribing Status: Receipt confirmed by pharmacy (8/9/2022  7:07 AM          What are your questions or concerns:  Pharmacy stated not there but was sent on 8/9/22    Date of last appointment with provider: n/a    Could we send this information to you in [facility] or would you prefer to receive a phone call?:   Patient would prefer a phone call   Okay to leave a detailed message?: Yes at Cell number on file:    Telephone Information:   Mobile [phone]

## 2022-08-15 NOTE — TELEPHONE ENCOUNTER
Writer called and spoke with the [facility] in [place] and confirmed that the medication was received.  Pharmacy tech states the medication is ready to be picked up.    A message was left for the patient advising her of this information.  The clinic phone number was provided if she has additional questions or concerns.    Sabrina Matos M.A., LPN  Clinic Manager  [facility]

## 2022-08-17 DIAGNOSIS — G89.29 CHRONIC RIGHT-SIDED THORACIC BACK PAIN: ICD-10-CM

## 2022-08-17 DIAGNOSIS — M54.6 CHRONIC RIGHT-SIDED THORACIC BACK PAIN: ICD-10-CM

## 2022-08-29 ENCOUNTER — OFFICE VISIT (OUTPATIENT)
Dept: INTERNAL MEDICINE | Facility: CLINIC | Age: 67
End: 2022-08-29
Payer: COMMERCIAL

## 2022-08-29 VITALS
BODY MASS INDEX: 45.93 KG/M2 | RESPIRATION RATE: 20 BRPM | DIASTOLIC BLOOD PRESSURE: 75 MMHG | WEIGHT: 227.4 LBS | SYSTOLIC BLOOD PRESSURE: 138 MMHG | OXYGEN SATURATION: 100 % | HEART RATE: 92 BPM

## 2022-08-29 DIAGNOSIS — I10 ESSENTIAL HYPERTENSION: ICD-10-CM

## 2022-08-29 DIAGNOSIS — I87.8 VENOUS STASIS: ICD-10-CM

## 2022-08-29 DIAGNOSIS — S06.9X1D TRAUMATIC BRAIN INJURY, WITH LOSS OF CONSCIOUSNESS OF 30 MINUTES OR LESS, SUBSEQUENT ENCOUNTER: ICD-10-CM

## 2022-08-29 DIAGNOSIS — E66.01 MORBID OBESITY (H): ICD-10-CM

## 2022-08-29 DIAGNOSIS — E11.9 TYPE 2 DIABETES MELLITUS WITHOUT COMPLICATION, WITHOUT LONG-TERM CURRENT USE OF INSULIN (H): Primary | ICD-10-CM

## 2022-08-29 DIAGNOSIS — M54.50 CHRONIC BILATERAL LOW BACK PAIN WITHOUT SCIATICA: ICD-10-CM

## 2022-08-29 DIAGNOSIS — G44.321 INTRACTABLE CHRONIC POST-TRAUMATIC HEADACHE: ICD-10-CM

## 2022-08-29 DIAGNOSIS — I87.2 VENOUS STASIS DERMATITIS OF BOTH LOWER EXTREMITIES: ICD-10-CM

## 2022-08-29 DIAGNOSIS — F33.1 MODERATE EPISODE OF RECURRENT MAJOR DEPRESSIVE DISORDER (H): ICD-10-CM

## 2022-08-29 DIAGNOSIS — H81.393 PERIPHERAL VERTIGO OF BOTH EARS: ICD-10-CM

## 2022-08-29 DIAGNOSIS — G89.29 CHRONIC BILATERAL LOW BACK PAIN WITHOUT SCIATICA: ICD-10-CM

## 2022-08-29 PROBLEM — H53.9 VISION DISORDER: Status: RESOLVED | Noted: 2019-01-08 | Resolved: 2022-08-29

## 2022-08-29 PROBLEM — H53.9 VISION DISORDER: Status: ACTIVE | Noted: 2019-01-08

## 2022-08-29 PROBLEM — M25.511 ACUTE PAIN OF RIGHT SHOULDER: Status: RESOLVED | Noted: 2022-06-13 | Resolved: 2022-08-29

## 2022-08-29 PROBLEM — Z78.9 DECREASED ACTIVITIES OF DAILY LIVING (ADL): Status: ACTIVE | Noted: 2019-01-08

## 2022-08-29 PROBLEM — E55.9 AVITAMINOSIS D: Status: ACTIVE | Noted: 2022-08-29

## 2022-08-29 LAB
ALBUMIN SERPL BCG-MCNC: 4.3 G/DL (ref 3.5–5.2)
ALP SERPL-CCNC: 83 U/L (ref 35–104)
ALT SERPL W P-5'-P-CCNC: 19 U/L (ref 10–35)
ANION GAP SERPL CALCULATED.3IONS-SCNC: 11 MMOL/L (ref 7–15)
AST SERPL W P-5'-P-CCNC: 28 U/L (ref 10–35)
BILIRUB SERPL-MCNC: 0.6 MG/DL
BUN SERPL-MCNC: 11.3 MG/DL (ref 8–23)
CALCIUM SERPL-MCNC: 9.4 MG/DL (ref 8.8–10.2)
CHLORIDE SERPL-SCNC: 102 MMOL/L (ref 98–107)
CREAT SERPL-MCNC: 0.53 MG/DL (ref 0.51–0.95)
DEPRECATED HCO3 PLAS-SCNC: 26 MMOL/L (ref 22–29)
GFR SERPL CREATININE-BSD FRML MDRD: >90 ML/MIN/1.73M2
GLUCOSE SERPL-MCNC: 98 MG/DL (ref 70–99)
HBA1C MFR BLD: 6.4 % (ref 0–5.6)
POTASSIUM SERPL-SCNC: 3.9 MMOL/L (ref 3.4–5.3)
PROT SERPL-MCNC: 7.5 G/DL (ref 6.4–8.3)
SODIUM SERPL-SCNC: 139 MMOL/L (ref 136–145)

## 2022-08-29 PROCEDURE — 83036 HEMOGLOBIN GLYCOSYLATED A1C: CPT | Performed by: INTERNAL MEDICINE

## 2022-08-29 PROCEDURE — 36415 COLL VENOUS BLD VENIPUNCTURE: CPT | Performed by: INTERNAL MEDICINE

## 2022-08-29 PROCEDURE — 80053 COMPREHEN METABOLIC PANEL: CPT | Performed by: INTERNAL MEDICINE

## 2022-08-29 PROCEDURE — 99215 OFFICE O/P EST HI 40 MIN: CPT | Performed by: INTERNAL MEDICINE

## 2022-08-29 RX ORDER — DULOXETIN HYDROCHLORIDE 30 MG/1
30 CAPSULE, DELAYED RELEASE ORAL 2 TIMES DAILY
Qty: 180 CAPSULE | Refills: 4 | Status: SHIPPED | OUTPATIENT
Start: 2022-08-29 | End: 2022-12-08

## 2022-08-29 RX ORDER — TRIAMCINOLONE ACETONIDE 1 MG/G
CREAM TOPICAL 2 TIMES DAILY
Qty: 80 G | Refills: 1 | Status: SHIPPED | OUTPATIENT
Start: 2022-08-29 | End: 2023-01-14

## 2022-08-29 RX ORDER — LOSARTAN POTASSIUM AND HYDROCHLOROTHIAZIDE 12.5; 5 MG/1; MG/1
1 TABLET ORAL DAILY
Qty: 90 TABLET | Refills: 1 | Status: SHIPPED | OUTPATIENT
Start: 2022-08-29 | End: 2022-12-08

## 2022-08-29 ASSESSMENT — ANXIETY QUESTIONNAIRES
2. NOT BEING ABLE TO STOP OR CONTROL WORRYING: NEARLY EVERY DAY
8. IF YOU CHECKED OFF ANY PROBLEMS, HOW DIFFICULT HAVE THESE MADE IT FOR YOU TO DO YOUR WORK, TAKE CARE OF THINGS AT HOME, OR GET ALONG WITH OTHER PEOPLE?: VERY DIFFICULT
3. WORRYING TOO MUCH ABOUT DIFFERENT THINGS: NEARLY EVERY DAY
5. BEING SO RESTLESS THAT IT IS HARD TO SIT STILL: NOT AT ALL
GAD7 TOTAL SCORE: 11
GAD7 TOTAL SCORE: 11
7. FEELING AFRAID AS IF SOMETHING AWFUL MIGHT HAPPEN: NOT AT ALL
1. FEELING NERVOUS, ANXIOUS, OR ON EDGE: NEARLY EVERY DAY
6. BECOMING EASILY ANNOYED OR IRRITABLE: SEVERAL DAYS
IF YOU CHECKED OFF ANY PROBLEMS ON THIS QUESTIONNAIRE, HOW DIFFICULT HAVE THESE PROBLEMS MADE IT FOR YOU TO DO YOUR WORK, TAKE CARE OF THINGS AT HOME, OR GET ALONG WITH OTHER PEOPLE: VERY DIFFICULT
GAD7 TOTAL SCORE: 11
4. TROUBLE RELAXING: SEVERAL DAYS
7. FEELING AFRAID AS IF SOMETHING AWFUL MIGHT HAPPEN: NOT AT ALL

## 2022-08-29 ASSESSMENT — PATIENT HEALTH QUESTIONNAIRE - PHQ9
SUM OF ALL RESPONSES TO PHQ QUESTIONS 1-9: 13
SUM OF ALL RESPONSES TO PHQ QUESTIONS 1-9: 13
10. IF YOU CHECKED OFF ANY PROBLEMS, HOW DIFFICULT HAVE THESE PROBLEMS MADE IT FOR YOU TO DO YOUR WORK, TAKE CARE OF THINGS AT HOME, OR GET ALONG WITH OTHER PEOPLE: SOMEWHAT DIFFICULT

## 2022-08-29 ASSESSMENT — PAIN SCALES - GENERAL: PAINLEVEL: MODERATE PAIN (5)
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
## 2022-09-18 ENCOUNTER — HEALTH MAINTENANCE LETTER (OUTPATIENT)
Age: 67
End: 2022-09-18

## 2022-11-21 ENCOUNTER — PATIENT OUTREACH (OUTPATIENT)
Dept: CARE COORDINATION | Facility: CLINIC | Age: 67
End: 2022-11-21
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
## 2022-11-22 ENCOUNTER — PATIENT OUTREACH (OUTPATIENT)
Dept: CARE COORDINATION | Facility: CLINIC | Age: 67
End: 2022-11-22

## 2022-11-22 NOTE — PROGRESS NOTES
Clinic Care Coordination Contact  Community Health Worker Initial Outreach    CHW Initial Information Gathering:  Referral Source: PCP  Preferred Hospital: [facility]  [phone]  Current living arrangement:: I live in a private home with family  Type of residence:: Apartment  Supplies Currently Used at Home: None  Equipment Currently Used at Home: none  Informal Support system:: Family, Children  No PCP office visit in Past Year: No  Transportation means:: Family  CHW Additional Questions  MyChart active?: Yes  Patient sent Social Determinants of Health questionnaire?: Yes    CHW Note:    CHW contacted patient regarding a referral to CCC. CHW introduced self and role of CCC.    Patient shared that she is having some financial difficulty. Patient reported that she is behind on her bills and would like assistance. Patient states she has applied for benefits but is waiting on a response.     Patient stated that she is in need of a CPAP machine. Patient would like assistance from [facility] with getting her CPAP.    CHW scheduled patient with [facility] on 12/1/22 at 9AM to discuss medical equipment-CPAP and financial resources.     Patient accepts CC: Yes. Patient scheduled for assessment with [facility] on 12/1/22 at 9AM. Patient noted desire to discuss medical equipment-needs CPAP, financial resources .       Raquel Miguel  Community Health Worker   [facility] Care Coordination  [facility]   [email or web address]  Office: [phone]

## 2022-11-25 ENCOUNTER — VIRTUAL VISIT (OUTPATIENT)
Dept: INTERNAL MEDICINE | Facility: CLINIC | Age: 67
End: 2022-11-25
Payer: COMMERCIAL

## 2022-11-25 DIAGNOSIS — E11.9 TYPE 2 DIABETES MELLITUS WITHOUT COMPLICATION, WITHOUT LONG-TERM CURRENT USE OF INSULIN (H): Primary | ICD-10-CM

## 2022-11-25 PROCEDURE — 99207 PR NO CHARGE LOS: CPT | Performed by: INTERNAL MEDICINE

## 2022-12-01 ENCOUNTER — PATIENT OUTREACH (OUTPATIENT)
Dept: NURSING | Facility: CLINIC | Age: 67
End: 2022-12-01
Payer: COMMERCIAL

## 2022-12-01 SDOH — ECONOMIC STABILITY: FOOD INSECURITY: WITHIN THE PAST 12 MONTHS, YOU WORRIED THAT YOUR FOOD WOULD RUN OUT BEFORE YOU GOT MONEY TO BUY MORE.: NEVER TRUE

## 2022-12-01 SDOH — ECONOMIC STABILITY: FOOD INSECURITY: WITHIN THE PAST 12 MONTHS, THE FOOD YOU BOUGHT JUST DIDN'T LAST AND YOU DIDN'T HAVE MONEY TO GET MORE.: NEVER TRUE

## 2022-12-01 ASSESSMENT — SOCIAL DETERMINANTS OF HEALTH (SDOH): HOW HARD IS IT FOR YOU TO PAY FOR THE VERY BASICS LIKE FOOD, HOUSING, MEDICAL CARE, AND HEATING?: VERY HARD

## 2022-12-01 ASSESSMENT — ACTIVITIES OF DAILY LIVING (ADL): DEPENDENT_IADLS:: INDEPENDENT

## 2022-12-01 NOTE — PROGRESS NOTES
Clinic Care Coordination Contact    Clinic Care Coordination Contact  OUTREACH    Referral Information:  Referral Source: PCP    Primary Diagnosis: Psychosocial    Chief Complaint   Patient presents with     Clinic Care Coordination - Initial        Universal Utilization: appropriate utilization, missed appt and forgot to cancel.  Will make efforts to cancel in the future.   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     Current Behavioral Concerns: depressed and strained by financial situation.  Her  left her and is living in a different house.  Strained by the physical demands of caring for her adult son with CP.   Education Provided to patient: Discussed resources and role of care coordination.     Pain  Pain (GOAL):: No  Health Maintenance Reviewed: Due/Overdue   Health Maintenance Due   Topic Date Due     DEXA  Never done     SPIROMETRY  Never done     ZOSTER IMMUNIZATION (1 of 2) Never done     EYE EXAM  07/07/2022     COVID-19 Vaccine (5 - Booster for Pfizer series) 07/18/2022     INFLUENZA VACCINE (1) 09/01/2022     Pneumococcal Vaccine: 65+ Years (2 - PCV) 07/23/2022     DIABETIC FOOT EXAM  12/30/2022       Clinical Pathway: None    Medication Management:  Medication review status: Medications reviewed and no changes reported per patient.        No concerns.      Functional Status:  Dependent ADLs:: Independent, Ambulation-no assistive device  Dependent IADLs:: Independent  Bed or wheelchair confined:: No  Mobility Status: Independent  Fallen 2 or more times in the past year?: No  Any fall with injury in the past year?:  No    Living Situation:  Current living arrangement:: I live in a private home with family- her mom is now living with her sister in [place]. Her son has recently been screened by [facility] [place] [facility] as patient would like him to move to a group home.   Type of residence:: Apartment    Lifestyle & Psychosocial Needs:    Social Determinants of Health     Tobacco Use: Low Risk      Smoking Tobacco Use: Never     Smokeless Tobacco Use: Never     Passive Exposure: Not on file   Alcohol Use: Not on file   Financial Resource Strain: High Risk     Difficulty of Paying Living Expenses: Very hard   Food Insecurity: No Food Insecurity     Worried About Running Out of Food in the Last Year: Never true     Ran Out of Food in the Last Year: Never true   Transportation Needs: No Transportation Needs     Lack of Transportation (Medical): No     Lack of Transportation (Non-Medical): No   Physical Activity: Not on file   Stress: Not on file   Social Connections: Not on file   Intimate Partner Violence: Not on file   Depression: At risk     PHQ-2 Score: 3   Housing Stability: Low Risk      Unable to Pay for Housing in the Last Year: No     Number of Places Lived in the Last Year: 2     Unstable Housing in the Last Year: No     Diet:: Regular  Inadequate nutrition (GOAL):: No  Tube Feeding: No  Inadequate activity/exercise (GOAL):: No  Significant changes in sleep pattern (GOAL): No  Transportation means:: Regular car     Sabianism or spiritual beliefs that impact treatment:: No  Mental health DX:: Yes  Mental health DX how managed:: Medication  Mental health management concern (GOAL):: Yes  Chemical Dependency Status: No Current Concerns  Informal Support system:: Family, Children, Friends, [place] based        Is not able to work any longer.  She is getting $900 per month in income.  She had been getting $700 and it went up to $900 and she is unsure.  She is going to call Social Security to figure out why. Her son is not  getting any income from Social Security and she will talk to them about any benefits he might qualify for.  They have trouble paying the rent which is about 1500 per month.  They are getting $200 in SNAP per month. Not struggling with food as the building has free groceries every other Friday.  Her debbie is what keeps her going.  Her kids are helping pay the rent. She is behind in bills and owes [employer] and would like to see if she qualifies for shawna care and MA.  The manager at her building helped her apply for Energy Assistance.  She contacted the [employer] and they cannot help until at least next year to help with her rent.      They moved to a smaller unit within the building and now have a two bedroom unit.  Reviewed that if she is unable to work, she cannot afford the rent at this building. She is interested in getting on waiting lists for subsidized buildings.      She had counseling in the past and would like to restart.  Called [facility] and they have a new provider, Maria Elena Miguel who is starting in a couple of weeks.  She can do virtual visits and patient will wait for a phone call to schedule the first appt.       Resources and Interventions:  Current Resources:      Community Resources: None  Supplies Currently Used at Home: None  Equipment Currently Used at Home: none  Employment Status: retired      Advance Care Plan/Directive  Advanced Care Plans/Directives on file:: No  Advanced Care Plan/Directive Status: Declined Further Information    Referrals Placed: Financial Services, Behavioral Health Providers       Care Plan:  Care Plan: Financial Wellbeing     Problem: Patient expresses financial resource strain     Long-Range Goal: I will apply for MA and Shawna Care.     Start Date: 12/1/2022 Expected End Date: 7/1/2023    Priority: High    Note:     Barriers: none identified.  Strengths: motivated.  Patient expressed understanding of goal: yes  Action steps to achieve this  goal:  1. I will connect with FRW to apply for programs.   2. I will complete any paper work.  3. I will report progress towards this goal at scheduled outreach telephone calls from the CCC team.                        Care Plan: Mental Health     Problem: Mood/Psychosocial Concerns     Long-Range Goal: Establish Mental Health Support and reduce depression symptoms.     Start Date: 12/1/2022 Expected End Date: 7/1/2023    Priority: High    Note:     Barriers: caregiver, financial strain.  Strengths: motivated to feel better.  Patient expressed understanding of goal: yes  Action steps to achieve this goal:  1. I will schedule appt with Brea Agudelo when called.  2. I will meet with new provider Maria Elena yin.  3. I will report progress towards this goal at scheduled outreach telephone calls from the CCC team.                          Care Plan: Housing Instability     Problem: SDOH LACK OF STABLE HOUSING     Long-Range Goal: I will put my name on senior housing apartment lists that are affordable.     Start Date: 12/1/2022 Expected End Date: 7/1/2023    Priority: Medium    Note:     Barriers: paying a lot of money for current apartment, caregiver for son who is disabled.   Strengths: motivated.  Patient expressed understanding of goal: yes  Action steps to achieve this goal:  1. I will review list of senior apartments that  sends.  2. I will call those that I am interested in to add myself to the waiting list.   3. I will report progress towards this goal at scheduled outreach telephone calls from the CCC team.                            Patient/Caregiver understanding: Re enrolled in care coordination.  Will send housing options via My Chart.    Outreach Frequency: monthly  Future Appointments              In 1 week Cj Georges MD [facility]          Plan: [facility] SW will continue to monitor, support patient with current goals and will be available to assist  as needs arise. CCC CHW will reach out to patient on a monthly basis to discuss progression of goals.      CCC SW will perform Chart Review in 45 days.       Clinic Care Coordination Contact  Financial Resource Worker Screening    County Benefits  Is patient requesting help applying for county benefits?: Yes  Have you recently applied for any county benefits?: No  How many people in your household?: 2  Do you buy/eat food together?: Yes  What is the monthly gross income for the household (wages, social security, workers comp, and pension)? : 900    Insurance:  [place]?: No  Is this an insurance renewal?: No  Is this a new insurance application request?: Yes  Have you recently applied for insurance?: No  How many people in your household? : 2  Do you file taxes?: Yes  What is the monthly gross income for the household (wages, social security, workers comp, and pension)? : 900    Any other information for the FRW?: not sure if she claims her adult son who lives with her and disabled.  She is getting $900 per month total.  she gets $200 in snap. son on MA, but she is not.  thanks    Care Coordination team will tell patient:   Thank you for answering all the questions, based on screening questions, our Financial Resource Worker will reach out to you with additional questions and next steps.

## 2022-12-01 NOTE — LETTER
[facility]  Patient Centered Plan of Care  About Me:        Patient Name:  Isabel Rosa    YOB: 1955  Age:         67 year old   Nikki MRN:    [id number] Telephone Information:  Home Phone [phone]   Mobile [phone]       Address:  [address] [place] [address] Email address:  [email or web address]      Emergency Contact(s)    Name Relationship Lgl Grd Work Phone Home Phone Mobile Phone   1. OTONIEL SCHAFFER Daughter    [phone]   2. ANKUSH ROSA Spouse   [phone]   3. PARISA SCHAFFER Daughter   [phone]            Primary language:  English     needed? No   [facility]:  [phone] op. 1  Other communication barriers:None    Preferred Method of Communication:     Current living arrangement: I live in a private home with family    Mobility Status/ Medical Equipment: Independent        Health Maintenance  Health Maintenance Reviewed: Due/Overdue   Health Maintenance Due   Topic Date Due     DEXA  Never done     SPIROMETRY  Never done     ZOSTER IMMUNIZATION (1 of 2) Never done     EYE EXAM  07/07/2022     COVID-19 Vaccine (5 - Booster for Pfizer series) 07/18/2022     INFLUENZA VACCINE (1) 09/01/2022     Pneumococcal Vaccine: 65+ Years (2 - PCV) 07/23/2022     DIABETIC FOOT EXAM  12/30/2022           My Access Plan  Medical Emergency 911   Primary Clinic Line [phone]   24 Hour Appointment Line [phone] or  [phone]) (toll-free)   24 Hour Nurse Line [phone] (toll-free)   Preferred Urgent Care [facility], [phone]     Preferred Hospital [facility]  [phone]     Preferred Pharmacy [id number]  [place] - [address] AT [facility] [address]     Behavioral Health Crisis Line The National Suicide Prevention Lifeline at 1-794.416.5697 or Text/Call 588             My Care Team Members  Patient  Care Team       Relationship Specialty Notifications Start End    Cj Georges MD PCP - General   3/9/18     Phone: [phone] Fax: [phone]         [address] [facility] [address]    Claudia Joseph MD MD Hematology & Oncology Admissions 12/14/17     Phone: [phone] Fax: [phone]         [address] [facility] [address]    Meghann Moody, PharmD Pharmacist Pharmacist  2/3/20     Phone: [phone] Fax: [phone]         [facility]  [address] [place] [address]    Cj Georges MD Assigned PCP   6/16/21     Phone: [phone] Fax: [phone]         [address] [facility] [address]    Juma Wilkerson, OD MD Optometry  6/18/21     Phone: [phone] Fax: [phone]         [address] [place] [address]    Juma Wilkerson, OD Assigned Surgical Provider   7/16/21     Phone: [phone] Fax: [phone]         [address] [place] [id number]    Denice Plasencia MD MD Neurology  7/26/21     Phone: [phone] Fax: [phone]         [address] [address] [facility] [address]    Cj Whaley, DAYSI CNP Assigned Sleep Provider   8/15/21     Phone: [phone] Fax: [phone]         [address] [facility] [address] [facility] [address]    Tenzin Chawla MD Assigned Musculoskeletal Provider   5/22/22     Phone: [phone] Fax: [phone] [address] [place] [address]    Raquel Miguel, MA Community Health Worker Primary Care - CC Admissions 11/21/22     Angela Rogel LSW Lead Care Coordinator Primary Care - [facility] Admissions 12/1/22     Phone: [phone]                 My Care Plans  Self Management and Treatment Plan  Care Plan  Care Plan: Financial Wellbeing     Problem: Patient expresses financial resource strain     Long-Range Goal: I will apply for MA and [facility].     Start Date: 12/1/2022 Expected End Date: 7/1/2023    Priority: High    Note:      Barriers: none identified.  Strengths: motivated.  Patient expressed understanding of goal: yes  Action steps to achieve this goal:  1. I will connect with FRW to apply for programs.   2. I will complete any paper work.  3. I will report progress towards this goal at scheduled outreach telephone calls from the CCC team.                        Care Plan: Mental Health     Problem: Mood/Psychosocial Concerns     Long-Range Goal: Establish Mental Health Support and reduce depression symptoms.     Start Date: 12/1/2022 Expected End Date: 7/1/2023    Priority: High    Note:     Barriers: caregiver, financial strain.  Strengths: motivated to feel better.  Patient expressed understanding of goal: yes  Action steps to achieve this goal:  1. I will schedule appt with Brea Agudelo when called.  2. I will meet with new provider Maria Elena yin.  3. I will report progress towards this goal at scheduled outreach telephone calls from the CCC team.                          Care Plan: Housing Instability     Problem: SDOH LACK OF STABLE HOUSING     Long-Range Goal: I will put my name on senior housing apartment lists that are affordable.     Start Date: 12/1/2022 Expected End Date: 7/1/2023    Priority: Medium    Note:     Barriers: paying a lot of money for current apartment, caregiver for son who is disabled.   Strengths: motivated.  Patient expressed understanding of goal: yes  Action steps to achieve this goal:  1. I will review list of senior apartments that  sends.  2. I will call those that I am interested in to add myself to the waiting list.   3. I will report progress towards this goal at scheduled outreach telephone calls from the CCC team.                               Advance Care Plans/Directives Type:   No data recorded    My Medical and Care Information  Problem List   Patient Active Problem List   Diagnosis     WILFRIDO - not using CPAP (2020)     Type 2 diabetes mellitus without complication, without  long-term current use of insulin (H)     Diabetic polyneuropathy associated with type 2 diabetes mellitus (H)     Beta thalassemia (H)     Low back pain      Moderate episode of recurrent major depressive disorder (H)     Morbid obesity (H)     Peripheral vertigo of both ears     Decreased activities of daily living (ADL)     Avitaminosis D      Current Medications and Allergies:    Current Outpatient Medications   Medication Instructions     albuterol (PROAIR HFA/PROVENTIL HFA/VENTOLIN HFA) 108 (90 Base) MCG/ACT inhaler 1-2 puffs, Inhalation, EVERY 4 HOURS PRN     albuterol (PROVENTIL) 2.5 mg, Nebulization, EVERY 6 HOURS PRN     amitriptyline (ELAVIL) 25 mg, Oral, AT BEDTIME     amLODIPine (NORVASC) 5 mg, Oral, DAILY     aspirin (ASA) 81 mg, Oral, DAILY     atorvastatin (LIPITOR) 20 mg, Oral, DAILY     Cholecalciferol (D 1000) 25 MCG (1000 UT) CAPS 1 capsule, Oral, DAILY     DULoxetine (CYMBALTA) 30 mg, Oral, 2 TIMES DAILY     fluticasone (FLONASE) 50 MCG/ACT nasal spray 1 spray, Nasal, DAILY     losartan-hydrochlorothiazide (HYZAAR) 50-12.5 MG tablet 1 tablet, Oral, DAILY     meclizine (ANTIVERT) 25 mg, Oral, 4 TIMES DAILY PRN     meloxicam (MOBIC) 15 mg, Oral, DAILY     nebulizer accessories (ADULT AEROSOL MASK) Misc [NEBULIZER ACCESSORIES (ADULT AEROSOL MASK) MISC] USE 1 AS DIRECTED.     triamcinolone (KENALOG) 0.1 % external cream Topical, 2 TIMES DAILY         Care Coordination Start Date: 11/21/2022   Frequency of Care Coordination: monthly     Form Last Updated: 12/01/2022

## 2022-12-01 NOTE — LETTER
[facility] CARE COORDINATION  [facility]    December 1, 2022    Isabel ADRIAN Biggs  [address]   [facility] [address]      Dear Isabel,        I am a clinic care coordinator who works with Cj Georges MD with the [facility]. I wanted to thank you for spending the time to talk with me.  Below is a description of clinic care coordination and how I can further assist you.       The clinic care coordination team is made up of a registered nurse, , financial resource worker and community health worker who understand the health care system. The goal of clinic care coordination is to help you manage your health and improve access to the health care system. Our team works alongside your provider to assist you in determining your health and social needs. We can help you obtain health care and community resources, providing you with necessary information and education. We can work with you through any barriers and develop a care plan that helps coordinate and strengthen the communication between you and your care team.    Please feel free to contact me with any questions or concerns regarding care coordination and what we can offer.      We are focused on providing you with the highest-quality healthcare experience possible.    Here are housing options for you to check out.  I don't know which ones have open waiting lists.  I would call those you are interested in and asking them to add your name to the waiting list if it is open.  I included places in [facility], as well as [place].  It is not all the options, but is a start.  Angela    [facility] SENIOR HOUSING GUIDE  [email or web address]     [place]ART[place]  [address] [place] [address]  [phone]  [email or web address]  Age 62+ or disabled  Small pets  Reserved parking  No smoking    [place]   [address] [place]  [address]  [phone]  [email or web address]  Age 62+   Accessible units available  Small pets  Limited garages available  No smoking    [facility]  [address] [place] [address]  [phone]  [email or web address]  Ages 62+ or disabled  Small pets  Public Housing  No smoking    [facility]   [address] [place] [address]  [phone]  [email or web address]  Age 62+ or disabled  One pet allowed with restrictions  Limited garages available  Smoking outdoors only  Partially subsidized housing    [address] [address]  [phone] Age 62+ or disabled   animal or service animal   with approved reasonable   accommodation  Garages available  No smoking    [place]   [address] [place] [address]  [phone]  [email or web address]  Age 62+  30 lbs-dog, 20 lbs-cat  Limited parking availabl      [facility]   [address] [place] [address]  [phone] Age 62+   One pet under 20 lbs.  No smoking; outdoor smoking area    [facility]  ([facility])  [address] [place] [address]  [phone]  [email or web address]  Age 62+  Pets up to 25 lbs.  No smoking inside apartments    [facility]TS   [address] [place] [address]  [phone]  [email or web address]  Age 62+ or disabled  Limited garage space  No smoking    [place] [facility]   [address] [place] [address]  [phone]  [email or web address]  Age 62+ or disabled  Small pets allowed  Off-street parking    [place]   [address] [place] [address] [phone]  (ACCAP Hotline)  [email or web address]  Age 62+ or disabled  Cats allowed  No smoking    [facility]   [address] [place] [address]  [phone]  www.[email or web address].[email or web address]  Age 62+ or disabled  1 pet - small dog or cat  Off-street parking  Smoke free property    [place]   [address] [facility] [place]  [address]  [phone]  [email or web address]  All ages  Service pets with doctor order  Garage available on first come first   served basis with $50 per month fee  Smoke free building    [facility]   [address]  [place] [address]  [phone]  [email or web address]  Age 62+  One pet under 20 lbs.  Off-street parking  No smoking    [facility]  [address]  [place] [address]  [phone]  [email or web address]  All ages  Reserved parking    [facility]  [address] [place] [address]  [phone]  [email or web address]  Age 62+  Pets up to 20     [facility] [place]  [address] [facility] [address]  [phone]    [place]  [address]  [facility] [address]  [phone]    [facility]  [address] [facility] [phone]    [facility]  [address] [place] [address]  [phone]    [facility]  [address] [facility] [address] [phone]    [facility] Apartments  [address] [facility] [id number] [phone]      Sincerely,     Angela Rogel,   [facility] [address]  [phone]      Enclosed: I have enclosed a copy of the Patient Centered Plan of Care. This has helpful information and goals that we have talked about. Please keep this in an easy to access place to use as needed.

## 2022-12-02 ENCOUNTER — PATIENT OUTREACH (OUTPATIENT)
Dept: CARE COORDINATION | Facility: CLINIC | Age: 67
End: 2022-12-02

## 2022-12-02 NOTE — PROGRESS NOTES
Clinic Care Coordination Contact  Program: [facility] Benefits/Health Insurance   County:  [facility] Case #:  [facility] Worker:   Mnsure #:   Subscriber #:   Renewal:  Date Applied:     FRW Outreach:   12/2/22:    Health Insurance:      Referral/Screening:  Financial Resource Worker Screening     County Benefits  Is patient requesting help applying for [facility] benefits?: Yes  Have you recently applied for any [facility] benefits?: No  How many people in your household?: 2  Do you buy/eat food together?: Yes  What is the monthly gross income for the household (wages, social security, workers comp, and pension)? : 900     Insurance:  [place]?: No  Is this an insurance renewal?: No  Is this a new insurance application request?: Yes  Have you recently applied for insurance?: No  How many people in your household? : 2  Do you file taxes?: Yes  What is the monthly gross income for the household (wages, social security, workers comp, and pension)? : 900     Any other information for the FRW?: not sure if she claims her adult son who lives with her and disabled.  She is getting $900 per month total.  she gets $200 in snap. son on MA, but she is not.  thanks

## 2022-12-08 ENCOUNTER — OFFICE VISIT (OUTPATIENT)
Dept: INTERNAL MEDICINE | Facility: CLINIC | Age: 67
End: 2022-12-08
Payer: COMMERCIAL

## 2022-12-08 VITALS
WEIGHT: 226.1 LBS | OXYGEN SATURATION: 97 % | DIASTOLIC BLOOD PRESSURE: 82 MMHG | SYSTOLIC BLOOD PRESSURE: 138 MMHG | HEART RATE: 82 BPM | TEMPERATURE: 97.9 F | HEIGHT: 60 IN | BODY MASS INDEX: 44.39 KG/M2

## 2022-12-08 DIAGNOSIS — F06.4 ANXIETY DISORDER DUE TO MEDICAL CONDITION: ICD-10-CM

## 2022-12-08 DIAGNOSIS — I10 ESSENTIAL HYPERTENSION: Primary | ICD-10-CM

## 2022-12-08 DIAGNOSIS — Z23 HIGH PRIORITY FOR 2019-NCOV VACCINE: ICD-10-CM

## 2022-12-08 DIAGNOSIS — R07.89 CHEST TIGHTNESS: ICD-10-CM

## 2022-12-08 DIAGNOSIS — E11.9 TYPE 2 DIABETES MELLITUS WITHOUT COMPLICATION, WITHOUT LONG-TERM CURRENT USE OF INSULIN (H): ICD-10-CM

## 2022-12-08 DIAGNOSIS — K21.00 GASTROESOPHAGEAL REFLUX DISEASE WITH ESOPHAGITIS WITHOUT HEMORRHAGE: ICD-10-CM

## 2022-12-08 PROCEDURE — 90662 IIV NO PRSV INCREASED AG IM: CPT | Performed by: INTERNAL MEDICINE

## 2022-12-08 PROCEDURE — G0008 ADMIN INFLUENZA VIRUS VAC: HCPCS | Performed by: INTERNAL MEDICINE

## 2022-12-08 PROCEDURE — 99214 OFFICE O/P EST MOD 30 MIN: CPT | Mod: 25 | Performed by: INTERNAL MEDICINE

## 2022-12-08 PROCEDURE — 91312 COVID-19 VACCINE BIVALENT BOOSTER 12+ (PFIZER): CPT | Performed by: INTERNAL MEDICINE

## 2022-12-08 PROCEDURE — 0124A COVID-19 VACCINE BIVALENT BOOSTER 12+ (PFIZER): CPT | Performed by: INTERNAL MEDICINE

## 2022-12-08 RX ORDER — LOSARTAN POTASSIUM AND HYDROCHLOROTHIAZIDE 12.5; 5 MG/1; MG/1
1 TABLET ORAL DAILY
Qty: 90 TABLET | Refills: 4 | Status: SHIPPED | OUTPATIENT
Start: 2022-12-08 | End: 2023-04-25

## 2022-12-08 RX ORDER — AMPICILLIN TRIHYDRATE 500 MG
1 CAPSULE ORAL DAILY
Qty: 90 CAPSULE | Refills: 4 | Status: SHIPPED | OUTPATIENT
Start: 2022-12-08 | End: 2023-04-25

## 2022-12-08 RX ORDER — AMLODIPINE BESYLATE 5 MG/1
5 TABLET ORAL DAILY
Qty: 90 TABLET | Refills: 4 | Status: SHIPPED | OUTPATIENT
Start: 2022-12-08 | End: 2022-12-29

## 2022-12-08 RX ORDER — DULOXETIN HYDROCHLORIDE 30 MG/1
30 CAPSULE, DELAYED RELEASE ORAL 2 TIMES DAILY
Qty: 180 CAPSULE | Refills: 4 | Status: SHIPPED | OUTPATIENT
Start: 2022-12-08 | End: 2023-08-14

## 2022-12-08 ASSESSMENT — PATIENT HEALTH QUESTIONNAIRE - PHQ9
10. IF YOU CHECKED OFF ANY PROBLEMS, HOW DIFFICULT HAVE THESE PROBLEMS MADE IT FOR YOU TO DO YOUR WORK, TAKE CARE OF THINGS AT HOME, OR GET ALONG WITH OTHER PEOPLE: EXTREMELY DIFFICULT
SUM OF ALL RESPONSES TO PHQ QUESTIONS 1-9: 17
SUM OF ALL RESPONSES TO PHQ QUESTIONS 1-9: 17

## 2022-12-08 ASSESSMENT — ANXIETY QUESTIONNAIRES
7. FEELING AFRAID AS IF SOMETHING AWFUL MIGHT HAPPEN: SEVERAL DAYS
GAD7 TOTAL SCORE: 7
GAD7 TOTAL SCORE: 7
7. FEELING AFRAID AS IF SOMETHING AWFUL MIGHT HAPPEN: SEVERAL DAYS
3. WORRYING TOO MUCH ABOUT DIFFERENT THINGS: MORE THAN HALF THE DAYS
IF YOU CHECKED OFF ANY PROBLEMS ON THIS QUESTIONNAIRE, HOW DIFFICULT HAVE THESE PROBLEMS MADE IT FOR YOU TO DO YOUR WORK, TAKE CARE OF THINGS AT HOME, OR GET ALONG WITH OTHER PEOPLE: NOT DIFFICULT AT ALL
2. NOT BEING ABLE TO STOP OR CONTROL WORRYING: MORE THAN HALF THE DAYS
1. FEELING NERVOUS, ANXIOUS, OR ON EDGE: MORE THAN HALF THE DAYS
GAD7 TOTAL SCORE: 7
6. BECOMING EASILY ANNOYED OR IRRITABLE: NOT AT ALL
8. IF YOU CHECKED OFF ANY PROBLEMS, HOW DIFFICULT HAVE THESE MADE IT FOR YOU TO DO YOUR WORK, TAKE CARE OF THINGS AT HOME, OR GET ALONG WITH OTHER PEOPLE?: NOT DIFFICULT AT ALL
4. TROUBLE RELAXING: NOT AT ALL
5. BEING SO RESTLESS THAT IT IS HARD TO SIT STILL: NOT AT ALL

## 2022-12-08 NOTE — PROGRESS NOTES
Office Visit - Follow Up   Isabel Biggs   67 year old female    Date of Visit: 12/8/2022    Chief Complaint   Patient presents with     Recheck Medication     RECHECK     PT REPORTS TIGHTNESS IN CHEST WITH PAL PALPATIONS LAST WEEK.     Imm/Inj     COVID-19 VACCINE        Assessment and Plan   1. Essential hypertension  I would like her to start losartan hydrochlorothiazide continue amlodipine and follow-up with me in 2 weeks for blood pressure recheck and lab  - amLODIPine (NORVASC) 5 MG tablet; Take 1 tablet (5 mg) by mouth daily  Dispense: 90 tablet; Refill: 4  - losartan-hydrochlorothiazide (HYZAAR) 50-12.5 MG tablet; Take 1 tablet by mouth daily  Dispense: 90 tablet; Refill: 4    2. Chest tightness  Recommend a stress test as ordered  - [facility] stress test; Future    3. Gastroesophageal reflux disease with esophagitis without hemorrhage  Start omeprazole  - omeprazole (PRILOSEC) 20 MG DR capsule; Take 1 capsule (20 mg) by mouth daily  Dispense: 90 capsule; Refill: 4    4. Anxiety disorder due to medical condition  Continue same  - amitriptyline (ELAVIL) 25 MG tablet; Take 1 tablet (25 mg) by mouth At Bedtime  Dispense: 90 tablet; Refill: 4  - DULoxetine (CYMBALTA) 30 MG capsule; Take 1 capsule (30 mg) by mouth 2 times daily  Dispense: 180 capsule; Refill: 4    5. Type 2 diabetes mellitus without complication, without long-term current use of insulin (H)  - Cholecalciferol (D 1000) 25 MCG (1000 UT) CAPS; Take 1 capsule by mouth daily  Dispense: 90 capsule; Refill: 4    6. High priority for 2019-nCoV vaccine  - COVID-19,PF,PFIZER BOOSTER BIVALENT 12+Yrs    The following high BMI interventions were performed this visit: lifestyle education regarding diet    Return in about 2 weeks (around 12/22/2022) for Follow up.     History of Present Illness   This 67 year old woman comes in for follow-up.  She has had a lot of stress in her life.  Her  is not supportive and financially draining to her assets.   "She has been having a little bit of chest tightness.  She relates this to anxiety.  Maybe some exertional component as well.  She is also been having some reflux symptoms.  I have recommended she start losartan and hydrochlorothiazide in August but she never started this.  Her blood pressure has been borderline elevated.  Ongoing anxiety.       Physical Exam   General Appearance:   No acute distress    /82   Pulse 82   Temp 97.9  F (36.6  C) (Tympanic)   Ht 1.511 m (4' 11.5\")   Wt 102.6 kg (226 lb 1.6 oz)   SpO2 97%   BMI 44.90 kg/m      Heart rate controlled rhythm regular lungs clear abdomen obese, soft, no lower extremity edema     Additional Information   Current Outpatient Medications   Medication Sig Dispense Refill     albuterol (PROAIR HFA/PROVENTIL HFA/VENTOLIN HFA) 108 (90 Base) MCG/ACT inhaler Inhale 1-2 puffs into the lungs every 4 hours as needed for shortness of breath / dyspnea or wheezing 3 g 11     albuterol (PROVENTIL) (2.5 MG/3ML) 0.083% neb solution Take 1 vial (2.5 mg) by nebulization every 6 hours as needed for wheezing 90 mL 4     amitriptyline (ELAVIL) 25 MG tablet Take 1 tablet (25 mg) by mouth At Bedtime 90 tablet 4     amLODIPine (NORVASC) 5 MG tablet Take 1 tablet (5 mg) by mouth daily 90 tablet 4     aspirin (ASA) 81 MG EC tablet Take 1 tablet (81 mg) by mouth daily 90 tablet 3     atorvastatin (LIPITOR) 20 MG tablet Take 1 tablet (20 mg) by mouth daily 90 tablet 3     Cholecalciferol (D 1000) 25 MCG (1000 UT) CAPS Take 1 capsule by mouth daily 90 capsule 4     DULoxetine (CYMBALTA) 30 MG capsule Take 1 capsule (30 mg) by mouth 2 times daily 180 capsule 4     fluticasone (FLONASE) 50 MCG/ACT nasal spray Spray 1 spray in nostril daily 16 g 11     losartan-hydrochlorothiazide (HYZAAR) 50-12.5 MG tablet Take 1 tablet by mouth daily 90 tablet 4     meclizine (ANTIVERT) 25 MG tablet Take 1 tablet (25 mg) by mouth 4 times daily as needed 90 tablet 3     meloxicam (MOBIC) 15 MG " tablet Take 1 tablet (15 mg) by mouth daily 90 tablet 3     nebulizer accessories (ADULT AEROSOL MASK) Misc [NEBULIZER ACCESSORIES (ADULT AEROSOL MASK) MISC] USE 1 AS DIRECTED. 1 each 0     omeprazole (PRILOSEC) 20 MG DR capsule Take 1 capsule (20 mg) by mouth daily 90 capsule 4     triamcinolone (KENALOG) 0.1 % external cream Apply topically 2 times daily 80 g 1     Allergies   Allergen Reactions     Chloroquine Other (See Comments), Itching and Visual Disturbance     Blurry vision, itchy skin  Blurry vision, itchy skin       Chlorquinaldol Dermatitis and Other (See Comments)     also blurry vision       Time:      Cj Georges MD    Answers for HPI/ROS submitted by the patient on 12/8/2022  If you checked off any problems, how difficult have these problems made it for you to do your work, take care of things at home, or get along with other people?: Extremely difficult  PHQ9 TOTAL SCORE: 17  ALFONSO 7 TOTAL SCORE: 7  Depression/Anxiety: Depression & Anxiety  Status since last visit:: no change  Anxiety since last: : no change  Other associated symptoms of depression:: No  Other associated symotome: : No  Significant life event: : relationship concerns  Anxious:: No  Current substance use:: No  How many servings of fruits and vegetables do you eat daily?: 2-3  On average, how many sweetened beverages do you drink each day (Examples: soda, juice, sweet tea, etc.  Do NOT count diet or artificially sweetened beverages)?: 1  How many minutes a day do you exercise enough to make your heart beat faster?: 60 or more  How many days a week do you exercise enough to make your heart beat faster?: 7  How many days per week do you miss taking your medication?: 0  What is the reason for your visit today?: DEPRESSION, CHEST TIGHTNESS AND PALPATATIONS  When did your symptoms begin?: 3-7 days ago  What are your symptoms?: CHEST TIGHTNESS AND PALPATATIONS  How would you describe these symptoms?: Moderate  Are your symptoms:: Staying  the same  Have you had these symptoms before?: Yes  Have you tried or received treatment for these symptoms before?: Yes  Did that treatment work? : No  Is there anything that makes you feel worse?: NO  Is there anything that makes you feel better?: NO

## 2022-12-20 ENCOUNTER — PATIENT OUTREACH (OUTPATIENT)
Dept: CARE COORDINATION | Facility: CLINIC | Age: 67
End: 2022-12-20

## 2022-12-20 ENCOUNTER — APPOINTMENT (OUTPATIENT)
Dept: CARE COORDINATION | Facility: CLINIC | Age: 67
End: 2022-12-20
Payer: COMMERCIAL

## 2022-12-20 NOTE — PROGRESS NOTES
Clinic Care Coordination Contact  Program: [facility] Benefits/Health Insurance   County:  [facility] Case #:  [facility] Worker:   Terrellure #:   Subscriber #:   Renewal:  Date Applied:     FRW Outreach:   12/20/22: FRW called pt at scheduled time but was unable to leave VM. FRW will make another outreach in 1-2 weeks.   12/2/22: FRW called pt and we scheduled appointment for 12/20 to apply for MA.     Health Insurance:      Referral/Screening:  Financial Resource Worker Screening     [facility] Benefits  Is patient requesting help applying for [employer] benefits?: Yes  Have you recently applied for any [employer] benefits?: No  How many people in your household?: 2  Do you buy/eat food together?: Yes  What is the monthly gross income for the household (wages, social security, workers comp, and pension)? : 900     Insurance:  [place]?: No  Is this an insurance renewal?: No  Is this a new insurance application request?: Yes  Have you recently applied for insurance?: No  How many people in your household? : 2  Do you file taxes?: Yes  What is the monthly gross income for the household (wages, social security, workers comp, and pension)? : 900     Any other information for the FRW?: not sure if she claims her adult son who lives with her and disabled.  She is getting $900 per month total.  she gets $200 in snap. son on MA, but she is not.  thanks

## 2022-12-27 ENCOUNTER — HOSPITAL ENCOUNTER (OUTPATIENT)
Dept: NUCLEAR MEDICINE | Facility: HOSPITAL | Age: 67
Discharge: HOME | End: 2022-12-27
Attending: INTERNAL MEDICINE
Payer: COMMERCIAL

## 2022-12-27 ENCOUNTER — HOSPITAL ENCOUNTER (OUTPATIENT)
Dept: CARDIOLOGY | Facility: HOSPITAL | Age: 67
Discharge: HOME | End: 2022-12-27
Attending: INTERNAL MEDICINE
Payer: COMMERCIAL

## 2022-12-27 DIAGNOSIS — R07.89 CHEST TIGHTNESS: ICD-10-CM

## 2022-12-27 LAB
CV STRESS CURRENT BP HE: NORMAL
CV STRESS CURRENT HR HE: 110
CV STRESS CURRENT HR HE: 112
CV STRESS CURRENT HR HE: 113
CV STRESS CURRENT HR HE: 113
CV STRESS CURRENT HR HE: 76
CV STRESS CURRENT HR HE: 79
CV STRESS CURRENT HR HE: 92
CV STRESS CURRENT HR HE: 94
CV STRESS CURRENT HR HE: 94
CV STRESS CURRENT HR HE: 95
CV STRESS CURRENT HR HE: 98
CV STRESS CURRENT HR HE: 99
CV STRESS DEVIATION TIME HE: NORMAL
CV STRESS ECHO PERCENT HR HE: NORMAL
CV STRESS EXERCISE STAGE HE: NORMAL
CV STRESS FINAL RESTING BP HE: NORMAL
CV STRESS FINAL RESTING HR HE: 94
CV STRESS MAX HR HE: 116
CV STRESS MAX TREADMILL GRADE HE: 0
CV STRESS MAX TREADMILL SPEED HE: 0
CV STRESS PEAK DIA BP HE: NORMAL
CV STRESS PEAK SYS BP HE: NORMAL
CV STRESS PHASE HE: NORMAL
CV STRESS PROTOCOL HE: NORMAL
CV STRESS RESTING PT POSITION HE: NORMAL
CV STRESS ST DEVIATION AMOUNT HE: NORMAL
CV STRESS ST DEVIATION ELEVATION HE: NORMAL
CV STRESS ST EVELATION AMOUNT HE: NORMAL
CV STRESS TEST TYPE HE: NORMAL
CV STRESS TOTAL STAGE TIME MIN 1 HE: NORMAL
RATE PRESSURE PRODUCT: NORMAL
STRESS ECHO BASELINE DIASTOLIC HE: 72
STRESS ECHO BASELINE HR: 78
STRESS ECHO BASELINE SYSTOLIC BP: 172
STRESS ECHO CALCULATED PERCENT HR: 76 %
STRESS ECHO LAST STRESS DIASTOLIC BP: 78
STRESS ECHO LAST STRESS HR: 110
STRESS ECHO LAST STRESS SYSTOLIC BP: 186
STRESS ECHO TARGET HR: 153

## 2022-12-27 PROCEDURE — 78452 HT MUSCLE IMAGE SPECT MULT: CPT | Mod: 26 | Performed by: INTERNAL MEDICINE

## 2022-12-27 PROCEDURE — A9500 TC99M SESTAMIBI: HCPCS | Performed by: INTERNAL MEDICINE

## 2022-12-27 PROCEDURE — 78452 HT MUSCLE IMAGE SPECT MULT: CPT

## 2022-12-27 PROCEDURE — 93018 CV STRESS TEST I&R ONLY: CPT | Performed by: INTERNAL MEDICINE

## 2022-12-27 PROCEDURE — 343N000001 HC RX 343: Performed by: INTERNAL MEDICINE

## 2022-12-27 PROCEDURE — 93016 CV STRESS TEST SUPVJ ONLY: CPT | Performed by: INTERNAL MEDICINE

## 2022-12-27 PROCEDURE — 93017 CV STRESS TEST TRACING ONLY: CPT

## 2022-12-27 PROCEDURE — 250N000011 HC RX IP 250 OP 636: Performed by: INTERNAL MEDICINE

## 2022-12-27 RX ORDER — ALBUTEROL SULFATE 0.83 MG/ML
2.5 SOLUTION RESPIRATORY (INHALATION)
Status: DISCONTINUED | OUTPATIENT
Start: 2022-12-27 | End: 2022-12-27 | Stop reason: HOSPADM

## 2022-12-27 RX ORDER — CAFFEINE 200 MG
200 TABLET ORAL
Status: DISCONTINUED | OUTPATIENT
Start: 2022-12-27 | End: 2022-12-27 | Stop reason: HOSPADM

## 2022-12-27 RX ORDER — AMINOPHYLLINE 25 MG/ML
50 INJECTION, SOLUTION INTRAVENOUS
Status: DISCONTINUED | OUTPATIENT
Start: 2022-12-27 | End: 2022-12-27 | Stop reason: HOSPADM

## 2022-12-27 RX ORDER — REGADENOSON 0.08 MG/ML
0.4 INJECTION, SOLUTION INTRAVENOUS ONCE
Status: COMPLETED | OUTPATIENT
Start: 2022-12-27 | End: 2022-12-27

## 2022-12-27 RX ORDER — CAFFEINE CITRATE 20 MG/ML
60 SOLUTION INTRAVENOUS
Status: DISCONTINUED | OUTPATIENT
Start: 2022-12-27 | End: 2022-12-27 | Stop reason: HOSPADM

## 2022-12-27 RX ADMIN — REGADENOSON 0.4 MG: 0.08 INJECTION, SOLUTION INTRAVENOUS at 13:21

## 2022-12-27 RX ADMIN — Medication 8.3 MILLICURIE: at 12:13

## 2022-12-27 RX ADMIN — Medication 35 MILLICURIE: at 14:26

## 2022-12-28 DIAGNOSIS — R06.2 WHEEZING: ICD-10-CM

## 2022-12-28 DIAGNOSIS — I10 ESSENTIAL HYPERTENSION: ICD-10-CM

## 2022-12-29 ENCOUNTER — PATIENT OUTREACH (OUTPATIENT)
Dept: CARE COORDINATION | Facility: CLINIC | Age: 67
End: 2022-12-29

## 2022-12-29 RX ORDER — AMLODIPINE BESYLATE 5 MG/1
5 TABLET ORAL DAILY
Qty: 90 TABLET | Refills: 2 | Status: SHIPPED | OUTPATIENT
Start: 2022-12-29 | End: 2023-04-25

## 2022-12-29 RX ORDER — ALBUTEROL SULFATE 90 UG/1
1-2 AEROSOL, METERED RESPIRATORY (INHALATION) EVERY 4 HOURS PRN
Qty: 3 G | Refills: 6 | Status: SHIPPED | OUTPATIENT
Start: 2022-12-29 | End: 2022-12-30

## 2022-12-29 NOTE — PROGRESS NOTES
Clinic Care Coordination Contact    Community Health Worker Follow Up    Care Gaps:     Health Maintenance Due   Topic Date Due     DEXA  Never done     SPIROMETRY  Never done     ZOSTER IMMUNIZATION (1 of 2) Never done     EYE EXAM  07/07/2022     Pneumococcal Vaccine: 65+ Years (2 - PCV) 07/23/2022     DIABETIC FOOT EXAM  12/30/2022         Care Plan:   Care Plan: Financial Wellbeing     Problem: Patient expresses financial resource strain     Long-Range Goal: I will apply for MA and Pamela Care.     Start Date: 12/1/2022 Expected End Date: 7/1/2023    This Visit's Progress: 10%    Priority: High    Note:     Barriers: none identified.  Strengths: motivated.  Patient expressed understanding of goal: yes  Action steps to achieve this goal:  1. I will connect with FRW to apply for programs.   2. I will complete any paper work.  3. I will report progress towards this goal at scheduled outreach telephone calls from the CCC team.    Discussed 12/29/22                    Care Plan: Mental Health     Problem: Mood/Psychosocial Concerns     Long-Range Goal: Establish Mental Health Support and reduce depression symptoms.     Start Date: 12/1/2022 Expected End Date: 7/1/2023    This Visit's Progress: 10%    Priority: High    Note:     Barriers: caregiver, financial strain.  Strengths: motivated to feel better.  Patient expressed understanding of goal: yes  Action steps to achieve this goal:  1. I will schedule appt with Brea Agudelo when called.  2. I will meet with new provider Maria Elena yin.  3. I will report progress towards this goal at scheduled outreach telephone calls from the CCC team.    Discussed 12/29/22                      Care Plan: Housing Instability     Problem: SDOH LACK OF STABLE HOUSING     Long-Range Goal: I will put my name on senior housing apartment lists that are affordable.     Start Date: 12/1/2022 Expected End Date: 7/1/2023    This Visit's Progress: 0%    Priority: Medium    Note:      Barriers: paying a lot of money for current apartment, caregiver for son who is disabled.   Strengths: motivated.  Patient expressed understanding of goal: yes  Action steps to achieve this goal:  1. I will review list of senior apartments that  sends.  2. I will call those that I am interested in to add myself to the waiting list.   3. I will report progress towards this goal at scheduled outreach telephone calls from the CCC team.    Discussed 12/29/22                        Intervention and Education during outreach: CHW gave patient contact information and encouraged patient to reach out with any questions or concerns.    CHW Note:    CHW contacted patient to review/update CCC goals.    Patient shared that she has not started looking into senior housing. Patient plans to explore different options and will update [facility] team at next scheduled outreach.    Patient shared that she is working with CRISTOPHER Saavedra to complete MA and SNAP goal. Patient reported that she has CRISTOPHER Saavedra's contact number and will reach out if she has questions or concerns.    Patient shared that she has a mental health therapy appointment the beginning of January. Patient is happy to be receiving care for her mental health. Patient will update [facility] team at next scheduled outreach.    Patient denied any other needs or concerns at this time but will reach out to [facility] as needed.     CHW Plan: CHW will continue to support patient with goals through routine scheduled outreach. CHW will outreach in one month on 1/31/23.      Raquel Miguel  Community Health Worker   [facility] Care Coordination  [facility]   [email or web address]  Office: [phone]

## 2022-12-29 NOTE — TELEPHONE ENCOUNTER
"Routing remaining refills to the requesting pharmacy.     Last Written Prescription Date:  8/9/2022  Last Fill Quantity: 3 g,  # refills: 11   Last office visit provider:  12/8/2022     Requested Prescriptions   Pending Prescriptions Disp Refills     albuterol (PROAIR HFA/PROVENTIL HFA/VENTOLIN HFA) 108 (90 Base) MCG/ACT inhaler 3 g 11     Sig: Inhale 1-2 puffs into the lungs every 4 hours as needed for shortness of breath or wheezing       Asthma Maintenance Inhalers - Anticholinergics Passed - 12/28/2022  9:24 AM        Passed - Patient is age 12 years or older        Passed - Recent (12 mo) or future (30 days) visit within the authorizing provider's specialty     Patient has had an office visit with the authorizing provider or a provider within the authorizing providers department within the previous 12 mos or has a future within next 30 days. See \"Patient Info\" tab in inbasket, or \"Choose Columns\" in Meds & Orders section of the refill encounter.              Passed - Medication is active on med list       Short-Acting Beta Agonist Inhalers Protocol  Passed - 12/28/2022  9:24 AM        Passed - Patient is age 12 or older        Passed - Recent (12 mo) or future (30 days) visit within the authorizing provider's specialty     Patient has had an office visit with the authorizing provider or a provider within the authorizing providers department within the previous 12 mos or has a future within next 30 days. See \"Patient Info\" tab in inbasket, or \"Choose Columns\" in Meds & Orders section of the refill encounter.              Passed - Medication is active on med list        Last Written Prescription Date:  12/8/2022  Last Fill Quantity: 90,  # refills: 4   Last office visit provider:  12/8/2022          amLODIPine (NORVASC) 5 MG tablet 90 tablet 4     Sig: Take 1 tablet (5 mg) by mouth daily       Calcium Channel Blockers Protocol  Passed - 12/28/2022  9:24 AM        Passed - Blood pressure under 140/90 in past 12 " "months     BP Readings from Last 3 Encounters:   12/08/22 138/82   08/29/22 138/75   08/05/22 133/67                 Passed - Recent (12 mo) or future (30 days) visit within the authorizing provider's specialty     Patient has had an office visit with the authorizing provider or a provider within the authorizing providers department within the previous 12 mos or has a future within next 30 days. See \"Patient Info\" tab in inbasket, or \"Choose Columns\" in Meds & Orders section of the refill encounter.              Passed - Medication is active on med list        Passed - Patient is age 18 or older        Passed - No active pregnancy on record        Passed - Normal serum creatinine on file in past 12 months     Recent Labs   Lab Test 08/29/22  1638   CR 0.53       Ok to refill medication if creatinine is low          Passed - No positive pregnancy test in past 12 months             Shanell Collins RN 12/29/22 2:40 PM  "

## 2022-12-30 DIAGNOSIS — R06.2 WHEEZING: ICD-10-CM

## 2022-12-30 RX ORDER — ALBUTEROL SULFATE 90 UG/1
1-2 AEROSOL, METERED RESPIRATORY (INHALATION) EVERY 4 HOURS PRN
Qty: 3 G | Refills: 6 | Status: SHIPPED | OUTPATIENT
Start: 2022-12-30 | End: 2023-01-14

## 2023-01-02 ENCOUNTER — OFFICE VISIT (OUTPATIENT)
Dept: INTERNAL MEDICINE | Facility: CLINIC | Age: 68
End: 2023-01-02
Payer: COMMERCIAL

## 2023-01-02 VITALS
OXYGEN SATURATION: 99 % | TEMPERATURE: 97.7 F | HEART RATE: 72 BPM | WEIGHT: 224 LBS | HEIGHT: 60 IN | BODY MASS INDEX: 43.98 KG/M2 | DIASTOLIC BLOOD PRESSURE: 68 MMHG | SYSTOLIC BLOOD PRESSURE: 124 MMHG | RESPIRATION RATE: 17 BRPM

## 2023-01-02 DIAGNOSIS — I10 ESSENTIAL HYPERTENSION: ICD-10-CM

## 2023-01-02 DIAGNOSIS — D56.1 BETA THALASSEMIA (H): ICD-10-CM

## 2023-01-02 DIAGNOSIS — J41.0 SIMPLE CHRONIC BRONCHITIS (H): ICD-10-CM

## 2023-01-02 DIAGNOSIS — F33.1 MODERATE EPISODE OF RECURRENT MAJOR DEPRESSIVE DISORDER (H): ICD-10-CM

## 2023-01-02 DIAGNOSIS — E11.9 TYPE 2 DIABETES MELLITUS WITHOUT COMPLICATION, WITHOUT LONG-TERM CURRENT USE OF INSULIN (H): Primary | ICD-10-CM

## 2023-01-02 DIAGNOSIS — E11.42 DIABETIC POLYNEUROPATHY ASSOCIATED WITH TYPE 2 DIABETES MELLITUS (H): ICD-10-CM

## 2023-01-02 DIAGNOSIS — G47.33 OSA (OBSTRUCTIVE SLEEP APNEA): ICD-10-CM

## 2023-01-02 DIAGNOSIS — E66.01 MORBID OBESITY (H): ICD-10-CM

## 2023-01-02 LAB
ANION GAP SERPL CALCULATED.3IONS-SCNC: 11 MMOL/L (ref 7–15)
BUN SERPL-MCNC: 16.2 MG/DL (ref 8–23)
CALCIUM SERPL-MCNC: 9.7 MG/DL (ref 8.8–10.2)
CHLORIDE SERPL-SCNC: 101 MMOL/L (ref 98–107)
CREAT SERPL-MCNC: 0.69 MG/DL (ref 0.51–0.95)
DEPRECATED HCO3 PLAS-SCNC: 26 MMOL/L (ref 22–29)
GFR SERPL CREATININE-BSD FRML MDRD: >90 ML/MIN/1.73M2
GLUCOSE SERPL-MCNC: 109 MG/DL (ref 70–99)
HBA1C MFR BLD: 6.3 % (ref 0–5.6)
POTASSIUM SERPL-SCNC: 4 MMOL/L (ref 3.4–5.3)
SODIUM SERPL-SCNC: 138 MMOL/L (ref 136–145)

## 2023-01-02 PROCEDURE — 99214 OFFICE O/P EST MOD 30 MIN: CPT | Mod: 25 | Performed by: INTERNAL MEDICINE

## 2023-01-02 PROCEDURE — G0009 ADMIN PNEUMOCOCCAL VACCINE: HCPCS | Performed by: INTERNAL MEDICINE

## 2023-01-02 PROCEDURE — 90677 PCV20 VACCINE IM: CPT | Performed by: INTERNAL MEDICINE

## 2023-01-02 PROCEDURE — 80048 BASIC METABOLIC PNL TOTAL CA: CPT | Performed by: INTERNAL MEDICINE

## 2023-01-02 PROCEDURE — 36415 COLL VENOUS BLD VENIPUNCTURE: CPT | Performed by: INTERNAL MEDICINE

## 2023-01-02 PROCEDURE — 83036 HEMOGLOBIN GLYCOSYLATED A1C: CPT | Performed by: INTERNAL MEDICINE

## 2023-01-02 NOTE — PROGRESS NOTES
"  Office Visit - Follow Up   Isabel Biggs   68 year old female    Date of Visit: 1/2/2023    Chief Complaint   Patient presents with     Follow Up     Stress test and blood presure        Assessment and Plan   1. Type 2 diabetes mellitus without complication, without long-term current use of insulin (H)  - Basic metabolic panel; Future  - Hemoglobin A1c; Future  - Basic metabolic panel  - Hemoglobin A1c    2. Diabetic polyneuropathy associated with type 2 diabetes mellitus (H)  Stable    3. COPD  Stable    4. WILFRIDO (obstructive sleep apnea)  - CPAP Order for DME - ONLY FOR DME    5. Essential hypertension  Blood pressure better continues to    6. Moderate episode of recurrent major depressive disorder (H)  Generally stable    7. Beta thalassemia (H)  Has been stable    8. Morbid obesity (H)  The following high BMI interventions were performed this visit: encouragement to exercise and lifestyle education regarding diet    Return in about 3 months (around 4/2/2023) for Follow up.     History of Present Illness   This 68 year old comes in for follow-up.  Reviewed her stress test which looks okay.  Feeling better with improvement in her blood pressure.  Not using CPAP would like a prescription, reviewed sleep study high AHI and pressure support of 10       Physical Exam   General Appearance:   No acute distress    /68   Pulse 72   Temp 97.7  F (36.5  C) (Tympanic)   Resp 17   Ht 1.511 m (4' 11.5\")   Wt 101.6 kg (224 lb)   SpO2 99%   BMI 44.49 kg/m           Additional Information   Current Outpatient Medications   Medication Sig Dispense Refill     albuterol (PROAIR HFA/PROVENTIL HFA/VENTOLIN HFA) 108 (90 Base) MCG/ACT inhaler Inhale 1-2 puffs into the lungs every 4 hours as needed for shortness of breath or wheezing 3 g 6     albuterol (PROVENTIL) (2.5 MG/3ML) 0.083% neb solution Take 1 vial (2.5 mg) by nebulization every 6 hours as needed for wheezing 90 mL 4     amitriptyline (ELAVIL) 25 MG tablet Take 1 " tablet (25 mg) by mouth At Bedtime 90 tablet 4     amLODIPine (NORVASC) 5 MG tablet Take 1 tablet (5 mg) by mouth daily 90 tablet 2     aspirin (ASA) 81 MG EC tablet Take 1 tablet (81 mg) by mouth daily 90 tablet 3     atorvastatin (LIPITOR) 20 MG tablet Take 1 tablet (20 mg) by mouth daily 90 tablet 3     Cholecalciferol (D 1000) 25 MCG (1000 UT) CAPS Take 1 capsule by mouth daily 90 capsule 4     DULoxetine (CYMBALTA) 30 MG capsule Take 1 capsule (30 mg) by mouth 2 times daily 180 capsule 4     fluticasone (FLONASE) 50 MCG/ACT nasal spray Spray 1 spray in nostril daily 16 g 11     losartan-hydrochlorothiazide (HYZAAR) 50-12.5 MG tablet Take 1 tablet by mouth daily 90 tablet 4     meclizine (ANTIVERT) 25 MG tablet Take 1 tablet (25 mg) by mouth 4 times daily as needed 90 tablet 3     meloxicam (MOBIC) 15 MG tablet Take 1 tablet (15 mg) by mouth daily 90 tablet 3     omeprazole (PRILOSEC) 20 MG DR capsule Take 1 capsule (20 mg) by mouth daily 90 capsule 4     tiZANidine (ZANAFLEX) 4 MG tablet        triamcinolone (KENALOG) 0.1 % external cream Apply topically 2 times daily 80 g 1     nebulizer accessories (ADULT AEROSOL MASK) Misc [NEBULIZER ACCESSORIES (ADULT AEROSOL MASK) MISC] USE 1 AS DIRECTED. (Patient not taking: Reported on 1/2/2023) 1 each 0     Allergies   Allergen Reactions     Chloroquine Other (See Comments), Itching and Visual Disturbance     Blurry vision, itchy skin  Blurry vision, itchy skin       Chlorquinaldol Dermatitis and Other (See Comments)     also blurry vision       Time:      Cj Georges MD    Answers for HPI/ROS submitted by the patient on 1/2/2023  Do you check your blood pressure regularly outside of the clinic?: No  Are your blood pressures ever more than 140 on the top number (systolic) OR more than 90 on the bottom number (diastolic)? (For example, greater than 140/90): No  Are you following a low salt diet?: Yes  Nitroglycerin use:: never  Do you take an aspirin every day?:  Yes

## 2023-01-03 ENCOUNTER — PATIENT OUTREACH (OUTPATIENT)
Dept: CARE COORDINATION | Facility: CLINIC | Age: 68
End: 2023-01-03

## 2023-01-03 NOTE — PROGRESS NOTES
Clinic Care Coordination Contact  Program: [facility] Benefits/Health Insurance   County:  [facility] Case #:  [facility] Worker:   Terrellure #:   Subscriber #:   Renewal:  Date Applied:     FRW Outreach:   1/3/23: FRW called pt. New appointment made for 1/12.  12/20/22: FRW called pt at scheduled time but was unable to leave VM. FRW will make another outreach in 1-2 weeks.   12/2/22: FRW called pt and we scheduled appointment for 12/20 to apply for MA.     Health Insurance:      Referral/Screening:  Financial Resource Worker Screening     [facility] Benefits  Is patient requesting help applying for [facility] benefits?: Yes  Have you recently applied for any [facility] benefits?: No  How many people in your household?: 2  Do you buy/eat food together?: Yes  What is the monthly gross income for the household (wages, social security, workers comp, and pension)? : 900     Insurance:  [place] No  Is this an insurance renewal?: No  Is this a new insurance application request?: Yes  Have you recently applied for insurance?: No  How many people in your household? : 2  Do you file taxes?: Yes  What is the monthly gross income for the household (wages, social security, workers comp, and pension)? : 900     Any other information for the FRW?: not sure if she claims her adult son who lives with her and disabled.  She is getting $900 per month total.  she gets $200 in snap. son on MA, but she is not.  thanks

## 2023-01-11 ENCOUNTER — PATIENT OUTREACH (OUTPATIENT)
Dept: CARE COORDINATION | Facility: CLINIC | Age: 68
End: 2023-01-11

## 2023-01-11 ENCOUNTER — TELEPHONE (OUTPATIENT)
Dept: SLEEP MEDICINE | Facility: CLINIC | Age: 68
End: 2023-01-11

## 2023-01-11 NOTE — TELEPHONE ENCOUNTER
Called to let patient know I received an updated Rx and will check with my supervisor if she can get setup.

## 2023-01-11 NOTE — PROGRESS NOTES
Care Coordination Clinician Chart Review    Situation: Patient chart reviewed by care coordinator.       Background: Care Coordination initial assessment and enrollment to Care Coordination was 12-1-2022.   Patient centered goals were developed with participation from patient.  BRIGHT  handed patient off to CHW for continued outreach every 30 days.        Assessment: Per chart review, patient outreach completed by CC CHW on 12-.  Patient is actively working to accomplish goals.  Patient's goals remain appropriate and relevant at this time.   Patient is not due for updated Plan of Care.  Annual assessment will be due 12-1-2023.      Care Plan: Financial Wellbeing     Problem: Patient expresses financial resource strain     Long-Range Goal: I will apply for MA and Pamela Care.     Start Date: 12/1/2022 Expected End Date: 7/1/2023    This Visit's Progress: 10%    Priority: High    Note:     Barriers: none identified.  Strengths: motivated.  Patient expressed understanding of goal: yes  Action steps to achieve this goal:  1. I will connect with FRW to apply for programs.   2. I will complete any paper work.  3. I will report progress towards this goal at scheduled outreach telephone calls from the CCC team.    Discussed 12/29/22                    Care Plan: Mental Health     Problem: Mood/Psychosocial Concerns     Long-Range Goal: Establish Mental Health Support and reduce depression symptoms.     Start Date: 12/1/2022 Expected End Date: 7/1/2023    This Visit's Progress: 10%    Priority: High    Note:     Barriers: caregiver, financial strain.  Strengths: motivated to feel better.  Patient expressed understanding of goal: yes  Action steps to achieve this goal:  1. I will schedule appt with Brea Agudelo when called.  2. I will meet with new provider Maria Elena yin.  3. I will report progress towards this goal at scheduled outreach telephone calls from the CCC team.    Discussed 12/29/22                       Care Plan: Housing Instability     Problem: SDOH LACK OF STABLE HOUSING     Long-Range Goal: I will put my name on senior housing apartment lists that are affordable.     Start Date: 12/1/2022 Expected End Date: 7/1/2023    This Visit's Progress: 0%    Priority: Medium    Note:     Barriers: paying a lot of money for current apartment, caregiver for son who is disabled.   Strengths: motivated.  Patient expressed understanding of goal: yes  Action steps to achieve this goal:  1. I will review list of senior apartments that  sends.  2. I will call those that I am interested in to add myself to the waiting list.   3. I will report progress towards this goal at scheduled outreach telephone calls from the CCC team.    Discussed 12/29/22                            Plan/Recommendations: The patient will continue working with Care Coordination to achieve goals as above.  CHW will involve SW CC as needed or if patient is ready to move to maintenance.  SW CC will continue to monitor progress to goals and CHW outreaches every 6 weeks.   Plan of Care updated and mailed to patient: No

## 2023-01-12 ENCOUNTER — PATIENT OUTREACH (OUTPATIENT)
Dept: CARE COORDINATION | Facility: CLINIC | Age: 68
End: 2023-01-12

## 2023-01-12 NOTE — PROGRESS NOTES
Clinic Care Coordination Contact  Program: [facility] Benefits/Health Insurance   County:  [facility] Case #:  [facility] Worker:   Terrellure #:   Subscriber #:   Renewal:  Date Applied:     FRW Outreach:   1/12/23: FRW called pt at scheduled time. Pt was sick and we rescheduled appointment to 1/23.   1/3/23: FRW called pt. New appointment made for 1/12.  12/20/22: FRW called pt at scheduled time but was unable to leave . FRW will make another outreach in 1-2 weeks.   12/2/22: FRW called pt and we scheduled appointment for 12/20 to apply for MA.     Health Insurance:      Referral/Screening:  Financial Resource Worker Screening     [facility] Benefits  Is patient requesting help applying for [facility] benefits?: Yes  Have you recently applied for any [facility] benefits?: No  How many people in your household?: 2  Do you buy/eat food together?: Yes  What is the monthly gross income for the household (wages, social security, workers comp, and pension)? : 900     [place]:  [place] No  Is this an insurance renewal?: No  Is this a new insurance application request?: Yes  Have you recently applied for insurance?: No  How many people in your household? : 2  Do you file taxes?: Yes  What is the monthly gross income for the household (wages, social security, workers comp, and pension)? : 900     Any other information for the FRW?: not sure if she claims her adult son who lives with her and disabled.  She is getting $900 per month total.  she gets $200 in snap. son on MA, but she is not.  thanks

## 2023-01-14 ENCOUNTER — OFFICE VISIT (OUTPATIENT)
Dept: URGENT CARE | Facility: URGENT CARE | Age: 68
End: 2023-01-14
Payer: COMMERCIAL

## 2023-01-14 VITALS
BODY MASS INDEX: 43.69 KG/M2 | SYSTOLIC BLOOD PRESSURE: 162 MMHG | HEART RATE: 93 BPM | DIASTOLIC BLOOD PRESSURE: 82 MMHG | TEMPERATURE: 99.1 F | WEIGHT: 220 LBS | OXYGEN SATURATION: 98 %

## 2023-01-14 DIAGNOSIS — T20.10XA FACIAL BURN, FIRST DEGREE, INITIAL ENCOUNTER: ICD-10-CM

## 2023-01-14 DIAGNOSIS — H10.32 ACUTE CONJUNCTIVITIS OF LEFT EYE, UNSPECIFIED ACUTE CONJUNCTIVITIS TYPE: Primary | ICD-10-CM

## 2023-01-14 PROCEDURE — 99214 OFFICE O/P EST MOD 30 MIN: CPT | Performed by: PHYSICIAN ASSISTANT

## 2023-01-14 RX ORDER — POLYMYXIN B SULFATE AND TRIMETHOPRIM 1; 10000 MG/ML; [USP'U]/ML
1-2 SOLUTION OPHTHALMIC 4 TIMES DAILY
Qty: 10 ML | Refills: 0 | Status: SHIPPED | OUTPATIENT
Start: 2023-01-14 | End: 2023-01-21

## 2023-01-14 RX ORDER — SILVER SULFADIAZINE 10 MG/G
CREAM TOPICAL 2 TIMES DAILY
Qty: 50 G | Refills: 0 | Status: SHIPPED | OUTPATIENT
Start: 2023-01-14 | End: 2023-08-09

## 2023-01-14 ASSESSMENT — ENCOUNTER SYMPTOMS
FEVER: 0
EYE PAIN: 1
COUGH: 0
EYE ITCHING: 0
RHINORRHEA: 0
SINUS PRESSURE: 0
FATIGUE: 0
RESPIRATORY NEGATIVE: 1
PHOTOPHOBIA: 0
CONSTITUTIONAL NEGATIVE: 1
EYE DISCHARGE: 1
PALPITATIONS: 0
CARDIOVASCULAR NEGATIVE: 1
SINUS PAIN: 0
CHILLS: 0
SORE THROAT: 0
WHEEZING: 0
SHORTNESS OF BREATH: 0
EYE REDNESS: 1
GASTROINTESTINAL NEGATIVE: 1

## 2023-01-14 ASSESSMENT — VISUAL ACUITY: OU: 1
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
## 2023-01-18 ENCOUNTER — OFFICE VISIT (OUTPATIENT)
Dept: OPTOMETRY | Facility: CLINIC | Age: 68
End: 2023-01-18
Attending: PHYSICIAN ASSISTANT
Payer: COMMERCIAL

## 2023-01-18 DIAGNOSIS — E11.9 TYPE 2 DIABETES MELLITUS WITHOUT COMPLICATION, WITHOUT LONG-TERM CURRENT USE OF INSULIN (H): ICD-10-CM

## 2023-01-18 DIAGNOSIS — H25.13 AGE-RELATED NUCLEAR CATARACT OF BOTH EYES: ICD-10-CM

## 2023-01-18 DIAGNOSIS — H18.892 DEFECT OF LEFT CORNEA: ICD-10-CM

## 2023-01-18 DIAGNOSIS — H40.003 GLAUCOMA SUSPECT, BILATERAL: Primary | ICD-10-CM

## 2023-01-18 PROCEDURE — 99213 OFFICE O/P EST LOW 20 MIN: CPT | Performed by: OPTOMETRIST

## 2023-01-18 RX ORDER — ERYTHROMYCIN 5 MG/G
0.5 OINTMENT OPHTHALMIC 2 TIMES DAILY
Qty: 1 G | Refills: 1 | Status: SHIPPED | OUTPATIENT
Start: 2023-01-18

## 2023-01-18 ASSESSMENT — VISUAL ACUITY
OD_PH_SC: 20/40
OS_SC: 20/80
OD_SC: 20/70
METHOD: SNELLEN - LINEAR
OD_PH_SC+: -1
OS_PH_SC+: -1
OS_PH_SC: 20/50

## 2023-01-18 ASSESSMENT — EXTERNAL EXAM - LEFT EYE: OS_EXAM: NORMAL

## 2023-01-18 ASSESSMENT — SLIT LAMP EXAM - LIDS
COMMENTS: NORMAL
COMMENTS: NORMAL

## 2023-01-18 ASSESSMENT — EXTERNAL EXAM - RIGHT EYE: OD_EXAM: NORMAL

## 2023-01-18 NOTE — LETTER
1/18/2023         RE: Isabel Biggs  [address]  [facility] [facility] [address]        Dear Colleague,    Thank you for referring your patient, Isabel Biggs, to the [facility]. Please see a copy of my visit note below.    Chief Complaint   Patient presents with     Eye Problem Left Eye     Last Thursday, boiled eggs in microwave and when took out, eggs exploded in face, got in eye, entire face was burning and hurting. Patient washed face and used cold water and a cloth to try and clean out eye. There is still pain with the left eye. When waking in the morning there is a lot of discharge in the eye. Was given Polytrim when went to  and there is mild improvement. The redness has gone away some, but the burning and pain is still very present.        Do you wear contact lenses? no        Denelle Ariana - Optometric Assistant     See Review Of Systems       Medical, surgical and family histories reviewed and updated 1/18/2023.         OBJECTIVE: See Ophthalmology exam    ASSESSMENT:    ICD-10-CM    1. Glaucoma suspect, bilateral  H40.003 Adult Eye  Referral      2. 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 Type 2 diabetes mellitus without complication, without long-term current use of insulin (H)  E11.9 Adult Eye  Referral      4. Age-related nuclear cataract of both eyes  H25.13 Adult Eye  Referral         PLAN:    Patient Instructions   Erythromycin ointment can be instilled into the left eye 2 times daily in addition to the eyedrops prescribed at .    You are a glaucoma suspect.  Referral for glaucoma testing.  Referral to [facility] ophthalmology- [phone].    Recommend annual eye exams.    Ariella Miguel O.D.  [facility]   [address] [place] [address]    [phone]          Again, thank you for allowing me to participate in the care of  your patient.        Sincerely,        Ariella Miguel OD

## 2023-01-18 NOTE — PATIENT INSTRUCTIONS
Erythromycin ointment can be instilled into the left eye 2 times daily in addition to the eyedrops prescribed at .    You are a glaucoma suspect.  Referral for glaucoma testing.  Referral to [facility] ophthalmology- [phone].    Recommend annual eye exams.    Ariella Miguel O.D.  [address] [address]    [phone]

## 2023-01-18 NOTE — PROGRESS NOTES
Chief Complaint   Patient presents with     Eye Problem Left Eye     Last Thursday, boiled eggs in microwave and when took out, eggs exploded in face, got in eye, entire face was burning and hurting. Patient washed face and used cold water and a cloth to try and clean out eye. There is still pain with the left eye. When waking in the morning there is a lot of discharge in the eye. Was given Polytrim when went to  and there is mild improvement. The redness has gone away some, but the burning and pain is still very present.        Do you wear contact lenses? no        Sindy Lane - Optometric Assistant     See Review Of Systems       Medical, surgical and family histories reviewed and updated 1/18/2023.         OBJECTIVE: See Ophthalmology exam    ASSESSMENT:    ICD-10-CM    1. Glaucoma suspect, bilateral  H40.003 Adult Eye  Referral      2. 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 Type 2 diabetes mellitus without complication, without long-term current use of insulin (H)  E11.9 Adult Eye  Referral      4. Age-related nuclear cataract of both eyes  H25.13 Adult Eye  Referral         PLAN:    Patient Instructions   Erythromycin ointment can be instilled into the left eye 2 times daily in addition to the eyedrops prescribed at .    You are a glaucoma suspect.  Referral for glaucoma testing.  Referral to [facility] ophthalmology- [phone].    Recommend annual eye exams.    Ariella Miguel O.D.  [address] [address]    [phone]

## 2023-01-23 ENCOUNTER — APPOINTMENT (OUTPATIENT)
Dept: CARE COORDINATION | Facility: CLINIC | Age: 68
End: 2023-01-23
Payer: COMMERCIAL

## 2023-01-23 ENCOUNTER — PATIENT OUTREACH (OUTPATIENT)
Dept: CARE COORDINATION | Facility: CLINIC | Age: 68
End: 2023-01-23

## 2023-01-23 NOTE — PROGRESS NOTES
Clinic Care Coordination Contact  Program: [facility] Benefits/Health Insurance   County:  [facility] Case #:  [facility] Worker:   Mnsure #:   Subscriber #:   Renewal:  Date Applied:     FRW Outreach:   1/23/23: FRW called pt and completed applications. FRW mailed the applications for signature and will touch base with pt next week to make sure she received them.  1/12/23: FRW called pt at scheduled time. Pt was sick and we rescheduled appointment to 1/23.   1/3/23: FRW called pt. New appointment made for 1/12.  12/20/22: FRW called pt at scheduled time but was unable to leave VM. FRW will make another outreach in 1-2 weeks.   12/2/22: FRW called pt and we scheduled appointment for 12/20 to apply for MA.     Health Insurance:      Referral/Screening:  Financial Resource Worker Screening     [facility] Benefits  Is patient requesting help applying for [facility] benefits?: Yes  Have you recently applied for any [facility] benefits?: No  How many people in your household?: 2  Do you buy/eat food together?: Yes  What is the monthly gross income for the household (wages, social security, workers comp, and pension)? : 900     [place]:  [place] No  Is this an insurance renewal?: No  Is this a new insurance application request?: Yes  Have you recently applied for insurance?: No  How many people in your household? : 2  Do you file taxes?: Yes  What is the monthly gross income for the household (wages, social security, workers comp, and pension)? : 900     Any other information for the FRW?: not sure if she claims her adult son who lives with her and disabled.  She is getting $900 per month total.  she gets $200 in snap. son on MA, but she is not.  thanks

## 2023-01-31 ENCOUNTER — PATIENT OUTREACH (OUTPATIENT)
Dept: CARE COORDINATION | Facility: CLINIC | Age: 68
End: 2023-01-31

## 2023-01-31 NOTE — PROGRESS NOTES
Clinic Care Coordination Contact    Community Health Worker Follow Up    Care Gaps:     Health Maintenance Due   Topic Date Due     DEXA  Never done     SPIROMETRY  Never done     ZOSTER IMMUNIZATION (1 of 2) Never done     EYE EXAM  07/07/2022         Care Plan:   Care Plan: Financial Wellbeing     Problem: Patient expresses financial resource strain     Long-Range Goal: I will apply for MA and [facility]ela [facility].     Start Date: 12/1/2022 Expected End Date: 7/1/2023    This Visit's Progress: 20% Recent Progress: 10%    Priority: High    Note:     Barriers: none identified.  Strengths: motivated.  Patient expressed understanding of goal: yes  Action steps to achieve this goal:  1. I will connect with FRW to apply for programs.   2. I will complete any paper work.  3. I will report progress towards this goal at scheduled outreach telephone calls from the CCC team.    Discussed 1/31/23                    Care Plan: Mental Health     Problem: Mood/Psychosocial Concerns     Long-Range Goal: Establish Mental Health Support and reduce depression symptoms.     Start Date: 12/1/2022 Expected End Date: 7/1/2023    This Visit's Progress: 10% Recent Progress: 10%    Priority: High    Note:     Barriers: caregiver, financial strain.  Strengths: motivated to feel better.  Patient expressed understanding of goal: yes  Action steps to achieve this goal:  1. I will schedule appt with Brea Agudelo when called.  2. I will meet with new provider Maria Elena yin.  3. I will report progress towards this goal at scheduled outreach telephone calls from the CCC team.    Discussed 1/31/23                      Care Plan: Housing Instability     Problem: SDOH LACK OF STABLE HOUSING     Long-Range Goal: I will put my name on senior housing apartment lists that are affordable.     Start Date: 12/1/2022 Expected End Date: 7/1/2023    This Visit's Progress: 10% Recent Progress: 0%    Priority: Medium    Note:     Barriers: paying a lot of  money for current apartment, caregiver for son who is disabled.   Strengths: motivated.  Patient expressed understanding of goal: yes  Action steps to achieve this goal:  1. I will review list of senior apartments that  sends.  2. I will call those that I am interested in to add myself to the waiting list.   3. I will report progress towards this goal at scheduled outreach telephone calls from the CCC team.    Discussed 1/31/23                        Intervention and Education during outreach: CHW gave patient contact information and encouraged patient to reach out with any questions or concerns.    CHW Note:    CHW contacted patient to review/update CCC goals.     Patient shared she has been working with [facility]3] CRISTOPHER Saavedra to apply for MA and Pamela Care. Patient reported she received the applications that [facility]3] CRISTOPHER Saavedra had sent via mail yesterday. Patient shared she plans on looking them over and completing this as soon as possible. Patient will update [facility] team at next scheduled outreach.    Patient reported that she has not established mental health support at this time. Patient reports she has been focusing on getting the MA approval at this time.     Patient stated she continues to explore senior housing options. Patient will update [facility] team at next scheduled outreach.    Patient denied any other needs or concerns at this time but will reach out to [facility]] as needed.      CHW Plan: CHW will continue to support patient with goals through routine scheduled outreach. CHW will outreach in one month on 3/2/23.      Raquel Miguel  Community Health Worker   [facility] Care Coordination  [facility]   [email or web address]  Office: [phone]

## 2023-01-31 NOTE — PROGRESS NOTES
Clinic Care Coordination Contact  Program: [facility] Benefits/Health Insurance   County:  [facility] Case #:  [facility] Worker:   Mnsure #:   Subscriber #:   Renewal:  Date Applied:     FRW Outreach:   1/31/23: FRW called pt to make sure she received the applications. FRW left VM and will make outreach in 1-2 weeks.  1/23/23: FRW called pt and completed applications. FRW mailed the applications for signature and will touch base with pt next week to make sure she received them.  1/12/23: FRW called pt at scheduled time. Pt was sick and we rescheduled appointment to 1/23.   1/3/23: FRW called pt. New appointment made for 1/12.  12/20/22: FRW called pt at scheduled time but was unable to leave VM. FRW will make another outreach in 1-2 weeks.   12/2/22: FRW called pt and we scheduled appointment for 12/20 to apply for MA.     Health Insurance:      Referral/Screening:  Financial Resource Worker Screening     [facility] Benefits  Is patient requesting help applying for [facility] benefits?: Yes  Have you recently applied for any [facility] benefits?: No  How many people in your household?: 2  Do you buy/eat food together?: Yes  What is the monthly gross income for the household (wages, social security, workers comp, and pension)? : 900     [place]:  [place] No  Is this an insurance renewal?: No  Is this a new insurance application request?: Yes  Have you recently applied for insurance?: No  How many people in your household? : 2  Do you file taxes?: Yes  What is the monthly gross income for the household (wages, social security, workers comp, and pension)? : 900     Any other information for the FRW?: not sure if she claims her adult son who lives with her and disabled.  She is getting $900 per month total.  she gets $200 in snap. son on MA, but she is not.  thanks

## 2023-02-14 ENCOUNTER — HOSPITAL ENCOUNTER (OUTPATIENT)
Facility: CLINIC | Age: 68
Setting detail: OBSERVATION
Discharge: HOME | End: 2023-02-15
Attending: EMERGENCY MEDICINE | Admitting: EMERGENCY MEDICINE
Payer: COMMERCIAL

## 2023-02-14 ENCOUNTER — APPOINTMENT (OUTPATIENT)
Dept: GENERAL RADIOLOGY | Facility: CLINIC | Age: 68
End: 2023-02-14
Attending: EMERGENCY MEDICINE
Payer: COMMERCIAL

## 2023-02-14 DIAGNOSIS — J02.0 STREPTOCOCCAL SORE THROAT: ICD-10-CM

## 2023-02-14 DIAGNOSIS — Z20.822 LAB TEST NEGATIVE FOR COVID-19 VIRUS: ICD-10-CM

## 2023-02-14 DIAGNOSIS — M62.81 GENERALIZED MUSCLE WEAKNESS: ICD-10-CM

## 2023-02-14 DIAGNOSIS — N30.00 ACUTE CYSTITIS WITHOUT HEMATURIA: ICD-10-CM

## 2023-02-14 DIAGNOSIS — R50.9 FEVER, UNSPECIFIED FEVER CAUSE: ICD-10-CM

## 2023-02-14 LAB
ALBUMIN SERPL BCG-MCNC: 4 G/DL (ref 3.5–5.2)
ALBUMIN UR-MCNC: 50 MG/DL
ALP SERPL-CCNC: 95 U/L (ref 35–104)
ALT SERPL W P-5'-P-CCNC: 17 U/L (ref 10–35)
ANION GAP SERPL CALCULATED.3IONS-SCNC: 14 MMOL/L (ref 7–15)
APPEARANCE UR: ABNORMAL
AST SERPL W P-5'-P-CCNC: 26 U/L (ref 10–35)
BASOPHILS # BLD AUTO: 0 10E3/UL (ref 0–0.2)
BASOPHILS NFR BLD AUTO: 0 %
BILIRUB SERPL-MCNC: 1.5 MG/DL
BILIRUB UR QL STRIP: NEGATIVE
BUN SERPL-MCNC: 10.4 MG/DL (ref 8–23)
CALCIUM SERPL-MCNC: 9 MG/DL (ref 8.8–10.2)
CHLORIDE SERPL-SCNC: 99 MMOL/L (ref 98–107)
COLOR UR AUTO: YELLOW
CREAT SERPL-MCNC: 0.72 MG/DL (ref 0.51–0.95)
DEPRECATED HCO3 PLAS-SCNC: 23 MMOL/L (ref 22–29)
EOSINOPHIL # BLD AUTO: 0 10E3/UL (ref 0–0.7)
EOSINOPHIL NFR BLD AUTO: 0 %
ERYTHROCYTE [DISTWIDTH] IN BLOOD BY AUTOMATED COUNT: 14.7 % (ref 10–15)
FLUAV RNA SPEC QL NAA+PROBE: NEGATIVE
FLUBV RNA RESP QL NAA+PROBE: NEGATIVE
GFR SERPL CREATININE-BSD FRML MDRD: >90 ML/MIN/1.73M2
GLUCOSE SERPL-MCNC: 125 MG/DL (ref 70–99)
GLUCOSE UR STRIP-MCNC: NEGATIVE MG/DL
GROUP A STREP BY PCR: DETECTED
HCT VFR BLD AUTO: 32.8 % (ref 35–47)
HGB BLD-MCNC: 10.3 G/DL (ref 11.7–15.7)
HGB UR QL STRIP: NEGATIVE
IMM GRANULOCYTES # BLD: 0 10E3/UL
IMM GRANULOCYTES NFR BLD: 0 %
KETONES UR STRIP-MCNC: 40 MG/DL
LEUKOCYTE ESTERASE UR QL STRIP: ABNORMAL
LYMPHOCYTES # BLD AUTO: 1.5 10E3/UL (ref 0.8–5.3)
LYMPHOCYTES NFR BLD AUTO: 14 %
MCH RBC QN AUTO: 24.6 PG (ref 26.5–33)
MCHC RBC AUTO-ENTMCNC: 31.4 G/DL (ref 31.5–36.5)
MCV RBC AUTO: 79 FL (ref 78–100)
MONOCYTES # BLD AUTO: 1.2 10E3/UL (ref 0–1.3)
MONOCYTES NFR BLD AUTO: 11 %
MUCOUS THREADS #/AREA URNS LPF: PRESENT /LPF
NEUTROPHILS # BLD AUTO: 8.1 10E3/UL (ref 1.6–8.3)
NEUTROPHILS NFR BLD AUTO: 75 %
NITRATE UR QL: NEGATIVE
NRBC # BLD AUTO: 0 10E3/UL
NRBC BLD AUTO-RTO: 0 /100
PH UR STRIP: 5.5 [PH] (ref 5–7)
PLATELET # BLD AUTO: 252 10E3/UL (ref 150–450)
POTASSIUM SERPL-SCNC: 3.7 MMOL/L (ref 3.4–5.3)
PROT SERPL-MCNC: 7.5 G/DL (ref 6.4–8.3)
RBC # BLD AUTO: 4.18 10E6/UL (ref 3.8–5.2)
RBC URINE: 0 /HPF
RSV RNA SPEC NAA+PROBE: NEGATIVE
SARS-COV-2 RNA RESP QL NAA+PROBE: NEGATIVE
SODIUM SERPL-SCNC: 136 MMOL/L (ref 136–145)
SP GR UR STRIP: 1.03 (ref 1–1.03)
SQUAMOUS EPITHELIAL: 9 /HPF
UROBILINOGEN UR STRIP-MCNC: 2 MG/DL
WBC # BLD AUTO: 10.8 10E3/UL (ref 4–11)
WBC URINE: 25 /HPF

## 2023-02-14 PROCEDURE — 99285 EMERGENCY DEPT VISIT HI MDM: CPT | Mod: 25,CS | Performed by: EMERGENCY MEDICINE

## 2023-02-14 PROCEDURE — 36415 COLL VENOUS BLD VENIPUNCTURE: CPT | Performed by: EMERGENCY MEDICINE

## 2023-02-14 PROCEDURE — 99285 EMERGENCY DEPT VISIT HI MDM: CPT | Mod: CS | Performed by: EMERGENCY MEDICINE

## 2023-02-14 PROCEDURE — 250N000013 HC RX MED GY IP 250 OP 250 PS 637: Performed by: EMERGENCY MEDICINE

## 2023-02-14 PROCEDURE — 81001 URINALYSIS AUTO W/SCOPE: CPT | Performed by: EMERGENCY MEDICINE

## 2023-02-14 PROCEDURE — 96366 THER/PROPH/DIAG IV INF ADDON: CPT | Performed by: EMERGENCY MEDICINE

## 2023-02-14 PROCEDURE — 99222 1ST HOSP IP/OBS MODERATE 55: CPT | Performed by: PHYSICIAN ASSISTANT

## 2023-02-14 PROCEDURE — 258N000003 HC RX IP 258 OP 636: Performed by: EMERGENCY MEDICINE

## 2023-02-14 PROCEDURE — 250N000011 HC RX IP 250 OP 636: Performed by: EMERGENCY MEDICINE

## 2023-02-14 PROCEDURE — 80053 COMPREHEN METABOLIC PANEL: CPT | Performed by: EMERGENCY MEDICINE

## 2023-02-14 PROCEDURE — 87088 URINE BACTERIA CULTURE: CPT | Performed by: EMERGENCY MEDICINE

## 2023-02-14 PROCEDURE — 87651 STREP A DNA AMP PROBE: CPT | Performed by: NURSE PRACTITIONER

## 2023-02-14 PROCEDURE — 87637 SARSCOV2&INF A&B&RSV AMP PRB: CPT | Performed by: EMERGENCY MEDICINE

## 2023-02-14 PROCEDURE — 85025 COMPLETE CBC W/AUTO DIFF WBC: CPT | Performed by: EMERGENCY MEDICINE

## 2023-02-14 PROCEDURE — C9803 HOPD COVID-19 SPEC COLLECT: HCPCS | Performed by: EMERGENCY MEDICINE

## 2023-02-14 PROCEDURE — 71046 X-RAY EXAM CHEST 2 VIEWS: CPT

## 2023-02-14 PROCEDURE — 96361 HYDRATE IV INFUSION ADD-ON: CPT | Performed by: EMERGENCY MEDICINE

## 2023-02-14 PROCEDURE — 96365 THER/PROPH/DIAG IV INF INIT: CPT | Performed by: EMERGENCY MEDICINE

## 2023-02-14 PROCEDURE — G0378 HOSPITAL OBSERVATION PER HR: HCPCS

## 2023-02-14 PROCEDURE — 71046 X-RAY EXAM CHEST 2 VIEWS: CPT | Mod: 26 | Performed by: STUDENT IN AN ORGANIZED HEALTH CARE EDUCATION/TRAINING PROGRAM

## 2023-02-14 PROCEDURE — 87086 URINE CULTURE/COLONY COUNT: CPT | Performed by: EMERGENCY MEDICINE

## 2023-02-14 PROCEDURE — 258N000003 HC RX IP 258 OP 636: Performed by: PHYSICIAN ASSISTANT

## 2023-02-14 PROCEDURE — 250N000013 HC RX MED GY IP 250 OP 250 PS 637: Performed by: PHYSICIAN ASSISTANT

## 2023-02-14 RX ORDER — CEFTRIAXONE 1 G/1
1 INJECTION, POWDER, FOR SOLUTION INTRAMUSCULAR; INTRAVENOUS ONCE
Status: DISCONTINUED | OUTPATIENT
Start: 2023-02-15 | End: 2023-02-15

## 2023-02-14 RX ORDER — ONDANSETRON 2 MG/ML
4 INJECTION INTRAMUSCULAR; INTRAVENOUS EVERY 6 HOURS PRN
Status: DISCONTINUED | OUTPATIENT
Start: 2023-02-14 | End: 2023-02-15 | Stop reason: HOSPADM

## 2023-02-14 RX ORDER — ASPIRIN 81 MG/1
81 TABLET ORAL DAILY
Status: DISCONTINUED | OUTPATIENT
Start: 2023-02-15 | End: 2023-02-15 | Stop reason: HOSPADM

## 2023-02-14 RX ORDER — AMLODIPINE BESYLATE 5 MG/1
5 TABLET ORAL DAILY
Status: DISCONTINUED | OUTPATIENT
Start: 2023-02-15 | End: 2023-02-15 | Stop reason: HOSPADM

## 2023-02-14 RX ORDER — ACETAMINOPHEN 325 MG/1
975 TABLET ORAL EVERY 8 HOURS PRN
Status: DISCONTINUED | OUTPATIENT
Start: 2023-02-14 | End: 2023-02-15 | Stop reason: HOSPADM

## 2023-02-14 RX ORDER — AZITHROMYCIN 250 MG/1
250 TABLET, FILM COATED ORAL DAILY
Status: DISCONTINUED | OUTPATIENT
Start: 2023-02-15 | End: 2023-02-15

## 2023-02-14 RX ORDER — SODIUM CHLORIDE 9 MG/ML
INJECTION, SOLUTION INTRAVENOUS CONTINUOUS
Status: DISCONTINUED | OUTPATIENT
Start: 2023-02-14 | End: 2023-02-15

## 2023-02-14 RX ORDER — SODIUM CHLORIDE 9 MG/ML
INJECTION, SOLUTION INTRAVENOUS CONTINUOUS
Status: DISCONTINUED | OUTPATIENT
Start: 2023-02-14 | End: 2023-02-14

## 2023-02-14 RX ORDER — DULOXETIN HYDROCHLORIDE 30 MG/1
30 CAPSULE, DELAYED RELEASE ORAL 2 TIMES DAILY
Status: DISCONTINUED | OUTPATIENT
Start: 2023-02-14 | End: 2023-02-15 | Stop reason: HOSPADM

## 2023-02-14 RX ORDER — ACETAMINOPHEN 325 MG/1
975 TABLET ORAL ONCE
Status: COMPLETED | OUTPATIENT
Start: 2023-02-14 | End: 2023-02-14

## 2023-02-14 RX ORDER — CEFTRIAXONE 1 G/1
1 INJECTION, POWDER, FOR SOLUTION INTRAMUSCULAR; INTRAVENOUS ONCE
Status: COMPLETED | OUTPATIENT
Start: 2023-02-14 | End: 2023-02-14

## 2023-02-14 RX ORDER — ONDANSETRON 4 MG/1
4 TABLET, ORALLY DISINTEGRATING ORAL EVERY 6 HOURS PRN
Status: DISCONTINUED | OUTPATIENT
Start: 2023-02-14 | End: 2023-02-15 | Stop reason: HOSPADM

## 2023-02-14 RX ORDER — AZITHROMYCIN 250 MG/1
500 TABLET, FILM COATED ORAL ONCE
Status: COMPLETED | OUTPATIENT
Start: 2023-02-14 | End: 2023-02-14

## 2023-02-14 RX ORDER — LOSARTAN POTASSIUM AND HYDROCHLOROTHIAZIDE 12.5; 5 MG/1; MG/1
1 TABLET ORAL DAILY
Status: DISCONTINUED | OUTPATIENT
Start: 2023-02-15 | End: 2023-02-14

## 2023-02-14 RX ORDER — ATORVASTATIN CALCIUM 20 MG/1
20 TABLET, FILM COATED ORAL AT BEDTIME
Status: DISCONTINUED | OUTPATIENT
Start: 2023-02-14 | End: 2023-02-15 | Stop reason: HOSPADM

## 2023-02-14 RX ADMIN — SODIUM CHLORIDE 1000 ML: 9 INJECTION, SOLUTION INTRAVENOUS at 12:19

## 2023-02-14 RX ADMIN — AMITRIPTYLINE HYDROCHLORIDE 25 MG: 25 TABLET, FILM COATED ORAL at 22:19

## 2023-02-14 RX ADMIN — AZITHROMYCIN MONOHYDRATE 500 MG: 250 TABLET ORAL at 13:47

## 2023-02-14 RX ADMIN — ACETAMINOPHEN 975 MG: 325 TABLET ORAL at 22:48

## 2023-02-14 RX ADMIN — SODIUM CHLORIDE: 9 INJECTION, SOLUTION INTRAVENOUS at 18:03

## 2023-02-14 RX ADMIN — ACETAMINOPHEN 975 MG: 325 TABLET ORAL at 12:20

## 2023-02-14 RX ADMIN — Medication 1 MG: at 22:19

## 2023-02-14 RX ADMIN — ATORVASTATIN CALCIUM 20 MG: 20 TABLET, FILM COATED ORAL at 22:19

## 2023-02-14 RX ADMIN — CEFTRIAXONE SODIUM 1 G: 1 INJECTION, POWDER, FOR SOLUTION INTRAMUSCULAR; INTRAVENOUS at 13:47

## 2023-02-14 RX ADMIN — DULOXETINE HYDROCHLORIDE 30 MG: 30 CAPSULE, DELAYED RELEASE PELLETS ORAL at 20:12

## 2023-02-14 ASSESSMENT — ACTIVITIES OF DAILY LIVING (ADL)
ADLS_ACUITY_SCORE: 33
ADLS_ACUITY_SCORE: 31
ADLS_ACUITY_SCORE: 35
ADLS_ACUITY_SCORE: 31
ADLS_ACUITY_SCORE: 31
ADLS_ACUITY_SCORE: 35
ADLS_ACUITY_SCORE: 35

## 2023-02-14 NOTE — ED PROVIDER NOTES
[place] EMERGENCY DEPARTMENT ([facility])  February 14, 2023  ED Provider Note  [facility]      History     Chief Complaint   Patient presents with     Flu Symptoms     HPI  Isabel Biggs is a 68 year old female with a past medical history significant for type 2 diabetes mellitus, hypertension, beta thalassemia who presents to the Emergency Department for evaluation of generalized weakness. Patient reports generalized weakness, sore throat, shortness of breath, congestion, and a mild cough. She states she has been experiencing increased weakness for the past few days, and she finds it difficult to move or use the bathroom. She states she has received her COVID vaccines. She reports history of hysterectomy and arthroscopy right shoulder rotator cuff repair. She reports that she takes amlodipine and duloxetine. She takes She reports she had 101 fever yesterday but no fever today. She states she vomited yesterday but no current nausea or vomiting. Denies diarrhea.     Past Medical History  Past Medical History:   Diagnosis Date     Anemia      Avitaminosis D      Bronchitis      Depression      Diabetes (H)      Glaucoma (increased eye pressure)      Headache      Obesity      Peripheral neuropathy      Rotator cuff tendonitis      Sleep apnea      Past Surgical History:   Procedure Laterality Date     ARTHROSCOPY SHOULDER ROTATOR CUFF REPAIR Right      COLONOSCOPY W/ BIOPSIES N/A 6/28/2021    Procedure: COLONOSCOPY with polypectomy ;  Surgeon: Ned Zamudio MD;  Location: [facility] OR;  Service: Gastroenterology     HYSTERECTOMY  1996    with bso, for ovarian cyst     OOPHORECTOMY  1996     MT TEMPORAL ARTERY LIGATN OR BX Bilateral 1/29/2020    Procedure: BIOPSY, ARTERY, TEMPORAL;  Surgeon: Eric Alejandro MD;  Location: [facility] OR;  Service: General     amitriptyline (ELAVIL) 25 MG tablet  amLODIPine (NORVASC) 5 MG tablet  aspirin (ASA) 81 MG EC  tablet  atorvastatin (LIPITOR) 20 MG tablet  Cholecalciferol (D 1000) 25 MCG (1000 UT) CAPS  DULoxetine (CYMBALTA) 30 MG capsule  erythromycin (ROMYCIN) 5 MG/GM ophthalmic ointment  losartan-hydrochlorothiazide (HYZAAR) 50-12.5 MG tablet  silver sulfADIAZINE (SILVADENE) 1 % external cream      Allergies   Allergen Reactions     Chloroquine Other (See Comments), Itching and Visual Disturbance     Blurry vision, itchy skin  Blurry vision, itchy skin       Chlorquinaldol Dermatitis and Other (See Comments)     also blurry vision     Family History  Family History   Problem Relation Age of Onset     Hypertension Mother      Diabetes Mother      Cancer Other      Breast Cancer Maternal Aunt 70.00     No Known Problems Father         killed in war     No Known Problems Sister         one  sudden death (37), others living     No Known Problems Brother         9 living, 1  diabetes, others  in war     No Known Problems Daughter      No Known Problems Son      Hereditary Breast and Ovarian Cancer Syndrome No family hx of      Cancer No family hx of      Colon Cancer No family hx of      Endometrial Cancer No family hx of      Ovarian Cancer No family hx of      Social History   Social History     Tobacco Use     Smoking status: Never     Smokeless tobacco: Never   Vaping Use     Vaping Use: Never used   Substance Use Topics     Alcohol use: Never     Drug use: Never      Past medical history, past surgical history, medications, allergies, family history, and social history were reviewed with the patient. No additional pertinent items.      A medically appropriate review of systems was performed with pertinent positives and negatives noted in the HPI, and all other systems negative.    Physical Exam   BP: 104/69  Pulse: 101  Temp: 98.4  F (36.9  C)  Resp: 20  SpO2: 96 %  Physical Exam  Constitutional:       General: She is not in acute distress.     Appearance: She is well-developed. She is not ill-appearing,  toxic-appearing or diaphoretic.   HENT:      Head: Normocephalic and atraumatic.      Nose: Congestion and rhinorrhea present.      Mouth/Throat:      Pharynx: No oropharyngeal exudate or posterior oropharyngeal erythema.   Cardiovascular:      Rate and Rhythm: Normal rate and regular rhythm.      Pulses: Normal pulses.      Heart sounds: Normal heart sounds.   Pulmonary:      Effort: Pulmonary effort is normal. No respiratory distress.      Breath sounds: Normal breath sounds.   Abdominal:      General: There is no distension.      Palpations: Abdomen is soft.      Tenderness: There is no abdominal tenderness. There is no rebound.   Musculoskeletal:         General: No tenderness.      Cervical back: Normal range of motion.   Skin:     General: Skin is warm and dry.   Neurological:      General: No focal deficit present.      Mental Status: She is alert and oriented to person, place, and time.   Psychiatric:         Mood and Affect: Mood normal.         Behavior: Behavior normal.         Thought Content: Thought content normal.           ED Course, Procedures, & Data      Procedures        11:09 PM  The patient was seen and examined by Tasha Hoang MD in Room [facility].   Results for orders placed or performed during the hospital encounter of 02/14/23   XR Chest 2 Views     Status: None    Narrative    EXAM: XR CHEST 2 VIEWS  2/14/2023 12:05 PM     HISTORY:  sob, fever       COMPARISON:  CT, 3/9/2018; radiograph, 9/18/2017    FINDINGS:   PA and lateral views of the chest.     Trachea is midline. Cardiomediastinal silhouette and pulmonary  vasculature are within normal limits. No confluent airspace opacity,  pleural effusion or appreciable pneumothorax.    No acute osseous abnormality. Degenerative changes of the shoulder,  right more than left. Visualized upper abdomen is unremarkable.        Impression    IMPRESSION: Streaky basilar pulmonary opacities, may represent  atelectasis versus atypical infection.  No confluent consolidation.    I have personally reviewed the examination and initial interpretation  and I agree with the findings.    BETTE ISLAS MD         SYSTEM ID:  [id number]   Comprehensive metabolic panel     Status: Abnormal   Result Value Ref Range    Sodium 136 136 - 145 mmol/L    Potassium 3.7 3.4 - 5.3 mmol/L    Chloride 99 98 - 107 mmol/L    Carbon Dioxide (CO2) 23 22 - 29 mmol/L    Anion Gap 14 7 - 15 mmol/L    Urea Nitrogen 10.4 8.0 - 23.0 mg/dL    Creatinine 0.72 0.51 - 0.95 mg/dL    Calcium 9.0 8.8 - 10.2 mg/dL    Glucose 125 (H) 70 - 99 mg/dL    Alkaline Phosphatase 95 35 - 104 U/L    AST 26 10 - 35 U/L    ALT 17 10 - 35 U/L    Protein Total 7.5 6.4 - 8.3 g/dL    Albumin 4.0 3.5 - 5.2 g/dL    Bilirubin Total 1.5 (H) <=1.2 mg/dL    GFR Estimate >90 >60 mL/min/1.73m2   UA with Microscopic reflex to Culture     Status: Abnormal    Specimen: Urine, Midstream   Result Value Ref Range    Color Urine Yellow Colorless, Straw, Light Yellow, Yellow    Appearance Urine Slightly Cloudy (A) Clear    Glucose Urine Negative Negative mg/dL    Bilirubin Urine Negative Negative    Ketones Urine 40 (A) Negative mg/dL    Specific Gravity Urine 1.027 1.003 - 1.035    Blood Urine Negative Negative    pH Urine 5.5 5.0 - 7.0    Protein Albumin Urine 50 (A) Negative mg/dL    Urobilinogen Urine 2.0 Normal, 2.0 mg/dL    Nitrite Urine Negative Negative    Leukocyte Esterase Urine Large (A) Negative    Mucus Urine Present (A) None Seen /LPF    RBC Urine 0 <=2 /HPF    WBC Urine 25 (H) <=5 /HPF    Squamous Epithelials Urine 9 (H) <=1 /HPF    Narrative    Urine Culture ordered based on laboratory criteria   Symptomatic Influenza A/B & SARS-CoV2 (COVID-19) Virus PCR Multiplex Nasopharyngeal     Status: Normal    Specimen: Nasopharyngeal; Swab   Result Value Ref Range    Influenza A PCR Negative Negative    Influenza B PCR Negative Negative    RSV PCR Negative Negative    SARS CoV2 PCR Negative Negative    Narrative    Testing  was performed using the Xpert Xpress CoV2/Flu/RSV Assay on the [employer] GeneXpert Instrument. This test should be ordered for the detection of SARS-CoV-2 and influenza viruses in individuals who meet clinical and/or epidemiological criteria. Test performance is unknown in asymptomatic patients. This test is for in vitro diagnostic use under the FDA EUA for laboratories certified under CLIA to perform high or moderate complexity testing. This test has not been FDA cleared or approved. A negative result does not rule out the presence of PCR inhibitors in the specimen or target RNA in concentration below the limit of detection for the assay. If only one viral target is positive but coinfection with multiple targets is suspected, the sample should be re-tested with another FDA cleared, approved, or authorized test, if coinfection would change clinical management. This test was validated by the [facility]. These laboratories are certified under the Clinical Laboratory Improvement Amendments of 1988 (CLIA-88) as qualified to perform high complexity laboratory testing.   CBC with platelets and differential     Status: Abnormal   Result Value Ref Range    WBC Count 10.8 4.0 - 11.0 10e3/uL    RBC Count 4.18 3.80 - 5.20 10e6/uL    Hemoglobin 10.3 (L) 11.7 - 15.7 g/dL    Hematocrit 32.8 (L) 35.0 - 47.0 %    MCV 79 78 - 100 fL    MCH 24.6 (L) 26.5 - 33.0 pg    MCHC 31.4 (L) 31.5 - 36.5 g/dL    RDW 14.7 10.0 - 15.0 %    Platelet Count 252 150 - 450 10e3/uL    % Neutrophils 75 %    % Lymphocytes 14 %    % Monocytes 11 %    % Eosinophils 0 %    % Basophils 0 %    % Immature Granulocytes 0 %    NRBCs per 100 WBC 0 <1 /100    Absolute Neutrophils 8.1 1.6 - 8.3 10e3/uL    Absolute Lymphocytes 1.5 0.8 - 5.3 10e3/uL    Absolute Monocytes 1.2 0.0 - 1.3 10e3/uL    Absolute Eosinophils 0.0 0.0 - 0.7 10e3/uL    Absolute Basophils 0.0 0.0 - 0.2 10e3/uL    Absolute Immature Granulocytes 0.0 <=0.4 10e3/uL    Absolute NRBCs  0.0 10e3/uL   CBC with platelets differential     Status: Abnormal    Narrative    The following orders were created for panel order CBC with platelets differential.  Procedure                               Abnormality         Status                     ---------                               -----------         ------                     CBC with platelets and d...[[id number]]  Abnormal            Final result                 Please view results for these tests on the individual orders.     Medications   0.9% sodium chloride BOLUS (1,000 mLs Intravenous New Bag 2/14/23 1219)     Followed by   sodium chloride 0.9% infusion (has no administration in time range)   acetaminophen (TYLENOL) tablet 975 mg (975 mg Oral Given 2/14/23 1220)   cefTRIAXone (ROCEPHIN) 1 g vial to attach to  mL bag for ADULTS or NS 50 mL bag for PEDS (1 g Intravenous New Bag 2/14/23 1347)   azithromycin (ZITHROMAX) tablet 500 mg (500 mg Oral Given 2/14/23 1347)                    No results found for any visits on 02/14/23.  Medications   0.9% sodium chloride BOLUS (has no administration in time range)     Followed by   sodium chloride 0.9% infusion (has no administration in time range)   acetaminophen (TYLENOL) tablet 975 mg (has no administration in time range)     Labs Ordered and Resulted from Time of ED Arrival to Time of ED Departure - No data to display  XR Chest 2 Views    (Results Pending)          Medical Decision Making  The patient's presentation is strongly suggestive of a chronic illness severe exacerbation, progression, or side effect of treatment.    The patient's evaluation involved:  review of external note(s) from 2 sources (see separate area of note for details)  ordering and/or review of 2 test(s) in this encounter (see separate area of note for details)  review of 2 test result(s) ordered prior to this encounter (see separate area of note for details)  independent interpretation of testing performed by another health  professional (CXR negative)  discussion of management or test interpretation with another health professional (Discussed case with Obs EVIE)    The patient's management involved a decision regarding hospitalization.      Assessment & Plan    Is a 68-year-old female who presents to the ER due to increased weakness myalgias and fevers for the last 2 days.  Patient symptoms started on Sunday and she has been unable to get out of bed.  Patient has urge to urinate but is too weak to actually make it to the bathroom.  Patient's been brought to the ER by her daughter.  Patient here had some mild erythema of her throat but no signs of PTA.  Patient had been updated on her COVID shots.  Patient was having a cough is a little productive.  We obtained labs that are stable.  Patient's chest x-ray stable.  Patient was checked for COVID and influenza which were negative.  Patient's medical chart was reviewed.  Patient does have a urinary tract infection.  Due to her having a urinary tract infection and fevers at home I recommend admission to the hospital.  Patient says that she really has a bad sore throat and is worried that she is not female to swallow her medications.  Patient also is concerned that she has not been able to make it to the bathroom due to her weakness.  Plan will be admit to the ED observation unit for further care.  Plan plan report was given to the ED abs EVIE to    I have reviewed the nursing notes. I have reviewed the findings, diagnosis, plan and need for follow up with the patient.    New Prescriptions    No medications on file       Final diagnoses:   Fever, unspecified fever cause   Acute cystitis without hematuria   Generalized muscle weakness   IAngelica, am serving as a trained medical scribe to document services personally performed by Tasha Hoang MD, based on the provider's statements to me.      I, Tasha Hoang MD, was physically present and have reviewed and verified the accuracy of this  note documented by Angelica Hook.     Tasha Hoang MD  [facility] EMERGENCY DEPARTMENT  2/14/2023     Tasha Hoang MD  02/14/23 [id number]

## 2023-02-14 NOTE — H&P
[facility] ED Observation Admission Note    Chief Complaint   Patient presents with     Flu Symptoms       Assessment/Plan:  Isabel Biggs is a 68 year old female with a past medical history significant for type 2 diabetes mellitus, hypertension, beta thalassemia who presents to the Emergency Department for evaluation of generalized weakness.     #Urinary tract infection  Reports urinary urgency and frequency in the last few days. Denies dysuria. Reports T max 101 at home. Patient reports she nausea but no emesis. Denies hematuria. In the ED, she is afebrile.  /69 RR 20 O2 sats 96% RA. LAB show unremarkable CMP except for glucose of 125. WBC 10.8 hgb 10.3, this is baseline for the patient, plt 252. UA, slightly cloudy, ketones 40, large LE, wbc 25, and 9 squamous epi cells. Suspect contaminate, however given that the patient reported urinary symptoms, we will treat with antibiotic until UC is back. If culture is negative, will discontinue treatment.   VS per routine   - ml/hr  -Ceftriaxone 1 g q 24h   -Add rapid strep  -Follow UC to direct antibiotic course     #Atypical Pneumonia  Reports 3-4 days of sore throat, congestion, dry cough, fever and shortness of breath. Symptoms progressively getting worse. Patient is afebrile here. No leukocytosis. Influenza A/B and covid19 negative. Chest Xray shows streaky basilar pulmonary opacities, may represent atelectasis versus atypical infection. No confluent consolidation. Patient was started on Azithromycin for possible atypical infection.   -Continue with Azithromycin 250 mg daily x 4     #HTN  #HLD   -Continue with Amlodipine 5 mg daily  -Continue with Atorvastatin 20 mg HS    #Depression  -Continue with Duloxetine 30 mg daily       HPI:    Isabel Biggs is a 68 year old female with a past medical history significant for type 2 diabetes mellitus, hypertension, beta thalassemia who presents to the Emergency Department for evaluation of generalized weakness.  Patient reports generalized weakness, sore throat, shortness of breath, congestion, and a mild cough. She states she has been experiencing increased weakness for the past few days, and she finds it difficult to move or use the bathroom. She states she has received her COVID vaccines. She reports history of hysterectomy and arthroscopy right shoulder rotator cuff repair. She reports that she takes amlodipine and duloxetine. She takes She reports she had 101 fever yesterday but no fever today. She states she vomited yesterday but no current nausea or vomiting. Denies diarrhea.     In the ED, she is afebrile.  /69 RR 20 O2 sats 96% RA. LAB show unremarkable CMP except for glucose of 125. WBC 10.8 hgb 10.3, this is baseline for the patient, plt 252. UA, slightly cloudy, ketones 40, large LE, wbc 25, and 9 squamous epi cells. Suspect contaminate, however given that the patient reported urinary symptoms, we will treat with antibiotic until UC is back. If culture is negative, will discontinue treatment. Influenza A/B and covid19 negative. Chest Xray shows streaky basilar pulmonary opacities, may represent atelectasis versus atypical infection. No confluent consolidation.     On admission to the observation unit the patient was stable.    History:    Past Medical History:   Diagnosis Date     Anemia      Avitaminosis D      Bronchitis      Depression      Diabetes (H)      Glaucoma (increased eye pressure)      Headache      Obesity      Peripheral neuropathy      Rotator cuff tendonitis      Sleep apnea        Past Surgical History:   Procedure Laterality Date     ARTHROSCOPY SHOULDER ROTATOR CUFF REPAIR Right      COLONOSCOPY W/ BIOPSIES N/A 6/28/2021    Procedure: COLONOSCOPY with polypectomy ;  Surgeon: Ned Zamudio MD;  Location: [facility];  Service: Gastroenterology     HYSTERECTOMY  1996    with bso, for ovarian cyst     OOPHORECTOMY  1996     AR TEMPORAL ARTERY LIGATN OR BX Bilateral 1/29/2020     Procedure: BIOPSY, ARTERY, TEMPORAL;  Surgeon: Eric Alejandro MD;  Location: [facility];  Service: General       Family History   Problem Relation Age of Onset     Hypertension Mother      Diabetes Mother      Cancer Other      Breast Cancer Maternal Aunt 70.00     No Known Problems Father         killed in war     No Known Problems Sister         one  sudden death (37), others living     No Known Problems Brother         9 living, 1  diabetes, others  in war     No Known Problems Daughter      No Known Problems Son      Hereditary Breast and Ovarian Cancer Syndrome No family hx of      Cancer No family hx of      Colon Cancer No family hx of      Endometrial Cancer No family hx of      Ovarian Cancer No family hx of        Social History     Socioeconomic History     Marital status:      Spouse name: Not on file     Number of children: Not on file     Years of education: Not on file     Highest education level: Not on file   Occupational History     Not on file   Tobacco Use     Smoking status: Never     Smokeless tobacco: Never   Vaping Use     Vaping Use: Never used   Substance and Sexual Activity     Alcohol use: Never     Drug use: Never     Sexual activity: Not on file   Other Topics Concern     Not on file   Social History Narrative    She is originally from [place]. 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   She is  and has 7 adult children and many grandchildren.     Social Determinants of Health     Financial Resource Strain: High Risk     Difficulty of Paying Living Expenses: Very hard   Food Insecurity: No Food Insecurity     Worried About Running Out of Food in the Last Year: Never true     Ran Out of Food in the Last Year: Never true   Transportation Needs: No Transportation Needs     Lack of Transportation (Medical): No      Lack of Transportation (Non-Medical): No   Physical Activity: Not on file   Stress: Not on file   Social Connections: Not on file   Intimate Partner Violence: Not on file   Housing Stability: Not on file       No current facility-administered medications on file prior to encounter.  amitriptyline (ELAVIL) 25 MG tablet, Take 1 tablet (25 mg) by mouth At Bedtime  amLODIPine (NORVASC) 5 MG tablet, Take 1 tablet (5 mg) by mouth daily  aspirin (ASA) 81 MG EC tablet, Take 1 tablet (81 mg) by mouth daily  atorvastatin (LIPITOR) 20 MG tablet, Take 1 tablet (20 mg) by mouth daily  Cholecalciferol (D 1000) 25 MCG (1000 UT) CAPS, Take 1 capsule by mouth daily  DULoxetine (CYMBALTA) 30 MG capsule, Take 1 capsule (30 mg) by mouth 2 times daily  erythromycin (ROMYCIN) 5 MG/GM ophthalmic ointment, Place 0.5 inches Into the left eye 2 times daily  losartan-hydrochlorothiazide (HYZAAR) 50-12.5 MG tablet, Take 1 tablet by mouth daily  silver sulfADIAZINE (SILVADENE) 1 % external cream, Apply topically 2 times daily        Data:    Results for orders placed or performed during the hospital encounter of 02/14/23   XR Chest 2 Views     Status: None    Narrative    EXAM: XR CHEST 2 VIEWS  2/14/2023 12:05 PM     HISTORY:  sob, fever       COMPARISON:  CT, 3/9/2018; radiograph, 9/18/2017    FINDINGS:   PA and lateral views of the chest.     Trachea is midline. Cardiomediastinal silhouette and pulmonary  vasculature are within normal limits. No confluent airspace opacity,  pleural effusion or appreciable pneumothorax.    No acute osseous abnormality. Degenerative changes of the shoulder,  right more than left. Visualized upper abdomen is unremarkable.        Impression    IMPRESSION: Streaky basilar pulmonary opacities, may represent  atelectasis versus atypical infection. No confluent consolidation.    I have personally reviewed the examination and initial interpretation  and I agree with the findings.    BETTE ISLAS MD         SYSTEM  ID:  [id number]   Comprehensive metabolic panel     Status: Abnormal   Result Value Ref Range    Sodium 136 136 - 145 mmol/L    Potassium 3.7 3.4 - 5.3 mmol/L    Chloride 99 98 - 107 mmol/L    Carbon Dioxide (CO2) 23 22 - 29 mmol/L    Anion Gap 14 7 - 15 mmol/L    Urea Nitrogen 10.4 8.0 - 23.0 mg/dL    Creatinine 0.72 0.51 - 0.95 mg/dL    Calcium 9.0 8.8 - 10.2 mg/dL    Glucose 125 (H) 70 - 99 mg/dL    Alkaline Phosphatase 95 35 - 104 U/L    AST 26 10 - 35 U/L    ALT 17 10 - 35 U/L    Protein Total 7.5 6.4 - 8.3 g/dL    Albumin 4.0 3.5 - 5.2 g/dL    Bilirubin Total 1.5 (H) <=1.2 mg/dL    GFR Estimate >90 >60 mL/min/1.73m2   UA with Microscopic reflex to Culture     Status: Abnormal    Specimen: Urine, Midstream   Result Value Ref Range    Color Urine Yellow Colorless, Straw, Light Yellow, Yellow    Appearance Urine Slightly Cloudy (A) Clear    Glucose Urine Negative Negative mg/dL    Bilirubin Urine Negative Negative    Ketones Urine 40 (A) Negative mg/dL    Specific Gravity Urine 1.027 1.003 - 1.035    Blood Urine Negative Negative    pH Urine 5.5 5.0 - 7.0    Protein Albumin Urine 50 (A) Negative mg/dL    Urobilinogen Urine 2.0 Normal, 2.0 mg/dL    Nitrite Urine Negative Negative    Leukocyte Esterase Urine Large (A) Negative    Mucus Urine Present (A) None Seen /LPF    RBC Urine 0 <=2 /HPF    WBC Urine 25 (H) <=5 /HPF    Squamous Epithelials Urine 9 (H) <=1 /HPF    Narrative    Urine Culture ordered based on laboratory criteria   Symptomatic Influenza A/B & SARS-CoV2 (COVID-19) Virus PCR Multiplex Nasopharyngeal     Status: Normal    Specimen: Nasopharyngeal; Swab   Result Value Ref Range    Influenza A PCR Negative Negative    Influenza B PCR Negative Negative    RSV PCR Negative Negative    SARS CoV2 PCR Negative Negative    Narrative    Testing was performed using the Xpert Xpress CoV2/Flu/RSV Assay on the [employer]. This test should be ordered for the detection of SARS-CoV-2 and influenza  viruses in individuals who meet clinical and/or epidemiological criteria. Test performance is unknown in asymptomatic patients. This test is for in vitro diagnostic use under the FDA EUA for laboratories certified under CLIA to perform high or moderate complexity testing. This test has not been FDA cleared or approved. A negative result does not rule out the presence of PCR inhibitors in the specimen or target RNA in concentration below the limit of detection for the assay. If only one viral target is positive but coinfection with multiple targets is suspected, the sample should be re-tested with another FDA cleared, approved, or authorized test, if coinfection would change clinical management. This test was validated by the [facility]. These laboratories are certified under the Clinical Laboratory Improvement Amendments of 1988 (CLIA-88) as qualified to perform high complexity laboratory testing.   CBC with platelets and differential     Status: Abnormal   Result Value Ref Range    WBC Count 10.8 4.0 - 11.0 10e3/uL    RBC Count 4.18 3.80 - 5.20 10e6/uL    Hemoglobin 10.3 (L) 11.7 - 15.7 g/dL    Hematocrit 32.8 (L) 35.0 - 47.0 %    MCV 79 78 - 100 fL    MCH 24.6 (L) 26.5 - 33.0 pg    MCHC 31.4 (L) 31.5 - 36.5 g/dL    RDW 14.7 10.0 - 15.0 %    Platelet Count 252 150 - 450 10e3/uL    % Neutrophils 75 %    % Lymphocytes 14 %    % Monocytes 11 %    % Eosinophils 0 %    % Basophils 0 %    % Immature Granulocytes 0 %    NRBCs per 100 WBC 0 <1 /100    Absolute Neutrophils 8.1 1.6 - 8.3 10e3/uL    Absolute Lymphocytes 1.5 0.8 - 5.3 10e3/uL    Absolute Monocytes 1.2 0.0 - 1.3 10e3/uL    Absolute Eosinophils 0.0 0.0 - 0.7 10e3/uL    Absolute Basophils 0.0 0.0 - 0.2 10e3/uL    Absolute Immature Granulocytes 0.0 <=0.4 10e3/uL    Absolute NRBCs 0.0 10e3/uL   CBC with platelets differential     Status: Abnormal    Narrative    The following orders were created for panel order CBC with platelets  differential.  Procedure                               Abnormality         Status                     ---------                               -----------         ------                     CBC with platelets and d...[[id number]]  Abnormal            Final result                 Please view results for these tests on the individual orders.             EKG Interpretation:         ROS:  REVIEW OF SYSTEMS:   General: fatigue   EYES: Negative for vision changes or eye problems   ENT: No problems with ears, nose or throat. No difficulty swallowing.   RESP: coughing, shortness of breath, congestion, and sore throat    CV: No chest pains or palpitations   GI: No nausea, vomiting, heartburn, abdominal pain, diarrhea, constipation or change in bowel habits   : Urinary frequency. No dysuria, bladder or kidney problems   MUSCULOSKELETAL: No significant muscle or joint pains   NEUROLOGIC: No headaches, numbness, tingling, weakness, problems with balance or coordination   PSYCHIATRIC: No problems with anxiety, depression or mental health   HEME/IMMUNE/ALLERGY: No history of bleeding or clotting problems or anemia. No allergies or immune system problems   ENDOCRINE: No history of thyroid disease, diabetes or other endocrine disorders   SKIN: No rashes,worrisome lesions or skin problems    PCP: Cj Georges MD  CARDIAC RISK: HTN, HLD    10 point ROS negative other than the symptoms noted above.      Exam:    Vitals:  B/P: 104/69, T: 98.4, P: 101, R: 20  Physical Exam   Constitutional: Pt is oriented to person, place, and time.Pt appears well-developed and well-nourished.   HENT:   Head: Normocephalic and atraumatic.   Eyes: Conjunctivae are normal. Pupils are equal, round, and reactive to light.   Neck: Normal range of motion. Neck supple.   Cardiovascular: Normal rate, regular rhythm, normal heart sounds and intact distal pulses.    Pulmonary/Chest: Effort normal and breath sounds normal. No respiratory distress. Pt has no  wheezes. Pt has no rales  Abdominal: Soft. Bowel sounds are normal. Pt exhibits no distension and no mass. No tenderness. Pt has no rebound and no guarding.   Musculoskeletal: Normal range of motion. Pt exhibits no edema.   Neurological: Pt is alert and oriented to person, place, and time. Normal reflexes.   Skin: Skin is warm and dry. No rash noted.   Psychiatric: Pt has a normal mood and affect. Behavior is normal. Judgment and thought content normal.     Consults: Low threshold   FEN: Regular   DVT prophylaxis: Early ambulation  Code Status: Full  Disposition: Stable vital signs. Patient return to baseline.  All labs/images reviewed    Signed:  Kristi Gamboa PA-C  February 14, 2023 at 5:12 PM

## 2023-02-15 ENCOUNTER — PATIENT OUTREACH (OUTPATIENT)
Dept: CARE COORDINATION | Facility: CLINIC | Age: 68
End: 2023-02-15
Payer: COMMERCIAL

## 2023-02-15 VITALS
OXYGEN SATURATION: 99 % | RESPIRATION RATE: 18 BRPM | DIASTOLIC BLOOD PRESSURE: 76 MMHG | SYSTOLIC BLOOD PRESSURE: 135 MMHG | TEMPERATURE: 98 F | HEART RATE: 93 BPM

## 2023-02-15 LAB
ANION GAP SERPL CALCULATED.3IONS-SCNC: 13 MMOL/L (ref 7–15)
BUN SERPL-MCNC: 7.1 MG/DL (ref 8–23)
CALCIUM SERPL-MCNC: 8.3 MG/DL (ref 8.8–10.2)
CHLORIDE SERPL-SCNC: 106 MMOL/L (ref 98–107)
CREAT SERPL-MCNC: 0.5 MG/DL (ref 0.51–0.95)
DEPRECATED HCO3 PLAS-SCNC: 20 MMOL/L (ref 22–29)
ERYTHROCYTE [DISTWIDTH] IN BLOOD BY AUTOMATED COUNT: 14.7 % (ref 10–15)
GFR SERPL CREATININE-BSD FRML MDRD: >90 ML/MIN/1.73M2
GLUCOSE SERPL-MCNC: 109 MG/DL (ref 70–99)
HCT VFR BLD AUTO: 30.3 % (ref 35–47)
HGB BLD-MCNC: 9.4 G/DL (ref 11.7–15.7)
HOLD SPECIMEN: NORMAL
LACTATE SERPL-SCNC: 0.7 MMOL/L (ref 0.7–2)
MCH RBC QN AUTO: 24.5 PG (ref 26.5–33)
MCHC RBC AUTO-ENTMCNC: 31 G/DL (ref 31.5–36.5)
MCV RBC AUTO: 79 FL (ref 78–100)
PLATELET # BLD AUTO: 209 10E3/UL (ref 150–450)
POTASSIUM SERPL-SCNC: 3.3 MMOL/L (ref 3.4–5.3)
RBC # BLD AUTO: 3.83 10E6/UL (ref 3.8–5.2)
SODIUM SERPL-SCNC: 139 MMOL/L (ref 136–145)
WBC # BLD AUTO: 8.6 10E3/UL (ref 4–11)

## 2023-02-15 PROCEDURE — 85027 COMPLETE CBC AUTOMATED: CPT | Performed by: PHYSICIAN ASSISTANT

## 2023-02-15 PROCEDURE — 36415 COLL VENOUS BLD VENIPUNCTURE: CPT | Performed by: NURSE PRACTITIONER

## 2023-02-15 PROCEDURE — 258N000003 HC RX IP 258 OP 636: Performed by: PHYSICIAN ASSISTANT

## 2023-02-15 PROCEDURE — 250N000011 HC RX IP 250 OP 636: Performed by: PHYSICIAN ASSISTANT

## 2023-02-15 PROCEDURE — 96372 THER/PROPH/DIAG INJ SC/IM: CPT | Performed by: PHYSICIAN ASSISTANT

## 2023-02-15 PROCEDURE — G0378 HOSPITAL OBSERVATION PER HR: HCPCS

## 2023-02-15 PROCEDURE — 36415 COLL VENOUS BLD VENIPUNCTURE: CPT | Performed by: PHYSICIAN ASSISTANT

## 2023-02-15 PROCEDURE — 250N000013 HC RX MED GY IP 250 OP 250 PS 637: Performed by: PHYSICIAN ASSISTANT

## 2023-02-15 PROCEDURE — 99239 HOSP IP/OBS DSCHRG MGMT >30: CPT | Mod: CS | Performed by: EMERGENCY MEDICINE

## 2023-02-15 PROCEDURE — 82310 ASSAY OF CALCIUM: CPT | Performed by: PHYSICIAN ASSISTANT

## 2023-02-15 PROCEDURE — 83605 ASSAY OF LACTIC ACID: CPT | Performed by: NURSE PRACTITIONER

## 2023-02-15 RX ORDER — PENICILLIN V POTASSIUM 500 MG/1
500 TABLET, FILM COATED ORAL EVERY 8 HOURS SCHEDULED
Status: DISCONTINUED | OUTPATIENT
Start: 2023-02-16 | End: 2023-02-15

## 2023-02-15 RX ADMIN — PENICILLIN G BENZATHINE 1.2 MILLION UNITS: 1200000 INJECTION, SUSPENSION INTRAMUSCULAR at 08:57

## 2023-02-15 RX ADMIN — AMLODIPINE BESYLATE 5 MG: 5 TABLET ORAL at 08:57

## 2023-02-15 RX ADMIN — ASPIRIN 81 MG: 81 TABLET ORAL at 08:57

## 2023-02-15 RX ADMIN — DULOXETINE HYDROCHLORIDE 30 MG: 30 CAPSULE, DELAYED RELEASE PELLETS ORAL at 08:57

## 2023-02-15 RX ADMIN — SODIUM CHLORIDE: 9 INJECTION, SOLUTION INTRAVENOUS at 01:48

## 2023-02-15 ASSESSMENT — ACTIVITIES OF DAILY LIVING (ADL)
ADLS_ACUITY_SCORE: 37
ADLS_ACUITY_SCORE: 31
ADLS_ACUITY_SCORE: 37
ADLS_ACUITY_SCORE: 31
ADLS_ACUITY_SCORE: 37
ADLS_ACUITY_SCORE: 31

## 2023-02-15 NOTE — PROGRESS NOTES
Brief Social Work Note  Care Management Follow Up    Length of Stay (days): 0    Expected Discharge Date:  TBD     Concerns to be Addressed:     SUN Document  Patient plan of care discussed at interdisciplinary rounds: Yes    Anticipated Discharge Disposition:  TBD     Anticipated Discharge Services:    Anticipated Discharge DME:      Patient/family educated on Medicare website which has current facility and service quality ratings:  N/A  Education Provided on the Discharge Plan:  N/A  Patient/Family in Agreement with the Plan:  N/A    Referrals Placed by CM/SW:  Not at this time  Private pay costs discussed: insurance costs out of pocket expenses, co-pays and deductibles    Additional Information:    1022 Due to Isabel's Observation status, SW met with her at bedside to introduce self, explain SW role, review the Medicare Outpatient Observation Notice (MOON) and why pt was receiving it. SW explained the document, and addressed questions and concerns. SW then asked Neftali to sign document. Pt signed SUN at 1025.  SW asked if she needed discharge transportation arranged and pt told SW that her son would pick her up at discharge.    1031 SW faxed SUN to [facility] ([phone]) then placed SUN in Clayton's chart.    SW will continue to follow as needed.    BRANDY Douglas, LGSW  ED/OBS   [facility]  Phone: [phone]  Pager: [phone]  Fax: [phone]     On-call pager, [phone], 4:00 pm to midnight

## 2023-02-15 NOTE — PROGRESS NOTES
ED Observation Progress Note  [facility]  Note Date: 2/15/2023    Isabel Biggs MRN: [id number]   Age: 68 year old YOB: 1955     Interval History   Continues to improve.  Vital signs generally better.  Eating and voiding well.  Tolerating medications without significant side effects.  No new concerns today.  Chose IM antibiotics for positive group A strep    Physical Exam   /76 (BP Location: Left arm)   Pulse 93   Temp 98  F (36.7  C) (Oral)   Resp 18   SpO2 99%   Physical Exam  Patient is alert no distress heart is regular lungs are clear examination of the oropharynx shows mild erythema no trismus no suggestion of abscess.  Results       Assessments & Plan (with Medical Decision Making)   Isabel Biggs is a 68 year old female admitted to the ED Observation Unit with streptococcal pharyngitis without complication    Services consulted during the observation course: None. Notable consultant recommendations include: N/A    Observation goals to be met before discharge home:  Discharged to home with pain management she was given IM Bicillin no additional antibiotics needed recommend primary clinic follow-up return particularly if pain fevers or difficulty swallowing.    --  Nick Goodson MD  [facility] UNIT 6D OBSERVATION [place]  2/15/2023
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
## 2023-02-15 NOTE — PLAN OF CARE
Goal Outcome Evaluation:  - Diagnostic tests and consults completed and resulted: Met   - Vital signs normal or at patient baseline: Not met   - Returns to baseline functional status: Not met   - Safe disposition plan has been identified: Met

## 2023-02-15 NOTE — PLAN OF CARE
Goal Outcome Evaluation:/69 (BP Location: Left arm)   Pulse 93   Temp 97.8  F (36.6  C) (Oral)   Resp 20   SpO2 98%       -diagnostic tests and consults completed and resulted :Not met   -vital signs normal or at patient baseline :Met   -returns to baseline functional status: Not met   -safe disposition plan has been identified:Not met

## 2023-02-15 NOTE — PROGRESS NOTES
Discharge instructions reviewed.  Patient verbalized understanding. PIV removed. Patient received Penicillin shot and was monitored for 30 minutes per orders. Patient ambulated to the main lobby. Family awaiting at main lobby. Patient discharged

## 2023-02-15 NOTE — PLAN OF CARE
Goal Outcome Evaluation:/69 (BP Location: Left arm)   Pulse 93   Temp 97.8  F (36.6  C) (Oral)   Resp 20   SpO2 98%          diagnostic tests and consults completed and resulted :Not met   -vital signs normal or at patient baseline :Met   -returns to baseline functional status: Not met   -safe disposition plan has been identified:Not met

## 2023-02-15 NOTE — PROGRESS NOTES
Clinic Care Coordination Contact  Program: [facility] Benefits/Health Insurance   County:  [facility] Case #:  [facility] Worker:   Terrellure #:   Subscriber #:   Renewal:  Date Applied:     FRW Outreach:   2/15/23: FRW called pt to patient. Patient was in hospital. FRW will make outreach next week.   1/31/23: FRW called pt to make sure she received the applications. FRW left VM and will make outreach in 1-2 weeks.  1/23/23: FRW called pt and completed applications. FRW mailed the applications for signature and will touch base with pt next week to make sure she received them.  1/12/23: FRW called pt at scheduled time. Pt was sick and we rescheduled appointment to 1/23.   1/3/23: FRW called pt. New appointment made for 1/12.  12/20/22: FRW called pt at scheduled time but was unable to leave VM. FRW will make another outreach in 1-2 weeks.   12/2/22: FRW called pt and we scheduled appointment for 12/20 to apply for MA.     Health Insurance:      Referral/Screening:  Financial Resource Worker Screening     [facility] Benefits  Is patient requesting help applying for [employer] benefits?: Yes  Have you recently applied for any [employer] benefits?: No  How many people in your household?: 2  Do you buy/eat food together?: Yes  What is the monthly gross income for the household (wages, social security, workers comp, and pension)? : 900     Insurance:  Was [place] for [place] insurance?: No  Is this an insurance renewal?: No  Is this a new insurance application request?: Yes  Have you recently applied for insurance?: No  How many people in your household? : 2  Do you file taxes?: Yes  What is the monthly gross income for the household (wages, social security, workers comp, and pension)? : 900     Any other information for the FRW?: not sure if she claims her adult son who lives with her and disabled.  She is getting $900 per month total.  she gets $200 in snap. son on MA, but she is not.  thanks

## 2023-02-15 NOTE — DISCHARGE INSTRUCTIONS
Peritonsillar Infection (Strep Throat)    You have an infection around the tonsils. This is sometimes caused by the streptococcus bacteria, so it's often called strep throat. The infection can cause severe sore throat, pain with swallowing, swollen glands, and fever.   Strep throat is treated with antibiotics.    Home care  All of the antibiotics should be taken as prescribed until they are gone. This is true even if symptoms start to get better. This is very important to ensure that the infection goes away. This helps prevent serious complications. It also helps keep the infection from spreading to other people  To help ease pain, you can gargle with warm saltwater. This can be done 4 times a day for the first 2 days. Dissolve 1/2 teaspoon of salt in 1 glass of warm water. Gargle with the solution, then spit it out.   Cool liquids and soft foods may make eating easier for the first few days.    Follow-up care  Follow up with a healthcare provider, or as advised.   When to seek medical advice  Call the healthcare provider right away if any of the following occur:   Fever   Symptoms that get worse or new symptoms   Symptoms that go away and come back  Refusing food and drink  Trouble opening the mouth  Bleeding  Rash  Swelling or enlarged glands (look like bumps) in the neck  Neck stiffness  Call 911  Call 911 right away if any of the following occur:   Trouble swallowing  Inability to eat or drink  Trouble breathing  Excessive drooling  Prevention  Here are steps you can take to help prevent an infection:   Keep good hand washing habits.  Don t have close contact with people who have sore throats, colds, or other upper respiratory infections.  Don t smoke, and stay away from secondhand smoke.      Call the healthcare provider in these cases:   Repeated temperature of 104 F (40 C)   Barrett last reviewed this educational content on 12/1/2019 2000-2021 [employer]. All rights reserved. This  information is not intended as a substitute for professional medical care. Always follow your healthcare professional's instructions.      You can take Tylenol and Ibuprofen for fevers and body aches from illness.

## 2023-02-15 NOTE — PLAN OF CARE
Goal Outcome Evaluation:BP (!) 154/65 (BP Location: Left arm)   Pulse 93   Temp 99  F (37.2  C) (Oral)   Resp 20   SpO2 100%        -diagnostic tests and consults completed and resulted :Not met   -vital signs normal or at patient baseline :Not met;/65;pt c/o generalized pain. PRN Tylenol administered.  -returns to baseline functional status: Not met   -safe disposition plan has been identified:Not met

## 2023-02-16 LAB — BACTERIA UR CULT: NO GROWTH

## 2023-02-16 NOTE — PROGRESS NOTES
Clinic Care Coordination Contact  [facility]: Post-Discharge Note  SITUATION                                                      Admission:    Admission Date: 02/14/23   Reason for Admission: fever, nausea  Discharge:   Discharge Date: 02/15/23  Discharge Diagnosis: Group A Strep Throat Infection    BACKGROUND                                                      Per hospital discharge summary and inpatient provider notes:     Isabel Biggs is a 68 year old female with a past medical history significant for type 2 diabetes mellitus, hypertension, beta thalassemia who presents to the Emergency Department for evaluation of generalized weakness.         # Group A Strep Throat infection  # Reported Fever  # Nausea   Per patient she reports T max 101 at home. Patient reports she nausea but no emesis. She was worked up for an acute respiratory infection: SARS CoV PCR, Influenza A/B, RSV negative.   Patient stated she had a sore throat so a Rapid Strep was performed.: Strep A PCR: positive     Patient was originally given ceftriaxone and azithromycin for concerns for a UTI and/or atypical pneumonia causing symptoms. UTI was ruled out, UA sample was contaminated but did not show ketones, RBCs, had some WBCs but also squamous cells so could be explained by external elliot. Discontinued Azithromycin since patient does not appear to have atypical pneumonia and her signs and symptoms can be explained by the Strep infection.      For her treatment plan,fter the discussion with the patient this morning she would prefer a 1x IM injection of Bicillin LA vs a 10 day course of antibiotics for the treatment of the strep throat. Patient was observed for over 30 minutes post-injection to assess for any allergic reaction prior to discharge.      Discussed treatments for her symptoms at home.      #HTN  #HLD   -Continue with Amlodipine 5 mg daily  -Continue with Atorvastatin 20 mg HS     #Depression  -Continue with Duloxetine 30 mg  daily   ASSESSMENT      Enrollment  Outreach Frequency: monthly    Discharge Assessment  How are you doing now that you are home?: I have a sore throat and am tired.  How are your symptoms? (Red Flag symptoms escalate to triage hotline per guidelines): Unchanged  Do you feel your condition is stable enough to be safe at home until your provider visit?: Yes  Does the patient have their discharge instructions? : Yes  Does the patient have questions regarding their discharge instructions? : No  Were you started on any new medications or were there changes to any of your previous medications? : No  Does the patient have all of their medications?: Yes  Do you have questions regarding any of your medications? : No  Do you have all of your needed medical supplies or equipment (DME)?  (i.e. oxygen tank, CPAP, cane, etc.): Yes  Discharge follow-up appointment scheduled within 14 calendar days? : Yes  Discharge Follow Up Appointment Date: 02/24/23  Discharge Follow Up Appointment Scheduled with?: Primary Care Provider         Post-op (Clinicians Only)  Did the patient have surgery or a procedure: No  Fever: No  Chills: No  Eating & Drinking:  (eating and drinking, but difficult due to sore throat.)  PO Intake: regular diet  Bowel Function: normal  Date of last BM: 02/14/23  Urinary Status: voiding without complaint/concerns    Care Management       Care Mgmt General Assessment      CCC RN spoke with patient today to follow up on recent hospitalization. Patient reported she was feeling tired and her throat is sore. She said she was going to try over-the-counter acetaminophen for her pain. Denied any fever at the time of this writing. Patient said she plans to rest at home while she recuperates. Encouraged adequate nutrition and hydration during the day. Patient said it was difficult to swallow at this time, but she would try her best. Encouraged her to take her medications as directed. Assisted patient with scheduling follow  up with PCP on 2/24/23. No other needs or concerns at this. Patient currently enrolled in [facility]. [facility] team will continue to monitor and support patient with current goals.                    PLAN                                                      Outpatient Plan:  Home     Future Appointments   Date Time Provider Department Center   2/24/2023 10:00 AM Cj Georges MD [facility]   3/7/2023 12:45 PM [facility]   4/7/2023  7:00 AM Cj Georges MD [facility]         For any urgent concerns, please contact our 24 hour nurse triage line: [phone] ([phone])         David J Myhre, RN

## 2023-02-17 LAB — BACTERIA UR CULT: ABNORMAL

## 2023-02-20 ENCOUNTER — PATIENT OUTREACH (OUTPATIENT)
Dept: CARE COORDINATION | Facility: CLINIC | Age: 68
End: 2023-02-20
Payer: COMMERCIAL

## 2023-02-20 NOTE — PROGRESS NOTES
Clinic Care Coordination Contact    Follow Up Progress Note      Assessment: Patient was in [facility] ED recently. Talked to patient and she went into ED as she thought she was having a heart attack as she was having such pain. 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  She is not feeling well yet and has PCP appt on 2-24.  Did not review goals as she didn't want to discuss at this time.      Care Gaps:    Health Maintenance Due   Topic Date Due     DEXA  Never done     SPIROMETRY  Never done     ZOSTER IMMUNIZATION (1 of 2) Never done     EYE EXAM  07/07/2022     MAMMO SCREENING  03/15/2023       Care Gaps Last addressed on 1-    Care Plans  Care Plan: Financial Wellbeing     Problem: Patient expresses financial resource strain     Long-Range Goal: I will apply for MA and [employer].     Start Date: 12/1/2022 Expected End Date: 7/1/2023    This Visit's Progress: 20% Recent Progress: 10%    Priority: High    Note:     Barriers: none identified.  Strengths: motivated.  Patient expressed understanding of goal: yes  Action steps to achieve this goal:  1. I will connect with FRW to apply for programs.   2. I will complete any paper work.  3. I will report progress towards this goal at scheduled outreach telephone calls from the CCC team.    Discussed 1/31/23                    Care Plan: Mental Health     Problem: Mood/Psychosocial Concerns     Long-Range Goal: Establish Mental Health Support and reduce depression symptoms.     Start Date: 12/1/2022 Expected End Date: 7/1/2023    This Visit's Progress: 10% Recent Progress: 10%    Priority: High    Note:     Barriers: caregiver, financial strain.  Strengths: motivated to feel better.  Patient expressed understanding of goal: yes  Action steps to achieve this goal:  1. I will schedule appt with Brea Agudelo when called.  2. I will meet with new provider Maria Elena yin.  3. I will report progress  towards this goal at scheduled outreach telephone calls from the CCC team.    Discussed 1/31/23                      Care Plan: Housing Instability     Problem: SDOH LACK OF STABLE HOUSING     Long-Range Goal: I will put my name on senior housing apartment lists that are affordable.     Start Date: 12/1/2022 Expected End Date: 7/1/2023    This Visit's Progress: 10% Recent Progress: 0%    Priority: Medium    Note:     Barriers: paying a lot of money for current apartment, caregiver for son who is disabled.   Strengths: motivated.  Patient expressed understanding of goal: yes  Action steps to achieve this goal:  1. I will review list of senior apartments that  sends.  2. I will call those that I am interested in to add myself to the waiting list.   3. I will report progress towards this goal at scheduled outreach telephone calls from the [facility] team.    Discussed 1/31/23                        Intervention/Education provided during outreach: support.      Outreach Frequency: monthly    Plan: CCC SW will continue to monitor, support patient with current goals and will be available to assist as needs arise. [facility] CHW will reach out to patient on a monthly basis to discuss progression of goals.      [facility] SW will perform Chart Review in 45 days.     Angela Rogel   [facility]  [phone]   Contact  [facility]/Voicemail    Referral Source: Care Team  Clinical Data:  Outreach  Outreach attempted x 1.  Unable to leave message.  Patient on external hospital discharge list for discharge from [facility]. Unable to view encounter.    Plan:   will try to reach patient again in 1-2 business days.  Social Elijah English  [facility]  [phone]

## 2023-02-20 NOTE — LETTER
[facility] CARE COORDINATION  [facility]  March 2, 2023        Isabel Biggs  [address]  [facility] [address]          Dear Isabel,     Attached is an updated Complex Care Plan for your continued enrollment in Care Coordination. Please let us know if you have additional questions, concerns, or goals that we can assist with.    Sincerely,    Angela Rogel,   [facility]  [phone]        [facility]  Patient Centered Plan of Care  About Me:        Patient Name:  Isabel Biggs    YOB: 1955  Age:         68 year old   [place] MRN:    [id number] Telephone Information:  Home Phone [phone]   Mobile [phone]       Address:  [address]  [facility] [address] Email address:  [email or web address]      Emergency Contact(s)    Name Relationship Lgl Grd Work Phone Home Phone Mobile Phone   1. OTONIEL SCHAFFER Daughter    [phone]   2. PARISA SCHAFFER Daughter   [phone]            Primary language:  English     needed? No   [place] [facility]:  [phone] op. 1  Other communication barriers:None    Preferred Method of Communication:     Current living arrangement: I live in a private home with family    Mobility Status/ Medical Equipment: Independent        Health Maintenance  Health Maintenance Reviewed: Due/Overdue   Health Maintenance Due   Topic Date Due     DEXA  Never done     SPIROMETRY  Never done     ZOSTER IMMUNIZATION (1 of 2) Never done     EYE EXAM  07/07/2022     MAMMO SCREENING  03/15/2023           My Access Plan  Medical Emergency 911   Primary Clinic Line  [phone]   24 Hour Appointment Line [phone] or  [phone]) (toll-free)   24 Hour Nurse Line [phone] (toll-free)   Preferred Urgent Care [facility] [phone]     Preferred Hospital [facility]  [phone]     Preferred Pharmacy [facility]  #[id number] - [facility] [place] - [address] AT [facility] & [address]     Behavioral Health Crisis Line The National Suicide Prevention Lifeline at 1-152.826.6935 or Text/Call 988             My Care Team Members  Patient Care Team       Relationship Specialty Notifications Start End    Cj Georges MD PCP - General   3/9/18     Phone: [phone] Fax: [phone]         [address] [facility] [address]    Claudia Joseph MD MD Hematology & Oncology Admissions 12/14/17     Phone: [phone] Fax: [phone]         [address] [facility] [address]    Meghann Moody, PharmD Pharmacist Pharmacist  2/3/20     Phone: [phone] Fax: [phone]         [facility] [address] [place] [address]    Cj Georges MD Assigned PCP   6/16/21     Phone: [phone] Fax: [phone]         [address] [facility] [address]    Juma Wilkerson OD MD Optometry  6/18/21     Phone: [phone] Fax: [phone]         [address] [place] [address]    Denice Plasencia MD MD Neurology  7/26/21     Phone: [phone] Fax: [phone]         [address] [place] [address]    Tenzin Chawla MD Assigned Musculoskeletal Provider   5/22/22     Phone: [phone] Fax: [phone]         [address] [place] [address]    Raquel Miguel MA Community Health Worker Primary Care - CC Admissions 11/21/22     Angela Rogel LSW Lead Care Coordinator Primary Care - CC Admissions 12/1/22     Phone: [phone]         Keri Day Financial Resource Worker   12/1/22     Phone: [phone]         Ariella Castellanos OD MD Optometry  1/18/23     Phone: [phone] Fax: [phone]         [address] [facility] [address]    Adele Hamilton PA-C Referring Physician Family Medicine  1/18/23     Referring Physician to eye    Phone: [phone] Fax: [phone] [address]  [facility] [address]    Ariella Castellanos OD Assigned Surgical Provider   1/21/23     Phone: [phone] Fax: [phone] [address] [facility] [address]    Dwain Santos MD MD Ophthalmology  1/23/23     Phone: [phone] Fax: [phone]         [address] [facility] [address]    Reginaldo Ochoa MD MD Neurology  2/28/23     Phone: [phone] Fax: [phone]         [address] [facility] [address]            My Care Plans  Self Management and Treatment Plan  Care Plan  Care Plan: Financial Wellbeing     Problem: Patient expresses financial resource strain     Long-Range Goal: I will apply for MA and [employer].     Start Date: 12/1/2022 Expected End Date: 7/1/2023    This Visit's Progress: 20% Recent Progress: 10%    Priority: High    Note:     Barriers: none identified.  Strengths: motivated.  Patient expressed understanding of goal: yes  Action steps to achieve this goal:  1. I will connect with FRW to apply for programs.   2. I will complete any paper work.  3. I will report progress towards this goal at scheduled outreach telephone calls from the CCC team.    Discussed 1/31/23                    Care Plan: Mental Health     Problem: Mood/Psychosocial Concerns     Long-Range Goal: Establish Mental Health Support and reduce depression symptoms.     Start Date: 12/1/2022 Expected End Date: 7/1/2023    This Visit's Progress: 10% Recent Progress: 10%    Priority: High    Note:     Barriers: caregiver, financial strain.  Strengths: motivated to feel better.  Patient expressed understanding of goal: yes  Action steps to achieve this goal:  1. I will schedule appt with Brea Agudelo when called.  2. I will meet with new provider Maria Elena yin.  3. I will report progress towards this goal at scheduled outreach telephone calls from the CCC team.    Discussed 1/31/23                      Care Plan: Housing Instability     Problem: SDOH LACK OF STABLE HOUSING      Long-Range Goal: I will put my name on senior housing apartment lists that are affordable.     Start Date: 12/1/2022 Expected End Date: 7/1/2023    This Visit's Progress: 10% Recent Progress: 0%    Priority: Medium    Note:     Barriers: paying a lot of money for current apartment, caregiver for son who is disabled.   Strengths: motivated.  Patient expressed understanding of goal: yes  Action steps to achieve this goal:  1. I will review list of senior apartments that  sends.  2. I will call those that I am interested in to add myself to the waiting list.   3. I will report progress towards this goal at scheduled outreach telephone calls from the CCC team.    Discussed 1/31/23                               Advance Care Plans/Directives Type:   No data recorded    My Medical and Care Information  Problem List   Patient Active Problem List   Diagnosis     WILFRIDO - not using CPAP (2020)     Type 2 diabetes mellitus without complication, without long-term current use of insulin (H)     Diabetic polyneuropathy associated with type 2 diabetes mellitus (H)     Beta thalassemia (H)     Low back pain      Moderate episode of recurrent major depressive disorder (H)     Morbid obesity (H)     Peripheral vertigo of both ears     Decreased activities of daily living (ADL)     Avitaminosis D     Essential hypertension      Current Medications and Allergies:  See printed Medication Report.    Care Coordination Start Date: 11/21/2022   Frequency of Care Coordination: monthly     Form Last Updated: 03/02/2023

## 2023-02-23 ENCOUNTER — PATIENT OUTREACH (OUTPATIENT)
Dept: CARE COORDINATION | Facility: CLINIC | Age: 68
End: 2023-02-23
Payer: COMMERCIAL

## 2023-02-23 NOTE — PROGRESS NOTES
Clinic Care Coordination Contact  Program: [facility] Benefits/Health Insurance   County:  [facility] Case #:  [facility] Worker:   [place] #:   Subscriber #:   Renewal:  Date Applied:     FRW Outreach:   2/23/23: FRW called pt 2x and there was no voicemail set up. FRW will make another outreach in 1-2 weeks.   2/15/23: FRW called pt to patient. Patient was in hospital. FRW will make outreach next week.   1/31/23: FRW called pt to make sure she received the applications. FRW left VM and will make outreach in 1-2 weeks.  1/23/23: FRW called pt and completed applications. FRW mailed the applications for signature and will touch base with pt next week to make sure she received them.  1/12/23: FRW called pt at scheduled time. Pt was sick and we rescheduled appointment to 1/23.   1/3/23: FRW called pt. New appointment made for 1/12.  12/20/22: FRW called pt at scheduled time but was unable to leave VM. FRW will make another outreach in 1-2 weeks.   12/2/22: FRW called pt and we scheduled appointment for 12/20 to apply for MA.     Health Insurance:      Referral/Screening:  Financial Resource Worker Screening     [facility] Benefits  Is patient requesting help applying for [facility] benefits?: Yes  Have you recently applied for any [facility] benefits?: No  How many people in your household?: 2  Do you buy/eat food together?: Yes  What is the monthly gross income for the household (wages, social security, workers comp, and pension)? : 900     [place]:  [place] No  Is this an insurance renewal?: No  Is this a new insurance application request?: Yes  Have you recently applied for insurance?: No  How many people in your household? : 2  Do you file taxes?: Yes  What is the monthly gross income for the household (wages, social security, workers comp, and pension)? : 900     Any other information for the FRW?: not sure if she claims her adult son who lives with her and disabled.  She is getting $900 per month  total.  she gets $200 in snap. son on MA, but she is not.  thanks

## 2023-02-28 ENCOUNTER — OFFICE VISIT (OUTPATIENT)
Dept: INTERNAL MEDICINE | Facility: CLINIC | Age: 68
End: 2023-02-28
Payer: COMMERCIAL

## 2023-02-28 VITALS
DIASTOLIC BLOOD PRESSURE: 74 MMHG | WEIGHT: 218.7 LBS | SYSTOLIC BLOOD PRESSURE: 128 MMHG | BODY MASS INDEX: 44.09 KG/M2 | TEMPERATURE: 98.2 F | RESPIRATION RATE: 14 BRPM | HEART RATE: 83 BPM | HEIGHT: 59 IN | OXYGEN SATURATION: 100 %

## 2023-02-28 DIAGNOSIS — I10 ESSENTIAL HYPERTENSION: ICD-10-CM

## 2023-02-28 DIAGNOSIS — I67.9 CEREBROVASCULAR DISEASE: ICD-10-CM

## 2023-02-28 DIAGNOSIS — F33.1 MODERATE EPISODE OF RECURRENT MAJOR DEPRESSIVE DISORDER (H): ICD-10-CM

## 2023-02-28 DIAGNOSIS — Z78.9 DECREASED ACTIVITIES OF DAILY LIVING (ADL): ICD-10-CM

## 2023-02-28 DIAGNOSIS — E11.42 DIABETIC POLYNEUROPATHY ASSOCIATED WITH TYPE 2 DIABETES MELLITUS (H): ICD-10-CM

## 2023-02-28 DIAGNOSIS — J02.0 PHARYNGITIS DUE TO GROUP A BETA HEMOLYTIC STREPTOCOCCI: Primary | ICD-10-CM

## 2023-02-28 DIAGNOSIS — G31.84 MILD COGNITIVE IMPAIRMENT: ICD-10-CM

## 2023-02-28 DIAGNOSIS — G47.33 OSA (OBSTRUCTIVE SLEEP APNEA): ICD-10-CM

## 2023-02-28 DIAGNOSIS — E11.9 TYPE 2 DIABETES MELLITUS WITHOUT COMPLICATION, WITHOUT LONG-TERM CURRENT USE OF INSULIN (H): ICD-10-CM

## 2023-02-28 LAB
TSH SERPL DL<=0.005 MIU/L-ACNC: 0.83 UIU/ML (ref 0.3–4.2)
VIT B12 SERPL-MCNC: 1187 PG/ML (ref 232–1245)

## 2023-02-28 PROCEDURE — 99214 OFFICE O/P EST MOD 30 MIN: CPT | Performed by: INTERNAL MEDICINE

## 2023-02-28 PROCEDURE — 36415 COLL VENOUS BLD VENIPUNCTURE: CPT | Performed by: INTERNAL MEDICINE

## 2023-02-28 PROCEDURE — 96127 BRIEF EMOTIONAL/BEHAV ASSMT: CPT | Performed by: INTERNAL MEDICINE

## 2023-02-28 PROCEDURE — 84443 ASSAY THYROID STIM HORMONE: CPT | Performed by: INTERNAL MEDICINE

## 2023-02-28 PROCEDURE — 82607 VITAMIN B-12: CPT | Performed by: INTERNAL MEDICINE

## 2023-02-28 ASSESSMENT — PATIENT HEALTH QUESTIONNAIRE - PHQ9
SUM OF ALL RESPONSES TO PHQ QUESTIONS 1-9: 18
SUM OF ALL RESPONSES TO PHQ QUESTIONS 1-9: 18
10. IF YOU CHECKED OFF ANY PROBLEMS, HOW DIFFICULT HAVE THESE PROBLEMS MADE IT FOR YOU TO DO YOUR WORK, TAKE CARE OF THINGS AT HOME, OR GET ALONG WITH OTHER PEOPLE: VERY DIFFICULT

## 2023-02-28 ASSESSMENT — ENCOUNTER SYMPTOMS: FEVER: 1

## 2023-02-28 ASSESSMENT — PAIN SCALES - GENERAL: PAINLEVEL: MILD PAIN (2)

## 2023-02-28 NOTE — LETTER
February 28, 2023      Isabel Biggs  [address]   [facility] [address]        To Whom It May Concern:    Isabel Biggs  was seen on 2/28/2023.  Based on her chronic medical conditions as well as changes in memory and ability to perform her activities of daily living, I am recommending that she move in with her daughter for more continuous supervision and assistance with ADLs.    Sincerely,        Cj Georges MD

## 2023-02-28 NOTE — PROGRESS NOTES
1. Pharyngitis due to group A beta hemolytic Streptococci  Hospitalized overnight for this now better    2. WILFRIDO (AHI 31)- not using CPAP (2020)  I think she might feel quite a bit better in terms of her energy level sleepiness and cognitive function if she were to start using CPAP.  It is unclear to me why she is unable to get a machine as she had had this at 10 cm of water and therefore refill was prescribed and hopefully her daughter can help her  - CPAP Order for DME - ONLY FOR DME    3. Type 2 diabetes mellitus without complication, without long-term current use of insulin (H)  Has generally been well controlled    4. Diabetic polyneuropathy associated with type 2 diabetes mellitus (H)  Stable    5. Essential hypertension  Well-controlled    6. Mild cognitive impairment  Daughter provides additional history on some issues with mild cognitive impairment and decreased ability to care for herself.  The patient will likely move in with her daughter.  We will do some neuropsychological testing at [facility] where her daughter works.  Recommended neurology evaluation as well.  I think treating obstructive sleep apnea can be helpful with this.  Ongoing treatment of depression, risk factor modification for cerebrovascular disease.  - Adult Neuropsychology Referral; Future  - Vitamin B12; Future  - TSH with free T4 reflex; Future  - Adult Neurology  Referral; Future  - Vitamin B12  - TSH with free T4 reflex    7. Decreased activities of daily living (ADL)  As above    8. Moderate episode of recurrent major depressive disorder (H)  As above    9. Cerebrovascular disease  As above  - Adult Neuropsychology Referral; Future    Andreina Hall is a 68 year old accompanied by her daughter, presenting for the following health issues:  Recheck Medication, UTI, Fever, and Follow Up (Fever, strep, UTI, headache was at [facility] ER 2/14-15)    UTI  Associated symptoms include a fever.   Fever  Associated symptoms include a  "fever.      ED/UC Followup:    Facility:  [facility]  Date of visit: 2/14-15  Reason for visit: fever,UTI  Current Status: still having headaches    Review of Systems   Constitutional: Positive for fever.        Objective    /74 (BP Location: Left arm, Patient Position: Sitting, Cuff Size: Adult Large)   Pulse 83   Temp 98.2  F (36.8  C)   Resp 14   Ht 1.499 m (4' 11\")   Wt 99.2 kg (218 lb 11.2 oz)   SpO2 100%   BMI 44.17 kg/m    Body mass index is 44.17 kg/m .  Physical Exam         Answers for HPI/ROS submitted by the patient on 2/28/2023  If you checked off any problems, how difficult have these problems made it for you to do your work, take care of things at home, or get along with other people?: Very difficult  PHQ9 TOTAL SCORE: 18      "

## 2023-03-06 ENCOUNTER — TELEPHONE (OUTPATIENT)
Dept: INTERNAL MEDICINE | Facility: CLINIC | Age: 68
End: 2023-03-06

## 2023-03-06 ENCOUNTER — PATIENT OUTREACH (OUTPATIENT)
Dept: CARE COORDINATION | Facility: CLINIC | Age: 68
End: 2023-03-06
Payer: COMMERCIAL

## 2023-03-06 DIAGNOSIS — H40.003 GLAUCOMA SUSPECT OF BOTH EYES: Primary | ICD-10-CM

## 2023-03-06 NOTE — TELEPHONE ENCOUNTER
Cornell and associate called to let us know that the patient was called 2x and lvm and no call back.

## 2023-03-06 NOTE — PROGRESS NOTES
Clinic Care Coordination Contact  Program: [facility] Benefits/Health Insurance   County:  [facility] Case #:  [facility] Worker:   Carola #:   Subscriber #:   Renewal:  Date Applied:     FRW Outreach:   3/6/23: FRW called pt and left VM. FRW will make another outreach next week.   2/23/23: FRW called pt 2x and there was no voicemail set up. FRW will make another outreach in 1-2 weeks.   2/15/23: FRW called pt to patient. Patient was in hospital. FRW will make outreach next week.   1/31/23: FRW called pt to make sure she received the applications. FRW left VM and will make outreach in 1-2 weeks.  1/23/23: FRW called pt and completed applications. FRW mailed the applications for signature and will touch base with pt next week to make sure she received them.  1/12/23: FRW called pt at scheduled time. Pt was sick and we rescheduled appointment to 1/23.   1/3/23: FRW called pt. New appointment made for 1/12.  12/20/22: FRW called pt at scheduled time but was unable to leave VM. FRW will make another outreach in 1-2 weeks.   12/2/22: FRW called pt and we scheduled appointment for 12/20 to apply for MA.     Health Insurance:      Referral/Screening:  Financial Resource Worker Screening     [facility] Benefits  Is patient requesting help applying for [facility] benefits?: Yes  Have you recently applied for any [facility] benefits?: No  How many people in your household?: 2  Do you buy/eat food together?: Yes  What is the monthly gross income for the household (wages, social security, workers comp, and pension)? : 900     [place]:  [place] No  Is this an insurance renewal?: No  Is this a new insurance application request?: Yes  Have you recently applied for insurance?: No  How many people in your household? : 2  Do you file taxes?: Yes  What is the monthly gross income for the household (wages, social security, workers comp, and pension)? : 900     Any other information for the FRW?: not sure if she claims her  adult son who lives with her and disabled.  She is getting $900 per month total.  she gets $200 in snap. son on MA, but she is not.  thanks

## 2023-03-15 ENCOUNTER — PATIENT OUTREACH (OUTPATIENT)
Dept: CARE COORDINATION | Facility: CLINIC | Age: 68
End: 2023-03-15
Payer: COMMERCIAL

## 2023-03-15 NOTE — PROGRESS NOTES
Clinic Care Coordination Contact  Program: [facility] Benefits/Health Insurance   County:  [facility] Case #:  [facility] Worker:   [place] #:   Subscriber #:   Renewal:  Date Applied:     FRW Outreach:   3/15/23: FRW called pt. Pt sent in applications. FRW and pt will connect in 3 weeks.   3/6/23: FRW called pt and left VM. FRW will make another outreach next week.   2/23/23: FRW called pt 2x and there was no voicemail set up. FRW will make another outreach in 1-2 weeks.   2/15/23: FRW called pt to patient. Patient was in hospital. FRW will make outreach next week.   1/31/23: FRW called pt to make sure she received the applications. FRW left VM and will make outreach in 1-2 weeks.  1/23/23: FRW called pt and completed applications. FRW mailed the applications for signature and will touch base with pt next week to make sure she received them.  1/12/23: FRW called pt at scheduled time. Pt was sick and we rescheduled appointment to 1/23.   1/3/23: FRW called pt. New appointment made for 1/12.  12/20/22: FRW called pt at scheduled time but was unable to leave VM. FRW will make another outreach in 1-2 weeks.   12/2/22: FRW called pt and we scheduled appointment for 12/20 to apply for MA.     Health Insurance:      Referral/Screening:  Financial Resource Worker Screening     [facility] Benefits  Is patient requesting help applying for [facility] benefits?: Yes  Have you recently applied for any [facility] benefits?: No  How many people in your household?: 2  Do you buy/eat food together?: Yes  What is the monthly gross income for the household (wages, social security, workers comp, and pension)? : 900     [place]:  [place] No  Is this an insurance renewal?: No  Is this a new insurance application request?: Yes  Have you recently applied for insurance?: No  How many people in your household? : 2  Do you file taxes?: Yes  What is the monthly gross income for the household (wages, social security, workers comp,  and pension)? : 900     Any other information for the FRW?: not sure if she claims her adult son who lives with her and disabled.  She is getting $900 per month total.  she gets $200 in snap. son on MA, but she is not.  thanks

## 2023-03-20 ENCOUNTER — PATIENT OUTREACH (OUTPATIENT)
Dept: CARE COORDINATION | Facility: CLINIC | Age: 68
End: 2023-03-20

## 2023-03-20 NOTE — PROGRESS NOTES
Clinic Care Coordination Contact    Community Health Worker Follow Up    Care Gaps:     Health Maintenance Due   Topic Date Due     DEXA  Never done     SPIROMETRY  Never done     ZOSTER IMMUNIZATION (1 of 2) Never done     EYE EXAM  07/07/2022     MAMMO SCREENING  03/15/2023       Postponed to next PCP appointment.     Care Plan:   Care Plan: Financial Wellbeing     Problem: Patient expresses financial resource strain     Long-Range Goal: I will apply for MA and [employer].     Start Date: 12/1/2022 Expected End Date: 7/1/2023    This Visit's Progress: 20% Recent Progress: 10%    Priority: High    Note:     Barriers: none identified.  Strengths: motivated.  Patient expressed understanding of goal: yes  Action steps to achieve this goal:  1. I will connect with FRW to apply for programs.   2. I will complete any paper work.  3. I will report progress towards this goal at scheduled outreach telephone calls from the CCC team.    Discussed 1/31/23                    Care Plan: Mental Health     Problem: Mood/Psychosocial Concerns     Long-Range Goal: Establish Mental Health Support and reduce depression symptoms.     Start Date: 12/1/2022 Expected End Date: 7/1/2023    This Visit's Progress: 10% Recent Progress: 10%    Priority: High    Note:     Barriers: caregiver, financial strain.  Strengths: motivated to feel better.  Patient expressed understanding of goal: yes  Action steps to achieve this goal:  1. I will schedule appt with Brea Agudelo when called.  2. I will meet with new provider Maria Elena yin.  3. I will report progress towards this goal at scheduled outreach telephone calls from the CCC team.    Discussed 1/31/23                      Care Plan: Housing Instability     Problem: [place]OH LACK OF STABLE HOUSING     Long-Range Goal: I will put my name on senior housing apartment lists that are affordable.     Start Date: 12/1/2022 Expected End Date: 7/1/2023    This Visit's Progress: 10% Recent  Progress: 0%    Priority: Medium    Note:     Barriers: paying a lot of money for current apartment, caregiver for son who is disabled.   Strengths: motivated.  Patient expressed understanding of goal: yes  Action steps to achieve this goal:  1. I will review list of senior apartments that  sends.  2. I will call those that I am interested in to add myself to the waiting list.   3. I will report progress towards this goal at scheduled outreach telephone calls from the CCC team.    Discussed 1/31/23                        Intervention and Education during outreach: CHW gave patient contact information and encouraged patient to reach out with any questions or concerns.    CHW Note:    CHW contacted patient to review/update CCC goals.     Patient reported she has a cold and will be resting today in hopes to feel better. CHW encouraged patient to monitor symptoms and if they worsen to contact clinic.    Patient shared she has been in touch with [facility] CRISTOPHER Saavedra regarding financial resources. Patient reported she has been approved for SNAP benefits which she is grateful for and is waiting to hear back regarding other benefits. Patient will update [facility] team at next scheduled outreach.    Patient reported she has not explored senior living options at this time. Patient shared that she will be moving in with her daughter in [place] at the end of May. Patient reported due to her medical concerns living with her daughter is the right option for her at this time. CHW will follow up progress of move at next outreach.    Patient shared she has not started counseling. Patient reported she has been busy but plans to get in touch with [facility] when she is able.    Patient denied any other needs or concerns at this time but will reach out to [facility] as needed.      CHW Plan: CHW will continue to support patient with goals through routine scheduled outreach. CHW will outreach in one month on 4/20/23.      Raquel Miguel   Community Health Worker   [facility]  Clinic Care Coordination  [facility]   [email or web address]  Office: [phone]

## 2023-04-04 ENCOUNTER — PATIENT OUTREACH (OUTPATIENT)
Dept: CARE COORDINATION | Facility: CLINIC | Age: 68
End: 2023-04-04
Payer: COMMERCIAL

## 2023-04-04 NOTE — PROGRESS NOTES
Care Coordination Clinician Chart Review    Situation: Patient chart reviewed by Care Coordinator.       Background: Care Coordination Program started: 11/21/2022. Initial assessment completed and patient-centered care plan(s) were developed with participation from patient. Lead CC handed patient off to CHW for continued outreaches.       Assessment: Per chart review, patient outreach completed by CC CHW on 3-.  Patient is actively working to accomplish goal(s). Patient's goal(s) appropriate and relevant at this time. Patient is not due for updated Plan of Care.  Assessments will be completed annually or as needed/with change of patient status.      Care Plan: Financial Wellbeing     Problem: Patient expresses financial resource strain     Long-Range Goal: I will apply for MA and Pamela Care.     Start Date: 12/1/2022 Expected End Date: 7/1/2023    This Visit's Progress: 20% Recent Progress: 10%    Priority: High    Note:     Barriers: none identified.  Strengths: motivated.  Patient expressed understanding of goal: yes  Action steps to achieve this goal:  1. I will connect with FRW to apply for programs.   2. I will complete any paper work.  3. I will report progress towards this goal at scheduled outreach telephone calls from the CCC team.    Discussed 1/31/23                    Care Plan: Mental Health     Problem: Mood/Psychosocial Concerns     Long-Range Goal: Establish Mental Health Support and reduce depression symptoms.     Start Date: 12/1/2022 Expected End Date: 7/1/2023    This Visit's Progress: 10% Recent Progress: 10%    Priority: High    Note:     Barriers: caregiver, financial strain.  Strengths: motivated to feel better.  Patient expressed understanding of goal: yes  Action steps to achieve this goal:  1. I will schedule appt with Brea Agudelo when called.  2. I will meet with new provider Maria Elena yin.  3. I will report progress towards this goal at scheduled outreach telephone  calls from the CCC team.    Discussed 1/31/23                      Care Plan: Housing Instability     Problem: SDOH LACK OF STABLE HOUSING     Long-Range Goal: I will put my name on senior housing apartment lists that are affordable.     Start Date: 12/1/2022 Expected End Date: 7/1/2023    This Visit's Progress: 10% Recent Progress: 0%    Priority: Medium    Note:     Barriers: paying a lot of money for current apartment, caregiver for son who is disabled.   Strengths: motivated.  Patient expressed understanding of goal: yes  Action steps to achieve this goal:  1. I will review list of senior apartments that  sends.  2. I will call those that I am interested in to add myself to the waiting list.   3. I will report progress towards this goal at scheduled outreach telephone calls from the CCC team.    Discussed 1/31/23                           Plan/Recommendations: The patient will continue working with Care Coordination to achieve goal(s) as above. CHW will continue outreaches at minimum every 30 days and will involve Lead CC as needed or if patient is ready to move to Maintenance. Lead CC will continue to monitor CHW outreaches and patient's progress to goal(s) every 6 weeks.     Plan of Care updated and sent to patient: No

## 2023-04-06 ENCOUNTER — PATIENT OUTREACH (OUTPATIENT)
Dept: CARE COORDINATION | Facility: CLINIC | Age: 68
End: 2023-04-06
Payer: COMMERCIAL

## 2023-04-06 NOTE — PROGRESS NOTES
Clinic Care Coordination Contact  Program: [facility] Benefits/Health Insurance   County:  [facility] Case #:  [facility] Worker:   Carola #:   Subscriber #:   Renewal:  Date Applied:     FRW Outreach:   4/6/23: FRW called pt to get a status. Pt was approved for SNAP. Patient is on waitlist for housing assistance. Pt is moving in with daughter in [place]. FRW and pt will connect in 2 weeks to call [facility] for MA status.   3/15/23: FRW called pt. Pt sent in applications. FRW and pt will connect in 3 weeks.   3/6/23: FRW called pt and left VM. FRW will make another outreach next week.   2/23/23: FRW called pt 2x and there was no voicemail set up. FRW will make another outreach in 1-2 weeks.   2/15/23: FRW called pt to patient. Patient was in hospital. FRW will make outreach next week.   1/31/23: FRW called pt to make sure she received the applications. FRW left VM and will make outreach in 1-2 weeks.  1/23/23: FRW called pt and completed applications. FRW mailed the applications for signature and will touch base with pt next week to make sure she received them.  1/12/23: FRW called pt at scheduled time. Pt was sick and we rescheduled appointment to 1/23.   1/3/23: FRW called pt. New appointment made for 1/12.  12/20/22: FRW called pt at scheduled time but was unable to leave VM. FRW will make another outreach in 1-2 weeks.   12/2/22: FRW called pt and we scheduled appointment for 12/20 to apply for MA.     Health Insurance:      Referral/Screening:  Financial Resource Worker Screening     [facility] Benefits  Is patient requesting help applying for [facility] benefits?: Yes  Have you recently applied for any [facility] benefits?: No  How many people in your household?: 2  Do you buy/eat food together?: Yes  What is the monthly gross income for the household (wages, social security, workers comp, and pension)? : 900     Insurance:  [place] insurance?: No  Is this an insurance renewal?: No  Is this a new insurance  application request?: Yes  Have you recently applied for insurance?: No  How many people in your household? : 2  Do you file taxes?: Yes  What is the monthly gross income for the household (wages, social security, workers comp, and pension)? : 900     Any other information for the FRW?: not sure if she claims her adult son who lives with her and disabled.  She is getting $900 per month total.  she gets $200 in snap. son on MA, but she is not.  thanks

## 2023-04-13 ENCOUNTER — OFFICE VISIT (OUTPATIENT)
Dept: SLEEP MEDICINE | Facility: CLINIC | Age: 68
End: 2023-04-13
Payer: COMMERCIAL

## 2023-04-13 VITALS
BODY MASS INDEX: 44.55 KG/M2 | OXYGEN SATURATION: 100 % | HEIGHT: 59 IN | SYSTOLIC BLOOD PRESSURE: 141 MMHG | RESPIRATION RATE: 18 BRPM | DIASTOLIC BLOOD PRESSURE: 78 MMHG | HEART RATE: 99 BPM | WEIGHT: 221 LBS

## 2023-04-13 DIAGNOSIS — G47.33 OSA (OBSTRUCTIVE SLEEP APNEA): Primary | ICD-10-CM

## 2023-04-13 DIAGNOSIS — E66.01 MORBID OBESITY WITH BMI OF 40.0-44.9, ADULT (H): ICD-10-CM

## 2023-04-13 PROCEDURE — 99213 OFFICE O/P EST LOW 20 MIN: CPT | Performed by: NURSE PRACTITIONER

## 2023-04-13 ASSESSMENT — SLEEP AND FATIGUE QUESTIONNAIRES
HOW LIKELY ARE YOU TO NOD OFF OR FALL ASLEEP WHILE SITTING INACTIVE IN A PUBLIC PLACE: HIGH CHANCE OF DOZING
HOW LIKELY ARE YOU TO NOD OFF OR FALL ASLEEP WHILE WATCHING TV: HIGH CHANCE OF DOZING
HOW LIKELY ARE YOU TO NOD OFF OR FALL ASLEEP WHILE LYING DOWN TO REST IN THE AFTERNOON WHEN CIRCUMSTANCES PERMIT: HIGH CHANCE OF DOZING
HOW LIKELY ARE YOU TO NOD OFF OR FALL ASLEEP WHEN YOU ARE A PASSENGER IN A CAR FOR AN HOUR WITHOUT A BREAK: HIGH CHANCE OF DOZING
HOW LIKELY ARE YOU TO NOD OFF OR FALL ASLEEP IN A CAR, WHILE STOPPED FOR A FEW MINUTES IN TRAFFIC: HIGH CHANCE OF DOZING
HOW LIKELY ARE YOU TO NOD OFF OR FALL ASLEEP WHILE SITTING AND TALKING TO SOMEONE: WOULD NEVER DOZE
HOW LIKELY ARE YOU TO NOD OFF OR FALL ASLEEP WHILE SITTING AND READING: HIGH CHANCE OF DOZING
HOW LIKELY ARE YOU TO NOD OFF OR FALL ASLEEP WHILE SITTING QUIETLY AFTER LUNCH WITHOUT ALCOHOL: HIGH CHANCE OF DOZING

## 2023-04-13 NOTE — PROGRESS NOTES
Sleep Apnea - Follow-up Visit:    Impression/Plan:  1. WILFRIDO (obstructive sleep apnea)  2. Morbid obesity with BMI of 40.0-44.9, adult (H)  - Comprehensive Sleep Study; Future    Severe Sleep apnea. Not currently managing WILFRIDO due to CPAP is broken and chart notes indicate that the patient needs re-evaluation with sleep study in order to obtain new PAP device through Medicare. Daytime symptoms are worsened.  Insomnia symptoms are also worsened.  The patient reports that she previously benefited from PAP therapy return to therapy.    An order for reevaluation of WILFRIDO with in lab polysomnography (PSG) was placed today, per Medicare requirements noted in the chart.  The patient would like through restart PAP therapy after her sleep study and an order will be placed when the results return, at her request.    Isabel Biggs will follow up in about 2 week(s) after the sleep study to review the results and to determine next steps, alternatively, the patient may follow-up in approximately 2 months after obtaining new APAP device to review download data and use/compliance, per Medicare requirements..     20 minutes spent with patient, all of which were spent face-to-face counseling, consulting, chart review/documentation, and coordinating plan of care on the date of the encounter.      DAYSI Padron CNP  Sleep Medicine    CC:  Cj Georges,         History of Present Illness:  Chief Complaint   Patient presents with     CPAP Follow Up     Discuss getting new cpap.  Cpap broke in 2021 and has not had device since that time       Isabel Biggs is a pleasant 68-year-old female with a PMH pertinent for T2DM, diabetic polyneuropathy, headaches, beta thalassemia, major depression, morbid obesity, and severe WILFRIDO who presents for follow-up of their severe sleep apnea, managed with CPAP. She reports nightly snoring, gasping, choking, snorting and poor quality of sleep for at least 2-3 years.  Her CPAP is broken and needs a new  one. She reports not sleeping well. She also reports having headaches and blurred vision as well recently. She was previously seen in sleep consultation on 9/25/2018 with Dr. Markell Woodruff through [facility] sleep center.  She was last seen by virtual visit with me on 8/10/2021 to reestablish care for history of severe WILFRIDO on CPAP therapy.  A new/replacement APAP device was ordered at that time, however, due to the pandemic and nationwide PAP supply shortage the patient was not able to obtain this device.    Previous Study Results: [facility] PSG/Titration  Date: 2/19/2015.  Weight - pounds.  AHI: 31/hr. RDI 53/hr. O2 harlan 83%.  CPAP presssure: 10 cm H2O      Do you use a CPAP Machine at home: No    What is your typical bedtime:  9 PM  How long does it take you to go to sleep:  60 minutes  What time do you typically get out of bed for the day:  11 AM  How many hours are you sleeping per night:  4 hours  Do you feel well rested in the morning:  No           EPWORTH SLEEPINESS SCALE         4/13/2023     2:06 PM    Christine Sleepiness Scale ( DANII De La Garza  1990-[address] [place] [address] - [place]/English - Final version - 21 Nov 07 - [facility].)   Sitting and reading High chance of dozing   Watching TV High chance of dozing   Sitting, inactive in a public place (e.g. a theatre or a meeting) High chance of dozing   As a passenger in a car for an hour without a break High chance of dozing   Lying down to rest in the afternoon when circumstances permit High chance of dozing   Sitting and talking to someone Would never doze   Sitting quietly after a lunch without alcohol High chance of dozing   In a car, while stopped for a few minutes in traffic High chance of dozing   Christine Score (MC) 21   Christine Score (Sleep) 21       INSOMNIA SEVERITY INDEX (YVONNE)          4/13/2023     2:05 PM   Insomnia Severity Index (YVONNE)   Difficulty falling asleep 4   Difficulty staying asleep 4   Problems waking up too early 4   How  SATISFIED/DISSATISFIED are you with your CURRENT sleep pattern? 4   How NOTICEABLE to others do you think your sleep problem is in terms of impairing the quality of your life? 4   How WORRIED/DISTRESSED are you about your current sleep problem? 4   To what extent do you consider your sleep problem to INTERFERE with your daily functioning (e.g. daytime fatigue, mood, ability to function at work/daily chores, concentration, memory, mood, etc.) CURRENTLY? 4   YVONNE Total Score 28       Guidelines for Scoring/Interpretation:  Total score categories:  0-7 = No clinically significant insomnia   8-14 = Subthreshold insomnia   15-21 = Clinical insomnia (moderate severity)  22-28 = Clinical insomnia (severe)  Used via courtesy of www.[employer].[place].gov with permission from Tin Stanley PhD., [facility]      Past medical/surgical history, family history, social history, medications and allergies were reviewed.        Problem List:  Patient Active Problem List    Diagnosis Date Noted     Essential hypertension 01/02/2023     Priority: Medium     Avitaminosis D 08/29/2022     Priority: Medium     Formatting of this note might be different from the original.  Created by Conversion    Replacement Utility updated for latest IMO load       Diabetic polyneuropathy associated with type 2 diabetes mellitus (H)      Priority: Medium     Created by Conversion  Replacement Utility updated for latest IMO load         WILFRIDO - not using CPAP (2020)      Priority: Medium     Replacement Utility updated for latest IMO load         Peripheral vertigo of both ears 01/26/2020     Priority: Medium     Type 2 diabetes mellitus without complication, without long-term current use of insulin (H)      Priority: Medium     Created by Conversion         Decreased activities of daily living (ADL) 01/08/2019     Priority: Medium     Morbid obesity (H) 11/08/2018     Priority: Medium     Beta thalassemia (H) 05/12/2015     Priority: Medium     Low back  "pain  05/12/2015     Priority: Medium     Moderate episode of recurrent major depressive disorder (H) 05/12/2015     Priority: Medium      Current Outpatient Medications   Medication     amitriptyline (ELAVIL) 25 MG tablet     amLODIPine (NORVASC) 5 MG tablet     aspirin (ASA) 81 MG EC tablet     atorvastatin (LIPITOR) 20 MG tablet     Cholecalciferol (D 1000) 25 MCG (1000 UT) CAPS     DULoxetine (CYMBALTA) 30 MG capsule     erythromycin (ROMYCIN) 5 MG/GM ophthalmic ointment     losartan-hydrochlorothiazide (HYZAAR) 50-12.5 MG tablet     silver sulfADIAZINE (SILVADENE) 1 % external cream     omeprazole (PRILOSEC) 20 MG DR capsule     Current Facility-Administered Medications   Medication     sodium hyaluronate (DUROLANE) injection 60 mg       BP (!) 141/78   Pulse 99   Resp 18   Ht 1.499 m (4' 11\")   Wt 100.2 kg (221 lb)   SpO2 100%   BMI 44.64 kg/m        This note was written with the assistance of the Dragon voice-dictation technology software. The final document, although reviewed, may contain errors. For corrections, please contact the office.    "

## 2023-04-13 NOTE — PATIENT INSTRUCTIONS
"    Your blood pressure was checked while you were in clinic today.  Please read the guidelines below about what these numbers mean and what you should do about them.  Your systolic blood pressure is the top number.  This is the pressure when the heart is pumping.  Your diastolic blood pressure is the bottom number.  This is the pressure in between beats.  If your systolic blood pressure is less than 120 and your diastolic blood pressure is less than 80, then your blood pressure is normal. There is nothing more that you need to do about it  If your systolic blood pressure is 120-139 or your diastolic blood pressure is 80-89, your blood pressure may be higher than it should be.  You should have your blood pressure re-checked within a year by a primary care provider.  If your systolic blood pressure is 140 or greater or your diastolic blood pressure is 90 or greater, you may have high blood pressure.  High blood pressure is treatable, but if left untreated over time it can put you at risk for heart attack, stroke, or kidney failure.  You should have your blood pressure re-checked by a primary care provider within the next four weeks.        MY TREATMENT INFORMATION FOR SLEEP APNEA-  Isabel Biggs    DOCTOR : DAYSI Padron CNP    Am I having a sleep study at a sleep center?  --->Due to normal delays, you will be contacted within 2-4 weeks to schedule    Am I having a home sleep study?  --->Watch the video for the device you are using:    -/drop off device-   https://www.[employer]com/watch?v=yGGFBdELGhk    -Disposable device sent out require phone/computer application-   https://www.[employer].com/watch?v=BCce_vbiwxE      Frequently asked questions:  1. What is Obstructive Sleep Apnea (WILFRIDO)? WILFRIDO is the most common type of sleep apnea. Apnea means, \"without breath.\"  Apnea is most often caused by narrowing or collapse of the upper airway as muscles relax during sleep.   Almost everyone has occasional apneas. " Most people with sleep apnea have had brief interruptions at night frequently for many years.  The severity of sleep apnea is related to how frequent and severe the events are.   2. What are the consequences of WILFRIDO? Symptoms include: feeling sleepy during the day, snoring loudly, gasping or stopping of breathing, trouble sleeping, and occasionally morning headaches or heartburn at night.  Sleepiness can be serious and even increase the risk of falling asleep while driving. Other health consequences may include development of high blood pressure and other cardiovascular disease in persons who are susceptible. Untreated WILFRIDO  can contribute to heart disease, stroke and diabetes.   3. What are the treatment options? In most situations, sleep apnea is a lifelong disease that must be managed with daily therapy. Medications are not effective for sleep apnea and surgery is generally not considered until other therapies have been tried. Your treatment is your choice . Continuous Positive Airway (CPAP) works right away and is the therapy that is effective in nearly everyone. An oral device to hold your jaw forward is usually the next most reliable option. Other options include postioning devices (to keep you off your back), weight loss, and surgery including a tongue pacing device. There is more detail about some of these options below.  4. Are my sleep studies covered by insurance? Although we will request verification of coverage, we advise you also check in advance of the study to ensure there is coverage.    Important tips for those choosing CPAP and similar devices   Know your equipment:  CPAP is continuous positive airway pressure that prevents obstructive sleep apnea by keeping the throat from collapsing while you are sleeping. In most cases, the device is  smart  and can slowly self-adjusts if your throat collapses and keeps a record every day of how well you are treated-this information is available to you and your  care team.  BPAP is bilevel positive airway pressure that keeps your throat open and also assists each breath with a pressure boost to maintain adequate breathing.  Special kinds of BPAP are used in patients who have inadequate breathing from lung or heart disease. In most cases, the device is  smart  and can slowly self-adjusts to assist breathing. Like CPAP, the device keeps a record of how well you are treated.  Your mask is your connection to the device. You get to choose what feels most comfortable and the staff will help to make sure if fits. Here: are some examples of the different masks that are available:       Key points to remember on your journey with sleep apnea:  Sleep study.  PAP devices often need to be adjusted during a sleep study to show that they are effective and adjusted right.  Good tips to remember: Try wearing just the mask during a quiet time during the day so your body adapts to wearing it. A humidifier is recommended for comfort in most cases to prevent drying of your nose and throat. Allergy medication from your provider may help you if you are having nasal congestion.  Getting settled-in. It takes more than one night for most of us to get used to wearing a mask. Try wearing just the mask during a quiet time during the day so your body adapts to wearing it. A humidifier is recommended for comfort in most cases. Our team will work with you carefully on the first day and will be in contact within 4 days and again at 2 and 4 weeks for advice and remote device adjustments. Your therapy is evaluated by the device each day.   Use it every night. The more you are able to sleep naturally for 7-8 hours, the more likely you will have good sleep and to prevent health risks or symptoms from sleep apnea. Even if you use it 4 hours it helps. Occasionally all of us are unable to use a medical therapy, in sleep apnea, it is not dangerous to miss one night.   Communicate. Call our skilled team on the  number provided on the first day if your visit for problems that make it difficult to wear the device. Over 2 out of 3 patients can learn to wear the device long-term with help from our team. Remember to call our team or your sleep providers if you are unable to wear the device as we may have other solutions for those who cannot adapt to mask CPAP therapy. It is recommended that you sleep your sleep provider within the first 3 months and yearly after that if you are not having problems.   Use it for your health. We encourage use of CPAP masks during daytime quiet periods to allow your face and brain to adapt to the sensation of CPAP so that it will be a more natural sensation to awaken to at night or during naps. This can be very useful during the first few weeks or months of adapting to CPAP though it does not help medically to wear CPAP during wakefulness and  should not be used as a strategy just to meet guidelines.  Take care of your equipment. Make sure you clean your mask and tubing using directions every day and that your filter and mask are replaced as recommended or if they are not working.     BESIDES CPAP, WHAT OTHER THERAPIES ARE THERE?    Positioning Device  Positioning devices are generally used when sleep apnea is mild and only occurs on your back.This example shows a pillow that straps around the waist. It may be appropriate for those whose sleep study shows milder sleep apnea that occurs primarily when lying flat on one's back. Preliminary studies have shown benefit but effectiveness at home may need to be verified by a home sleep test. These devices are generally not covered by medical insurance.  Examples of devices that maintain sleeping on the back to prevent snoring and mild sleep apnea.    Belt type body positioner  http://[employer]Mention Mobile/    Electronic reminder  http://nightshifttherapy.com/            Oral Appliance  What is oral appliance therapy?  An oral appliance device fits on your teeth  at night like a retainer used after having braces. The device is made by a specialized dentist and requires several visits over 1-2 months before a manufactured device is made to fit your teeth and is adjusted to prevent your sleep apnea. Once an oral device is working properly, snoring should be improved. A home sleep test may be recommended at that time if to determine whether the sleep apnea is adequately treated.       Some things to remember:  -Oral devices are often, but not always, covered by your medical insurance. Be sure to check with your insurance provider.   -If you are referred for oral therapy, you will be given a list of specialized dentists to consider or you may choose to visit the Web site of the American Academy of Dental Sleep Medicine  -Oral devices are less likely to work if you have severe sleep apnea or are extremely overweight.     More detailed information  An oral appliance is a small acrylic device that fits over the upper and lower teeth  (similar to a retainer or a mouth guard). This device slightly moves jaw forward, which moves the base of the tongue forward, opens the airway, improves breathing for effective treat snoring and obstructive sleep apnea in perhaps 7 out of 10 people .  The best working devices are custom-made by a dental device  after a mold is made of the teeth 1, 2, 3.  When is an oral appliance indicated?  Oral appliance therapy is recommended as a first-line treatment for patients with primary snoring, mild sleep apnea, and for patients with moderate sleep apnea who prefer appliance therapy to use of CPAP4, 5. Severity of sleep apnea is determined by sleep testing and is based on the number of respiratory events per hour of sleep.   How successful is oral appliance therapy?  The success rate of oral appliance therapy in patients with mild sleep apnea is 75-80% while in patients with moderate sleep apnea it is 50-70%. The chance of success in patients  with severe sleep apnea is 40-50%. The research also shows that oral appliances have a beneficial effect on the cardiovascular health of WILFRIDO patients at the same magnitude as CPAP therapy7.  Oral appliances should be a second-line treatment in cases of severe sleep apnea, but if not completely successful then a combination therapy utilizing CPAP plus oral appliance therapy may be effective. Oral appliances tend to be effective in a broad range of patients although studies show that the patients who have the highest success are females, younger patients, those with milder disease, and less severe obesity. 3, 6.   Finding a dentist that practices dental sleep medicine  Specific training is available through the American Academy of Dental Sleep Medicine for dentists interested in working in the field of sleep. To find a dentist who is educated in the field of sleep and the use of oral appliances, near you, visit the Web site of the American Academy of Dental Sleep Medicine.    References  1. Shane et al. Objectively measured vs self-reported compliance during oral appliance therapy for sleep-disordered breathing. Chest 2013; 144(5): 1674-1378.  2. Yara et al. Objective measurement of compliance during oral appliance therapy for sleep-disordered breathing. Thorax 2013; 68(1): 91-96.  3. Joseph, et al. Mandibular advancement devices in 620 men and women with WILFRIDO and snoring: tolerability and predictors of treatment success. Chest 2004; 125: 5608-6920.  4. uSsie, et al. Oral appliances for snoring and WILFRIDO: a review. Sleep 2006; 29: 244-262.  5. Cory, et al. Oral appliance treatment for WILFRIDO: an update. J Clin Sleep Med 2014; 10(2): 215-227.  6. Kristopher et al. Predictors of OSAH treatment outcome. J Dent Res 2007; 86: 6397-1573.      Weight Loss:    Weight loss is a long-term strategy that may improve sleep apnea in some patients.    Weight management is a personal decision and the decision should  be based on your interest and the potential benefits.  If you are interested in exploring weight loss strategies, the following discussion covers the impact on weight loss on sleep apnea and the approaches that may be successful.    Being overweight does not necessarily mean you will have health consequences.  Those who have BMI over 35 or over 27 with existing medical conditions carries greater risk.   Weight loss decreases severity of sleep apnea in most people with obesity. For those with mild obesity who have developed snoring with weight gain, even 15-30 pound weight loss can improve and occasionally eliminate sleep apnea.  Structured and life-long dietary and health habits are necessary to lose weight and keep healthier weight levels.     Though there may be significant health benefits from weight loss, long-term weight loss is very difficult to achieve- studies show success with dietary management in less than 10% of people. In addition, substantial weight loss may require years of dietary control and may be difficult if patients have severe obesity. In these cases, surgical management may be considered.  Finally, older individuals who have tolerated obesity without health complications may be less likely to benefit from weight loss strategies.      Your BMI is Body mass index is 44.64 kg/m .    What is BMI?  Body mass index (BMI) is one way to tell whether you are at a healthy weight, overweight, or obese. It measures your weight in relation to your height.  A BMI of 18.5 to 24.9 is in the healthy range. A person with a BMI of 25 to 29.9 is considered overweight, and someone with a BMI of 30 or greater is considered obese.  Another way to find out if you are at risk for health problems caused by overweight and obesity is to measure your waist. If you are a woman and your waist is more than 35 inches, or if you are a man and your waist is more than 40 inches, your risk of disease may be higher.  More than  two-thirds of American adults are considered overweight or obese. Being overweight or obese increases the risk for further weight gain.  Excess weight may lead to heart disease and diabetes. Creating and following plans for healthy eating and physical activity may help you improve your health.    Methods for maintaining or losing weight.  Weight control is part of healthy lifestyle and includes exercise, emotional health, and healthy eating habits.  Careful eating habits lifelong is the mainstay of weight control.  Though there are significant health benefits from weight loss, long-term weight loss with diet alone may be very difficult to achieve- studies show long-term success with dietary management in less than 10% of people. Attaining a healthy weight may be especially difficult to achieve in those with severe obesity. In some cases, medications, devices and surgical management might be considered.    What can you do?  If you are overweight or obese and are interested in methods for weight loss, you should discuss this with your provider. In addition, we recommend that you review healthy life styles and methods for weight loss available through the National Institutes of Health patient information sites:   http://win.niddk.nih.gov/publications/index.htm    Surgery:    Surgery for obstructive sleep apnea is considered generally only when other therapies fail to work. Surgery may be discussed with you if you are having a difficult time tolerating CPAP and or when there is an abnormal structure that requires surgical correction.  Nose and throat surgeries often enlarge the airway to prevent collapse.  Most of these surgeries create pain for 1-2 weeks and up to half of the most common surgeries are not effective throughout life.  You should carefully discuss the benefits and drawbacks to surgery with your sleep provider and surgeon to determine if it is the best solution for you.   More information  Surgery for WILFRIDO  is directed at areas that are responsible for narrowing or complete obstruction of the airway during sleep.  There are a wide range of procedures available to enlarge and/or stabilize the airway to prevent blockage of breathing in the three major areas where it can occur: the palate, tongue, and nasal regions.  Successful surgical treatment depends on the accurate identification of the factors responsible for obstructive sleep apnea in each person.  A personalized approach is required because there is no single treatment that works well for everyone.  Because of anatomic variation, consultation with an examination by a sleep surgeon is a critical first step in determining what surgical options are best for each patient.  In some cases, examination during sedation may be recommended in order to guide the selection of procedures.  Patients will be counseled about risks and benefits as well as the typical recovery course after surgery. Surgery is typically not a cure for a person s WILFRIDO.  However, surgery will often significantly improve one s WILFRIDO severity (termed  success rate ).  Even in the absence of a cure, surgery will decrease the cardiovascular risk associated with OSA7; improve overall quality of life8 (sleepiness, functionality, sleep quality, etc).      Palate Procedures:  Patients with WILFRIDO often have narrowing of their airway in the region of their tonsils and uvula.  The goals of palate procedures are to widen the airway in this region as well as to help the tissues resist collapse.  Modern palate procedure techniques focus on tissue conservation and soft tissue rearrangement, rather than tissue removal.  Often the uvula is preserved in this procedure. Residual sleep apnea is common in patient after pharyngoplasty with an average reduction in sleep apnea events of 33%2.      Tongue Procedures:  ExamWhile patients are awake, the muscles that surround the throat are active and keep this region open for  breathing. These muscles relax during sleep, allowing the tongue and other structures to collapse and block breathing.  There are several different tongue procedures available.  Selection of a tongue base procedure depends on characteristics seen on physical exam.  Generally, procedures are aimed at removing bulky tissues in this area or preventing the back of the tongue from falling back during sleep.  Success rates for tongue surgery range from 50-62%3.    Hypoglossal Nerve Stimulation:  Hypoglossal nerve stimulation has recently received approval from the [place] Food and Drug Administration for the treatment of obstructive sleep apnea.  This is based on research showing that the system was safe and effective in treating sleep apnea6.  Results showed that the median AHI score decreased 68%, from 29.3 to 9.0. This therapy uses an implant system that senses breathing patterns and delivers mild stimulation to airway muscles, which keeps the airway open during sleep.  The system consists of three fully implanted components: a small generator (similar in size to a pacemaker), a breathing sensor, and a stimulation lead.  Using a small handheld remote, a patient turns the therapy on before bed and off upon awakening.    Candidates for this device must be greater than 18 years of age, have moderate to severe WILFRIDO (AHI between 15-65), BMI less than 35, have tried CPAP/oral appliance for at least 8 weeks without success, and have appropriate upper airway anatomy (determined by a sleep endoscopy performed by Dr. Trenton Nicole).    Hypoglossal Nerve Stimulation Pathway:    The sleep surgeon s office will work with the patient through the insurance prior-authorization process (including communications and appeals).    Nasal Procedures:  Nasal obstruction can interfere with nasal breathing during the day and night.  Studies have shown that relief of nasal obstruction can improve the ability of some patients to tolerate  positive airway pressure therapy for obstructive sleep apnea1.  Treatment options include medications such as nasal saline, topical corticosteroid and antihistamine sprays, and oral medications such as antihistamines or decongestants. Non-surgical treatments can include external nasal dilators for selected patients. If these are not successful by themselves, surgery can improve the nasal airway either alone or in combination with these other options.      Combination Procedures:  Combination of surgical procedures and other treatments may be recommended, particularly if patients have more than one area of narrowing or persistent positional disease.  The success rate of combination surgery ranges from 66-80%2,3.    References  Michelle CAMPBELL. The Role of the Nose in Snoring and Obstructive Sleep Apnoea: An Update.  Eur Arch Otorhinolaryngol. 2011; 268: 1365-73.   Lina SM; Salinas JA; Jorge JR; Pallanch JF; Marisela MB; Pedro SG; Duglas NEUMANN. Surgical modifications of the upper airway for obstructive sleep apnea in adults: a systematic review and meta-analysis. SLEEP 2010;33(10):4602-6953. Ene TANNER. Hypopharyngeal surgery in obstructive sleep apnea: an evidence-based medicine review.  Arch Otolaryngol Head Neck Surg. 2006 Feb;132(2):206-13.  Ishmael YH1, Russel Y, Jhonny EBER. The efficacy of anatomically based multilevel surgery for obstructive sleep apnea. Otolaryngol Head Neck Surg. 2003 Oct;129(4):327-35.  Ene TANNER, Goldberg A. Hypopharyngeal Surgery in Obstructive Sleep Apnea: An Evidence-Based Medicine Review. Arch Otolaryngol Head Neck Surg. 2006 Feb;132(2):206-13.  Delano GUZMÁN et al. Upper-Airway Stimulation for Obstructive Sleep Apnea.  N Engl J Med. 2014 Jan 9;370(2):139-49.  Neil Y et al. Increased Incidence of Cardiovascular Disease in Middle-aged Men with Obstructive Sleep Apnea. Am J Respir Crit Care Med; 2002 166: 159-165  Alicia DANIELS et al. Studying Life Effects and Effectiveness of Palatopharyngoplasty  (SLEEP) study: Subjective Outcomes of Isolated Uvulopalatopharyngoplasty. Otolaryngol Head Neck Surg. 2011; 144: 623-631.        WHAT IF I ONLY HAVE SNORING?    Mandibular advancement devices, lateral sleep positioning, long-term weight loss and treatment of nasal allergies have been shown to improve snoring.  Exercising tongue muscles with a game (https://apps.[employer]/us/amrita/soundly-reduce-snoring/xh6801231849) or stimulating the tongue during the day with a device (https://doi.org/10.3390/fgs20620098) have improved snoring in some individuals.    Remember to Drive Safe... Drive Alive     Sleep health profoundly affects your health, mood, and your safety.  Thirty three percent of the population (one in three of us) is not getting enough sleep and many have a sleep disorder. Not getting enough sleep or having an untreated / undertreated sleep condition may make us sleepy without even knowing it. In fact, our driving could be dramatically impaired due to our sleep health. As your provider, here are some things I would like you to know about driving:     Here are some warning signs for impairment and dangerous drowsy driving:              -Having been awake more than 16 hours               -Looking tired               -Eyelid drooping              -Head nodding (it could be too late at this point)              -Driving for more than 30 minutes     Some things you could do to make the driving safer if you are experiencing some drowsiness:              -Stop driving and rest              -Call for transportation              -Make sure your sleep disorder is adequately treated     Some things that have been shown NOT to work when experiencing drowsiness while driving:              -Turning on the radio              -Opening windows              -Eating any  distracting  /  entertaining  foods (e.g., sunflower seeds, candy, or any other)              -Talking on the phone      Your decision may not only impact your  life, but also the life of others. Please, remember to drive safe for yourself and all of us.

## 2023-04-19 ENCOUNTER — TELEPHONE (OUTPATIENT)
Dept: OPHTHALMOLOGY | Facility: CLINIC | Age: 68
End: 2023-04-19
Payer: COMMERCIAL

## 2023-04-19 NOTE — TELEPHONE ENCOUNTER
Writer called patient to reschedule 4/20 23 appointment with Dr. Santos. Patient was given clinic number to schedule at her own leisure.

## 2023-04-20 ENCOUNTER — PATIENT OUTREACH (OUTPATIENT)
Dept: CARE COORDINATION | Facility: CLINIC | Age: 68
End: 2023-04-20
Payer: COMMERCIAL

## 2023-04-20 NOTE — PROGRESS NOTES
Clinic Care Coordination Contact  [facility]/Voicemail    Clinical Data: Care Coordinator Outreach  Outreach attempted x 1. Left message on patient's voicemail with call back information and requested return call.    Plan: Care Coordinator will try to reach patient again in 10 business days or on 5/4/23.      Raquel Miguel  Community Health Worker   [facility] Care Coordination  [facility]   [email or web address]  Office: [phone]

## 2023-04-20 NOTE — PROGRESS NOTES
Clinic Care Coordination Contact  Program: [facility] Benefits/Health Insurance   County:  [facility] Case #:  [facility] Worker:   Carola #:   Subscriber #:   Renewal:  Date Applied:     FRW Outreach:   4/20/23: FRW and pt called [facility] but was on hold for over 20 minutes. FRW and pt will call tomorrow at a different time.  4/6/23: FRW called pt to get a status. Pt was approved for SNAP. Patient is on waitlist for housing assistance. Pt is moving in with daughter in [place]. FRW and pt will connect in 2 weeks to call [facility] for MA status.   3/15/23: FRW called pt. Pt sent in applications. FRW and pt will connect in 3 weeks.   3/6/23: FRW called pt and left VM. FRW will make another outreach next week.   2/23/23: FRW called pt 2x and there was no voicemail set up. FRW will make another outreach in 1-2 weeks.   2/15/23: FRW called pt to patient. Patient was in hospital. FRW will make outreach next week.   1/31/23: FRW called pt to make sure she received the applications. FRW left VM and will make outreach in 1-2 weeks.  1/23/23: FRW called pt and completed applications. FRW mailed the applications for signature and will touch base with pt next week to make sure she received them.  1/12/23: FRW called pt at scheduled time. Pt was sick and we rescheduled appointment to 1/23.   1/3/23: FRW called pt. New appointment made for 1/12.  12/20/22: FRW called pt at scheduled time but was unable to leave VM. FRW will make another outreach in 1-2 weeks.   12/2/22: FRW called pt and we scheduled appointment for 12/20 to apply for MA.     Health Insurance:      Referral/Screening:  Financial Resource Worker Screening     [facility] Benefits  Is patient requesting help applying for [facility] benefits?: Yes  Have you recently applied for any [facility] benefits?: No  How many people in your household?: 2  Do you buy/eat food together?: Yes  What is the monthly gross income for the household (wages, social security, workers comp, and pension)? :  900     [place]:  [place]?: No  Is this an insurance renewal?: No  Is this a new insurance application request?: Yes  Have you recently applied for insurance?: No  How many people in your household? : 2  Do you file taxes?: Yes  What is the monthly gross income for the household (wages, social security, workers comp, and pension)? : 900     Any other information for the FRW?: not sure if she claims her adult son who lives with her and disabled.  She is getting $900 per month total.  she gets $200 in snap. son on MA, but she is not.  thanks

## 2023-04-21 ENCOUNTER — PATIENT OUTREACH (OUTPATIENT)
Dept: CARE COORDINATION | Facility: CLINIC | Age: 68
End: 2023-04-21
Payer: COMMERCIAL

## 2023-04-21 NOTE — PROGRESS NOTES
Clinic Care Coordination Contact  Program: [facility] Benefits/Health Insurance   County:  [facility] Case #: [id number] (Put this on certain Population)   [facility] Worker:   Mnsure #:   Subscriber #:   Renewal:  Date Applied:   [facility] Address: [address] [address]  Fax: [phone]  FRW Outreach:     4/21/23: FRW and pt called [facility]. [facility] had incorrect address for patient and patient sent the application to wrong location. FRW and pt will complete certain pop on Tuesday.  4/21/23: FRW called pt to call [facility]. Patient was sleeping. FRW will call back later today.   4/20/23: FRW and pt called [facility] but was on hold for over 20 minutes. FRW and pt will call tomorrow at a different time.  4/6/23: FRW called pt to get a status. Pt was approved for SNAP. Patient is on waitlist for housing assistance. Pt is moving in with daughter in [place]. FRW and pt will connect in 2 weeks to call [facility] for MA status.   3/15/23: FRW called pt. Pt sent in applications. FRW and pt will connect in 3 weeks.   3/6/23: FRW called pt and left VM. FRW will make another outreach next week.   2/23/23: FRW called pt 2x and there was no voicemail set up. FRW will make another outreach in 1-2 weeks.   2/15/23: FRW called pt to patient. Patient was in hospital. FRW will make outreach next week.   1/31/23: FRW called pt to make sure she received the applications. FRW left VM and will make outreach in 1-2 weeks.  1/23/23: FRW called pt and completed applications. FRW mailed the applications for signature and will touch base with pt next week to make sure she received them.  1/12/23: FRW called pt at scheduled time. Pt was sick and we rescheduled appointment to 1/23.   1/3/23: FRW called pt. New appointment made for 1/12.  12/20/22: FRW called pt at scheduled time but was unable to leave VM. FRW will make another outreach in 1-2 weeks.   12/2/22: FRW called pt and we scheduled appointment for 12/20 to apply  for MA.     Health Insurance:      Referral/Screening:  Financial Resource Worker Screening     County Benefits  Is patient requesting help applying for [facility] benefits?: Yes  Have you recently applied for any county benefits?: No  How many people in your household?: 2  Do you buy/eat food together?: Yes  What is the monthly gross income for the household (wages, social security, workers comp, and pension)? : 900     [place]:  [place]?: No  Is this an insurance renewal?: No  Is this a new insurance application request?: Yes  Have you recently applied for insurance?: No  How many people in your household? : 2  Do you file taxes?: Yes  What is the monthly gross income for the household (wages, social security, workers comp, and pension)? : 900     Any other information for the FRW?: not sure if she claims her adult son who lives with her and disabled.  She is getting $900 per month total.  she gets $200 in snap. son on MA, but she is not.  thanks

## 2023-04-21 NOTE — PROGRESS NOTES
Clinic Care Coordination Contact  Program: [facility] Benefits/Health Insurance   County:  [facility] Case #:  [facility] Worker:   Carola #:   Subscriber #:   Renewal:  Date Applied:     FRW Outreach:   4/21/23: FRW called pt to call [facility]. Patient was sleeping. FRW will call back later today.   4/20/23: FRW and pt called [facility] but was on hold for over 20 minutes. FRW and pt will call tomorrow at a different time.  4/6/23: FRW called pt to get a status. Pt was approved for SNAP. Patient is on waitlist for housing assistance. Pt is moving in with daughter in [place]. FRW and pt will connect in 2 weeks to call [facility] for MA status.   3/15/23: FRW called pt. Pt sent in applications. FRW and pt will connect in 3 weeks.   3/6/23: FRW called pt and left VM. FRW will make another outreach next week.   2/23/23: FRW called pt 2x and there was no voicemail set up. FRW will make another outreach in 1-2 weeks.   2/15/23: FRW called pt to patient. Patient was in hospital. FRW will make outreach next week.   1/31/23: FRW called pt to make sure she received the applications. FRW left VM and will make outreach in 1-2 weeks.  1/23/23: FRW called pt and completed applications. FRW mailed the applications for signature and will touch base with pt next week to make sure she received them.  1/12/23: FRW called pt at scheduled time. Pt was sick and we rescheduled appointment to 1/23.   1/3/23: FRW called pt. New appointment made for 1/12.  12/20/22: FRW called pt at scheduled time but was unable to leave VM. FRW will make another outreach in 1-2 weeks.   12/2/22: FRW called pt and we scheduled appointment for 12/20 to apply for MA.     Health Insurance:      Referral/Screening:  Financial Resource Worker Screening     [facility] Benefits  Is patient requesting help applying for [facility] benefits?: Yes  Have you recently applied for any [facility] benefits?: No  How many people in your household?: 2  Do you buy/eat food together?: Yes  What  is the monthly gross income for the household (wages, social security, workers comp, and pension)? : 900     [place]:  [place]?: No  Is this an insurance renewal?: No  Is this a new insurance application request?: Yes  Have you recently applied for insurance?: No  How many people in your household? : 2  Do you file taxes?: Yes  What is the monthly gross income for the household (wages, social security, workers comp, and pension)? : 900     Any other information for the FRW?: not sure if she claims her adult son who lives with her and disabled.  She is getting $900 per month total.  she gets $200 in snap. son on MA, but she is not.  thanks

## 2023-04-23 ENCOUNTER — PATIENT OUTREACH (OUTPATIENT)
Dept: CARE COORDINATION | Facility: CLINIC | Age: 68
End: 2023-04-23
Payer: COMMERCIAL

## 2023-04-24 ENCOUNTER — ANCILLARY PROCEDURE (OUTPATIENT)
Dept: MAMMOGRAPHY | Facility: CLINIC | Age: 68
End: 2023-04-24
Attending: INTERNAL MEDICINE
Payer: COMMERCIAL

## 2023-04-24 DIAGNOSIS — Z12.31 VISIT FOR SCREENING MAMMOGRAM: ICD-10-CM

## 2023-04-24 DIAGNOSIS — E11.9 TYPE 2 DIABETES MELLITUS WITHOUT COMPLICATION, WITHOUT LONG-TERM CURRENT USE OF INSULIN (H): ICD-10-CM

## 2023-04-24 PROCEDURE — 77063 BREAST TOMOSYNTHESIS BI: CPT | Mod: TC | Performed by: RADIOLOGY

## 2023-04-24 PROCEDURE — 77067 SCR MAMMO BI INCL CAD: CPT | Mod: TC | Performed by: RADIOLOGY

## 2023-04-25 ENCOUNTER — ANCILLARY PROCEDURE (OUTPATIENT)
Dept: GENERAL RADIOLOGY | Facility: CLINIC | Age: 68
End: 2023-04-25
Attending: INTERNAL MEDICINE
Payer: COMMERCIAL

## 2023-04-25 ENCOUNTER — APPOINTMENT (OUTPATIENT)
Dept: CARE COORDINATION | Facility: CLINIC | Age: 68
End: 2023-04-25
Payer: COMMERCIAL

## 2023-04-25 ENCOUNTER — OFFICE VISIT (OUTPATIENT)
Dept: INTERNAL MEDICINE | Facility: CLINIC | Age: 68
End: 2023-04-25
Payer: COMMERCIAL

## 2023-04-25 ENCOUNTER — PATIENT OUTREACH (OUTPATIENT)
Dept: CARE COORDINATION | Facility: CLINIC | Age: 68
End: 2023-04-25

## 2023-04-25 VITALS
DIASTOLIC BLOOD PRESSURE: 78 MMHG | OXYGEN SATURATION: 99 % | WEIGHT: 220.2 LBS | RESPIRATION RATE: 24 BRPM | SYSTOLIC BLOOD PRESSURE: 144 MMHG | HEIGHT: 60 IN | TEMPERATURE: 97.9 F | HEART RATE: 94 BPM | BODY MASS INDEX: 43.23 KG/M2
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
## 2023-04-25 DIAGNOSIS — M25.521 RIGHT ELBOW PAIN: ICD-10-CM

## 2023-04-25 DIAGNOSIS — M25.551 HIP PAIN, RIGHT: ICD-10-CM

## 2023-04-25 DIAGNOSIS — F06.4 ANXIETY DISORDER DUE TO MEDICAL CONDITION: ICD-10-CM

## 2023-04-25 DIAGNOSIS — M25.511 ACUTE PAIN OF RIGHT SHOULDER: Primary | ICD-10-CM

## 2023-04-25 DIAGNOSIS — E11.9 TYPE 2 DIABETES MELLITUS WITHOUT COMPLICATION, WITHOUT LONG-TERM CURRENT USE OF INSULIN (H): ICD-10-CM

## 2023-04-25 DIAGNOSIS — M25.561 ACUTE PAIN OF RIGHT KNEE: ICD-10-CM

## 2023-04-25 DIAGNOSIS — I10 ESSENTIAL HYPERTENSION: ICD-10-CM

## 2023-04-25 DIAGNOSIS — M25.531 RIGHT WRIST PAIN: ICD-10-CM

## 2023-04-25 DIAGNOSIS — M25.511 ACUTE PAIN OF RIGHT SHOULDER: ICD-10-CM

## 2023-04-25 PROCEDURE — 99214 OFFICE O/P EST MOD 30 MIN: CPT | Performed by: INTERNAL MEDICINE

## 2023-04-25 PROCEDURE — 73030 X-RAY EXAM OF SHOULDER: CPT | Mod: TC | Performed by: RADIOLOGY

## 2023-04-25 PROCEDURE — 73502 X-RAY EXAM HIP UNI 2-3 VIEWS: CPT | Mod: TC | Performed by: RADIOLOGY

## 2023-04-25 PROCEDURE — 73070 X-RAY EXAM OF ELBOW: CPT | Mod: TC | Performed by: RADIOLOGY

## 2023-04-25 PROCEDURE — 73560 X-RAY EXAM OF KNEE 1 OR 2: CPT | Mod: TC | Performed by: RADIOLOGY

## 2023-04-25 PROCEDURE — 73110 X-RAY EXAM OF WRIST: CPT | Mod: TC | Performed by: RADIOLOGY

## 2023-04-25 RX ORDER — LOSARTAN POTASSIUM AND HYDROCHLOROTHIAZIDE 12.5; 5 MG/1; MG/1
1 TABLET ORAL DAILY
Qty: 90 TABLET | Refills: 4 | Status: SHIPPED | OUTPATIENT
Start: 2023-04-25 | End: 2024-03-07 | Stop reason: DRUGHIGH

## 2023-04-25 RX ORDER — DULOXETIN HYDROCHLORIDE 30 MG/1
30 CAPSULE, DELAYED RELEASE ORAL 2 TIMES DAILY
Qty: 180 CAPSULE | Refills: 4 | Status: CANCELLED | OUTPATIENT
Start: 2023-04-25

## 2023-04-25 RX ORDER — AMLODIPINE BESYLATE 5 MG/1
5 TABLET ORAL DAILY
Qty: 90 TABLET | Refills: 4 | Status: SHIPPED | OUTPATIENT
Start: 2023-04-25 | End: 2023-09-29

## 2023-04-25 RX ORDER — AMPICILLIN TRIHYDRATE 500 MG
1 CAPSULE ORAL DAILY
Qty: 90 CAPSULE | Refills: 4 | Status: SHIPPED | OUTPATIENT
Start: 2023-04-25 | End: 2023-08-14

## 2023-04-25 RX ORDER — SILVER SULFADIAZINE 10 MG/G
CREAM TOPICAL 2 TIMES DAILY
Qty: 50 G | Refills: 0 | Status: CANCELLED | OUTPATIENT
Start: 2023-04-25

## 2023-04-25 RX ORDER — AMITRIPTYLINE HYDROCHLORIDE 50 MG/1
50 TABLET ORAL AT BEDTIME
Qty: 90 TABLET | Refills: 4 | Status: SHIPPED | OUTPATIENT
Start: 2023-04-25 | End: 2023-09-29

## 2023-04-25 RX ORDER — ERYTHROMYCIN 5 MG/G
0.5 OINTMENT OPHTHALMIC 2 TIMES DAILY
Qty: 1 G | Refills: 1 | Status: CANCELLED | OUTPATIENT
Start: 2023-04-25

## 2023-04-25 ASSESSMENT — PATIENT HEALTH QUESTIONNAIRE - PHQ9
10. IF YOU CHECKED OFF ANY PROBLEMS, HOW DIFFICULT HAVE THESE PROBLEMS MADE IT FOR YOU TO DO YOUR WORK, TAKE CARE OF THINGS AT HOME, OR GET ALONG WITH OTHER PEOPLE: SOMEWHAT DIFFICULT
SUM OF ALL RESPONSES TO PHQ QUESTIONS 1-9: 18
SUM OF ALL RESPONSES TO PHQ QUESTIONS 1-9: 18

## 2023-04-25 NOTE — PROGRESS NOTES
1. Type 2 diabetes mellitus without complication, without long-term current use of insulin (H)  - aspirin (ASA) 81 MG EC tablet; Take 1 tablet (81 mg) by mouth daily  Dispense: 90 tablet; Refill: 4  - Cholecalciferol (D 1000) 25 MCG (1000 UT) CAPS; Take 1 capsule by mouth daily  Dispense: 90 capsule; Refill: 4    2. Essential hypertension  - amLODIPine (NORVASC) 5 MG tablet; Take 1 tablet (5 mg) by mouth daily  Dispense: 90 tablet; Refill: 4  - losartan-hydrochlorothiazide (HYZAAR) 50-12.5 MG tablet; Take 1 tablet by mouth daily  Dispense: 90 tablet; Refill: 4    3. Anxiety disorder due to medical condition  Not sleeping well increase Elavil, she needs to do her sleep study and start CPAP  - amitriptyline (ELAVIL) 50 MG tablet; Take 1 tablet (50 mg) by mouth At Bedtime  Dispense: 90 tablet; Refill: 4        7. Acute pain of right shoulder  - XR Shoulder Right G/E 3 Views; Future    8. Right wrist pain  - XR Wrist Right G/E 3 Views; Future    9. Hip pain, right  - XR Hip Right 2-3 Views; Future    10. Acute pain of right knee  - XR Knee Right 1/2 Views; Future    11. Fall, initial encounter  - XR Wrist Right G/E 3 Views; Future  - XR Shoulder Right G/E 3 Views; Future  - XR Hip Right 2-3 Views; Future  - XR Knee Right 1/2 Views; Future  - XR Elbow Right 2 Views; Future    12. Right elbow pain  - XR Elbow Right 2 Views; Future      Subjective   Isabel is a 68 year old, presenting for the following health issues:  Recheck Medication and RECHECK (PT REPORTS FALL 4/24/23 WITH PAIN ON RIGHT SIDE SHOULDER HIP AND RIB)        2/28/2023     9:23 AM   Additional Questions   Roomed by      History of Present Illness       Mental Health Follow-up:  Patient presents to follow-up on Depression.Patient's depression since last visit has been:  No change  The patient is having other symptoms associated with depression.      Any significant life events: relationship concerns, financial concerns, housing concerns and health  concerns  Patient is feeling anxious or having panic attacks.  Patient has no concerns about alcohol or drug use.    She eats 4 or more servings of fruits and vegetables daily.She consumes 1 sweetened beverage(s) daily.She exercises with enough effort to increase her heart rate 30 to 60 minutes per day.  She exercises with enough effort to increase her heart rate 6 days per week. She is missing 3 dose(s) of medications per week.  She is not taking prescribed medications regularly due to remembering to take.    Today's PHQ-9         PHQ-9 Total Score: 18    PHQ-9 Q9 Thoughts of better off dead/self-harm past 2 weeks :   Not at all    How difficult have these problems made it for you to do your work, take care of things at home, or get along with other people: Somewhat difficult       Pain History:  When did you first notice your pain? 4/24/23   Have you seen anyone else for your pain? No  How has your pain affected your ability to work? Not currently working - unrelated to pain  Where in your body do you have pain? RIGHT SHOULDER HIP RIB BOTH HANDS        Review of Systems         Objective    There were no vitals taken for this visit.  There is no height or weight on file to calculate BMI.  Physical Exam   Tenderness palpation of the right shoulder right elbow right wrist right hip with normal range of motion, some abrasions                Patient answers are not available for this visit.

## 2023-04-25 NOTE — PROGRESS NOTES
Clinic Care Coordination Contact  Program: [facility] Benefits/Health Insurance   County:  [facility] Case #: [id number] (Put this on certain Population)   [facility] Worker:   Mnsure #:   Subscriber #:   Renewal:  Date Applied:   [facility] Address: [address] [address]  Fax: [phone]  FRW Outreach:     4/25/23: FRW called pt at scheduled time. Patient asked if appointment could be moved to tomorrow at 3 pm.   4/21/23: FRW and pt called [facility]. [facility] had incorrect address for patient and patient sent the application to wrong location. FRW and pt will complete certain pop on Tuesday.  4/21/23: FRW called pt to call [facility]. Patient was sleeping. FRW will call back later today.   4/20/23: FRW and pt called [facility] but was on hold for over 20 minutes. FRW and pt will call tomorrow at a different time.  4/6/23: FRW called pt to get a status. Pt was approved for SNAP. Patient is on waitlist for housing assistance. Pt is moving in with daughter in [place]. FRW and pt will connect in 2 weeks to call [facility] for MA status.   3/15/23: FRW called pt. Pt sent in applications. FRW and pt will connect in 3 weeks.   3/6/23: FRW called pt and left VM. FRW will make another outreach next week.   2/23/23: FRW called pt 2x and there was no voicemail set up. FRW will make another outreach in 1-2 weeks.   2/15/23: FRW called pt to patient. Patient was in hospital. FRW will make outreach next week.   1/31/23: FRW called pt to make sure she received the applications. FRW left VM and will make outreach in 1-2 weeks.  1/23/23: FRW called pt and completed applications. FRW mailed the applications for signature and will touch base with pt next week to make sure she received them.  1/12/23: FRW called pt at scheduled time. Pt was sick and we rescheduled appointment to 1/23.   1/3/23: FRW called pt. New appointment made for 1/12.  12/20/22: FRW called pt at scheduled time but was unable to leave . CRISTOPHER will  make another outreach in 1-2 weeks.   12/2/22: FRW called pt and we scheduled appointment for 12/20 to apply for MA.     Health Insurance:      Referral/Screening:  Financial Resource Worker Screening     County Benefits  Is patient requesting help applying for county benefits?: Yes  Have you recently applied for any county benefits?: No  How many people in your household?: 2  Do you buy/eat food together?: Yes  What is the monthly gross income for the household (wages, social security, workers comp, and pension)? : 900     [place]:  [place] No  Is this an insurance renewal?: No  Is this a new insurance application request?: Yes  Have you recently applied for insurance?: No  How many people in your household? : 2  Do you file taxes?: Yes  What is the monthly gross income for the household (wages, social security, workers comp, and pension)? : 900     Any other information for the FRW?: not sure if she claims her adult son who lives with her and disabled.  She is getting $900 per month total.  she gets $200 in snap. son on MA, but she is not.  thanks

## 2023-04-26 ENCOUNTER — APPOINTMENT (OUTPATIENT)
Dept: CARE COORDINATION | Facility: CLINIC | Age: 68
End: 2023-04-26
Payer: COMMERCIAL

## 2023-04-26 ENCOUNTER — PATIENT OUTREACH (OUTPATIENT)
Dept: CARE COORDINATION | Facility: CLINIC | Age: 68
End: 2023-04-26

## 2023-04-26 RX ORDER — ATORVASTATIN CALCIUM 20 MG/1
TABLET, FILM COATED ORAL
Qty: 90 TABLET | Refills: 0 | Status: SHIPPED | OUTPATIENT
Start: 2023-04-26 | End: 2023-08-14

## 2023-04-26 NOTE — PROGRESS NOTES
Clinic Care Coordination Contact  Program: [facility] Benefits/Health Insurance   County:  [facility] Case #: [id number] (Put this on certain Population)   [facility] Worker:   Mnsure #:   Subscriber #:   Renewal:  Date Applied:   [facility] Address: [address] [address]  Fax: [phone]  FRW Outreach:   4/26/23: FWR called pt at appt time, client didn't answer no vm setup. FRW will reach out to patient next week.  4/25/23: FRW called pt at scheduled time. Patient asked if appointment could be moved to tomorrow at 3 pm.   4/21/23: FRW and pt called [facility]. [facility] had incorrect address for patient and patient sent the application to wrong location. FRW and pt will complete certain pop on Tuesday.  4/21/23: FRW called pt to call [facility]. Patient was sleeping. FRW will call back later today.   4/20/23: FRW and pt called [facility] but was on hold for over 20 minutes. FRW and pt will call tomorrow at a different time.  4/6/23: FRW called pt to get a status. Pt was approved for SNAP. Patient is on waitlist for housing assistance. Pt is moving in with daughter in [place]. FRW and pt will connect in 2 weeks to call [facility] for MA status.   3/15/23: FRW called pt. Pt sent in applications. FRW and pt will connect in 3 weeks.   3/6/23: FRW called pt and left VM. FRW will make another outreach next week.   2/23/23: FRW called pt 2x and there was no voicemail set up. FRW will make another outreach in 1-2 weeks.   2/15/23: FRW called pt to patient. Patient was in hospital. FRW will make outreach next week.   1/31/23: FRW called pt to make sure she received the applications. FRW left VM and will make outreach in 1-2 weeks.  1/23/23: FRW called pt and completed applications. FRW mailed the applications for signature and will touch base with pt next week to make sure she received them.  1/12/23: FRW called pt at scheduled time. Pt was sick and we rescheduled appointment to 1/23.   1/3/23: FRW called pt.  New appointment made for 1/12.  12/20/22: FRW called pt at scheduled time but was unable to leave VM. FRW will make another outreach in 1-2 weeks.   12/2/22: FRW called pt and we scheduled appointment for 12/20 to apply for MA.     Health Insurance:      Referral/Screening:  Financial Resource Worker Screening     County Benefits  Is patient requesting help applying for [facility] benefits?: Yes  Have you recently applied for any county benefits?: No  How many people in your household?: 2  Do you buy/eat food together?: Yes  What is the monthly gross income for the household (wages, social security, workers comp, and pension)? : 900     [place]:  [place]?: No  Is this an insurance renewal?: No  Is this a new insurance application request?: Yes  Have you recently applied for insurance?: No  How many people in your household? : 2  Do you file taxes?: Yes  What is the monthly gross income for the household (wages, social security, workers comp, and pension)? : 900     Any other information for the FRW?: not sure if she claims her adult son who lives with her and disabled.  She is getting $900 per month total.  she gets $200 in snap. son on MA, but she is not.  thanks

## 2023-04-26 NOTE — TELEPHONE ENCOUNTER
"Last Written Prescription Date:  2/17/2022  Last Fill Quantity: 90,  # refills: 3   Last office visit provider:  4/25/2023     Requested Prescriptions   Pending Prescriptions Disp Refills     atorvastatin (LIPITOR) 20 MG tablet [Pharmacy Med Name: ATORVASTATIN 20MG TABLETS] 90 tablet 3     Sig: TAKE 1 TABLET(20 MG) BY MOUTH DAILY       Statins Protocol Passed - 4/24/2023  1:38 PM        Passed - LDL on file in past 12 months     Recent Labs   Lab Test 05/23/22  1523                Passed - No abnormal creatine kinase in past 12 months     Recent Labs   Lab Test 11/26/18  1823   CKT 89                Passed - Recent (12 mo) or future (30 days) visit within the authorizing provider's specialty     Patient has had an office visit with the authorizing provider or a provider within the authorizing providers department within the previous 12 mos or has a future within next 30 days. See \"Patient Info\" tab in inbasket, or \"Choose Columns\" in Meds & Orders section of the refill encounter.              Passed - Medication is active on med list        Passed - Patient is age 18 or older        Passed - No active pregnancy on record        Passed - No positive pregnancy test in past 12 months             Lakisha Moore RN 04/26/23 4:00 AM  "

## 2023-05-02 ENCOUNTER — PATIENT OUTREACH (OUTPATIENT)
Dept: CARE COORDINATION | Facility: CLINIC | Age: 68
End: 2023-05-02
Payer: COMMERCIAL

## 2023-05-02 NOTE — PROGRESS NOTES
Clinic Care Coordination Contact  Program: [facility] Benefits/Health Insurance   County:  [facility] Case #: [id number] (Put this on certain Population)   [facility] Worker:   Mnsure #:   Subscriber #:   Renewal:  Date Applied:   [facility] Address: [address], [place] [address]  Fax: [phone]  FRW Outreach:   5/2/23: FRW called pt and left VM. FRW will make another outreach in 2 weeks.   4/26/23: FRW called pt at appt time, client didn't answer no vm setup. FRW will reach out to patient next week.  4/25/23: FRW called pt at scheduled time. Patient asked if appointment could be moved to tomorrow at 3 pm.   4/21/23: FRW and pt called [facility]. [facility] had incorrect address for patient and patient sent the application to wrong location. FRW and pt will complete certain pop on Tuesday.  4/21/23: FRW called pt to call [facility]. Patient was sleeping. FRW will call back later today.   4/20/23: FRW and pt called [facility] but was on hold for over 20 minutes. FRW and pt will call tomorrow at a different time.  4/6/23: FRW called pt to get a status. Pt was approved for SNAP. Patient is on waitlist for housing assistance. Pt is moving in with daughter in [place]. FRW and pt will connect in 2 weeks to call [facility] for MA status.   3/15/23: FRW called pt. Pt sent in applications. FRW and pt will connect in 3 weeks.   3/6/23: FRW called pt and left VM. FRW will make another outreach next week.   2/23/23: FRW called pt 2x and there was no voicemail set up. FRW will make another outreach in 1-2 weeks.   2/15/23: FRW called pt to patient. Patient was in hospital. FRW will make outreach next week.   1/31/23: FRW called pt to make sure she received the applications. FRW left VM and will make outreach in 1-2 weeks.  1/23/23: FRW called pt and completed applications. FRW mailed the applications for signature and will touch base with pt next week to make sure she received them.  1/12/23: FRW called pt at scheduled  time. Pt was sick and we rescheduled appointment to 1/23.   1/3/23: FRW called pt. New appointment made for 1/12.  12/20/22: FRW called pt at scheduled time but was unable to leave VM. FRW will make another outreach in 1-2 weeks.   12/2/22: FRW called pt and we scheduled appointment for 12/20 to apply for MA.     Health Insurance:      Referral/Screening:  Financial Resource Worker Screening     County Benefits  Is patient requesting help applying for [facility] benefits?: Yes  Have you recently applied for any county benefits?: No  How many people in your household?: 2  Do you buy/eat food together?: Yes  What is the monthly gross income for the household (wages, social security, workers comp, and pension)? : 900     [place]:  [place]?: No  Is this an insurance renewal?: No  Is this a new insurance application request?: Yes  Have you recently applied for insurance?: No  How many people in your household? : 2  Do you file taxes?: Yes  What is the monthly gross income for the household (wages, social security, workers comp, and pension)? : 900     Any other information for the FRW?: not sure if she claims her adult son who lives with her and disabled.  She is getting $900 per month total.  she gets $200 in snap. son on MA, but she is not.  thanks

## 2023-05-04 ENCOUNTER — PATIENT OUTREACH (OUTPATIENT)
Dept: CARE COORDINATION | Facility: CLINIC | Age: 68
End: 2023-05-04
Payer: COMMERCIAL

## 2023-05-04 NOTE — PROGRESS NOTES
Clinic Care Coordination Contact    Community Health Worker Follow Up    Care Gaps:     Health Maintenance Due   Topic Date Due     DEXA  Never done     SPIROMETRY  Never done     ZOSTER IMMUNIZATION (1 of 2) Never done     EYE EXAM  07/07/2022     MEDICARE ANNUAL WELLNESS VISIT  05/23/2023     LIPID  05/23/2023     MICROALBUMIN  05/23/2023       Postponed to next PCP appointment.     Care Plan:   Care Plan: Financial Wellbeing     Problem: Patient expresses financial resource strain     Long-Range Goal: I will apply for MA and [employer] [employer].     Start Date: 12/1/2022 Expected End Date: 7/1/2023    This Visit's Progress: 20% Recent Progress: 20%    Priority: High    Note:     Barriers: none identified.  Strengths: motivated.  Patient expressed understanding of goal: yes  Action steps to achieve this goal:  1. I will connect with FRW to apply for programs.   2. I will complete any paper work.  3. I will report progress towards this goal at scheduled outreach telephone calls from the CCC team.    Discussed 5/4/23                    Care Plan: Mental Health     Problem: Mood/Psychosocial Concerns     Long-Range Goal: Establish Mental Health Support and reduce depression symptoms.     Start Date: 12/1/2022 Expected End Date: 7/1/2023    This Visit's Progress: 20% Recent Progress: 10%    Priority: High    Note:     Barriers: caregiver, financial strain.  Strengths: motivated to feel better.  Patient expressed understanding of goal: yes  Action steps to achieve this goal:  1. I will schedule appt with Brea Agudelo when called.  2. I will meet with new provider Maria Elena yin.  3. I will report progress towards this goal at scheduled outreach telephone calls from the CCC team.    Discussed 5/4/23                    Care Plan: Housing Instability     Problem: SDOH LACK OF STABLE HOUSING     Long-Range Goal: I will put my name on senior housing apartment lists that are affordable.     Start Date: 12/1/2022  "Expected End Date: 7/1/2023    This Visit's Progress: 10% Recent Progress: 10%    Priority: Medium    Note:     Barriers: paying a lot of money for current apartment, caregiver for son who is disabled.   Strengths: motivated.  Patient expressed understanding of goal: yes  Action steps to achieve this goal:  1. I will review list of senior apartments that  sends.  2. I will call those that I am interested in to add myself to the waiting list.   3. I will report progress towards this goal at scheduled outreach telephone calls from the CCC team.    Discussed 5/4/23                        Intervention and Education during outreach: CHW gave patient contact information and encouraged patient to reach out with any questions or concerns.    CHW Note:    CHW contacted patient to review/update CCC goals.    Patient reported she has been busy as she is moving in with her daughter at the end of the month. Patient shared she gets along with her daughter and is looking forward to the move.    Patient shared she has been in contact with CRISTOPHER Saavedra regarding the financial resources. Patient shared she is still waiting to hear if she has been approved for MA. Patient will update [facility] team at next scheduled outreach.    Patient shared her mental health has been \"good\". Patient reported she has not contacted [facility] regarding a counselor. Patient shared she will be busy with the move and is unsure if she would like to do counseling. Patient will update [facility]] team on counseling services at next [facility]3] outreach.    Patient denied any other needs or concerns at this time but will reach out to [facility]] as needed.     CHW Plan: CHW will continue to support patient with goals through routine scheduled outreach. CHW will outreach in one month on 6/5/23.      Raquel Miguel  Community Health Worker   [facility] Care Coordination  [facility]   [email or web address]  Office: " [phone]

## 2023-05-09 ENCOUNTER — PATIENT OUTREACH (OUTPATIENT)
Dept: CARE COORDINATION | Facility: CLINIC | Age: 68
End: 2023-05-09
Payer: COMMERCIAL

## 2023-05-09 NOTE — LETTER
[facility] CARE COORDINATION  [facility]  June 6, 2023        Isabel Biggs  [address]  [place] [address]          Dear Isabel,     Attached is an updated Complex Care Plan for your continued enrollment in Care Coordination. Please let us know if you have additional questions, concerns, or goals that we can assist with.    Sincerely,    Angela Rogel,   [facility]  [phone]        [facility]  Patient Centered Plan of Care  About Me:        Patient Name:  Isabel Biggs    YOB: 1955  Age:         68 year old   [place] MRN:    [id number] Telephone Information:  Home Phone [phone]   Mobile [phone]       Address:  [address]  [place] [address] Email address:  [email or web address]      Emergency Contact(s)    Name Relationship Lgl Grd Work Phone Home Phone Mobile Phone   1. OTONIEL SCHAFFER Daughter    [phone]   2. PARISA SCHAFFER Daughter   [phone]            Primary language:  English     needed? No   [employer]:  [phone] op. 1  Other communication barriers:None    Preferred Method of Communication:     Current living arrangement: I live in a private home with family    Mobility Status/ Medical Equipment: Independent        Health Maintenance  Health Maintenance Reviewed: Due/Overdue   Health Maintenance Due   Topic Date Due    DEXA  Never done    SPIROMETRY  Never done    ZOSTER IMMUNIZATION (1 of 2) Never done    EYE EXAM  07/07/2022    COVID-19 Vaccine (6 - Pfizer series) 04/08/2023    MEDICARE ANNUAL WELLNESS VISIT  05/23/2023           My Access Plan  Medical Emergency 911   Primary Clinic Line [facility] - [phone]   24 Hour Appointment Line [phone] or  [phone]) (toll-free)   24 Hour Nurse Line [phone] (toll-free)   Preferred Urgent Care [facility], [phone]       Preferred  [facility]  [phone]       Preferred Pharmacy [id number]  [place], [place] - [address] AT [facility] & [address]     Behavioral Health Crisis Line The National Suicide Prevention Lifeline at 1-998.177.9150 or Text/Call 988             My Care Team Members  Patient Care Team         Relationship Specialty Notifications Start End    Cj Georges MD PCP - General   3/9/18     Phone: [phone] Fax: [phone]         [address] [facility] [address]    Claudia Joseph MD MD Hematology & Oncology Admissions 12/14/17     Phone: [phone] Fax: [phone]         [address] [facility] [address]    Meghann Rosado, PharmD Pharmacist Pharmacist  2/3/20     Phone: [phone] Fax: [phone]         [facility] [address] [place] [address]    Cj Georges MD Assigned PCP   6/16/21     Phone: [phone] Fax: [phone]         [address] [facility] [address]    Juma Wilkerson OD MD Optometry  6/18/21     Phone: [phone] Fax: [phone]         [address] [address]    Denice Plasencia MD MD Neurology  7/26/21     Phone: [phone] Fax: [phone]         [address] [place] [address]    Tenzin Chawla MD Assigned Musculoskeletal Provider   5/22/22     Phone: [phone] Fax: [phone] [address] [place] [address]    Raquel Miguel MA Community Health Worker Primary Care - CC Admissions 11/21/22     Angela Rogel LSW Lead Care Coordinator Primary Care - CC Admissions 12/1/22     Phone: [phone]         Keri Day Financial Resource Worker   12/1/22     Phone: [phone]         Ariella Castellanos OD MD Optometry  1/18/23     Phone: [phone] Fax: [phone]         [address] [facility] [address]    Adele Hamilton PALindaC Referring Physician Family Medicine  1/18/23     Referring  Physician to eye    Phone: [phone] Fax: [phone] [address] [facility] [address]    Ariella Castellanos OD Assigned Surgical Provider   1/21/23     Phone: [phone] Fax: [phone] [address] [facility] [address]    Dwain Santos MD MD Ophthalmology  1/23/23     Phone: [phone] Fax: [phone]         [address] [facility] [address]    Reginaldo Ochoa MD MD Neurology  2/28/23     Phone: [phone] Fax: [phone]         [address] [facility] [address]    Cj Whaley, APRN CNP Assigned Sleep Provider   4/15/23     Phone: [phone] Fax: [phone]         [address] [facility] [address]              My Care Plans  Self Management and Treatment Plan  Care Plan  Care Plan: Financial Wellbeing       Problem: Patient expresses financial resource strain       Long-Range Goal: I will apply for MA and [employer].       Start Date: 12/1/2022 Expected End Date: 7/1/2023    This Visit's Progress: 20% Recent Progress: 20%    Priority: High    Note:     Barriers: none identified.  Strengths: motivated.  Patient expressed understanding of goal: yes  Action steps to achieve this goal:  1. I will connect with FRW to apply for programs.   2. I will complete any paper work.  3. I will report progress towards this goal at scheduled outreach telephone calls from the CCC team.    Discussed 5/4/23                            Care Plan: Mental Health       Problem: Mood/Psychosocial Concerns       Long-Range Goal: Establish Mental Health Support and reduce depression symptoms.       Start Date: 12/1/2022 Expected End Date: 7/1/2023    This Visit's Progress: 20% Recent Progress: 10%    Priority: High    Note:     Barriers: caregiver, financial strain.  Strengths: motivated to feel better.  Patient expressed understanding of goal: yes  Action steps to achieve this goal:  1. I will schedule appt with [facility] when called.  2. I will  meet with new provider Maria Elena yin.  3. I will report progress towards this goal at scheduled outreach telephone calls from the CCC team.    Discussed 5/4/23                            Care Plan: Housing Instability       Problem: SDOH LACK OF STABLE HOUSING       Long-Range Goal: I will put my name on senior housing apartment lists that are affordable.       Start Date: 12/1/2022 Expected End Date: 7/1/2023    This Visit's Progress: 10% Recent Progress: 10%    Priority: Medium    Note:     Barriers: paying a lot of money for current apartment, caregiver for son who is disabled.   Strengths: motivated.  Patient expressed understanding of goal: yes  Action steps to achieve this goal:  1. I will review list of senior apartments that  sends.  2. I will call those that I am interested in to add myself to the waiting list.   3. I will report progress towards this goal at scheduled outreach telephone calls from the CCC team.    Discussed 5/4/23                                   Advance Care Plans/Directives Type:   No data recorded    My Medical and Care Information  Problem List   Patient Active Problem List   Diagnosis    WILFRIDO - not using CPAP (2020)    Type 2 diabetes mellitus without complication, without long-term current use of insulin (H)    Diabetic polyneuropathy associated with type 2 diabetes mellitus (H)    Beta thalassemia (H)    Low back pain     Moderate episode of recurrent major depressive disorder (H)    Morbid obesity (H)    Peripheral vertigo of both ears    Decreased activities of daily living (ADL)    Avitaminosis D    Essential hypertension      Current Medications and Allergies:    Current Outpatient Medications   Medication Instructions    amitriptyline (ELAVIL) 50 mg, Oral, AT BEDTIME    amLODIPine (NORVASC) 5 mg, Oral, DAILY    aspirin (ASA) 81 mg, Oral, DAILY    atorvastatin (LIPITOR) 20 MG tablet TAKE 1 TABLET(20 MG) BY MOUTH DAILY    Cholecalciferol (D 1000) 25 MCG (1000 UT)  CAPS 1 capsule, Oral, DAILY    DULoxetine (CYMBALTA) 30 mg, Oral, 2 TIMES DAILY    erythromycin (ROMYCIN) 5 MG/GM ophthalmic ointment 0.5 inches, Left Eye, 2 TIMES DAILY    losartan-hydrochlorothiazide (HYZAAR) 50-12.5 MG tablet 1 tablet, Oral, DAILY    omeprazole (PRILOSEC) 20 MG DR capsule No dose, route, or frequency recorded.    silver sulfADIAZINE (SILVADENE) 1 % external cream Topical, 2 TIMES DAILY         Care Coordination Start Date: 11/21/2022   Frequency of Care Coordination: monthly       Form Last Updated: 06/06/2023
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
## 2023-05-12 ENCOUNTER — OFFICE VISIT (OUTPATIENT)
Dept: INTERNAL MEDICINE | Facility: CLINIC | Age: 68
End: 2023-05-12
Payer: COMMERCIAL

## 2023-05-12 ENCOUNTER — TELEPHONE (OUTPATIENT)
Dept: INTERNAL MEDICINE | Facility: CLINIC | Age: 68
End: 2023-05-12

## 2023-05-12 VITALS
DIASTOLIC BLOOD PRESSURE: 70 MMHG | SYSTOLIC BLOOD PRESSURE: 135 MMHG | TEMPERATURE: 98.2 F | RESPIRATION RATE: 16 BRPM | OXYGEN SATURATION: 97 % | HEART RATE: 95 BPM

## 2023-05-12 DIAGNOSIS — M25.512 LEFT SHOULDER PAIN, UNSPECIFIED CHRONICITY: ICD-10-CM

## 2023-05-12 DIAGNOSIS — I10 ESSENTIAL HYPERTENSION: ICD-10-CM

## 2023-05-12 DIAGNOSIS — J41.0 SIMPLE CHRONIC BRONCHITIS (H): ICD-10-CM

## 2023-05-12 DIAGNOSIS — M79.605 BILATERAL LEG PAIN: ICD-10-CM

## 2023-05-12 DIAGNOSIS — M79.604 BILATERAL LEG PAIN: ICD-10-CM

## 2023-05-12 DIAGNOSIS — E11.9 TYPE 2 DIABETES MELLITUS WITHOUT COMPLICATION, WITHOUT LONG-TERM CURRENT USE OF INSULIN (H): ICD-10-CM

## 2023-05-12 DIAGNOSIS — E11.42 DIABETIC POLYNEUROPATHY ASSOCIATED WITH TYPE 2 DIABETES MELLITUS (H): ICD-10-CM
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
## 2023-05-12 DIAGNOSIS — M54.2 MUSCULOSKELETAL NECK PAIN: ICD-10-CM

## 2023-05-12 LAB
CHOLEST SERPL-MCNC: 145 MG/DL
CREAT UR-MCNC: 80.4 MG/DL
HBA1C MFR BLD: 6 % (ref 0–5.6)
HDLC SERPL-MCNC: 52 MG/DL
LDLC SERPL CALC-MCNC: 72 MG/DL
MICROALBUMIN UR-MCNC: <12 MG/L
MICROALBUMIN/CREAT UR: NORMAL MG/G{CREAT}
NONHDLC SERPL-MCNC: 93 MG/DL
TRIGL SERPL-MCNC: 107 MG/DL

## 2023-05-12 PROCEDURE — 36415 COLL VENOUS BLD VENIPUNCTURE: CPT | Performed by: INTERNAL MEDICINE

## 2023-05-12 PROCEDURE — 80061 LIPID PANEL: CPT | Performed by: INTERNAL MEDICINE

## 2023-05-12 PROCEDURE — 82043 UR ALBUMIN QUANTITATIVE: CPT | Performed by: INTERNAL MEDICINE

## 2023-05-12 PROCEDURE — 99214 OFFICE O/P EST MOD 30 MIN: CPT | Performed by: INTERNAL MEDICINE

## 2023-05-12 PROCEDURE — 83036 HEMOGLOBIN GLYCOSYLATED A1C: CPT | Performed by: INTERNAL MEDICINE

## 2023-05-12 PROCEDURE — 82570 ASSAY OF URINE CREATININE: CPT | Performed by: INTERNAL MEDICINE

## 2023-05-12 ASSESSMENT — PAIN SCALES - GENERAL: PAINLEVEL: SEVERE PAIN (6)

## 2023-05-12 NOTE — TELEPHONE ENCOUNTER
General Call      Reason for Call:  Pt was seen this A.M. with Dr Georges, asking for pain medication and also patient would like a MRI     Please advise    What are your questions or concerns:  n/a    Date of last appointment with provider: n/a    Could we send this information to you in [facility] or would you prefer to receive a phone call?:   Patient would prefer a phone call   Okay to leave a detailed message?: Yes at Home number on file [phone] (home)
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
## 2023-05-15 NOTE — TELEPHONE ENCOUNTER
"ED/Discharge Protocol    \"Hi, my name is Gillian Rea RN, a registered nurse, and I am calling on behalf of Dr. Mariee's office at [place].  I am calling to follow up and see how things are going for you after your recent visit.\"    \"I see that you were in the (ER/UC/IP) on 2/16/20.    How are you doing now that you are home?\" patient states he is doing better since being home.     Is patient experiencing symptoms that may require a hospital visit?  None .     Discharge Instructions    \"Let's review your discharge instructions.  What is/are the follow-up recommendations?  Pt. Response: see my doctor and take Prednisone.     \"Were you instructed to make a follow-up appointment?\"  Pt. Response: Yes.  Has appointment been made?   Yes      \"When you see the provider, I would recommend that you bring your discharge instructions with you.    Medications    \"How many new medications are you on since your hospitalization/ED visit?\"    0-1  \"How many of your current medicines changed (dose, timing, name, etc.) while you were in the hospital/ED visit?\"   0-1  \"Do you have questions about your medications?\"   No  \"Were you newly diagnosed with heart failure, COPD, diabetes or did you have a heart attack?\"   No  For patients on insulin: \"Did you start on insulin in the hospital or did you have your insulin dose changed?\"   No  Post Discharge Medication Reconciliation Status: discharge medications reconciled, continue medications without change.    Was MTM referral placed (*Make sure to put transitions as reason for referral)?   No    Call Summary    \"Do you have any questions or concerns about your condition or care plan at the moment?\"    No  Triage nurse advice given: follow-up as advised and scheduled. Call with questions or concerns.         \"If you have questions or things don't continue to improve, we encourage you contact us through the main clinic number,  [phone].  Even if the clinic is not open, triage " Discussed in my chart message to patient   "nurses are available 24/7 to help you.     We would like you to know that our clinic has extended hours (provide information).  We also have urgent care (provide details on closest location and hours/contact info)\"      \"Thank you for your time and take care!\"        "

## 2023-05-17 ENCOUNTER — PATIENT OUTREACH (OUTPATIENT)
Dept: CARE COORDINATION | Facility: CLINIC | Age: 68
End: 2023-05-17
Payer: COMMERCIAL

## 2023-05-17 NOTE — PROGRESS NOTES
Clinic Care Coordination Contact  Program: [facility] Benefits/Health Insurance   County:  [facility] Case #: [id number] (Put this on certain Population)   [facility] Worker:   Mnsure #:   Subscriber #:   Renewal:  Date Applied:   [facility] [place] Address: [address] [address]  Fax: [phone]  FRW Outreach:     5/17/23: FRW called pt and left VM. FRW will make another outreach in 2 weeks for final attempt.  5/2/23: FRW called pt and left VM. FRW will make another outreach in 2 weeks.   4/26/23: FRW called pt at appt time, client didn't answer no vm setup. FRW will reach out to patient next week.  4/25/23: FRW called pt at scheduled time. Patient asked if appointment could be moved to tomorrow at 3 pm.   4/21/23: FRW and pt called [facility] [place]. [facility] had incorrect address for patient and patient sent the application to wrong location. FRW and pt will complete certain pop on Tuesday.  4/21/23: FRW called pt to call [facility] [place]. Patient was sleeping. FRW will call back later today.   4/20/23: FRW and pt called [facility] [place] but was on hold for over 20 minutes. FRW and pt will call tomorrow at a different time.  4/6/23: FRW called pt to get a status. Pt was approved for SNAP. Patient is on waitlist for housing assistance. Pt is moving in with daughter in [place]. FRW and pt will connect in 2 weeks to call [facility] for MA status.   3/15/23: FRW called pt. Pt sent in applications. FRW and pt will connect in 3 weeks.   3/6/23: FRW called pt and left VM. FRW will make another outreach next week.   2/23/23: FRW called pt 2x and there was no voicemail set up. FRW will make another outreach in 1-2 weeks.   2/15/23: FRW called pt to patient. Patient was in hospital. FRW will make outreach next week.   1/31/23: FRW called pt to make sure she received the applications. FRW left VM and will make outreach in 1-2 weeks.  1/23/23: FRW called pt and completed applications. W mailed the applications for signature and will  touch base with pt next week to make sure she received them.  1/12/23: FRW called pt at scheduled time. Pt was sick and we rescheduled appointment to 1/23.   1/3/23: FRW called pt. New appointment made for 1/12.  12/20/22: FRW called pt at scheduled time but was unable to leave VM. FRW will make another outreach in 1-2 weeks.   12/2/22: FRW called pt and we scheduled appointment for 12/20 to apply for MA.     Health Insurance:      Referral/Screening:  Financial Resource Worker Screening     [facility] Benefits  Is patient requesting help applying for [facility] benefits?: Yes  Have you recently applied for any [facility] benefits?: No  How many people in your household?: 2  Do you buy/eat food together?: Yes  What is the monthly gross income for the household (wages, social security, workers comp, and pension)? : 900     Insurance:  Was [place] for [place] insurance?: No  Is this an insurance renewal?: No  Is this a new insurance application request?: Yes  Have you recently applied for insurance?: No  How many people in your household? : 2  Do you file taxes?: Yes  What is the monthly gross income for the household (wages, social security, workers comp, and pension)? : 900     Any other information for the FRW?: not sure if she claims her adult son who lives with her and disabled.  She is getting $900 per month total.  she gets $200 in snap. son on MA, but she is not.  thanks

## 2023-05-25 NOTE — TELEPHONE ENCOUNTER
RECORDS RECEIVED FROM: Internal   REASON FOR VISIT: Mild cognitive impairment    Date of Appt: 08/23/2023   NOTES (FOR ALL VISITS) STATUS DETAILS   OFFICE NOTE from referring provider Internal 02/28/2023 Dr Georges [facility]    OFFICE NOTE from other specialist N/A    DISCHARGE SUMMARY from hospital N/A    DISCHARGE REPORT from the ER N/A    OPERATIVE REPORT N/A    [facility] (MS ONLY) N/A    EMG N/A    EEG N/A    MEDICATION LIST N/A    IMAGING  (FOR ALL VISITS)     LUMBAR PUNCTURE N/A    DAVID SCAN (MOVEMENT) N/A    ULTRASOUND (CAROTID BILAT) *VASCULAR* N/A    MRI (HEAD, NECK, SPINE) N/A    CT (HEAD, NECK, SPINE) N/A

## 2023-05-31 ENCOUNTER — PATIENT OUTREACH (OUTPATIENT)
Dept: CARE COORDINATION | Facility: CLINIC | Age: 68
End: 2023-05-31
Payer: COMMERCIAL

## 2023-05-31 NOTE — PROGRESS NOTES
Clinic Care Coordination Contact  Program: [facility] Benefits/Health Insurance   County:  [facility] Case #: [id number] (Put this on certain Population)   [facility] Worker:   Mnsure #:   Subscriber #:   Renewal:  Date Applied:   [facility] Address: [address], [place] [address]  Fax: [phone]  FRW Outreach:     5/31/23: FRW called pt. Pt was sleeping and asked FRW to call back later today.   5/17/23: FRW called pt and left VM. FRW will make another outreach in 2 weeks for final attempt.  5/2/23: FRW called pt and left VM. FRW will make another outreach in 2 weeks.   4/26/23: FRW called pt at appt time, client didn't answer no vm setup. FRW will reach out to patient next week.  4/25/23: FRW called pt at scheduled time. Patient asked if appointment could be moved to tomorrow at 3 pm.   4/21/23: FRW and pt called [facility]. [facility] had incorrect address for patient and patient sent the application to wrong location. FRW and pt will complete certain pop on Tuesday.  4/21/23: FRW called pt to call [facility]. Patient was sleeping. FRW will call back later today.   4/20/23: FRW and pt called [facility] but was on hold for over 20 minutes. FRW and pt will call tomorrow at a different time.  4/6/23: FRW called pt to get a status. Pt was approved for SNAP. Patient is on waitlist for housing assistance. Pt is moving in with daughter in [place]. FRW and pt will connect in 2 weeks to call [facility] for MA status.   3/15/23: FRW called pt. Pt sent in applications. FRW and pt will connect in 3 weeks.   3/6/23: FRW called pt and left VM. FRW will make another outreach next week.   2/23/23: FRW called pt 2x and there was no voicemail set up. FRW will make another outreach in 1-2 weeks.   2/15/23: FRW called pt to patient. Patient was in hospital. FRW will make outreach next week.   1/31/23: FRW called pt to make sure she received the applications. FRW left  and will make outreach in 1-2 weeks.  1/23/23: CRISTOPHER called  pt and completed applications. FRW mailed the applications for signature and will touch base with pt next week to make sure she received them.  1/12/23: FRW called pt at scheduled time. Pt was sick and we rescheduled appointment to 1/23.   1/3/23: FRW called pt. New appointment made for 1/12.  12/20/22: FRW called pt at scheduled time but was unable to leave VM. FRW will make another outreach in 1-2 weeks.   12/2/22: FRW called pt and we scheduled appointment for 12/20 to apply for MA.     Health Insurance:      Referral/Screening:  Financial Resource Worker Screening     [facility] Benefits  Is patient requesting help applying for [facility] benefits?: Yes  Have you recently applied for any [facility] benefits?: No  How many people in your household?: 2  Do you buy/eat food together?: Yes  What is the monthly gross income for the household (wages, social security, workers comp, and pension)? : 900     Insurance:  [place]?: No  Is this an insurance renewal?: No  Is this a new insurance application request?: Yes  Have you recently applied for insurance?: No  How many people in your household? : 2  Do you file taxes?: Yes  What is the monthly gross income for the household (wages, social security, workers comp, and pension)? : 900     Any other information for the FRW?: not sure if she claims her adult son who lives with her and disabled.  She is getting $900 per month total.  she gets $200 in snap. son on MA, but she is not.  thanks

## 2023-05-31 NOTE — PROGRESS NOTES
Clinic Care Coordination Contact  Program: [facility] Benefits/Health Insurance   County:  [facility] Case #: [id number] (Put this on certain Population)   [facility] Worker:   Mnsure #:   Subscriber #:   Renewal:  Date Applied:   [facility] Address: [address], [place] [address]  Fax: [phone]  FRW Outreach:   5/31/23: FRW called pt. Pt is going to look through her mail to see if [facility] sent her the certain pop application. Pt is calling FRW back.   5/31/23: FRW called pt. Pt was sleeping and asked FRW to call back later today.   5/17/23: FRW called pt and left VM. FRW will make another outreach in 2 weeks for final attempt.  5/2/23: FRW called pt and left VM. FRW will make another outreach in 2 weeks.   4/26/23: FRW called pt at appt time, client didn't answer no vm setup. FRW will reach out to patient next week.  4/25/23: FRW called pt at scheduled time. Patient asked if appointment could be moved to tomorrow at 3 pm.   4/21/23: FRW and pt called [facility]. [facility] had incorrect address for patient and patient sent the application to wrong location. FRW and pt will complete certain pop on Tuesday.  4/21/23: FRW called pt to call [facility]. Patient was sleeping. FRW will call back later today.   4/20/23: FRW and pt called [facility] but was on hold for over 20 minutes. FRW and pt will call tomorrow at a different time.  4/6/23: FRW called pt to get a status. Pt was approved for SNAP. Patient is on waitlist for housing assistance. Pt is moving in with daughter in [place]. FRW and pt will connect in 2 weeks to call [facility] for MA status.   3/15/23: FRW called pt. Pt sent in applications. FRW and pt will connect in 3 weeks.   3/6/23: FRW called pt and left VM. FRW will make another outreach next week.   2/23/23: FRW called pt 2x and there was no voicemail set up. FRW will make another outreach in 1-2 weeks.   2/15/23: FRW called pt to patient. Patient was in hospital. FRW will make outreach next  week.   1/31/23: FRW called pt to make sure she received the applications. FRW left VM and will make outreach in 1-2 weeks.  1/23/23: FRW called pt and completed applications. FRW mailed the applications for signature and will touch base with pt next week to make sure she received them.  1/12/23: FRW called pt at scheduled time. Pt was sick and we rescheduled appointment to 1/23.   1/3/23: FRW called pt. New appointment made for 1/12.  12/20/22: FRW called pt at scheduled time but was unable to leave VM. FRW will make another outreach in 1-2 weeks.   12/2/22: FRW called pt and we scheduled appointment for 12/20 to apply for MA.     Health Insurance:      Referral/Screening:  Financial Resource Worker Screening     [facility] Benefits  Is patient requesting help applying for [facility] benefits?: Yes  Have you recently applied for any [facility] benefits?: No  How many people in your household?: 2  Do you buy/eat food together?: Yes  What is the monthly gross income for the household (wages, social security, workers comp, and pension)? : 900     Insurance:  [place]?: No  Is this an insurance renewal?: No  Is this a new insurance application request?: Yes  Have you recently applied for insurance?: No  How many people in your household? : 2  Do you file taxes?: Yes  What is the monthly gross income for the household (wages, social security, workers comp, and pension)? : 900     Any other information for the FRW?: not sure if she claims her adult son who lives with her and disabled.  She is getting $900 per month total.  she gets $200 in snap. son on MA, but she is not.  thanks

## 2023-06-05 ENCOUNTER — PATIENT OUTREACH (OUTPATIENT)
Dept: CARE COORDINATION | Facility: CLINIC | Age: 68
End: 2023-06-05
Payer: COMMERCIAL

## 2023-06-05 NOTE — PROGRESS NOTES
Clinic Care Coordination Contact  [facility]/Voicemail    Clinical Data: Care Coordinator Outreach  Outreach attempted x 1. Unable to leave message on patient's voicemail as mailbox was full.    Plan: Care Coordinator will try to reach patient again in 10 business days or on 6/19/23.      Raquel Miguel  Community Health Worker   [facility] Care Coordination  [facility]   [email or web address]  Office: [phone]

## 2023-06-08 ENCOUNTER — PATIENT OUTREACH (OUTPATIENT)
Dept: CARE COORDINATION | Facility: CLINIC | Age: 68
End: 2023-06-08
Payer: COMMERCIAL

## 2023-06-08 NOTE — PROGRESS NOTES
Clinic Care Coordination Contact  Program: [facility] Benefits/Health Insurance   County:  [facility] Case #: [id number] (Put this on certain Population)   [facility] Worker:   Mnsure #:   Subscriber #:   Renewal:  Date Applied:   [facility] Address: [address], [place] [address]  Fax: [phone]  FRW Outreach:     6/8/23: FRW called pt. She was moving to [place] today. FRW and pt will connect next week.   5/31/23: FRW called pt. Pt is going to look through her mail to see if [facility] sent her the certain pop application. Pt is calling FRW back.   5/31/23: FRW called pt. Pt was sleeping and asked FRW to call back later today.   5/17/23: FRW called pt and left VM. FRW will make another outreach in 2 weeks for final attempt.  5/2/23: FRW called pt and left VM. FRW will make another outreach in 2 weeks.   4/26/23: FRW called pt at appt time, client didn't answer no vm setup. FRW will reach out to patient next week.  4/25/23: FRW called pt at scheduled time. Patient asked if appointment could be moved to tomorrow at 3 pm.   4/21/23: FRW and pt called [facility]. [facility] had incorrect address for patient and patient sent the application to wrong location. FRW and pt will complete certain pop on Tuesday.  4/21/23: FRW called pt to call [facility]. Patient was sleeping. FRW will call back later today.   4/20/23: FRW and pt called [facility] but was on hold for over 20 minutes. FRW and pt will call tomorrow at a different time.  4/6/23: FRW called pt to get a status. Pt was approved for SNAP. Patient is on waitlist for housing assistance. Pt is moving in with daughter in [place]. FRW and pt will connect in 2 weeks to call [facility] for MA status.   3/15/23: FRW called pt. Pt sent in applications. FRW and pt will connect in 3 weeks.   3/6/23: FRW called pt and left VM. FRW will make another outreach next week.   2/23/23: FRW called pt 2x and there was no voicemail set up. W will make another outreach in  1-2 weeks.   2/15/23: FRW called pt to patient. Patient was in hospital. FRW will make outreach next week.   1/31/23: FRW called pt to make sure she received the applications. FRW left VM and will make outreach in 1-2 weeks.  1/23/23: FRW called pt and completed applications. FRW mailed the applications for signature and will touch base with pt next week to make sure she received them.  1/12/23: FRW called pt at scheduled time. Pt was sick and we rescheduled appointment to 1/23.   1/3/23: FRW called pt. New appointment made for 1/12.  12/20/22: FRW called pt at scheduled time but was unable to leave . FRW will make another outreach in 1-2 weeks.   12/2/22: FRW called pt and we scheduled appointment for 12/20 to apply for MA.     Health Insurance:      Referral/Screening:  Financial Resource Worker Screening     [facility] Benefits  Is patient requesting help applying for [facility] benefits?: Yes  Have you recently applied for any county benefits?: No  How many people in your household?: 2  Do you buy/eat food together?: Yes  What is the monthly gross income for the household (wages, social security, workers comp, and pension)? : 900     Insurance:  [place]?: No  Is this an insurance renewal?: No  Is this a new insurance application request?: Yes  Have you recently applied for insurance?: No  How many people in your household? : 2  Do you file taxes?: Yes  What is the monthly gross income for the household (wages, social security, workers comp, and pension)? : 900     Any other information for the FRW?: not sure if she claims her adult son who lives with her and disabled.  She is getting $900 per month total.  she gets $200 in snap. son on MA, but she is not.  thanks

## 2023-06-13 ENCOUNTER — THERAPY VISIT (OUTPATIENT)
Dept: PHYSICAL THERAPY | Facility: CLINIC | Age: 68
End: 2023-06-13
Attending: INTERNAL MEDICINE
Payer: COMMERCIAL
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
## 2023-06-13 DIAGNOSIS — M79.605 BILATERAL LEG PAIN: ICD-10-CM

## 2023-06-13 DIAGNOSIS — M79.604 BILATERAL LEG PAIN: ICD-10-CM

## 2023-06-13 DIAGNOSIS — M54.2 MUSCULOSKELETAL NECK PAIN: ICD-10-CM

## 2023-06-13 DIAGNOSIS — M25.512 LEFT SHOULDER PAIN, UNSPECIFIED CHRONICITY: ICD-10-CM

## 2023-06-13 PROCEDURE — 97161 PT EVAL LOW COMPLEX 20 MIN: CPT | Mod: GP | Performed by: PHYSICAL THERAPIST

## 2023-06-13 PROCEDURE — 97110 THERAPEUTIC EXERCISES: CPT | Mod: GP | Performed by: PHYSICAL THERAPIST
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
## 2023-06-15 ENCOUNTER — PATIENT OUTREACH (OUTPATIENT)
Dept: CARE COORDINATION | Facility: CLINIC | Age: 68
End: 2023-06-15
Payer: COMMERCIAL

## 2023-06-15 NOTE — PROGRESS NOTES
Clinic Care Coordination Contact  Program: [facility] Benefits/Health Insurance   County:  [facility] Case #: [id number] (Put this on certain Population)   [facility] Worker:   Mnsure #:   Subscriber #:   Renewal:  Date Applied:   [facility] Address: [address] [address]  Fax: [phone]  FRW Outreach:     6/15/23: FRW called pt. Pt stated it was not a good time to talk and to call back in 2 weeks.   6/8/23: FRW called pt. She was moving to [place] today. FRW and pt will connect next week.   5/31/23: FRW called pt. Pt is going to look through her mail to see if [facility] sent her the certain pop application. Pt is calling FRW back.   5/31/23: FRW called pt. Pt was sleeping and asked FRW to call back later today.   5/17/23: FRW called pt and left VM. FRW will make another outreach in 2 weeks for final attempt.  5/2/23: FRW called pt and left VM. FRW will make another outreach in 2 weeks.   4/26/23: FRW called pt at appt time, client didn't answer no vm setup. FRW will reach out to patient next week.  4/25/23: FRW called pt at scheduled time. Patient asked if appointment could be moved to tomorrow at 3 pm.   4/21/23: FRW and pt called [facility]. [facility] had incorrect address for patient and patient sent the application to wrong location. FRW and pt will complete certain pop on Tuesday.  4/21/23: FRW called pt to call [facility]. Patient was sleeping. FRW will call back later today.   4/20/23: FRW and pt called [facility] but was on hold for over 20 minutes. FRW and pt will call tomorrow at a different time.  4/6/23: FRW called pt to get a status. Pt was approved for SNAP. Patient is on waitlist for housing assistance. Pt is moving in with daughter in [place]. FRW and pt will connect in 2 weeks to call [facility] for MA status.   3/15/23: FRW called pt. Pt sent in applications. FRW and pt will connect in 3 weeks.   3/6/23: FRW called pt and left VM. FRW will make another outreach next week.    2/23/23: FRW called pt 2x and there was no voicemail set up. FRW will make another outreach in 1-2 weeks.   2/15/23: FRW called pt to patient. Patient was in hospital. FRW will make outreach next week.   1/31/23: FRW called pt to make sure she received the applications. FRW left VM and will make outreach in 1-2 weeks.  1/23/23: FRW called pt and completed applications. FRW mailed the applications for signature and will touch base with pt next week to make sure she received them.  1/12/23: FRW called pt at scheduled time. Pt was sick and we rescheduled appointment to 1/23.   1/3/23: FRW called pt. New appointment made for 1/12.  12/20/22: FRW called pt at scheduled time but was unable to leave VM. FRW will make another outreach in 1-2 weeks.   12/2/22: FRW called pt and we scheduled appointment for 12/20 to apply for MA.     Health Insurance:      Referral/Screening:  Financial Resource Worker Screening     [facility] Benefits  Is patient requesting help applying for [facility] benefits?: Yes  Have you recently applied for any [facility] benefits?: No  How many people in your household?: 2  Do you buy/eat food together?: Yes  What is the monthly gross income for the household (wages, social security, workers comp, and pension)? : 900     Insurance:  [place]?: No  Is this an insurance renewal?: No  Is this a new insurance application request?: Yes  Have you recently applied for insurance?: No  How many people in your household? : 2  Do you file taxes?: Yes  What is the monthly gross income for the household (wages, social security, workers comp, and pension)? : 900     Any other information for the FRW?: not sure if she claims her adult son who lives with her and disabled.  She is getting $900 per month total.  she gets $200 in snap. son on MA, but she is not.  thanks

## 2023-06-15 NOTE — PROGRESS NOTES
Clinic Care Coordination Contact    Community Health Worker Follow Up    Care Gaps:     Health Maintenance Due   Topic Date Due     DEXA  Never done     SPIROMETRY  Never done     ZOSTER IMMUNIZATION (1 of 2) Never done     EYE EXAM  07/07/2022     COVID-19 Vaccine (6 - Pfizer series) 04/08/2023     MEDICARE ANNUAL WELLNESS VISIT  05/23/2023       Postponed to next PCP appointment.     Care Plan:   Care Plan: Financial Wellbeing     Problem: Patient expresses financial resource strain     Long-Range Goal: I will apply for MA and [facility].     Start Date: 12/1/2022 Expected End Date: 7/1/2023    This Visit's Progress: 20% Recent Progress: 20%    Priority: High    Note:     Barriers: none identified.  Strengths: motivated.  Patient expressed understanding of goal: yes  Action steps to achieve this goal:  1. I will connect with FRW to apply for programs.   2. I will complete any paper work.  3. I will report progress towards this goal at scheduled outreach telephone calls from the CCC team.    Discussed 6/15/23                    Care Plan: Mental Health     Problem: Mood/Psychosocial Concerns     Long-Range Goal: Establish Mental Health Support and reduce depression symptoms.     Start Date: 12/1/2022 Expected End Date: 7/1/2023    This Visit's Progress: 20% Recent Progress: 20%    Priority: High    Note:     Barriers: caregiver, financial strain.  Strengths: motivated to feel better.  Patient expressed understanding of goal: yes  Action steps to achieve this goal:  1. I will schedule appt with Brea Agudelo when called.  2. I will meet with new provider Maria Elena yin.  3. I will report progress towards this goal at scheduled outreach telephone calls from the CCC team.    Discussed 6/15/23                    Care Plan: Housing Instability     Problem: SDOH LACK OF STABLE HOUSING     Long-Range Goal: I will put my name on senior housing apartment lists that are affordable.     Start Date: 12/1/2022  "Expected End Date: 7/1/2023    This Visit's Progress: 10% Recent Progress: 10%    Priority: Medium    Note:     Barriers: paying a lot of money for current apartment, caregiver for son who is disabled.   Strengths: motivated.  Patient expressed understanding of goal: yes  Action steps to achieve this goal:  1. I will review list of senior apartments that  sends.  2. I will call those that I am interested in to add myself to the waiting list.   3. I will report progress towards this goal at scheduled outreach telephone calls from the CCC team.    Discussed 6/15/23                        Intervention and Education during outreach: CHW gave patient contact information and encouraged patient to reach out with any questions or concerns.    CHW Note:    CHW contacted patient to review/update CCC goals.    Patient reported she has been busy due to moving in with her daughter. Patient shared the move went well and she is enjoying living with her daughter thus far.    Patient shared she has been in contact with ERIC Saavedra regarding the financial resources. Patient shared she received a call from the [facility] last week but was busy with her move and requested return call. Patient will update [facility] team on financial goal at next [facility] outreach.    Patient shared her mental health has been \"good\". Patient reported she has not contacted [facility] regarding a counselor. Patient shared she was busy with the move but does plan on contacting them when she has time available. Patient will update [facility] team on counseling services at next [facility] outreach.    Patient denied any other needs or concerns at this time but will reach out to [facility] as needed.     CHW Plan: CHW will continue to support patient with goals through routine scheduled outreach. CHW will outreach in one month on 7/14/23.      Raquel Miguel  Community Health Worker   [facility]  Clinic Care Coordination  [facility] "   [email or web address]  Office: [phone]

## 2023-06-20 ENCOUNTER — PATIENT OUTREACH (OUTPATIENT)
Dept: CARE COORDINATION | Facility: CLINIC | Age: 68
End: 2023-06-20
Payer: COMMERCIAL

## 2023-06-20 ENCOUNTER — THERAPY VISIT (OUTPATIENT)
Dept: PHYSICAL THERAPY | Facility: CLINIC | Age: 68
End: 2023-06-20
Attending: INTERNAL MEDICINE
Payer: COMMERCIAL

## 2023-06-20 DIAGNOSIS — M25.512 LEFT SHOULDER PAIN, UNSPECIFIED CHRONICITY: Primary | ICD-10-CM

## 2023-06-20 DIAGNOSIS — M54.2 MUSCULOSKELETAL NECK PAIN: ICD-10-CM

## 2023-06-20 DIAGNOSIS — M79.605 BILATERAL LEG PAIN: ICD-10-CM

## 2023-06-20 DIAGNOSIS — M79.604 BILATERAL LEG PAIN: ICD-10-CM

## 2023-06-20 PROCEDURE — 97140 MANUAL THERAPY 1/> REGIONS: CPT | Mod: GP | Performed by: PHYSICAL THERAPIST

## 2023-06-20 PROCEDURE — 97110 THERAPEUTIC EXERCISES: CPT | Mod: GP | Performed by: PHYSICAL THERAPIST

## 2023-06-20 NOTE — PROGRESS NOTES
Care Coordination Clinician Chart Review    Situation: Patient chart reviewed by Care Coordinator.       Background: Care Coordination Program started: 11/21/2022. Initial assessment completed and patient-centered care plan(s) were developed with participation from patient. Lead CC handed patient off to CHW for continued outreaches.       Assessment: Per chart review, patient outreach completed by CC CHW on 6-.  Patient is actively working to accomplish goal(s). Patient's goal(s) appropriate and relevant at this time. Patient is due for updated Plan of Care.  Assessments will be completed annually or as needed/with change of patient status.      Care Plan: Financial Wellbeing     Problem: Patient expresses financial resource strain     Long-Range Goal: I will apply for MA and [facility].     Start Date: 12/1/2022 Expected End Date: 7/1/2023    This Visit's Progress: 20% Recent Progress: 20%    Priority: High    Note:     Barriers: none identified.  Strengths: motivated.  Patient expressed understanding of goal: yes  Action steps to achieve this goal:  1. I will connect with FRW to apply for programs.   2. I will complete any paper work.  3. I will report progress towards this goal at scheduled outreach telephone calls from the CCC team.    Discussed 6/15/23                    Care Plan: Mental Health     Problem: Mood/Psychosocial Concerns     Long-Range Goal: Establish Mental Health Support and reduce depression symptoms.     Start Date: 12/1/2022 Expected End Date: 7/1/2023    This Visit's Progress: 20% Recent Progress: 20%    Priority: High    Note:     Barriers: caregiver, financial strain.  Strengths: motivated to feel better.  Patient expressed understanding of goal: yes  Action steps to achieve this goal:  1. I will schedule appt with Brea Agudelo when called.  2. I will meet with new provider Maria Elena yin.  3. I will report progress towards this goal at scheduled outreach telephone  calls from the CCC team.    Discussed 6/15/23                    Care Plan: Housing Instability     Problem: SDOH LACK OF STABLE HOUSING     Long-Range Goal: I will put my name on senior housing apartment lists that are affordable.     Start Date: 12/1/2022 Expected End Date: 7/1/2023    This Visit's Progress: 10% Recent Progress: 10%    Priority: Medium    Note:     Barriers: paying a lot of money for current apartment, caregiver for son who is disabled.   Strengths: motivated.  Patient expressed understanding of goal: yes  Action steps to achieve this goal:  1. I will review list of senior apartments that  sends.  2. I will call those that I am interested in to add myself to the waiting list.   3. I will report progress towards this goal at scheduled outreach telephone calls from the CCC team.    Discussed 6/15/23                           Plan/Recommendations: The patient will continue working with Care Coordination to achieve goal(s) as above. CHW will continue outreaches at minimum every 30 days and will involve Lead CC as needed or if patient is ready to move to Maintenance. Lead CC will continue to monitor CHW outreaches and patient's progress to goal(s) every 6 weeks.     Plan of Care updated and sent to patient:   No

## 2023-06-27 ENCOUNTER — PATIENT OUTREACH (OUTPATIENT)
Dept: CARE COORDINATION | Facility: CLINIC | Age: 68
End: 2023-06-27
Payer: COMMERCIAL

## 2023-06-27 ENCOUNTER — THERAPY VISIT (OUTPATIENT)
Dept: PHYSICAL THERAPY | Facility: CLINIC | Age: 68
End: 2023-06-27
Payer: COMMERCIAL

## 2023-06-27 DIAGNOSIS — M79.604 BILATERAL LEG PAIN: ICD-10-CM

## 2023-06-27 DIAGNOSIS — M79.605 BILATERAL LEG PAIN: ICD-10-CM

## 2023-06-27 DIAGNOSIS — M54.2 MUSCULOSKELETAL NECK PAIN: ICD-10-CM

## 2023-06-27 DIAGNOSIS — M25.512 LEFT SHOULDER PAIN, UNSPECIFIED CHRONICITY: Primary | ICD-10-CM

## 2023-06-27 PROCEDURE — 97140 MANUAL THERAPY 1/> REGIONS: CPT | Mod: GP | Performed by: PHYSICAL THERAPIST

## 2023-06-27 PROCEDURE — 97110 THERAPEUTIC EXERCISES: CPT | Mod: GP | Performed by: PHYSICAL THERAPIST

## 2023-06-27 NOTE — PROGRESS NOTES
Clinic Care Coordination Contact  Program: [facility] Benefits/Health Insurance   County:  [facility] Case #: [id number] (Put this on certain Population)   [facility] Worker:   Terrellure #:   Subscriber #:   Renewal:  Date Applied: 1/23/23   [facility] Address: [address] [place] [address]  Fax: [phone]    FRW Outreach:   6/27/23: CTA called pt to check the status of Health Insurance.CTA was unable to talk to talk to pt, DARRYN. FRW will f/u in 30 days.    6/15/23: FRW called pt. Pt stated it was not a good time to talk and to call back in 2 weeks.   6/8/23: FRW called pt. She was moving to [place] today. FRW and pt will connect next week.   5/31/23: FRW called pt. Pt is going to look through her mail to see if [facility] sent her the certain pop application. Pt is calling FRW back.   5/31/23: FRW called pt. Pt was sleeping and asked FRW to call back later today.   5/17/23: FRW called pt and left VM. FRW will make another outreach in 2 weeks for final attempt.  5/2/23: FRW called pt and left VM. FRW will make another outreach in 2 weeks.   4/26/23: FRW called pt at appt time, client didn't answer no vm setup. FRW will reach out to patient next week.  4/25/23: FRW called pt at scheduled time. Patient asked if appointment could be moved to tomorrow at 3 pm.   4/21/23: FRW and pt called [facility]. [facility] had incorrect address for patient and patient sent the application to wrong location. FRW and pt will complete certain pop on Tuesday.  4/21/23: FRW called pt to call [facility]. Patient was sleeping. FRW will call back later today.   4/20/23: FRW and pt called [facility] but was on hold for over 20 minutes. FRW and pt will call tomorrow at a different time.  4/6/23: FRW called pt to get a status. Pt was approved for SNAP. Patient is on waitlist for housing assistance. Pt is moving in with daughter in [place]. FRW and pt will connect in 2 weeks to call [facility] for MA status.   3/15/23: FRW called pt. Pt  sent in applications. FRW and pt will connect in 3 weeks.   3/6/23: FRW called pt and left VM. FRW will make another outreach next week.   2/23/23: FRW called pt 2x and there was no voicemail set up. FRW will make another outreach in 1-2 weeks.   2/15/23: FRW called pt to patient. Patient was in hospital. FRW will make outreach next week.   1/31/23: FRW called pt to make sure she received the applications. FRW left VM and will make outreach in 1-2 weeks.  1/23/23: FRW called pt and completed applications. FRW mailed the applications for signature and will touch base with pt next week to make sure she received them.  1/12/23: FRW called pt at scheduled time. Pt was sick and we rescheduled appointment to 1/23.   1/3/23: FRW called pt. New appointment made for 1/12.  12/20/22: FRW called pt at scheduled time but was unable to leave VM. FRW will make another outreach in 1-2 weeks.   12/2/22: FRW called pt and we scheduled appointment for 12/20 to apply for MA.     Health Insurance:      Referral/Screening:  Financial Resource Worker Screening     County Benefits  Is patient requesting help applying for [employer] benefits?: Yes  Have you recently applied for any county benefits?: No  How many people in your household?: 2  Do you buy/eat food together?: Yes  What is the monthly gross income for the household (wages, social security, workers comp, and pension)? : 900     Insurance:  [place] insurance?: No  Is this an insurance renewal?: No  Is this a new insurance application request?: Yes  Have you recently applied for insurance?: No  How many people in your household? : 2  Do you file taxes?: Yes  What is the monthly gross income for the household (wages, social security, workers comp, and pension)? : 900     Any other information for the FRW?: not sure if she claims her adult son who lives with her and disabled.  She is getting $900 per month total.  she gets $200 in snap. son on MA, but she is not.   thanks

## 2023-07-14 ENCOUNTER — PATIENT OUTREACH (OUTPATIENT)
Dept: CARE COORDINATION | Facility: CLINIC | Age: 68
End: 2023-07-14
Payer: COMMERCIAL

## 2023-07-14 NOTE — PROGRESS NOTES
Clinic Care Coordination Contact    Community Health Worker Follow Up    Care Gaps:     Health Maintenance Due   Topic Date Due     DEXA  Never done     SPIROMETRY  Never done     ZOSTER IMMUNIZATION (1 of 2) Never done     EYE EXAM  07/07/2022     COVID-19 Vaccine (6 - Pfizer series) 04/08/2023     MEDICARE ANNUAL WELLNESS VISIT  05/23/2023       Postponed to next PCP appointment.     Care Plan:   Care Plan: Financial Wellbeing     Problem: Patient expresses financial resource strain     Long-Range Goal: I will apply for MA and [facility].     Start Date: 12/1/2022 Expected End Date: 7/1/2023    This Visit's Progress: 20% Recent Progress: 20%    Priority: High    Note:     Barriers: none identified.  Strengths: motivated.  Patient expressed understanding of goal: yes  Action steps to achieve this goal:  1. I will connect with FRW to apply for programs.   2. I will complete any paper work.  3. I will report progress towards this goal at scheduled outreach telephone calls from the CCC team.    Discussed 7/14/23                    Care Plan: Mental Health     Problem: Mood/Psychosocial Concerns     Long-Range Goal: Establish Mental Health Support and reduce depression symptoms.     Start Date: 12/1/2022 Expected End Date: 7/1/2023    This Visit's Progress: 20% Recent Progress: 20%    Priority: High    Note:     Barriers: caregiver, financial strain.  Strengths: motivated to feel better.  Patient expressed understanding of goal: yes  Action steps to achieve this goal:  1. I will schedule appt with Brea Agudelo when called.  2. I will meet with new provider Maria Elena yin.  3. I will report progress towards this goal at scheduled outreach telephone calls from the CCC team.    Discussed 7/14/23                    Care Plan: Housing Instability     Problem: SDOH LACK OF STABLE HOUSING     Long-Range Goal: I will put my name on senior housing apartment lists that are affordable.     Start Date: 12/1/2022  "Expected End Date: 7/1/2023    This Visit's Progress: 20% Recent Progress: 10%    Priority: Medium    Note:     Barriers: paying a lot of money for current apartment, caregiver for son who is disabled.   Strengths: motivated.  Patient expressed understanding of goal: yes  Action steps to achieve this goal:  1. I will review list of senior apartments that  sends.  2. I will call those that I am interested in to add myself to the waiting list.   3. I will report progress towards this goal at scheduled outreach telephone calls from the CCC team.    Discussed 7/14/23                        Intervention and Education during outreach: CHW scheduled patient with [facility] BR[facility] on 7/20/23 at 2PM to discuss transportation resources. CHW gave patient contact information and encouraged patient to reach out with any questions or concerns.    CHW Note:    CHW contacted patient to review/update CCC goals.    Patient shared she has been doing \"good\" and is enjoying living with her daughter thus far.    Patient shared she has been in contact with ERIC Saavedra regarding the financial resources. Patient shared she received a call from the [facility] last month but was busy with her move and requested return call. Patient will update [facility] team on financial goal at next [facility] outreach.    Patient shared her mental health has been \"good\". Patient reported she has not contacted [facility] regarding a counselor. Patient shared she was busy but does plan on contacting them when she has time available. Patient will update [facility] team on counseling services at next [facility] outreach.    Patient shared she is interested in exploring transportation resources at this time.    CHW scheduled patient with [facility] on 7/20/23 at 2PM to discuss transportation resources.    Patient denied any other needs or concerns at this time but will reach out to [facility] as needed.     CHW Plan: CHW will continue to support patient with goals through routine scheduled " outreach. CHW will outreach in one month on 8/21/23.      Raquel Miguel  Community Health Worker   [facility]  Clinic Care Coordination  [facility]   [email or web address]  Office: [phone]

## 2023-07-17 ENCOUNTER — THERAPY VISIT (OUTPATIENT)
Dept: PHYSICAL THERAPY | Facility: CLINIC | Age: 68
End: 2023-07-17
Payer: COMMERCIAL

## 2023-07-17 DIAGNOSIS — M54.2 MUSCULOSKELETAL NECK PAIN: ICD-10-CM

## 2023-07-17 DIAGNOSIS — M79.604 BILATERAL LEG PAIN: ICD-10-CM

## 2023-07-17 DIAGNOSIS — M25.512 LEFT SHOULDER PAIN, UNSPECIFIED CHRONICITY: Primary | ICD-10-CM

## 2023-07-17 DIAGNOSIS — M79.605 BILATERAL LEG PAIN: ICD-10-CM

## 2023-07-17 PROCEDURE — 97110 THERAPEUTIC EXERCISES: CPT | Mod: GP | Performed by: PHYSICAL THERAPIST

## 2023-07-20 ENCOUNTER — PATIENT OUTREACH (OUTPATIENT)
Dept: NURSING | Facility: CLINIC | Age: 68
End: 2023-07-20
Payer: COMMERCIAL

## 2023-07-20 ASSESSMENT — ACTIVITIES OF DAILY LIVING (ADL): DEPENDENT_IADLS:: INDEPENDENT
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
## 2023-07-24 ENCOUNTER — THERAPY VISIT (OUTPATIENT)
Dept: PHYSICAL THERAPY | Facility: CLINIC | Age: 68
End: 2023-07-24
Payer: COMMERCIAL

## 2023-07-24 DIAGNOSIS — M54.2 MUSCULOSKELETAL NECK PAIN: ICD-10-CM

## 2023-07-24 DIAGNOSIS — M79.605 BILATERAL LEG PAIN: ICD-10-CM

## 2023-07-24 DIAGNOSIS — M25.512 LEFT SHOULDER PAIN, UNSPECIFIED CHRONICITY: Primary | ICD-10-CM

## 2023-07-24 DIAGNOSIS — M79.604 BILATERAL LEG PAIN: ICD-10-CM

## 2023-07-24 PROCEDURE — 97110 THERAPEUTIC EXERCISES: CPT | Mod: GP | Performed by: PHYSICAL THERAPIST

## 2023-07-24 PROCEDURE — 97140 MANUAL THERAPY 1/> REGIONS: CPT | Mod: GP | Performed by: PHYSICAL THERAPIST

## 2023-07-26 ENCOUNTER — PATIENT OUTREACH (OUTPATIENT)
Dept: CARE COORDINATION | Facility: CLINIC | Age: 68
End: 2023-07-26
Payer: COMMERCIAL

## 2023-07-26 NOTE — PROGRESS NOTES
Clinic Care Coordination Contact  Program: [facility] Benefits/Health Insurance   County:  [facility] Case #: [id number] (Put this on certain Population)   [facility] Worker:   Mnsure #:   Subscriber #:   Renewal:  Date Applied: 1/23/23   [facility] Address: [address] [place] [address]  Fax: [phone]    FRW Outreach:   7/26/23: FRW called pt. Pt was sleeping and asked for a call back.   Keri Day   Financial Resource Worker  [facility]  Clinic Care Coordination  [phone]   6/27/23: CTA called pt to check the status of Health Insurance.CTA was unable to talk to talk to pt, LM. FRW will f/u in 30 days.    6/15/23: FRW called pt. Pt stated it was not a good time to talk and to call back in 2 weeks.   6/8/23: FRW called pt. She was moving to [place] today. FRW and pt will connect next week.   5/31/23: FRW called pt. Pt is going to look through her mail to see if [facility] sent her the certain pop application. Pt is calling FRW back.   5/31/23: FRW called pt. Pt was sleeping and asked FRW to call back later today.   5/17/23: FRW called pt and left VM. FRW will make another outreach in 2 weeks for final attempt.  5/2/23: FRW called pt and left VM. FRW will make another outreach in 2 weeks.   4/26/23: FRW called pt at appt time, client didn't answer no vm setup. FRW will reach out to patient next week.  4/25/23: FRW called pt at scheduled time. Patient asked if appointment could be moved to tomorrow at 3 pm.   4/21/23: FRW and pt called [facility]. [facility] had incorrect address for patient and patient sent the application to wrong location. FRW and pt will complete certain pop on Tuesday.  4/21/23: FRW called pt to call [facility]. Patient was sleeping. FRW will call back later today.   4/20/23: FRW and pt called [facility] but was on hold for over 20 minutes. FRW and pt will call tomorrow at a different time.  4/6/23: FRW called pt to get a status. Pt was approved for SNAP.  Patient is on waitlist for housing assistance. Pt is moving in with daughter in [place]. FRW and pt will connect in 2 weeks to call [facility] for MA status.   3/15/23: FRW called pt. Pt sent in applications. FRW and pt will connect in 3 weeks.   3/6/23: FRW called pt and left VM. FRW will make another outreach next week.   2/23/23: FRW called pt 2x and there was no voicemail set up. FRW will make another outreach in 1-2 weeks.   2/15/23: FRW called pt to patient. Patient was in hospital. FRW will make outreach next week.   1/31/23: FRW called pt to make sure she received the applications. FRW left VM and will make outreach in 1-2 weeks.  1/23/23: FRW called pt and completed applications. FRW mailed the applications for signature and will touch base with pt next week to make sure she received them.  1/12/23: FRW called pt at scheduled time. Pt was sick and we rescheduled appointment to 1/23.   1/3/23: FRW called pt. New appointment made for 1/12.  12/20/22: FRW called pt at scheduled time but was unable to leave VM. FRW will make another outreach in 1-2 weeks.   12/2/22: FRW called pt and we scheduled appointment for 12/20 to apply for MA.     Health Insurance:      Referral/Screening:  Financial Resource Worker Screening     [facility] Benefits  Is patient requesting help applying for [facility] benefits?: Yes  Have you recently applied for any [facility] benefits?: No  How many people in your household?: 2  Do you buy/eat food together?: Yes  What is the monthly gross income for the household (wages, social security, workers comp, and pension)? : 900     [place]:  [place] No  Is this an insurance renewal?: No  Is this a new insurance application request?: Yes  Have you recently applied for insurance?: No  How many people in your household? : 2  Do you file taxes?: Yes  What is the monthly gross income for the household (wages, social security, workers comp, and pension)? : 900     Any  other information for the FRW?: not sure if she claims her adult son who lives with her and disabled.  She is getting $900 per month total.  she gets $200 in snap. son on MA, but she is not.  thanks

## 2023-07-30 ENCOUNTER — HEALTH MAINTENANCE LETTER (OUTPATIENT)
Age: 68
End: 2023-07-30

## 2023-08-03 ENCOUNTER — PATIENT OUTREACH (OUTPATIENT)
Dept: CARE COORDINATION | Facility: CLINIC | Age: 68
End: 2023-08-03
Payer: COMMERCIAL

## 2023-08-03 NOTE — PROGRESS NOTES
Clinic Care Coordination Contact  Program: [facility] Benefits/Health Insurance   County:  [facility] Case #: [id number] (Put this on certain Population)   [facility] Worker:   Mnsure #:   Subscriber #:   Renewal:  Date Applied: 1/23/23   [facility] Address: [address] [place] [address]  Fax: [phone]    FRW Outreach:   8/3/23: FRW called pt. Pt thinks she got paperwork from [facility] but does not know. FRW and pt will call [facility] tomorrow at 11.   Keri Day   Financial Resource Worker  [facility] Care Coordination  [phone]   7/26/23: FRW called pt. Pt was sleeping and asked for a call back.   Keri Day   Financial Resource Worker  [facility] Care Coordination  [phone]   6/27/23: CTA called pt to check the status of Health Insurance.CTA was unable to talk to talk to pt, LM. FRW will f/u in 30 days.    6/15/23: FRW called pt. Pt stated it was not a good time to talk and to call back in 2 weeks.   6/8/23: FRW called pt. She was moving to [place] today. FRW and pt will connect next week.   5/31/23: FRW called pt. Pt is going to look through her mail to see if [facility] sent her the certain pop application. Pt is calling FRW back.   5/31/23: FRW called pt. Pt was sleeping and asked FRW to call back later today.   5/17/23: FRW called pt and left VM. FRW will make another outreach in 2 weeks for final attempt.  5/2/23: FRW called pt and left VM. FRW will make another outreach in 2 weeks.   4/26/23: FRW called pt at appt time, client didn't answer no vm setup. FRW will reach out to patient next week.  4/25/23: FRW called pt at scheduled time. Patient asked if appointment could be moved to tomorrow at 3 pm.   4/21/23: FRW and pt called [facility]. [facility] had incorrect address for patient and patient sent the application to wrong location. FRW and pt will complete certain pop on Tuesday.  4/21/23: FRW called pt to call [facility].  Patient was sleeping. FRW will call back later today.   4/20/23: FRW and pt called [facility] but was on hold for over 20 minutes. FRW and pt will call tomorrow at a different time.  4/6/23: FRW called pt to get a status. Pt was approved for SNAP. Patient is on waitlist for housing assistance. Pt is moving in with daughter in [place]. FRW and pt will connect in 2 weeks to call [facility] for MA status.   3/15/23: FRW called pt. Pt sent in applications. FRW and pt will connect in 3 weeks.   3/6/23: FRW called pt and left VM. FRW will make another outreach next week.   2/23/23: FRW called pt 2x and there was no voicemail set up. FRW will make another outreach in 1-2 weeks.   2/15/23: FRW called pt to patient. Patient was in hospital. FRW will make outreach next week.   1/31/23: FRW called pt to make sure she received the applications. FRW left VM and will make outreach in 1-2 weeks.  1/23/23: FRW called pt and completed applications. FRW mailed the applications for signature and will touch base with pt next week to make sure she received them.  1/12/23: FRW called pt at scheduled time. Pt was sick and we rescheduled appointment to 1/23.   1/3/23: FRW called pt. New appointment made for 1/12.  12/20/22: FRW called pt at scheduled time but was unable to leave . FRW will make another outreach in 1-2 weeks.   12/2/22: FRW called pt and we scheduled appointment for 12/20 to apply for MA.     Health Insurance:      Referral/Screening:  Financial Resource Worker Screening     [facility] Benefits  Is patient requesting help applying for [facility] benefits?: Yes  Have you recently applied for any [facility] benefits?: No  How many people in your household?: 2  Do you buy/eat food together?: Yes  What is the monthly gross income for the household (wages, social security, workers comp, and pension)? : 900     Insurance:  [place]?: No  Is this an insurance renewal?: No  Is this a new insurance  application request?: Yes  Have you recently applied for insurance?: No  How many people in your household? : 2  Do you file taxes?: Yes  What is the monthly gross income for the household (wages, social security, workers comp, and pension)? : 900     Any other information for the FRW?: not sure if she claims her adult son who lives with her and disabled.  She is getting $900 per month total.  she gets $200 in snap. son on MA, but she is not.  thanks

## 2023-08-04 ENCOUNTER — APPOINTMENT (OUTPATIENT)
Dept: CARE COORDINATION | Facility: CLINIC | Age: 68
End: 2023-08-04
Payer: COMMERCIAL

## 2023-08-04 ENCOUNTER — PATIENT OUTREACH (OUTPATIENT)
Dept: CARE COORDINATION | Facility: CLINIC | Age: 68
End: 2023-08-04

## 2023-08-04 NOTE — PROGRESS NOTES
Clinic Care Coordination Contact  Program: [facility] Benefits/Health Insurance   County:  [facility] Case #: [facility] Worker:   [place] #:   Subscriber #:   Renewal:  Date Applied: 1/23/23     FRW Outreach:   8/4/23: FRW and pt connected. Pt wants to start over and reapply and send the application to [facility] as she has moved. Leidy complete and mailed for signature. FRW including [facility] address.   Keri Day   Financial Resource Worker  [facility] Care Coordination  [phone]   8/3/23: FRW called pt. Pt thinks she got paperwork from [facility] but does not know. FRW and pt will call [facility] tomorrow at 11.   Keri Day   Financial Resource Worker  [facility] Care Coordination  [phone]   8/4/23:  7/26/23: FRW called pt. Pt was sleeping and asked for a call back.   Keri Day   Financial Resource Worker  [facility] Care Coordination  [phone]   6/27/23: CTA called pt to check the status of Health Insurance.CTA was unable to talk to talk to pt, LM. FRW will f/u in 30 days.    6/15/23: FRW called pt. Pt stated it was not a good time to talk and to call back in 2 weeks.   6/8/23: FRW called pt. She was moving to [place] today. FRW and pt will connect next week.   5/31/23: FRW called pt. Pt is going to look through her mail to see if [facility] sent her the certain pop application. Pt is calling FRW back.   5/31/23: FRW called pt. Pt was sleeping and asked FRW to call back later today.   5/17/23: FRW called pt and left VM. FRW will make another outreach in 2 weeks for final attempt.  5/2/23: FRW called pt and left VM. FRW will make another outreach in 2 weeks.   4/26/23: FRW called pt at appt time, client didn't answer no vm setup. FRW will reach out to patient next week.  4/25/23: FRW called pt at scheduled time. Patient asked if appointment could be moved to tomorrow at 3 pm.   4/21/23: FRW and pt called [place]  [facility]. [facility] had incorrect address for patient and patient sent the application to wrong location. FRW and pt will complete certain pop on Tuesday.  4/21/23: FRW called pt to call [facility]. Patient was sleeping. FRW will call back later today.   4/20/23: FRW and pt called [facility] but was on hold for over 20 minutes. FRW and pt will call tomorrow at a different time.  4/6/23: FRW called pt to get a status. Pt was approved for SNAP. Patient is on waitlist for housing assistance. Pt is moving in with daughter in [place]. FRW and pt will connect in 2 weeks to call [facility] for MA status.   3/15/23: FRW called pt. Pt sent in applications. FRW and pt will connect in 3 weeks.   3/6/23: FRW called pt and left VM. FRW will make another outreach next week.   2/23/23: FRW called pt 2x and there was no voicemail set up. FRW will make another outreach in 1-2 weeks.   2/15/23: FRW called pt to patient. Patient was in hospital. FRW will make outreach next week.   1/31/23: FRW called pt to make sure she received the applications. FRW left VM and will make outreach in 1-2 weeks.  1/23/23: FRW called pt and completed applications. FRW mailed the applications for signature and will touch base with pt next week to make sure she received them.  1/12/23: FRW called pt at scheduled time. Pt was sick and we rescheduled appointment to 1/23.   1/3/23: FRW called pt. New appointment made for 1/12.  12/20/22: FRW called pt at scheduled time but was unable to leave VM. FRW will make another outreach in 1-2 weeks.   12/2/22: FRW called pt and we scheduled appointment for 12/20 to apply for MA.     Health Insurance:      Referral/Screening:  Financial Resource Worker Screening     [facility] Benefits  Is patient requesting help applying for [facility] benefits?: Yes  Have you recently applied for any [facility] benefits?: No  How many people in your household?: 2  Do you buy/eat food together?: Yes  What is the monthly gross income for the  household (wages, social security, workers comp, and pension)? : 900     Insurance:  [place]?: No  Is this an insurance renewal?: No  Is this a new insurance application request?: Yes  Have you recently applied for insurance?: No  How many people in your household? : 2  Do you file taxes?: Yes  What is the monthly gross income for the household (wages, social security, workers comp, and pension)? : 900     Any other information for the FRW?: not sure if she claims her adult son who lives with her and disabled.  She is getting $900 per month total.  she gets $200 in snap. son on MA, but she is not.  thanks

## 2023-08-08 ENCOUNTER — THERAPY VISIT (OUTPATIENT)
Dept: SLEEP MEDICINE | Facility: CLINIC | Age: 68
End: 2023-08-08
Payer: COMMERCIAL

## 2023-08-08 DIAGNOSIS — G47.33 OSA (OBSTRUCTIVE SLEEP APNEA): ICD-10-CM

## 2023-08-08 PROCEDURE — 95810 POLYSOM 6/> YRS 4/> PARAM: CPT | Performed by: INTERNAL MEDICINE

## 2023-08-08 ASSESSMENT — SLEEP AND FATIGUE QUESTIONNAIRES
HOW LIKELY ARE YOU TO NOD OFF OR FALL ASLEEP WHILE WATCHING TV: HIGH CHANCE OF DOZING
HOW LIKELY ARE YOU TO NOD OFF OR FALL ASLEEP WHILE LYING DOWN TO REST IN THE AFTERNOON WHEN CIRCUMSTANCES PERMIT: SLIGHT CHANCE OF DOZING
HOW LIKELY ARE YOU TO NOD OFF OR FALL ASLEEP WHEN YOU ARE A PASSENGER IN A CAR FOR AN HOUR WITHOUT A BREAK: HIGH CHANCE OF DOZING
HOW LIKELY ARE YOU TO NOD OFF OR FALL ASLEEP WHILE SITTING AND READING: HIGH CHANCE OF DOZING
HOW LIKELY ARE YOU TO NOD OFF OR FALL ASLEEP WHILE SITTING INACTIVE IN A PUBLIC PLACE: HIGH CHANCE OF DOZING
HOW LIKELY ARE YOU TO NOD OFF OR FALL ASLEEP WHILE SITTING AND TALKING TO SOMEONE: SLIGHT CHANCE OF DOZING
HOW LIKELY ARE YOU TO NOD OFF OR FALL ASLEEP IN A CAR, WHILE STOPPED FOR A FEW MINUTES IN TRAFFIC: MODERATE CHANCE OF DOZING
HOW LIKELY ARE YOU TO NOD OFF OR FALL ASLEEP WHILE SITTING QUIETLY AFTER LUNCH WITHOUT ALCOHOL: HIGH CHANCE OF DOZING

## 2023-08-09 NOTE — PROCEDURES
"         SLEEP STUDY INTERPRETATION  DIAGNOSTIC POLYSOMNOGRAPHY REPORT      Patient: DERRELL ROSA  YOB: 1955  Study Date: 8/8/2023  MRN: [id number]  Referring Provider: -  Ordering Provider: Cj Whaley APRN CNP    Indications for Polysomnography: The patient is a 68 year old Female who is 4' 11\" and weighs 221.0 lbs. Her BMI is 45.2, [place] sleepiness scale 21 and neck circumference is 39 cm. Relevant medical history includes type 2 diabetes, severe sleep apnea. A diagnostic polysomnogram was performed to re-evaluate for sleep apnea.    Polysomnogram Data: A full night polysomnogram recorded the standard physiologic parameters including EEG, EOG, EMG, ECG, nasal and oral airflow. Respiratory parameters of chest and abdominal movements were recorded with respiratory inductance plethysmography. Oxygen saturation was recorded by pulse oximetry. Hypopnea scoring rule used: 1B 4%.    Sleep Architecture: Decreased sleep efficiency with increased wake after sleep onset and increased arousal index.  The total recording time of the polysomnogram was 513.4 minutes. The total sleep time was 358.0 minutes. Sleep latency was increased at 64.5 minutes without the use of a sleep aid. REM latency was 119.5 minutes. Arousal index was increased at 29.8 arousals per hour. Sleep efficiency was decreased at 69.7%. Wake after sleep onset was 90.5 minutes. The patient spent 9.8% of total sleep time in Stage N1, 54.1% in Stage N2, 19.4% in Stage N3, and 16.8% in REM. Time in REM supine was 35.0 minutes.    Respiration: Overall moderate REM predominant obstructive sleep apnea with associated hypoxemia.  Events ? The polysomnogram revealed a presence of 53 obstructive, - central, and - mixed apneas resulting in an apnea index of 8.9 events per hour. There were 69 obstructive hypopneas and - central hypopneas resulting in an obstructive hypopnea index of 11.6 and central hypopnea index of - events per hour. The combined " apnea/hypopnea index was 20.4 events per hour (central apnea/hypopnea index was - events per hour). The REM AHI was 58.0 events per hour. The supine AHI was 18.5 events per hour. The RERA index was 4.9 events per hour.  The RDI was 25.3 events per hour.  Snoring - was reported as moderate, intermittent.  Respiratory rate and pattern - was notable for normal respiratory rate and pattern.  Sustained Sleep Associated Hypoventilation - Transcutaneous carbon dioxide monitoring was not used, however significant hypoventilation was not suggested by oximetry.  Sleep Associated Hypoxemia - (Greater than 5 minutes O2 sat at or below 88%) was present. Baseline oxygen saturation was 95.7%. Lowest oxygen saturation was 71.0%. Time spent less than or equal to 88% was 22.6 minutes. Time spent less than or equal to 89% was 24.8 minutes.    Movement Activity: No abnormal movement activity.  Periodic Limb Activity - There were 44 PLMs during the entire study. The PLM index was 7.4 movements per hour. The PLM Arousal Index was 3.0 per hour.  REM EMG Activity - Excessive transient/sustained muscle activity was not present.  Nocturnal Behavior - Abnormal sleep related behaviors were not noted.  Bruxism - None apparent.    Cardiac Summary: Normal sinus rhythm and rates.  The average pulse rate was 85.2 bpm. The minimum pulse rate was 65.0 bpm while the maximum pulse rate was 104.0 bpm.  Arrhythmias were not noted.      Assessment:   Overall moderate REM predominant obstructive sleep apnea with AHI 20.4 events per hour and associated hypoxemia.  Decreased sleep efficiency with increased wake after sleep onset and increased arousal index.  No abnormal movement activity.  Normal sinus rhythm and rates.    Recommendations:  Based on the presence of moderate obstructive sleep apnea and excessive daytime sleepiness, treatment could be empirically initiated with Auto?titrating PAP therapy with a range of 5 to 15 cmH2O. Recommend clinical follow  up with sleep management team.  Advice regarding the risks of drowsy driving.  Suggest optimizing sleep schedule and avoiding sleep deprivation.  Weight management (BMI > 30).  Pharmacologic therapy should be used for management of restless legs syndrome only if present and clinically indicated and not based on the presence of periodic limb movements alone.    Diagnostic Codes:   Obstructive Sleep Apnea G47.33  Sleep Hypoxemia G47.36        _____________________________________   Electronically Signed By: Dione Bynum MD 8/9/2023

## 2023-08-10 LAB — SLPCOMP: NORMAL

## 2023-08-11 ENCOUNTER — THERAPY VISIT (OUTPATIENT)
Dept: PHYSICAL THERAPY | Facility: CLINIC | Age: 68
End: 2023-08-11
Payer: COMMERCIAL

## 2023-08-11 DIAGNOSIS — M54.2 MUSCULOSKELETAL NECK PAIN: ICD-10-CM

## 2023-08-11 DIAGNOSIS — M79.605 BILATERAL LEG PAIN: ICD-10-CM

## 2023-08-11 DIAGNOSIS — M79.604 BILATERAL LEG PAIN: ICD-10-CM

## 2023-08-11 DIAGNOSIS — M25.512 LEFT SHOULDER PAIN, UNSPECIFIED CHRONICITY: Primary | ICD-10-CM

## 2023-08-11 PROCEDURE — 97140 MANUAL THERAPY 1/> REGIONS: CPT | Mod: GP | Performed by: PHYSICAL THERAPIST

## 2023-08-11 PROCEDURE — 97110 THERAPEUTIC EXERCISES: CPT | Mod: GP | Performed by: PHYSICAL THERAPIST

## 2023-08-11 NOTE — PROGRESS NOTES
PLAN  Continue therapy per current plan of care.   08/11/23 0500   Appointment Info   Signing clinician's name / credentials Harish Holbrook DPT   Total/Authorized Visits 12 (E&T)   Visits Used 6   Medical Diagnosis Left shoulder pain, unspecified chronicity  Bilateral leg pain,  Musculoskeletal neck pain   PT Tx Diagnosis L shoulder pain, neck pain   Quick Adds Certification   Progress Note/Certification   Start of Care Date 06/13/23   Onset of illness/injury or Date of Surgery 05/07/23   Therapy Frequency 1x/wk   Predicted Duration 12 wks   Certification date from 06/13/23   Certification date to 09/10/23   Progress Note Completed Date 06/13/23   PT Goal 1   Goal Identifier reaching   Goal Description Pt will be able to reach overhead pain free   Rationale to maximize safety and independence with performance of ADLs and functional tasks;to maximize safety and independence within the home;to maximize safety and independence with self cares   Goal Progress L shoulder abduction AROM 85 deg 8/11/23   Target Date 09/10/23   Subjective Report   Subjective Report Olive reports that the cervical extension aggravates her chronic vertigo. Reports that generally she feels that things are getting better. Main concern continues to be her L shoulder pain/pain with movement of her L arm. Describes the pain as sometimes burning and it goes from anterior shoulder to just above the elbow. The low back is doing better.   Objective Measures   Objective Measures Objective Measure 1   Objective Measure 1   Objective Measure cervical AROM min loss B SB with ERP on L UT with R SB.  Tender upon palpation of L UT/LS. L shoulder AROM 110/85/occiput/L buttock, L shoulder strengh testing deferred due to pain. L shoulder abduction increased from 85 to 130 following repeated cervical retraction with clinician overpressure   Treatment Interventions (PT)   Interventions Therapeutic Procedure/Exercise;Manual Therapy;Neuromuscular Re-education  "  Therapeutic Procedure/Exercise   Therapeutic Procedures: strength, endurance, ROM, flexibillity minutes (35560) 28   PTRx Ther Proc 1 Cervical Retraction With Patient Overpressure   PTRx Ther Proc 1 - Details x15 with overpressure multiple manual cues=NE/NE with clinician overpressure 2 x 10=P central upper thoracic pain/NW with inc in L shoulder abduction from 85 to 130   PTRx Ther Proc 2 Standing Extension   PTRx Ther Proc 2 - Details x10 against plinth for technique review   PTRx Ther Proc 3 Scapular Retraction/Depression   PTRx Ther Proc 3 - Details HEP   PTRx Ther Proc 4 Upper Trapezius Stretch   PTRx Ther Proc 4 - Details HEP   PTRx Ther Proc 5 Four Corner Stretch Flexion   PTRx Ther Proc 5 - Details x15   PTRx Ther Proc 6 Wand Shoulder Abduction Standing   PTRx Ther Proc 6 - Details x15 with multiple cues for technique   Neuromuscular Re-education   PTRx Neuro Re-ed 1 Posture Correction with Lumbar Roll   PTRx Neuro Re-ed 1 - Details HEP   Manual Therapy   Manual Therapy: Mobilization, MFR, MLD, friction massage minutes (86628) 10   Manual Therapy 1 STM L UT in sitting x 10'   Patient Response/Progress \"feels less pain\" after   Plan   Home program see PTRx   Updates to plan of care cervical retraction with clinician overpressure, d/c extension   Plan for next session per respone to repated retraction wtih self overpressure   Total Session Time   Timed Code Treatment Minutes 38   Total Treatment Time (sum of timed and untimed services) 38         Beginning/End Dates of Progress Note Reporting Period:  06/13/23 to 08/11/2023    Referring Provider:  Cj Georges    "

## 2023-08-14 ENCOUNTER — VIRTUAL VISIT (OUTPATIENT)
Dept: INTERNAL MEDICINE | Facility: CLINIC | Age: 68
End: 2023-08-14
Payer: COMMERCIAL

## 2023-08-14 DIAGNOSIS — S06.9X1D TRAUMATIC BRAIN INJURY, WITH LOSS OF CONSCIOUSNESS OF 30 MINUTES OR LESS, SUBSEQUENT ENCOUNTER: ICD-10-CM

## 2023-08-14 DIAGNOSIS — H81.393 PERIPHERAL VERTIGO OF BOTH EARS: ICD-10-CM

## 2023-08-14 DIAGNOSIS — G47.33 OSA (OBSTRUCTIVE SLEEP APNEA): Primary | ICD-10-CM

## 2023-08-14 DIAGNOSIS — E11.9 TYPE 2 DIABETES MELLITUS WITHOUT COMPLICATION, WITHOUT LONG-TERM CURRENT USE OF INSULIN (H): ICD-10-CM

## 2023-08-14 DIAGNOSIS — M65.30 TRIGGER FINGER, ACQUIRED: ICD-10-CM

## 2023-08-14 DIAGNOSIS — F06.4 ANXIETY DISORDER DUE TO MEDICAL CONDITION: ICD-10-CM

## 2023-08-14 DIAGNOSIS — E55.9 AVITAMINOSIS D: ICD-10-CM

## 2023-08-14 DIAGNOSIS — I10 ESSENTIAL HYPERTENSION: ICD-10-CM

## 2023-08-14 DIAGNOSIS — H81.10 BENIGN PAROXYSMAL POSITIONAL VERTIGO, UNSPECIFIED LATERALITY: Primary | ICD-10-CM

## 2023-08-14 PROCEDURE — 99214 OFFICE O/P EST MOD 30 MIN: CPT | Mod: VID | Performed by: INTERNAL MEDICINE

## 2023-08-14 RX ORDER — MECLIZINE HYDROCHLORIDE 25 MG/1
25 TABLET ORAL 3 TIMES DAILY
Qty: 90 TABLET | Refills: 11 | Status: SHIPPED | OUTPATIENT
Start: 2023-08-14 | End: 2024-01-05

## 2023-08-14 RX ORDER — AMPICILLIN TRIHYDRATE 500 MG
1 CAPSULE ORAL DAILY
Qty: 90 CAPSULE | Refills: 4 | Status: SHIPPED | OUTPATIENT
Start: 2023-08-14 | End: 2024-01-05

## 2023-08-14 RX ORDER — ATORVASTATIN CALCIUM 20 MG/1
TABLET, FILM COATED ORAL
Qty: 90 TABLET | Refills: 2 | Status: SHIPPED | OUTPATIENT
Start: 2023-08-14 | End: 2023-09-29

## 2023-08-14 RX ORDER — DULOXETIN HYDROCHLORIDE 30 MG/1
30 CAPSULE, DELAYED RELEASE ORAL 2 TIMES DAILY
Qty: 180 CAPSULE | Refills: 4 | Status: SHIPPED | OUTPATIENT
Start: 2023-08-14 | End: 2024-08-29

## 2023-08-14 RX ORDER — PREDNISONE 10 MG/1
10 TABLET ORAL EVERY MORNING
Qty: 4 TABLET | Refills: 0 | Status: SHIPPED | OUTPATIENT
Start: 2023-08-14 | End: 2023-08-18

## 2023-08-14 ASSESSMENT — PATIENT HEALTH QUESTIONNAIRE - PHQ9
SUM OF ALL RESPONSES TO PHQ QUESTIONS 1-9: 19
10. IF YOU CHECKED OFF ANY PROBLEMS, HOW DIFFICULT HAVE THESE PROBLEMS MADE IT FOR YOU TO DO YOUR WORK, TAKE CARE OF THINGS AT HOME, OR GET ALONG WITH OTHER PEOPLE: SOMEWHAT DIFFICULT
SUM OF ALL RESPONSES TO PHQ QUESTIONS 1-9: 19

## 2023-08-14 NOTE — PATIENT INSTRUCTIONS
Meclizine 25 mg 3 times daily    Resume duloxetine 30 mg twice daily    Resume vitamin D 1000 units daily    Referral to therapy to repeat vestibular rehab    Referral to orthopedics for trigger finger injection    Prednisone 10 mg daily for 4 days    Question whether taking losartan/HCTZ
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
## 2023-08-14 NOTE — TELEPHONE ENCOUNTER
"Last Written Prescription Date:  4/26/2023  Last Fill Quantity: 90,  # refills: 0   Last office visit provider:  5/12/2023     Requested Prescriptions   Pending Prescriptions Disp Refills    atorvastatin (LIPITOR) 20 MG tablet [Pharmacy Med Name: ATORVASTATIN 20MG TABLETS] 90 tablet 0     Sig: TAKE 1 TABLET(20 MG) BY MOUTH DAILY       Statins Protocol Passed - 8/14/2023 11:35 AM        Passed - LDL on file in past 12 months     Recent Labs   Lab Test 05/12/23  0752   LDL 72             Passed - No abnormal creatine kinase in past 12 months     Recent Labs   Lab Test 11/26/18  1823   CKT 89                Passed - Recent (12 mo) or future (30 days) visit within the authorizing provider's specialty     Patient has had an office visit with the authorizing provider or a provider within the authorizing providers department within the previous 12 mos or has a future within next 30 days. See \"Patient Info\" tab in inbasket, or \"Choose Columns\" in Meds & Orders section of the refill encounter.              Passed - Medication is active on med list        Passed - Patient is age 18 or older        Passed - No active pregnancy on record        Passed - No positive pregnancy test in past 12 months             Shanell Collins RN 08/14/23 3:23 PM  "

## 2023-08-14 NOTE — PROGRESS NOTES
Isabel is a 68 year old who is being evaluated via a billable video visit.      How would you like to obtain your AVS? MyChart  If the video visit is dropped, the invitation should be resent by: {video visit invitation (Optional) :475258}  Will anyone else be joining your video visit? No  {If patient encounters technical issues they should call [phone] :121040}        {PROVIDER CHARTING PREFERENCE:555078}    Subjective   Iasbel is a 68 year old, presenting for the following health issues:  Dizziness (Dizziness worsening X 1 month, also experiencing headaches at night and in the morning.)      8/14/2023     4:56 PM   Additional Questions   Roomed by Nitza   Accompanied by N/A         8/14/2023     4:56 PM   Patient Reported Additional Medications   Patient reports taking the following new medications N/A       HPI     {SUPERLIST (Optional):060521}  {additonal problems for provider to add (Optional):568070}      Review of Systems   {ROS COMP (Optional):011700}      Objective           Vitals:  No vitals were obtained today due to virtual visit.    Physical Exam   {video visit exam brief selected:045960}    {Diagnostic Test Results (Optional):556581}    {AMBULATORY ATTESTATION (Optional):489036}        Video-Visit Details    Type of service:  Video Visit   Video Start Time: {video visit start/end time for provider to select:847280}  Video End Time:{video visit start/end time for provider to select:504234}    Originating Location (pt. Location): Home  {PROVIDER LOCATION On-site should be selected for visits conducted from your clinic location or adjoining [facility] hospital, academic office, or other nearby [facility] building. Off-site should be selected for all other provider locations, including home:089172}  Distant Location (provider location):  On-site  Platform used for Video Visit: [employer]

## 2023-08-16 ENCOUNTER — PATIENT OUTREACH (OUTPATIENT)
Dept: CARE COORDINATION | Facility: CLINIC | Age: 68
End: 2023-08-16
Payer: COMMERCIAL

## 2023-08-16 ENCOUNTER — THERAPY VISIT (OUTPATIENT)
Dept: PHYSICAL THERAPY | Facility: CLINIC | Age: 68
End: 2023-08-16
Attending: INTERNAL MEDICINE
Payer: COMMERCIAL

## 2023-08-16 DIAGNOSIS — H81.393 PERIPHERAL VERTIGO OF BOTH EARS: Primary | ICD-10-CM

## 2023-08-16 DIAGNOSIS — H81.10 BENIGN PAROXYSMAL POSITIONAL VERTIGO, UNSPECIFIED LATERALITY: ICD-10-CM

## 2023-08-16 PROCEDURE — 95992 CANALITH REPOSITIONING PROC: CPT | Mod: GP | Performed by: PHYSICAL THERAPIST

## 2023-08-16 PROCEDURE — 97162 PT EVAL MOD COMPLEX 30 MIN: CPT | Mod: GP | Performed by: PHYSICAL THERAPIST
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
## 2023-08-16 NOTE — PROGRESS NOTES
Clinic Care Coordination Contact  Program: [facility] Benefits/Health Insurance   County:  [facility] Case #: [facility] Worker:   Carola #:   Subscriber #:   Renewal:  Date Applied: 1/23/23     FRW Outreach:   8/16/23: FRW called pt. Pt received the application for Certain Population and will mail to [facility]. FRW will make outreach to pt within 30 days.  Keri Day   Financial Resource Worker  [facility] Care Coordination  [phone]   8/4/23: FRW and pt connected. Pt wants to start over and reapply and send the application to [facility] as she has moved. Leidy complete and mailed for signature. FRW including [facility] address.   Keri Day   Financial Resource Worker  [facility] Care Coordination  [phone]   8/3/23: FRW called pt. Pt thinks she got paperwork from [facility] but does not know. FRW and pt will call [facility] tomorrow at 11.   Keri Day   Financial Resource Worker  [facility] Care Coordination  [phone]   8/4/23:  7/26/23: FRW called pt. Pt was sleeping and asked for a call back.   Keri Day   Financial Resource Worker  [facility] Care Coordination  [phone]   6/27/23: CTA called pt to check the status of Health Insurance.CTA was unable to talk to talk to pt, LM. FRW will f/u in 30 days.    6/15/23: FRW called pt. Pt stated it was not a good time to talk and to call back in 2 weeks.   6/8/23: FRW called pt. She was moving to [place] today. FRW and pt will connect next week.   5/31/23: FRW called pt. Pt is going to look through her mail to see if [facility] sent her the certain pop application. Pt is calling FRW back.   5/31/23: FRW called pt. Pt was sleeping and asked FRW to call back later today.   5/17/23: FRW called pt and left VM. FRW will make another outreach in 2 weeks for final attempt.  5/2/23: FRW called pt and left VM. FRW will make another outreach in 2 weeks.    4/26/23: FRW called pt at appt time, client didn't answer no vm setup. FRW will reach out to patient next week.  4/25/23: FRW called pt at scheduled time. Patient asked if appointment could be moved to tomorrow at 3 pm.   4/21/23: FRW and pt called [facility]. [facility] had incorrect address for patient and patient sent the application to wrong location. FRW and pt will complete certain pop on Tuesday.  4/21/23: FRW called pt to call [facility]. Patient was sleeping. FRW will call back later today.   4/20/23: FRW and pt called [facility] but was on hold for over 20 minutes. FRW and pt will call tomorrow at a different time.  4/6/23: FRW called pt to get a status. Pt was approved for SNAP. Patient is on waitlist for housing assistance. Pt is moving in with daughter in [place]. FRW and pt will connect in 2 weeks to call [facility] for MA status.   3/15/23: FRW called pt. Pt sent in applications. FRW and pt will connect in 3 weeks.   3/6/23: FRW called pt and left VM. FRW will make another outreach next week.   2/23/23: FRW called pt 2x and there was no voicemail set up. FRW will make another outreach in 1-2 weeks.   2/15/23: FRW called pt to patient. Patient was in hospital. FRW will make outreach next week.   1/31/23: FRW called pt to make sure she received the applications. FRW left VM and will make outreach in 1-2 weeks.  1/23/23: FRW called pt and completed applications. FRW mailed the applications for signature and will touch base with pt next week to make sure she received them.  1/12/23: FRW called pt at scheduled time. Pt was sick and we rescheduled appointment to 1/23.   1/3/23: FRW called pt. New appointment made for 1/12.  12/20/22: FRW called pt at scheduled time but was unable to leave VM. FRW will make another outreach in 1-2 weeks.   12/2/22: FRW called pt and we scheduled appointment for 12/20 to apply for MA.     Health Insurance:      Referral/Screening:  Financial Resource Worker Screening      [facility] Benefits  Is patient requesting help applying for Critical access hospital benefits?: Yes  Have you recently applied for any Critical access hospital benefits?: No  How many people in your household?: 2  Do you buy/eat food together?: Yes  What is the monthly gross income for the household (wages, social security, workers comp, and pension)? : 900     Insurance:  [place] for [place] insurance?: No  Is this an insurance renewal?: No  Is this a new insurance application request?: Yes  Have you recently applied for insurance?: No  How many people in your household? : 2  Do you file taxes?: Yes  What is the monthly gross income for the household (wages, social security, workers comp, and pension)? : 900     Any other information for the FRW?: not sure if she claims her adult son who lives with her and disabled.  She is getting $900 per month total.  she gets $200 in snap. son on MA, but she is not.  thanks

## 2023-08-21 ENCOUNTER — PATIENT OUTREACH (OUTPATIENT)
Dept: CARE COORDINATION | Facility: CLINIC | Age: 68
End: 2023-08-21
Payer: COMMERCIAL

## 2023-08-21 NOTE — PROGRESS NOTES
Clinic Care Coordination Contact  Community Health Worker Follow Up    Care Gaps:     Health Maintenance Due   Topic Date Due    DEXA  Never done    SPIROMETRY  Never done    ZOSTER IMMUNIZATION (1 of 2) Never done    EYE EXAM  07/07/2022    COVID-19 Vaccine (6 - Pfizer series) 04/08/2023    MEDICARE ANNUAL WELLNESS VISIT  05/23/2023       Postponed to next PCP appointment.     Care Plan:   Care Plan: Financial Wellbeing       Problem: Patient expresses financial resource strain       Long-Range Goal: I will apply for MA and [facility].       Start Date: 12/1/2022 Expected End Date: 11/30/2023    This Visit's Progress: 40% Recent Progress: 40%    Priority: High    Note:     Barriers: none identified.  Strengths: motivated.  Patient expressed understanding of goal: yes  Action steps to achieve this goal:  1. I will connect with FRW to apply for programs.   2. I will complete any paper work.  3. I will report progress towards this goal at scheduled outreach telephone calls from the CCC team.    Discussed 8/21/23                            Care Plan: Mental Health       Problem: Mood/Psychosocial Concerns       Long-Range Goal: Establish Mental Health Support and reduce depression symptoms.       Start Date: 12/1/2022 Expected End Date: 7/1/2023    This Visit's Progress: 20% Recent Progress: 20%    Priority: High    Note:     Barriers: caregiver, financial strain.  Strengths: motivated to feel better.  Patient expressed understanding of goal: yes  Action steps to achieve this goal:  1. I will attend appt on 8-3 with Sushila Hair at [facility]  2. I will meet with counselor virtually.  3. I will report progress towards this goal at scheduled outreach telephone calls from the CCC team.    Discussed 8/21/23                            Care Plan: Housing Instability       Problem: SDOH LACK OF STABLE HOUSING       Long-Range Goal: I will put my name on senior housing apartment lists that are affordable.       Start  "Date: 12/1/2022 Expected End Date: 7/1/2023    This Visit's Progress: 30% Recent Progress: 30%    Priority: Medium    Note:     Barriers: paying a lot of money for current apartment, caregiver for son who is disabled.   Strengths: motivated.  Patient expressed understanding of goal: yes  Action steps to achieve this goal:  1. I will review list of senior apartments that  sends.  2. I will call those that I am interested in to add myself to the waiting list.   3. I will report progress towards this goal at scheduled outreach telephone calls from the CCC team.    Discussed 8/21/23                              Intervention and Education during outreach: CHW provided patient with the contact information for [facility] "[employer] regarding scheduling her counseling appointment. CHW gave patient contact information and encouraged patient to reach out with any questions or concerns.     CHW Note:  CHW contacted patient to review/update CCC goals.  Patient shared she has been doing \"about the same\" since last [facility] outreach.  Patient shared she has been in contact with CRISTOPHER Saavedra regarding the financial resources. Patient shared she received the application for Certain Population and is still working on completing the application at this time. CHW encouraged patient to complete application and mail to [facility] when she is able. Patient will update [facility] team on financial goal at next [facility] outreach.  Patient reported she has not contacted [facility] "[employer] regarding a counselor. Patient shared she has been busy but does plan on contacting them when she has time available. CHW provided patient with the contact information for [facility] "[employer] and encouraged her to schedule appointment at her earliest convenience.  Patient reported she is still reviewing the senior housing list that [facility] had provided. Patient will reach out to [facility] " if she has questions or needs assistance with the housing resources.    Patient denied any other needs or concerns at this time but will reach out to CCC as needed.      CHW Plan: CHW will continue to support patient with goals through routine scheduled outreach. CHW will outreach in one month on 9/20/23.        Raquel Miguel  Community Health Worker   [facility] Care Coordination  [facility]   [email or web address]  Office: [phone]

## 2023-08-22 ENCOUNTER — VIRTUAL VISIT (OUTPATIENT)
Dept: SLEEP MEDICINE | Facility: CLINIC | Age: 68
End: 2023-08-22
Payer: COMMERCIAL

## 2023-08-22 VITALS — HEIGHT: 62 IN | BODY MASS INDEX: 39.38 KG/M2 | WEIGHT: 214 LBS

## 2023-08-22 DIAGNOSIS — G47.33 OSA (OBSTRUCTIVE SLEEP APNEA): Primary | ICD-10-CM

## 2023-08-22 DIAGNOSIS — G47.36 HYPOXEMIA ASSOCIATED WITH SLEEP: ICD-10-CM

## 2023-08-22 DIAGNOSIS — F33.1 MODERATE EPISODE OF RECURRENT MAJOR DEPRESSIVE DISORDER (H): ICD-10-CM

## 2023-08-22 PROCEDURE — 99214 OFFICE O/P EST MOD 30 MIN: CPT | Mod: VID | Performed by: NURSE PRACTITIONER

## 2023-08-22 ASSESSMENT — SLEEP AND FATIGUE QUESTIONNAIRES
HOW LIKELY ARE YOU TO NOD OFF OR FALL ASLEEP WHILE SITTING AND TALKING TO SOMEONE: SLIGHT CHANCE OF DOZING
HOW LIKELY ARE YOU TO NOD OFF OR FALL ASLEEP WHILE LYING DOWN TO REST IN THE AFTERNOON WHEN CIRCUMSTANCES PERMIT: HIGH CHANCE OF DOZING
HOW LIKELY ARE YOU TO NOD OFF OR FALL ASLEEP WHILE SITTING QUIETLY AFTER LUNCH WITHOUT ALCOHOL: SLIGHT CHANCE OF DOZING
HOW LIKELY ARE YOU TO NOD OFF OR FALL ASLEEP WHILE SITTING INACTIVE IN A PUBLIC PLACE: HIGH CHANCE OF DOZING
HOW LIKELY ARE YOU TO NOD OFF OR FALL ASLEEP WHEN YOU ARE A PASSENGER IN A CAR FOR AN HOUR WITHOUT A BREAK: HIGH CHANCE OF DOZING
HOW LIKELY ARE YOU TO NOD OFF OR FALL ASLEEP WHILE SITTING AND READING: MODERATE CHANCE OF DOZING
HOW LIKELY ARE YOU TO NOD OFF OR FALL ASLEEP IN A CAR, WHILE STOPPED FOR A FEW MINUTES IN TRAFFIC: SLIGHT CHANCE OF DOZING
HOW LIKELY ARE YOU TO NOD OFF OR FALL ASLEEP WHILE WATCHING TV: SLIGHT CHANCE OF DOZING

## 2023-08-22 ASSESSMENT — PAIN SCALES - GENERAL: PAINLEVEL: SEVERE PAIN (7)

## 2023-08-22 ASSESSMENT — PATIENT HEALTH QUESTIONNAIRE - PHQ9: SUM OF ALL RESPONSES TO PHQ QUESTIONS 1-9: 19

## 2023-08-22 NOTE — PATIENT INSTRUCTIONS
"MY INFORMATION ON SLEEP APNEA-  Isabel Biggs    DOCTOR : DAYSI Padron CNP  SLEEP CENTER :      MY CONTACT NUMBER:   [facility]  [phone]  [facility]   [phone]  [facility]   [phone]      [facility]  [phone]  [facility]   [phone]    [facility] [address], [address]  [place] [address]  Phone: [phone]    Hours:  Mon - Fri: 8:00 a.m. - 4:30 p.m.  Sat: Closed  Sun: Closed      Key Points:  1. What is Obstructive Sleep Apnea (WILFRIDO)? WILFRIDO is the most common type of sleep apnea. Apnea literally means, \"without breath.\" It is characterized by repetitive pauses in breathing, despite continued effort to breathe, and is usually associated with a reduction in blood oxygen saturation. Apneas can last 10 to over 60 seconds. It is caused by narrowing or collapse of the upper airway as muscles relax during sleep.   2. What are the consequences of WILFRIDO? Symptoms include: daytime sleepiness- possibly increasing the risk of falling asleep while driving, unrefreshing/restless sleep, snoring, insomnia, waking frequently to urinate, waking with heartburn or reflux, reduced concentration and memory, and morning headaches. Other health consequences may include development of high blood pressure. Untreated WILFRIDO also can contribute to heart disease, stroke and diabetes.   3. What are the treatment options? In most situations, sleep apnea is a lifelong disease that must be managed with daily therapy. Continuous Positive Airway (CPAP) is the most reliable treatment. A mouthguard to hold your jaw forward is usually the next most reliable option. Other options include postioning devices (to keep you off your back), nasal valves, tongue retaining device, weight loss, surgery. There is more detail about these options toward the end of this " document.  4. What are the most important things to remember about using CPAP?     WHERE CAN I FIND MORE INFORMATION?    American Academy of Sleep Medicine Patient information on sleep disorders:  http://yoursleep.aasmnet.org    CPAP -  WHY AND HOW?                 Continuous positive airway pressure, or CPAP, is the most effective treatment for obstructive sleep apnea. It works by blowing room air, through a mask, to hold your throat open. A decision to use CPAP is a major step forward in the pursuit of a healthier life. The successful use of CPAP will help you breathe easier, sleep better and live healthier. Using CPAP can be a positive experience if you keep these bruner points in mind:  Commitment  CPAP is not a quick fix for your problem. It involves a long-term commitment to improve your sleep and your health.    Communication  Stay in close communication with both your sleep doctor and your CPAP supplier. Ask lots of questions and seek help when you need it.    Consistency  Use CPAP all night, every night and for every nap. You will receive the maximum health benefits from CPAP when you use it every time that you sleep. This will also make it easier for your body to adjust to the treatment.    Correction  The first machine and mask that you try may not be the best ones for you. Work with your sleep doctor and your CPAP supplier to make corrections to your equipment selection. Ask about trying a different type of machine or mask if you have ongoing problems. Make sure that your mask is a good fit and learn to use your equipment properly.    Challenge  Tell a family member or close friend to ask you each morning if you used your CPAP the previous night. Have someone to challenge you to give it your best effort.    Connection   Your adjustment to CPAP will be easier if you are able to connect with others who use the same treatment. Ask your sleep doctor if there is a support group in your area for people who have  "sleep apnea, or look for one on the Internet.  Comfort   Increase your level of comfort by using a saline spray, decongestant or heated humidifier if CPAP irritates your nose, mouth or throat. Use your unit's \"ramp\" setting to slowly get used to the air pressure level. There may be soft pads you can buy that will fit over your mask straps. Look on www.CPAP.com for accessories that can help make CPAP use more comfortable.  Cleaning   Clean your mask, tubing and headgear on a regular basis. Put this time in your schedule so that you don't forget to do it. Check and replace the filters for your CPAP unit and humidifier.    Completion   Although you are never finished with CPAP therapy, you should reward yourself by celebrating the completion of your first month of treatment. Expect this first month to be your hardest period of adjustment. It will involve some trial and error as you find the machine, mask and pressure settings that are right for you.    Continuation  After your first month of treatment, continue to make a daily commitment to use your CPAP all night, every night and for every nap.    CPAP-Tips to starting with success:  Begin using your CPAP for short periods of time during the day while you watch TV or read.    Use CPAP every night and for every nap. Using it less often reduces the health benefits and makes it harder for your body to get used to it.    Newer CPAP models are virtually silent; however, if you find the sound of your CPAP machine to be bothersome, place the unit under your bed to dampen the sound.     Make small adjustments to your mask, tubing, straps and headgear until you get the right fit. Tightening the mask may actually worsen the leak.  If it leaves significant marks on your face or irritates the bridge of your nose, it may not be the best mask for you.  Speak with the person who supplied the mask and consider trying other masks. Insurances will allow you to try different masks " during the first month of starting CPAP.  Insurance also covers a new mask, hose and filter about every 6 months.    Use a saline nasal spray to ease mild nasal congestion. Neti-Pot or saline nasal rinses may also help. Nasal gel sprays can help reduce nasal dryness.  Biotene mouthwash can be helpful to protect your teeth if you experience frequent dry mouth.  Dry mouth may be a sign of air escaping out of your mouth or out of the mask in the case of a full face mask.  Speak with your provider if you expect that is the case.     Take a nasal decongestant to relieve more severe nasal or sinus congestion.  Do not use Afrin (oxymetazoline) nasal spray more than 3 days in a row.  Speak with your sleep doctor if your nasal congestion is chronic.    Use a heated humidifier that fits your CPAP model to enhance your breathing comfort. Adjust the heat setting up if you get a dry nose or throat, down if you get condensation in the hose or mask.  Position the CPAP lower than you so that any condensation in the hose drains back into the machine rather than towards the mask.    Try a system that uses nasal pillows if traditional masks give you problems.    Clean your mask, tubing and headgear once a week. Make sure the equipment dries fully.    Regularly check and replace the filters for your CPAP unit and humidifier.    Work closely with your sleep provider and your CPAP supplier to make sure that you have the machine, mask and air pressure setting that works best for you. It is better to stop using it and call your provider to solve problems than to lay awake all night frustrated with the device.    Weight Loss:    Weight loss decreases severity of sleep apnea in most people with obesity. For those with mild obesity who have developed snoring with weight gain, even 15-30 pound weight loss can improve and occasionally eliminate sleep apnea.  Structured and life-long dietary and health habits are necessary to lose weight and  keep healthier weight levels.     Though there are significant health benefits from weight loss, long-term weight loss is very difficult to achieve- studies show success with dietary management in less than 10% of people. In addition, substantial weight loss may require years of dietary control and may be difficult if patients have severe obesity. In these cases, surgical management may be considered.    If you are interested in methods for weight loss, you should review the options discussed at the National Institutes of Health patient information sites:     http://win.niddk.nih.gov/publications/index.htm  http:/www.health.nih.gov/topic/WeightLossDieting    Bariatric programs offer counseling in all methods of weight loss:    Http:/www.[facility].org/Specialties/WeightLossSurgeryandMedicalMgmt/htm    Your BMI is Body mass index is 39.14 kg/m .    Weight management plan: Patient was referred to their PCP to discuss a diet and exercise plan.    Body mass index (BMI) is one way to tell whether you are at a healthy weight, overweight, or obese. It measures your weight in relation to your height.  A BMI of 18.5 to 24.9 is in the healthy range. A person with a BMI of 25 to 29.9 is considered overweight, and someone with a BMI of 30 or greater is considered obese.  Another way to find out if you are at risk for health problems caused by overweight and obesity is to measure your waist. If you are a woman and your waist is more than 35 inches, or if you are a man and your waist is more than 40 inches, your risk of disease may be higher.  More than two-thirds of American adults are considered overweight or obese. Being overweight or obese increases the risk for further weight gain.  Excess weight may lead to heart disease and diabetes. Creating and following plans for healthy eating and physical activity may help you improve your health.    Methods for maintaining or losing weight.    Weight control is part of healthy  lifestyle and includes exercise, emotional health, and healthy eating habits.  Careful eating habits lifelong is the mainstay of weight control.  Though there are significant health benefits from weight loss, long-term weight loss with diet alone may be very difficult to achieve- studies show long-term success with dietary management in less than 10% of people. Attaining a healthy weight may be especially difficult to achieve in those with severe obesity. In some cases, medications, devices and surgical management might be considered.    What can you do?    If you are overweight or obese and are interested in methods for weight loss, you should discuss this with your provider. In addition, we recommend that you review healthy life styles and methods for weight loss available through the National Institutes of Health patient information sites:     http://win.niddk.nih.gov/publications/index.htm

## 2023-08-22 NOTE — PROGRESS NOTES
Depression Response    Patient completed the PHQ-9 assessment for depression and scored >9? Yes  Question 9 on the PHQ-9 was positive for suicidality? No  Does patient have current mental health provider? Yes    Is this a virtual visit? Yes   Does patient have suicidal ideation (positive question 9)? No - offer to place Mental Health Referral.  Patient declined referral/not needed - Has an appointment upcoming in September    I personally notified the following: visit provider         Virtual Visit Details    Type of service:  Video Visit   Video Start Time: 1:09 PM  Video End Time:  1:32 PM    Originating Location (pt. Location): Home    Distant Location (provider location):  Off-site  Platform used for Video Visit: [facility]      Sleep Study Follow-Up Visit:    Date on this visit: 8/22/2023    Isabel Biggs is a pleasant 68-year-old female with a PMH pertinent for T2DM, diabetic polyneuropathy, headaches, beta thalassemia, major depression, morbid obesity, and severe WILFRIDO who presents today for follow-up of her sleep study done on 8/08/2023 at the [facility] for  reevaluation of sleep apnea .          These findings were reviewed with patient.     Past medical/surgical history, family history, social history, medications and allergies were reviewed.      Problem List:  Patient Active Problem List    Diagnosis Date Noted    Left shoulder pain, unspecified chronicity 06/13/2023     Priority: Medium    Bilateral leg pain 06/13/2023     Priority: Medium    Musculoskeletal neck pain 06/13/2023     Priority: Medium    Essential hypertension 01/02/2023     Priority: Medium    Avitaminosis D 08/29/2022     Priority: Medium     Formatting of this note might be different from the original.  Created by Conversion    Replacement Utility updated for latest IMO load      Diabetic polyneuropathy associated with type 2 diabetes mellitus (H)      Priority: Medium     Created by Conversion  Replacement  Utility updated for latest IMO load        WILFRIDO      Priority: Medium     8/8/2023 [facility] Study (221.0 lbs) - AHI 20.4, RDI 25.3, Supine AHI 18.5, REM AHI 58.0, Low O2 71.0%, Time Spent ?88% 22.6 minutes / Time Spent ?89% 24.8 minutes.        Peripheral vertigo of both ears 01/26/2020     Priority: Medium    Type 2 diabetes mellitus without complication, without long-term current use of insulin (H)      Priority: Medium     Created by Conversion        Decreased activities of daily living (ADL) 01/08/2019     Priority: Medium    Morbid obesity (H) 11/08/2018     Priority: Medium    Beta thalassemia (H) 05/12/2015     Priority: Medium    Low back pain  05/12/2015     Priority: Medium    Moderate episode of recurrent major depressive disorder (H) 05/12/2015     Priority: Medium      Current Outpatient Medications   Medication    amitriptyline (ELAVIL) 50 MG tablet    amLODIPine (NORVASC) 5 MG tablet    aspirin (ASA) 81 MG EC tablet    atorvastatin (LIPITOR) 20 MG tablet    Cholecalciferol (D 1000) 25 MCG (1000 UT) CAPS    DULoxetine (CYMBALTA) 30 MG capsule    erythromycin (ROMYCIN) 5 MG/GM ophthalmic ointment    losartan-hydrochlorothiazide (HYZAAR) 50-12.5 MG tablet    meclizine (ANTIVERT) 25 MG tablet    omeprazole (PRILOSEC) 20 MG DR capsule     Current Facility-Administered Medications   Medication    sodium hyaluronate (DUROLANE) injection 60 mg     LMP 06/08/1996 (Exact Date)     Impression/Plan:  Moderate Obstructive Sleep Apnea.   Hypoxemia associated with sleep  Moderate episode of recurrent major depressive disorder (H)     Treatment options discussed today including  auto-CPAP at 8-15 cmH2O. She has a PHQ-9 > 9 today and has a previous diagnosis of major depression which she reports having an upcoming appointment with her mental health provider early next month.  She has declined acute intervention at this time.    Elected treatment with auto-CPAP at 8-15 cmH2O. A comprehensive DME order  was placed on 8/14/2023 for new/replacement APAP device, mask of choice, and supplies sent to [facility].  She is to get set up on APAP the 1st week of September.    She will follow up with me in about 2 month(s) after obtaining new APAP device to review download data and use/compliance.     35 minutes spent with patient, all of which were spent face-to-face counseling, consulting, chart review/documentation, and coordinating plan of care on the date of the encounter.      DAYSI Padron CNP  Sleep Medicine      CC: Cj Georges     This note was written with the assistance of the Dragon voice-dictation technology software. The final document, although reviewed, may contain errors. For corrections, please contact the office.

## 2023-08-22 NOTE — NURSING NOTE
Is the patient currently in the state of [place]? YES    Visit mode:VIDEO    If the visit is dropped, the patient can be reconnected by: VIDEO VISIT: Text to cell phone:   Telephone Information:   Mobile [phone]       Will anyone else be joining the visit? NO  (If patient encounters technical issues they should call [phone]    How would you like to obtain your AVS? MyChart    Are changes needed to the allergy or medication list? Pt stated no changes to allergies and Pt stated no med changes    Reason for visit: RECHECK  Has patient had flu shot for current/most recent flu season? If so, when? Yes: 12/08/2022      Love MARTINI

## 2023-08-23 ENCOUNTER — OFFICE VISIT (OUTPATIENT)
Dept: NEUROLOGY | Facility: CLINIC | Age: 68
End: 2023-08-23
Payer: COMMERCIAL

## 2023-08-23 ENCOUNTER — PRE VISIT (OUTPATIENT)
Dept: NEUROLOGY | Facility: CLINIC | Age: 68
End: 2023-08-23

## 2023-08-23 VITALS
SYSTOLIC BLOOD PRESSURE: 155 MMHG | BODY MASS INDEX: 40.33 KG/M2 | DIASTOLIC BLOOD PRESSURE: 83 MMHG | WEIGHT: 220.5 LBS | HEART RATE: 82 BPM

## 2023-08-23 DIAGNOSIS — R41.3 MEMORY CHANGE: Primary | ICD-10-CM

## 2023-08-23 PROCEDURE — 99204 OFFICE O/P NEW MOD 45 MIN: CPT | Performed by: STUDENT IN AN ORGANIZED HEALTH CARE EDUCATION/TRAINING PROGRAM
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
## 2023-08-23 NOTE — PATIENT INSTRUCTIONS
Blood work  MRI brain  Neuropsychological testing   Let me know if you change your mind on a medication    Most important things  Mood  Sleep  Staying physically and mentally  I agree with no driving.

## 2023-08-24 ENCOUNTER — THERAPY VISIT (OUTPATIENT)
Dept: PHYSICAL THERAPY | Facility: CLINIC | Age: 68
End: 2023-08-24
Payer: COMMERCIAL

## 2023-08-24 DIAGNOSIS — M79.605 BILATERAL LEG PAIN: ICD-10-CM

## 2023-08-24 DIAGNOSIS — M25.512 LEFT SHOULDER PAIN, UNSPECIFIED CHRONICITY: Primary | ICD-10-CM

## 2023-08-24 DIAGNOSIS — M54.2 MUSCULOSKELETAL NECK PAIN: ICD-10-CM

## 2023-08-24 DIAGNOSIS — M79.604 BILATERAL LEG PAIN: ICD-10-CM

## 2023-08-24 PROCEDURE — 97110 THERAPEUTIC EXERCISES: CPT | Mod: GP | Performed by: PHYSICAL THERAPIST

## 2023-08-24 PROCEDURE — 97140 MANUAL THERAPY 1/> REGIONS: CPT | Mod: GP | Performed by: PHYSICAL THERAPIST

## 2023-08-28 ENCOUNTER — PATIENT OUTREACH (OUTPATIENT)
Dept: CARE COORDINATION | Facility: CLINIC | Age: 68
End: 2023-08-28
Payer: COMMERCIAL

## 2023-08-28 NOTE — PROGRESS NOTES
Care Coordination Clinician Chart Review    Situation: Patient chart reviewed by Care Coordinator.       Background: Care Coordination Program started: 11/21/2022. Initial assessment completed and patient-centered care plan(s) were developed with participation from patient. Lead CC handed patient off to CHW for continued outreaches.       Assessment: Per chart review, patient outreach completed by CC CHW on 8-.  Patient is actively working to accomplish goal(s). Patient's goal(s) appropriate and relevant at this time. Patient is due for updated Plan of Care.  Assessments will be completed annually or as needed/with change of patient status.      Care Plan: Financial Wellbeing       Problem: Patient expresses financial resource strain       Long-Range Goal: I will apply for MA and Pamela Care.       Start Date: 12/1/2022 Expected End Date: 11/30/2023    This Visit's Progress: 40% Recent Progress: 40%    Priority: High    Note:     Barriers: none identified.  Strengths: motivated.  Patient expressed understanding of goal: yes  Action steps to achieve this goal:  1. I will connect with FRW to apply for programs.   2. I will complete any paper work.  3. I will report progress towards this goal at scheduled outreach telephone calls from the CCC team.    Discussed 8/21/23                            Care Plan: Mental Health       Problem: Mood/Psychosocial Concerns       Long-Range Goal: Establish Mental Health Support and reduce depression symptoms.       Start Date: 12/1/2022 Expected End Date: 7/1/2023    This Visit's Progress: 20% Recent Progress: 20%    Priority: High    Note:     Barriers: caregiver, financial strain.  Strengths: motivated to feel better.  Patient expressed understanding of goal: yes  Action steps to achieve this goal:  1. I will attend appt on 8-3 with Sushila Hair at [facility]  2. I will meet with counselor humphrey.  3. I will report progress towards this goal at scheduled outreach  telephone calls from the CCC team.    Discussed 8/21/23                            Care Plan: Housing Instability       Problem: SDOH LACK OF STABLE HOUSING       Long-Range Goal: I will put my name on senior housing apartment lists that are affordable.       Start Date: 12/1/2022 Expected End Date: 7/1/2023    This Visit's Progress: 30% Recent Progress: 30%    Priority: Medium    Note:     Barriers: paying a lot of money for current apartment, caregiver for son who is disabled.   Strengths: motivated.  Patient expressed understanding of goal: yes  Action steps to achieve this goal:  1. I will review list of senior apartments that  sends.  2. I will call those that I am interested in to add myself to the waiting list.   3. I will report progress towards this goal at scheduled outreach telephone calls from the CCC team.    Discussed 8/21/23                                 Plan/Recommendations: The patient will continue working with Care Coordination to achieve goal(s) as above. CHW will continue outreaches at minimum every 30 days and will involve Lead CC as needed or if patient is ready to move to Maintenance. Lead CC will continue to monitor CHW outreaches and patient's progress to goal(s) every 6 weeks.     Plan of Care updated and sent to patient: Yes, via [facility]

## 2023-08-28 NOTE — LETTER
[facility] CARE COORDINATION  [facility]  August 28, 2023        Isabel Biggs  [address]  [address]  [place] [address]          Dear Isabel,     Attached is an updated Complex Care Plan for your continued enrollment in Care Coordination. Please let us know if you have additional questions, concerns, or goals that we can assist with.    Sincerely,    Angela Rogel,   [facility]  [phone]        [facility]  Patient Centered Plan of Care  About Me:        Patient Name:  Isabel Biggs    YOB: 1955  Age:         68 year old   [facility] MRN:    [id number] Telephone Information:  Home Phone [phone]   Mobile [phone]   Home Phone [phone]       Address:  [address]  [address]  [place] [address] Email address:  [email or web address]      Emergency Contact(s)    Name Relationship Lgl Grd Work Phone Home Phone Mobile Phone   1. OTONIEL SCHAFFER Daughter    [phone]   2. PARISA SCHAFFER Daughter   [phone]            Primary language:  English     needed? No   [employer]:  [phone] op. 1  Other communication barriers:None    Preferred Method of Communication:     Current living arrangement: I live in a private home with family    Mobility Status/ Medical Equipment: Independent        Health Maintenance  Health Maintenance Reviewed: Due/Overdue   Health Maintenance Due   Topic Date Due    DEXA  Never done    SPIROMETRY  Never done    ZOSTER IMMUNIZATION (1 of 2) Never done    EYE EXAM  07/07/2022    COVID-19 Vaccine (6 - Pfizer series) 04/08/2023    MEDICARE ANNUAL WELLNESS VISIT  05/23/2023    INFLUENZA VACCINE (1) 09/01/2023           My Access Plan  Medical Emergency 911   Primary Clinic Line [facility] - [phone]   24 Hour Appointment Line [phone] or  [phone]) (toll-free)   24 Hour Nurse Line [phone] (toll-free)   Preferred Urgent Care   [facility], [phone]     Preferred Hospital [facility] [place]  [phone]     Preferred Pharmacy [facility] - [place], [place] - [address] [place] [address]     Behavioral Health Crisis Line The National Suicide Prevention Lifeline at 1-152.449.9588 or Text/Call 988             My Care Team Members  Patient Care Team         Relationship Specialty Notifications Start End    Cj Georges MD PCP - General   3/9/18     Phone: [phone] Fax: [phone]         [address] [facility] [address]    Claudia Joseph MD MD Hematology & Oncology Admissions 12/14/17     Phone: [phone] Fax: [phone]         [address] [place] [address]    Meghann Rosado, PharmD Pharmacist Pharmacist  2/3/20     Phone: [phone] Fax: [phone]         [facility]  [address] [place] [address]    Cj Georges MD Assigned PCP   6/16/21     Phone: [phone] Fax: [phone]         [address] [facility] [address]    Juma Wilkerson OD MD Optometry  6/18/21     Phone: [phone] Fax: [phone]         [address] [address]    Denice Plasencia MD MD Neurology  7/26/21     Phone: [phone] Fax: [phone]         [address] [place] [address]    Tenzin Chawla MD Assigned Musculoskeletal Provider   5/22/22     Phone: [phone] Fax: [phone] [address] [place] [address]    Raquel Miguel, MA Community Health Worker Primary Care - CC Admissions 11/21/22     Angela Rogel LSW Lead Care Coordinator Primary Care - CC Admissions 12/1/22     Phone: [phone]         Keri Day Financial Resource Worker   12/1/22     Phone: [phone]         Ariella Miguel OD MD Optometry  1/18/23     Phone: [phone] Fax: [phone] [address] [facility] [address]    Adele Hamilton PA-C Referring Physician Family Medicine   1/18/23     Referring Physician to eye    Phone: [phone] Fax: [phone] [address] [facility] [address]    Dwain Santos MD MD Ophthalmology  1/23/23     Phone: [phone] Fax: [phone]         [address] [facility] [address]    Reginaldo Ochoa MD MD Neurology  2/28/23     Phone: [phone] Fax: [phone]         [address] [facility] [address]    Cj Whaley, APRN CNP Assigned Sleep Provider   4/15/23     Phone: [phone] Fax: [phone] [address] [place] [address] [facility] [address]    Ariella Miguel, OD Assigned Surgical Provider   7/15/23     Phone: [phone] Fax: [phone] [address] [facility] [address]    Reginaldo Ochoa MD Assigned Neuroscience Provider   8/26/23     Phone: [phone] Fax: [phone]         [address] [facility] [address]              My Care Plans  Self Management and Treatment Plan  Care Plan  Care Plan: Financial Wellbeing       Problem: Patient expresses financial resource strain       Long-Range Goal: I will apply for MA and Pamela Care.       Start Date: 12/1/2022 Expected End Date: 11/30/2023    This Visit's Progress: 40% Recent Progress: 40%    Priority: High    Note:     Barriers: none identified.  Strengths: motivated.  Patient expressed understanding of goal: yes  Action steps to achieve this goal:  1. I will connect with FRW to apply for programs.   2. I will complete any paper work.  3. I will report progress towards this goal at scheduled outreach telephone calls from the CCC team.    Discussed 8/21/23                            Care Plan: Mental Health       Problem: Mood/Psychosocial Concerns       Long-Range Goal: Establish Mental Health Support and reduce depression symptoms.       Start Date: 12/1/2022 Expected End Date: 7/1/2023    This Visit's Progress: 20% Recent Progress: 20%    Priority: High    Note:     Barriers: caregiver, financial  strain.  Strengths: motivated to feel better.  Patient expressed understanding of goal: yes  Action steps to achieve this goal:  1. I will attend appt on 8-3 with Sushila Hair at [facility]  2. I will meet with counselor humphrey.  3. I will report progress towards this goal at scheduled outreach telephone calls from the CCC team.    Discussed 8/21/23                            Care Plan: Housing Instability       Problem: SDOH LACK OF STABLE HOUSING       Long-Range Goal: I will put my name on senior housing apartment lists that are affordable.       Start Date: 12/1/2022 Expected End Date: 7/1/2023    This Visit's Progress: 30% Recent Progress: 30%    Priority: Medium    Note:     Barriers: paying a lot of money for current apartment, caregiver for son who is disabled.   Strengths: motivated.  Patient expressed understanding of goal: yes  Action steps to achieve this goal:  1. I will review list of senior apartments that  sends.  2. I will call those that I am interested in to add myself to the waiting list.   3. I will report progress towards this goal at scheduled outreach telephone calls from the CCC team.    Discussed 8/21/23                                      Advance Care Plans/Directives Type:   No data recorded    My Medical and Care Information  Problem List   Patient Active Problem List   Diagnosis    WILFRIDO    Type 2 diabetes mellitus without complication, without long-term current use of insulin (H)    Diabetic polyneuropathy associated with type 2 diabetes mellitus (H)    Beta thalassemia (H)    Low back pain     Moderate episode of recurrent major depressive disorder (H)    Morbid obesity (H)    Peripheral vertigo of both ears    Decreased activities of daily living (ADL)    Avitaminosis D    Essential hypertension    Left shoulder pain, unspecified chronicity    Bilateral leg pain    Musculoskeletal neck pain      Current Medications and Allergies:    Current Outpatient Medications    Medication Instructions    amitriptyline (ELAVIL) 50 mg, Oral, AT BEDTIME    amLODIPine (NORVASC) 5 mg, Oral, DAILY    aspirin (ASA) 81 mg, Oral, DAILY    atorvastatin (LIPITOR) 20 MG tablet TAKE 1 TABLET(20 MG) BY MOUTH DAILY    Cholecalciferol (D 1000) 25 MCG (1000 UT) CAPS 1 capsule, Oral, DAILY    DULoxetine (CYMBALTA) 30 mg, Oral, 2 TIMES DAILY    erythromycin (ROMYCIN) 5 MG/GM ophthalmic ointment 0.5 inches, Left Eye, 2 TIMES DAILY    losartan-hydrochlorothiazide (HYZAAR) 50-12.5 MG tablet 1 tablet, Oral, DAILY    meclizine (ANTIVERT) 25 mg, Oral, 3 TIMES DAILY    omeprazole (PRILOSEC) 20 MG DR capsule No dose, route, or frequency recorded.         Care Coordination Start Date: 11/21/2022   Frequency of Care Coordination: monthly     Form Last Updated: 08/28/2023

## 2023-08-29 ENCOUNTER — LAB (OUTPATIENT)
Dept: LAB | Facility: CLINIC | Age: 68
End: 2023-08-29
Payer: COMMERCIAL

## 2023-08-29 DIAGNOSIS — R41.3 MEMORY CHANGE: ICD-10-CM

## 2023-08-29 PROCEDURE — 87389 HIV-1 AG W/HIV-1&-2 AB AG IA: CPT | Performed by: STUDENT IN AN ORGANIZED HEALTH CARE EDUCATION/TRAINING PROGRAM

## 2023-08-29 PROCEDURE — 84425 ASSAY OF VITAMIN B-1: CPT | Mod: 90 | Performed by: STUDENT IN AN ORGANIZED HEALTH CARE EDUCATION/TRAINING PROGRAM

## 2023-08-29 PROCEDURE — 83520 IMMUNOASSAY QUANT NOS NONAB: CPT | Mod: 90 | Performed by: STUDENT IN AN ORGANIZED HEALTH CARE EDUCATION/TRAINING PROGRAM

## 2023-08-29 PROCEDURE — 36415 COLL VENOUS BLD VENIPUNCTURE: CPT | Performed by: STUDENT IN AN ORGANIZED HEALTH CARE EDUCATION/TRAINING PROGRAM

## 2023-08-29 PROCEDURE — 99000 SPECIMEN HANDLING OFFICE-LAB: CPT | Performed by: STUDENT IN AN ORGANIZED HEALTH CARE EDUCATION/TRAINING PROGRAM

## 2023-08-29 PROCEDURE — 86780 TREPONEMA PALLIDUM: CPT | Performed by: STUDENT IN AN ORGANIZED HEALTH CARE EDUCATION/TRAINING PROGRAM

## 2023-08-30 LAB
HIV 1+2 AB+HIV1 P24 AG SERPL QL IA: NONREACTIVE
T PALLIDUM AB SER QL: NONREACTIVE

## 2023-09-02 LAB — VIT B1 PYROPHOSHATE BLD-SCNC: 82 NMOL/L

## 2023-09-08 LAB — SCANNED LAB RESULT: NORMAL

## 2023-09-14 ENCOUNTER — ANCILLARY PROCEDURE (OUTPATIENT)
Dept: GENERAL RADIOLOGY | Facility: CLINIC | Age: 68
End: 2023-09-14
Attending: INTERNAL MEDICINE
Payer: COMMERCIAL

## 2023-09-14 ENCOUNTER — OFFICE VISIT (OUTPATIENT)
Dept: INTERNAL MEDICINE | Facility: CLINIC | Age: 68
End: 2023-09-14
Payer: COMMERCIAL

## 2023-09-14 VITALS
HEIGHT: 62 IN | SYSTOLIC BLOOD PRESSURE: 120 MMHG | OXYGEN SATURATION: 98 % | HEART RATE: 91 BPM | DIASTOLIC BLOOD PRESSURE: 80 MMHG | RESPIRATION RATE: 20 BRPM | BODY MASS INDEX: 40.67 KG/M2 | WEIGHT: 221 LBS | TEMPERATURE: 97.8 F

## 2023-09-14 DIAGNOSIS — R41.89 COGNITIVE IMPAIRMENT: ICD-10-CM

## 2023-09-14 DIAGNOSIS — M25.561 CHRONIC PAIN OF RIGHT KNEE: ICD-10-CM

## 2023-09-14 DIAGNOSIS — M54.50 CHRONIC BILATERAL LOW BACK PAIN WITHOUT SCIATICA: ICD-10-CM

## 2023-09-14 DIAGNOSIS — E78.2 MIXED HYPERLIPIDEMIA: ICD-10-CM

## 2023-09-14 DIAGNOSIS — H81.393 PERIPHERAL VERTIGO OF BOTH EARS: ICD-10-CM

## 2023-09-14 DIAGNOSIS — Z78.0 ASYMPTOMATIC POSTMENOPAUSAL ESTROGEN DEFICIENCY: ICD-10-CM

## 2023-09-14 DIAGNOSIS — E55.9 AVITAMINOSIS D: ICD-10-CM

## 2023-09-14 DIAGNOSIS — E11.9 TYPE 2 DIABETES MELLITUS WITHOUT COMPLICATION, WITHOUT LONG-TERM CURRENT USE OF INSULIN (H): ICD-10-CM

## 2023-09-14 DIAGNOSIS — F06.4 ANXIETY DISORDER DUE TO MEDICAL CONDITION: ICD-10-CM

## 2023-09-14 DIAGNOSIS — F33.1 MODERATE EPISODE OF RECURRENT MAJOR DEPRESSIVE DISORDER (H): ICD-10-CM

## 2023-09-14 DIAGNOSIS — E66.01 MORBID OBESITY (H): ICD-10-CM

## 2023-09-14 DIAGNOSIS — G89.29 CHRONIC PAIN OF RIGHT KNEE: ICD-10-CM

## 2023-09-14 DIAGNOSIS — G47.33 OSA (OBSTRUCTIVE SLEEP APNEA): ICD-10-CM

## 2023-09-14 DIAGNOSIS — I10 ESSENTIAL HYPERTENSION: ICD-10-CM

## 2023-09-14 DIAGNOSIS — M65.30 TRIGGER FINGER, ACQUIRED: ICD-10-CM

## 2023-09-14 DIAGNOSIS — M79.604 BILATERAL LEG PAIN: ICD-10-CM

## 2023-09-14 DIAGNOSIS — E11.42 DIABETIC POLYNEUROPATHY ASSOCIATED WITH TYPE 2 DIABETES MELLITUS (H): ICD-10-CM

## 2023-09-14 DIAGNOSIS — M79.605 BILATERAL LEG PAIN: ICD-10-CM

## 2023-09-14 DIAGNOSIS — G89.29 CHRONIC BILATERAL LOW BACK PAIN WITHOUT SCIATICA: ICD-10-CM

## 2023-09-14 DIAGNOSIS — Z00.00 ANNUAL PHYSICAL EXAM: Primary | ICD-10-CM

## 2023-09-14 DIAGNOSIS — D56.1 BETA THALASSEMIA (H): ICD-10-CM

## 2023-09-14 PROBLEM — Z78.9 DECREASED ACTIVITIES OF DAILY LIVING (ADL): Status: RESOLVED | Noted: 2019-01-08 | Resolved: 2023-09-14

## 2023-09-14 LAB
ALBUMIN SERPL BCG-MCNC: 4.5 G/DL (ref 3.5–5.2)
ALP SERPL-CCNC: 108 U/L (ref 35–104)
ALT SERPL W P-5'-P-CCNC: 16 U/L (ref 0–50)
ANION GAP SERPL CALCULATED.3IONS-SCNC: 13 MMOL/L (ref 7–15)
AST SERPL W P-5'-P-CCNC: 20 U/L (ref 0–45)
BILIRUB SERPL-MCNC: 0.6 MG/DL
BUN SERPL-MCNC: 12.4 MG/DL (ref 8–23)
CALCIUM SERPL-MCNC: 9.6 MG/DL (ref 8.8–10.2)
CHLORIDE SERPL-SCNC: 102 MMOL/L (ref 98–107)
CHOLEST SERPL-MCNC: 193 MG/DL
CREAT SERPL-MCNC: 0.57 MG/DL (ref 0.51–0.95)
CREAT UR-MCNC: 150 MG/DL
DEPRECATED HCO3 PLAS-SCNC: 23 MMOL/L (ref 22–29)
EGFRCR SERPLBLD CKD-EPI 2021: >90 ML/MIN/1.73M2
ERYTHROCYTE [DISTWIDTH] IN BLOOD BY AUTOMATED COUNT: 14.5 % (ref 10–15)
GLUCOSE SERPL-MCNC: 115 MG/DL (ref 70–99)
HBA1C MFR BLD: 6.1 % (ref 0–5.6)
HCT VFR BLD AUTO: 35.8 % (ref 35–47)
HDLC SERPL-MCNC: 61 MG/DL
HGB BLD-MCNC: 11.3 G/DL (ref 11.7–15.7)
LDLC SERPL CALC-MCNC: 114 MG/DL
MCH RBC QN AUTO: 24.4 PG (ref 26.5–33)
MCHC RBC AUTO-ENTMCNC: 31.6 G/DL (ref 31.5–36.5)
MCV RBC AUTO: 77 FL (ref 78–100)
MICROALBUMIN UR-MCNC: 14.9 MG/L
MICROALBUMIN/CREAT UR: 9.93 MG/G CR (ref 0–25)
NONHDLC SERPL-MCNC: 132 MG/DL
PLATELET # BLD AUTO: 334 10E3/UL (ref 150–450)
POTASSIUM SERPL-SCNC: 4 MMOL/L (ref 3.4–5.3)
PROT SERPL-MCNC: 8 G/DL (ref 6.4–8.3)
RBC # BLD AUTO: 4.64 10E6/UL (ref 3.8–5.2)
SODIUM SERPL-SCNC: 138 MMOL/L (ref 136–145)
TRIGL SERPL-MCNC: 88 MG/DL
WBC # BLD AUTO: 4.7 10E3/UL (ref 4–11)

## 2023-09-14 PROCEDURE — 82306 VITAMIN D 25 HYDROXY: CPT | Performed by: INTERNAL MEDICINE

## 2023-09-14 PROCEDURE — 80053 COMPREHEN METABOLIC PANEL: CPT | Performed by: INTERNAL MEDICINE

## 2023-09-14 PROCEDURE — 99214 OFFICE O/P EST MOD 30 MIN: CPT | Mod: 25 | Performed by: INTERNAL MEDICINE

## 2023-09-14 PROCEDURE — G0008 ADMIN INFLUENZA VIRUS VAC: HCPCS | Performed by: INTERNAL MEDICINE

## 2023-09-14 PROCEDURE — 36415 COLL VENOUS BLD VENIPUNCTURE: CPT | Performed by: INTERNAL MEDICINE

## 2023-09-14 PROCEDURE — 73562 X-RAY EXAM OF KNEE 3: CPT | Mod: TC | Performed by: RADIOLOGY

## 2023-09-14 PROCEDURE — 85027 COMPLETE CBC AUTOMATED: CPT | Performed by: INTERNAL MEDICINE

## 2023-09-14 PROCEDURE — 90662 IIV NO PRSV INCREASED AG IM: CPT | Performed by: INTERNAL MEDICINE

## 2023-09-14 PROCEDURE — 83036 HEMOGLOBIN GLYCOSYLATED A1C: CPT | Performed by: INTERNAL MEDICINE

## 2023-09-14 PROCEDURE — 82570 ASSAY OF URINE CREATININE: CPT | Performed by: INTERNAL MEDICINE

## 2023-09-14 PROCEDURE — 80061 LIPID PANEL: CPT | Performed by: INTERNAL MEDICINE

## 2023-09-14 PROCEDURE — G0439 PPPS, SUBSEQ VISIT: HCPCS | Performed by: INTERNAL MEDICINE

## 2023-09-14 PROCEDURE — 82043 UR ALBUMIN QUANTITATIVE: CPT | Performed by: INTERNAL MEDICINE

## 2023-09-14 ASSESSMENT — ENCOUNTER SYMPTOMS
CONSTIPATION: 1
HEARTBURN: 1
WEAKNESS: 1
FREQUENCY: 0
SORE THROAT: 0
ABDOMINAL PAIN: 0
CHILLS: 0
DIARRHEA: 0
ARTHRALGIAS: 1
DYSURIA: 0
BREAST MASS: 0
HEMATURIA: 0
NERVOUS/ANXIOUS: 1
JOINT SWELLING: 0
HEADACHES: 1

## 2023-09-14 ASSESSMENT — PATIENT HEALTH QUESTIONNAIRE - PHQ9
SUM OF ALL RESPONSES TO PHQ QUESTIONS 1-9: 18
SUM OF ALL RESPONSES TO PHQ QUESTIONS 1-9: 18
10. IF YOU CHECKED OFF ANY PROBLEMS, HOW DIFFICULT HAVE THESE PROBLEMS MADE IT FOR YOU TO DO YOUR WORK, TAKE CARE OF THINGS AT HOME, OR GET ALONG WITH OTHER PEOPLE: SOMEWHAT DIFFICULT

## 2023-09-14 ASSESSMENT — ACTIVITIES OF DAILY LIVING (ADL): CURRENT_FUNCTION: NO ASSISTANCE NEEDED

## 2023-09-14 NOTE — PATIENT INSTRUCTIONS
Patient Education   Personalized Prevention Plan  You are due for the preventive services outlined below.  Your care team is available to assist you in scheduling these services.  If you have already completed any of these items, please share that information with your care team to update in your medical record.  Health Maintenance Due   Topic Date Due     Osteoporosis Screening  Never done     Breathing Capacity Test  Never done     Zoster (Shingles) Vaccine (1 of 2) Never done     Eye Exam  07/07/2022     COVID-19 Vaccine (6 - Pfizer series) 04/08/2023     Your Health Risk Assessment indicates you feel you are not in good health    A healthy lifestyle helps keep the body fit and the mind alert. It helps protect you from disease, helps you fight disease, and helps prevent chronic disease (disease that doesn't go away) from getting worse. This is important as you get older and begin to notice twinges in muscles and joints and a decline in the strength and stamina you once took for granted. A healthy lifestyle includes good healthcare, good nutrition, weight control, recreation, and regular exercise. Avoid harmful substances and do what you can to keep safe. Another part of a healthy lifestyle is stay mentally active and socially involved.    Good healthcare   Have a wellness visit every year.   If you have new symptoms, let us know right away. Don't wait until the next checkup.   Take medicines exactly as prescribed and keep your medicines in a safe place. Tell us if your medicine causes problems.   Healthy diet and weight control   Eat 3 or 4 small, nutritious, low-fat, high-fiber meals a day. Include a variety of fruits, vegetables, and whole-grain foods.   Make sure you get enough calcium in your diet. Calcium, vitamin D, and exercise help prevent osteoporosis (bone thinning).   If you live alone, try eating with others when you can. That way you get a good meal and have company while you eat it.   Try to keep a  healthy weight. If you eat more calories than your body uses for energy, it will be stored as fat and you will gain weight.     Recreation   Recreation is not limited to sports and team events. It includes any activity that provides relaxation, interest, enjoyment, and exercise. Recreation provides an outlet for physical, mental, and social energy. It can give a sense of worth and achievement. It can help you stay healthy.    Mental Exercise and Social Involvement  Mental and emotional health is as important as physical health. Keep in touch with friends and family. Stay as active as possible. Continue to learn and challenge yourself.   Things you can do to stay mentally active are:  Learn something new, like a foreign language or musical instrument.   Play SCRABBLE or do crossword puzzles. If you cannot find people to play these games with you at home, you can play them with others on your computer through the Internet.   Join a games club--anything from card games to chess or checkers or lawn bowling.   Start a new hobby.   Go back to school.   Volunteer.   Read.   Keep up with world events.  Learning About Dietary Guidelines  What are the Dietary Guidelines for Americans?     Dietary Guidelines for Americans provide tips for eating well and staying healthy. This helps reduce the risk for long-term (chronic) diseases.  These guidelines recommend that you:  Eat and drink the right amount for you. The [place] government's food guide is called MyPlate. It can help you make your own well-balanced eating plan.  Try to balance your eating with your activity. This helps you stay at a healthy weight.  Drink alcohol in moderation, if at all.  Limit foods high in salt, saturated fat, trans fat, and added sugar.  These guidelines are from the [place] Department of Agriculture and the U[place] Department of Health and Human Services. They are updated every 5 years.  What is MyPlate?  MyPlate is the [place] government's food guide. It can  "help you make your own well-balanced eating plan. A balanced eating plan means that you eat enough, but not too much, and that your food gives you the nutrients you need to stay healthy.  MyPlate focuses on eating plenty of whole grains, fruits, and vegetables, and on limiting fat and sugar. It is available online at www.ChooseMyPlate.gov.  How can you get started?  If you're trying to eat healthier, you can slowly change your eating habits over time. You don't have to make big changes all at once. Start by adding one or two healthy foods to your meals each day.  Grains  Choose whole-grain breads and cereals and whole-wheat pasta and whole-grain crackers.  Vegetables  Eat a variety of vegetables every day. They have lots of nutrients and are part of a heart-healthy diet.  Fruits  Eat a variety of fruits every day. Fruits contain lots of nutrients. Choose fresh fruit instead of fruit juice.  Protein foods  Choose fish and lean poultry more often. Eat red meat and fried meats less often. Dried beans, tofu, and nuts are also good sources of protein.  Dairy  Choose low-fat or fat-free products from this food group. If you have problems digesting milk, try eating cheese or yogurt instead.  Fats and oils  Limit fats and oils if you're trying to cut calories. Choose healthy fats when you cook. These include canola oil and olive oil.  Where can you learn more?  Go to https://www.[employer].net/patiented  Enter D676 in the search box to learn more about \"Learning About Dietary Guidelines.\"  Current as of: March 1, 2023               Content Version: 13.7    9295-8067 [employer].   Care instructions adapted under license by your healthcare professional. If you have questions about a medical condition or this instruction, always ask your healthcare professional. [employer] disclaims any warranty or liability for your use of this information.      Bladder Training: Care Instructions  Your Care " Instructions     Bladder training is used to treat urge incontinence and stress incontinence. Urge incontinence means that the need to urinate comes on so fast that you can't get to a toilet in time. Stress incontinence means that you leak urine because of pressure on your bladder. For example, it may happen when you laugh, cough, or lift something heavy.  Bladder training can increase how long you can wait before you have to urinate. It can also help your bladder hold more urine. And it can give you better control over the urge to urinate.  It is important to remember that bladder training takes a few weeks to a few months to make a difference. You may not see results right away, but don't give up.  Follow-up care is a key part of your treatment and safety. Be sure to make and go to all appointments, and call your doctor if you are having problems. It's also a good idea to know your test results and keep a list of the medicines you take.  How can you care for yourself at home?  Work with your doctor to come up with a bladder training program that is right for you. You may use one or more of the following methods.  Delayed urination  In the beginning, try to keep from urinating for 5 minutes after you first feel the need to go.  While you wait, take deep, slow breaths to relax. Kegel exercises can also help you delay the need to go to the bathroom.  After some practice, when you can easily wait 5 minutes to urinate, try to wait 10 minutes before you urinate.  Slowly increase the waiting period until you are able to control when you have to urinate.  Scheduled urination  Empty your bladder when you first wake up in the morning.  Schedule times throughout the day when you will urinate.  Start by going to the bathroom every hour, even if you don't need to go.  Slowly increase the time between trips to the bathroom.  When you have found a schedule that works well for you, keep doing it.  If you wake up during the night  "and have to urinate, do it. Apply your schedule to waking hours only.  Kegel exercises  These tighten and strengthen pelvic muscles, which can help you control the flow of urine. (If doing these exercises causes pain, stop doing them and talk with your doctor.) To do Kegel exercises:  Squeeze your muscles as if you were trying not to pass gas. Or squeeze your muscles as if you were stopping the flow of urine. Your belly, legs, and buttocks shouldn't move.  Hold the squeeze for 3 seconds, then relax for 5 to 10 seconds.  Start with 3 seconds, then add 1 second each week until you are able to squeeze for 10 seconds.  Repeat the exercise 10 times a session. Do 3 to 8 sessions a day.  When should you call for help?  Watch closely for changes in your health, and be sure to contact your doctor if:    Your incontinence is getting worse.     You do not get better as expected.   Where can you learn more?  Go to https://www.[employer].net/patiented  Enter V684 in the search box to learn more about \"Bladder Training: Care Instructions.\"  Current as of: March 1, 2023               Content Version: 13.7    4222-3747 [employer].   Care instructions adapted under license by your healthcare professional. If you have questions about a medical condition or this instruction, always ask your healthcare professional. [employer] disclaims any warranty or liability for your use of this information.      Your Health Risk Assessment indicates you feel you are not in good emotional health.    Recreation   Recreation is not limited to sports and team events. It includes any activity that provides relaxation, interest, enjoyment, and exercise. Recreation provides an outlet for physical, mental, and social energy. It can give a sense of worth and achievement. It can help you stay healthy.    Mental Exercise and Social Involvement  Mental and emotional health is as important as physical health. Keep in touch with " friends and family. Stay as active as possible. Continue to learn and challenge yourself.   Things you can do to stay mentally active are:  Learn something new, like a foreign language or musical instrument.   Play SCRABBLE or do crossword puzzles. If you cannot find people to play these games with you at home, you can play them with others on your computer through the Internet.   Join a games club--anything from card games to chess or checkers or lawn bowling.   Start a new hobby.   Go back to school.   Volunteer.   Read.   Keep up with world events.  Learning About Depression Screening  What is depression screening?  Depression screening is a way to see if you have depression symptoms. It may be done by a doctor or counselor. It's often part of a routine checkup. That's because your mental health is just as important as your physical health.  Depression is a mental health condition that affects how you feel, think, and act. You may:  Have less energy.  Lose interest in your daily activities.  Feel sad and grouchy for a long time.  Depression is very common. It affects people of all ages.  Many things can lead to depression. Some people become depressed after they have a stroke or find out they have a major illness like cancer or heart disease. The death of a loved one or a breakup may lead to depression. It can run in families. Most experts believe that a combination of inherited genes and stressful life events can cause it.  What happens during screening?  You may be asked to fill out a form about your depression symptoms. You and the doctor will discuss your answers. The doctor may ask you more questions to learn more about how you think, act, and feel.  What happens after screening?  If you have symptoms of depression, your doctor will talk to you about your options.  Doctors usually treat depression with medicines or counseling. Often, combining the two works best. Many people don't get help because they think  "that they'll get over the depression on their own. But people with depression may not get better unless they get treatment.  The cause of depression is not well understood. There may be many factors involved. But if you have depression, it's not your fault.  A serious symptom of depression is thinking about death or suicide. If you or someone you care about talks about this or about feeling hopeless, get help right away.  It's important to know that depression can be treated. Medicine, counseling, and self-care may help.  Where can you learn more?  Go to https://www.[employer].net/patiented  Enter T185 in the search box to learn more about \"Learning About Depression Screening.\"  Current as of: October 20, 2022               Content Version: 13.7    [id number] [employer].   Care instructions adapted under license by your healthcare professional. If you have questions about a medical condition or this instruction, always ask your healthcare professional. [employer] disclaims any warranty or liability for your use of this information.      Preventing Falls: Care Instructions    Talk to your doctor about the medicines you take. Ask if any of them increase the risk of falls and whether they can be changed or stopped.   Try to exercise regularly. It can help improve your strength and balance. This can help lower your risk of falling.     Practice fall safety and prevention.    Wear low-heeled shoes that fit well and give your feet good support. Talk to your doctor if you have foot problems that make this hard.  Carry a cellphone or wear a medical alert device that you can use to call for help.  Use stepladders instead of chairs to reach high objects. Don't climb if you're at risk for falls. Ask for help, if needed.  Wear the correct eyeglasses, if you need them.    Make your home safer.    Remove rugs, cords, clutter, and furniture from walkways.  Keep your house well lit. Use night-lights in " "hallways and bathrooms.  Install and use sturdy handrails on stairways.  Wear nonskid footwear, even inside. Don't walk barefoot or in socks without shoes.    Be safe outside.    Use handrails, curb cuts, and ramps whenever possible.  Keep your hands free by using a shoulder bag or backpack.  Try to walk in well-lit areas. Watch out for uneven ground, changes in pavement, and debris.  Be careful in the winter. Walk on the grass or gravel when sidewalks are slippery. Use de-icer on steps and walkways. Add non-slip devices to shoes.    Put grab bars and nonskid mats in your shower or tub and near the toilet. Try to use a shower chair or bath bench when bathing.   Get into a tub or shower by putting in your weaker leg first. Get out with your strong side first. Have a phone or medical alert device in the bathroom with you.   Where can you learn more?  Go to https://www.[employer].net/patiented  Enter G117 in the search box to learn more about \"Preventing Falls: Care Instructions.\"  Current as of: November 9, 2022               Content Version: 13.7    [id number] [employer].   Care instructions adapted under license by your healthcare professional. If you have questions about a medical condition or this instruction, always ask your healthcare professional. [employer] disclaims any warranty or liability for your use of this information.      How to Get Up Safely After a Fall: Care Instructions  Overview     If you have injuries, health problems, or other reasons that may make it easy for you to fall at home, it is a good idea to learn how to get up safely after a fall. Learning how to get up correctly can help you avoid making an injury worse.  Also, knowing what to do if you cannot get up can help you stay safe until help arrives.  Follow-up care is a key part of your treatment and safety. Be sure to make and go to all appointments, and call your doctor if you are having problems. It's also a " good idea to know your test results and keep a list of the medicines you take.  How can you care for yourself after a fall?  If you think you can get up  First lie still for a few minutes and think about how you feel. If your body feels okay and you think you can get up safely, follow the rest of the steps below:  Look for a chair or other piece of furniture that is close to you.  Roll onto your side and rest. Roll by turning your head in the direction you want to roll, move your shoulder and arm, then hip and leg in the same direction.  Lie still for a moment to let your blood pressure adjust.  Slowly push your upper body up, lift your head, and take a moment to rest.  Slowly get up on your hands and knees, and crawl to the chair or other stable piece of furniture.  Put your hands on the chair.  Move one foot forward, and place it flat on the floor. Your other leg should be bent with the knee on the floor.  Rise slowly, turn your body, and sit in the chair. Stay seated for a bit and think about how you feel. Call for help. Even if you feel okay, let someone know what happened to you. You might not know that you have a serious injury.  If you cannot get up  If you think you are injured after a fall or you cannot get up, try not to panic.  Call out for help.  If you have a phone within reach or you have an emergency call device, use it to call for help.  If you do not have a phone within reach, try to slide yourself toward it. If you cannot get to the phone, try to slide toward a door or window or a place where you think you can be heard.  [place] or use an object to make noise so someone might hear you.  If you can reach something that you can use for a pillow, place it under your head. Try to stay warm by covering yourself with a blanket or clothing while you wait for help.  When should you call for help?   Call 911 anytime you think you may need emergency care. For example, call if:    You passed out (lost  "consciousness).     You cannot get up after a fall.     You have severe pain.   Call your doctor now or seek immediate medical care if:    You have new or worse pain.     You are dizzy or lightheaded.     You hit your head.   Watch closely for changes in your health, and be sure to contact your doctor if:    You do not get better as expected.   Where can you learn more?  Go to https://www.[employer].net/patiented  Enter G513 in the search box to learn more about \"How to Get Up Safely After a Fall: Care Instructions.\"  Current as of: November 14, 2022               Content Version: 13.7    [id number] [employer].   Care instructions adapted under license by your healthcare professional. If you have questions about a medical condition or this instruction, always ask your healthcare professional. [employer] disclaims any warranty or liability for your use of this information.         "

## 2023-09-14 NOTE — PROGRESS NOTES
"The patient was provided with suggestions to help her develop a healthy physical lifestyle.  The patient was counseled and encouraged to consider modifying their diet and eating habits. She was provided with information on recommended healthy diet options.  Information on urinary incontinence and treatment options given to patient.  The patient was provided with suggestions to help her develop a healthy emotional lifestyle.  The patient s PHQ-9 score is consistent with moderate depression. She was provided with information regarding depression and was advised to schedule a follow up appointment in 12 weeks to further address this issue.  She is at risk for falling and has been provided with information to reduce the risk of falling at home.Answers submitted by the patient for this visit:  Patient Health Questionnaire (Submitted on 9/14/2023)  If you checked off any problems, how difficult have these problems made it for you to do your work, take care of things at home, or get along with other people?: Somewhat difficult  PHQ9 TOTAL SCORE: 18  Annual Preventive Visit (Submitted on 9/14/2023)  Chief Complaint: Annual Exam:  In general, how would you rate your overall physical health?: fair  Frequency of exercise:: 2-3 days/week  Do you usually eat at least 4 servings of fruit and vegetables a day, include whole grains & fiber, and avoid regularly eating high fat or \"junk\" foods? : No  Taking medications regularly:: Yes  Medication side effects:: Other  Activities of Daily Living: no assistance needed  Home safety: no safety concerns identified  Hearing Impairment:: no hearing concerns  In the past 6 months, have you been bothered by leaking of urine?: Yes  abdominal pain: No  Blood in urine: No  chills: No  congestion: No  constipation: Yes  diarrhea: No  nervous/anxious: Yes  frequency: No  genital sores: No  headaches: Yes  hearing loss: No  heartburn: Yes  arthralgias: Yes  joint swelling: No  peripheral edema: " No  dysuria: No  sore throat: No  urgency: Yes  rash: No  visual disturbance: Yes  weakness: Yes  pelvic pain: No  vaginal bleeding: No  vaginal discharge: No  tenderness: No  breast mass: No  breast discharge: No  In general, how would you rate your overall mental or emotional health?: fair  Additional concerns today:: Yes  Exercise outside of work (Submitted on 9/14/2023)  Chief Complaint: Annual Exam:  Duration of exercise:: 15-30 minutes

## 2023-09-14 NOTE — PROGRESS NOTES
"SUBJECTIVE:   Isabel is a 68 year old who presents for Preventive Visit.      9/14/2023    11:45 AM   Additional Questions   Roomed by Lisa       Are you in the first 12 months of your Medicare coverage?  No    Healthy Habits:     In general, how would you rate your overall health?  Fair    Frequency of exercise:  2-3 days/week    Duration of exercise:  15-30 minutes    Do you usually eat at least 4 servings of fruit and vegetables a day, include whole grains    & fiber and avoid regularly eating high fat or \"junk\" foods?  No    Taking medications regularly:  Yes    Medication side effects:  Other    Ability to successfully perform activities of daily living:  No assistance needed    Home Safety:  No safety concerns identified    Hearing Impairment:  No hearing concerns    In the past 6 months, have you been bothered by leaking of urine? Yes    In general, how would you rate your overall mental or emotional health?  Fair    Additional concerns today:  Yes        Have you ever done Advance Care Planning? (For example, a Health Directive, POLST, or a discussion with a medical provider or your loved ones about your wishes): No, advance care planning information given to patient to review.  Patient plans to discuss their wishes with loved ones or provider.         Fall risk  Fallen 2 or more times in the past year?: No  Any fall with injury in the past year?: Yes    Cognitive Screening   1) Repeat 3 items (Leader, Season, Table)    2) Clock draw: NORMAL  3) 3 item recall: Recalls 3 objects  Results: NORMAL clock, 1-2 items recalled: COGNITIVE IMPAIRMENT LESS LIKELY    Mini-CogTM Copyright [employer]. Licensed by the author for use in [facility]; reprinted with permission (sofya@.[facility]). All rights reserved.      Do you have sleep apnea, excessive snoring or daytime drowsiness? : yes    Reviewed and updated as needed this visit by clinical staff   Tobacco  Allergies  Meds              Reviewed and updated " as needed this visit by Provider                 Social History     Tobacco Use     Smoking status: Never     Smokeless tobacco: Never   Substance Use Topics     Alcohol use: Never             9/14/2023    11:44 AM   Alcohol Use   Prescreen: >3 drinks/day or >7 drinks/week? Not Applicable     Do you have a current opioid prescription? No  Do you use any other controlled substances or medications that are not prescribed by a provider? None        Current providers sharing in care for this patient include:   Patient Care Team:  Cj Georges MD as PCP - General  Claudia Joseph MD as MD (Hematology & Oncology)  Meghann Rosado, PharmD as Pharmacist (Pharmacist)  Cj Georges MD as Assigned PCP  Juma Wilkerson OD as MD (Optometry)  Denice Plasencia MD as MD (Neurology)  Tenzin Chawla MD as Assigned Musculoskeletal Provider  Raquel Miguel MA as Community Health Worker (Primary Care - CC)  Angela Rogel LSW as Lead Care Coordinator (Primary Care - CC)  Keri Day as Financial Resource Worker  Ariella Miguel OD as MD (Optometry)  Adele Hamilton PA-C as Referring Physician (Family Medicine)  Dwain Santos MD as MD (Ophthalmology)  Reginaldo Ochoa MD as MD (Neurology)  Cj Whaley, DAYSI CNP as Assigned Sleep Provider  Ariella Miguel OD as Assigned Surgical Provider  Reginaldo Ochoa MD as Assigned Neuroscience Provider    The following health maintenance items are reviewed in Epic and correct as of today:  Health Maintenance   Topic Date Due     DEXA  Never done     SPIROMETRY  Never done     ZOSTER IMMUNIZATION (1 of 2) Never done     EYE EXAM  07/07/2022     COVID-19 Vaccine (6 - Pfizer series) 04/08/2023     MEDICARE ANNUAL WELLNESS VISIT  05/23/2023     INFLUENZA VACCINE (1) 09/01/2023     DTAP/TDAP/TD IMMUNIZATION (3 - Td or Tdap) 10/23/2023     A1C  11/12/2023     DIABETIC FOOT EXAM  01/02/2024     BMP  02/15/2024     PHQ-9  03/14/2024     LIPID   05/12/2024     MICROALBUMIN  05/12/2024     ANNUAL REVIEW OF HM ORDERS  05/12/2024     FALL RISK ASSESSMENT  09/14/2024     MAMMO SCREENING  04/24/2025     ADVANCE CARE PLANNING  05/23/2027     COLORECTAL CANCER SCREENING  06/28/2031     HEPATITIS C SCREENING  Completed     Pneumococcal Vaccine: 65+ Years  Completed     COPD ACTION PLAN  Addressed     DEPRESSION ACTION PLAN  Addressed     IPV IMMUNIZATION  Aged Out     HPV IMMUNIZATION  Aged Out     MENINGITIS IMMUNIZATION  Aged Out       FHS-7:       4/24/2023     2:37 PM   Breast CA Risk Assessment (FHS-7)   Did any of your first-degree relatives have breast or ovarian cancer? No   Did any of your relatives have bilateral breast cancer? No   Did any man in your family have breast cancer? No   Did any woman in your family have breast and ovarian cancer? No   Did any woman in your family have breast cancer before age 50 y? No   Do you have 2 or more relatives with breast and/or ovarian cancer? No   Do you have 2 or more relatives with breast and/or bowel cancer? No     Pertinent mammograms are reviewed under the imaging tab.    Review of Systems   Constitutional:  Negative for chills.   HENT:  Negative for congestion, hearing loss and sore throat.    Eyes:  Positive for visual disturbance.   Cardiovascular:  Negative for peripheral edema.   Gastrointestinal:  Positive for constipation and heartburn. Negative for abdominal pain and diarrhea.   Breasts:  Negative for tenderness, breast mass and discharge.   Genitourinary:  Positive for urgency. Negative for dysuria, frequency, genital sores, hematuria, pelvic pain, vaginal bleeding and vaginal discharge.   Musculoskeletal:  Positive for arthralgias. Negative for joint swelling.   Skin:  Negative for rash.   Neurological:  Positive for weakness and headaches.   Psychiatric/Behavioral:  The patient is nervous/anxious.      Constitutional, HEENT, cardiovascular, pulmonary, gi and gu systems are negative, except as  "otherwise noted.    OBJECTIVE:   /80 (BP Location: Left arm, Patient Position: Sitting, Cuff Size: Adult Regular)   Pulse 91   Temp 97.8  F (36.6  C) (Tympanic)   Resp 20   Ht 1.575 m (5' 2\")   Wt 100.2 kg (221 lb)   LMP 06/08/1996 (Exact Date)   SpO2 98%   BMI 40.42 kg/m   Estimated body mass index is 40.42 kg/m  as calculated from the following:    Height as of this encounter: 1.575 m (5' 2\").    Weight as of this encounter: 100.2 kg (221 lb).  Physical Exam  EYES: Eyelids, conjunctiva, and sclera were normal. Pupils were normal. Cornea, iris, and lens were normal bilaterally.  HEAD, EARS, NOSE, MOUTH, AND THROAT: Head and face were normal. Hearing was normal to voice and the ears were normal to external exam. Nose appearance was normal and there was no discharge. Oropharynx was normal.  NECK: Neck appearance was normal. There were no neck masses and the thyroid was not enlarged.  RESPIRATORY: Breathing pattern was normal and the chest moved symmetrically.  Percussion/auscultatory percussion was normal.  Lung sounds were normal and there were no abnormal sounds.  CARDIOVASCULAR: Heart rate and rhythm were normal.  S1 and S2 were normal and there were no extra sounds or murmurs. Peripheral pulses in arms and legs were normal.  Jugular venous pressure was normal.  There was no peripheral edema.  GASTROINTESTINAL: The abdomen was normal in contour.    NEUROLOGIC: The patient was alert and oriented to person, place, time, and circumstance. Speech was normal. Cranial nerves were normal. Motor strength was normal for age. The patient was normally coordinated.  PSYCHIATRIC:  Mood and affect were normal and the patient had normal recent and remote memory. The patient's judgment and insight were normal.    ASSESSMENT / PLAN:   1. Annual physical exam  This is a 68-year-old woman with issues as discussed below    2. Asymptomatic postmenopausal estrogen deficiency  - DEXA HIP/PELVIS/SPINE - Future; Future    3. " WILFRIDO  Continue to recommend CPAP    4. Type 2 diabetes mellitus without complication, without long-term current use of insulin (H)  Has been well controlled continue same  - Comprehensive metabolic panel; Future  - Hemoglobin A1c; Future  - Albumin Random Urine Quantitative with Creat Ratio; Future    5. Diabetic polyneuropathy associated with type 2 diabetes mellitus (H)  Stable    6. Mixed hyperlipidemia  Continue statin  - Lipid panel reflex to direct LDL Fasting; Future    7. Essential hypertension  Blood pressure controlled continue same  - CBC with platelets; Future    8. Avitaminosis D  - Vitamin D Deficiency; Future    9. Moderate episode of recurrent major depressive disorder (H)  10. Anxiety disorder due to medical condition  Continue Cymbalta and amitriptyline    11. Cognitive impairment  Evaluation underway with neurology    12. Peripheral vertigo of both ears  Has done physical therapy and is seeing neurology    13. Beta thalassemia (H)  Blood counts stable    14. Low back pain   15. Bilateral leg pain  16. Chronic pain of right knee  We will also see orthopedics  - Physical Therapy Referral; Future  - XR Knee Right 3 Views; Future    17. Trigger finger, acquired  - Orthopedic  Referral; Future    18. Morbid obesity (H)  COUNSELING:  Reviewed preventive health counseling, as reflected in patient instructions  She reports that she has never smoked. She has never used smokeless tobacco.  Appropriate preventive services were discussed with this patient, including applicable screening as appropriate for cardiovascular disease, diabetes, osteopenia/osteoporosis, and glaucoma.  As appropriate for age/gender, discussed screening for colorectal cancer, prostate cancer, breast cancer, and cervical cancer. Checklist reviewing preventive services available has been given to the patient.  Reviewed patients plan of care and provided an AVS. The Basic Care Plan (routine screening as documented in Health  Maintenance) for [place] meets the Care Plan requirement. This Care Plan has been established and reviewed with the Patient.          jC Georges MD  [facility]    Identified Health Risks:  I have reviewed Opioid Use Disorder and Substance Use Disorder risk factors and made any needed referrals.

## 2023-09-14 NOTE — NURSING NOTE
Prior to immunization administration, verified patients identity using patient s name and date of birth. Please see Immunization Activity for additional information.     Screening Questionnaire for Adult Immunization    Are you sick today?   No   Do you have allergies to medications, food, a vaccine component or latex?   No   Have you ever had a serious reaction after receiving a vaccination?   No   Do you have a long-term health problem with heart, lung, kidney, or metabolic disease (e.g., diabetes), asthma, a blood disorder, no spleen, complement component deficiency, a cochlear implant, or a spinal fluid leak?  Are you on long-term aspirin therapy?   No   Do you have cancer, leukemia, HIV/AIDS, or any other immune system problem?   No   Do you have a parent, brother, or sister with an immune system problem?   No   In the past 3 months, have you taken medications that affect  your immune system, such as prednisone, other steroids, or anticancer drugs; drugs for the treatment of rheumatoid arthritis, Crohn s disease, or psoriasis; or have you had radiation treatments?   No   Have you had a seizure, or a brain or other nervous system problem?   No   During the past year, have you received a transfusion of blood or blood    products, or been given immune (gamma) globulin or antiviral drug?   No   For women: Are you pregnant or is there a chance you could become       pregnant during the next month?   No   Have you received any vaccinations in the past 4 weeks?   No     Immunization questionnaire answers were all negative.    Pt tolerated injection (Influenza Vaccine 65+: Fluzone HD. Site (Left deltoid) was cleansed with alcohol prior to injection. No pain, burning, swelling or redness at the site of the injection. Patient instructed to remain in clinic for 15 minutes afterwards, and to report any adverse reactions    Screening performed by Shaye Gray RN on 9/14/2023 at 12:37 PM.

## 2023-09-18 ENCOUNTER — PATIENT OUTREACH (OUTPATIENT)
Dept: CARE COORDINATION | Facility: CLINIC | Age: 68
End: 2023-09-18
Payer: COMMERCIAL

## 2023-09-18 LAB — DEPRECATED CALCIDIOL+CALCIFEROL SERPL-MC: 29 UG/L (ref 20–75)

## 2023-09-18 NOTE — PROGRESS NOTES
Clinic Care Coordination Contact  Program: [facility] Benefits/Health Insurance   County:  [facility] Case #: [facility] Worker:   Carola #:   Subscriber #:   Renewal:  Date Applied: 1/23/23     FRW Outreach:   9/18/23: FRW called pt and left VM. FRW will make another outreach within 30 days.   Keri Day   Financial Resource Worker  [facility] Care Coordination  [phone]   8/16/23: FRW called pt. Pt received the application for Certain Population and will mail to [facility]. FRW will make outreach to pt within 30 days.  Keri Day   Financial Resource Worker  [facility] Care Coordination  [phone]   8/4/23: FRW and pt connected. Pt wants to start over and reapply and send the application to [facility] as she has moved. Leidy complete and mailed for signature. FRW including [facility] address.   Keri Day   Financial Resource Worker  [facility] Care Coordination  [phone]   8/3/23: FRW called pt. Pt thinks she got paperwork from [facility] but does not know. FRW and pt will call [facility] tomorrow at 11.   Keri Day   Financial Resource Worker  [facility] Care Coordination  [phone]   8/4/23:  7/26/23: FRW called pt. Pt was sleeping and asked for a call back.   Keri Day   Financial Resource Worker  [facility] Care Coordination  [phone]   6/27/23: CTA called pt to check the status of Health Insurance.CTA was unable to talk to talk to pt, LM. FRW will f/u in 30 days.    6/15/23: FRW called pt. Pt stated it was not a good time to talk and to call back in 2 weeks.   6/8/23: FRW called pt. She was moving to [place] today. FRW and pt will connect next week.   5/31/23: FRW called pt. Pt is going to look through her mail to see if [facility] sent her the certain pop application. Pt is calling FRW back.   5/31/23: FRW called pt. Pt was sleeping and asked FRW to  call back later today.   5/17/23: FRW called pt and left VM. FRW will make another outreach in 2 weeks for final attempt.  5/2/23: FRW called pt and left VM. FRW will make another outreach in 2 weeks.   4/26/23: FRW called pt at appt time, client didn't answer no vm setup. FRW will reach out to patient next week.  4/25/23: FRW called pt at scheduled time. Patient asked if appointment could be moved to tomorrow at 3 pm.   4/21/23: FRW and pt called [facility] [place]. [facility] had incorrect address for patient and patient sent the application to wrong location. FRW and pt will complete certain pop on Tuesday.  4/21/23: FRW called pt to call [facility] [place]. Patient was sleeping. FRW will call back later today.   4/20/23: FRW and pt called [facility] [place] but was on hold for over 20 minutes. FRW and pt will call tomorrow at a different time.  4/6/23: FRW called pt to get a status. Pt was approved for SNAP. Patient is on waitlist for housing assistance. Pt is moving in with daughter in [place]. FRW and pt will connect in 2 weeks to call [facility] for MA status.   3/15/23: FRW called pt. Pt sent in applications. FRW and pt will connect in 3 weeks.   3/6/23: FRW called pt and left VM. FRW will make another outreach next week.   2/23/23: FRW called pt 2x and there was no voicemail set up. FRW will make another outreach in 1-2 weeks.   2/15/23: FRW called pt to patient. Patient was in hospital. FRW will make outreach next week.   1/31/23: FRW called pt to make sure she received the applications. FRW left VM and will make outreach in 1-2 weeks.  1/23/23: FRW called pt and completed applications. FRW mailed the applications for signature and will touch base with pt next week to make sure she received them.  1/12/23: FRW called pt at scheduled time. Pt was sick and we rescheduled appointment to 1/23.   1/3/23: FRW called pt. New appointment made for 1/12.  12/20/22: FRW called pt at scheduled time but was unable to leave VM. FRW will make  another outreach in 1-2 weeks.   12/2/22: FRW called pt and we scheduled appointment for 12/20 to apply for MA.     Health Insurance:      Referral/Screening:  Financial Resource Worker Screening     County Benefits  Is patient requesting help applying for county benefits?: Yes  Have you recently applied for any county benefits?: No  How many people in your household?: 2  Do you buy/eat food together?: Yes  What is the monthly gross income for the household (wages, social security, workers comp, and pension)? : 900     Insurance:  [place] insurance?: No  Is this an insurance renewal?: No  Is this a new insurance application request?: Yes  Have you recently applied for insurance?: No  How many people in your household? : 2  Do you file taxes?: Yes  What is the monthly gross income for the household (wages, social security, workers comp, and pension)? : 900     Any other information for the FRW?: not sure if she claims her adult son who lives with her and disabled.  She is getting $900 per month total.  she gets $200 in snap. son on MA, but she is not.  thanks

## 2023-09-19 ENCOUNTER — PATIENT OUTREACH (OUTPATIENT)
Dept: CARE COORDINATION | Facility: CLINIC | Age: 68
End: 2023-09-19
Payer: COMMERCIAL

## 2023-09-19 NOTE — PROGRESS NOTES
Clinic Care Coordination Contact  Community Health Worker Follow Up    Care Gaps:     Health Maintenance Due   Topic Date Due    DEXA  Never done    SPIROMETRY  Never done    ZOSTER IMMUNIZATION (1 of 2) Never done    EYE EXAM  07/07/2022    COVID-19 Vaccine (6 - Pfizer series) 04/08/2023       Postponed to next PCP appointment.     Care Plan:   Care Plan: Financial Wellbeing       Problem: Patient expresses financial resource strain       Long-Range Goal: I will apply for MA and Pamela Care.       Start Date: 12/1/2022 Expected End Date: 11/30/2023    This Visit's Progress: 40% Recent Progress: 40%    Priority: High    Note:     Barriers: none identified.  Strengths: motivated.  Patient expressed understanding of goal: yes  Action steps to achieve this goal:  1. I will connect with FRW to apply for programs.   2. I will complete any paper work.  3. I will report progress towards this goal at scheduled outreach telephone calls from the CCC team.    Discussed 9/19/23                            Care Plan: Mental Health       Problem: Mood/Psychosocial Concerns       Long-Range Goal: Establish Mental Health Support and reduce depression symptoms.       Start Date: 12/1/2022 Expected End Date: 7/1/2023    This Visit's Progress: 20% Recent Progress: 20%    Priority: High    Note:     Barriers: caregiver, financial strain.  Strengths: motivated to feel better.  Patient expressed understanding of goal: yes  Action steps to achieve this goal:  1. I will attend appt on 8-3 with Sushila Hair at [facility]  2. I will meet with counselor virtually.  3. I will report progress towards this goal at scheduled outreach telephone calls from the CCC team.    Discussed 9/19/23                            Care Plan: Housing Instability       Problem: SDOH LACK OF STABLE HOUSING       Long-Range Goal: I will put my name on senior housing apartment lists that are affordable.       Start Date: 12/1/2022 Expected End Date: 7/1/2023     "This Visit's Progress: On Hold Recent Progress: 30%    Priority: Medium    Note:     Barriers: paying a lot of money for current apartment, caregiver for son who is disabled.   Strengths: motivated.  Patient expressed understanding of goal: yes  Action steps to achieve this goal:  1. I will review list of senior apartments that  sends.  2. I will call those that I am interested in to add myself to the waiting list.   3. I will report progress towards this goal at scheduled outreach telephone calls from the CCC team.    Discussed 9/19/23                              Intervention and Education during outreach: CHW gave patient contact information and encouraged patient to reach out with any questions or concerns.     CHW Note:  CHW contacted patient to review/update CCC goals.  Patient shared she has been doing \"about the same\" since last [facility] outreach.  Patient shared she has been in contact with CRISTOPHER Saavedra regarding the financial resources. Patient shared she missed a call from CRISTOPHER Saavedra yesterday and plans on contacting her back when she has time available. CHW provided patient with CRISTOPHER Saavedra's contact information and encouraged patient to call when able.  Patient reported she has not contacted [facility] [employer] regarding a counselor. Patient shared she has been busy but does plan on contacting them when she has time available. CHW encouraged patient to contact [facility] [employer] to schedule appointment at her earliest convenience. CHW will follow up on goal at next scheduled outreach.  Patient shared she would like to put her housing goal on hold at this time. Patient reported she has moved in with her daughter and plans on staying. Patient will inform [facility] team if/when she would like to proceed with housing goal.   Patient denied any other needs or concerns at this time but will reach out to [facility] as needed.      CHW Plan: CHW will continue to support " patient with goals through routine scheduled outreach. CHW will outreach in one month on 10/19/23.        Raquel Miguel  Community Health Worker   [facility] Coordination  [facility]   [email or web address]  Office: [phone]

## 2023-09-19 NOTE — TELEPHONE ENCOUNTER
DIAGNOSIS: (L) middle trigger finger    APPOINTMENT DATE: 10/05/2023   NOTES STATUS DETAILS   OFFICE NOTE from referring provider Internal 09/14/2023 Dr Georges [facility]    OFFICE NOTE from other specialist N/A    DISCHARGE SUMMARY from hospital N/A    DISCHARGE REPORT from the ER N/A    OPERATIVE REPORT N/A    EMG report N/A    MEDICATION LIST N/A    MRI N/A    DEXA (osteoporosis/bone health) N/A    CT SCAN N/A    XRAYS (IMAGES & REPORTS) N/A

## 2023-09-20 ENCOUNTER — THERAPY VISIT (OUTPATIENT)
Dept: PHYSICAL THERAPY | Facility: CLINIC | Age: 68
End: 2023-09-20
Attending: INTERNAL MEDICINE
Payer: COMMERCIAL

## 2023-09-20 DIAGNOSIS — H81.393 PERIPHERAL VERTIGO OF BOTH EARS: Primary | ICD-10-CM

## 2023-09-20 PROCEDURE — 97750 PHYSICAL PERFORMANCE TEST: CPT | Mod: GP | Performed by: PHYSICAL THERAPIST

## 2023-09-20 PROCEDURE — 97116 GAIT TRAINING THERAPY: CPT | Mod: GP | Performed by: PHYSICAL THERAPIST

## 2023-09-20 PROCEDURE — 97112 NEUROMUSCULAR REEDUCATION: CPT | Mod: GP | Performed by: PHYSICAL THERAPIST

## 2023-09-20 PROCEDURE — 97535 SELF CARE MNGMENT TRAINING: CPT | Mod: GP | Performed by: PHYSICAL THERAPIST

## 2023-09-21 NOTE — PROGRESS NOTES
23 1300   Signing Clinician's Name / Credentials   Signing clinician's name / credentials Nina Pinto DPT NCS   Functional Gait Assessment (NIKO Gonzalez, AKIN Anderson, et al. (2004))   1. GAIT LEVEL SURFACE 1  (11.06 seconds and 16 steps.)   2. CHANGE IN GAIT SPEED 3  (7.63 and 14 steps)   3. GAIT WITH HORIZONTAL HEAD TURNS 2  (weaves and slows)   4. GAIT WITH VERTICAL HEAD TURNS 2  (weaves and slows)   5. GAIT AND PIVOT TURN 3   6. STEP OVER OBSTACLE 2   7. GAIT WITH NARROW BASE OF SUPPORT 0   8. GAIT WITH EYES CLOSED 1  (veers ot left, slow)   9. AMBULATING BACKWARDS 2   10. STEPS 2  (needs railing)   Total Functional Gait Assessment Score   TOTAL SCORE: (MAXIMUM SCORE 30) 18     Functional Gait Assessment (FGA): The FGA assesses postural stability during various walking tasks.   Gait assistive device used: None    Scores of <22 /30 have been correlated with predicting falls in community-dwelling older adults according to Carlos & Huseyin 2010.   Scores of <18 /30 have been correlated with increased risk for falls in patients with Parkinsons Disease according to [place] Bright Zhou et al 2014.  Minimal Detectable Change for patients with acute/chronic stroke = 4.2 according to Leonardo & Felisael 2009  Minimal Detectable Change for patients with vestibular disorder = 8 according to Carlos & Huseyin 2010    Assessment (rationale for performing, application to patient s function & care plan): at risk for falls with high level gait activitities  (Minutes billed as physical performance test):  15    Nina Pinto DPT, MPT, NCS  Physical Therapist   Board Certified Neurologic Clinical Specialist     [facility]   [address] [place] [address]   [email or web address]  [email or web address]   [phone]   Clinic: [phone] //   Fax: [phone]

## 2023-09-25 ENCOUNTER — ANCILLARY PROCEDURE (OUTPATIENT)
Dept: MRI IMAGING | Facility: CLINIC | Age: 68
End: 2023-09-25
Attending: STUDENT IN AN ORGANIZED HEALTH CARE EDUCATION/TRAINING PROGRAM
Payer: COMMERCIAL

## 2023-09-25 DIAGNOSIS — R41.3 MEMORY CHANGE: ICD-10-CM

## 2023-09-25 PROCEDURE — 70551 MRI BRAIN STEM W/O DYE: CPT | Mod: GC | Performed by: STUDENT IN AN ORGANIZED HEALTH CARE EDUCATION/TRAINING PROGRAM

## 2023-09-27 ENCOUNTER — DOCUMENTATION ONLY (OUTPATIENT)
Dept: SLEEP MEDICINE | Facility: CLINIC | Age: 68
End: 2023-09-27
Payer: COMMERCIAL

## 2023-09-27 ENCOUNTER — DOCUMENTATION ONLY (OUTPATIENT)
Dept: SLEEP MEDICINE | Facility: CLINIC | Age: 68
End: 2023-09-27

## 2023-09-27 DIAGNOSIS — G47.33 OSA (OBSTRUCTIVE SLEEP APNEA): Primary | ICD-10-CM

## 2023-09-27 NOTE — PROGRESS NOTES
Patient was offered choice of vendor and chose [facility].  Patient Isabel Biggs was set up at [facility] on September 27, 2023. Patient received a RESMED Airsense 10 Pressures were set at  8-15 cm H2O.   Patient s ramp is 4 cm H2O for AUTO and FLEX/EPR is 2.  Patient received a PRIYANK Mask name: [id number]  Nasal mask size FIRPACK, heated tubing and heated humidifier.  Patient has the following compliance requirements: using and visit requirements  Patient has a follow up on TBD.  Maite Aguilar

## 2023-09-29 ENCOUNTER — TELEPHONE (OUTPATIENT)
Dept: INTERNAL MEDICINE | Facility: CLINIC | Age: 68
End: 2023-09-29
Payer: COMMERCIAL

## 2023-09-29 DIAGNOSIS — I10 ESSENTIAL HYPERTENSION: ICD-10-CM

## 2023-09-29 DIAGNOSIS — F06.4 ANXIETY DISORDER DUE TO MEDICAL CONDITION: ICD-10-CM

## 2023-09-29 DIAGNOSIS — E11.9 TYPE 2 DIABETES MELLITUS WITHOUT COMPLICATION, WITHOUT LONG-TERM CURRENT USE OF INSULIN (H): ICD-10-CM

## 2023-09-29 DIAGNOSIS — K21.9 GASTROESOPHAGEAL REFLUX DISEASE WITHOUT ESOPHAGITIS: Primary | ICD-10-CM

## 2023-09-29 DIAGNOSIS — J40 BRONCHITIS: Primary | ICD-10-CM

## 2023-09-29 RX ORDER — ALBUTEROL SULFATE 90 UG/1
2 AEROSOL, METERED RESPIRATORY (INHALATION) EVERY 6 HOURS PRN
Qty: 18 G | Refills: 1 | Status: SHIPPED | OUTPATIENT
Start: 2023-09-29 | End: 2024-01-05

## 2023-09-29 RX ORDER — ATORVASTATIN CALCIUM 20 MG/1
20 TABLET, FILM COATED ORAL DAILY
Qty: 90 TABLET | Refills: 0 | Status: SHIPPED | OUTPATIENT
Start: 2023-09-29 | End: 2024-03-07

## 2023-09-29 RX ORDER — AMLODIPINE BESYLATE 5 MG/1
5 TABLET ORAL DAILY
Qty: 90 TABLET | Refills: 0 | Status: SHIPPED | OUTPATIENT
Start: 2023-09-29 | End: 2024-01-05

## 2023-09-29 RX ORDER — AMITRIPTYLINE HYDROCHLORIDE 50 MG/1
50 TABLET ORAL AT BEDTIME
Qty: 90 TABLET | Refills: 0 | Status: SHIPPED | OUTPATIENT
Start: 2023-09-29 | End: 2024-03-25

## 2023-09-29 NOTE — TELEPHONE ENCOUNTER
General Call      Reason for Call: patient is stating she  has bronchitis  Symptoms:  Coughing, wheezing,chest congestion        What are your questions or concerns: pt stating she would like an inhaler    Pharm:  [facility] - [place]    Date of last appointment with provider:  n/a    Could we send this information to you in [employer] or would you prefer to receive a phone call?:   Patient would prefer a phone call   Okay to leave a detailed message?: Yes at Home number on file [phone] (home)

## 2023-10-05 ENCOUNTER — PRE VISIT (OUTPATIENT)
Dept: ORTHOPEDICS | Facility: CLINIC | Age: 68
End: 2023-10-05

## 2023-10-06 NOTE — NURSING NOTE
DME orders have been automatically faxed to [facility]. 30-90 day new CPAP compliance/follow-up appointment with provider was scheduled. Anahi Mack CMA

## 2023-10-09 ENCOUNTER — PATIENT OUTREACH (OUTPATIENT)
Dept: CARE COORDINATION | Facility: CLINIC | Age: 68
End: 2023-10-09
Payer: COMMERCIAL

## 2023-10-09 NOTE — PROGRESS NOTES
Care Coordination Clinician Chart Review    Situation: Patient chart reviewed by Care Coordinator.       Background: Care Coordination Program started: 11/21/2022. Initial assessment completed and patient-centered care plan(s) were developed with participation from patient. Lead CC handed patient off to CHW for continued outreaches.       Assessment: Per chart review, patient outreach completed by CC CHW on 9-.  Patient is actively working to accomplish goal(s). Patient's goal(s) appropriate and relevant at this time. Patient is not due for updated Plan of Care.  Assessments will be completed annually or as needed/with change of patient status.      Care Plan: Financial Wellbeing       Problem: Patient expresses financial resource strain       Long-Range Goal: I will apply for MA and 4vets.       Start Date: 12/1/2022 Expected End Date: 11/30/2023    This Visit's Progress: 40% Recent Progress: 40%    Priority: High    Note:     Barriers: none identified.  Strengths: motivated.  Patient expressed understanding of goal: yes  Action steps to achieve this goal:  1. I will connect with FRW to apply for programs.   2. I will complete any paper work.  3. I will report progress towards this goal at scheduled outreach telephone calls from the CCC team.    Discussed 9/19/23                            Care Plan: Mental Health       Problem: Mood/Psychosocial Concerns       Long-Range Goal: Establish Mental Health Support and reduce depression symptoms.       Start Date: 12/1/2022 Expected End Date: 7/1/2023    This Visit's Progress: 20% Recent Progress: 20%    Priority: High    Note:     Barriers: caregiver, financial strain.  Strengths: motivated to feel better.  Patient expressed understanding of goal: yes  Action steps to achieve this goal:  1. I will attend appt on 8-3 with Sushila Hair at [facility]  2. I will meet with counselor virtually.  3. I will report progress towards this goal at scheduled  outreach telephone calls from the CCC team.    Discussed 9/19/23                            Care Plan: Housing Instability       Problem: SDOH LACK OF STABLE HOUSING       Long-Range Goal: I will put my name on senior housing apartment lists that are affordable.       Start Date: 12/1/2022 Expected End Date: 7/1/2023    This Visit's Progress: On Hold Recent Progress: 30%    Priority: Medium    Note:     Barriers: paying a lot of money for current apartment, caregiver for son who is disabled.   Strengths: motivated.  Patient expressed understanding of goal: yes  Action steps to achieve this goal:  1. I will review list of senior apartments that  sends.  2. I will call those that I am interested in to add myself to the waiting list.   3. I will report progress towards this goal at scheduled outreach telephone calls from the CCC team.    Discussed 9/19/23                                 Plan/Recommendations: The patient will continue working with Care Coordination to achieve goal(s) as above. CHW will continue outreaches at minimum every 30 days and will involve Lead CC as needed or if patient is ready to move to Maintenance. Lead CC will continue to monitor CHW outreaches and patient's progress to goal(s) every 6 weeks.     Plan of Care updated and sent to patient: No

## 2023-10-10 PROBLEM — M79.605 BILATERAL LEG PAIN: Status: RESOLVED | Noted: 2023-06-13 | Resolved: 2023-10-10

## 2023-10-10 PROBLEM — M79.604 BILATERAL LEG PAIN: Status: RESOLVED | Noted: 2023-06-13 | Resolved: 2023-10-10

## 2023-10-10 PROBLEM — M25.512 LEFT SHOULDER PAIN, UNSPECIFIED CHRONICITY: Status: RESOLVED | Noted: 2023-06-13 | Resolved: 2023-10-10

## 2023-10-10 PROBLEM — M54.2 MUSCULOSKELETAL NECK PAIN: Status: RESOLVED | Noted: 2023-06-13 | Resolved: 2023-10-10

## 2023-10-10 NOTE — PROGRESS NOTES
DISCHARGE  Reason for Discharge: Patient has not made expected progress due to interrupted treatment attendance.    Equipment Issued:     Discharge Plan: Patient to continue home program.    Referring Provider:  Cj Georges

## 2023-10-13 ENCOUNTER — VIRTUAL VISIT (OUTPATIENT)
Dept: INTERNAL MEDICINE | Facility: CLINIC | Age: 68
End: 2023-10-13
Payer: COMMERCIAL

## 2023-10-13 DIAGNOSIS — J02.9 SORE THROAT: Primary | ICD-10-CM

## 2023-10-13 PROCEDURE — 99441 PR PHYSICIAN TELEPHONE EVALUATION 5-10 MIN: CPT | Mod: 93 | Performed by: INTERNAL MEDICINE

## 2023-10-13 NOTE — PROGRESS NOTES
Isabel is a 68 year old who is being evaluated via a billable telephone visit.      What phone number would you like to be contacted at? [phone]  How would you like to obtain your AVS? Lea    Distant Location (provider location):  On-site    Assessment & Plan     1. Sore throat  - Streptococcus A Rapid Screen w/Reflex to PCR - Clinic Collect; Future    Cj Georges MD  [facility]   Isabel is a 68 year old, presenting for the following health issues:  office visit  and Recheck Medication (Pt is here for office visit )      10/13/2023    10:28 AM   Additional Questions   Roomed by landon         10/13/2023    10:28 AM   Patient Reported Additional Medications   Patient reports taking the following new medications no       History of Present Illness       Reason for visit:  Throat issues    She eats 4 or more servings of fruits and vegetables daily.She consumes 2 sweetened beverage(s) daily.She exercises with enough effort to increase her heart rate 10 to 19 minutes per day.  She exercises with enough effort to increase her heart rate 7 days per week.   She is taking medications regularly.       Pt stated mouth is dry , and hard to swollen at times.        Review of Systems         Objective           Vitals:  No vitals were obtained today due to virtual visit.    Physical Exam                 Phone call duration: 5 minutes

## 2023-10-14 ENCOUNTER — OFFICE VISIT (OUTPATIENT)
Dept: ORTHOPEDICS | Facility: CLINIC | Age: 68
End: 2023-10-14
Attending: INTERNAL MEDICINE
Payer: COMMERCIAL

## 2023-10-14 DIAGNOSIS — M65.30 TRIGGER FINGER, ACQUIRED: Primary | ICD-10-CM

## 2023-10-14 PROCEDURE — 99214 OFFICE O/P EST MOD 30 MIN: CPT | Performed by: STUDENT IN AN ORGANIZED HEALTH CARE EDUCATION/TRAINING PROGRAM

## 2023-10-14 ASSESSMENT — PAIN SCALES - GENERAL: PAINLEVEL: MODERATE PAIN (5)

## 2023-10-14 NOTE — PATIENT INSTRUCTIONS
Armando Isabel Biggs ,     A copy of your assessment and our treatment plan that we discussed together is included below, as written in your medical chart.   If you have any questions, please feel free to call the clinic.     --------------------------------------------------  Isabel Biggs is a 68 year old female that presents with painful clicking/triggering of her left long finger and ring finger.  Triggering originally started in the left long finger several months ago and has progressed to include the ring finger.  Waking up with the finger locked frequently.  Has tried a type of compression glove but no splinting or other interventions. History and physical exam appear most consistent with acquired trigger finger of the left long finger and left ring finger.     Discussed the nature of the condition and treatment options and mutually agreed upon the following plan:    - Medications:          - Discussed pharmacologic options for pain relief.   - May use NSAIDs (Ibuprofen, Naproxen) or Acetaminophen (Tylenol) as needed for pain control.   - May also use topical medications such as lidocaine, IcyHot, BioFreeze, or Voltaren gel as needed for pain control.  Recommend using Voltaren gel up to 4 times per day over the painful flexor tendons.  - Injections:          - Discussed possible injection options and alternatives.    - Options include corticosteroid injection of the flexor tendon sheath/A1 pulley (trigger finger injection).    - Deferred injections today and will consider them in the future as needed.   - Therapy:          - Discussed the benefits of hand therapy vs home exercise program for optimization of range of motion, flexibility, strength, stability and function.   - Preference is for a home exercise program.   - Home Exercise Program given today in clinic and recommendation given to perform HEP daily and after exacerbations.  - Modalities:          - May use ice, heat, massage or other modalities as  needed.   - Bracing:          - Discussed bracing options and recommend using trigger finger extension splinting.  Given an oval 8 splint today in clinic and instructed to use the extension splinting at night primarily, and may choose to use it during the day for exacerbating activities if desired.  - Surgery:          - Discussed non-operative and operative treatment options for the patient's condition.   -Discussed trigger finger release surgery versus nonoperative options.  Goal would be to continue conservative care for as long as possible before surgery would be considered.  - Activity:          - Encouraged to remain active and participate in regular activities as symptoms allow.  Avoid exacerbating activities.  - Follow up:          - In 4 to 6 weeks as needed for re-evaluation and update to treatment plan, or sooner for new/worsening symptoms.  - Patient has clinic contact information for questions or concerns.   --------------------------------------------------    It was a pleasure seeing you today. Thank you for choosing [facility] for your care.       Reginaldo Denson DO, CAQSM  [facility] - Sports Medicine  [facility] - Department of Orthopedic Surgery     Disclaimer:  This note was prepared and written using Dragon Medical dictation software. As a result, there may be errors in the script that have gone undetected. Please consider this when interpreting the information in this note.

## 2023-10-14 NOTE — PROGRESS NOTES
Isabel Biggs  :  1955  DOS: 10/13/2023  MRN: [id number]  PCP: Cj Georges    Sports Medicine Clinic Visit      HPI  Isabel Biggs is a 68 year old female who is seen in consultation at the request of  Cj Georges M.D. presenting with left middle finger trigger finger.    - Mechanism of Injury:  No inciting injury.  Insidious onset.  - Prior evaluation:  was seen by Dr. Georges on 23 .  Suspected trigger finger and recommended following up with Ortho/hand specialist for treatment recommendations.    - Pain Character:  Pain has been present for  around 2 months. Seems to be worsening over the past 2 weeks with the addition of her ring and index fingers in addition to the original complaint of the middle finger .  Pain is well localized to the MCP region and radiates up the finger volarly.  - Endorses: Painful clicking and triggering at the A1 pulley, frequently wakes up with the finger locked in flexion and has to manually extend the finger.  - Denies:  clicking, grinding, instability.  Numbness, tingling, radicular shooting pain, weakness.  - Alleviating factors:  rest, activity modifications, a glove  - Aggravating factors:   any flexion of the fingers  - Treatments tried:  rest, activity modifications    - Patient Goals:  be able to manage the symptoms successfully  - Social History: retired      Review of Systems  Musculoskeletal: as above  Remainder of review of systems is negative including constitutional, CV, pulmonary, GI, Skin and Neurologic except as noted in HPI or medical history.    Past Medical History:   Diagnosis Date    Anemia     Avitaminosis D     Bronchitis     Depression     Diabetes (H)     Glaucoma (increased eye pressure)     Headache     Obesity     Peripheral neuropathy     Rotator cuff tendonitis     Sleep apnea      Past Surgical History:   Procedure Laterality Date    ARTHROSCOPY SHOULDER ROTATOR CUFF REPAIR Right     COLONOSCOPY W/ BIOPSIES N/A 2021    Procedure:  COLONOSCOPY with polypectomy ;  Surgeon: Ned Zamudio MD;  Location: [facility] OR;  Service: Gastroenterology    HYSTERECTOMY      with bso, for ovarian cyst    OOPHORECTOMY      DE TEMPORAL ARTERY LIGATN OR BX Bilateral 2020    Procedure: BIOPSY, ARTERY, TEMPORAL;  Surgeon: Eric Alejandro MD;  Location: [facility] OR;  Service: General     Family History   Problem Relation Age of Onset    Hypertension Mother     Diabetes Mother     Cancer Other     Breast Cancer Maternal Aunt 70.00    No Known Problems Father         killed in war    No Known Problems Sister         one  sudden death (37), others living    No Known Problems Brother         9 living, 1  diabetes, others  in war    No Known Problems Daughter     No Known Problems Son     Hereditary Breast and Ovarian Cancer Syndrome No family hx of     Cancer No family hx of     Colon Cancer No family hx of     Endometrial Cancer No family hx of     Ovarian Cancer No family hx of          Objective  LMP 1996 (Exact Date)     General: healthy, alert and in no acute distress.    HEENT: no scleral icterus or conjunctival erythema.   Skin: no suspicious lesions or rash. No jaundice.   CV: regular rhythm by palpation, 2+ distal pulses.  Resp: normal respiratory effort without conversational dyspnea.   Psych: normal mood and affect.    Gait: nonantalgic, appropriate coordination and balance.     Neuro:        - Sensation to light touch:    - Intact throughout the BUE including all peripheral nerve distributions.     MSK - Wrist/Hand:       - Inspection:    - No significant swelling, erythema, warmth, ecchymosis, lesion, or atrophy noted.        - ROM:    -Full AROM at the affected fingers with evident clicking/triggering in the left middle finger and ring finger.       - Palpation:    - TTP at the MCP/A1 pulley.   - NTTP elsewhere.        - Strength:  (*antalgic)  RUE LUE  - Wrist Extension   5 5  - Wrist Flexion    5 5  -  FDI     5 5  - ADM     5 5  - FPL     5 5  - APB     5 5  - EIP     5 5  - EDC     5 5  - APL/EPB    5 5           - Special tests:        -Triggering: Positive in the left long and ring finger      Radiology  Chart reviewed and no pertinent images to interpret.  Additional imaging not necessary today.      Assessment  1. Trigger finger, acquired        Plan  Isabel Biggs is a 68 year old female that presents with painful clicking/triggering of her left long finger and ring finger.  Triggering originally started in the left long finger several months ago and has progressed to include the ring finger.  Waking up with the finger locked frequently.  Has tried a type of compression glove but no splinting or other interventions. History and physical exam appear most consistent with acquired trigger finger of the left long finger and left ring finger.     Discussed the nature of the condition and treatment options and mutually agreed upon the following plan:    - Medications:          - Discussed pharmacologic options for pain relief.   - May use NSAIDs (Ibuprofen, Naproxen) or Acetaminophen (Tylenol) as needed for pain control.   - May also use topical medications such as lidocaine, IcyHot, BioFreeze, or Voltaren gel as needed for pain control.  Recommend using Voltaren gel up to 4 times per day over the painful flexor tendons.  - Injections:          - Discussed possible injection options and alternatives.    - Options include corticosteroid injection of the flexor tendon sheath/A1 pulley (trigger finger injection).    - Deferred injections today and will consider them in the future as needed.   - Therapy:          - Discussed the benefits of hand therapy vs home exercise program for optimization of range of motion, flexibility, strength, stability and function.   - Preference is for a home exercise program.   - Home Exercise Program given today in clinic and recommendation given to perform HEP daily and after  exacerbations.  - Modalities:          - May use ice, heat, massage or other modalities as needed.   - Bracing:          - Discussed bracing options and recommend using trigger finger extension splinting.  Given an oval 8 splint today in clinic and instructed to use the extension splinting at night primarily, and may choose to use it during the day for exacerbating activities if desired.  - Surgery:          - Discussed non-operative and operative treatment options for the patient's condition.   -Discussed trigger finger release surgery versus nonoperative options.  Goal would be to continue conservative care for as long as possible before surgery would be considered.  - Activity:          - Encouraged to remain active and participate in regular activities as symptoms allow.  Avoid exacerbating activities.  - Follow up:          - In 4 to 6 weeks as needed for re-evaluation and update to treatment plan, or sooner for new/worsening symptoms.  - Patient has clinic contact information for questions or concerns.       Reginaldo Denson DO, DEANNEM  [facility] - Sports Medicine  [facility] - Department of Orthopedic Surgery       Disclaimer:  This note was prepared and written using Dragon Medical dictation software. As a result, there may be errors in the script that have gone undetected. Please consider this when interpreting the information in this note.

## 2023-10-14 NOTE — LETTER
10/14/2023         RE: Isabel Biggs  [address]  [address]  [place] [address]        Dear Colleague,    Thank you for referring your patient, Isabel Biggs, to the [facility] [place]. Please see a copy of my visit note below.    Isabel Biggs  :  1955  DOS: 10/13/2023  MRN: [id number]  PCP: Cj Georges    Sports Medicine Clinic Visit      HPI  Isabel Biggs is a 68 year old female who is seen in consultation at the request of  Cj Georges M.D. presenting with left middle finger trigger finger.    - Mechanism of Injury:  No inciting injury.  Insidious onset.  - Prior evaluation:  was seen by Dr. Georges on 23 .  Suspected trigger finger and recommended following up with Ortho/hand specialist for treatment recommendations.    - Pain Character:  Pain has been present for  around 2 months. Seems to be worsening over the past 2 weeks with the addition of her ring and index fingers in addition to the original complaint of the middle finger .  Pain is well localized to the MCP region and radiates up the finger volarly.  - Endorses: Painful clicking and triggering at the A1 pulley, frequently wakes up with the finger locked in flexion and has to manually extend the finger.  - Denies:  clicking, grinding, instability.  Numbness, tingling, radicular shooting pain, weakness.  - Alleviating factors:  rest, activity modifications, a glove  - Aggravating factors:   any flexion of the fingers  - Treatments tried:  rest, activity modifications    - Patient Goals:  be able to manage the symptoms successfully  - Social History: retired      Review of Systems  Musculoskeletal: as above  Remainder of review of systems is negative including constitutional, CV, pulmonary, GI, Skin and Neurologic except as noted in HPI or medical history.    Past Medical History:   Diagnosis Date     Anemia      Avitaminosis D      Bronchitis      Depression      Diabetes (H)       Glaucoma (increased eye pressure)      Headache      Obesity      Peripheral neuropathy      Rotator cuff tendonitis      Sleep apnea      Past Surgical History:   Procedure Laterality Date     ARTHROSCOPY SHOULDER ROTATOR CUFF REPAIR Right      COLONOSCOPY W/ BIOPSIES N/A 2021    Procedure: COLONOSCOPY with polypectomy ;  Surgeon: Ned Zamudio MD;  Location: [facility] OR;  Service: Gastroenterology     HYSTERECTOMY      with bso, for ovarian cyst     OOPHORECTOMY       KS TEMPORAL ARTERY LIGATN OR BX Bilateral 2020    Procedure: BIOPSY, ARTERY, TEMPORAL;  Surgeon: Eirc Alejandro MD;  Location: [facility] OR;  Service: General     Family History   Problem Relation Age of Onset     Hypertension Mother      Diabetes Mother      Cancer Other      Breast Cancer Maternal Aunt 70.00     No Known Problems Father         killed in war     No Known Problems Sister         one  sudden death (37), others living     No Known Problems Brother         9 living, 1  diabetes, others  in war     No Known Problems Daughter      No Known Problems Son      Hereditary Breast and Ovarian Cancer Syndrome No family hx of      Cancer No family hx of      Colon Cancer No family hx of      Endometrial Cancer No family hx of      Ovarian Cancer No family hx of          Objective  LMP 1996 (Exact Date)     General: healthy, alert and in no acute distress.    HEENT: no scleral icterus or conjunctival erythema.   Skin: no suspicious lesions or rash. No jaundice.   CV: regular rhythm by palpation, 2+ distal pulses.  Resp: normal respiratory effort without conversational dyspnea.   Psych: normal mood and affect.    Gait: nonantalgic, appropriate coordination and balance.     Neuro:        - Sensation to light touch:    - Intact throughout the BUE including all peripheral nerve distributions.     MSK - Wrist/Hand:       - Inspection:    - No significant swelling, erythema, warmth, ecchymosis,  lesion, or atrophy noted.        - ROM:    -Full AROM at the affected fingers with evident clicking/triggering in the left middle finger and ring finger.       - Palpation:    - TTP at the MCP/A1 pulley.   - NTTP elsewhere.        - Strength:  (*antalgic)  KULWINDERCIRO JOVANNYE  - Wrist Extension   5 5  - Wrist Flexion    5 5  - FDI     5 5  - ADM     5 5  - FPL     5 5  - APB     5 5  - EIP     5 5  - EDC     5 5  - APL/EPB    5 5           - Special tests:        -Triggering: Positive in the left long and ring finger      Radiology  Chart reviewed and no pertinent images to interpret.  Additional imaging not necessary today.      Assessment  1. Trigger finger, acquired        Plan  Isabel Biggs is a 68 year old female that presents with painful clicking/triggering of her left long finger and ring finger.  Triggering originally started in the left long finger several months ago and has progressed to include the ring finger.  Waking up with the finger locked frequently.  Has tried a type of compression glove but no splinting or other interventions. History and physical exam appear most consistent with acquired trigger finger of the left long finger and left ring finger.     Discussed the nature of the condition and treatment options and mutually agreed upon the following plan:    - Medications:          - Discussed pharmacologic options for pain relief.   - May use NSAIDs (Ibuprofen, Naproxen) or Acetaminophen (Tylenol) as needed for pain control.   - May also use topical medications such as lidocaine, IcyHot, BioFreeze, or Voltaren gel as needed for pain control.  Recommend using Voltaren gel up to 4 times per day over the painful flexor tendons.  - Injections:          - Discussed possible injection options and alternatives.    - Options include corticosteroid injection of the flexor tendon sheath/A1 pulley (trigger finger injection).    - Deferred injections today and will consider them in the future as needed.   - Therapy:           - Discussed the benefits of hand therapy vs home exercise program for optimization of range of motion, flexibility, strength, stability and function.   - Preference is for a home exercise program.   - Home Exercise Program given today in clinic and recommendation given to perform HEP daily and after exacerbations.  - Modalities:          - May use ice, heat, massage or other modalities as needed.   - Bracing:          - Discussed bracing options and recommend using trigger finger extension splinting.  Given an oval 8 splint today in clinic and instructed to use the extension splinting at night primarily, and may choose to use it during the day for exacerbating activities if desired.  - Surgery:          - Discussed non-operative and operative treatment options for the patient's condition.   -Discussed trigger finger release surgery versus nonoperative options.  Goal would be to continue conservative care for as long as possible before surgery would be considered.  - Activity:          - Encouraged to remain active and participate in regular activities as symptoms allow.  Avoid exacerbating activities.  - Follow up:          - In 4 to 6 weeks as needed for re-evaluation and update to treatment plan, or sooner for new/worsening symptoms.  - Patient has clinic contact information for questions or concerns.       Reginaldo Denson DO, CAQSM  [facility] - Sports Medicine  [facility] - Department of Orthopedic Surgery       Disclaimer:  This note was prepared and written using Dragon Medical dictation software. As a result, there may be errors in the script that have gone undetected. Please consider this when interpreting the information in this note.        Again, thank you for allowing me to participate in the care of your patient.        Sincerely,        Reginaldo Denson DO

## 2023-10-19 ENCOUNTER — PATIENT OUTREACH (OUTPATIENT)
Dept: CARE COORDINATION | Facility: CLINIC | Age: 68
End: 2023-10-19
Payer: COMMERCIAL

## 2023-10-19 NOTE — PROGRESS NOTES
Clinic Care Coordination Contact  [facility]/Voicemail    Clinical Data: Care Coordinator Outreach  Outreach attempted x 1. Unable to leave message on patient's voicemail as MB was full.    Plan: Care Coordinator will try to reach patient again in 10 business days or on 11/1/23.      Raquel Miguel  Community Health Worker   [facility] Care Coordination  [facility]   [email or web address]  Office: [phone]

## 2023-10-23 ENCOUNTER — THERAPY VISIT (OUTPATIENT)
Dept: PHYSICAL THERAPY | Facility: CLINIC | Age: 68
End: 2023-10-23
Attending: INTERNAL MEDICINE
Payer: COMMERCIAL

## 2023-10-23 DIAGNOSIS — H81.393 PERIPHERAL VERTIGO OF BOTH EARS: Primary | ICD-10-CM

## 2023-10-23 PROCEDURE — 97750 PHYSICAL PERFORMANCE TEST: CPT | Mod: GP | Performed by: PHYSICAL THERAPIST

## 2023-10-23 PROCEDURE — 97112 NEUROMUSCULAR REEDUCATION: CPT | Mod: GP | Performed by: PHYSICAL THERAPIST

## 2023-10-23 NOTE — PROGRESS NOTES
10/23/23 1300   Signing Clinician's Name / Credentials   Signing clinician's name / credentials Nina Pinto DPT NCS   Dynamic Gait Index (Daniel and Roberts [place], 1995)   Gait Level Surface 2  (speed is slow, 9.88 seconds and 14 steps)   Change in Gait Speed 2  (7.12 seconds and 12 steps.)   Gait and Horizontal Head Turns 3  (no change in gait with head motion)   Gait with Vertical Head Turns 3  (no change in gait with head motion)   Gait and Pivot Turns 3   Step Over Obstacle 3   Step Around Obstacles 3   Steps 1   Total Dynamic Gait Index Score  (A score of 19 or less has been correlated to an increased risk of falls in community dwelling older adults, patients with vestibular disorders, and patients with MS.)   Total Score (out of 24) 20     Dynamic Gait Index (DGI):The DGI is a measure of balance during gait that is reliable and valid for the elderly and individuals with Parkinson's disease, MS, vestibular disorders, or s/p stroke. Gait assistive device used: None    Patient score: 20/24  Scores ?19/24 indicate an increased risk for falls according to Olga et al 2000  Minimal Detectable Change = 2.9 in community dwelling elderly according to Alfonso et al 2011    Assessment (rationale for performing, application to patient s function & care plan): at risk for falls with high level activity but she avoids that. Fall risk is low on this tet but high on FGA (high level activities)  Minutes billed as physical performance test: 23 (gait speeds with 4ww and without)DGI/FGA    Nina Pinto DPT, MPT, NCS  Physical Therapist   Board Certified Neurologic Clinical Specialist     [facility]   [address] [place] [address]   [email or web address]   [phone]   Clinic: [phone] //   Fax: [phone]

## 2023-10-30 ENCOUNTER — ANCILLARY PROCEDURE (OUTPATIENT)
Dept: BONE DENSITY | Facility: CLINIC | Age: 68
End: 2023-10-30
Attending: INTERNAL MEDICINE
Payer: COMMERCIAL

## 2023-10-30 DIAGNOSIS — Z78.0 ASYMPTOMATIC POSTMENOPAUSAL ESTROGEN DEFICIENCY: ICD-10-CM

## 2023-10-30 PROCEDURE — 77080 DXA BONE DENSITY AXIAL: CPT | Performed by: RADIOLOGY

## 2023-10-31 ENCOUNTER — PATIENT OUTREACH (OUTPATIENT)
Dept: CARE COORDINATION | Facility: CLINIC | Age: 68
End: 2023-10-31

## 2023-10-31 ENCOUNTER — THERAPY VISIT (OUTPATIENT)
Dept: PHYSICAL THERAPY | Facility: CLINIC | Age: 68
End: 2023-10-31
Attending: INTERNAL MEDICINE
Payer: COMMERCIAL

## 2023-10-31 DIAGNOSIS — G89.29 CHRONIC PAIN OF RIGHT KNEE: ICD-10-CM

## 2023-10-31 DIAGNOSIS — M25.561 CHRONIC PAIN OF RIGHT KNEE: ICD-10-CM

## 2023-10-31 DIAGNOSIS — M25.561 ACUTE PAIN OF RIGHT KNEE: Primary | ICD-10-CM

## 2023-10-31 PROCEDURE — 97110 THERAPEUTIC EXERCISES: CPT | Mod: GP | Performed by: PHYSICAL THERAPIST

## 2023-10-31 PROCEDURE — 97161 PT EVAL LOW COMPLEX 20 MIN: CPT | Mod: GP | Performed by: PHYSICAL THERAPIST

## 2023-10-31 ASSESSMENT — ACTIVITIES OF DAILY LIVING (ADL)
WEAKNESS: THE SYMPTOM AFFECTS MY ACTIVITY SEVERELY
STAND: ACTIVITY IS FAIRLY DIFFICULT
SWELLING: THE SYMPTOM AFFECTS MY ACTIVITY SEVERELY
KNEE_ACTIVITY_OF_DAILY_LIVING_SCORE: 22.86
HOW_WOULD_YOU_RATE_THE_CURRENT_FUNCTION_OF_YOUR_KNEE_DURING_YOUR_USUAL_DAILY_ACTIVITIES_ON_A_SCALE_FROM_0_TO_100_WITH_100_BEING_YOUR_LEVEL_OF_KNEE_FUNCTION_PRIOR_TO_YOUR_INJURY_AND_0_BEING_THE_INABILITY_TO_PERFORM_ANY_OF_YOUR_USUAL_DAILY_ACTIVITIES?: 50
LIMPING: THE SYMPTOM AFFECTS MY ACTIVITY SEVERELY
SIT WITH YOUR KNEE BENT: ACTIVITY IS FAIRLY DIFFICULT
STIFFNESS: THE SYMPTOM PREVENTS ME FROM ALL DAILY ACTIVITIES
SQUAT: ACTIVITY IS VERY DIFFICULT
WALK: ACTIVITY IS FAIRLY DIFFICULT
AS_A_RESULT_OF_YOUR_KNEE_INJURY,_HOW_WOULD_YOU_RATE_YOUR_CURRENT_LEVEL_OF_DAILY_ACTIVITY?: SEVERELY ABNORMAL
GO DOWN STAIRS: ACTIVITY IS FAIRLY DIFFICULT
GIVING WAY, BUCKLING OR SHIFTING OF KNEE: THE SYMPTOM AFFECTS MY ACTIVITY SEVERELY
RAW_SCORE: 16
HOW_WOULD_YOU_RATE_THE_OVERALL_FUNCTION_OF_YOUR_KNEE_DURING_YOUR_USUAL_DAILY_ACTIVITIES?: ABNORMAL
PAIN: THE SYMPTOM AFFECTS MY ACTIVITY SEVERELY
GO UP STAIRS: I AM UNABLE TO DO THE ACTIVITY
RISE FROM A CHAIR: ACTIVITY IS FAIRLY DIFFICULT
KNEE_ACTIVITY_OF_DAILY_LIVING_SUM: 16
KNEEL ON THE FRONT OF YOUR KNEE: I AM UNABLE TO DO THE ACTIVITY
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
## 2023-10-31 NOTE — PROGRESS NOTES
PHYSICAL THERAPY EVALUATION  Type of Visit: Evaluation    See electronic medical record for Abuse and Falls Screening details.    Subjective       Presenting condition or subjective complaint: right knee pain that started about 2 months ago when she was going to get out of bed felt clicking and popping in her knee.  Date of onset: 09/14/23    Relevant medical history: Depression; Overweight (vertigo)   Dates & types of surgery: hysterectomy 1996    Prior diagnostic imaging/testing results: X-ray Chronic enthesitis along superior pole of the right patella at the quadriceps attachment. The right knee is negative for fracture or significant compartmental narrowing. No significant effusion.   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    Patient goals for therapy: cooking and prolonged standing, getting in and out of the bath tub.    Pain assessment: Pain present     Objective   KNEE EVALUATION  PAIN: Pain Level at Rest: 2/10  Pain Level with Use: 9/10  Pain Location: right anteiror and posterior knee pain  Pain Quality: Aching and Burning  Pain Frequency: constant  Pain is Exacerbated By: getting in and out of bath tub. Prolonged sitting, walking too long.   Pain is Relieved By: pain medication  Pain Progression: Worsened  INTEGUMENTARY (edema, incisions):   POSTURE:   GAIT:  Weightbearing Status:   Assistive Device(s):   Gait Deviations:   BALANCE/PROPRIOCEPTION:   WEIGHTBEARING ALIGNMENT:   NON-WEIGHTBEARING ALIGNMENT:   ROM:   (Degrees) Left AROM Left PROM   Right AROM Right PROM   Knee Flexion 112  96    Knee Extension 0  0    Pain:   End feel:     STRENGTH:   Pain: - none + mild ++ moderate +++ severe  Strength Scale: 0-5/5 Left Right   Knee Flexion 5 3   Knee Extension 5 3   Quad Set 5 3     FLEXIBILITY:  decreased quadricep and hamstring flexibility.   SPECIAL TESTS:    Left Right   Apley's (Meniscus)  Positive   Adriane's (Meniscus) Negative  Positive   Basil's (ITB/TFL)  Positive   Patellar Apprehension Test  Positive   Patella Tracking     Ligamentous Stability     Anterior Drawer (ACL)     Posterior Drawer (PCL)     Prone Dial Test at 30 Deg and 90 Deg (PCL/PLC)     Valgus Stress Testing at 0 Deg and 30 Deg  Positive,     Varus Stress Testing at 0 Deg and 30 Deg   Positive,       FUNCTIONAL TESTS:   PALPATION:  + pain over right medial joint line, quadricep tendon, patellar tendon adductor long and medial / lateral hamstring tendons.   JOINT MOBILITY:     Assessment & Plan   CLINICAL IMPRESSIONS  Medical Diagnosis: Chronic pain of right knee    Treatment Diagnosis: chronic right knee pain   Impression/Assessment: Patient is a 68 year old female with acute right knee pain complaints.  The following significant findings have been identified: Pain, Decreased ROM/flexibility, Decreased joint mobility, Decreased strength, and Instability. These impairments interfere with their ability to perform self care tasks and household chores as compared to previous level of function.     Clinical Decision Making (Complexity):  Clinical Presentation: Stable/Uncomplicated  Clinical Presentation Rationale: based on medical and personal factors listed in PT evaluation  Clinical Decision Making (Complexity): Low complexity    PLAN OF CARE  Treatment Interventions:  Modalities: Ultrasound  Interventions: Manual Therapy, Neuromuscular Re-education, Therapeutic Activity, Therapeutic Exercise, Self-Care/Home Management    Long Term Goals     PT Goal 1  Goal Identifier: andres  Goal  Description: pt will be able to step over a 12 inch step in order to get in and out of the bath tub with 2/10 Pl or less  Rationale: to maximize safety and independence with performance of ADLs and functional tasks  Goal Progress: unable to get in and out of a bath tub.      Frequency of Treatment: 1 x week  Duration of Treatment: 8 weeks    Recommended Referrals to Other Professionals:   Education Assessment:   Learner/Method: Patient;Demonstration;Pictures/Video;No Barriers to Learning    Risks and benefits of evaluation/treatment have been explained.   Patient/Family/caregiver agrees with Plan of Care.     Evaluation Time:     PT Eval, Low Complexity Minutes (59701): 20   Present: Not applicable     Signing Clinician: HEATHER Li [facility]                                                                                   OUTPATIENT PHYSICAL THERAPY      PLAN OF TREATMENT FOR OUTPATIENT REHABILITATION   Patient's Last Name, First Name, LEISA BiggsIsabel YOB: 1955   Provider's Name   [facility]   Medical Record No.  [id number]     Onset Date: 09/14/23  Start of Care Date: 10/31/23     Medical Diagnosis:  Chronic pain of right knee      PT Treatment Diagnosis:  chronic right knee pain Plan of Treatment  Frequency/Duration: 1 x week/ 8 weeks    Certification date from 10/31/23 to 12/26/23         See note for plan of treatment details and functional goals     Miles Kuhn PT                         I CERTIFY THE NEED FOR THESE SERVICES FURNISHED UNDER        THIS PLAN OF TREATMENT AND WHILE UNDER MY CARE     (Physician attestation of this document indicates review and certification of the therapy plan).                Referring Provider:  Cj Georges      Initial Assessment  See Epic Evaluation- Start of Care Date: 10/31/23

## 2023-11-07 ENCOUNTER — PATIENT OUTREACH (OUTPATIENT)
Dept: NURSING | Facility: CLINIC | Age: 68
End: 2023-11-07
Payer: COMMERCIAL

## 2023-11-07 ASSESSMENT — ACTIVITIES OF DAILY LIVING (ADL): DEPENDENT_IADLS:: INDEPENDENT

## 2023-11-07 NOTE — PROGRESS NOTES
Clinic Care Coordination Contact  Follow Up Progress Note      Assessment: Completed re assessment and set up goals.  She wants to apply for [employer] and to get a therapist near her home.  She is getting rides from local program in [place] and it works ok, but cannot use it to get to [place] clinic.  Completed her part of the MM application and will mail it to her for her signature. Will ask PCP to complete MM application and will mail to patient.  She will work with FRW to complete the financial assistance from [employer].  She has past due bills. She doesn't want to apply for MA at this time, as she feels it may impact her getting citizenship.  She wants to wait until after her citizenship is processed, then may apply for it. Is living with her daughter and that is going well. Is using her cpap machine.  Had recent scan for potential problems with her memory, but the scan was normal.  Reviewed her meds and she is taking as prescribed.  She is using a walker, but would like a walker with wheels and a seat.  She also would like a shower chair. Explained that shower chairs are not covered by Medicare. She can discuss at her next PCP her desire to get a wheeled walker.  Insurance may cover it, depending on when she got the last piece of mobility equipment. She would like to establish with a therapist in her area.  Will look for options and will send to her for review.     Has upcoming sleep appt in [place]. Would like to see if it can be changed to a virtual appt or if she can see a provider in the [place] area.  Will route to Dr. Whaley for consideration.      Care Gaps:    Health Maintenance Due   Topic Date Due    SPIROMETRY  Never done    ZOSTER IMMUNIZATION (1 of 2) Never done    RSV VACCINE (Pregnancy & 60+) (1 - 1-dose 60+ series) Never done    EYE EXAM  07/07/2022    COVID-19 Vaccine (6 - 2023-24 season) 09/01/2023    DTAP/TDAP/TD IMMUNIZATION (3 - Td or Tdap) 10/23/2023       Scheduled December.        Care Plans  Care Plan: Financial Wellbeing       Problem: Patient expresses financial resource strain       Long-Range Goal: I will apply for MA after I get my citizenship, and apply for [employer].       Start Date: 11/7/2023 Expected End Date: 11/6/2024    Recent Progress: 40%    Priority: High    Note:     Barriers: none identified.  Strengths: motivated.  Patient expressed understanding of goal: yes  Action steps to achieve this goal:  1. I will connect with FRW to apply for programs.   2. I will complete any paper work.  3. I will work on getting my citizenship approved and then apply for MA.   4. I will report progress towards this goal at scheduled outreach telephone calls from the CCC team.                              Care Plan: Mental Health       Problem: Mood/Psychosocial Concerns       Long-Range Goal: Establish Mental Health Support and reduce depression symptoms.       Start Date: 11/7/2023 Expected End Date: 11/6/2024    Recent Progress: 20%    Priority: High    Note:     Barriers: none noted.   Strengths: motivated to feel better.  Patient expressed understanding of goal: yes  Action steps to achieve this goal:  1. I will schedule and attend appointments with therapist.    2. I will meet with counselor virtually.  3. I will report progress towards this goal at scheduled outreach telephone calls from the CCC team.                                Care Plan: Transportation       Problem: Lack of transportation       Long-Range Goal: I will apply for [employer]       Start Date: 11/7/2023 Expected End Date: 11/6/2024    Priority: Medium    Note:     Barriers: getting paper work done.   Strengths: motivated.   Patient expressed understanding of goal: yes  Action steps to achieve this goal:  1. I will complete my part of the application.  2. I will send in my part and PCP part of the application together to [employer].   3. I will report progress towards this goal at scheduled outreach  telephone calls from the CCC team.                                  Intervention/Education provided during outreach: support, help with application for [employer].        Plan:     Care Coordinator will follow up in one week with options for therapists.   11-8-2023 Found therapist Angela Joe who would have opening on November 17th.  Called patient and she took the information and will call her to discuss her needs.    Angela Joe, MSW;[facility]  Psychotherapist  EMDR Certified  Positive Discipline Trainer  , Consultant   [facility]  [address], [place]  [address] [phone]  FAX: [phone]  [email or web address]    Plan- [facility] SW will continue to monitor, support patient with current goals and will be available to assist as needs arise. [facility] CHW will reach out to patient on a monthly basis to discuss progression of goals.      [facility] SW will perform Chart Review in 45 days.

## 2023-11-07 NOTE — LETTER
[facility]  Patient Centered Plan of Care  About Me:        Patient Name:  Isabel Biggs    YOB: 1955  Age:         68 year old   Nikki MRN:    [id number] Telephone Information:  Home Phone [phone]   Mobile [phone]   Home Phone [phone]       Address:  [address] [place] [address] [place]  [address] [address] Email address:  [email or web address]      Emergency Contact(s)    Name Relationship Lgl Grd Work Phone Home Phone Mobile Phone   1. OTONIEL SCHAFFER Daughter    [phone]   2. PARISA SCHAFFER Daughter   [phone]            Primary language:  English     needed? No   [employer]:  [phone] op. 1  Other communication barriers:English as a second language; Visual impairment    Preferred Method of Communication:     Current living arrangement: I live in a private home with family    Mobility Status/ Medical Equipment: Independent        Health Maintenance  Health Maintenance Reviewed: Due/Overdue   Health Maintenance Due   Topic Date Due    SPIROMETRY  Never done    ZOSTER IMMUNIZATION (1 of 2) Never done    RSV VACCINE (Pregnancy & 60+) (1 - 1-dose 60+ series) Never done    EYE EXAM  07/07/2022    COVID-19 Vaccine (6 - 2023-24 season) 09/01/2023    DTAP/TDAP/TD IMMUNIZATION (3 - Td or Tdap) 10/23/2023           My Access Plan  Medical Emergency 911   Primary Clinic Line [facility] - [phone]   24 Hour Appointment Line [phone] or  [phone]) (toll-free)   24 Hour Nurse Line [phone] (toll-free)   Preferred Urgent Care [facility] [phone]     Preferred Hospital [facility]  [phone]     Preferred Pharmacy [facility] - [place], [place] - [address]     Behavioral Health Crisis Line The National Suicide Prevention Lifeline at 1-412.845.1631 or Text/Call 568           My Care Team Members  Patient Care Team         Relationship  Specialty Notifications Start End    Cj Georges MD PCP - General   3/9/18     Phone: [phone] Fax: [phone]         [address] [facility] [address]    Claudia Joseph MD MD Hematology & Oncology Admissions 12/14/17     Phone: [phone] Fax: [phone]         [address] [facility] [address]    Meghann Rosado, PharmD Pharmacist Pharmacist  2/3/20     Phone: [phone] Fax: [phone]         [address] [place] [address]    Cj Georges MD Assigned PCP   6/16/21     Phone: [phone] Fax: [phone]         [address] [facility] [address]    Juma Wilkerson OD MD Optometry  6/18/21     Phone: [phone] Fax: [phone]         [address] [address]    Denice Plasencia MD MD Neurology  7/26/21     Phone: [phone] Fax: [phone]         [address] [place] [address]    Tenzin Chawla MD Assigned Musculoskeletal Provider   5/22/22     Phone: [phone] Fax: [phone] [address] [facility] [address] [facility] [address]    Raquel Miguel, CHW Community Health Worker Primary Care - [facility] Admissions 11/21/22     Angela Rogel LSW Lead Care Coordinator Primary Care - [facility] Admissions 12/1/22     Phone: [phone]         Keri Day Financial Resource Worker   12/1/22     Phone: [phone]         Ariella Miguel OD MD Optometry  1/18/23     Phone: [phone] Fax: [phone] [address] [facility] [address]    Adele Hamilton PA-C Referring Physician Family Medicine  1/18/23     Referring Physician to eye    Phone: [phone] Fax: [phone] [address] [facility] [address]    Dwain Santos MD MD Ophthalmology  1/23/23     Phone: [phone] Fax: [phone]         [address] [place] [address]    Reginaldo Ochoa MD MD Neurology  2/28/23     Phone: [phone] Fax: [phone]         [address] [place] [address]     Cj Whaley, APRN CNP Assigned Sleep Provider   4/15/23     Phone: [phone] Fax: [phone] [address] [facility] [address]    Ariella Miguel, OD Assigned Surgical Provider   7/15/23     Phone: [phone] Fax: [phone]         [address] [facility] [address]    Ria Goode MD MD Ophthalmology  9/15/23     Phone: [phone] Fax: [phone] [address] [facility] [address]    Reginaldo Denson DO Assigned Neuroscience Provider   10/21/23     Phone: [phone] Fax: [phone] [address] [facility] [address] [place] [address]                My Care Plans  Self Management and Treatment Plan    Care Plan  Care Plan: Financial Wellbeing       Problem: Patient expresses financial resource strain       Long-Range Goal: I will apply for MA after I get my citizenship, and apply for [employer] Care.       Start Date: 11/7/2023 Expected End Date: 11/6/2024    Recent Progress: 40%    Priority: High    Note:     Barriers: none identified.  Strengths: motivated.  Patient expressed understanding of goal: yes  Action steps to achieve this goal:  1. I will connect with FRW to apply for programs.   2. I will complete any paper work.  3. I will work on getting my citizenship approved and then apply for MA.   4. I will report progress towards this goal at scheduled outreach telephone calls from the CCC team.                              Care Plan: Mental Health       Problem: Mood/Psychosocial Concerns       Long-Range Goal: Establish Mental Health Support and reduce depression symptoms.       Start Date: 11/7/2023 Expected End Date: 11/6/2024    Recent Progress: 20%    Priority: High    Note:     Barriers: none noted.   Strengths: motivated to feel better.  Patient expressed understanding of goal: yes  Action steps to achieve this goal:  1. I will schedule and attend appointments with therapist.    2. I will meet with counselor virtually.  3. I will report  progress towards this goal at scheduled outreach telephone calls from the CCC team.                                Care Plan: Transportation       Problem: Lack of transportation       Long-Range Goal: I will apply for [employer]       Start Date: 11/7/2023 Expected End Date: 11/6/2024    Priority: Medium    Note:     Barriers: getting paper work done.   Strengths: motivated.   Patient expressed understanding of goal: yes  Action steps to achieve this goal:  1. I will complete my part of the application.  2. I will send in my part and PCP part of the application together to [employer].   3. I will report progress towards this goal at scheduled outreach telephone calls from the CCC team.                                  Advance Care Plans/Directives:   Advanced Care Plan/Directives on file:   No    Discussed with patient/caregiver(s):   Declined Further Information             My Medical and Care Information  Problem List   Patient Active Problem List   Diagnosis    WILFRIDO    Type 2 diabetes mellitus without complication, without long-term current use of insulin (H)    Diabetic polyneuropathy associated with type 2 diabetes mellitus (H)    Beta thalassemia (H)    Low back pain     Moderate episode of recurrent major depressive disorder (H)    Morbid obesity (H)    Peripheral vertigo of both ears    Avitaminosis D    Essential hypertension    Mixed hyperlipidemia    Acute pain of right knee      Current Medications and Allergies:    Current Outpatient Medications   Medication Instructions    albuterol (PROAIR HFA/PROVENTIL HFA/VENTOLIN HFA) 108 (90 Base) MCG/ACT inhaler 2 puffs, Inhalation, EVERY 6 HOURS PRN    amitriptyline (ELAVIL) 50 mg, Oral, AT BEDTIME    amLODIPine (NORVASC) 5 mg, Oral, DAILY    aspirin (ASA) 81 mg, Oral, DAILY    atorvastatin (LIPITOR) 20 mg, Oral, DAILY    Cholecalciferol (D 1000) 25 MCG (1000 UT) CAPS 1 capsule, Oral, DAILY    DULoxetine (CYMBALTA) 30 mg, Oral, 2 TIMES DAILY     erythromycin (ROMYCIN) 5 MG/GM ophthalmic ointment 0.5 inches, Left Eye, 2 TIMES DAILY    losartan-hydrochlorothiazide (HYZAAR) 50-12.5 MG tablet 1 tablet, Oral, DAILY    meclizine (ANTIVERT) 25 mg, Oral, 3 TIMES DAILY    omeprazole (PRILOSEC) 20 mg, Oral, DAILY         Care Coordination Start Date: 11/21/2022   Frequency of Care Coordination: monthly, more frequently as needed     Form Last Updated: 11/07/2023

## 2023-11-09 ENCOUNTER — PATIENT OUTREACH (OUTPATIENT)
Dept: CARE COORDINATION | Facility: CLINIC | Age: 68
End: 2023-11-09

## 2023-11-09 NOTE — PROGRESS NOTES
Clinic Care Coordination Contact  Program: [facility] Benefits/Health Insurance   County:  [facility] Case #: [facility] Worker:   Carola #:   Subscriber #:   Renewal:  Date Applied: 1/23/23     FRW Outreach:   11/9/23: FRW called pt but unable to leave VM. FRW will call pt again within 30 days.   Keri Day   Financial Resource Worker  [facility] Care Coordination  [phone]   9/18/23: FRW called pt and left VM. FRW will make another outreach within 30 days.   Keri Day   Financial Resource Worker  [facility] Care Coordination  [phone]   8/16/23: FRW called pt. Pt received the application for Certain Population and will mail to [facility]. FRW will make outreach to pt within 30 days.  Keri Day   Financial Resource Worker  [facility] Care Coordination  [phone]   8/4/23: FRW and pt connected. Pt wants to start over and reapply and send the application to [facility] as she has moved. Leidy complete and mailed for signature. FRW including [facility] address.   Keri Day   Financial Resource Worker  [facility] Care Coordination  [phone]   8/3/23: FRW called pt. Pt thinks she got paperwork from [facility] but does not know. FRW and pt will call [facility] tomorrow at 11.   Keri Day   Financial Resource Worker  [facility] Care Coordination  [phone]   8/4/23:  7/26/23: FRW called pt. Pt was sleeping and asked for a call back.   Keri Day   Financial Resource Worker  [facility] Care Coordination  [phone]   6/27/23: CTA called pt to check the status of Health Insurance.CTA was unable to talk to talk to pt, LM. FRW will f/u in 30 days.    6/15/23: FRW called pt. Pt stated it was not a good time to talk and to call back in 2 weeks.   6/8/23: FRW called pt. She was moving to [place] today. FRW and pt will connect next week.   5/31/23:  FRW called pt. Pt is going to look through her mail to see if [facility] sent her the certain pop application. Pt is calling FRW back.   5/31/23: FRW called pt. Pt was sleeping and asked FRW to call back later today.   5/17/23: FRW called pt and left VM. FRW will make another outreach in 2 weeks for final attempt.  5/2/23: FRW called pt and left VM. FRW will make another outreach in 2 weeks.   4/26/23: FRW called pt at appt time, client didn't answer no vm setup. FRW will reach out to patient next week.  4/25/23: FRW called pt at scheduled time. Patient asked if appointment could be moved to tomorrow at 3 pm.   4/21/23: FRW and pt called [facility]. [facility] had incorrect address for patient and patient sent the application to wrong location. FRW and pt will complete certain pop on Tuesday.  4/21/23: FRW called pt to call [facility]. Patient was sleeping. FRW will call back later today.   4/20/23: FRW and pt called [facility] but was on hold for over 20 minutes. FRW and pt will call tomorrow at a different time.  4/6/23: FRW called pt to get a status. Pt was approved for SNAP. Patient is on waitlist for housing assistance. Pt is moving in with daughter in [place]. FRW and pt will connect in 2 weeks to [facility] for MA status.   3/15/23: FRW called pt. Pt sent in applications. FRW and pt will connect in 3 weeks.   3/6/23: FRW called pt and left VM. FRW will make another outreach next week.   2/23/23: FRW called pt 2x and there was no voicemail set up. FRW will make another outreach in 1-2 weeks.   2/15/23: FRW called pt to patient. Patient was in hospital. FRW will make outreach next week.   1/31/23: FRW called pt to make sure she received the applications. FRW left VM and will make outreach in 1-2 weeks.  1/23/23: FRW called pt and completed applications. FRW mailed the applications for signature and will touch base with pt next week to make sure she received them.  1/12/23: CRISTOPHER called pt at scheduled  time. Pt was sick and we rescheduled appointment to 1/23.   1/3/23: FRW called pt. New appointment made for 1/12.  12/20/22: FRW called pt at scheduled time but was unable to leave VM. FRW will make another outreach in 1-2 weeks.   12/2/22: FRW called pt and we scheduled appointment for 12/20 to apply for MA.     Health Insurance:      Referral/Screening:  Financial Resource Worker Screening     County Benefits  Is patient requesting help applying for [facility] benefits?: Yes  Have you recently applied for any county benefits?: No  How many people in your household?: 2  Do you buy/eat food together?: Yes  What is the monthly gross income for the household (wages, social security, workers comp, and pension)? : 900     Insurance:  [place] for [place] insurance?: No  Is this an insurance renewal?: No  Is this a new insurance application request?: Yes  Have you recently applied for insurance?: No  How many people in your household? : 2  Do you file taxes?: Yes  What is the monthly gross income for the household (wages, social security, workers comp, and pension)? : 900     Any other information for the FRW?: not sure if she claims her adult son who lives with her and disabled.  She is getting $900 per month total.  she gets $200 in snap. son on MA, but she is not.  thanks

## 2023-11-22 ENCOUNTER — LAB (OUTPATIENT)
Dept: LAB | Facility: CLINIC | Age: 68
End: 2023-11-22
Attending: INTERNAL MEDICINE
Payer: COMMERCIAL

## 2023-11-22 DIAGNOSIS — J02.9 SORE THROAT: ICD-10-CM

## 2023-11-22 LAB
DEPRECATED S PYO AG THROAT QL EIA: NEGATIVE
GROUP A STREP BY PCR: NOT DETECTED

## 2023-11-22 PROCEDURE — 87651 STREP A DNA AMP PROBE: CPT

## 2023-11-28 ENCOUNTER — PATIENT OUTREACH (OUTPATIENT)
Dept: CARE COORDINATION | Facility: CLINIC | Age: 68
End: 2023-11-28
Payer: COMMERCIAL

## 2023-11-28 NOTE — PROGRESS NOTES
Clinic Care Coordination Contact  Program: [facility] Benefits/Health Insurance   County:  [facility] Case #: [facility] Worker:   Carola #:   Subscriber #:   Renewal:  Date Applied: 1/23/23     FRW Outreach:   11/28/23: FRW called pt and left VM. FRW will make another outreach within 30 days for final attempt.   Keri Day   Financial Resource Worker  [facility] Care Coordination  [phone]   11/9/23: FRW called pt but unable to leave VM. FRW will call pt again within 30 days.   Keri Day   Financial Resource Worker  [facility] Care Coordination  [phone]   9/18/23: FRW called pt and left VM. FRW will make another outreach within 30 days.   Keri Day   Financial Resource Worker  [facility] Care Coordination  [phone]   8/16/23: FRW called pt. Pt received the application for Certain Population and will mail to [facility]. FRW will make outreach to pt within 30 days.  Keri Day   Financial Resource Worker  [facility] Care Coordination  [phone]   8/4/23: FRW and pt connected. Pt wants to start over and reapply and send the application to [facility] as she has moved. Leidy complete and mailed for signature. FRW including [facility] address.   Keri Day   Financial Resource Worker  [facility] Care Coordination  [phone]   8/3/23: FRW called pt. Pt thinks she got paperwork from [facility] but does not know. FRW and pt will call [facility] tomorrow at 11.   Keri Day   Financial Resource Worker  [facility] Care Coordination  [phone]   8/4/23:  7/26/23: FRW called pt. Pt was sleeping and asked for a call back.   Keri Day   Financial Resource Worker  [facility] Care Coordination  [phone]   6/27/23: CTA called pt to check the status of Health Insurance.CTA was unable to talk to talk to pt, LM. FRW will f/u  in 30 days.    6/15/23: FRW called pt. Pt stated it was not a good time to talk and to call back in 2 weeks.   6/8/23: FRW called pt. She was moving to [place] today. FRW and pt will connect next week.   5/31/23: FRW called pt. Pt is going to look through her mail to see if [facility] sent her the certain pop application. Pt is calling FRW back.   5/31/23: FRW called pt. Pt was sleeping and asked FRW to call back later today.   5/17/23: FRW called pt and left VM. FRW will make another outreach in 2 weeks for final attempt.  5/2/23: FRW called pt and left VM. FRW will make another outreach in 2 weeks.   4/26/23: FRW called pt at appt time, client didn't answer no vm setup. FRW will reach out to patient next week.  4/25/23: FRW called pt at scheduled time. Patient asked if appointment could be moved to tomorrow at 3 pm.   4/21/23: FRW and pt called [facility]. [facility] had incorrect address for patient and patient sent the application to wrong location. FRW and pt will complete certain pop on Tuesday.  4/21/23: FRW called pt to call [facility]. Patient was sleeping. FRW will call back later today.   4/20/23: FRW and pt called [facility] but was on hold for over 20 minutes. FRW and pt will call tomorrow at a different time.  4/6/23: FRW called pt to get a status. Pt was approved for SNAP. Patient is on waitlist for housing assistance. Pt is moving in with daughter in [place]. FRW and pt will connect in 2 weeks to [facility] for MA status.   3/15/23: FRW called pt. Pt sent in applications. FRW and pt will connect in 3 weeks.   3/6/23: FRW called pt and left VM. FRW will make another outreach next week.   2/23/23: FRW called pt 2x and there was no voicemail set up. FRW will make another outreach in 1-2 weeks.   2/15/23: FRW called pt to patient. Patient was in hospital. FRW will make outreach next week.   1/31/23: CRISTOPHER called pt to make sure she received the applications. W left  and will make outreach  in 1-2 weeks.  1/23/23: FRW called pt and completed applications. FRW mailed the applications for signature and will touch base with pt next week to make sure she received them.  1/12/23: FRW called pt at scheduled time. Pt was sick and we rescheduled appointment to 1/23.   1/3/23: FRW called pt. New appointment made for 1/12.  12/20/22: FRW called pt at scheduled time but was unable to leave . FRW will make another outreach in 1-2 weeks.   12/2/22: FRW called pt and we scheduled appointment for 12/20 to apply for MA.     Health Insurance:      Referral/Screening:  Financial Resource Worker Screening     [facility] Benefits  Is patient requesting help applying for [facility] benefits?: Yes  Have you recently applied for any county benefits?: No  How many people in your household?: 2  Do you buy/eat food together?: Yes  What is the monthly gross income for the household (wages, social security, workers comp, and pension)? : 900     Insurance:  [place] No  Is this an insurance renewal?: No  Is this a new insurance application request?: Yes  Have you recently applied for insurance?: No  How many people in your household? : 2  Do you file taxes?: Yes  What is the monthly gross income for the household (wages, social security, workers comp, and pension)? : 900     Any other information for the FRW?: not sure if she claims her adult son who lives with her and disabled.  She is getting $900 per month total.  she gets $200 in snap. son on MA, but she is not.  thanks

## 2023-12-07 ENCOUNTER — PATIENT OUTREACH (OUTPATIENT)
Dept: CARE COORDINATION | Facility: CLINIC | Age: 68
End: 2023-12-07
Payer: COMMERCIAL
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
## 2023-12-19 NOTE — PROGRESS NOTES
DISCHARGE  Reason for Discharge: Patient chooses to discontinue therapy.  Patient has failed to schedule further appointments.    Equipment Issued:     Discharge Plan: Patient to continue home program.    Referring Provider:  Cj Georges

## 2023-12-20 ENCOUNTER — PATIENT OUTREACH (OUTPATIENT)
Dept: CARE COORDINATION | Facility: CLINIC | Age: 68
End: 2023-12-20
Payer: COMMERCIAL

## 2023-12-20 NOTE — LETTER
Tyson Hall, this is Angela.  Can you call me when you have time?  Your voice mail is full and I want to schdule a time to do your annual assessment.  Thanks, Angela  [phone]

## 2023-12-20 NOTE — PROGRESS NOTES
Clinic Care Coordination Contact  Follow Up Progress Note      Assessment: completed re assessment. Wants to continue with current goals. Helped her cancel her sleep appt on 12-22 as she has to take her son to immigration doctor. They are trying to get him citizenship so he can get a waiver and move into a group home.  If he doesn't get a waiver, patient may find a handicapped apartment in her daughter's area so she and her son could move in together. He has pca hours. She would like to get pca hours if she can get MA. She is unsure if she sent in the paper work for MA and FRW has not been able to reach her. Gave her FRW's number and encouraged her to call today.  She did not set up counseling with the therapist, but she thinks she called her. Gave her the phone number and she will try to call and set up appt.  She has not heard anything from [facility] yet.      She has been using her cpap but it is not recording the hours that she is using it.  Like last night, she wore if for 12 hours and it only shows that she used it for one hour.  She will call sleep medicine to set up appt and discuss her concerns.      Her daughter and son in law are assisting with household chores and she sometimes needs some help with bathing.  She would like to find her own apartment if she is feeling better and able to live independently.      She attended her court hearing and the charges were dropped for her moving violation.  She only had to pay $50 for court fees and it was dropped to petty cadet.  She is grateful.    She has her immigration hearing scheduled for January 12th.     No other concerns.     Care Gaps:    Health Maintenance Due   Topic Date Due    SPIROMETRY  Never done    ZOSTER IMMUNIZATION (1 of 2) Never done    RSV VACCINE (Pregnancy & 60+) (1 - 1-dose 60+ series) Never done    EYE EXAM  07/07/2022    COVID-19 Vaccine (6 - 2023-24 season) 09/01/2023    DTAP/TDAP/TD IMMUNIZATION (3 - Td or Tdap) 10/23/2023     DEPRESSION 6 MO INDEX REPEAT PHQ-9  12/16/2023    DIABETIC FOOT EXAM  01/02/2024       Postponed to next pcp appt     Care Plans  Care Plan: Financial Wellbeing       Problem: Patient expresses financial resource strain       Long-Range Goal: I will apply for MA after I get my citizenship, and apply for [facility].       Start Date: 11/7/2023 Expected End Date: 11/6/2024    This Visit's Progress: 40% Recent Progress: 40%    Priority: High    Note:     Barriers: none identified.  Strengths: motivated.  Patient expressed understanding of goal: yes  Action steps to achieve this goal:  1. I will connect with FRW to apply for programs.   2. I will complete any paper work.  3. I will work on getting my citizenship approved and then apply for MA.   4. I will report progress towards this goal at scheduled outreach telephone calls from the CCC team.    Discussed 12/7/23                            Care Plan: Mental Health       Problem: Mood/Psychosocial Concerns       Long-Range Goal: Establish Mental Health Support and reduce depression symptoms.       Start Date: 11/7/2023 Expected End Date: 11/6/2024    This Visit's Progress: 20% Recent Progress: 20%    Priority: High    Note:     Barriers: none noted.   Strengths: motivated to feel better.  Patient expressed understanding of goal: yes  Action steps to achieve this goal:  1. I will schedule and attend appointments with therapist.    2. I will meet with counselor virtually.  3. I will report progress towards this goal at scheduled outreach telephone calls from the CCC team.    Discussed 12/7/23                            Care Plan: Transportation       Problem: Lack of transportation       Long-Range Goal: I will apply for [employer]       Start Date: 11/7/2023 Expected End Date: 11/6/2024    This Visit's Progress: 60% Recent Progress: 10%    Priority: Medium    Note:     Barriers: getting paper work done.   Strengths: motivated.   Patient expressed understanding of  goal: yes  Action steps to achieve this goal:  1. I will complete my part of the application.  2. I will send in my part and PCP part of the application together to [facility].   3. I will report progress towards this goal at scheduled outreach telephone calls from the CCC team.    Discussed 12/7/23                              Intervention/Education provided during outreach: support.     Outreach Frequency: monthly, more frequently as needed      Plan:   [facility] SW will continue to monitor, support patient with current goals and will be available to assist as needs arise. [facility] CHW will reach out to patient on a monthly basis to discuss progression of goals.      [facility] SW will perform Chart Review in 45 days.     [facility]    Clinical Data: Care Coordinator Outreach    Outreach Documentation Number of Outreach Attempt   12/20/2023  10:24 AM 1       Called and VM is full. Trying to schedule re assessment.     Plan: Care Coordinator will send unable to contact letter with care coordinator contact information via [employer]. Care Coordinator will try to reach patient again in 10 business days.    Angela Rogel,   [facility]  [phone]

## 2023-12-21 ENCOUNTER — PATIENT OUTREACH (OUTPATIENT)
Dept: CARE COORDINATION | Facility: CLINIC | Age: 68
End: 2023-12-21
Payer: COMMERCIAL

## 2023-12-21 NOTE — PROGRESS NOTES
Clinic Care Coordination Contact  Program: [facility] Benefits/Health Insurance   County:  [facility] Case #: [facility] Worker:   Carola #:   Subscriber #:   Renewal:  Date Applied: 1/23/23     FRW Outreach:   12/21/23: FRW called pt. Pt has immigration interview on 1/24. Pt would like to reapply for MA. Appointment made for 1/26.  Keri Day   Financial Resource Worker  [facility] Care Coordination  [phone]   11/28/23: FRW called pt and left VM. FRW will make another outreach within 30 days for final attempt.   Keri Day   Financial Resource Worker  [facility] Care Coordination  [phone]   11/9/23: FRW called pt but unable to leave VM. FRW will call pt again within 30 days.   Keri Day   Financial Resource Worker  [facility] Care Coordination  [phone]   9/18/23: FRW called pt and left VM. FRW will make another outreach within 30 days.   Keri Day   Financial Resource Worker  [facility] Care Coordination  [phone]   8/16/23: FRW called pt. Pt received the application for Certain Population and will mail to [facility]. FRW will make outreach to pt within 30 days.  Keri Day   Financial Resource Worker  [facility] Care Coordination  [phone]   8/4/23: FRW and pt connected. Pt wants to start over and reapply and send the application to [facility] as she has moved. Leidy complete and mailed for signature. FRW including [facility] address.   Keri Day   Financial Resource Worker  [facility] Care Coordination  [phone]   8/3/23: FRW called pt. Pt thinks she got paperwork from [facility] but does not know. FRW and pt will call [facility] tomorrow at 11.   Keri Day   Financial Resource Worker  [facility] Care Coordination  [phone]   8/4/23:  7/26/23: FRW called pt. Pt was sleeping and asked for a call  back.   Keri Day   Financial Resource Worker  [facility] Care Coordination  [phone]   6/27/23: CTA called pt to check the status of Health Insurance.CTA was unable to talk to talk to ptDARRYN. FRW will f/u in 30 days.    6/15/23: FRW called pt. Pt stated it was not a good time to talk and to call back in 2 weeks.   6/8/23: FRW called pt. She was moving to [place] today. FRW and pt will connect next week.   5/31/23: FRW called pt. Pt is going to look through her mail to see if [facility] sent her the certain pop application. Pt is calling FRW back.   5/31/23: FRW called pt. Pt was sleeping and asked FRW to call back later today.   5/17/23: FRW called pt and left VM. FRW will make another outreach in 2 weeks for final attempt.  5/2/23: FRW called pt and left VM. FRW will make another outreach in 2 weeks.   4/26/23: FRW called pt at appt time, client didn't answer no vm setup. FRW will reach out to patient next week.  4/25/23: FRW called pt at scheduled time. Patient asked if appointment could be moved to tomorrow at 3 pm.   4/21/23: FRW and pt called [facility]. [facility] had incorrect address for patient and patient sent the application to wrong location. FRW and pt will complete certain pop on Tuesday.  4/21/23: FRW called pt to call [facility]. Patient was sleeping. FRW will call back later today.   4/20/23: FRW and pt called [facility] but was on hold for over 20 minutes. FRW and pt will call tomorrow at a different time.  4/6/23: FRW called pt to get a status. Pt was approved for SNAP. Patient is on waitlist for housing assistance. Pt is moving in with daughter in [place]. FRW and pt will connect in 2 weeks to call [facility] for MA status.   3/15/23: FRW called pt. Pt sent in applications. FRW and pt will connect in 3 weeks.   3/6/23: FRW called pt and left VM. FRW will make another outreach next week.   2/23/23: FRW called pt 2x and there was no voicemail set up. W  will make another outreach in 1-2 weeks.   2/15/23: FRW called pt to patient. Patient was in hospital. FRW will make outreach next week.   1/31/23: FRW called pt to make sure she received the applications. FRW left VM and will make outreach in 1-2 weeks.  1/23/23: FRW called pt and completed applications. FRW mailed the applications for signature and will touch base with pt next week to make sure she received them.  1/12/23: FRW called pt at scheduled time. Pt was sick and we rescheduled appointment to 1/23.   1/3/23: FRW called pt. New appointment made for 1/12.  12/20/22: FRW called pt at scheduled time but was unable to leave . FRW will make another outreach in 1-2 weeks.   12/2/22: FRW called pt and we scheduled appointment for 12/20 to apply for MA.     Health Insurance:      Referral/Screening:  Financial Resource Worker Screening     [facility] Benefits  Is patient requesting help applying for [facility] benefits?: Yes  Have you recently applied for any county benefits?: No  How many people in your household?: 2  Do you buy/eat food together?: Yes  What is the monthly gross income for the household (wages, social security, workers comp, and pension)? : 900     Insurance:  [place] No  Is this an insurance renewal?: No  Is this a new insurance application request?: Yes  Have you recently applied for insurance?: No  How many people in your household? : 2  Do you file taxes?: Yes  What is the monthly gross income for the household (wages, social security, workers comp, and pension)? : 900     Any other information for the FRW?: not sure if she claims her adult son who lives with her and disabled.  She is getting $900 per month total.  she gets $200 in snap. son on MA, but she is not.  thanks

## 2024-01-05 ENCOUNTER — PATIENT OUTREACH (OUTPATIENT)
Dept: CARE COORDINATION | Facility: CLINIC | Age: 69
End: 2024-01-05
Payer: COMMERCIAL

## 2024-01-05 ENCOUNTER — MYC REFILL (OUTPATIENT)
Dept: INTERNAL MEDICINE | Facility: CLINIC | Age: 69
End: 2024-01-05
Payer: COMMERCIAL

## 2024-01-05 DIAGNOSIS — H81.10 BENIGN PAROXYSMAL POSITIONAL VERTIGO, UNSPECIFIED LATERALITY: ICD-10-CM

## 2024-01-05 DIAGNOSIS — J40 BRONCHITIS: ICD-10-CM

## 2024-01-05 DIAGNOSIS — K21.9 GASTROESOPHAGEAL REFLUX DISEASE WITHOUT ESOPHAGITIS: ICD-10-CM

## 2024-01-05 DIAGNOSIS — I10 ESSENTIAL HYPERTENSION: ICD-10-CM

## 2024-01-05 DIAGNOSIS — H81.393 PERIPHERAL VERTIGO OF BOTH EARS: ICD-10-CM

## 2024-01-05 DIAGNOSIS — E11.9 TYPE 2 DIABETES MELLITUS WITHOUT COMPLICATION, WITHOUT LONG-TERM CURRENT USE OF INSULIN (H): ICD-10-CM

## 2024-01-05 RX ORDER — ASPIRIN 81 MG/1
81 TABLET ORAL DAILY
Qty: 90 TABLET | Refills: 0 | Status: SHIPPED | OUTPATIENT
Start: 2024-01-05 | End: 2024-03-07

## 2024-01-05 RX ORDER — ALBUTEROL SULFATE 90 UG/1
2 AEROSOL, METERED RESPIRATORY (INHALATION) EVERY 6 HOURS PRN
Qty: 18 G | Refills: 1 | Status: SHIPPED | OUTPATIENT
Start: 2024-01-05 | End: 2024-04-02

## 2024-01-05 RX ORDER — AMPICILLIN TRIHYDRATE 500 MG
1 CAPSULE ORAL DAILY
Qty: 90 CAPSULE | Refills: 4 | Status: CANCELLED | OUTPATIENT
Start: 2024-01-05

## 2024-01-05 RX ORDER — MECLIZINE HYDROCHLORIDE 25 MG/1
25 TABLET ORAL 3 TIMES DAILY
Qty: 90 TABLET | Refills: 11 | Status: CANCELLED | OUTPATIENT
Start: 2024-01-05

## 2024-01-05 RX ORDER — AMLODIPINE BESYLATE 5 MG/1
5 TABLET ORAL DAILY
Qty: 90 TABLET | Refills: 0 | Status: SHIPPED | OUTPATIENT
Start: 2024-01-05 | End: 2024-03-25

## 2024-01-05 RX ORDER — MECLIZINE HYDROCHLORIDE 25 MG/1
25 TABLET ORAL 3 TIMES DAILY
Qty: 90 TABLET | Refills: 4 | Status: SHIPPED | OUTPATIENT
Start: 2024-01-05 | End: 2024-08-29

## 2024-01-05 RX ORDER — AMPICILLIN TRIHYDRATE 500 MG
1 CAPSULE ORAL DAILY
Qty: 90 CAPSULE | Refills: 1 | Status: SHIPPED | OUTPATIENT
Start: 2024-01-05 | End: 2024-03-07

## 2024-01-05 NOTE — PROGRESS NOTES
Contact      Situation: Patient chart reviewed by .Patient was in [facility], but was not able to access records.       Assessment: Patient went to hospital with fever, cough, chills and she has Covid. Her daughter is picking up the Paxlovid today and she will start taking it when she can. She continues to feel terrible.  Is isolating in her bed room.      Did not review goals due to being ill.     Plan/Recommendations: will call patient next week to check in.     Angela Rogel,   [facility]  [phone]

## 2024-01-10 ENCOUNTER — PATIENT OUTREACH (OUTPATIENT)
Dept: CARE COORDINATION | Facility: CLINIC | Age: 69
End: 2024-01-10
Payer: COMMERCIAL

## 2024-01-10 NOTE — LETTER
[facility]  [facility]  February 2, 2024        Isabel Biggs  [address]  [address]  [facility] [address]          Dear Isabel,     Attached is an updated Complex Care Plan for your continued enrollment in Care Coordination. Please let us know if you have additional questions, concerns, or goals that we can assist with.    Sincerely,    Angela Rogel,   [facility]  [phone]        [facility]  Patient Centered Plan of Care  About Me:        Patient Name:  Isabel Biggs    YOB: 1955  Age:         69 year old   [facility] MRN:    [id number] Telephone Information:  Home Phone [phone]   Mobile [phone]   Home Phone [phone]       Address:  [address]  [address]  [facility] [address] Email address:  [email or web address]      Emergency Contact(s)    Name Relationship Lgl Grd Work Phone Home Phone Mobile Phone   1. OTONIEL SCHAFFER Daughter    [phone]   2. PARISA SCHAFFER Daughter   [phone]            Primary language:  English     needed? No   [facility] [employer]:  [phone] op. 1  Other communication barriers:English as a second language; Visual impairment    Preferred Method of Communication:     Current living arrangement: I live in a private home with family    Mobility Status/ Medical Equipment: Independent        Health Maintenance  Health Maintenance Reviewed: Due/Overdue   Health Maintenance Due   Topic Date Due    SPIROMETRY  Never done    ZOSTER IMMUNIZATION (1 of 2) Never done    RSV VACCINE (Pregnancy & 60+) (1 - 1-dose 60+ series) Never done    EYE EXAM  07/07/2022    COVID-19 Vaccine (6 - 2023-24 season) 09/01/2023    DTAP/TDAP/TD IMMUNIZATION (3 - Td or Tdap) 10/23/2023    DIABETIC FOOT EXAM  01/02/2024    DEPRESSION 6 MO INDEX REPEAT PHQ-9  01/14/2024           My Access Plan  Medical Emergency 911   Primary Clinic Line [facility] - [phone]   24  Hour Appointment Line [phone] or  [phone]) (toll-free)   24 Hour Nurse Line [phone] (toll-free)   Preferred Urgent Care [facility], [phone]     [facility] [place] [place]  [phone]     Preferred Pharmacy [id number] [address] AT [facility] & [facility]     Behavioral Health Crisis Line The National Suicide Prevention Lifeline at 1-541.568.6378 or Text/Call 208           My Care Team Members  Patient Care Team         Relationship Specialty Notifications Start End    Cj Georges MD PCP - General   3/9/18     Phone: [phone] Fax: [phone] [address] [facility] [address]    Claudia Joseph MD MD Hematology & Oncology Admissions 12/14/17     Phone: [phone] Fax: [phone]         [address] [facility] [address]    Meghann Rosado, PharmD Pharmacist Pharmacist  2/3/20     Phone: [phone] Fax: [phone]         [address] [place] [address]    Cj Georges MD Assigned PCP   6/16/21     Phone: [phone] Fax: [phone] [address] [facility] [address]    Juma Wilkerson OD MD Optometry  6/18/21     Phone: [phone] Fax: [phone]         [address] [address]    Denice Plasencia MD MD Neurology  7/26/21     Phone: [phone] Fax: [phone]          [address] [address]    Raquel Miguel, CHW Community Health Worker Primary Care - CC Admissions 11/21/22     Angela Rogel LSW Lead Care Coordinator Primary Care - CC Admissions 12/1/22     Phone: [phone]         Keri Day Financial Resource Worker   12/1/22     Phone: [phone]         Ariella Miguel OD MD Optometry  1/18/23     Phone: [phone] Fax: [phone] [address] [facility] [address]    Adele Hamilton PA-C Referring Physician Family  Medicine  1/18/23     Referring Physician to eye    Phone: [phone] Fax: [phone] [address] [facility] [address]    Dwain Santos MD MD Ophthalmology  1/23/23     Phone: [phone] Fax: [phone]         [address] [facility] [address]    Reginaldo Ochoa MD MD Neurology  2/28/23     Phone: [phone] Fax: [phone]         [address] [facility] [address]    Cj Whaley, APRN CNP Assigned Sleep Provider   4/15/23     Phone: [phone] Fax: [phone]         [address] [facility] [address]    Ariella Miguel, SHABNAM Assigned Surgical Provider   7/15/23     Phone: [phone] Fax: [phone] [address] [facility] [address]    Ria Goode MD MD Ophthalmology  9/15/23     Phone: [phone] Fax: [phone] [address] [facility] [address]    Reginaldo Denson DO Assigned Neuroscience Provider   10/21/23     Phone: [phone] Fax: [phone]          [facility] [address] [place] [address]    Kuldeep Vincent MD Physician Ophthalmology  12/5/23     Phone: [phone] Fax: [phone]         0 [facility] [address] [place] [address]                My Care Plans  Self Management and Treatment Plan    Care Plan  Care Plan: Financial Wellbeing       Problem: Patient expresses financial resource strain       Long-Range Goal: I will apply for MA after I get my citizenship, and apply for [employer].       Start Date: 11/7/2023 Expected End Date: 11/6/2024    This Visit's Progress: 40% Recent Progress: 40%    Priority: High    Note:     Barriers: none identified.  Strengths: motivated.  Patient expressed understanding of goal: yes  Action steps to achieve this goal:  1. I will connect with FRW to apply for programs.   2. I will complete any paper work.  3. I will work on getting my citizenship approved and then apply for MA.   4. I will report progress towards this goal at  scheduled outreach telephone calls from the CCC team.    Discussed 12/7/23                            Care Plan: Mental Health       Problem: Mood/Psychosocial Concerns       Long-Range Goal: Establish Mental Health Support and reduce depression symptoms.       Start Date: 11/7/2023 Expected End Date: 11/6/2024    This Visit's Progress: 20% Recent Progress: 20%    Priority: High    Note:     Barriers: none noted.   Strengths: motivated to feel better.  Patient expressed understanding of goal: yes  Action steps to achieve this goal:  1. I will schedule and attend appointments with therapist.    2. I will meet with counselor virtually.  3. I will report progress towards this goal at scheduled outreach telephone calls from the CCC team.    Discussed 12/7/23                            Care Plan: Transportation       Problem: Lack of transportation       Long-Range Goal: I will apply for [employer]       Start Date: 11/7/2023 Expected End Date: 11/6/2024    This Visit's Progress: 60% Recent Progress: 10%    Priority: Medium    Note:     Barriers: getting paper work done.   Strengths: motivated.   Patient expressed understanding of goal: yes  Action steps to achieve this goal:  1. I will complete my part of the application.  2. I will send in my part and PCP part of the application together to [employer].   3. I will report progress towards this goal at scheduled outreach telephone calls from the CCC team.    Discussed 12/7/23                              Advance Care Plans/Directives:   Advanced Care Plan/Directives on file:   No    Discussed with patient/caregiver(s):   Declined Further Information             My Medical and Care Information  Problem List   Patient Active Problem List   Diagnosis    WILFRIDO    Type 2 diabetes mellitus without complication, without long-term current use of insulin (H)    Diabetic polyneuropathy associated with type 2 diabetes mellitus (H)    Beta thalassemia (H)    Low back pain      Moderate episode of recurrent major depressive disorder (H)    Morbid obesity (H)    Peripheral vertigo of both ears    Avitaminosis D    Essential hypertension    Mixed hyperlipidemia    Acute pain of right knee      Current Medications and Allergies:    Current Outpatient Medications   Medication Instructions    albuterol (PROAIR HFA/PROVENTIL HFA/VENTOLIN HFA) 108 (90 Base) MCG/ACT inhaler 2 puffs, Inhalation, EVERY 6 HOURS PRN    amitriptyline (ELAVIL) 50 mg, Oral, AT BEDTIME    amLODIPine (NORVASC) 5 mg, Oral, DAILY    aspirin 81 mg, Oral, DAILY    atorvastatin (LIPITOR) 20 mg, Oral, DAILY    Cholecalciferol (D 1000) 25 MCG (1000 UT) CAPS 1 capsule, Oral, DAILY    DULoxetine (CYMBALTA) 30 mg, Oral, 2 TIMES DAILY    erythromycin (ROMYCIN) 5 MG/GM ophthalmic ointment 0.5 inches, Left Eye, 2 TIMES DAILY    losartan-hydrochlorothiazide (HYZAAR) 50-12.5 MG tablet 1 tablet, Oral, DAILY    meclizine (ANTIVERT) 25 mg, Oral, 3 TIMES DAILY    omeprazole (PRILOSEC) 20 mg, Oral, DAILY         Care Coordination Start Date: 11/21/2022   Frequency of Care Coordination: monthly, more frequently as needed     Form Last Updated: 02/02/2024

## 2024-01-10 NOTE — PROGRESS NOTES
Clinic Care Coordination Contact  Follow Up Progress Note      Assessment: Talked to patient who is feeling somewhat better, but still resting in bed, no taste or smell, reduced appetite, weak. She did take the paxlovid and it did help.  Doesn't feel that she needs to see PCP.      She has her citizenship interview on 1-12 and is unsure if she will be well enough.  Her son's is tomorrow and her daughter will be with him.      Care Gaps:    Health Maintenance Due   Topic Date Due    SPIROMETRY  Never done    ZOSTER IMMUNIZATION (1 of 2) Never done    RSV VACCINE (Pregnancy & 60+) (1 - 1-dose 60+ series) Never done    EYE EXAM  07/07/2022    COVID-19 Vaccine (6 - 2023-24 season) 09/01/2023    DTAP/TDAP/TD IMMUNIZATION (3 - Td or Tdap) 10/23/2023    DIABETIC FOOT EXAM  01/02/2024       Postponed to next pcp appt     Care Plans  Care Plan: Financial Wellbeing       Problem: Patient expresses financial resource strain       Long-Range Goal: I will apply for MA after I get my citizenship, and apply for [employer].       Start Date: 11/7/2023 Expected End Date: 11/6/2024    This Visit's Progress: 40% Recent Progress: 40%    Priority: High    Note:     Barriers: none identified.  Strengths: motivated.  Patient expressed understanding of goal: yes  Action steps to achieve this goal:  1. I will connect with FRW to apply for programs.   2. I will complete any paper work.  3. I will work on getting my citizenship approved and then apply for MA.   4. I will report progress towards this goal at scheduled outreach telephone calls from the CCC team.    Discussed 12/7/23                            Care Plan: Mental Health       Problem: Mood/Psychosocial Concerns       Long-Range Goal: Establish Mental Health Support and reduce depression symptoms.       Start Date: 11/7/2023 Expected End Date: 11/6/2024    This Visit's Progress: 20% Recent Progress: 20%    Priority: High    Note:     Barriers: none noted.   Strengths: motivated  to feel better.  Patient expressed understanding of goal: yes  Action steps to achieve this goal:  1. I will schedule and attend appointments with therapist.    2. I will meet with counselor virtually.  3. I will report progress towards this goal at scheduled outreach telephone calls from the CCC team.    Discussed 12/7/23                            Care Plan: Transportation       Problem: Lack of transportation       Long-Range Goal: I will apply for [employer]       Start Date: 11/7/2023 Expected End Date: 11/6/2024    This Visit's Progress: 60% Recent Progress: 10%    Priority: Medium    Note:     Barriers: getting paper work done.   Strengths: motivated.   Patient expressed understanding of goal: yes  Action steps to achieve this goal:  1. I will complete my part of the application.  2. I will send in my part and PCP part of the application together to [employer].   3. I will report progress towards this goal at scheduled outreach telephone calls from the CCC team.    Discussed 12/7/23                              Intervention/Education provided during outreach: support.      Outreach Frequency: monthly, more frequently as needed    Plan:   [facility] SW will continue to monitor, support patient with current goals and will be available to assist as needs arise. [facility] CHW will reach out to patient on a monthly basis to discuss progression of goals.      [facility] SW will perform Chart Review in 45 days.

## 2024-01-26 ENCOUNTER — APPOINTMENT (OUTPATIENT)
Dept: CARE COORDINATION | Facility: CLINIC | Age: 69
End: 2024-01-26
Payer: COMMERCIAL

## 2024-01-26 ENCOUNTER — PATIENT OUTREACH (OUTPATIENT)
Dept: CARE COORDINATION | Facility: CLINIC | Age: 69
End: 2024-01-26

## 2024-01-30 ENCOUNTER — PATIENT OUTREACH (OUTPATIENT)
Dept: CARE COORDINATION | Facility: CLINIC | Age: 69
End: 2024-01-30
Payer: COMMERCIAL

## 2024-02-09 ENCOUNTER — PATIENT OUTREACH (OUTPATIENT)
Dept: CARE COORDINATION | Facility: CLINIC | Age: 69
End: 2024-02-09
Payer: COMMERCIAL

## 2024-02-09 NOTE — PROGRESS NOTES
Clinic Care Coordination Contact  [facility]/Voicemail    Clinical Data: Care Coordinator Outreach    Outreach Documentation Number of Outreach Attempt   2/9/2024  10:19 AM 1       Left message on patient's voicemail with call back information and requested return call.    Plan: Care Coordinator will try to reach patient again in 10 business days or on 2/23/24.      Raquel Miguel  Community Health Worker   [facility] Care Coordination  [facility]   [email or web address]  Office: [phone]

## 2024-02-12 ENCOUNTER — OFFICE VISIT (OUTPATIENT)
Dept: OPHTHALMOLOGY | Facility: CLINIC | Age: 69
End: 2024-02-12
Payer: COMMERCIAL

## 2024-02-12 DIAGNOSIS — H25.813 COMBINED FORMS OF AGE-RELATED CATARACT OF BOTH EYES: ICD-10-CM

## 2024-02-12 DIAGNOSIS — H52.03 HYPEROPIA OF BOTH EYES WITH ASTIGMATISM AND PRESBYOPIA: ICD-10-CM

## 2024-02-12 DIAGNOSIS — H40.003 GLAUCOMA SUSPECT OF BOTH EYES: Primary | ICD-10-CM

## 2024-02-12 DIAGNOSIS — H52.4 HYPEROPIA OF BOTH EYES WITH ASTIGMATISM AND PRESBYOPIA: ICD-10-CM

## 2024-02-12 DIAGNOSIS — H40.033 ANATOMICAL NARROW ANGLE BORDERLINE GLAUCOMA OF BOTH EYES: ICD-10-CM

## 2024-02-12 DIAGNOSIS — H52.203 HYPEROPIA OF BOTH EYES WITH ASTIGMATISM AND PRESBYOPIA: ICD-10-CM

## 2024-02-12 PROCEDURE — 92133 CPTRZD OPH DX IMG PST SGM ON: CPT | Performed by: OPHTHALMOLOGY

## 2024-02-12 PROCEDURE — 92083 EXTENDED VISUAL FIELD XM: CPT | Performed by: OPHTHALMOLOGY

## 2024-02-12 PROCEDURE — 99207 PR NO BILLABLE SERVICE THIS VISIT: CPT

## 2024-02-12 PROCEDURE — 92020 GONIOSCOPY: CPT | Mod: 59 | Performed by: OPHTHALMOLOGY

## 2024-02-12 PROCEDURE — 99204 OFFICE O/P NEW MOD 45 MIN: CPT | Mod: 25 | Performed by: OPHTHALMOLOGY

## 2024-02-12 PROCEDURE — 66761 REVISION OF IRIS: CPT | Mod: RT | Performed by: OPHTHALMOLOGY

## 2024-02-12 ASSESSMENT — EXTERNAL EXAM - RIGHT EYE: OD_EXAM: NORMAL

## 2024-02-12 ASSESSMENT — CUP TO DISC RATIO
OS_RATIO: 0.8
OD_RATIO: 0.8

## 2024-02-12 ASSESSMENT — REFRACTION_MANIFEST
OS_ADD: +2.50
OD_ADD: +2.50
OS_CYLINDER: +0.25
OD_CYLINDER: +0.75
OD_AXIS: 045
OS_AXIS: 155
OS_SPHERE: +2.25
OD_SPHERE: +2.25

## 2024-02-12 ASSESSMENT — CONF VISUAL FIELD
OD_SUPERIOR_NASAL_RESTRICTION: 0
OS_INFERIOR_NASAL_RESTRICTION: 0
OD_INFERIOR_TEMPORAL_RESTRICTION: 0
METHOD: COUNTING FINGERS
OD_NORMAL: 1
OD_INFERIOR_NASAL_RESTRICTION: 0
OD_SUPERIOR_TEMPORAL_RESTRICTION: 0
OS_SUPERIOR_TEMPORAL_RESTRICTION: 0
OS_NORMAL: 1
OS_INFERIOR_TEMPORAL_RESTRICTION: 0
OS_SUPERIOR_NASAL_RESTRICTION: 0

## 2024-02-12 ASSESSMENT — EXTERNAL EXAM - LEFT EYE: OS_EXAM: NORMAL

## 2024-02-12 ASSESSMENT — PACHYMETRY
OS_CT(UM): 572
OD_CT(UM): 572

## 2024-02-12 ASSESSMENT — GONIOSCOPY: METHOD: SUSSMAN, FOUR MIRROR

## 2024-02-12 ASSESSMENT — TONOMETRY
IOP_METHOD: APPLANATION
OS_IOP_MMHG: 20
OD_IOP_MMHG: 18
IOP_METHOD: TONOPEN
OS_IOP_MMHG: 26

## 2024-02-12 ASSESSMENT — SLIT LAMP EXAM - LIDS
COMMENTS: NORMAL
COMMENTS: NORMAL

## 2024-02-12 ASSESSMENT — VISUAL ACUITY
OD_SC: 20/30
OD_SC+: -2
METHOD: SNELLEN - LINEAR
OS_SC+: -2
OS_SC: 20/30

## 2024-02-12 NOTE — NURSING NOTE
Chief Complaints and History of Present Illnesses   Patient presents with    Eye Exam For Diabetes     Chief Complaint(s) and History of Present Illness(es)       Eye Exam For Diabetes              Laterality: both eyes    Associated symptoms: eye pain, flashes and floaters    Treatments tried: no treatments              Comments    Isabel Biggs is a(n) 69 year old female who presents for a diabetic exam and establish care - closer to home. Last eye exam was 1 year(s) ago. Since exam, vision is worsening. Had glasses, but broke some time ago. Denies lubricating drops. Some flashes and floaters both eyes. Intermittent sharp eye pain.     Lab Results       Component                Value               Date                       A1C                      6.1                 09/14/2023                 A1C                      6.0                 05/12/2023                 A1C                      6.3                 01/02/2023                 A1C                      6.4                 08/29/2022                 A1C                      6.3                 05/23/2022              Shon Hamilton COT 1:19 PM February 12, 2024

## 2024-02-12 NOTE — PROGRESS NOTES
HPI       Eye Exam For Diabetes    In both eyes.  Associated symptoms include eye pain, flashes and floaters.  Treatments tried include no treatments.             Comments    Isabel Biggs is a(n) 69 year old female who presents for a diabetic exam and establish care - closer to home. Last eye exam was 1 year(s) ago. Since exam, vision is worsening. Had glasses, but broke some time ago. Denies lubricating drops. Some flashes and floaters both eyes. Intermittent sharp eye pain.     Lab Results       Component                Value               Date                       A1C                      6.1                 09/14/2023                 A1C                      6.0                 05/12/2023                 A1C                      6.3                 01/02/2023                 A1C                      6.4                 08/29/2022                 A1C                      6.3                 05/23/2022              Shon Joy COT 1:19 PM February 12, 2024     denies family history of ocular conditions   denies history of ocular surgeries             Last edited by Kuldeep Vincent MD on 2/12/2024  1:58 PM.         Review of systems for the eyes was negative other than the pertinent positives/negatives listed in the HPI.      Assessment & Plan    HPI:  Isabel Biggs is a 69 year old female with history of HTN, HLD, GERD, hyperopia with astigmatism and presbyopia presents from Dr. Nallely Miguel for glaucoma evaluation.       POHx: Hyperopia with astigmatism and presbyopia  PMHx:HTN, HLD, GERD  Current Medications: albuterol (PROAIR HFA/PROVENTIL HFA/VENTOLIN HFA) 108 (90 Base) MCG/ACT inhaler, Inhale 2 puffs into the lungs every 6 hours as needed for shortness of breath, wheezing or cough  amitriptyline (ELAVIL) 50 MG tablet, Take 1 tablet (50 mg) by mouth At Bedtime  amLODIPine (NORVASC) 5 MG tablet, Take 1 tablet (5 mg) by mouth daily  aspirin 81 MG EC tablet, Take 1 tablet (81 mg) by mouth  daily  atorvastatin (LIPITOR) 20 MG tablet, Take 1 tablet (20 mg) by mouth daily  Cholecalciferol (D 1000) 25 MCG (1000 UT) CAPS, Take 1 capsule by mouth daily  DULoxetine (CYMBALTA) 30 MG capsule, Take 1 capsule (30 mg) by mouth 2 times daily  erythromycin (ROMYCIN) 5 MG/GM ophthalmic ointment, Place 0.5 inches Into the left eye 2 times daily  losartan-hydrochlorothiazide (HYZAAR) 50-12.5 MG tablet, Take 1 tablet by mouth daily  meclizine (ANTIVERT) 25 MG tablet, Take 1 tablet (25 mg) by mouth 3 times daily  omeprazole (PRILOSEC) 20 MG DR capsule, Take 1 capsule (20 mg) by mouth daily    sodium hyaluronate (DUROLANE) injection 60 mg      FHx: cataracts  PSHx: denies history of ocular surgeries       Current Eye Medications:      Assessment & Plan:  (H40.003) Glaucoma suspect of both eyes  (primary encounter diagnosis)  (H40.033) Anatomical narrow angle borderline glaucoma of both eyes  Maximum intraocular pressure 18/20  Family history: No  Trauma history: No  Gonioscopy: closed, steep, bowed anteriorly  Pachymetry:   Treatment History:     Today's testing:  Visual field 02/12/24:   Right eye nonspecific defect, enlarged blind spot, nonspecific defect VFI 88%, MD -6.96, PSD 3.66  Left eye nonspecific defect, VFI 92%, MD -5.18, PSD 3.73    OCT nerve fiber layer 02/12/24:   Right eye - G(g) 112 NI (g) 149 TI (g) 172 NS (g) 134 TS (g) 125      Left eye - G(g) 113 NI (g) 151 TI (g) 145 NS (g) 143 TS (g) 132       IOP goal: mid teens  Discussed glaucoma diagnosis at length including etiology of disease and treatment options including laser, drops, or surgery  Concern for narrow angle with minimal visible ocular structures  Recommend laser peripheral iridotomy (LPI) both eyes  Will perform right eye today followed by left eye     (H52.03,  H52.203,  H52.4) Hyperopia of both eyes with astigmatism and presbyopia  Hold pending laser peripheral iridotomy (LPI) both eyes and dilation      Return in about 4 weeks (around  3/11/2024) for Follow Up-v/t, LPI OS.        Kuldeep Vincent MD     Attending Physician Attestation:  Complete documentation of historical and exam elements from today's encounter can be found in the full encounter summary report (not reduplicated in this progress note).  I personally obtained the chief complaint(s) and history of present illness.  I confirmed and edited as necessary the review of systems, past medical/surgical history, family history, social history, and examination findings as documented by others; and I examined the patient myself.  I personally reviewed the relevant tests, images, and reports as documented above.  I formulated and edited as necessary the assessment and plan and discussed the findings and management plan with the patient and family. - Kuldeep Vincent MD

## 2024-02-21 ENCOUNTER — PATIENT OUTREACH (OUTPATIENT)
Dept: CARE COORDINATION | Facility: CLINIC | Age: 69
End: 2024-02-21
Payer: COMMERCIAL

## 2024-02-21 NOTE — PROGRESS NOTES
Care Coordination Clinician Chart Review    Situation: Patient chart reviewed by Care Coordinator.       Background: Care Coordination Program started: 11/21/2022. Initial assessment completed and patient-centered care plan(s) were developed with participation from patient. Lead CC handed patient off to CHW for continued outreaches.       Assessment: Per chart review, patient outreach completed by CC CHW on 2-9-2024 leaving VM x1.  Patient is actively working to accomplish goal(s). Patient's goal(s) appropriate and relevant at this time. Patient is not due for updated Plan of Care.  Assessments will be completed annually or as needed/with change of patient status.      Care Plan: Financial Wellbeing       Problem: Patient expresses financial resource strain       Long-Range Goal: I will apply for MA after I get my citizenship, and apply for [employer] Care.       Start Date: 11/7/2023 Expected End Date: 11/6/2024    This Visit's Progress: 40% Recent Progress: 40%    Priority: High    Note:     Barriers: none identified.  Strengths: motivated.  Patient expressed understanding of goal: yes  Action steps to achieve this goal:  1. I will connect with FRW to apply for programs.   2. I will complete any paper work.  3. I will work on getting my citizenship approved and then apply for MA.   4. I will report progress towards this goal at scheduled outreach telephone calls from the CCC team.    Discussed 12/7/23                            Care Plan: Mental Health       Problem: Mood/Psychosocial Concerns       Long-Range Goal: Establish Mental Health Support and reduce depression symptoms.       Start Date: 11/7/2023 Expected End Date: 11/6/2024    This Visit's Progress: 20% Recent Progress: 20%    Priority: High    Note:     Barriers: none noted.   Strengths: motivated to feel better.  Patient expressed understanding of goal: yes  Action steps to achieve this goal:  1. I will schedule and attend appointments with therapist.     2. I will meet with counselor virtually.  3. I will report progress towards this goal at scheduled outreach telephone calls from the CCC team.    Discussed 12/7/23                            Care Plan: Transportation       Problem: Lack of transportation       Long-Range Goal: I will apply for [employer]       Start Date: 11/7/2023 Expected End Date: 11/6/2024    This Visit's Progress: 60% Recent Progress: 10%    Priority: Medium    Note:     Barriers: getting paper work done.   Strengths: motivated.   Patient expressed understanding of goal: yes  Action steps to achieve this goal:  1. I will complete my part of the application.  2. I will send in my part and PCP part of the application together to [employer].   3. I will report progress towards this goal at scheduled outreach telephone calls from the CCC team.    Discussed 12/7/23                                 Plan/Recommendations: The patient will continue working with Care Coordination to achieve goal(s) as above. CHW will continue outreaches at minimum every 30 days and will involve Lead CC as needed or if patient is ready to move to Maintenance. Lead CC will continue to monitor CHW outreaches and patient's progress to goal(s) every 6 weeks.     Plan of Care updated and sent to patient: No

## 2024-02-23 ENCOUNTER — PATIENT OUTREACH (OUTPATIENT)
Dept: CARE COORDINATION | Facility: CLINIC | Age: 69
End: 2024-02-23
Payer: COMMERCIAL

## 2024-02-23 NOTE — LETTER
[facility]  [facility]   February 23, 2024    Isabel Biggs  [address] [address] [place] [address] [address]    [facility] [address]      Dear Isabel,    I have been attempting to reach you since our last contact. I would like to continue to work with you and provide any additional support you may need on achieving your health care related goals. I would appreciate if you would give me a call at [phone] to let me know if you would like to continue working together. I know that there are many things that can affect our ability to communicate and I hope we can continue to work together.    All of us at the [facility] are invested in your health and are here to assist you in meeting your goals.     Sincerely,    Raquel Miguel  Community Health Worker   [facility] Care Coordination  [place] [place] [facility]   [email or web address]  Office: [phone]

## 2024-02-23 NOTE — PROGRESS NOTES
Clinic Care Coordination Contact  [facility]/Voicemail    Clinical Data: Care Coordinator Outreach    Outreach Documentation Number of Outreach Attempt   2/9/2024  10:19 AM 1   2/23/2024  11:38 AM 2       Left message on patient's voicemail with call back information and requested return call.    Plan: Care Coordinator will send unable to contact letter with care coordinator contact information via mail. Care Coordinator will perform chart review in 3 weeks on 3/15/24.      Raquel Miguel  Community Health Worker   [facility] Care Coordination  [facility]   [email or web address]  Office: [phone]

## 2024-02-28 ENCOUNTER — PATIENT OUTREACH (OUTPATIENT)
Dept: CARE COORDINATION | Facility: CLINIC | Age: 69
End: 2024-02-28
Payer: COMMERCIAL

## 2024-03-01 ENCOUNTER — TRANSFERRED RECORDS (OUTPATIENT)
Dept: MULTI SPECIALTY CLINIC | Facility: CLINIC | Age: 69
End: 2024-03-01

## 2024-03-01 LAB — RETINOPATHY: NORMAL

## 2024-03-04 ENCOUNTER — PATIENT OUTREACH (OUTPATIENT)
Dept: CARE COORDINATION | Facility: CLINIC | Age: 69
End: 2024-03-04
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
## 2024-03-07 ENCOUNTER — APPOINTMENT (OUTPATIENT)
Dept: INTERNAL MEDICINE | Facility: CLINIC | Age: 69
End: 2024-03-07
Payer: COMMERCIAL

## 2024-03-07 ENCOUNTER — VIRTUAL VISIT (OUTPATIENT)
Dept: INTERNAL MEDICINE | Facility: CLINIC | Age: 69
End: 2024-03-07
Payer: COMMERCIAL

## 2024-03-07 DIAGNOSIS — F33.1 MODERATE EPISODE OF RECURRENT MAJOR DEPRESSIVE DISORDER (H): ICD-10-CM

## 2024-03-07 DIAGNOSIS — I10 ESSENTIAL HYPERTENSION: ICD-10-CM

## 2024-03-07 DIAGNOSIS — J01.00 ACUTE NON-RECURRENT MAXILLARY SINUSITIS: Primary | ICD-10-CM

## 2024-03-07 DIAGNOSIS — J41.0 SIMPLE CHRONIC BRONCHITIS (H): ICD-10-CM

## 2024-03-07 DIAGNOSIS — E11.42 DIABETIC POLYNEUROPATHY ASSOCIATED WITH TYPE 2 DIABETES MELLITUS (H): Primary | ICD-10-CM

## 2024-03-07 DIAGNOSIS — D56.1 BETA THALASSEMIA (H): ICD-10-CM

## 2024-03-07 DIAGNOSIS — E66.01 MORBID OBESITY (H): ICD-10-CM

## 2024-03-07 DIAGNOSIS — E11.42 DIABETIC POLYNEUROPATHY ASSOCIATED WITH TYPE 2 DIABETES MELLITUS (H): ICD-10-CM

## 2024-03-07 DIAGNOSIS — E11.9 TYPE 2 DIABETES MELLITUS WITHOUT COMPLICATION, WITHOUT LONG-TERM CURRENT USE OF INSULIN (H): ICD-10-CM

## 2024-03-07 PROCEDURE — 99214 OFFICE O/P EST MOD 30 MIN: CPT | Mod: 95 | Performed by: INTERNAL MEDICINE

## 2024-03-07 PROCEDURE — 96127 BRIEF EMOTIONAL/BEHAV ASSMT: CPT | Mod: 95 | Performed by: INTERNAL MEDICINE

## 2024-03-07 RX ORDER — LOSARTAN POTASSIUM AND HYDROCHLOROTHIAZIDE 25; 100 MG/1; MG/1
1 TABLET ORAL DAILY
Qty: 90 TABLET | Refills: 3 | Status: SHIPPED | OUTPATIENT
Start: 2024-03-07 | End: 2024-07-23

## 2024-03-07 RX ORDER — GUAIFENESIN 600 MG/1
600 TABLET, EXTENDED RELEASE ORAL 2 TIMES DAILY
Qty: 60 TABLET | Refills: 2 | Status: SHIPPED | OUTPATIENT
Start: 2024-03-07 | End: 2024-07-26

## 2024-03-07 RX ORDER — PREDNISONE 20 MG/1
20 TABLET ORAL EVERY MORNING
Qty: 4 TABLET | Refills: 0 | Status: SHIPPED | OUTPATIENT
Start: 2024-03-07 | End: 2024-03-11

## 2024-03-07 RX ORDER — ATORVASTATIN CALCIUM 20 MG/1
20 TABLET, FILM COATED ORAL DAILY
Qty: 90 TABLET | Refills: 3 | Status: SHIPPED | OUTPATIENT
Start: 2024-03-07 | End: 2024-07-23

## 2024-03-07 RX ORDER — AMPICILLIN TRIHYDRATE 500 MG
1 CAPSULE ORAL DAILY
Qty: 90 CAPSULE | Refills: 3 | Status: SHIPPED | OUTPATIENT
Start: 2024-03-07 | End: 2024-07-24

## 2024-03-07 RX ORDER — ASPIRIN 81 MG/1
81 TABLET ORAL DAILY
Qty: 90 TABLET | Refills: 3 | Status: SHIPPED | OUTPATIENT
Start: 2024-03-07 | End: 2024-07-24

## 2024-03-07 ASSESSMENT — PATIENT HEALTH QUESTIONNAIRE - PHQ9
SUM OF ALL RESPONSES TO PHQ QUESTIONS 1-9: 7
SUM OF ALL RESPONSES TO PHQ QUESTIONS 1-9: 7
10. IF YOU CHECKED OFF ANY PROBLEMS, HOW DIFFICULT HAVE THESE PROBLEMS MADE IT FOR YOU TO DO YOUR WORK, TAKE CARE OF THINGS AT HOME, OR GET ALONG WITH OTHER PEOPLE: SOMEWHAT DIFFICULT
10. IF YOU CHECKED OFF ANY PROBLEMS, HOW DIFFICULT HAVE THESE PROBLEMS MADE IT FOR YOU TO DO YOUR WORK, TAKE CARE OF THINGS AT HOME, OR GET ALONG WITH OTHER PEOPLE: SOMEWHAT DIFFICULT
SUM OF ALL RESPONSES TO PHQ QUESTIONS 1-9: 7
SUM OF ALL RESPONSES TO PHQ QUESTIONS 1-9: 7

## 2024-03-07 NOTE — PROGRESS NOTES
Isabel is a 69 year old female contacting the clinic today via video, who will use the platform: Domee for the visit.  Phone # for Doximity, or if Amwell drops:   Telephone Information:   Mobile [phone]          ASSESSMENT and PLAN:  1. Acute non-recurrent maxillary sinusitis  Duration suggest bacterial triggered by COVID.  Will treat  - predniSONE (DELTASONE) 20 MG tablet; Take 1 tablet (20 mg) by mouth every morning for 4 days  Dispense: 4 tablet; Refill: 0  - guaiFENesin (MUCINEX) 600 MG 12 hr tablet; Take 1 tablet (600 mg) by mouth 2 times daily  Dispense: 60 tablet; Refill: 2  - amoxicillin-clavulanate (AUGMENTIN) 875-125 MG tablet; Take 1 tablet by mouth 2 times daily for 7 days  Dispense: 14 tablet; Refill: 0    2. Diabetic polyneuropathy associated with type 2 diabetes mellitus (H)  Stable and up-to-date    3. Type 2 diabetes mellitus without complication, without long-term current use of insulin (H)  Okay to refill meds  - aspirin 81 MG EC tablet; Take 1 tablet (81 mg) by mouth daily  Dispense: 90 tablet; Refill: 3  - atorvastatin (LIPITOR) 20 MG tablet; Take 1 tablet (20 mg) by mouth daily  Dispense: 90 tablet; Refill: 3  - Cholecalciferol (D 1000) 25 MCG (1000 UT) CAPS; Take 1 capsule by mouth daily  Dispense: 90 capsule; Refill: 3    4. Essential hypertension  Would increase intensity of losartan  - losartan-hydrochlorothiazide (HYZAAR) 100-25 MG tablet; Take 1 tablet by mouth daily  Dispense: 90 tablet; Refill: 3    5. Simple chronic bronchitis (H)  Flared with sinus infection driving    6. Moderate episode of recurrent major depressive disorder (H)  Stable mood per history    7. Morbid obesity (H)  Noted but no treatment discussed    8. Beta thalassemia (H)  Stable       Patient Instructions   Augmentin 875 mg twice daily for 7 days    Mucinex 600 mg twice daily    Prednisone 20 mg daily for 4 days    Refill aspirin, atorvastatin and vitamin D to mail order    Increase losartan/HCTZ to 100/25  "daily            Return in about 4 months (around 7/7/2024) for using a video visit.       CHIEF COMPLAINT:  Chief Complaint   Patient presents with    Covid Concern     Had Covid and was seen in ED on 1/4/24; patient reports ongoing HA's, loss pf appetite, sinus pressure, and fatigue        HISTORY OF PRESENT ILLNESS:  Isabel is a 69 year old female contacting the clinic today via video for complaints of worsening sinus congestion and bronchitis.  Became ill with COVID in January.  Seen in the emergency room and treated with Paxlovid.  Since that time she has had right-sided headache, sinus congestion, and posterior nasal drainage.  No anterior drainage and minimal sore throat.  Decreased appetite.  Worsening cough and asthma.  Has not had a sinus infection before    Blood pressure has been elevated but does not monitor at home.  Takes amlodipine and losartan HCTZ    Mood good but energy decreased    HPI    REVIEW OF SYSTEMS:   No peripheral edema but does have urinary urgency    Today's PHQ-2 Score:       8/22/2023    12:48 PM   PHQ-2 ( 1999 Pfizer)   Q1: Little interest or pleasure in doing things 3   Q2: Feeling down, depressed or hopeless 3   PHQ-2 Score 6       PFSH:  Social History     Social History Narrative    She is originally from [place]. 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   She is  and has 7 adult children and many grandchildren.       TOBACCO USE:  History   Smoking Status    Never   Smokeless Tobacco    Never       VITALS:  There were no vitals filed for this visit.  LMP 06/08/1996 (Exact Date)  Estimated body mass index is 40.42 kg/m  as calculated from the following:    Height as of 9/14/23: 1.575 m (5' 2\").    Weight as of 9/14/23: 100.2 kg (221 lb).    PHYSICAL EXAM:  (observations via Video)  Alert and oriented.  Overweight    MEDICATIONS: "   Current Outpatient Medications   Medication Sig Dispense Refill    albuterol (PROAIR HFA/PROVENTIL HFA/VENTOLIN HFA) 108 (90 Base) MCG/ACT inhaler Inhale 2 puffs into the lungs every 6 hours as needed for shortness of breath, wheezing or cough 18 g 1    amitriptyline (ELAVIL) 50 MG tablet Take 1 tablet (50 mg) by mouth At Bedtime 90 tablet 0    amLODIPine (NORVASC) 5 MG tablet Take 1 tablet (5 mg) by mouth daily 90 tablet 0    amoxicillin-clavulanate (AUGMENTIN) 875-125 MG tablet Take 1 tablet by mouth 2 times daily for 7 days 14 tablet 0    aspirin 81 MG EC tablet Take 1 tablet (81 mg) by mouth daily 90 tablet 3    atorvastatin (LIPITOR) 20 MG tablet Take 1 tablet (20 mg) by mouth daily 90 tablet 3    Cholecalciferol (D 1000) 25 MCG (1000 UT) CAPS Take 1 capsule by mouth daily 90 capsule 3    DULoxetine (CYMBALTA) 30 MG capsule Take 1 capsule (30 mg) by mouth 2 times daily 180 capsule 4    erythromycin (ROMYCIN) 5 MG/GM ophthalmic ointment Place 0.5 inches Into the left eye 2 times daily 1 g 1    guaiFENesin (MUCINEX) 600 MG 12 hr tablet Take 1 tablet (600 mg) by mouth 2 times daily 60 tablet 2    losartan-hydrochlorothiazide (HYZAAR) 100-25 MG tablet Take 1 tablet by mouth daily 90 tablet 3    meclizine (ANTIVERT) 25 MG tablet Take 1 tablet (25 mg) by mouth 3 times daily 90 tablet 4    omeprazole (PRILOSEC) 20 MG DR capsule Take 1 capsule (20 mg) by mouth daily 90 capsule 1    predniSONE (DELTASONE) 20 MG tablet Take 1 tablet (20 mg) by mouth every morning for 4 days 4 tablet 0       Outside Notes summarized: Emergency room note January 4  Labs, x-rays, cardiology, GI tests reviewed: Low hemoglobin thalassemia.  Recent Labs   Lab Test 09/14/23  1303 05/12/23  0752 02/28/23  1013 02/15/23  0539   HGB 11.3*  --   --  9.4*   WBC 4.7  --   --  8.6     --   --  139   POTASSIUM 4.0  --   --  3.3*   CR 0.57  --   --  0.50*   A1C 6.1* 6.0*  --   --    B12  --   --  1,187  --    TSH  --   --  0.83  --    VITDT 29   --   --   --      Lab Results   Component Value Date    NGYRX47EAM Negative 02/14/2023    DUPDX81TLF NEGATIVE 06/25/2021     Lab Results   Component Value Date    CHOL 193 09/14/2023     New orders: No orders of the defined types were placed in this encounter.      Independent review of:         3/7/2024     9:28 AM   Additional Questions   Roomed by TOM Gonzalez   Accompanied by Alone       Video-Visit Details  Type of service:  Video Visit  Patient has given verbal consent to a Video visit?  Yes  How would you like to obtain your AVS?  MyChart  Will anyone else be joining your video visit, giving supplemental history? No    Originating location (pt location): Home    Distant Location (provider location):  Off-site    Video Start Time: 11:38 AM  Video End time:  11:58 AM  Face to face plus orders: 20 minutes  Documentation time:  3 minutes     The visit lasted a total of 23 minutes    Ivan Simpson MD  [facility]       Answers submitted by the patient for this visit:  Patient Health Questionnaire (Submitted on 3/7/2024)  If you checked off any problems, how difficult have these problems made it for you to do your work, take care of things at home, or get along with other people?: Somewhat difficult  PHQ9 TOTAL SCORE: 7

## 2024-03-07 NOTE — PROGRESS NOTES
Isabel is a 69 year old who is being evaluated via a billable video visit.      How would you like to obtain your AVS? MyChart  If the video visit is dropped, the invitation should be resent by: Send to e-mail at: [email or web address]  Will anyone else be joining your video visit? No  {If patient encounters technical issues they should call [phone] :771843}        {PROVIDER CHARTING PREFERENCE:457515}    Subjective   Isabel is a 69 year old, presenting for the following health issues:  Covid Concern (Had Covid and was seen in ED on 1/4/24; patient reports ongoing HA's, loss pf appetite, sinus pressure, and fatigue )      3/7/2024     9:28 AM   Additional Questions   Roomed by TOM Gonzalez   Accompanied by Mitchel     HPI       COVID-19 Symptom Review  How many days ago did these symptoms start? Ongoing symptoms since testing positive 1/4/24    Are any of the following symptoms significant for you?  New or worsening difficulty breathing? No  Worsening cough? No  Fever or chills? No  Headache: YES  Sore throat: No  Chest pain: No  Diarrhea: No  Body aches? No    What treatments has patient tried? Paxlovid however has completed this medication   Does patient live in a nursing home, group home, or shelter? No  Does patient have a way to get food/medications during quarantined? Yes, I have a friend or family member who can help me.    {Provider  Link to COVID SmartSet :039179}      {additonal problems for provider to add (Optional):988270}    {ROS Picklists (Optional):342446}      Objective           Vitals:  No vitals were obtained today due to virtual visit.    Physical Exam   {video visit exam brief selected:069889}    {Diagnostic Test Results (Optional):873950}      Video-Visit Details    Type of service:  Video Visit   Video Start Time: {video visit start/end time for provider to select:957458}  Video End Time:{video visit start/end time for provider to select:753160}    Originating Location (pt. Location):  Home  {PROVIDER LOCATION On-site should be selected for visits conducted from your clinic location or adjoining [facility] hospital, academic office, or other nearby [facility] building. Off-site should be selected for all other provider locations, including home:560484}  Distant Location (provider location):  Off-site  Platform used for Video Visit: Ralph  Signed Electronically by: Ivan Simpson MD  {Email feedback regarding this note to [email or web address]   :243757}

## 2024-03-07 NOTE — PATIENT INSTRUCTIONS
Augmentin 875 mg twice daily for 7 days    Mucinex 600 mg twice daily    Prednisone 20 mg daily for 4 days    Refill aspirin, atorvastatin and vitamin D to mail order    Increase losartan/HCTZ to 100/25 daily

## 2024-03-11 ENCOUNTER — PATIENT OUTREACH (OUTPATIENT)
Dept: NURSING | Facility: CLINIC | Age: 69
End: 2024-03-11
Payer: COMMERCIAL

## 2024-03-11 DIAGNOSIS — Z59.9 FINANCIAL DIFFICULTIES: Primary | ICD-10-CM

## 2024-03-11 SDOH — ECONOMIC STABILITY - INCOME SECURITY: PROBLEM RELATED TO HOUSING AND ECONOMIC CIRCUMSTANCES, UNSPECIFIED: Z59.9

## 2024-03-11 NOTE — PROGRESS NOTES
Clinic Care Coordination Contact  Follow Up Progress Note      Assessment: Patient doesn't think the MM application ever came to her for signature.  Looks like her part was completed and will double check for PCP portion.  Will call her with update.    She is ready to apply for MA and for shawna care at this time.  She has income of $800.  Discussed working with FRW and she agreed.  Order sent.     Care Gaps:    Health Maintenance Due   Topic Date Due    SPIROMETRY  Never done    ZOSTER IMMUNIZATION (1 of 2) Never done    RSV VACCINE (Pregnancy & 60+) (1 - 1-dose 60+ series) Never done    EYE EXAM  07/07/2022    COVID-19 Vaccine (6 - 2023-24 season) 09/01/2023    DTAP/TDAP/TD IMMUNIZATION (3 - Td or Tdap) 10/23/2023    DIABETIC FOOT EXAM  01/02/2024    A1C  03/14/2024       Postponed to next pcp appt.      Care Plans  Care Plan: Financial Wellbeing       Problem: Patient expresses financial resource strain       Long-Range Goal: I will apply for MA after I get my citizenship, and apply for Shawna Care.       Start Date: 11/7/2023 Expected End Date: 11/6/2024    This Visit's Progress: 40% Recent Progress: 40%    Priority: High    Note:     Barriers: none identified.  Strengths: motivated.  Patient expressed understanding of goal: yes  Action steps to achieve this goal:  1. I will connect with FRW to apply for programs.   2. I will complete any paper work.  3. I will work on getting my citizenship approved and then apply for MA.   4. I will report progress towards this goal at scheduled outreach telephone calls from the CCC team.    Discussed 3/4/24                            Care Plan: Mental Health       Problem: Mood/Psychosocial Concerns       Long-Range Goal: Establish Mental Health Support and reduce depression symptoms.       Start Date: 11/7/2023 Expected End Date: 11/6/2024    This Visit's Progress: 30% Recent Progress: 20%    Priority: High    Note:     Barriers: none noted.   Strengths: motivated to feel  better.  Patient expressed understanding of goal: yes  Action steps to achieve this goal:  1. I will schedule and attend appointments with therapist.    2. I will meet with counselor virtually.  3. I will report progress towards this goal at scheduled outreach telephone calls from the CCC team.    Discussed 3/4/24                            Care Plan: Transportation       Problem: Lack of transportation       Long-Range Goal: I will apply for [employer]       Start Date: 11/7/2023 Expected End Date: 11/6/2024    This Visit's Progress: 60% Recent Progress: 60%    Priority: Medium    Note:     Barriers: getting paper work done.   Strengths: motivated.   Patient expressed understanding of goal: yes  Action steps to achieve this goal:  1. I will complete my part of the application.  2. I will send in my part and PCP part of the application together to [employer].   3. I will report progress towards this goal at scheduled outreach telephone calls from the CCC team.    Discussed 3/4/24                              Intervention/Education provided during outreach: support.      Outreach Frequency: monthly, more frequently as needed    Plan:     Care Coordinator will follow up in one week.     3- Looked for [employer] application, but is done too long ago to have retained the form.  Called patient and offered to send her the signature page with envelope to send back to writer. She will do that.  Her address was wrong in Epic which may have been the problem.  It is [address], [place] street.  Changed Epic.  Once her signature page comes to clinic, will ask PCP to complete PCP portion.      Plan- [facility] SW will continue to monitor, support patient with current goals and will be available to assist as needs arise. [facility] CHW will reach out to patient on a monthly basis to discuss progression of goals.      [facility] SW will perform Chart Review in 45 days.

## 2024-03-14 ENCOUNTER — PATIENT OUTREACH (OUTPATIENT)
Dept: CARE COORDINATION | Facility: CLINIC | Age: 69
End: 2024-03-14
Payer: COMMERCIAL

## 2024-03-22 ENCOUNTER — PATIENT OUTREACH (OUTPATIENT)
Dept: CARE COORDINATION | Facility: CLINIC | Age: 69
End: 2024-03-22
Payer: COMMERCIAL

## 2024-03-24 DIAGNOSIS — I10 ESSENTIAL HYPERTENSION: ICD-10-CM

## 2024-03-24 DIAGNOSIS — F06.4 ANXIETY DISORDER DUE TO MEDICAL CONDITION: ICD-10-CM

## 2024-03-25 RX ORDER — AMLODIPINE BESYLATE 5 MG/1
5 TABLET ORAL DAILY
Qty: 90 TABLET | Refills: 0 | Status: SHIPPED | OUTPATIENT
Start: 2024-03-25 | End: 2024-08-29

## 2024-03-25 RX ORDER — AMITRIPTYLINE HYDROCHLORIDE 50 MG/1
50 TABLET ORAL AT BEDTIME
Qty: 90 TABLET | Refills: 0 | Status: SHIPPED | OUTPATIENT
Start: 2024-03-25 | End: 2024-07-26

## 2024-03-28 ENCOUNTER — TELEPHONE (OUTPATIENT)
Dept: INTERNAL MEDICINE | Facility: CLINIC | Age: 69
End: 2024-03-28
Payer: COMMERCIAL

## 2024-03-28 ENCOUNTER — PATIENT OUTREACH (OUTPATIENT)
Dept: CARE COORDINATION | Facility: CLINIC | Age: 69
End: 2024-03-28
Payer: COMMERCIAL

## 2024-03-28 NOTE — TELEPHONE ENCOUNTER
March 28, 2024    [facility] Mob[employer] Professional Verification Form was received via fax for Dr. Georges to sign.  Patient label was attached to paperwork and placed in provider's inbox to be signed.    Noris Cantrell

## 2024-03-28 NOTE — PROGRESS NOTES
Clinic Care Coordination Contact  Follow Up Progress Note      Assessment: Call back from patient and completed patient part of the [employer] application.  Sent form to PCP for completion. No other concerns today.  Is working with FRW on goal.     Care Gaps:    Health Maintenance Due   Topic Date Due    SPIROMETRY  Never done    ZOSTER IMMUNIZATION (1 of 2) Never done    RSV VACCINE (Pregnancy & 60+) (1 - 1-dose 60+ series) Never done    EYE EXAM  07/07/2022    COVID-19 Vaccine (6 - 2023-24 season) 09/01/2023    DTAP/TDAP/TD IMMUNIZATION (3 - Td or Tdap) 10/23/2023    DIABETIC FOOT EXAM  01/02/2024    A1C  03/14/2024    DEPRESSION 6 MO INDEX REPEAT PHQ-9  03/21/2024       Postponed to next pcp appt     Care Plans  Care Plan: Financial Wellbeing       Problem: Patient expresses financial resource strain       Long-Range Goal: I will apply for MA after I get my citizenship, and apply for Pamela Care.       Start Date: 11/7/2023 Expected End Date: 11/6/2024    This Visit's Progress: 40% Recent Progress: 40%    Priority: High    Note:     Barriers: none identified.  Strengths: motivated.  Patient expressed understanding of goal: yes  Action steps to achieve this goal:  1. I will connect with FRW to apply for programs.   2. I will complete any paper work.  3. I will work on getting my citizenship approved and then apply for MA.   4. I will report progress towards this goal at scheduled outreach telephone calls from the CCC team.    Discussed 3/4/24                            Care Plan: Mental Health       Problem: Mood/Psychosocial Concerns       Long-Range Goal: Establish Mental Health Support and reduce depression symptoms.       Start Date: 11/7/2023 Expected End Date: 11/6/2024    This Visit's Progress: 30% Recent Progress: 20%    Priority: High    Note:     Barriers: none noted.   Strengths: motivated to feel better.  Patient expressed understanding of goal: yes  Action steps to achieve this goal:  1. I will  schedule and attend appointments with therapist.    2. I will meet with counselor virtually.  3. I will report progress towards this goal at scheduled outreach telephone calls from the CCC team.    Discussed 3/4/24                            Care Plan: Transportation       Problem: Lack of transportation       Long-Range Goal: I will apply for [employer]       Start Date: 11/7/2023 Expected End Date: 11/6/2024    This Visit's Progress: 60% Recent Progress: 60%    Priority: Medium    Note:     Barriers: getting paper work done.   Strengths: motivated.   Patient expressed understanding of goal: yes  Action steps to achieve this goal:  1. I will complete my part of the application.  2. I will send in my part and PCP part of the application together to [employer].   3. I will report progress towards this goal at scheduled outreach telephone calls from the [facility] team.  3-28- completed patient part of amrita, and sent to PCP for completion.   Discussed 3/ sent MM application signature page in mail.                               Intervention/Education provided during outreach: support with MM application.      Outreach Frequency: monthly, more frequently as needed    Plan:   [facility] SW will continue to monitor, support patient with current goals and will be available to assist as needs arise. [facility] CHW will reach out to patient on a monthly basis to discuss progression of goals.      [facility] SW will perform Chart Review in 45 days.         [facility]/Voicemail    Clinical Data: Care Coordinator Outreach    Outreach Documentation Number of Outreach Attempt   2/23/2024  11:38 AM 2   3/28/2024  11:44 AM 1       Left message on patient's voicemail with call back information and requested return call.  Received her [employer] signature page.  Gave PCP the provider portion to complete asking it to be returned to writer.  Will complete patient's form when she calls back.     Plan:Care Coordinator will try to reach patient again in 10  business days.    Angela Rogel,   [facility]  [phone]

## 2024-03-31 DIAGNOSIS — J40 BRONCHITIS: ICD-10-CM

## 2024-04-02 RX ORDER — ALBUTEROL SULFATE 90 UG/1
AEROSOL, METERED RESPIRATORY (INHALATION)
Qty: 34 G | Refills: 2 | Status: SHIPPED | OUTPATIENT
Start: 2024-04-02

## 2024-04-11 ENCOUNTER — PATIENT OUTREACH (OUTPATIENT)
Dept: CARE COORDINATION | Facility: CLINIC | Age: 69
End: 2024-04-11
Payer: COMMERCIAL

## 2024-04-11 NOTE — LETTER
[facility] CARE COORDINATION  [facility]    May 1, 2024        Isabel Biggs  [address]  [address]  [facility] [address]          Dear Isabel,     Attached is an updated Complex Care Plan for your continued enrollment in Care Coordination. Please let us know if you have additional questions, concerns, or goals that we can assist with.    Sincerely,    Angela Rogel,   [facility]  [phone]        [facility]  Patient Centered Plan of Care  About Me:        Patient Name:  Isabel Biggs    YOB: 1955  Age:         69 year old   [place] MRN:    [id number] Telephone Information:  Home Phone [phone]   Mobile [phone]   Home Phone [phone]       Address:  [address]  [address] [place], [place] [address] Email address:  [email or web address]      Emergency Contact(s)    Name Relationship Lgl Grd Work Phone Home Phone Mobile Phone   1. OTONIEL SCHAFFER Daughter    [phone]   2. PARISA SCHAFFER Daughter   [phone]            Primary language:  English     needed? No   [facility] [employer]:  [phone] op. 1  Other communication barriers:English as a second language; Visual impairment    Preferred Method of Communication:     Current living arrangement: I live in a private home with family    Mobility Status/ Medical Equipment: Independent        Health Maintenance  Health Maintenance Reviewed: Due/Overdue   Health Maintenance Due   Topic Date Due    SPIROMETRY  Never done    ZOSTER IMMUNIZATION (1 of 2) Never done    RSV VACCINE (Pregnancy & 60+) (1 - 1-dose 60+ series) Never done    EYE EXAM  07/07/2022    COVID-19 Vaccine (6 - 2023-24 season) 09/01/2023    DTAP/TDAP/TD IMMUNIZATION (3 - Td or Tdap) 10/23/2023    DIABETIC FOOT EXAM  01/02/2024    A1C  03/14/2024    DEPRESSION 6 MO INDEX REPEAT PHQ-9  03/21/2024           My Access Plan  Medical Emergency 911   Primary Clinic Line [facility] -  [phone]   24 Hour Appointment Line [phone] or  [phone]) (toll-free)   24 Hour Nurse Line [phone] (toll-free)   Preferred Urgent Care [facility], [phone]     Preferred Hospital [facility]  [phone]     Preferred Pharmacy [id number] [address] AT [facility]     Behavioral Health Crisis Line The National Suicide Prevention Lifeline at 1-823.194.7939 or Text/Call 988           My Care Team Members  Patient Care Team         Relationship Specialty Notifications Start End    Cj Georges MD PCP - General   3/9/18     Phone: [phone] Fax: [phone] [address] [facility] [address]    Claudia Joseph MD MD Hematology & Oncology Admissions 12/14/17     Phone: [phone] Fax: [phone]         [address] [facility] [address]    Meghann Rosado, PharmD Pharmacist Pharmacist  2/3/20     Phone: [phone] Fax: [phone]         [address] [place] [address]    Cj Georges MD Assigned PCP   6/16/21     Phone: [phone] Fax: [phone]         [address] [facility] [address]    Juma Wilkerson OD MD Optometry  6/18/21     Phone: [phone] Fax: [phone]         [address] [facility] [address] [facility] [address]    Denice Plasencia MD MD Neurology  7/26/21     Phone: [phone] Fax: [phone]         [address] [address]    Raquel Miguel CHW Community Health Worker Primary Care - CC Admissions 11/21/22     Angela Rogel LSW Lead Care Coordinator Primary Care - CC Admissions 12/1/22     Phone: [phone]         Ariella Miguel OD MD Optometry  1/18/23     Phone: [phone] Fax: [phone]         [address] [facility] [address]    Adele Hamilton PA-C Referring Physician Family Medicine  1/18/23     Referring Physician to eye    Phone:  [phone] Fax: [phone] [address] [facility] [address]    Dwain Santos MD MD Ophthalmology  1/23/23     Phone: [phone] Fax: [phone]         [address] [facility] [address]    Reginaldo Ochoa MD MD Neurology  2/28/23     Phone: [phone] Fax: [phone]         [address] [facility] [address]    Cj Whaley, APRN CNP Assigned Sleep Provider   4/15/23     Phone: [phone] Fax: [phone]         [address] [address]    Ria Goode MD MD Ophthalmology  9/15/23     Phone: [phone] Fax: [phone] [address] [facility] [address]    Reginaldo Denson DO Assigned Neuroscience Provider   10/21/23     Phone: [phone] Fax: [phone]         [address] [facility] [address] [place] [address]    Kuldeep Vincent MD Physician Ophthalmology  12/5/23     Phone: [phone] Fax: [phone]         [address] [place] [address]    Kuldeep Vincent MD Assigned Surgical Provider   2/23/24     Phone: [phone] Fax: [phone]         [address] [place] [address]    Sulema Weeks MA Financial Resource Worker   3/12/24     Phone: [phone]                     My Care Plans  Self Management and Treatment Plan    Care Plan  Care Plan: Financial Wellbeing       Problem: Patient expresses financial resource strain       Long-Range Goal: I will apply for MA after I get my citizenship, and apply for [employer].       Start Date: 11/7/2023 Expected End Date: 11/6/2024    This Visit's Progress: 50% Recent Progress: 40%    Priority: High    Note:     Barriers: none identified.  Strengths: motivated.  Patient expressed understanding of goal: yes  Action steps to achieve this goal:  1. I will connect with FRW to apply for programs.   2. I will complete any paper work.  3. I will work on getting my citizenship approved and then apply for MA.   4. I will  report progress towards this goal at scheduled outreach telephone calls from the CCC team.    Discussed 3/4/24                            Care Plan: Mental Health       Problem: Mood/Psychosocial Concerns       Long-Range Goal: Establish Mental Health Support and reduce depression symptoms.       Start Date: 11/7/2023 Expected End Date: 11/6/2024    This Visit's Progress: On Hold Recent Progress: 30%    Priority: High    Note:     Barriers: none noted.   Strengths: motivated to feel better.  Patient expressed understanding of goal: yes  Action steps to achieve this goal:  1. I will schedule and attend appointments with therapist.    2. I will meet with counselor virtually.  3. I will report progress towards this goal at scheduled outreach telephone calls from the CCC team.    Discussed 3/4/24                            Care Plan: Transportation       Problem: Lack of transportation       Long-Range Goal: I will apply for [employer]       Start Date: 11/7/2023 Expected End Date: 11/6/2024    This Visit's Progress: 70% Recent Progress: 60%    Priority: Medium    Note:     Barriers: getting paper work done.   Strengths: motivated.   Patient expressed understanding of goal: yes  Action steps to achieve this goal:  1. I will complete my part of the application.  2. I will send in my part and PCP part of the application together to [employer].   3. I will report progress towards this goal at scheduled outreach telephone calls from the CCC team.  4-16 mailed to [employer]  3-28- completed patient part of amrita, and sent to PCP for completion.   Discussed 3/ sent MM application signature page in mail.                               Advance Care Plans/Directives:   Advanced Care Plan/Directives on file:   No    Discussed with patient/caregiver(s):   Declined Further Information             My Medical and Care Information  Problem List   Patient Active Problem List   Diagnosis    WILFRIDO    Type 2 diabetes  mellitus without complication, without long-term current use of insulin (H)    Diabetic polyneuropathy associated with type 2 diabetes mellitus (H)    Beta thalassemia (H)    Low back pain     Moderate episode of recurrent major depressive disorder (H)    Morbid obesity (H)    Peripheral vertigo of both ears    Avitaminosis D    Essential hypertension    Mixed hyperlipidemia    Acute pain of right knee    Simple chronic bronchitis (H)      Current Medications and Allergies:    Current Outpatient Medications   Medication Instructions    albuterol (PROAIR HFA/PROVENTIL HFA/VENTOLIN HFA) 108 (90 Base) MCG/ACT inhaler USE 2 INHALATIONS BY MOUTH EVERY 6 HOURS AS NEEDED FOR SHORTNESS  OF BREATH WHEEZING OR COUGH    amitriptyline (ELAVIL) 50 mg, Oral, AT BEDTIME    amLODIPine (NORVASC) 5 mg, Oral, DAILY    aspirin 81 mg, Oral, DAILY    atorvastatin (LIPITOR) 20 mg, Oral, DAILY    Cholecalciferol (D 1000) 25 MCG (1000 UT) CAPS 1 capsule, Oral, DAILY    DULoxetine (CYMBALTA) 30 mg, Oral, 2 TIMES DAILY    erythromycin (ROMYCIN) 5 MG/GM ophthalmic ointment 0.5 inches, Left Eye, 2 TIMES DAILY    guaiFENesin (MUCINEX) 600 mg, Oral, 2 TIMES DAILY    losartan-hydrochlorothiazide (HYZAAR) 100-25 MG tablet 1 tablet, Oral, DAILY    meclizine (ANTIVERT) 25 mg, Oral, 3 TIMES DAILY    omeprazole (PRILOSEC) 20 mg, Oral, DAILY         Care Coordination Start Date: 11/21/2022   Frequency of Care Coordination: monthly, more frequently as needed     Form Last Updated: 05/01/2024

## 2024-04-11 NOTE — PROGRESS NOTES
Contact   Chart Review     Situation: Patient chart reviewed by .    Background: Waiting for [facility] Mobility form from PCP.     Assessment: PCP has not been able to complete.     Plan/Recommendations: Sent message to PCP.  Will follow up in one week.    4-  Form completed and copy was made.  Sent original to [facility] in the postal mail.      Angela Rogel,   [facility]  [phone]

## 2024-04-16 NOTE — TELEPHONE ENCOUNTER
April 16, 2024    [facility]  was picked up from outbox of Dr. Georges.  Paperwork has been reviewed and is complete.  Per initial initial request, this was sent via fax to this was given to Angela CAMPBELL     Noris Cantrell

## 2024-04-26 ENCOUNTER — PATIENT OUTREACH (OUTPATIENT)
Dept: CARE COORDINATION | Facility: CLINIC | Age: 69
End: 2024-04-26
Payer: COMMERCIAL

## 2024-04-29 ENCOUNTER — PATIENT OUTREACH (OUTPATIENT)
Dept: CARE COORDINATION | Facility: CLINIC | Age: 69
End: 2024-04-29
Payer: COMMERCIAL

## 2024-04-29 NOTE — PROGRESS NOTES
Clinic Care Coordination Contact  Community Health Worker Follow Up    Care Gaps:   Health Maintenance Due   Topic Date Due    SPIROMETRY  Never done    ZOSTER IMMUNIZATION (1 of 2) Never done    RSV VACCINE (Pregnancy & 60+) (1 - 1-dose 60+ series) Never done    EYE EXAM  07/07/2022    COVID-19 Vaccine (6 - 2023-24 season) 09/01/2023    DTAP/TDAP/TD IMMUNIZATION (3 - Td or Tdap) 10/23/2023    DIABETIC FOOT EXAM  01/02/2024    A1C  03/14/2024    DEPRESSION 6 MO INDEX REPEAT PHQ-9  03/21/2024       Postponed to Next CHW Outreach     Care Plan:   Care Plan: Financial Wellbeing       Problem: Patient expresses financial resource strain       Long-Range Goal: I will apply for MA after I get my citizenship, and apply for [employer].       Start Date: 11/7/2023 Expected End Date: 11/6/2024    This Visit's Progress: 50% Recent Progress: 40%    Priority: High    Note:     Barriers: none identified.  Strengths: motivated.  Patient expressed understanding of goal: yes  Action steps to achieve this goal:  1. I will connect with FRW to apply for programs.   2. I will complete any paper work.  3. I will work on getting my citizenship approved and then apply for MA.   4. I will report progress towards this goal at scheduled outreach telephone calls from the CCC team.    Discussed 3/4/24                            Care Plan: Mental Health       Problem: Mood/Psychosocial Concerns       Long-Range Goal: Establish Mental Health Support and reduce depression symptoms.       Start Date: 11/7/2023 Expected End Date: 11/6/2024    This Visit's Progress: On Hold Recent Progress: 30%    Priority: High    Note:     Barriers: none noted.   Strengths: motivated to feel better.  Patient expressed understanding of goal: yes  Action steps to achieve this goal:  1. I will schedule and attend appointments with therapist.    2. I will meet with counselor virtually.  3. I will report progress towards this goal at scheduled outreach telephone calls  from the CCC team.    Discussed 3/4/24                            Care Plan: Transportation       Problem: Lack of transportation       Long-Range Goal: I will apply for [employer]       Start Date: 11/7/2023 Expected End Date: 11/6/2024    This Visit's Progress: 70% Recent Progress: 60%    Priority: Medium    Note:     Barriers: getting paper work done.   Strengths: motivated.   Patient expressed understanding of goal: yes  Action steps to achieve this goal:  1. I will complete my part of the application.  2. I will send in my part and PCP part of the application together to [employer].   3. I will report progress towards this goal at scheduled outreach telephone calls from the CCC team.  4-16 mailed to [employer]  3-28- completed patient part of amrita, and sent to PCP for completion.   Discussed 3/ sent MM application signature page in mail.                               Intervention and Education during outreach:     Spoke to Patient ([place])  CHW Introduced intent of call regarding monthly follow up.   Patients reports that she is doing okay, further expressing that her immigration status has changed further indicated that she is happy and feel a weight lifted. Patient expressed that she is still in the process of completing CC application further indicating that she is not aware that she missed FRW's call, further indicating that she will try to call FRW within next week. Patient expressed that she will also inquire support from FRW that she received an letter in the mail from the [facility] that indicated that Patient's SNAP will end on May 1st due to Patient not completing some documentation. Patient expressed that she was not aware that she needed to complete additional documentation.   Patient requested for CHW to place Mental Health goal on hold due to Patient expressing that she doesn't need additional support at thi time. She has not been feeling like follow up with counseling at this time.    Patient expressed that she has completed all paperwork for [employer], and is still waiting for additional update on application status.    Patient expressed that no additional support or resources is needed.   CHW encourages Patient to contact CHW/ CCC Team if additional support is needed. CHW provides Patient with CHW's contact information. Patient acknowledged.     CHW Plan: Next CHW Outreach in One Month     YUE Bragg  Clinic Care Coordination   [facility]   Phone: [phone]  [email or web address]

## 2024-05-05 ENCOUNTER — HEALTH MAINTENANCE LETTER (OUTPATIENT)
Age: 69
End: 2024-05-05

## 2024-05-08 ENCOUNTER — PATIENT OUTREACH (OUTPATIENT)
Dept: CARE COORDINATION | Facility: CLINIC | Age: 69
End: 2024-05-08
Payer: COMMERCIAL

## 2024-05-08 NOTE — PROGRESS NOTES
Care Coordination Clinician Chart Review    Situation: Patient chart reviewed by Care Coordinator.       Background: Care Coordination Program started: 11/21/2022. Initial assessment completed and patient-centered care plan(s) were developed with participation from patient. Lead CC handed patient off to CHW for continued outreaches.       Assessment: Per chart review, patient outreach completed by CC CHW on 4-.  Patient is actively working to accomplish goal(s). Patient's goal(s) appropriate and relevant at this time. Patient is not due for updated Plan of Care.  Assessments will be completed annually or as needed/with change of patient status.      Care Plan: Financial Wellbeing       Problem: Patient expresses financial resource strain       Long-Range Goal: I will apply for MA after I get my citizenship, and apply for [employer].       Start Date: 11/7/2023 Expected End Date: 11/6/2024    This Visit's Progress: 50% Recent Progress: 40%    Priority: High    Note:     Barriers: none identified.  Strengths: motivated.  Patient expressed understanding of goal: yes  Action steps to achieve this goal:  1. I will connect with FRW to apply for programs.   2. I will complete any paper work.  3. I will work on getting my citizenship approved and then apply for MA.   4. I will report progress towards this goal at scheduled outreach telephone calls from the CCC team.    Discussed 3/4/24                            Care Plan: Mental Health       Problem: Mood/Psychosocial Concerns       Long-Range Goal: Establish Mental Health Support and reduce depression symptoms.       Start Date: 11/7/2023 Expected End Date: 11/6/2024    This Visit's Progress: On Hold Recent Progress: 30%    Priority: High    Note:     Barriers: none noted.   Strengths: motivated to feel better.  Patient expressed understanding of goal: yes  Action steps to achieve this goal:  1. I will schedule and attend appointments with therapist.    2. I will  meet with counselor virtually.  3. I will report progress towards this goal at scheduled outreach telephone calls from the CCC team.    Discussed 3/4/24                            Care Plan: Transportation       Problem: Lack of transportation       Long-Range Goal: I will apply for [employer]       Start Date: 11/7/2023 Expected End Date: 11/6/2024    This Visit's Progress: 70% Recent Progress: 60%    Priority: Medium    Note:     Barriers: getting paper work done.   Strengths: motivated.   Patient expressed understanding of goal: yes  Action steps to achieve this goal:  1. I will complete my part of the application.  2. I will send in my part and PCP part of the application together to [employer].   3. I will report progress towards this goal at scheduled outreach telephone calls from the CCC team.  4-16 mailed to [employer]  3-28- completed patient part of amrita, and sent to PCP for completion.   Discussed 3/ sent MM application signature page in mail.                                  Plan/Recommendations: The patient will continue working with Care Coordination to achieve goal(s) as above. CHW will continue outreaches at minimum every 30 days and will involve Lead CC as needed or if patient is ready to move to Maintenance. Lead CC will continue to monitor CHW outreaches and patient's progress to goal(s) every 6 weeks.     Plan of Care updated and sent to patient: No    5-  VM from patient with update- she was approved for [employer] for lifetime and is so grateful for everyone who helped make this happen!  Completed that goal.

## 2024-05-21 ENCOUNTER — ANCILLARY PROCEDURE (OUTPATIENT)
Dept: MAMMOGRAPHY | Facility: CLINIC | Age: 69
End: 2024-05-21
Attending: INTERNAL MEDICINE
Payer: COMMERCIAL

## 2024-05-21 DIAGNOSIS — Z12.31 VISIT FOR SCREENING MAMMOGRAM: ICD-10-CM

## 2024-05-21 PROCEDURE — 77067 SCR MAMMO BI INCL CAD: CPT | Performed by: STUDENT IN AN ORGANIZED HEALTH CARE EDUCATION/TRAINING PROGRAM

## 2024-05-21 PROCEDURE — 77063 BREAST TOMOSYNTHESIS BI: CPT | Performed by: STUDENT IN AN ORGANIZED HEALTH CARE EDUCATION/TRAINING PROGRAM

## 2024-05-29 ENCOUNTER — PATIENT OUTREACH (OUTPATIENT)
Dept: CARE COORDINATION | Facility: CLINIC | Age: 69
End: 2024-05-29
Payer: COMMERCIAL

## 2024-05-30 ENCOUNTER — OFFICE VISIT (OUTPATIENT)
Dept: INTERNAL MEDICINE | Facility: CLINIC | Age: 69
End: 2024-05-30
Payer: COMMERCIAL

## 2024-05-30 ENCOUNTER — PATIENT OUTREACH (OUTPATIENT)
Dept: CARE COORDINATION | Facility: CLINIC | Age: 69
End: 2024-05-30

## 2024-05-30 VITALS
SYSTOLIC BLOOD PRESSURE: 120 MMHG | RESPIRATION RATE: 20 BRPM | DIASTOLIC BLOOD PRESSURE: 80 MMHG | OXYGEN SATURATION: 99 % | BODY MASS INDEX: 39.73 KG/M2 | HEIGHT: 62 IN | WEIGHT: 215.9 LBS | HEART RATE: 85 BPM | TEMPERATURE: 97.4 F

## 2024-05-30 DIAGNOSIS — F33.1 MODERATE EPISODE OF RECURRENT MAJOR DEPRESSIVE DISORDER (H): ICD-10-CM

## 2024-05-30 DIAGNOSIS — E66.01 MORBID OBESITY (H): ICD-10-CM

## 2024-05-30 DIAGNOSIS — L30.9 DERMATITIS: ICD-10-CM

## 2024-05-30 DIAGNOSIS — E11.29 TYPE 2 DIABETES MELLITUS WITH DIABETIC MICROALBUMINURIA, WITHOUT LONG-TERM CURRENT USE OF INSULIN (H): Primary | ICD-10-CM

## 2024-05-30 DIAGNOSIS — G47.33 OSA (OBSTRUCTIVE SLEEP APNEA): ICD-10-CM

## 2024-05-30 DIAGNOSIS — E11.42 DIABETIC POLYNEUROPATHY ASSOCIATED WITH TYPE 2 DIABETES MELLITUS (H): ICD-10-CM

## 2024-05-30 DIAGNOSIS — I10 ESSENTIAL HYPERTENSION: ICD-10-CM

## 2024-05-30 DIAGNOSIS — R80.9 TYPE 2 DIABETES MELLITUS WITH DIABETIC MICROALBUMINURIA, WITHOUT LONG-TERM CURRENT USE OF INSULIN (H): Primary | ICD-10-CM

## 2024-05-30 DIAGNOSIS — J41.0 SIMPLE CHRONIC BRONCHITIS (H): ICD-10-CM

## 2024-05-30 PROBLEM — M25.561 ACUTE PAIN OF RIGHT KNEE: Status: RESOLVED | Noted: 2023-10-31 | Resolved: 2024-05-30

## 2024-05-30 PROBLEM — E55.9 AVITAMINOSIS D: Status: RESOLVED | Noted: 2022-08-29 | Resolved: 2024-05-30

## 2024-05-30 LAB — HBA1C MFR BLD: 6.1 % (ref 0–5.6)

## 2024-05-30 PROCEDURE — 96127 BRIEF EMOTIONAL/BEHAV ASSMT: CPT | Performed by: INTERNAL MEDICINE

## 2024-05-30 PROCEDURE — G2211 COMPLEX E/M VISIT ADD ON: HCPCS | Performed by: INTERNAL MEDICINE

## 2024-05-30 PROCEDURE — 83036 HEMOGLOBIN GLYCOSYLATED A1C: CPT | Performed by: INTERNAL MEDICINE

## 2024-05-30 PROCEDURE — 99214 OFFICE O/P EST MOD 30 MIN: CPT | Performed by: INTERNAL MEDICINE

## 2024-05-30 PROCEDURE — 36415 COLL VENOUS BLD VENIPUNCTURE: CPT | Performed by: INTERNAL MEDICINE

## 2024-05-30 PROCEDURE — 80048 BASIC METABOLIC PNL TOTAL CA: CPT | Performed by: INTERNAL MEDICINE

## 2024-05-30 RX ORDER — RESPIRATORY SYNCYTIAL VIRUS VACCINE 120MCG/0.5
0.5 KIT INTRAMUSCULAR ONCE
Qty: 1 EACH | Refills: 0 | Status: CANCELLED | OUTPATIENT
Start: 2024-05-30 | End: 2024-05-30

## 2024-05-30 RX ORDER — TRIAMCINOLONE ACETONIDE 1 MG/G
CREAM TOPICAL 2 TIMES DAILY
Qty: 45 G | Refills: 0 | Status: SHIPPED | OUTPATIENT
Start: 2024-05-30 | End: 2024-08-29

## 2024-05-30 ASSESSMENT — PATIENT HEALTH QUESTIONNAIRE - PHQ9
SUM OF ALL RESPONSES TO PHQ QUESTIONS 1-9: 15
SUM OF ALL RESPONSES TO PHQ QUESTIONS 1-9: 15
10. IF YOU CHECKED OFF ANY PROBLEMS, HOW DIFFICULT HAVE THESE PROBLEMS MADE IT FOR YOU TO DO YOUR WORK, TAKE CARE OF THINGS AT HOME, OR GET ALONG WITH OTHER PEOPLE: VERY DIFFICULT

## 2024-05-30 ASSESSMENT — PAIN SCALES - GENERAL: PAINLEVEL: NO PAIN (0)

## 2024-05-30 NOTE — PROGRESS NOTES
Clinic Care Coordination Contact  [facility]/Voicemail       Clinical Data: CHW Outreach    Outreach attempted x 1.  CHW was unable to leave voicemail message on patient's voicemail with call back information and requested return call. Due to Patient's VM is full and not accepting new messages at this time.     Plan: CHW will make another attempt to reach the patient via phone or MyChart.    CHW outreach in the next 2 weeks.      YUE Bragg  Clinic Care Coordination   [facility]   Phone: [phone]  [email or web address]

## 2024-05-30 NOTE — PROGRESS NOTES
"  Office Visit - Follow Up   Isabel Biggs   69 year old female    Date of Visit: 5/30/2024    Chief Complaint   Patient presents with    Diabetes        Assessment and Plan   1. Type 2 diabetes mellitus with diabetic microalbuminuria, without long-term current use of insulin (H)  Has been well-controlled through diet and exercise, continue with reduction in calories, carbohydrates, regular exercise and modest weight loss  - Basic metabolic panel  (Ca, Cl, CO2, Creat, Gluc, K, Na, BUN); Future  - Basic metabolic panel  (Ca, Cl, CO2, Creat, Gluc, K, Na, BUN)    2. Diabetic polyneuropathy associated with type 2 diabetes mellitus (H)  - HEMOGLOBIN A1C; Future  - HEMOGLOBIN A1C    3. Essential hypertension  Blood pressure okay continue same check lab    4. Simple chronic bronchitis (H)  Stable    5. Moderate episode of recurrent major depressive disorder (H)  Stable continue Cymbalta    6. Dermatitis  - triamcinolone (KENALOG) 0.1 % external cream; Apply topically 2 times daily  Dispense: 45 g; Refill: 0    7. WILFRIDO occasional CPAP  Encouraged her to resume CPAP    8. Morbid obesity (H)  The following high BMI interventions were performed this visit: encouragement to exercise and lifestyle education regarding diet    Return in about 4 months (around 9/30/2024) for Routine preventive.     History of Present Illness   This 69 year old woman comes in for follow-up.  Overall she is doing okay.  She has been tired, not using CPAP although she did use it for a while and it was helpful.  Some struggles with her relationship with her .  She is no longer living with him.       Physical Exam   General Appearance:   No acute distress    /80   Pulse 85   Temp 97.4  F (36.3  C) (Temporal)   Resp 20   Ht 1.575 m (5' 2\")   Wt 97.9 kg (215 lb 14.4 oz)   LMP 06/08/1996 (Exact Date)   SpO2 99%   BMI 39.49 kg/m           Additional Information   Current Outpatient Medications   Medication Sig Dispense Refill    " albuterol (PROAIR HFA/PROVENTIL HFA/VENTOLIN HFA) 108 (90 Base) MCG/ACT inhaler USE 2 INHALATIONS BY MOUTH EVERY 6 HOURS AS NEEDED FOR SHORTNESS  OF BREATH WHEEZING OR COUGH 34 g 2    amitriptyline (ELAVIL) 50 MG tablet TAKE 1 TABLET BY MOUTH AT  BEDTIME 90 tablet 0    amLODIPine (NORVASC) 5 MG tablet TAKE 1 TABLET BY MOUTH DAILY 90 tablet 0    aspirin 81 MG EC tablet Take 1 tablet (81 mg) by mouth daily 90 tablet 3    atorvastatin (LIPITOR) 20 MG tablet Take 1 tablet (20 mg) by mouth daily 90 tablet 3    Cholecalciferol (D 1000) 25 MCG (1000 UT) CAPS Take 1 capsule by mouth daily 90 capsule 3    DULoxetine (CYMBALTA) 30 MG capsule Take 1 capsule (30 mg) by mouth 2 times daily 180 capsule 4    erythromycin (ROMYCIN) 5 MG/GM ophthalmic ointment Place 0.5 inches Into the left eye 2 times daily 1 g 1    guaiFENesin (MUCINEX) 600 MG 12 hr tablet Take 1 tablet (600 mg) by mouth 2 times daily 60 tablet 2    losartan-hydrochlorothiazide (HYZAAR) 100-25 MG tablet Take 1 tablet by mouth daily 90 tablet 3    meclizine (ANTIVERT) 25 MG tablet Take 1 tablet (25 mg) by mouth 3 times daily 90 tablet 4    omeprazole (PRILOSEC) 20 MG DR capsule Take 1 capsule (20 mg) by mouth daily 90 capsule 1    triamcinolone (KENALOG) 0.1 % external cream Apply topically 2 times daily 45 g 0       Time:     The longitudinal plan of care for the diagnosis(es)/condition(s) as documented were addressed during this visit. Due to the added complexity in care, I will continue to support Henry in the subsequent management and with ongoing continuity of care.     Cj Georges MD  Answers submitted by the patient for this visit:  Patient Health Questionnaire (Submitted on 5/30/2024)  If you checked off any problems, how difficult have these problems made it for you to do your work, take care of things at home, or get along with other people?: Very difficult  PHQ9 TOTAL SCORE: 15

## 2024-05-31 LAB
ANION GAP SERPL CALCULATED.3IONS-SCNC: 12 MMOL/L (ref 7–15)
BUN SERPL-MCNC: 15.4 MG/DL (ref 8–23)
CALCIUM SERPL-MCNC: 9.6 MG/DL (ref 8.8–10.2)
CHLORIDE SERPL-SCNC: 102 MMOL/L (ref 98–107)
CREAT SERPL-MCNC: 0.66 MG/DL (ref 0.51–0.95)
DEPRECATED HCO3 PLAS-SCNC: 26 MMOL/L (ref 22–29)
EGFRCR SERPLBLD CKD-EPI 2021: >90 ML/MIN/1.73M2
GLUCOSE SERPL-MCNC: 114 MG/DL (ref 70–99)
POTASSIUM SERPL-SCNC: 3.8 MMOL/L (ref 3.4–5.3)
SODIUM SERPL-SCNC: 140 MMOL/L (ref 135–145)

## 2024-06-05 ENCOUNTER — PATIENT OUTREACH (OUTPATIENT)
Dept: CARE COORDINATION | Facility: CLINIC | Age: 69
End: 2024-06-05
Payer: COMMERCIAL

## 2024-06-05 NOTE — PROGRESS NOTES
Clinic Care Coordination Contact  Care Team Conversations    DEEPIKA Nolan at the [facility], spoke to CHW this date about a letter that had been returned to the [facility] addressed to pt. The return address lists the [facility].    Note pt's address is incorrect, missing the apartment number.    CHW contacted EASTON Yang, and CRISTOPHER Hurley, regarding this matter. Pt is working with EASTON Yang, at the [facility]. [employer] requests that CHW resend the letter adding pt's apartment number: [address] [place] [address] [address]  [place] [address]     CHW Plan: CHW resent via [employer]PS the Application for Certain Populations and Authorization to Obtain Financial Information from the Account Validation Service to pt this date. No further action needed.    Miguelina Salazar  Community Health Worker  [facility]  [phone]

## 2024-06-13 ENCOUNTER — PATIENT OUTREACH (OUTPATIENT)
Dept: CARE COORDINATION | Facility: CLINIC | Age: 69
End: 2024-06-13
Payer: COMMERCIAL

## 2024-06-13 NOTE — PROGRESS NOTES
Clinic Care Coordination Contact  Community Health Worker Follow Up    Care Gaps:     Health Maintenance Due   Topic Date Due    SPIROMETRY  Never done    ZOSTER IMMUNIZATION (1 of 2) Never done    RSV VACCINE (Pregnancy & 60+) (1 - 1-dose 60+ series) Never done    EYE EXAM  07/07/2022    COVID-19 Vaccine (6 - 2023-24 season) 09/01/2023    DTAP/TDAP/TD IMMUNIZATION (3 - Td or Tdap) 10/23/2023       Postponed to next PCP appointment.     Care Plan:   Care Plan: Financial Wellbeing       Problem: Patient expresses financial resource strain       Long-Range Goal: I will apply for MA after I get my citizenship, and apply for [facility].       Start Date: 11/7/2023 Expected End Date: 11/6/2024    This Visit's Progress: 50% Recent Progress: 50%    Priority: High    Note:     Barriers: none identified.  Strengths: motivated.  Patient expressed understanding of goal: yes  Action steps to achieve this goal:  1. I will connect with FRW to apply for programs.   2. I will complete any paper work.  3. I will work on getting my citizenship approved and then apply for MA.   4. I will report progress towards this goal at scheduled outreach telephone calls from the CCC team.    Discussed 6/13/24                            Care Plan: Mental Health       Problem: Mood/Psychosocial Concerns       Long-Range Goal: Establish Mental Health Support and reduce depression symptoms.       Start Date: 11/7/2023 Expected End Date: 11/6/2024    This Visit's Progress: On Hold Recent Progress: On Hold    Priority: High    Note:     Barriers: none noted.   Strengths: motivated to feel better.  Patient expressed understanding of goal: yes  Action steps to achieve this goal:  1. I will schedule and attend appointments with therapist.    2. I will meet with counselor virtually.  3. I will report progress towards this goal at scheduled outreach telephone calls from the CCC team.    Discussed 6/13/24                              Intervention and  "Education during outreach: CHW gave patient contact information for CHW and FRW and encouraged patient to reach out with any questions or concerns.     CHW Note:  CHW contacted patient to review/follow up on CCC goals.  Patient shared she has been doing \"good\" since last [facility] outreach.   Patient reported she has been approved for [employer] which she is happy about.  Patient reported she had been receiving the SNAP benefit, however, her last payment was on 5/1/24 due to the [place] needing [place]. CHW provided patient with CRISTOPHER Leone's contact information and encouraged her to reach out regarding SNAP benefit. Patient plans on contacting CRISTOPHER Leone when she has time available.   Patient shared she has been working on completing the application for [employer]. CHW encouraged patient to reach out to CRISTOPHER Leone if she has questions surrounding [employer] application.  Patient shared she had placed her mental health goal on hold temporarily. Patient plans on focusing on her financial goals and will schedule mental health appointment when she feels necessary.   Patient denied any other needs or concerns at this time but will reach out to [facility] as needed.      CHW Plan: CHW will continue to support patient with goals through routine scheduled outreach. CHW will outreach in one month on 7/12/24.        Raquel Miguel  Community Health Worker   [facility] Care Coordination  [facility]   [email or web address]  Office: [phone]  "

## 2024-06-17 ENCOUNTER — PATIENT OUTREACH (OUTPATIENT)
Dept: CARE COORDINATION | Facility: CLINIC | Age: 69
End: 2024-06-17
Payer: COMMERCIAL

## 2024-06-17 NOTE — PROGRESS NOTES
Care Coordination Clinician Chart Review    Situation: Patient chart reviewed by Care Coordinator.       Background: Care Coordination Program started: 11/21/2022. Initial assessment completed and patient-centered care plan(s) were developed with participation from patient. Lead CC handed patient off to CHW for continued outreaches.       Assessment: Per chart review, patient outreach completed by CC CHW on 6-.  Patient is actively working to accomplish goal(s). Patient's goal(s) appropriate and relevant at this time. Patient is not due for updated Plan of Care.  Assessments will be completed annually or as needed/with change of patient status.      Care Plan: Financial Wellbeing       Problem: Patient expresses financial resource strain       Long-Range Goal: I will apply for MA after I get my citizenship, and apply for [employer].       Start Date: 11/7/2023 Expected End Date: 11/6/2024    This Visit's Progress: 50% Recent Progress: 50%    Priority: High    Note:     Barriers: none identified.  Strengths: motivated.  Patient expressed understanding of goal: yes  Action steps to achieve this goal:  1. I will connect with FRW to apply for programs.   2. I will complete any paper work.  3. I will work on getting my citizenship approved and then apply for MA.   4. I will report progress towards this goal at scheduled outreach telephone calls from the CCC team.    Discussed 6/13/24                            Care Plan: Mental Health       Problem: Mood/Psychosocial Concerns       Long-Range Goal: Establish Mental Health Support and reduce depression symptoms.       Start Date: 11/7/2023 Expected End Date: 11/6/2024    This Visit's Progress: On Hold Recent Progress: On Hold    Priority: High    Note:     Barriers: none noted.   Strengths: motivated to feel better.  Patient expressed understanding of goal: yes  Action steps to achieve this goal:  1. I will schedule and attend appointments with therapist.    2. I  will meet with counselor humphrey.  3. I will report progress towards this goal at scheduled outreach telephone calls from the CCC team.    Discussed 6/13/24                                 Plan/Recommendations: The patient will continue working with Care Coordination to achieve goal(s) as above. CHW will continue outreaches at minimum every 30 days and will involve Lead CC as needed or if patient is ready to move to Maintenance. Lead CC will continue to monitor CHW outreaches and patient's progress to goal(s) every 6 weeks.     Plan of Care updated and sent to patient: No

## 2024-07-03 ENCOUNTER — PATIENT OUTREACH (OUTPATIENT)
Dept: CARE COORDINATION | Facility: CLINIC | Age: 69
End: 2024-07-03
Payer: COMMERCIAL

## 2024-07-09 ENCOUNTER — PATIENT OUTREACH (OUTPATIENT)
Dept: CARE COORDINATION | Facility: CLINIC | Age: 69
End: 2024-07-09
Payer: COMMERCIAL

## 2024-07-09 NOTE — PROGRESS NOTES
UT/Voicemail    Clinical Data: Care Coordinator Outreach    Outreach Documentation Number of Outreach Attempt   7/9/2024  10:45 AM 1   7/10/2024   1:59 PM 2       Left message on patient's voicemail with call back information and requested return call.    Plan:Delegating to CHW to contact in 5-10 business days.     Social Tameka Worker  [facility]  [phone]    [facility]/Voicemail    Clinical Data: Care Coordinator Outreach    Outreach Documentation Number of Outreach Attempt   7/9/2024  10:45 AM 1       VM is full    Plan: Care Coordinator will try to reach patient again in 1-2 business days.    Social Elijah English  [facility]  [phone]

## 2024-07-15 ENCOUNTER — PATIENT OUTREACH (OUTPATIENT)
Dept: CARE COORDINATION | Facility: CLINIC | Age: 69
End: 2024-07-15
Payer: COMMERCIAL

## 2024-07-15 NOTE — PROGRESS NOTES
Clinic Care Coordination Contact  Community Health Worker Follow Up    Care Gaps:     Health Maintenance Due   Topic Date Due    SPIROMETRY  Never done    ZOSTER IMMUNIZATION (1 of 2) Never done    RSV VACCINE (Pregnancy & 60+) (1 - 1-dose 60+ series) Never done    EYE EXAM  07/07/2022    COVID-19 Vaccine (6 - 2023-24 season) 09/01/2023    DTAP/TDAP/TD IMMUNIZATION (3 - Td or Tdap) 10/23/2023       Postponed to next PCP appointment.     Care Plan:   Care Plan: Financial Wellbeing       Problem: Patient expresses financial resource strain       Long-Range Goal: I will apply for MA after I get my citizenship, and apply for [facility].       Start Date: 11/7/2023 Expected End Date: 11/6/2024    This Visit's Progress: 50% Recent Progress: 50%    Priority: High    Note:     Barriers: none identified.  Strengths: motivated.  Patient expressed understanding of goal: yes  Action steps to achieve this goal:  1. I will connect with FRW to apply for programs.   2. I will complete any paper work.  3. I will work on getting my citizenship approved and then apply for MA.   4. I will report progress towards this goal at scheduled outreach telephone calls from the CCC team.    Discussed 7/15/24                            Care Plan: Mental Health       Problem: Mood/Psychosocial Concerns       Long-Range Goal: Establish Mental Health Support and reduce depression symptoms.       Start Date: 11/7/2023 Expected End Date: 11/6/2024    This Visit's Progress: On Hold Recent Progress: On Hold    Priority: High    Note:     Barriers: none noted.   Strengths: motivated to feel better.  Patient expressed understanding of goal: yes  Action steps to achieve this goal:  1. I will schedule and attend appointments with therapist.    2. I will meet with counselor virtually.  3. I will report progress towards this goal at scheduled outreach telephone calls from the CCC team.    Discussed 7/15/24                              Intervention and  "Education during outreach: CHW reviewed patient's upcoming appointment with Dr. Georges on 7/23/24 at 9:40AM. CHW gave patient contact information for CHW and FRW and encouraged patient to reach out with any questions or concerns.     CHW Note:  CHW contacted patient to review/follow up on CCC goals.  Patient shared she has been doing \"ok\" since last [facility] outreach.   Patient reported she had been receiving the SNAP benefit, however, her last payment was on 5/1/24 due to the [place] needing more information. Patient plans on calling to discuss her SNAP benefit this week and will update CCC team at next scheduled outreach.  Patient reported she is still working on the [employer] application at this time. CHW encouraged patient to reach out to [facility] team if she needs additional support with [employer] application.  Patient shared she had placed her mental health goal on hold temporarily. Patient plans on focusing on her financial goals and will schedule mental health appointment when she feels necessary.   CHW reviewed patient's upcoming appointment with Dr. Georges on 7/23/24 at 9:40AM.  Patient denied any other needs or concerns at this time but will reach out to [facility] as needed.      CHW Plan: CHW will continue to support patient with goals through routine scheduled outreach. CHW will outreach in one month on 8/14/24.        Raquel Miguel  Community Health Worker   [facility] Care Coordination  [facility]   [email or web address]  Office: [phone]  "

## 2024-07-23 ENCOUNTER — OFFICE VISIT (OUTPATIENT)
Dept: INTERNAL MEDICINE | Facility: CLINIC | Age: 69
End: 2024-07-23
Payer: COMMERCIAL

## 2024-07-23 VITALS
OXYGEN SATURATION: 97 % | BODY MASS INDEX: 39.2 KG/M2 | SYSTOLIC BLOOD PRESSURE: 138 MMHG | HEART RATE: 83 BPM | HEIGHT: 62 IN | TEMPERATURE: 97.9 F | DIASTOLIC BLOOD PRESSURE: 76 MMHG | RESPIRATION RATE: 20 BRPM | WEIGHT: 213 LBS

## 2024-07-23 DIAGNOSIS — E11.29 TYPE 2 DIABETES MELLITUS WITH DIABETIC MICROALBUMINURIA, WITHOUT LONG-TERM CURRENT USE OF INSULIN (H): ICD-10-CM

## 2024-07-23 DIAGNOSIS — I10 ESSENTIAL HYPERTENSION: ICD-10-CM

## 2024-07-23 DIAGNOSIS — E11.42 DIABETIC POLYNEUROPATHY ASSOCIATED WITH TYPE 2 DIABETES MELLITUS (H): Primary | ICD-10-CM

## 2024-07-23 DIAGNOSIS — R80.9 TYPE 2 DIABETES MELLITUS WITH DIABETIC MICROALBUMINURIA, WITHOUT LONG-TERM CURRENT USE OF INSULIN (H): ICD-10-CM

## 2024-07-23 DIAGNOSIS — N17.9 AKI (ACUTE KIDNEY INJURY) (H): ICD-10-CM

## 2024-07-23 LAB
ALBUMIN UR-MCNC: NEGATIVE MG/DL
ANION GAP SERPL CALCULATED.3IONS-SCNC: 10 MMOL/L (ref 7–15)
APPEARANCE UR: CLEAR
BACTERIA #/AREA URNS HPF: ABNORMAL /HPF
BILIRUB UR QL STRIP: NEGATIVE
BUN SERPL-MCNC: 18.3 MG/DL (ref 8–23)
CALCIUM SERPL-MCNC: 9.9 MG/DL (ref 8.8–10.4)
CHLORIDE SERPL-SCNC: 103 MMOL/L (ref 98–107)
COLOR UR AUTO: YELLOW
CREAT SERPL-MCNC: 0.89 MG/DL (ref 0.51–0.95)
CREAT UR-MCNC: 96.5 MG/DL
EGFRCR SERPLBLD CKD-EPI 2021: 70 ML/MIN/1.73M2
GLUCOSE SERPL-MCNC: 103 MG/DL (ref 70–99)
GLUCOSE UR STRIP-MCNC: NEGATIVE MG/DL
HCO3 SERPL-SCNC: 27 MMOL/L (ref 22–29)
HGB UR QL STRIP: NEGATIVE
KETONES UR STRIP-MCNC: NEGATIVE MG/DL
LEUKOCYTE ESTERASE UR QL STRIP: ABNORMAL
MICROALBUMIN UR-MCNC: <12 MG/L
MICROALBUMIN/CREAT UR: NORMAL MG/G{CREAT}
MUCOUS THREADS #/AREA URNS LPF: PRESENT /LPF
NITRATE UR QL: NEGATIVE
PH UR STRIP: 5.5 [PH] (ref 5–8)
POTASSIUM SERPL-SCNC: 3.9 MMOL/L (ref 3.4–5.3)
RBC #/AREA URNS AUTO: ABNORMAL /HPF
SODIUM SERPL-SCNC: 140 MMOL/L (ref 135–145)
SP GR UR STRIP: 1.01 (ref 1–1.03)
SQUAMOUS #/AREA URNS AUTO: ABNORMAL /LPF
UROBILINOGEN UR STRIP-ACNC: 0.2 E.U./DL
WBC #/AREA URNS AUTO: ABNORMAL /HPF

## 2024-07-23 PROCEDURE — 82570 ASSAY OF URINE CREATININE: CPT | Performed by: INTERNAL MEDICINE

## 2024-07-23 PROCEDURE — 82043 UR ALBUMIN QUANTITATIVE: CPT | Performed by: INTERNAL MEDICINE

## 2024-07-23 PROCEDURE — 81001 URINALYSIS AUTO W/SCOPE: CPT | Performed by: INTERNAL MEDICINE

## 2024-07-23 PROCEDURE — 36415 COLL VENOUS BLD VENIPUNCTURE: CPT | Performed by: INTERNAL MEDICINE

## 2024-07-23 PROCEDURE — 80048 BASIC METABOLIC PNL TOTAL CA: CPT | Performed by: INTERNAL MEDICINE

## 2024-07-23 PROCEDURE — G2211 COMPLEX E/M VISIT ADD ON: HCPCS | Performed by: INTERNAL MEDICINE

## 2024-07-23 PROCEDURE — 99214 OFFICE O/P EST MOD 30 MIN: CPT | Performed by: INTERNAL MEDICINE

## 2024-07-23 RX ORDER — ATORVASTATIN CALCIUM 40 MG/1
40 TABLET, FILM COATED ORAL DAILY
Qty: 90 TABLET | Refills: 4 | Status: SHIPPED | OUTPATIENT
Start: 2024-07-23

## 2024-07-23 RX ORDER — LOSARTAN POTASSIUM 100 MG/1
100 TABLET ORAL DAILY
Qty: 90 TABLET | Refills: 4 | Status: SHIPPED | OUTPATIENT
Start: 2024-07-23

## 2024-07-23 ASSESSMENT — PATIENT HEALTH QUESTIONNAIRE - PHQ9
SUM OF ALL RESPONSES TO PHQ QUESTIONS 1-9: 19
SUM OF ALL RESPONSES TO PHQ QUESTIONS 1-9: 19
10. IF YOU CHECKED OFF ANY PROBLEMS, HOW DIFFICULT HAVE THESE PROBLEMS MADE IT FOR YOU TO DO YOUR WORK, TAKE CARE OF THINGS AT HOME, OR GET ALONG WITH OTHER PEOPLE: VERY DIFFICULT

## 2024-07-23 ASSESSMENT — PAIN SCALES - GENERAL: PAINLEVEL: MODERATE PAIN (5)

## 2024-07-23 NOTE — PROGRESS NOTES
Office Visit - Follow Up   Isabel Biggs   69 year old female    Date of Visit: 7/23/2024    Chief Complaint   Patient presents with    Hospital F/U     Hospital follow up-[place] 7/7-7/9- Acute Kidney Injury. She has concerns with her vision, skin tone and diabetes concerns.  She is concerned with her loss of appetite.        Assessment and Plan   1. Diabetic polyneuropathy associated with type 2 diabetes mellitus (H)  Stable    2. KASSIE (acute kidney injury) (H24)  Likely volume mediated, recheck basic metabolic panel.  If okay restart losartan but hold hydrochlorothiazide.    3. Type 2 diabetes mellitus without complication, without long-term current use of insulin (H)  Restarting losartan, she has some microalbuminuria and will add Jardiance, may need patient assistance due to cost  - Albumin Random Urine Quantitative with Creat Ratio; Future  - empagliflozin (JARDIANCE) 10 MG TABS tablet; Take 1 tablet (10 mg) by mouth daily  Dispense: 90 tablet; Refill: 1  - atorvastatin (LIPITOR) 40 MG tablet; Take 1 tablet (40 mg) by mouth daily  Dispense: 90 tablet; Refill: 4  - Med Therapy Management Referral    4. Essential hypertension  - Basic metabolic panel  (Ca, Cl, CO2, Creat, Gluc, K, Na, BUN); Future  - UA Macroscopic with reflex to Microscopic and Culture - Lab Collect; Future  - losartan (COZAAR) 100 MG tablet; Take 1 tablet (100 mg) by mouth daily  Dispense: 90 tablet; Refill: 4      Return in about 4 weeks (around 8/20/2024) for Follow up.     History of Present Illness   This 69 year old woman comes in for follow-up.  She is at [facility] with acute kidney injury which sounds like it was secondary to a gastroenteritis.  He is now able to keep fluids down and her appetite is slowly improving.  She was discharged on amlodipine but losartan and hydrochlorothiazide were stopped.       Physical Exam   General Appearance:   No acute distress    /76   Pulse 83   Temp 97.9  F (36.6  C)  "(Tympanic)   Resp 20   Ht 1.575 m (5' 2\")   Wt 96.6 kg (213 lb)   LMP 06/08/1996 (Exact Date)   SpO2 97%   BMI 38.96 kg/m           Additional Information   Current Outpatient Medications   Medication Sig Dispense Refill    albuterol (PROAIR HFA/PROVENTIL HFA/VENTOLIN HFA) 108 (90 Base) MCG/ACT inhaler USE 2 INHALATIONS BY MOUTH EVERY 6 HOURS AS NEEDED FOR SHORTNESS  OF BREATH WHEEZING OR COUGH 34 g 2    amitriptyline (ELAVIL) 50 MG tablet TAKE 1 TABLET BY MOUTH AT  BEDTIME 90 tablet 0    amLODIPine (NORVASC) 5 MG tablet TAKE 1 TABLET BY MOUTH DAILY 90 tablet 0    aspirin 81 MG EC tablet Take 1 tablet (81 mg) by mouth daily 90 tablet 3    atorvastatin (LIPITOR) 40 MG tablet Take 1 tablet (40 mg) by mouth daily 90 tablet 4    Cholecalciferol (D 1000) 25 MCG (1000 UT) CAPS Take 1 capsule by mouth daily 90 capsule 3    DULoxetine (CYMBALTA) 30 MG capsule Take 1 capsule (30 mg) by mouth 2 times daily 180 capsule 4    empagliflozin (JARDIANCE) 10 MG TABS tablet Take 1 tablet (10 mg) by mouth daily 90 tablet 1    erythromycin (ROMYCIN) 5 MG/GM ophthalmic ointment Place 0.5 inches Into the left eye 2 times daily 1 g 1    guaiFENesin (MUCINEX) 600 MG 12 hr tablet Take 1 tablet (600 mg) by mouth 2 times daily 60 tablet 2    losartan (COZAAR) 100 MG tablet Take 1 tablet (100 mg) by mouth daily 90 tablet 4    meclizine (ANTIVERT) 25 MG tablet Take 1 tablet (25 mg) by mouth 3 times daily 90 tablet 4    omeprazole (PRILOSEC) 20 MG DR capsule Take 1 capsule (20 mg) by mouth daily 90 capsule 1    triamcinolone (KENALOG) 0.1 % external cream Apply topically 2 times daily 45 g 0       Time:     The longitudinal plan of care for the diagnosis(es)/condition(s) as documented were addressed during this visit. Due to the added complexity in care, I will continue to support Mamou in the subsequent management and with ongoing continuity of care.     Cj Georges MD  "

## 2024-07-23 NOTE — PROGRESS NOTES
"  {PROVIDER CHARTING PREFERENCE:366065}    Andreina Hall is a 69 year old, presenting for the following health issues:  Hospital F/U (Hospital follow up-[place] 7/7-7/9- Acute Kidney Injury. She has concerns with her vision, skin tone and diabetes concerns.  She is concerned with her loss of appetite.)      7/23/2024     9:15 AM   Additional Questions   Roomed by Mirtha WINSTON     Via the Health Maintenance questionnaire, the patient has reported the following services have been completed -Eye Exam: does not recall 2024-03-01, this information has been sent to the abstraction team.  HPI     {MA/LPN/RN Pre-Provider Visit Orders- hCG/UA/Strep (Optional):534826}  {SUPERLIST (Optional):713774}  {additonal problems for provider to add (Optional):178352}    {ROS Picklists (Optional):652997}      Objective    BP (!) 147/85 (BP Location: Left arm, Patient Position: Sitting, Cuff Size: Adult Regular)   Pulse 83   Temp 97.9  F (36.6  C) (Tympanic)   Resp 20   Ht 1.575 m (5' 2\")   Wt 96.6 kg (213 lb)   LMP 06/08/1996 (Exact Date)   SpO2 97%   BMI 38.96 kg/m    Body mass index is 38.96 kg/m .  Physical Exam   {Exam List (Optional):699791}    {Diagnostic Test Results (Optional):341950}        Signed Electronically by: Cj Georges MD  {Email feedback regarding this note to [email or web address]   :126202}  "

## 2024-07-24 ENCOUNTER — TELEPHONE (OUTPATIENT)
Dept: INTERNAL MEDICINE | Facility: CLINIC | Age: 69
End: 2024-07-24
Payer: COMMERCIAL

## 2024-07-24 ENCOUNTER — PATIENT OUTREACH (OUTPATIENT)
Dept: CARE COORDINATION | Facility: CLINIC | Age: 69
End: 2024-07-24
Payer: COMMERCIAL

## 2024-07-24 DIAGNOSIS — E11.9 TYPE 2 DIABETES MELLITUS WITHOUT COMPLICATION, WITHOUT LONG-TERM CURRENT USE OF INSULIN (H): ICD-10-CM

## 2024-07-24 DIAGNOSIS — N30.00 ACUTE CYSTITIS WITHOUT HEMATURIA: Primary | ICD-10-CM

## 2024-07-24 NOTE — TELEPHONE ENCOUNTER
Test Results        Who ordered the test:  José Manuel    Type of test: Lab    Date of test:  7/23/2024    Where was the test performed:  [place]    What are your questions/concerns?:  Concerns over abnormal results    Could we send this information to you in [facility] or would you prefer to receive a phone call?:   Patient would prefer a phone call   Okay to leave a detailed message?: Yes at Cell number on file:    Telephone Information:   Mobile [phone]

## 2024-07-24 NOTE — TELEPHONE ENCOUNTER
Call return to patient. Patient report that she was able to see her recent lab result note and is concern about recent urinalysis results. She is not sure why urine notes Mucus on the report.      Patient indicates that she has been experiencing some urinary frequency in the last week and is concern that she has a urinary tract infection. Would PCP advise that she takes Abx Tx.    Patient is also looking for refills for Asa 81 mg and Vitamin D, Rx's pended for approval.

## 2024-07-24 NOTE — LETTER
[facility] CARE COORDINATION  [facility]    July 24, 2024        Isabel Biggs  [address]  [address]  [place] [address]          Dear Isabel,     Attached is an updated Complex Care Plan for your continued enrollment in Care Coordination. Please let us know if you have additional questions, concerns, or goals that we can assist with.    Sincerely,    Angela Rogel,   [facility]  [phone]        [facility]  Patient Centered Plan of Care  About Me:        Patient Name:  Isabel Biggs    YOB: 1955  Age:         69 year old   [place] MRN:    [id number] Telephone Information:  Home Phone [phone]   Mobile [phone]   Home Phone [phone]       Address:  [address]  [address]  [place] [address] Email address:  [email or web address]      Emergency Contact(s)    Name Relationship Lgl Grd Work Phone Home Phone Mobile Phone   1. OTONIEL SCHAFFER Daughter    [phone]   2. PARISA SCHAFFER Daughter   [phone]            Primary language:  English     needed? No   [place] [employer]:  [phone] op. 1  Other communication barriers:English as a second language; Visual impairment    Preferred Method of Communication:     Current living arrangement: I live in a private home with family    Mobility Status/ Medical Equipment: Independent        Health Maintenance  Health Maintenance Reviewed: Due/Overdue ***      My Access Plan  Medical Emergency 911   Primary Clinic Line [facility] - [phone]   24 Hour Appointment Line [phone] or  [phone]) (toll-free)   24 Hour Nurse Line [phone] (toll-free)   Preferred Urgent Care [facility], [phone]     Preferred Hospital [facility]  [phone]     Preferred Pharmacy [id number] [place], [place] - [address] AT [facility]     Behavioral  Health Crisis Line The National Suicide Prevention Lifeline at 1-835.117.8764 or Text/Call 988           My Care Team Members  Patient Care Team         Relationship Specialty Notifications Start End    Cj Georges MD PCP - General   3/9/18     Phone: [phone] Fax: [phone]         [address] [facility] [address]    Claudia Joseph MD MD Hematology & Oncology Admissions 12/14/17     Phone: [phone] Fax: [phone]         [address] [facility] [address]    Meghann Rosado, PharmD Pharmacist Pharmacist  2/3/20     Phone: [phone] Fax: [phone]         [address] [place] [address]    Cj Georges MD Assigned PCP   6/16/21     Phone: [phone] Fax: [phone]         [address] [facility] [address]    Juma Wilkerson OD MD Optometry  6/18/21     Phone: [phone] Fax: [phone]         [address] [address]    Denice Plasencia MD MD Neurology  7/26/21     Phone: [phone] Fax: [phone]         [address] [facility] [address] [place] [address]    Raquel Miguel CHW Community Health Worker Primary Care - CC Admissions 11/21/22     Angela Rogel LSW Lead Care Coordinator Primary Care - CC Admissions 12/1/22     Phone: [phone]         Ariella Miguel OD MD Optometry  1/18/23     Phone: [phone] Fax: [phone] [address] [facility] [address]    Adele Hamilton PA-C Referring Physician Family Medicine  1/18/23     Referring Physician to eye    Phone: [phone] Fax: [phone] [address] [facility] [address]    Dwain Santos MD MD Ophthalmology  1/23/23     Phone: [phone] Fax: [phone] [address] [facility] [address]    Reginaldo Ochoa MD MD Neurology  2/28/23     Phone: [phone] Fax: [phone] [address] [facility] [address]    Cj Whaley APRN CNP Assigned Sleep Provider   4/15/23     Phone:  [phone] Fax: [phone]         [address] [facility] [address]    Ria Goode MD MD Ophthalmology  9/15/23     Phone: [phone] Fax: [phone] [address] [facility] [address]    Reginaldo Denson DO Assigned Neuroscience Provider   10/21/23     Phone: [phone] Fax: [phone]          [facility] [address] [place] [address]    Kuldeep Vincent MD Physician Ophthalmology  12/5/23     Phone: [phone] Fax: [phone]         [address] [facility] [address] [facility] [address]    Kuldeep Vincent MD Assigned Surgical Provider   2/23/24     Phone: [phone] Fax: [phone]         [address] [facility] [address] [facility] [address]    Sulema Weeks MA Financial Resource Worker   3/12/24     Phone: [phone]                     My Care Plans  Self Management and Treatment Plan    Care Plan  Care Plan: Financial Wellbeing       Problem: Patient expresses financial resource strain       Long-Range Goal: I will apply for MA after I get my citizenship, and apply for Pamela Care.       Start Date: 11/7/2023 Expected End Date: 11/6/2024    This Visit's Progress: 50% Recent Progress: 50%    Priority: High    Note:     Barriers: none identified.  Strengths: motivated.  Patient expressed understanding of goal: yes  Action steps to achieve this goal:  1. I will connect with FRW to apply for programs.   2. I will complete any paper work.  3. I will work on getting my citizenship approved and then apply for MA.   4. I will report progress towards this goal at scheduled outreach telephone calls from the CCC team.    Discussed 7/15/24                            Care Plan: Mental Health       Problem: Mood/Psychosocial Concerns       Long-Range Goal: Establish Mental Health Support and reduce depression symptoms.       Start Date: 11/7/2023 Expected End Date: 11/6/2024    This Visit's Progress: On Hold Recent Progress: On Hold    Priority: High     Note:     Barriers: none noted.   Strengths: motivated to feel better.  Patient expressed understanding of goal: yes  Action steps to achieve this goal:  1. I will schedule and attend appointments with therapist.    2. I will meet with counselor virtually.  3. I will report progress towards this goal at scheduled outreach telephone calls from the CCC team.    Discussed 7/15/24                                           Advance Care Plans/Directives:   Advanced Care Plan/Directives on file:   No    Discussed with patient/caregiver(s):   Declined Further Information             My Medical and Care Information  Problem List   Patient Active Problem List   Diagnosis    WILFRIDO occasional CPAP    Type 2 diabetes mellitus with diabetic microalbuminuria, without long-term current use of insulin (H)    Diabetic polyneuropathy associated with type 2 diabetes mellitus (H)    Beta thalassemia (H)    Low back pain     Moderate episode of recurrent major depressive disorder (H)    Morbid obesity (H)    Peripheral vertigo of both ears    Essential hypertension    Mixed hyperlipidemia    Simple chronic bronchitis (H)      Current Medications and Allergies:    Current Outpatient Medications   Medication Instructions    albuterol (PROAIR HFA/PROVENTIL HFA/VENTOLIN HFA) 108 (90 Base) MCG/ACT inhaler USE 2 INHALATIONS BY MOUTH EVERY 6 HOURS AS NEEDED FOR SHORTNESS  OF BREATH WHEEZING OR COUGH    amitriptyline (ELAVIL) 50 mg, Oral, AT BEDTIME    amLODIPine (NORVASC) 5 mg, Oral, DAILY    aspirin 81 mg, Oral, DAILY    atorvastatin (LIPITOR) 40 mg, Oral, DAILY    Cholecalciferol (D 1000) 25 MCG (1000 UT) CAPS 1 capsule, Oral, DAILY    DULoxetine (CYMBALTA) 30 mg, Oral, 2 TIMES DAILY    empagliflozin (JARDIANCE) 10 mg, Oral, DAILY    erythromycin (ROMYCIN) 5 MG/GM ophthalmic ointment 0.5 inches, Left Eye, 2 TIMES DAILY    guaiFENesin (MUCINEX) 600 mg, Oral, 2 TIMES DAILY    losartan (COZAAR) 100 mg, Oral, DAILY    meclizine (ANTIVERT) 25 mg,  Oral, 3 TIMES DAILY    omeprazole (PRILOSEC) 20 mg, Oral, DAILY    triamcinolone (KENALOG) 0.1 % external cream Topical, 2 TIMES DAILY         Care Coordination Start Date: 11/21/2022   Frequency of Care Coordination: monthly, more frequently as needed     Form Last Updated: 07/24/2024

## 2024-07-24 NOTE — PROGRESS NOTES
Care Coordination Clinician Chart Review    Situation: Patient chart reviewed by Care Coordinator.       Background: Care Coordination Program started: 11/21/2022. Initial assessment completed and patient-centered care plan(s) were developed with participation from patient. Lead CC handed patient off to CHW for continued outreaches.       Assessment: Per chart review, patient outreach completed by CC CHW on 7-.  Patient is actively working to accomplish goal(s). Patient's goal(s) appropriate and relevant at this time. Patient is due for updated Plan of Care.  Assessments will be completed annually or as needed/with change of patient status.      Care Plan: Financial Wellbeing       Problem: Patient expresses financial resource strain       Long-Range Goal: I will apply for MA after I get my citizenship, and apply for Pamela Care.       Start Date: 11/7/2023 Expected End Date: 11/6/2024    This Visit's Progress: 50% Recent Progress: 50%    Priority: High    Note:     Barriers: none identified.  Strengths: motivated.  Patient expressed understanding of goal: yes  Action steps to achieve this goal:  1. I will connect with FRW to apply for programs.   2. I will complete any paper work.  3. I will work on getting my citizenship approved and then apply for MA.   4. I will report progress towards this goal at scheduled outreach telephone calls from the CCC team.    Discussed 7/15/24                            Care Plan: Mental Health       Problem: Mood/Psychosocial Concerns       Long-Range Goal: Establish Mental Health Support and reduce depression symptoms.       Start Date: 11/7/2023 Expected End Date: 11/6/2024    This Visit's Progress: On Hold Recent Progress: On Hold    Priority: High    Note:     Barriers: none noted.   Strengths: motivated to feel better.  Patient expressed understanding of goal: yes  Action steps to achieve this goal:  1. I will schedule and attend appointments with therapist.    2. I  will meet with counselor humphrey.  3. I will report progress towards this goal at scheduled outreach telephone calls from the CCC team.    Discussed 7/15/24                                 Plan/Recommendations: The patient will continue working with Care Coordination to achieve goal(s) as above. CHW will continue outreaches at minimum every 30 days and will involve Lead CC as needed or if patient is ready to move to Maintenance. Lead CC will continue to monitor CHW outreaches and patient's progress to goal(s) every 6 weeks.     Plan of Care updated and sent to patient: Yes, via mail

## 2024-07-25 RX ORDER — AMPICILLIN TRIHYDRATE 500 MG
1 CAPSULE ORAL DAILY
Qty: 90 CAPSULE | Refills: 3 | Status: SHIPPED | OUTPATIENT
Start: 2024-07-25 | End: 2024-08-29

## 2024-07-25 RX ORDER — NITROFURANTOIN 25; 75 MG/1; MG/1
100 CAPSULE ORAL 2 TIMES DAILY
Qty: 10 CAPSULE | Refills: 0 | Status: SHIPPED | OUTPATIENT
Start: 2024-07-25 | End: 2024-07-30

## 2024-07-25 RX ORDER — ASPIRIN 81 MG/1
81 TABLET ORAL DAILY
Qty: 90 TABLET | Refills: 3 | Status: SHIPPED | OUTPATIENT
Start: 2024-07-25 | End: 2024-08-29

## 2024-07-25 NOTE — TELEPHONE ENCOUNTER
Spoke with patient and relayed message from PCP.    Advised patient if symptoms do not improve following antibiotic to contact clinic.     Informed of refill requests for aspirin and vitamin D.     No further questions.

## 2024-07-25 NOTE — TELEPHONE ENCOUNTER
I put in for nitrofurantoin.  She does have some white blood cells and bacteria but no other features of infection.

## 2024-07-26 ENCOUNTER — VIRTUAL VISIT (OUTPATIENT)
Dept: PHARMACY | Facility: CLINIC | Age: 69
End: 2024-07-26
Attending: INTERNAL MEDICINE
Payer: COMMERCIAL

## 2024-07-26 DIAGNOSIS — F06.4 ANXIETY DISORDER DUE TO MEDICAL CONDITION: ICD-10-CM

## 2024-07-26 PROCEDURE — 99207 PR NO CHARGE LOS: CPT | Mod: 93

## 2024-07-26 RX ORDER — AMITRIPTYLINE HYDROCHLORIDE 50 MG/1
50 TABLET ORAL AT BEDTIME
Qty: 90 TABLET | Refills: 1 | Status: SHIPPED | OUTPATIENT
Start: 2024-07-26 | End: 2024-09-12

## 2024-07-26 RX ORDER — PERMETHRIN 50 MG/G
CREAM TOPICAL
COMMUNITY
Start: 2024-06-03 | End: 2024-08-29

## 2024-07-26 NOTE — PATIENT INSTRUCTIONS
"Recommendations from today's MTM visit:                                                      MTM (medication therapy management) is a service provided by a clinical pharmacist designed to help you get the most of out of your medicines.   Today we reviewed what your medicines are for, how to know if they are working, that your medicines are safe and how to make your medicine regimen as easy as possible.      1. Please fill out page 1 -3 of the jardiance PAP form, and please email back the form to the writer  2. PharmD to send patient PAP form via her email: [email or web address]  3. PharmD to communicate with PCP to refer patient to a dietitian     Follow-up: Due on 08/29/2024    It was great speaking with you today.  I value your experience and would be very thankful for your time in providing feedback in our clinic survey. In the next few days, you may receive an email or text message from [facility] with a link to a survey related to your  clinical pharmacist.\"     To schedule another MTM appointment, please call the clinic directly or you may call the MTM scheduling line at [phone].    My Clinical Pharmacist's contact information:                                                      Please feel free to contact me with any questions or concerns you have.        Keith Tellez, PharmD     Medication Therapy Management (MTM) Pharmacist     [phone]     [email or web address]     [facility]    "

## 2024-07-26 NOTE — PROGRESS NOTES
Clinical Pharmacy Consult:                                                    Isabel Biggs is a 69 year old female called for a clinical pharmacist consult.  She was referred to me from Dr. Georges.       Reason for Consult: Jardiance PAP    Discussion: Patient is called to discuss about Jardiance PAP. Patient was scheduled for 30 minutes and so unable to do medication review initial; however, I did go over each medication for reconciliation. The current medication list she has is up to date. Discussed about diet briefly. Patient is provided with instruction on how to fill the form for jardiance PAP.     Plan:  1. Please fill out page 1 -3 of the jardiance PAP form, and please email back the form to the writer  2. PharmD to send patient PAP form via her email: [email or web address]  3. PharmD to communicate with PCP to refer patient to a dietitian       Follow-up: Due on 08/29/2024    Post Discharge Medication Reconciliation Status: discharge medications reconciled, continue medications without change.      Keith Tellez, PharmD     Medication Therapy Management (MTM) Pharmacist     [phone]     [email or web address]

## 2024-08-06 ENCOUNTER — PATIENT OUTREACH (OUTPATIENT)
Dept: CARE COORDINATION | Facility: CLINIC | Age: 69
End: 2024-08-06
Payer: COMMERCIAL

## 2024-08-14 ENCOUNTER — PATIENT OUTREACH (OUTPATIENT)
Dept: CARE COORDINATION | Facility: CLINIC | Age: 69
End: 2024-08-14
Payer: COMMERCIAL

## 2024-08-14 NOTE — PROGRESS NOTES
Clinic Care Coordination Contact  Community Health Worker Follow Up    Care Gaps:     Health Maintenance Due   Topic Date Due    SPIROMETRY  Never done    ZOSTER IMMUNIZATION (1 of 2) Never done    RSV VACCINE (Pregnancy & 60+) (1 - 1-dose 60+ series) Never done    COVID-19 Vaccine (6 - 2023-24 season) 09/01/2023    DTAP/TDAP/TD IMMUNIZATION (3 - Td or Tdap) 10/23/2023    DEPRESSION 12 MO INDEX REPEAT PHQ-9  08/06/2024    MEDICARE ANNUAL WELLNESS VISIT  09/14/2024    LIPID  09/14/2024       Postponed to next PCP appointment.     Care Plan:   Care Plan: Financial Wellbeing       Problem: Patient expresses financial resource strain       Long-Range Goal: I will apply for MA after I get my citizenship, and apply for Pamela Care.       Start Date: 11/7/2023 Expected End Date: 11/6/2024    This Visit's Progress: 50% Recent Progress: 50%    Priority: High    Note:     Barriers: none identified.  Strengths: motivated.  Patient expressed understanding of goal: yes  Action steps to achieve this goal:  1. I will connect with FRW to apply for programs.   2. I will complete any paper work.  3. I will work on getting my citizenship approved and then apply for MA.   4. I will report progress towards this goal at scheduled outreach telephone calls from the CCC team.    Discussed 8/14/24                            Care Plan: Mental Health       Problem: Mood/Psychosocial Concerns       Long-Range Goal: Establish Mental Health Support and reduce depression symptoms.       Start Date: 11/7/2023 Expected End Date: 11/6/2024    This Visit's Progress: On Hold Recent Progress: On Hold    Priority: High    Note:     Barriers: none noted.   Strengths: motivated to feel better.  Patient expressed understanding of goal: yes  Action steps to achieve this goal:  1. I will schedule and attend appointments with therapist.    2. I will meet with counselor virtually.  3. I will report progress towards this goal at scheduled outreach telephone  "calls from the CCC team.    Discussed 8/14/24                              Intervention and Education during outreach: CHW reviewed patient's upcoming appointment with Dr. Georges on 8/30/24 at 3PM. CHW gave patient contact information for CHW and FRW and encouraged patient to reach out with any questions or concerns.     CHW Note:  CHW contacted patient to review/follow up on CCC goals.  Patient shared she has been doing \"ok\" since last [facility] outreach.   Patient reported she has submitted more information to the [facility] regarding her SNAP benefit. Patient shared she is waiting for the [facility] to review her information and make a decision.   Patient reported she has completed the [facility] application and is waiting to hear if she qualifies. Patient will update [facility] team on financial goals at next scheduled outreach.  Patient shared she had placed her mental health goal on hold temporarily. Patient plans on focusing on her financial goals and will schedule mental health appointment when she feels necessary.   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 Patient shared she is enjoying her job thus far and will update CC team at next scheduled outreach.  CHW reviewed patient's upcoming appointment with Dr. Georges on 8/30/24 at 3PM.  Patient denied any other needs or concerns at this time but will reach out to [facility] as needed.      CHW Plan: CHW will continue to support patient with goals through routine scheduled outreach. CHW will outreach in one month on 9/13/24.        Raquel Miguel  Community Health Worker   [facility] Care Coordination  [facility]   [email or web address]  Office: [phone]  "

## 2024-08-26 NOTE — PROGRESS NOTES
Clinic Care Coordination Contact  Program: [facility]  [place]: [facility] [place] Case #:  [facility] Worker:   Carola #:   Subscriber #:   Renewal:  Date Applied:     FRW Outreach:   1/27/2022: Outreach attempted x 2. Left message on voicemail indicating last outreach attempt.   Plan: FRW will send an unreachable letter and remove from panel. Patient can be referred back to FRW.        1/19/2022:  Outreach attempted x 1 unable to leave message on voicemail due to mailbox being full with call back information and requested return call.  Plan: FRW will call again within one week  Health Insurance:      Referral/Screening:  [facility] Screening    Row Name 01/18/22 1044         [facility] Benefits     Is patient requesting help applying for [facility] benefits? Yes         Have you recently applied for any [facility] benefits? No         How many people in your household? 4         Do you buy/eat food together? Yes         What is the monthly gross income for the household (wages, social security, workers comp, and pension)?  1900                   Insurance:     [place]? No         Is this an insurance renewal? No         Is this a new insurance application request? No                   OTHER     Is this a shawna care application? No         Any other information for the FRW? Household size decreased-spouse moved out               Goals Addressed:      Detail Level: Generalized Detail Level: Zone Detail Level: Detailed

## 2024-08-29 ENCOUNTER — VIRTUAL VISIT (OUTPATIENT)
Dept: PHARMACY | Facility: CLINIC | Age: 69
End: 2024-08-29
Payer: COMMERCIAL

## 2024-08-29 DIAGNOSIS — L29.9 ITCHING: ICD-10-CM

## 2024-08-29 DIAGNOSIS — Z78.9 TAKES DIETARY SUPPLEMENTS: ICD-10-CM

## 2024-08-29 DIAGNOSIS — E11.9 TYPE 2 DIABETES MELLITUS WITHOUT COMPLICATION, WITHOUT LONG-TERM CURRENT USE OF INSULIN (H): Primary | ICD-10-CM

## 2024-08-29 DIAGNOSIS — I10 ESSENTIAL HYPERTENSION: ICD-10-CM

## 2024-08-29 DIAGNOSIS — K21.00 GASTROESOPHAGEAL REFLUX DISEASE WITH ESOPHAGITIS WITHOUT HEMORRHAGE: ICD-10-CM

## 2024-08-29 DIAGNOSIS — E78.2 MIXED HYPERLIPIDEMIA: ICD-10-CM

## 2024-08-29 DIAGNOSIS — J41.0 SIMPLE CHRONIC BRONCHITIS (H): ICD-10-CM

## 2024-08-29 DIAGNOSIS — G47.09 OTHER INSOMNIA: ICD-10-CM

## 2024-08-29 DIAGNOSIS — F41.9 ANXIETY: ICD-10-CM

## 2024-08-29 DIAGNOSIS — R42 DIZZINESS: ICD-10-CM

## 2024-08-29 DIAGNOSIS — R21 RASH: ICD-10-CM

## 2024-08-29 DIAGNOSIS — F33.1 MODERATE EPISODE OF RECURRENT MAJOR DEPRESSIVE DISORDER (H): ICD-10-CM

## 2024-08-29 PROCEDURE — 99207 PR NO CHARGE LOS: CPT

## 2024-08-29 RX ORDER — MECLIZINE HYDROCHLORIDE 25 MG/1
25 TABLET ORAL 3 TIMES DAILY
Qty: 90 TABLET | Refills: 4 | Status: SHIPPED | OUTPATIENT
Start: 2024-08-29

## 2024-08-29 RX ORDER — PERMETHRIN 50 MG/G
CREAM TOPICAL
Qty: 60 G | Refills: 1 | Status: SHIPPED | OUTPATIENT
Start: 2024-08-29 | End: 2024-09-24

## 2024-08-29 RX ORDER — ASPIRIN 81 MG/1
81 TABLET ORAL DAILY
Qty: 90 TABLET | Refills: 3 | Status: SHIPPED | OUTPATIENT
Start: 2024-08-29

## 2024-08-29 RX ORDER — DULOXETIN HYDROCHLORIDE 30 MG/1
30 CAPSULE, DELAYED RELEASE ORAL DAILY
Qty: 90 CAPSULE | Refills: 4 | Status: SHIPPED | OUTPATIENT
Start: 2024-08-29

## 2024-08-29 RX ORDER — TRIAMCINOLONE ACETONIDE 1 MG/G
CREAM TOPICAL 2 TIMES DAILY
Qty: 45 G | Refills: 0 | Status: SHIPPED | OUTPATIENT
Start: 2024-08-29

## 2024-08-29 RX ORDER — AMLODIPINE BESYLATE 5 MG/1
5 TABLET ORAL DAILY
Qty: 90 TABLET | Refills: 2 | Status: SHIPPED | OUTPATIENT
Start: 2024-08-29

## 2024-08-29 RX ORDER — AMPICILLIN TRIHYDRATE 500 MG
1 CAPSULE ORAL DAILY
Qty: 90 CAPSULE | Refills: 4 | Status: SHIPPED | OUTPATIENT
Start: 2024-08-29

## 2024-08-29 NOTE — PATIENT INSTRUCTIONS
"Recommendations from today's MTM visit:                                                      MTM (medication therapy management) is a service provided by a clinical pharmacist designed to help you get the most of out of your medicines.   Today we reviewed what your medicines are for, how to know if they are working, that your medicines are safe and how to make your medicine regimen as easy as possible.      PharmD to communicate with PCP for dietitian referral   PharmD refilled a number of medications   PharmD to communicate with PCP about adding trazodone and lowering Amitriptyline dose to lower the side effect dry mouth     Follow-up: Due on when needed     It was great speaking with you today.  I value your experience and would be very thankful for your time in providing feedback in our clinic survey. In the next few days, you may receive an email or text message from [employer] with a link to a survey related to your  clinical pharmacist.\"     To schedule another MTM appointment, please call the clinic directly or you may call the MTM scheduling line at [phone].    My Clinical Pharmacist's contact information:                                                      Please feel free to contact me with any questions or concerns you have.        Keith Tellez, PharmD     Medication Therapy Management (MTM) Pharmacist     [phone]     [email or web address]     [facility]    "

## 2024-08-29 NOTE — Clinical Note
Armando ESPINAL,  Spoke with patient today, and was wondering about referring her to dietitian. I was also wondering about introducing trazodone for sleep and mood and lower Amitriptyline dose due to dry mouth side effects.  Thanks, Keith

## 2024-08-29 NOTE — LETTER
"Recommended To-Do List      Prepared on: Aug 29, 2024       You can get the best results from your medications by completing the items on this \"To-Do List.\"      Bring your To-Do List when you go to your doctor. And, share it with your family or caregivers.    My To-Do List:  What we talked about: What I should do:    What my medicines are for, how to know if my medicines are working, made sure my medicines are safe for me and reviewed how to take my medicines.      No other significant past medical history or family history.                "

## 2024-08-29 NOTE — PROGRESS NOTES
Medication Therapy Management (MTM) Encounter    ASSESSMENT:                            Medication Adherence/Access: No issues identified    Diabetes Type II: A1C within goal of < 7%. No sugar readings at this time; however, patient does not like to check her sugar through glucometer; she would not qualify for CGM since she is not on insulin.  Patient could benefit from a visit from dietitian. Will communicate with PCP.     Hypertension: In clinic BP mostly within goal of < 130/80. No home BP reading at this time. Advised patient to check BP at home. Advised patient to start taking amlodipine.    Hyperlipidemia: Stable on atorvastatin 40 mg daily     GERD: Stable on omeprazole daily       Simple Chronic Bronchitis: Stable on as needed albuterol     Itching and Rash:Stable     Dizziness and Balance: Stable on Meclizine     Mental Health   Anxiety, Depression, and Insomnia: Patient is not sleeping sometimes due to her marriage condition. Advised her to take duloxetine 30 mg twice daily as she was taking it daily. Considering patient is having the side effects with amitriptyline reasonable to lower its dose and add trazodone.      Supplements: Continue taking current dietary supplements       PLAN:                            PharmD to communicate with PCP for dietitian referral   PharmD refilled a number of medications   PharmD to communicate with PCP about adding trazodone and lowering Amitriptyline dose to lower the side effect dry mouth     Follow-up: Due on when needed     SUBJECTIVE/OBJECTIVE:                          Isabel Biggs is a 69 year old female seen for an initial visit. She was referred to me from Dr. Georges.      Reason for visit: Medication Review Initial    Allergies/ADRs: Reviewed in chart  Past Medical History: Reviewed in chart  Tobacco: She reports that she has never smoked. She has never used smokeless tobacco.    Medication Adherence/Access: no issues reported    Diabetes Type II:   Jardiance 10  mg daily   Aspirin 81mg daily  Patient is not experiencing side effects.  Current diabetes symptoms: polyuria, polydipsia, and polyphagia  Blood sugar monitoring: never  Diet/Exercise: Patient said she is eating good amount      Eye exam is up to date  Foot exam is up to date    Lab Results   Component Value Date    A1C 6.1 05/30/2024    A1C 6.1 09/14/2023    A1C 6.0 05/12/2023    A1C 6.3 01/02/2023    A1C 6.4 08/29/2022     Hypertension    Amlodipine 5 mg daily    Jardiance 10 mg daily    Losartan 100 mg daily   Patient reports no current medication side effects  Patient does not self-monitor blood pressure.      BP Readings from Last 3 Encounters:   07/23/24 138/76   05/30/24 120/80   09/14/23 120/80        Hyperlipidemia    Atorvastatin 40 mg daily    Patient reports no significant myalgias or other side effects.  The 10-year ASCVD risk score (Mo KWOK, et al., 2019) is: 27.9%    Values used to calculate the score:      Age: 69 years      Sex: Female      Is Non- : Yes      Diabetic: Yes      Tobacco smoker: No      Systolic Blood Pressure: 138 mmHg      Is BP treated: Yes      HDL Cholesterol: 61 mg/dL      Total Cholesterol: 193 mg/dL     GERD     Omeprazole 20 mg daily   Patient feels that current regimen is effective.       Simple Chronic Bronchitis:   Albuterol 108 mcg/act inhaler and uses 2 inhalations every 6 hours as needed for shortness of the breath or cough        Itching and Rash:    Permethrin (Elimite) 5% external cream once daily as needed   Triamcinolone 0.1% external cream  and takes 2 times daily    This medications help with the itching per patient     Dizziness and Balance:   Meclizine 25 mg 3 times daily as needed   Patient noted this medication is helping when needed       Mental Health   Anxiety, Depression, and Insomnia   Duloxetine 30 mg 2 times daily    Amitriptyline 50 mg at bedtime    Patient reports amitriptyline causing her the side effects of dry mouth      Patient is not sleeping well; she goes to bed but does not sleep. She will sleep 6 hours in average. This also depends on her activity. Last night she is up and she was thinking about what happened.    - Patient's mood is affected by what happened in his marriage, but she said she is stable and doing fine.      Supplements    Vitamin D3 25 mcg (1000 international unit(s)) daily         Today's Vitals: LMP 06/08/1996 (Exact Date)   ----------------      I spent 33 minutes with this patient today. All changes were made via collaborative practice agreement with Cj Georges MD. A copy of the visit note was provided to the patient's provider(s).    A summary of these recommendations was sent via [employer].      Telemedicine Visit Details  Type of service:  Telephone visit  Start Time:  12:30 PM   End Time: 1:03 PM     Medication Therapy Recommendations  No medication therapy recommendations to display       Keith Tellez, PharmD     Medication Therapy Management (MTM) Pharmacist     [phone]     [email or web address]

## 2024-08-29 NOTE — LETTER
August 29, 2024  Isabel CAMPBELL Dian  [address]    [place] [facility] [address]    Dear Ms. Biggs, [facility]     Thank you for talking with me on Aug 29, 2024 about your health and medications. As a follow-up to our conversation, I have included two documents:      Your Recommended To-Do List has steps you should take to get the best results from your medications.  Your Medication List will help you keep track of your medications and how to take them.    If you want to talk about these documents, please call PATI PENNY RPH at phone: [phone], Monday-Friday 8-4:30pm.    I look forward to working with you and your doctors to make sure your medications work well for you.    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     Dr. Murguia is the ordering clinician, therapy plan orders reviewed and signed by clinician.     Vital Signs:  ONC OP Encounter Vitals  BP: (!) 141/69  Heart Rate: 88  Resp: 18  Temp: 95.9 °F (35.5 °C)  SpO2: 100 %      Allergies:  ALLERGIES:  No Known Allergies      Labs reviewed with the patient: N/A    Nursing Summary:  Patient to C for post allo care. Orthostatic VS taken. BLE edema noted, MD made aware. Instructed to hold fluids for today. Labs drawn and reviewed with johana GRACE to be discharged, no replacements today (encouarged to eat greens per Dr. Murguia) Patient verbalizes understanding. Tri fusion dressing changed.     Patient condition stable during treatment? Yes  Questions were answered and understanding was verbalized. Yes   Education provided Yes    Patient advised on follow up, any and all questions answered.  Patient Discharged to home Ambulatory with spouse

## 2024-08-29 NOTE — LETTER
_  Medication List        Prepared on: Aug 29, 2024     Bring your Medication List when you go to the doctor, hospital, or   emergency room. And, share it with your family or caregivers.     Note any changes to how you take your medications.  Cross out medications when you no longer use them.    Medication How I take it Why I use it Prescriber   albuterol (PROAIR HFA/PROVENTIL HFA/VENTOLIN HFA) 108 (90 Base) MCG/ACT inhaler USE 2 INHALATIONS BY MOUTH EVERY 6 HOURS AS NEEDED FOR SHORTNESS  OF BREATH WHEEZING OR COUGH Bronchitis Cj Georges MD   amitriptyline (ELAVIL) 50 MG tablet Take 1 tablet (50 mg) by mouth at bedtime Anxiety disorder due to medical condition Cj Georges MD   amLODIPine (NORVASC) 5 MG tablet Take 1 tablet (5 mg) by mouth daily. Essential Hypertension Cj Georges MD   aspirin 81 MG EC tablet Take 1 tablet (81 mg) by mouth daily. Type 2 diabetes mellitus without complication, without long-term current use of insulin (H) Cj Georges MD   atorvastatin (LIPITOR) 40 MG tablet Take 1 tablet (40 mg) by mouth daily Type 2 diabetes mellitus with diabetic microalbuminuria, without long-term current use of insulin (H) Cj Georges MD   Cholecalciferol (D 1000) 25 MCG (1000 UT) CAPS Take 1 capsule by mouth daily. Type 2 diabetes mellitus without complication, without long-term current use of insulin (H) Cj Georges MD   DULoxetine (CYMBALTA) 30 MG capsule Take 1 capsule (30 mg) by mouth daily.  Depression Cj Georges MD   empagliflozin (JARDIANCE) 10 MG TABS tablet Take 1 tablet (10 mg) by mouth daily Type 2 diabetes mellitus with diabetic microalbuminuria, without long-term current use of insulin (H) Cj Georges MD   erythromycin (ROMYCIN) 5 MG/GM ophthalmic ointment Place 0.5 inches Into the left eye 2 times daily Defect of left cornea Ariella Miguel OD   losartan (COZAAR) 100 MG tablet Take 1 tablet (100 mg) by mouth daily Essential Hypertension Cj Georges MD    meclizine (ANTIVERT) 25 MG tablet Take 1 tablet (25 mg) by mouth 3 times daily.  Dizziness Cj Georges MD   omeprazole (PRILOSEC) 20 MG DR capsule Take 1 capsule (20 mg) by mouth daily.  GERD Cj Georges MD   permethrin (ELIMITE) 5 % external cream Apply topically once as needed (for itching).  Itching and rash Cj Georges MD   triamcinolone (KENALOG) 0.1 % external cream Apply topically 2 times daily.  Rash Cj Georges MD         Add new medications, over-the-counter drugs, herbals, vitamins, or  minerals in the blank rows below.    Medication How I take it Why I use it Prescriber                                      Allergies:      - Chloroquine - Other (See Comments), Itching, Visual Disturbance  - Chlorquinaldol - Dermatitis, Other (See Comments)        Side effects I have had:      Not on File        Other Information:              My notes and questions:

## 2024-08-30 ENCOUNTER — PATIENT OUTREACH (OUTPATIENT)
Dept: CARE COORDINATION | Facility: CLINIC | Age: 69
End: 2024-08-30

## 2024-08-30 NOTE — PROGRESS NOTES
I do not think that Medicare will pay for a visit with the dietitian.    I think would be very reasonable to introduce trazodone and lower amitriptyline, if you are able to help with that.  Thank you!

## 2024-08-30 NOTE — PROGRESS NOTES
Care Coordination Clinician Chart Review    Situation: Patient chart reviewed by Care Coordinator.       Background: Care Coordination Program started: 11/21/2022. Initial assessment completed and patient-centered care plan(s) were developed with participation from patient. Lead CC handed patient off to CHW for continued outreaches.       Assessment: Per chart review, patient outreach completed by CC CHW on 8-.  Patient is actively working to accomplish goal(s). Patient's goal(s) appropriate and relevant at this time. Patient is not due for updated Plan of Care.  Assessments will be completed annually or as needed/with change of patient status.      Care Plan: Financial Wellbeing       Problem: Patient expresses financial resource strain       Long-Range Goal: I will apply for MA after I get my citizenship, and apply for [facility].       Start Date: 11/7/2023 Expected End Date: 11/6/2024    This Visit's Progress: 50% Recent Progress: 50%    Priority: High    Note:     Barriers: none identified.  Strengths: motivated.  Patient expressed understanding of goal: yes  Action steps to achieve this goal:  1. I will connect with FRW to apply for programs.   2. I will complete any paper work.  3. I will work on getting my citizenship approved and then apply for MA.   4. I will report progress towards this goal at scheduled outreach telephone calls from the CCC team.    Discussed 8/14/24                            Care Plan: Mental Health       Problem: Mood/Psychosocial Concerns       Long-Range Goal: Establish Mental Health Support and reduce depression symptoms.       Start Date: 11/7/2023 Expected End Date: 11/6/2024    This Visit's Progress: On Hold Recent Progress: On Hold    Priority: High    Note:     Barriers: none noted.   Strengths: motivated to feel better.  Patient expressed understanding of goal: yes  Action steps to achieve this goal:  1. I will schedule and attend appointments with therapist.    2. I  will meet with counselor humphrey.  3. I will report progress towards this goal at scheduled outreach telephone calls from the CCC team.    Discussed 8/14/24                                 Plan/Recommendations: The patient will continue working with Care Coordination to achieve goal(s) as above. CHW will continue outreaches at minimum every 30 days and will involve Lead CC as needed or if patient is ready to move to Maintenance. Lead CC will continue to monitor CHW outreaches and patient's progress to goal(s) every 6 weeks.     Plan of Care updated and sent to patient: No

## 2024-09-11 ENCOUNTER — TELEPHONE (OUTPATIENT)
Dept: OPHTHALMOLOGY | Facility: CLINIC | Age: 69
End: 2024-09-11
Payer: COMMERCIAL

## 2024-09-11 NOTE — TELEPHONE ENCOUNTER
[facility] Call Center    Phone Message    May a detailed message be left on voicemail: yes     Reason for Call: Other: Patient is scheduled for November 4th for a Glaucoma follow up. She mentioned she is having some issues with her eyes and was wondering if she could be seen sooner. She has been added to the wait list. Please advise.      Action Taken: Other: eye    Travel Screening: Not Applicable

## 2024-09-11 NOTE — TELEPHONE ENCOUNTER
Called pt to discuss symptoms. States decreased reading, and sees a shadow in her right periphery only when reading. Floaters, long-standing and unchanged. No new flashes of light. Some itching, but specifically denies sudden redness, eye or brow pain, and halos around lights. Offered her appt on Monday, but she declines due to work schedule. Re-scheduled her for Tuesday 9/17/2024 with Dr. Vincent in .  Natalie Sandoval COA 9:12 AM September 11, 2024

## 2024-09-12 ENCOUNTER — PATIENT OUTREACH (OUTPATIENT)
Dept: CARE COORDINATION | Facility: CLINIC | Age: 69
End: 2024-09-12
Payer: COMMERCIAL

## 2024-09-12 ENCOUNTER — TELEPHONE (OUTPATIENT)
Dept: INTERNAL MEDICINE | Facility: CLINIC | Age: 69
End: 2024-09-12
Payer: COMMERCIAL

## 2024-09-12 DIAGNOSIS — G47.09 OTHER INSOMNIA: Primary | ICD-10-CM

## 2024-09-12 RX ORDER — TRAZODONE HYDROCHLORIDE 50 MG/1
TABLET, FILM COATED ORAL
Qty: 90 TABLET | Refills: 3 | Status: SHIPPED | OUTPATIENT
Start: 2024-09-12

## 2024-09-12 NOTE — TELEPHONE ENCOUNTER
After discussion with Dr. Georges, we plan to lower the dose of amitriptyline and start a new medication for sleep that is trazodone.  We order 50 mg and patient can take half to one tablet at bedtime. Updated patient and she understood instructions.                Keith Tellez, PharmD     Medication Therapy Management (MTM) Pharmacist     [phone]     [email or web address]

## 2024-09-12 NOTE — PROGRESS NOTES
Clinic Care Coordination Contact  [facility]/Voicemail    Clinical Data: Care Coordinator Outreach    Outreach Documentation Number of Outreach Attempt   9/12/2024  11:23 AM 1       Left message on patient's voicemail with call back information and requested return call.    Plan:  Care Coordinator will try to reach patient again in 10 business days or on 9/25/24.      Raquel Miguel  Community Health Worker   [facility] Care Coordination  [facility]   [email or web address]  Office: [phone]

## 2024-09-17 ENCOUNTER — OFFICE VISIT (OUTPATIENT)
Dept: OPHTHALMOLOGY | Facility: CLINIC | Age: 69
End: 2024-09-17
Payer: COMMERCIAL

## 2024-09-17 DIAGNOSIS — H25.813 COMBINED FORMS OF AGE-RELATED CATARACT OF BOTH EYES: Primary | ICD-10-CM

## 2024-09-17 DIAGNOSIS — H40.033 ANATOMICAL NARROW ANGLE BORDERLINE GLAUCOMA OF BOTH EYES: ICD-10-CM

## 2024-09-17 DIAGNOSIS — H43.811 POSTERIOR VITREOUS DETACHMENT OF RIGHT EYE: ICD-10-CM

## 2024-09-17 DIAGNOSIS — H40.003 GLAUCOMA SUSPECT OF BOTH EYES: ICD-10-CM

## 2024-09-17 PROCEDURE — 76519 ECHO EXAM OF EYE: CPT | Mod: 50 | Performed by: OPHTHALMOLOGY

## 2024-09-17 PROCEDURE — 92133 CPTRZD OPH DX IMG PST SGM ON: CPT | Performed by: OPHTHALMOLOGY

## 2024-09-17 PROCEDURE — 99213 OFFICE O/P EST LOW 20 MIN: CPT | Performed by: OPHTHALMOLOGY

## 2024-09-17 ASSESSMENT — REFRACTION_MANIFEST
OD_CYLINDER: +0.50
OD_SPHERE: +2.25
OD_ADD: +2.50
OS_AXIS: 152
OS_SPHERE: -0.25
OD_CYLINDER: SPHERE
OS_CYLINDER: +0.25
OD_SPHERE: +2.50
OS_CYLINDER: +0.25
OD_AXIS: 091
OS_AXIS: 051
OS_ADD: +2.50
OS_SPHERE: +1.75

## 2024-09-17 ASSESSMENT — REFRACTION_WEARINGRX
OS_CYLINDER: SPHERE
OS_SPHERE: +1.50
OD_SPHERE: +1.50
OD_CYLINDER: SPHERE
SPECS_TYPE: SVL

## 2024-09-17 ASSESSMENT — PACHYMETRY
OS_CT(UM): 572
OD_CT(UM): 572

## 2024-09-17 ASSESSMENT — CONF VISUAL FIELD
OD_SUPERIOR_TEMPORAL_RESTRICTION: 0
OD_NORMAL: 1
OS_SUPERIOR_TEMPORAL_RESTRICTION: 0
OD_INFERIOR_TEMPORAL_RESTRICTION: 0
OD_INFERIOR_NASAL_RESTRICTION: 0
OD_SUPERIOR_NASAL_RESTRICTION: 0
OS_INFERIOR_NASAL_RESTRICTION: 0
OS_NORMAL: 1
OS_INFERIOR_TEMPORAL_RESTRICTION: 0
OS_SUPERIOR_NASAL_RESTRICTION: 0
METHOD: COUNTING FINGERS

## 2024-09-17 ASSESSMENT — VISUAL ACUITY
OD_SC: 20/30
OS_SC+: -2
OS_SC: 20/25
CORRECTION_TYPE: GLASSES
METHOD: SNELLEN - LINEAR
OD_SC+: -1

## 2024-09-17 ASSESSMENT — SLIT LAMP EXAM - LIDS
COMMENTS: NORMAL
COMMENTS: NORMAL

## 2024-09-17 ASSESSMENT — CUP TO DISC RATIO
OD_RATIO: 0.8
OS_RATIO: 0.8

## 2024-09-17 ASSESSMENT — EXTERNAL EXAM - LEFT EYE: OS_EXAM: NORMAL

## 2024-09-17 ASSESSMENT — TONOMETRY
OS_IOP_MMHG: 18
OD_IOP_MMHG: 17
IOP_METHOD: APPLANATION

## 2024-09-17 ASSESSMENT — EXTERNAL EXAM - RIGHT EYE: OD_EXAM: NORMAL

## 2024-09-17 NOTE — NURSING NOTE
"No chief complaint on file.      Chief Complaint(s) and History of Present Illness(es)    Patient says her vision is worse in both eyes than it was at her last visit, especially the right eye. The change is mostly noticed at near. She also has new floaters in her right eye and a \"shadow\" in her right eye when she's writing. The decrease in vision and floaters started either in April or May. She also has \"shooting pain and itching a lot\", both eyes but especially the right eye. This started around the same time. Patient mentions that her blood sugars have been fluctuating and she has an appointment to have it checked on the 19th.   Ocular Medications: None  Lab Results       Component                Value               Date                       A1C                      6.1                 05/30/2024                 A1C                      6.1                 09/14/2023                 A1C                      6.0                 05/12/2023                 A1C                      6.3                 01/02/2023                 A1C                      6.4                 08/29/2022                      RAY Gutierrez    "

## 2024-09-17 NOTE — PROGRESS NOTES
"HPI    Patient says her vision is worse in both eyes than it was at her last visit, especially the right eye. The change is mostly noticed at near. She also has new floaters in her right eye and a \"shadow\" in her right eye when she's writing. The decrease in vision and floaters started either in April or May. She also has \"shooting pain and itching a lot\", both eyes but especially the right eye. This started around the same time. Patient mentions that her blood sugars have been fluctuating and she has an appointment to have it checked on the 19th.   Ocular Medications: None  Lab Results       Component                Value               Date                       A1C                      6.1                 05/30/2024                 A1C                      6.1                 09/14/2023                 A1C                      6.0                 05/12/2023                 A1C                      6.3                 01/02/2023                 A1C                      6.4                 08/29/2022               Last edited by Alcira Phelan on 9/17/2024  1:01 PM.         Review of systems for the eyes was negative other than the pertinent positives/negatives listed in the HPI.      Assessment & Plan    HPI:  Isabel Biggs is a 69 year old female with history of HTN, HLD, GERD, hyperopia with astigmatism and presbyopia, narrow angle both eyes s/p laser peripheral iridotomy (LPI) right eye presents after failing to followup for pi left eye. She notes a floater right eye since April.     POHx: Hyperopia with astigmatism and presbyopia  PMHx:HTN, HLD, GERD  Current Medications:   Current Outpatient Medications   Medication Sig Dispense Refill    albuterol (PROAIR HFA/PROVENTIL HFA/VENTOLIN HFA) 108 (90 Base) MCG/ACT inhaler USE 2 INHALATIONS BY MOUTH EVERY 6 HOURS AS NEEDED FOR SHORTNESS  OF BREATH WHEEZING OR COUGH 34 g 2    amitriptyline (ELAVIL) 25 MG tablet Take 1 tablet (25 mg) by mouth at bedtime. 90 tablet 3 "    amLODIPine (NORVASC) 5 MG tablet Take 1 tablet (5 mg) by mouth daily. 90 tablet 2    aspirin 81 MG EC tablet Take 1 tablet (81 mg) by mouth daily. 90 tablet 3    atorvastatin (LIPITOR) 40 MG tablet Take 1 tablet (40 mg) by mouth daily 90 tablet 4    Cholecalciferol (D 1000) 25 MCG (1000 UT) CAPS Take 1 capsule by mouth daily. 90 capsule 4    DULoxetine (CYMBALTA) 30 MG capsule Take 1 capsule (30 mg) by mouth daily. 90 capsule 4    empagliflozin (JARDIANCE) 10 MG TABS tablet Take 1 tablet (10 mg) by mouth daily 90 tablet 1    erythromycin (ROMYCIN) 5 MG/GM ophthalmic ointment Place 0.5 inches Into the left eye 2 times daily 1 g 1    losartan (COZAAR) 100 MG tablet Take 1 tablet (100 mg) by mouth daily 90 tablet 4    meclizine (ANTIVERT) 25 MG tablet Take 1 tablet (25 mg) by mouth 3 times daily. 90 tablet 4    omeprazole (PRILOSEC) 20 MG DR capsule Take 1 capsule (20 mg) by mouth daily. 90 capsule 1    permethrin (ELIMITE) 5 % external cream Apply topically once as needed (for itching). 60 g 1    traZODone (DESYREL) 50 MG tablet Take half (25 mg) tablet to one tablet (50 mg) at bedtime 90 tablet 3    triamcinolone (KENALOG) 0.1 % external cream Apply topically 2 times daily. 45 g 0     No current facility-administered medications for this visit.     FHx: cataracts  PSHx: denies history of ocular surgeries       Current Eye Medications:      Assessment & Plan:  (H40.003) Glaucoma suspect of both eyes  (primary encounter diagnosis)  (H40.033) Anatomical narrow angle borderline glaucoma of both eyes  Maximum intraocular pressure 18/20  Family history: No  Trauma history: No  Gonioscopy: closed, steep, bowed anteriorly  Pachymetry:   Treatment History:     Today's testing:  Visual field 02/12/24:   Right eye nonspecific defect, enlarged blind spot, nonspecific defect VFI 88%, MD -6.96, PSD 3.66  Left eye nonspecific defect, VFI 92%, MD -5.18, PSD 3.73    OCT nerve fiber layer 02/12/24:   Right eye - G(g) 112 NI (g) 149  TI (g) 172 NS (g) 134 TS (g) 125      Left eye - G(g) 113 NI (g) 151 TI (g) 145 NS (g) 143 TS (g) 132     OCT nerve fiber layer 09/17/24:   Right eye - G(g) 115 NI (g) 160 TI (g) 145 NS (g) 143 TS (g) 127  Left eye - G(g) 124 NI (g) 159 TI (g) 154 NS (g) 170 TS (g) 139    IOP goal: mid teens  Concern for narrow angle with minimal visible ocular structures  Recommend laser peripheral iridotomy (LPI) left eye   Unable to perform today due to dilation    (H52.03,  H52.203,  H52.4) Hyperopia of both eyes with astigmatism and presbyopia  Hold pending laser peripheral iridotomy (LPI) both eyes and dilation    (H25.813) Combined forms of age-related cataract of both eyes  (primary encounter diagnosis)  Irregular Ks  Start AT four times a day  and repeat after LPI    (H43.811) Posterior vitreous detachment of right eye  Retinal detachment precautions discussed including increased flashes, floaters, or curtain coming down across vision, patient instructed to return to clinic for evaluation       Return in about 4 weeks (around 10/15/2024) for Follow Up-v/t/Mrx LPI OS, repeat IOL master and erickson.        Kuldeep Vincent MD     Attending Physician Attestation:  Complete documentation of historical and exam elements from today's encounter can be found in the full encounter summary report (not reduplicated in this progress note).  I personally obtained the chief complaint(s) and history of present illness.  I confirmed and edited as necessary the review of systems, past medical/surgical history, family history, social history, and examination findings as documented by others; and I examined the patient myself.  I personally reviewed the relevant tests, images, and reports as documented above.  I formulated and edited as necessary the assessment and plan and discussed the findings and management plan with the patient and family. - Kuldeep Vincent MD

## 2024-09-24 ENCOUNTER — OFFICE VISIT (OUTPATIENT)
Dept: INTERNAL MEDICINE | Facility: CLINIC | Age: 69
End: 2024-09-24
Payer: COMMERCIAL

## 2024-09-24 VITALS
HEART RATE: 74 BPM | TEMPERATURE: 98.7 F | DIASTOLIC BLOOD PRESSURE: 70 MMHG | RESPIRATION RATE: 16 BRPM | WEIGHT: 213.2 LBS | SYSTOLIC BLOOD PRESSURE: 132 MMHG | BODY MASS INDEX: 38.99 KG/M2 | OXYGEN SATURATION: 95 %

## 2024-09-24 DIAGNOSIS — G47.33 OSA (OBSTRUCTIVE SLEEP APNEA): ICD-10-CM

## 2024-09-24 DIAGNOSIS — J41.0 SIMPLE CHRONIC BRONCHITIS (H): ICD-10-CM

## 2024-09-24 DIAGNOSIS — Z00.00 ANNUAL PHYSICAL EXAM: Primary | ICD-10-CM

## 2024-09-24 DIAGNOSIS — E66.01 MORBID OBESITY (H): ICD-10-CM

## 2024-09-24 DIAGNOSIS — R80.9 TYPE 2 DIABETES MELLITUS WITH DIABETIC MICROALBUMINURIA, WITHOUT LONG-TERM CURRENT USE OF INSULIN (H): ICD-10-CM

## 2024-09-24 DIAGNOSIS — D56.1 BETA THALASSEMIA (H): ICD-10-CM

## 2024-09-24 DIAGNOSIS — I10 ESSENTIAL HYPERTENSION: ICD-10-CM

## 2024-09-24 DIAGNOSIS — F33.1 MODERATE EPISODE OF RECURRENT MAJOR DEPRESSIVE DISORDER (H): ICD-10-CM

## 2024-09-24 DIAGNOSIS — E11.42 DIABETIC POLYNEUROPATHY ASSOCIATED WITH TYPE 2 DIABETES MELLITUS (H): ICD-10-CM

## 2024-09-24 DIAGNOSIS — E11.29 TYPE 2 DIABETES MELLITUS WITH DIABETIC MICROALBUMINURIA, WITHOUT LONG-TERM CURRENT USE OF INSULIN (H): ICD-10-CM

## 2024-09-24 DIAGNOSIS — E78.2 MIXED HYPERLIPIDEMIA: ICD-10-CM

## 2024-09-24 DIAGNOSIS — H81.393 PERIPHERAL VERTIGO OF BOTH EARS: ICD-10-CM

## 2024-09-24 LAB
ALBUMIN SERPL BCG-MCNC: 4.2 G/DL (ref 3.5–5.2)
ALP SERPL-CCNC: 92 U/L (ref 40–150)
ALT SERPL W P-5'-P-CCNC: 8 U/L (ref 0–50)
ANION GAP SERPL CALCULATED.3IONS-SCNC: 9 MMOL/L (ref 7–15)
AST SERPL W P-5'-P-CCNC: 22 U/L (ref 0–45)
BILIRUB SERPL-MCNC: 0.7 MG/DL
BUN SERPL-MCNC: 14.1 MG/DL (ref 8–23)
CALCIUM SERPL-MCNC: 9.6 MG/DL (ref 8.8–10.4)
CHLORIDE SERPL-SCNC: 103 MMOL/L (ref 98–107)
CHOLEST SERPL-MCNC: 154 MG/DL
CREAT SERPL-MCNC: 0.76 MG/DL (ref 0.51–0.95)
EGFRCR SERPLBLD CKD-EPI 2021: 84 ML/MIN/1.73M2
ERYTHROCYTE [DISTWIDTH] IN BLOOD BY AUTOMATED COUNT: 14.4 % (ref 10–15)
EST. AVERAGE GLUCOSE BLD GHB EST-MCNC: 117 MG/DL
FASTING STATUS PATIENT QL REPORTED: YES
FASTING STATUS PATIENT QL REPORTED: YES
GLUCOSE SERPL-MCNC: 98 MG/DL (ref 70–99)
HBA1C MFR BLD: 5.7 % (ref 0–5.6)
HCO3 SERPL-SCNC: 27 MMOL/L (ref 22–29)
HCT VFR BLD AUTO: 33.6 % (ref 35–47)
HDLC SERPL-MCNC: 50 MG/DL
HGB BLD-MCNC: 10.4 G/DL (ref 11.7–15.7)
LDLC SERPL CALC-MCNC: 87 MG/DL
MCH RBC QN AUTO: 24.7 PG (ref 26.5–33)
MCHC RBC AUTO-ENTMCNC: 31 G/DL (ref 31.5–36.5)
MCV RBC AUTO: 80 FL (ref 78–100)
NONHDLC SERPL-MCNC: 104 MG/DL
PLATELET # BLD AUTO: 291 10E3/UL (ref 150–450)
POTASSIUM SERPL-SCNC: 4.2 MMOL/L (ref 3.4–5.3)
PROT SERPL-MCNC: 7.5 G/DL (ref 6.4–8.3)
RBC # BLD AUTO: 4.21 10E6/UL (ref 3.8–5.2)
SODIUM SERPL-SCNC: 139 MMOL/L (ref 135–145)
TRIGL SERPL-MCNC: 83 MG/DL
TSH SERPL DL<=0.005 MIU/L-ACNC: 0.89 UIU/ML (ref 0.3–4.2)
WBC # BLD AUTO: 4.2 10E3/UL (ref 4–11)

## 2024-09-24 PROCEDURE — G0439 PPPS, SUBSEQ VISIT: HCPCS | Performed by: INTERNAL MEDICINE

## 2024-09-24 PROCEDURE — 36415 COLL VENOUS BLD VENIPUNCTURE: CPT | Performed by: INTERNAL MEDICINE

## 2024-09-24 PROCEDURE — G0008 ADMIN INFLUENZA VIRUS VAC: HCPCS | Performed by: INTERNAL MEDICINE

## 2024-09-24 PROCEDURE — 90480 ADMN SARSCOV2 VAC 1/ONLY CMP: CPT | Performed by: INTERNAL MEDICINE

## 2024-09-24 PROCEDURE — 80053 COMPREHEN METABOLIC PANEL: CPT | Performed by: INTERNAL MEDICINE

## 2024-09-24 PROCEDURE — 83036 HEMOGLOBIN GLYCOSYLATED A1C: CPT | Performed by: INTERNAL MEDICINE

## 2024-09-24 PROCEDURE — 91320 SARSCV2 VAC 30MCG TRS-SUC IM: CPT | Performed by: INTERNAL MEDICINE

## 2024-09-24 PROCEDURE — 84443 ASSAY THYROID STIM HORMONE: CPT | Performed by: INTERNAL MEDICINE

## 2024-09-24 PROCEDURE — 99214 OFFICE O/P EST MOD 30 MIN: CPT | Mod: 25 | Performed by: INTERNAL MEDICINE

## 2024-09-24 PROCEDURE — 85027 COMPLETE CBC AUTOMATED: CPT | Performed by: INTERNAL MEDICINE

## 2024-09-24 PROCEDURE — 82043 UR ALBUMIN QUANTITATIVE: CPT | Performed by: INTERNAL MEDICINE

## 2024-09-24 PROCEDURE — 82570 ASSAY OF URINE CREATININE: CPT | Performed by: INTERNAL MEDICINE

## 2024-09-24 PROCEDURE — 80061 LIPID PANEL: CPT | Performed by: INTERNAL MEDICINE

## 2024-09-24 PROCEDURE — 90662 IIV NO PRSV INCREASED AG IM: CPT | Performed by: INTERNAL MEDICINE

## 2024-09-24 ASSESSMENT — PAIN SCALES - GENERAL: PAINLEVEL: SEVERE PAIN (6)

## 2024-09-24 ASSESSMENT — PATIENT HEALTH QUESTIONNAIRE - PHQ9: SUM OF ALL RESPONSES TO PHQ QUESTIONS 1-9: 5

## 2024-09-24 NOTE — PATIENT INSTRUCTIONS
Patient Education   Preventive Care Advice   This is general advice given by our system to help you stay healthy. However, your care team may have specific advice just for you. Please talk to your care team about your preventive care needs.  Nutrition  Eat 5 or more servings of fruits and vegetables each day.  Try wheat bread, brown rice and whole grain pasta (instead of white bread, rice, and pasta).  Get enough calcium and vitamin D. Check the label on foods and aim for 100% of the RDA (recommended daily allowance).  Lifestyle  Exercise at least 150 minutes each week  (30 minutes a day, 5 days a week).  Do muscle strengthening activities 2 days a week. These help control your weight and prevent disease.  No smoking.  Wear sunscreen to prevent skin cancer.  Have a dental exam and cleaning every 6 months.  Yearly exams  See your health care team every year to talk about:  Any changes in your health.  Any medicines your care team has prescribed.  Preventive care, family planning, and ways to prevent chronic diseases.  Shots (vaccines)   HPV shots (up to age 26), if you've never had them before.  Hepatitis B shots (up to age 59), if you've never had them before.  COVID-19 shot: Get this shot when it's due.  Flu shot: Get a flu shot every year.  Tetanus shot: Get a tetanus shot every 10 years.  Pneumococcal, hepatitis A, and RSV shots: Ask your care team if you need these based on your risk.  Shingles shot (for age 50 and up)  General health tests  Diabetes screening:  Starting at age 35, Get screened for diabetes at least every 3 years.  If you are younger than age 35, ask your care team if you should be screened for diabetes.  Cholesterol test: At age 39, start having a cholesterol test every 5 years, or more often if advised.  Bone density scan (DEXA): At age 50, ask your care team if you should have this scan for osteoporosis (brittle bones).  Hepatitis C: Get tested at least once in your life.  STIs (sexually  transmitted infections)  Before age 24: Ask your care team if you should be screened for STIs.  After age 24: Get screened for STIs if you're at risk. You are at risk for STIs (including HIV) if:  You are sexually active with more than one person.  You don't use condoms every time.  You or a partner was diagnosed with a sexually transmitted infection.  If you are at risk for HIV, ask about PrEP medicine to prevent HIV.  Get tested for HIV at least once in your life, whether you are at risk for HIV or not.  Cancer screening tests  Cervical cancer screening: If you have a cervix, begin getting regular cervical cancer screening tests starting at age 21.  Breast cancer scan (mammogram): If you've ever had breasts, begin having regular mammograms starting at age 40. This is a scan to check for breast cancer.  Colon cancer screening: It is important to start screening for colon cancer at age 45.  Have a colonoscopy test every 10 years (or more often if you're at risk) Or, ask your provider about stool tests like a FIT test every year or Cologuard test every 3 years.  To learn more about your testing options, visit:   .  For help making a decision, visit:   https://bit.ly/dv93600.  Prostate cancer screening test: If you have a prostate, ask your care team if a prostate cancer screening test (PSA) at age 55 is right for you.  Lung cancer screening: If you are a current or former smoker ages 50 to 80, ask your care team if ongoing lung cancer screenings are right for you.  For informational purposes only. Not to replace the advice of your health care provider. Copyright   2023 [facility]. All rights reserved. Clinically reviewed by the [facility]. MyFeelBack [id number] - REV 01/24.  Learning About Depression Screening  What is depression screening?  Depression screening is a way to see if you have depression symptoms. It may be done by a doctor or counselor. It's often part of a routine  "checkup. That's because your mental health is just as important as your physical health.  Depression is a mental health condition that affects how you feel, think, and act. You may:  Have less energy.  Lose interest in your daily activities.  Feel sad and grouchy for a long time.  Depression is very common. It affects people of all ages.  Many things can lead to depression. Some people become depressed after they have a stroke or find out they have a major illness like cancer or heart disease. The death of a loved one or a breakup may lead to depression. It can run in families. Most experts believe that a combination of inherited genes and stressful life events can cause it.  What happens during screening?  You may be asked to fill out a form about your depression symptoms. You and the doctor will discuss your answers. The doctor may ask you more questions to learn more about how you think, act, and feel.  What happens after screening?  If you have symptoms of depression, your doctor will talk to you about your options.  Doctors usually treat depression with medicines or counseling. Often, combining the two works best. Many people don't get help because they think that they'll get over the depression on their own. But people with depression may not get better unless they get treatment.  The cause of depression is not well understood. There may be many factors involved. But if you have depression, it's not your fault.  A serious symptom of depression is thinking about death or suicide. If you or someone you care about talks about this or about feeling hopeless, get help right away.  It's important to know that depression can be treated. Medicine, counseling, and self-care may help.  Where can you learn more?  Go to https://www.avox.net/patiented  Enter T185 in the search box to learn more about \"Learning About Depression Screening.\"  Current as of: June 24, 2023  Content Version: 14.1 2006-2024 [employer] " [employer].   Care instructions adapted under license by your healthcare professional. If you have questions about a medical condition or this instruction, always ask your healthcare professional. Health[employer], Incorporated disclaims any warranty or liability for your use of this information.

## 2024-09-24 NOTE — PROGRESS NOTES
PrPreventive Care Visit  [facility]  Cj Georges MD, Internal Medicine  Sep 24, 2024      SUBJECTIVE:   Isabel is a 69 year old, presenting for the following:  Follow Up (/1 month/Diabetic polyneuropathy/Discuss meal plan that match DX), Annual Visit, and Pain (LT hip and RT knee, painful, noticed last week, would like to discuss)        9/24/2024    11:55 AM   Additional Questions   Roomed by CLINTON Cotton   Accompanied by Self     Are you in the first 12 months of your Medicare coverage?  No    History of Present Illness       Diabetes:   She presents for follow up of diabetes.    She is not checking blood glucose.         She has no concerns regarding her diabetes at this time.  She is having blurry vision.        She exercises with enough effort to increase her heart rate 10 to 19 minutes per day.  She exercises with enough effort to increase her heart rate 7 days per week. She is missing 2 dose(s) of medications per week.  She is not taking prescribed medications regularly due to remembering to take.    Today's PHQ-9 Score:       9/24/2024    12:15 PM   PHQ-9 SCORE   PHQ-9 Total Score MyChart Incomplete         Have you ever done Advance Care Planning? (For example, a Health Directive, POLST, or a discussion with a medical provider or your loved ones about your wishes): Yes, patient states has an Advance Care Planning document and will bring a copy to the clinic.    Healthy Habits  Ability to successfully perform ADLs: Yes  Hearing impairment: No  Home Safety: Safe      8/22/2023    12:48 PM   PHQ-2 ( 1999 Pf[employer])   Q1: Little interest or pleasure in doing things 3   Q2: Feeling down, depressed or hopeless 3   PHQ-2 Score 6         9/24/2024    12:04 PM   PHQ   PHQ-9 Total Score 5   Q9: Thoughts of better off dead/self-harm past 2 weeks Not at all     Fall Risk:   Fall risk  Fallen 2 or more times in the past year?: No  Cognitive Screening   1) Repeat 3 items (Leader, Season, Table)    2)  Clock draw: NORMAL  3) 3 item recall: Recalls 3 objects  Results: 3 items recalled: COGNITIVE IMPAIRMENT LESS LIKELY    Mini-CogTM Copyright S Micha. Licensed by the author for use in [facility]; reprinted with permission (sofya@[facility]). All rights reserved.      Reviewed and updated as needed this visit by clinical staff   Tobacco  Allergies  Meds              Reviewed and updated as needed this visit by Provider                  Social History     Tobacco Use    Smoking status: Never    Smokeless tobacco: Never   Substance Use Topics    Alcohol use: Never             9/24/2024    12:18 PM   Alcohol Use   Prescreen: >3 drinks/day or >7 drinks/week? No   Do you have a current opioid prescription? No  Do you use any other controlled substances or medications that are not prescribed by a provider? None    Current providers sharing in care for this patient include:   Patient Care Team:  Cj Georges MD as PCP - General  Claudia Joseph MD as MD (Hematology & Oncology)  Cj Georges MD as Assigned PCP  Juma Wilkerson OD as MD (Optometry)  Denice Plasencia MD as MD (Neurology)  Raquel Miguel CHW as Community Health Worker (Primary Care - CC)  Angela Rogel LSW as Lead Care Coordinator (Primary Care - CC)  Ariella Miguel OD as MD (Optometry)  Adele Hamilton PA-C as Referring Physician (Family Medicine)  Dwain Santos MD as MD (Ophthalmology)  Reginaldo Ochoa MD as MD (Neurology)  Cj Whaley, APRN CNP as Assigned Sleep Provider  Ria Goode MD as MD (Ophthalmology)  Reginaldo Denson DO as Assigned Neuroscience Provider  Kuldeep Vincent MD as Physician (Ophthalmology)  Kuldeep Vincent MD as Assigned Surgical Provider  Keith Tellez RPH as Pharmacist  Keith Tellez RPH as Assigned MT Pharmacist    The following health maintenance items are reviewed in Epic and correct as of today:  Health Maintenance   Topic Date  Due    SPIROMETRY  Never done    ZOSTER IMMUNIZATION (1 of 2) Never done    RSV VACCINE (1 - Risk 60-74 years 1-dose series) Never done    DTAP/TDAP/TD IMMUNIZATION (3 - Td or Tdap) 10/23/2023    COVID-19 Vaccine (6 - 2024-25 season) 09/01/2024    LIPID  09/14/2024    DEPRESSION 12 MO INDEX REPEAT PHQ-9  10/08/2024    A1C  11/30/2024    PHQ-9  03/24/2025    DIABETIC FOOT EXAM  05/30/2025    ANNUAL REVIEW OF HM ORDERS  05/30/2025    BMP  07/23/2025    MICROALBUMIN  07/23/2025    EYE EXAM  09/17/2025    MEDICARE ANNUAL WELLNESS VISIT  09/24/2025    FALL RISK ASSESSMENT  09/24/2025    MAMMO SCREENING  05/21/2026    ADVANCE CARE PLANNING  09/24/2029    COLORECTAL CANCER SCREENING  06/28/2031    DEXA  10/30/2038    HEPATITIS C SCREENING  Completed    INFLUENZA VACCINE  Completed    Pneumococcal Vaccine: 65+ Years  Completed    COPD ACTION PLAN  Addressed    DEPRESSION ACTION PLAN  Addressed    HPV IMMUNIZATION  Aged Out    MENINGITIS IMMUNIZATION  Aged Out    RSV MONOCLONAL ANTIBODY  Aged Out     FHS-7:       4/24/2023     2:37 PM 5/21/2024     1:28 PM   Breast CA Risk Assessment (FHS-7)   Did any of your first-degree relatives have breast or ovarian cancer? No No   Did any of your relatives have bilateral breast cancer? No Yes   Did any man in your family have breast cancer? No No   Did any woman in your family have breast and ovarian cancer? No No   Did any woman in your family have breast cancer before age 50 y? No No   Do you have 2 or more relatives with breast and/or ovarian cancer? No No   Do you have 2 or more relatives with breast and/or bowel cancer? No No     Pertinent mammograms are reviewed under the imaging tab.  Review of Systems     OBJECTIVE:   /70 (BP Location: Left arm, Patient Position: Sitting, Cuff Size: Adult Regular)   Pulse 74   Temp 98.7  F (37.1  C) (Tympanic)   Resp 16   Wt 96.7 kg (213 lb 3.2 oz)   LMP 06/08/1996 (Exact Date)   SpO2 95%   BMI 38.99 kg/m     Estimated body mass  "index is 38.99 kg/m  as calculated from the following:    Height as of 7/23/24: 1.575 m (5' 2\").    Weight as of this encounter: 96.7 kg (213 lb 3.2 oz).  Physical Exam  EYES: Eyelids, conjunctiva, and sclera were normal. Pupils were normal. Cornea, iris, and lens were normal bilaterally.  HEAD, EARS, NOSE, MOUTH, AND THROAT: Head and face were normal. Hearing was normal to voice and the ears were normal to external exam. Nose appearance was normal and there was no discharge.  NECK: Neck appearance was normal.   RESPIRATORY: Breathing pattern was normal and the chest moved symmetrically.   Lung sounds were normal and there were no abnormal sounds.  CARDIOVASCULAR: Heart rate and rhythm were normal.  S1 and S2 were normal and there were no extra sounds or murmurs. There was no peripheral edema.  GASTROINTESTINAL: The abdomen was normal in contour.  NEUROLOGIC: The patient was alert and oriented to person, place, time, and circumstance. Speech was normal. Cranial nerves were normal. Motor strength was normal for age. The patient was normally coordinated.  PSYCHIATRIC:  Mood and affect were normal and the patient had normal recent and remote memory. The patient's judgment and insight were normal.    ASSESSMENT / PLAN:   1. Annual physical exam  This is a 69-year-old man in for annual visit    2. Diabetic polyneuropathy associated with type 2 diabetes mellitus (H)  Continue with excellent diabetic footcare    3. Type 2 diabetes mellitus with diabetic microalbuminuria, without long-term current use of insulin (H)  She would like to meet with our diabetes educator, referral placed.  Continue with Jardiance, consider adding GLP-1 agonist but she is concerned about cost  - empagliflozin (JARDIANCE) 25 MG TABS tablet; Take 1 tablet (25 mg) by mouth daily.  Dispense: 90 tablet; Refill: 4  - Comprehensive metabolic panel; Future  - Hemoglobin A1c; Future  - Albumin Random Urine Quantitative with Creat Ratio; Future  - Adult " "Diabetes Education  Referral; Future  - Comprehensive metabolic panel  - Hemoglobin A1c  - Albumin Random Urine Quantitative with Creat Ratio    4. Simple chronic bronchitis (H)  Stable    5. Essential hypertension  Pressure okay continue same    6. Beta thalassemia (H)  Stable check lab    7. Mixed hyperlipidemia  Continue statin  - CBC with platelets; Future  - Lipid panel reflex to direct LDL Fasting; Future  - TSH with free T4 reflex; Future  - CBC with platelets  - Lipid panel reflex to direct LDL Fasting  - TSH with free T4 reflex    8. Moderate episode of recurrent major depressive disorder (H)  Doing much better with part-time work, continue duloxetine    9. WILFRIDO occasional CPAP  She is not using CPAP    10. Peripheral vertigo of both ears  Seems to be stable    11. Morbid obesity (H)  Discussed importance of reduction in calories, carbohydrates, regular exercise and modest weight loss  Counseling  Reviewed preventive health counseling, as reflected in patient instructions  BMI  Estimated body mass index is 38.99 kg/m  as calculated from the following:    Height as of 7/23/24: 1.575 m (5' 2\").    Weight as of this encounter: 96.7 kg (213 lb 3.2 oz).   Weight management plan: Discussed healthy diet and exercise guidelines      She reports that she has never smoked. She has never used smokeless tobacco.      Appropriate preventive services were discussed with this patient, including applicable screening as appropriate for fall prevention, nutrition, physical activity, Tobacco-use cessation, weight loss and cognition.  Checklist reviewing preventive services available has been given to the patient.    Reviewed patients plan of care and provided an AVS. The Basic Care Plan (routine screening as documented in Health Maintenance) for Isabel meets the Care Plan requirement. This Care Plan has been established and reviewed with the Patient.          Signed Electronically by: Cj Georges MD    Identified " Health Risks  I have reviewed Opioid Use Disorder and Substance Use Disorder risk factors and made any needed referrals.

## 2024-09-24 NOTE — PROGRESS NOTES
MAGNUS  Answers submitted by the patient for this visit:  Patient Health Questionnaire (Submitted on 9/24/2024)  PHQ9 TOTAL SCORE: Incomplete  Diabetes Visit (Submitted on 9/24/2024)  Chief Complaint: Chronic problems general questions HPI Form  Frequency of checking blood sugars:: not at all  Diabetic concerns:: none  Paraesthesia present:: blurry vision  General Questionnaire (Submitted on 9/24/2024)  Chief Complaint: Chronic problems general questions HPI Form  How many minutes a day do you exercise enough to make your heart beat faster?: 10 to 19  How many days a week do you exercise enough to make your heart beat faster?: 7  How many days per week do you miss taking your medication?: 2  What makes it hard for you to take your medication every day?: remembering to take

## 2024-09-25 ENCOUNTER — PATIENT OUTREACH (OUTPATIENT)
Dept: CARE COORDINATION | Facility: CLINIC | Age: 69
End: 2024-09-25
Payer: COMMERCIAL

## 2024-09-25 LAB
CREAT UR-MCNC: 94.6 MG/DL
MICROALBUMIN UR-MCNC: <12 MG/L
MICROALBUMIN/CREAT UR: NORMAL MG/G{CREAT}

## 2024-09-25 NOTE — LETTER
[facility] CARE COORDINATION  [facility]  September 25, 2024    Isabel Biggs  [address]    [place] [address]      Dear Isabel,    I have been attempting to reach you since our last contact. I would like to continue to work with you and provide any additional support you may need on achieving your health care related goals. I would appreciate if you would give me a call at [phone] to let me know if you would like to continue working together. I know that there are many things that can affect our ability to communicate and I hope we can continue to work together.    All of us at the [facility] are invested in your health and are here to assist you in meeting your goals.     Sincerely,    Raquel Miguel  Community Health Worker   [facility] Care Coordination  [facility]   [email or web address]  Office: [phone]

## 2024-09-25 NOTE — PROGRESS NOTES
Clinic Care Coordination Contact  [facility]/Voicemail    Clinical Data: Care Coordinator Outreach    Outreach Documentation Number of Outreach Attempt   9/12/2024  11:23 AM 1   9/25/2024   4:03 PM 2       Unable to leave message on patient's voicemail.    Plan: Care Coordinator will send unable to contact letter with care coordinator contact information via [employer]. Care Coordinator will perform chart review in 3 weeks on 10/16/24.      Raquel Miguel  Community Health Worker   [facility] Care Coordination  [facility]   [email or web address]  Office: [phone]

## 2024-10-11 ENCOUNTER — PATIENT OUTREACH (OUTPATIENT)
Dept: CARE COORDINATION | Facility: CLINIC | Age: 69
End: 2024-10-11
Payer: COMMERCIAL

## 2024-10-11 NOTE — PROGRESS NOTES
Care Coordination Clinician Chart Review    Situation: Patient chart reviewed by Care Coordinator.       Background: Care Coordination Program started: 11/21/2022. Initial assessment completed and patient-centered care plan(s) were developed with participation from patient. Lead CC handed patient off to CHW for continued outreaches.       Assessment: Per chart review, patient outreach completed by CC CHW on 9- leaving VM x2.  Patient is actively working to accomplish goal(s). Patient's goal(s) appropriate and relevant at this time. Patient is not due for updated Plan of Care.  Assessments will be completed annually or as needed/with change of patient status.      Care Plan: Financial Wellbeing       Problem: Patient expresses financial resource strain       Long-Range Goal: I will apply for MA after I get my citizenship, and apply for [facility].       Start Date: 11/7/2023 Expected End Date: 11/6/2024    This Visit's Progress: 50% Recent Progress: 50%    Priority: High    Note:     Barriers: none identified.  Strengths: motivated.  Patient expressed understanding of goal: yes  Action steps to achieve this goal:  1. I will connect with FRW to apply for programs.   2. I will complete any paper work.  3. I will work on getting my citizenship approved and then apply for MA.   4. I will report progress towards this goal at scheduled outreach telephone calls from the CCC team.    Discussed 8/14/24                            Care Plan: Mental Health       Problem: Mood/Psychosocial Concerns       Long-Range Goal: Establish Mental Health Support and reduce depression symptoms.       Start Date: 11/7/2023 Expected End Date: 11/6/2024    This Visit's Progress: On Hold Recent Progress: On Hold    Priority: High    Note:     Barriers: none noted.   Strengths: motivated to feel better.  Patient expressed understanding of goal: yes  Action steps to achieve this goal:  1. I will schedule and attend appointments with  therapist.    2. I will meet with counselor virtually.  3. I will report progress towards this goal at scheduled outreach telephone calls from the CCC team.    Discussed 8/14/24                                 Plan/Recommendations: The patient will continue working with Care Coordination to achieve goal(s) as above. CHW will continue outreaches at minimum every 30 days and will involve Lead CC as needed or if patient is ready to move to Maintenance. Lead CC will continue to monitor CHW outreaches and patient's progress to goal(s) every 6 weeks.     Plan of Care updated and sent to patient: No

## 2024-10-11 NOTE — PROGRESS NOTES
Clinic Care Coordination Contact  [facility]/Voicemail    Clinical Data: Care Coordinator Outreach    Outreach Documentation Number of Outreach Attempt   9/12/2024  11:23 AM 1   9/25/2024   4:03 PM 2   10/11/2024   2:03 PM 3       Left message on patient's voicemail with call back information and requested return call.    Plan: Care Coordinator sent care coordination introduction letter on 9/25/24 via [employer]. Care Coordinator will perform chart review on 10/24/24.      Raquel Miguel  Community Health Worker   [facility] Care Coordination  [facility]   [email or web address]  Office: [phone]

## 2024-10-15 ENCOUNTER — OFFICE VISIT (OUTPATIENT)
Dept: OPHTHALMOLOGY | Facility: CLINIC | Age: 69
End: 2024-10-15
Payer: COMMERCIAL

## 2024-10-15 DIAGNOSIS — H40.033 ANATOMICAL NARROW ANGLE BORDERLINE GLAUCOMA OF BOTH EYES: Primary | ICD-10-CM

## 2024-10-15 PROCEDURE — 66761 REVISION OF IRIS: CPT | Mod: LT | Performed by: OPHTHALMOLOGY

## 2024-10-15 ASSESSMENT — REFRACTION_MANIFEST
OD_CYLINDER: +0.75
OS_AXIS: 180
OD_AXIS: 165
OS_ADD: +2.75
OD_SPHERE: +2.25
OD_ADD: +2.75
OS_CYLINDER: +0.25
OS_SPHERE: +2.00

## 2024-10-15 ASSESSMENT — TONOMETRY
OS_IOP_MMHG: 20
OD_IOP_MMHG: 20
IOP_METHOD: TONOPEN

## 2024-10-15 ASSESSMENT — EXTERNAL EXAM - RIGHT EYE: OD_EXAM: NORMAL

## 2024-10-15 ASSESSMENT — EXTERNAL EXAM - LEFT EYE: OS_EXAM: NORMAL

## 2024-10-15 ASSESSMENT — VISUAL ACUITY
METHOD: SNELLEN - LINEAR
OS_SC+: -2
OD_PH_SC: 20/30
OD_SC: 20/40
OS_SC: 20/30
OD_SC+: +2

## 2024-10-15 ASSESSMENT — SLIT LAMP EXAM - LIDS
COMMENTS: NORMAL
COMMENTS: NORMAL

## 2024-10-15 ASSESSMENT — CUP TO DISC RATIO
OD_RATIO: 0.8
OS_RATIO: 0.8

## 2024-10-15 NOTE — NURSING NOTE
"Chief Complaints and History of Present Illnesses   Patient presents with    Follow Up       Chief Complaint(s) and History of Present Illness(es)       Follow Up              Laterality: both eyes              Comments    4 week follow up and LPI, left eye. No changes in vision since her last visit. No changes in floaters and no flashing lights. The same \"shooting pain and itching\", both eyes is still there, no changes.   Ocular Medications: Artificial Tears both eyes 2-3 times per day (only helps some what)  Lab Results       Component                Value               Date                       A1C                      5.7                 09/24/2024                 A1C                      6.1                 05/30/2024                 A1C                      6.1                 09/14/2023                 A1C                      6.0                 05/12/2023                 A1C                      6.3                 01/02/2023                              Alcira Phelan, COA    "

## 2024-10-16 NOTE — PROGRESS NOTES
"HPI       Follow Up    In both eyes.             Comments    4 week follow up and LPI, left eye. No changes in vision since her last visit. No changes in floaters and no flashing lights. The same \"shooting pain and itching\", both eyes is still there, no changes.   Ocular Medications: Artificial Tears both eyes 2-3 times per day (only helps some what)  Lab Results       Component                Value               Date                       A1C                      5.7                 09/24/2024                 A1C                      6.1                 05/30/2024                 A1C                      6.1                 09/14/2023                 A1C                      6.0                 05/12/2023                 A1C                      6.3                 01/02/2023                     Last edited by Alcira Phelan on 10/15/2024 10:08 AM.         Review of systems for the eyes was negative other than the pertinent positives/negatives listed in the HPI.      Assessment & Plan    HPI:  Isabel Biggs is a 69 year old female with history of HTN, HLD, GERD, hyperopia with astigmatism and presbyopia, narrow angle both eyes s/p laser peripheral iridotomy (LPI) right eye presents for laser peripheral iridotomy (LPI) left eye and repeat IOL master/erickson    POHx: Hyperopia with astigmatism and presbyopia  PMHx:HTN, HLD, GERD  Current Medications:   Current Outpatient Medications   Medication Sig Dispense Refill    albuterol (PROAIR HFA/PROVENTIL HFA/VENTOLIN HFA) 108 (90 Base) MCG/ACT inhaler USE 2 INHALATIONS BY MOUTH EVERY 6 HOURS AS NEEDED FOR SHORTNESS  OF BREATH WHEEZING OR COUGH 34 g 2    amitriptyline (ELAVIL) 25 MG tablet Take 1 tablet (25 mg) by mouth at bedtime. 90 tablet 3    amLODIPine (NORVASC) 5 MG tablet Take 1 tablet (5 mg) by mouth daily. 90 tablet 2    aspirin 81 MG EC tablet Take 1 tablet (81 mg) by mouth daily. 90 tablet 3    atorvastatin (LIPITOR) 40 MG tablet Take 1 tablet (40 mg) by mouth " daily 90 tablet 4    Cholecalciferol (D 1000) 25 MCG (1000 UT) CAPS Take 1 capsule by mouth daily. 90 capsule 4    DULoxetine (CYMBALTA) 30 MG capsule Take 1 capsule (30 mg) by mouth daily. 90 capsule 4    empagliflozin (JARDIANCE) 25 MG TABS tablet Take 1 tablet (25 mg) by mouth daily. 90 tablet 4    erythromycin (ROMYCIN) 5 MG/GM ophthalmic ointment Place 0.5 inches Into the left eye 2 times daily 1 g 1    losartan (COZAAR) 100 MG tablet Take 1 tablet (100 mg) by mouth daily 90 tablet 4    meclizine (ANTIVERT) 25 MG tablet Take 1 tablet (25 mg) by mouth 3 times daily. 90 tablet 4    omeprazole (PRILOSEC) 20 MG DR capsule Take 1 capsule (20 mg) by mouth daily. 90 capsule 1    traZODone (DESYREL) 50 MG tablet Take half (25 mg) tablet to one tablet (50 mg) at bedtime 90 tablet 3    triamcinolone (KENALOG) 0.1 % external cream Apply topically 2 times daily. 45 g 0     No current facility-administered medications for this visit.     FHx: cataracts  PSHx: denies history of ocular surgeries       Current Eye Medications:  AT four times a day      Assessment & Plan:  (H40.003) Glaucoma suspect of both eyes  (primary encounter diagnosis)  (H40.033) Anatomical narrow angle borderline glaucoma of both eyes  Maximum intraocular pressure 18/20  Family history: No  Trauma history: No  Gonioscopy: closed, steep, bowed anteriorly  Pachymetry:   Treatment History:     Today's testing:  Visual field 02/12/24:   Right eye nonspecific defect, enlarged blind spot, nonspecific defect VFI 88%, MD -6.96, PSD 3.66  Left eye nonspecific defect, VFI 92%, MD -5.18, PSD 3.73    OCT nerve fiber layer 02/12/24:   Right eye - G(g) 112 NI (g) 149 TI (g) 172 NS (g) 134 TS (g) 125      Left eye - G(g) 113 NI (g) 151 TI (g) 145 NS (g) 143 TS (g) 132     OCT nerve fiber layer 09/17/24:   Right eye - G(g) 115 NI (g) 160 TI (g) 145 NS (g) 143 TS (g) 127  Left eye - G(g) 124 NI (g) 159 TI (g) 154 NS (g) 170 TS (g) 139    IOP goal: mid teens  Concern for  narrow angle with minimal visible ocular structures  Recommend laser peripheral iridotomy (LPI) left eye   Risks, benefits and alternatives discussed  Patient elects to proceed    (H52.03,  H52.203,  H52.4) Hyperopia of both eyes with astigmatism and presbyopia  Hold pending laser peripheral iridotomy (LPI) both eyes and dilation    (H25.813) Combined forms of age-related cataract of both eyes  (primary encounter diagnosis)  Irregular Ks again with inconsistent measurements between IOL master and erickson today and previous  Continue AT four times a day and repeat    (H43.811) Posterior vitreous detachment of right eye  Retinal detachment precautions discussed including increased flashes, floaters, or curtain coming down across vision, patient instructed to return to clinic for evaluation       Return in about 4 weeks (around 11/12/2024) for Follow Up-v/t, repeat IOL calcs, erickson.        Kuldeep Vincent MD     Attending Physician Attestation:  Complete documentation of historical and exam elements from today's encounter can be found in the full encounter summary report (not reduplicated in this progress note).  I personally obtained the chief complaint(s) and history of present illness.  I confirmed and edited as necessary the review of systems, past medical/surgical history, family history, social history, and examination findings as documented by others; and I examined the patient myself.  I personally reviewed the relevant tests, images, and reports as documented above.  I formulated and edited as necessary the assessment and plan and discussed the findings and management plan with the patient and family. - Kuldeep Vincent MD

## 2024-10-21 ENCOUNTER — PATIENT OUTREACH (OUTPATIENT)
Dept: CARE COORDINATION | Facility: CLINIC | Age: 69
End: 2024-10-21
Payer: COMMERCIAL

## 2024-10-21 NOTE — PROGRESS NOTES
Clinic Care Coordination Contact  Follow Up Progress Note      Assessment: Reached patient.  She had to replace her phone and lost her contacts. She is working part time as a , three days a week from 9 AM to 8 PM. It is nice to be more self sufficient.  Her right knee bothers her, but she is able to do the job as she doesn't have to be very active. Is unsure if she would qualify for MA with her current income from Social Security and her work.  She will try to total it. She does want to get mental health supports now and requested a call tomorrow or Wednesday.     Care Gaps:    Health Maintenance Due   Topic Date Due    SPIROMETRY  Never done    ZOSTER IMMUNIZATION (1 of 2) Never done    RSV VACCINE (1 - Risk 60-74 years 1-dose series) Never done    DTAP/TDAP/TD IMMUNIZATION (3 - Td or Tdap) 10/23/2023    DEPRESSION 12 MO INDEX REPEAT PHQ-9  10/08/2024       Postponed to next pcp appt     Care Plans  Care Plan: Financial Wellbeing       Problem: Patient expresses financial resource strain       Long-Range Goal: I will apply for MA after I get my citizenship, and apply for [facility].       Start Date: 11/7/2023 Expected End Date: 11/6/2024    This Visit's Progress: 60% Recent Progress: 50%    Priority: High    Note:     Barriers: none identified.  Strengths: motivated.  Patient expressed understanding of goal: yes  Action steps to achieve this goal:  1. I will connect with [employer] to apply for programs.   2. I will complete any paper work.  3. I will work on getting my citizenship approved and then apply for MA.   4. I will report progress towards this goal at scheduled outreach telephone calls from the [facility] team.    Discussed 8/14/24                            Care Plan: Mental Health       Problem: Mood/Psychosocial Concerns       Long-Range Goal: Establish Mental Health Support and reduce depression symptoms.       Start Date: 11/7/2023 Expected End Date: 11/6/2024    This Visit's Progress: 10% Recent  Progress: On Hold    Priority: High    Note:     Barriers: none noted.   Strengths: motivated to feel better.  Patient expressed understanding of goal: yes  Action steps to achieve this goal:  1. I will schedule and attend appointments with therapist.    2. I will meet with counselor virtually.  3. I will report progress towards this goal at scheduled outreach telephone calls from the [facility] team.    Discussed 8/14/24                              Intervention/Education provided during outreach: support.     Outreach Frequency: monthly, more frequently as needed    Plan:     Care Coordinator will follow up in 1-2 days.

## 2024-10-22 ENCOUNTER — OFFICE VISIT (OUTPATIENT)
Dept: NEUROPSYCHOLOGY | Facility: CLINIC | Age: 69
End: 2024-10-22
Payer: COMMERCIAL

## 2024-10-22 DIAGNOSIS — R41.3 IMPAIRED MEMORY: ICD-10-CM

## 2024-10-22 DIAGNOSIS — R41.89 COGNITIVE IMPAIRMENT: Primary | ICD-10-CM

## 2024-10-22 DIAGNOSIS — F32.A DEPRESSION, UNSPECIFIED DEPRESSION TYPE: ICD-10-CM

## 2024-10-22 PROCEDURE — 96132 NRPSYC TST EVAL PHYS/QHP 1ST: CPT

## 2024-10-22 PROCEDURE — 96138 PSYCL/NRPSYC TECH 1ST: CPT

## 2024-10-22 PROCEDURE — 96133 NRPSYC TST EVAL PHYS/QHP EA: CPT

## 2024-10-22 PROCEDURE — 96139 PSYCL/NRPSYC TST TECH EA: CPT

## 2024-10-22 PROCEDURE — 90791 PSYCH DIAGNOSTIC EVALUATION: CPT
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
## 2024-10-23 ENCOUNTER — PATIENT OUTREACH (OUTPATIENT)
Dept: NURSING | Facility: CLINIC | Age: 69
End: 2024-10-23
Payer: COMMERCIAL

## 2024-10-23 NOTE — NURSING NOTE
The patient was seen for neuropsychological evaluation at the request of Dr. Reginaldo Ochoa , for the purposes of diagnostic clarification and treatment planning. 170 minutes of test administration and scoring were provided by this writer, Sunil Almendarez. Please see Dr. Elaine Knox's report for a full interpretation of the findings.

## 2024-10-23 NOTE — LETTER
[facility] CARE COORDINATION  [facility]    October 23, 2024        Isabel Biggs  [address]  [address]  [facility] [address]          Dear Isabel,     Attached is an updated Complex Care Plan for your continued enrollment in Care Coordination. Please let us know if you have additional questions, concerns, or goals that we can assist with.    Sincerely,    Angela Rogel,   [facility]  [phone]        [facility]  Patient Centered Plan of Care  About Me:        Patient Name:  Isabel Biggs    YOB: 1955  Age:         69 year old   [employer] MRN:    [id number] Telephone Information:  Home Phone [phone]   Mobile [phone]   Home Phone [phone]       Address:  [address]  [address]  [facility] [address] Email address:  [email or web address]      Emergency Contact(s)    Name Relationship Lgl Grd Work Phone Home Phone Mobile Phone   1. OTONIEL SCHAFFER Daughter    [phone]   2. PARISA SCHAFFER Daughter   [phone]            Primary language:  English     needed? No   [employer]:  [phone] op. 1  Other communication barriers:English as a second language; Visual impairment    Preferred Method of Communication:     Current living arrangement: I live in a private home with family    Mobility Status/ Medical Equipment: Independent        Health Maintenance  Health Maintenance Reviewed: Due/Overdue   Health Maintenance Due   Topic Date Due    SPIROMETRY  Never done    ZOSTER IMMUNIZATION (1 of 2) Never done    RSV VACCINE (1 - Risk 60-74 years 1-dose series) Never done    DTAP/TDAP/TD IMMUNIZATION (3 - Td or Tdap) 10/23/2023    DEPRESSION 12 MO INDEX REPEAT PHQ-9  10/08/2024           My Access Plan  Medical Emergency 911   Primary Clinic Line [facility] - [phone]   24 Hour Appointment Line [phone] or  [phone]) (toll-free)   24 Hour Nurse Line [phone]  (toll-free)   Preferred Urgent Care [facility], [phone]     Preferred Hospital [facility] [place]  [phone]     Preferred Pharmacy [id number] [address] AT [facility]     Behavioral Health Crisis Line The National Suicide Prevention Lifeline at 1-492.354.9083 or Text/Call 988           My Care Team Members  Patient Care Team         Relationship Specialty Notifications Start End    Cj Georges MD PCP - General   3/9/18     Phone: [phone] Fax: [phone] [address] [facility] [address]    Claudia Joseph MD MD Hematology & Oncology Admissions 12/14/17     Phone: [phone] Fax: [phone]         [address] [facility] [address]    Cj Georges MD Assigned PCP   6/16/21     Phone: [phone] Fax: [phone]         [address] [facility] [address]    Juma Wilkerson OD MD Optometry  6/18/21     Phone: [phone] Fax: [phone]         [address] [place] [id number]    Denice Plasencia MD MD Neurology  7/26/21     Phone: [phone] Fax: [phone]         [address] [place] [address]    aRquel Miguel CHW Community Health Worker Primary Care - [facility] Admissions 11/21/22     Angela Rogel LSW Lead Care Coordinator Primary Care - [facility] Admissions 12/1/22     Phone: [phone]         Ariella Miguel OD MD Optometry  1/18/23     Phone: [phone] Fax: [phone] [address] [facility] [address]    Adele Hamilton PA-C Referring Physician Family Medicine  1/18/23     Referring Physician to eye    Phone: [phone] Fax: [phone] [address] [facility] [address]    Dwain Santos MD MD Ophthalmology  1/23/23     Phone: [phone] Fax: [phone]         [address] [facility] [address]    Reginaldo Ochoa MD MD Neurology  2/28/23     Phone:  [phone] Fax: [phone]         [address] [facility] [address]    Cj Whaley, APRN CNP Assigned Sleep Provider   4/15/23     Phone: [phone] Fax: [phone]         [address] [facility] [address] [facility] [address]    Ria Goode MD MD Ophthalmology  9/15/23     Phone: [phone] Fax: [phone] [address] [facility] [address]    Reginaldo Denson DO Assigned Neuroscience Provider   10/21/23     Phone: [phone] Fax: [phone]         [address] [facility] [address] [place] [address]    Kuldeep Vincent MD Physician Ophthalmology  12/5/23     Phone: [phone] Fax: [phone]         [address] [facility] [address] [facility] [address]    Kuldeep Vincent MD Assigned Surgical Provider   2/23/24     Phone: [phone] Fax: [phone]         [address] [place] [address] [address] [facility] [address]    Keith Tellez RPH Pharmacist   7/26/24     Phone: [phone] Fax: [phone]         [address] [place] [address]    Keith Tellez RPH Assigned [place] Pharmacist   8/23/24     Phone: [phone] Fax: [phone]         [address] [place] [address]                My Care Plans  Self Management and Treatment Plan    Care Plan  Care Plan: Financial Wellbeing       Problem: Patient expresses financial resource strain       Long-Range Goal: I will apply for MA after I get my citizenship, and apply for [facility].       Start Date: 11/7/2023 Expected End Date: 10/22/2025    This Visit's Progress: 60% Recent Progress: 60%    Priority: High    Note:     Barriers: none identified.  Strengths: motivated.  Patient expressed understanding of goal: yes  Action steps to achieve this goal:  1. I will apply for programs if eligible.   2. I will complete any paper work.  3. I will report progress towards this goal at scheduled outreach telephone calls from the [facility] team.    Discussed 10-  Has to add up her income to  see if she is eligible for MA                            Care Plan: Mental Health       Problem: Mood/Psychosocial Concerns       Long-Range Goal: Establish Mental Health Support and reduce depression symptoms.       Start Date: 11/7/2023 Expected End Date: 10/22/2025    This Visit's Progress: 20% Recent Progress: 10%    Priority: High    Note:     Barriers: none noted.   Strengths: motivated to feel better.  Patient expressed understanding of goal: yes  Action steps to achieve this goal:  1. I will schedule and attend appointments with therapist.    2. I will meet with counselor virtually.  3. I will report progress towards this goal at scheduled outreach telephone calls from the [facility] team.    Discussed 10/23/24 had neuro psych testing done on 10-22. Wants to see results before setting up anything else.                             Care Plan: Housing Instability       Problem: SDOH LACK OF STABLE HOUSING       Long-Range Goal: Establish Stable Housing       Start Date: 10/23/2024 Expected End Date: 10/22/2025    Priority: High    Note:     Barriers: living with daughter and her family, crowded. Needs subsidized apartment.   Strengths: motivated.  Patient expressed understanding of goal: yes  Action steps to achieve this goal:  1. I will review options that [facility] sends to me.   2. I will call those apartment in [facility] to be near my family.   3. I get on waiting lists.  4. I will report progress towards this goal at scheduled outreach telephone calls from the [facility] team.                                    Advance Care Plans/Directives:   Advanced Care Plan/Directives on file: No    Discussed with patient/caregiver(s): Declined Further Information             My Medical and Care Information  Problem List   Patient Active Problem List   Diagnosis    WILFRIDO occasional CPAP    Type 2 diabetes mellitus with diabetic microalbuminuria, without long-term current use of insulin (H)    Diabetic polyneuropathy  associated with type 2 diabetes mellitus (H)    Beta thalassemia (H)    Low back pain     Moderate episode of recurrent major depressive disorder (H)    Morbid obesity (H)    Peripheral vertigo of both ears    Essential hypertension    Mixed hyperlipidemia    Simple chronic bronchitis (H)      Current Medications:  Please refer to the most recent medication list provided to you by your medical team and reach out to your provider with any questions or to make any corrections.    Care Coordination Start Date: 11/21/2022   Frequency of Care Coordination: monthly, more frequently as needed     Form Last Updated: 10/23/2024

## 2024-10-23 NOTE — PROGRESS NOTES
Clinic Care Coordination Contact  Follow Up Progress Note      Assessment: completed re assessment and updated goals and added a housing goal. She would like to get in person appt with PCP to discuss knee pain.  It clicks and hurts.  She is off work on Tuesday and Wednesday.  She needs to figure out her income in order to see if she qualifies for MA.  She is taking [employer] to meeting today at her son's group home. He is doing well there.  She would like to move out of her daughter's house as they do not have enough bedrooms and people have to sleep on the floor. Will send options for her to review.  She completed neuro psych testing yesterday and will review summary. She will consider mental health supports at that time depending on the results.  Has trouble with memory.      Care Gaps:    Health Maintenance Due   Topic Date Due    SPIROMETRY  Never done    ZOSTER IMMUNIZATION (1 of 2) Never done    RSV VACCINE (1 - Risk 60-74 years 1-dose series) Never done    DTAP/TDAP/TD IMMUNIZATION (3 - Td or Tdap) 10/23/2023    DEPRESSION 12 MO INDEX REPEAT PHQ-9  10/08/2024       Requested a  from Care Team to call patient.    Care Plans  Care Plan: Financial Wellbeing       Problem: Patient expresses financial resource strain       Long-Range Goal: I will apply for MA after I get my citizenship, and apply for [facility].       Start Date: 11/7/2023 Expected End Date: 10/22/2025    This Visit's Progress: 60% Recent Progress: 60%    Priority: High    Note:     Barriers: none identified.  Strengths: motivated.  Patient expressed understanding of goal: yes  Action steps to achieve this goal:  1. I will apply for programs if eligible.   2. I will complete any paper work.  3. I will report progress towards this goal at scheduled outreach telephone calls from the [facility] team.    Discussed 10-  Has to add up her income to see if she is eligible for MA                            Care Plan: Mental Health        Problem: Mood/Psychosocial Concerns       Long-Range Goal: Establish Mental Health Support and reduce depression symptoms.       Start Date: 11/7/2023 Expected End Date: 10/22/2025    This Visit's Progress: 20% Recent Progress: 10%    Priority: High    Note:     Barriers: none noted.   Strengths: motivated to feel better.  Patient expressed understanding of goal: yes  Action steps to achieve this goal:  1. I will schedule and attend appointments with therapist.    2. I will meet with counselor virtually.  3. I will report progress towards this goal at scheduled outreach telephone calls from the [facility] team.    Discussed 10/23/24 had neuro psych testing done on 10-22. Wants to see results before setting up anything else.                             Care Plan: Housing Instability       Problem: SDOH LACK OF STABLE HOUSING       Long-Range Goal: Establish Stable Housing       Start Date: 10/23/2024 Expected End Date: 10/22/2025    Priority: High    Note:     Barriers: living with daughter and her family, crowded. Needs subsidized apartment.   Strengths: motivated.  Patient expressed understanding of goal: yes  Action steps to achieve this goal:  1. I will review options that  [facility] sends to me.   2. I will call those apartment in [facility] to be near my family.   3. I get on waiting lists.  4. I will report progress towards this goal at scheduled outreach telephone calls from the [facility] team.                                  Intervention/Education provided during outreach: support with goals.      Outreach Frequency: monthly, more frequently as needed    Plan:   [facility] SW will continue to monitor, support patient with current goals and will be available to assist as needs arise. [facility] CHW will reach out to patient on a monthly basis to discuss progression of goals.      [facility] SW will perform Chart Review in 45 days.     Angela Rogel,   [facility]  [phone]

## 2024-10-23 NOTE — TELEPHONE ENCOUNTER
Lm on vm to call back and speak to the care team to let us know if 10/30/24, 9:20AM arrival works for her. Appt slot on hold.

## 2024-10-24 NOTE — TELEPHONE ENCOUNTER
Called and confirmed with pt that appt date/time works, cancelled 11/9/24 appt at [facility] per pt.
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
## 2024-10-31 NOTE — PROGRESS NOTES
Provider: CE      Tech: RENETTA      Patient Name: Isabel Biggs      : 55      MRN: [id number]      SQUIRES: 10/22/24      Age: 69      Education: 16      Ethnicity: B      Handedness: Right      Station: OP             NEUROPSYCHOLOGICAL TESTS              PREMORBID ABILITY RAW SCORE STANDARDIZED SCORE* DESCRIPTIVE RANGE*   Wide Range Achievement Test- 5th Edition (WRAT-5) Word Reading 60 SS= 100 Average     G.E. >12.9 --          ATTENTION AND EXECUTIVE FUNCTIONS RAW SCORE STANDARDIZED SCORE* DESCRIPTIVE RANGE**   Wechsler Adult Intelligence Scale - 4th Edition (WAIS-IV)      Digit Span Forward 7 ss= 7 Low Average   Digit Span Backward 7 ss= 9 Average   Digit Span Sequencing 6 ss= 8 Average   Digit Span Total 20 ss= 7 Low Average   Coding 20 ss= 3 Exceptionally Low   Matrix Reasoning 6 ss= 5 Below Average   Trail Making Test (Time/errors)        Part A s,r,e 126/6 TS= 21 Exceptionally Low   Part B s,r,e 243/3 TS= 33 Below Average   [place] Card Sorting Test (WCST-128)      Total Errorse 16 TS= 56 Average   Perseverative Responsese 7 TS= 57 High Average   Perseverative Errorse 7 TS= 57 High Average   Categories Completede 6 %ile= >16 WNL   Failure To Maintain Sete 1 %ile= >16 WNL   Independent Living Scales (ILS)       Health and Safety 35 TS= 50 High          LANGUAGE RAW SCORE STANDARDIZED SCORE* DESCRIPTIVE RANGE**   Neuropsychological Assessment Battery (NAB) Naming s,e 29 TS= 42 Low Average   Animal Fluency s,r,e 12 TS=  40 Low Average          VISUOSPATIAL PROCESSING RAW SCORE STANDARDIZED SCORE* DESCRIPTIVE RANGE**   Michel Judgment of Line Orientation (JOLO)s 13 %ile= 1.5 Exceptionally Low   Logan Complex Figure Test (RCFT) Copy 23.5 %ile= <=1 Exceptionally Low   Brief Visual Memory Test - Revised (BVMT-R) Copy 9   Below Expectation          LEARNING & MEMORY RAW SCORE STANDARDIZED SCORE* DESCRIPTIVE RANGE**   Brenner Verbal Learning Test - Revised (HVLT-R; Form 1)      Total Trials 1-3 4-5-7 (16) TS= 23  Exceptionally Low   Delayed Recall 2 TS= <=20 Exceptionally Low   Retention (%) 29% TS= <=20 Exceptionally Low   Recognition Hits 9 --     Recognition False Alarms 2 --     Recognition Discriminability 7 TS= 24 Exceptionally Low   Brief Visual Memory Test - Revised (BVMT-R; Form 1)      Trials 1-3 0-4-3 (7) TS= 24 Exceptionally Low   Learning 4 TS= 51 Average   Delayed Recall 1 TS= <20 Exceptionally Low   Recognition Hits 5 %ile= >16 WNL   Recognition False Alarms 2 %ile= <1 Exceptionally Low   Discrimination Index 3 %ile= 3-5 Below Average          PSYCHOLOGICAL & BEHAVIORAL SYMPTOMS RAW SCORE STANDARDIZED SCORE* DESCRIPTIVE RANGE**   Geriatric Depression Scale (GDS) 18 --  Mild/Moderate   Geriatric Anxiey Scale (GAS)       Somatic 9 --  Mild   Cognitive 4 --  Mild   Affective 7 --  Moderate   Total 20 --  Mild          PERFORMANCE VALIDITY RAW SCORE   DESCRIPTIVE RANGE**   RDS 7 --  Valid Range   TOMM       Trial 1 47   Valid Range   Trial 2 50 --  Valid Range          * All standardized scores are adjusted for age. Superscripts denote additional adjustment for (s)ex, (r)ace/ethnicity, (l)anguage, and/or (e)ducation.   ** Descriptive ranges are based on American Academy of Clinical Neuropsychology (2020) consensus guidelines, or test manuals where appropriate.    WNL = within normal limits. DC = discontinued due to patient s inability to complete the test.      Standardized scores: TS = T-score; mean = 50, standard deviation =10; z = z-score; mean = 0, standard deviation = 1; ss = scaled score; mean = 10, standard deviation = 3; MAS = [place] Older Adult Normative Study age adjusted scaled score; mean = 10, standard deviation = 3; SS = standard score; mean = 100, standard deviation = 15.

## 2024-11-04 ASSESSMENT — PATIENT HEALTH QUESTIONNAIRE - PHQ9: SUM OF ALL RESPONSES TO PHQ QUESTIONS 1-9: 9

## 2024-11-06 ASSESSMENT — PATIENT HEALTH QUESTIONNAIRE - PHQ9: SUM OF ALL RESPONSES TO PHQ QUESTIONS 1-9: 13

## 2024-11-14 ENCOUNTER — PATIENT OUTREACH (OUTPATIENT)
Dept: CARE COORDINATION | Facility: CLINIC | Age: 69
End: 2024-11-14
Payer: COMMERCIAL

## 2024-11-14 NOTE — PROGRESS NOTES
Clinic Care Coordination Contact  [facility]/Voicemail    Clinical Data: Care Coordinator Outreach    Outreach Documentation Number of Outreach Attempt   10/11/2024   2:03 PM 3   11/14/2024   9:54 AM 1       Left message on patient's voicemail with call back information and requested return call.      Plan: Care Coordinator will try to reach patient again in 10 business days.    Irena COMBS [facility] Community Health Worker  Ambulatory Care Coordination  Covering for Raquel TURNER

## 2024-11-19 ENCOUNTER — OFFICE VISIT (OUTPATIENT)
Dept: OPHTHALMOLOGY | Facility: CLINIC | Age: 69
End: 2024-11-19
Payer: COMMERCIAL

## 2024-11-19 ENCOUNTER — TELEPHONE (OUTPATIENT)
Dept: OPHTHALMOLOGY | Facility: CLINIC | Age: 69
End: 2024-11-19

## 2024-11-19 DIAGNOSIS — H52.4 HYPEROPIA OF BOTH EYES WITH ASTIGMATISM AND PRESBYOPIA: ICD-10-CM

## 2024-11-19 DIAGNOSIS — H52.03 HYPEROPIA OF BOTH EYES WITH ASTIGMATISM AND PRESBYOPIA: ICD-10-CM

## 2024-11-19 DIAGNOSIS — H25.813 COMBINED FORMS OF AGE-RELATED CATARACT OF BOTH EYES: Primary | ICD-10-CM

## 2024-11-19 DIAGNOSIS — H40.033 ANATOMICAL NARROW ANGLE BORDERLINE GLAUCOMA OF BOTH EYES: ICD-10-CM

## 2024-11-19 DIAGNOSIS — H52.203 HYPEROPIA OF BOTH EYES WITH ASTIGMATISM AND PRESBYOPIA: ICD-10-CM

## 2024-11-19 PROCEDURE — 99214 OFFICE O/P EST MOD 30 MIN: CPT | Performed by: OPHTHALMOLOGY

## 2024-11-19 PROCEDURE — 76519 ECHO EXAM OF EYE: CPT | Mod: 50 | Performed by: OPHTHALMOLOGY

## 2024-11-19 ASSESSMENT — REFRACTION_WEARINGRX
OD_CYLINDER: SPHERE
SPECS_TYPE: SVL
OS_SPHERE: +1.50
OD_SPHERE: +1.50
OS_CYLINDER: SPHERE

## 2024-11-19 ASSESSMENT — SLIT LAMP EXAM - LIDS
COMMENTS: NORMAL
COMMENTS: NORMAL

## 2024-11-19 ASSESSMENT — TONOMETRY
OS_IOP_MMHG: 18
IOP_METHOD: TONOPEN
OD_IOP_MMHG: 18

## 2024-11-19 ASSESSMENT — VISUAL ACUITY
OS_CC: 20/30
OD_CC+: -3
CORRECTION_TYPE: GLASSES
OD_CC: 20/25
OS_CC+: -1
METHOD: SNELLEN - LINEAR

## 2024-11-19 ASSESSMENT — REFRACTION_MANIFEST
OD_SPHERE: +2.25
OS_CYLINDER: +0.25
OD_CYLINDER: +0.75
OS_AXIS: 180
OD_AXIS: 165
OS_SPHERE: +2.00

## 2024-11-19 ASSESSMENT — EXTERNAL EXAM - LEFT EYE: OS_EXAM: NORMAL

## 2024-11-19 ASSESSMENT — EXTERNAL EXAM - RIGHT EYE: OD_EXAM: NORMAL

## 2024-11-19 NOTE — PROGRESS NOTES
HPI       Follow Up    In both eyes.  Since onset it is stable.  Associated symptoms include eye pain (intermittent left eye), floaters and itching.  Negative for flashes.  Treatments tried include artificial tears.  Response to treatment was mild improvement.             Comments    Here for follow up. [facility] is about the same. Some pain and itching.           Last edited by Shon Hamilton on 11/19/2024  9:51 AM.         Review of systems for the eyes was negative other than the pertinent positives/negatives listed in the HPI.      Assessment & Plan    HPI:  Isabel Biggs is a 69 year old female with history of HTN, HLD, GERD, hyperopia with astigmatism and presbyopia, narrow angle both eyes s/p laser peripheral iridotomy (LPI) both eyes presents for cataract calcs    POHx: Hyperopia with astigmatism and presbyopia  PMHx:HTN, HLD, GERD  Current Medications:   Current Outpatient Medications   Medication Sig Dispense Refill    albuterol (PROAIR HFA/PROVENTIL HFA/VENTOLIN HFA) 108 (90 Base) MCG/ACT inhaler USE 2 INHALATIONS BY MOUTH EVERY 6 HOURS AS NEEDED FOR SHORTNESS  OF BREATH WHEEZING OR COUGH 34 g 2    amitriptyline (ELAVIL) 25 MG tablet Take 1 tablet (25 mg) by mouth at bedtime. 90 tablet 3    amLODIPine (NORVASC) 5 MG tablet Take 1 tablet (5 mg) by mouth daily. 90 tablet 2    aspirin 81 MG EC tablet Take 1 tablet (81 mg) by mouth daily. 90 tablet 3    atorvastatin (LIPITOR) 40 MG tablet Take 1 tablet (40 mg) by mouth daily 90 tablet 4    Cholecalciferol (D 1000) 25 MCG (1000 UT) CAPS Take 1 capsule by mouth daily. 90 capsule 4    DULoxetine (CYMBALTA) 30 MG capsule Take 1 capsule (30 mg) by mouth daily. 90 capsule 4    empagliflozin (JARDIANCE) 25 MG TABS tablet Take 1 tablet (25 mg) by mouth daily. 90 tablet 4    erythromycin (ROMYCIN) 5 MG/GM ophthalmic ointment Place 0.5 inches Into the left eye 2 times daily 1 g 1    losartan (COZAAR) 100 MG tablet Take 1 tablet (100 mg) by mouth daily 90 tablet 4    meclizine  (ANTIVERT) 25 MG tablet Take 1 tablet (25 mg) by mouth 3 times daily. 90 tablet 4    omeprazole (PRILOSEC) 20 MG DR capsule Take 1 capsule (20 mg) by mouth daily. 90 capsule 1    traZODone (DESYREL) 50 MG tablet Take half (25 mg) tablet to one tablet (50 mg) at bedtime 90 tablet 3    triamcinolone (KENALOG) 0.1 % external cream Apply topically 2 times daily. 45 g 0     No current facility-administered medications for this visit.     FHx: cataracts  PSHx: denies history of ocular surgeries       Current Eye Medications:  AT four times a day      Assessment & Plan:  (H40.003) Glaucoma suspect of both eyes  (primary encounter diagnosis)  (H40.033) Anatomical narrow angle borderline glaucoma of both eyes  Maximum intraocular pressure 18/20  Family history: No  Trauma history: No  Gonioscopy: closed, steep, bowed anteriorly  Pachymetry:   Treatment History:     Today's testing:  Visual field 02/12/24:   Right eye nonspecific defect, enlarged blind spot, nonspecific defect VFI 88%, MD -6.96, PSD 3.66  Left eye nonspecific defect, VFI 92%, MD -5.18, PSD 3.73    OCT nerve fiber layer 02/12/24:   Right eye - G(g) 112 NI (g) 149 TI (g) 172 NS (g) 134 TS (g) 125      Left eye - G(g) 113 NI (g) 151 TI (g) 145 NS (g) 143 TS (g) 132     OCT nerve fiber layer 09/17/24:   Right eye - G(g) 115 NI (g) 160 TI (g) 145 NS (g) 143 TS (g) 127  Left eye - G(g) 124 NI (g) 159 TI (g) 154 NS (g) 170 TS (g) 139    IOP goal: mid teens  Concern for narrow angle with minimal visible ocular structures    (H52.03,  H52.203,  H52.4) Hyperopia of both eyes with astigmatism and presbyopia  Hold pending Cataract extraction/intraocular lens     (H25.813) Combined forms of age-related cataract of both eyes  (primary encounter diagnosis)  Special equipment/needs:  Eye: right  Anesthesia:topical  Dilates to: 6.5  Iris expansion:  possible  Pseudoexfoliation: No  Trypan Blue: No  Trauma: No    Able lay to flat: Yes   Blood Thinner: Yes ASA  Tamsulosin:  No  DM: No  Guttae: No    Dominant Eye: right    Plan: [place] both eyes   Cataract is believed to be significantly contributing to patient's visual impairment. Cataract surgery recommended and expected to improve vision. Vision is not correctable by glasses or other nonsurgical measures. R/B/A were discussed with the patient. These included, but are not limited to: bleeding, infection, loss of vision, loss of the eye, need for more surgery, glaucoma, retinal detachment, need for glasses, corneal edema. The patient voiced understanding and wishes to proceed. All lens options were discussed with the patient including monofocal lenses, multifocal lenses, and toric lenses. The risks and benefits of each were discussed, including halos, glare, and possible need for glasses. No guarantees about vision after surgery were given. The patient voiced understanding and wishes to proceed    Proceed with CE/IOL right eye followed by left eye.  Deferred toric due to cost  DIAMOX each eye     Would be great candidate for toric right eye at least, but patient deferred    (H43.811) Posterior vitreous detachment of right eye  Retinal detachment precautions discussed including increased flashes, floaters, or curtain coming down across vision, patient instructed to return to clinic for evaluation       Return for POD0.        Kuldeep Vincent MD     Attending Physician Attestation:  Complete documentation of historical and exam elements from today's encounter can be found in the full encounter summary report (not reduplicated in this progress note).  I personally obtained the chief complaint(s) and history of present illness.  I confirmed and edited as necessary the review of systems, past medical/surgical history, family history, social history, and examination findings as documented by others; and I examined the patient myself.  I personally reviewed the relevant tests, images, and reports as documented above.  I formulated and  edited as necessary the assessment and plan and discussed the findings and management plan with the patient and family. - Kuldeep Vincent MD

## 2024-11-19 NOTE — TELEPHONE ENCOUNTER
Called and LVM for patient to schedule surgery with Dr. Vincent. Provided direct call back number [phone].      Vera Sena on 11/19/2024 at 12:11 PM

## 2024-11-27 ENCOUNTER — PATIENT OUTREACH (OUTPATIENT)
Dept: CARE COORDINATION | Facility: CLINIC | Age: 69
End: 2024-11-27
Payer: COMMERCIAL

## 2024-11-27 PROBLEM — H25.813 COMBINED FORMS OF AGE-RELATED CATARACT OF BOTH EYES: Status: ACTIVE | Noted: 2024-11-19

## 2024-11-27 NOTE — PROGRESS NOTES
Clinic Care Coordination Contact  [facility]/Voicemail    Clinical Data: Care Coordinator Outreach    Outreach Documentation Number of Outreach Attempt   11/14/2024   9:54 AM 1   11/27/2024   9:50 AM 1       Left message on patient's voicemail with call back information and requested return call.      Plan: Care Coordinator will try to reach patient again in 10 business days.    YUE Sánchez   [phone]  Clinic Care Coordination  [facility]

## 2024-12-02 ENCOUNTER — PATIENT OUTREACH (OUTPATIENT)
Dept: CARE COORDINATION | Facility: CLINIC | Age: 69
End: 2024-12-02
Payer: COMMERCIAL

## 2024-12-02 NOTE — PROGRESS NOTES
Care Coordination Clinician Chart Review    Situation: Patient chart reviewed by Care Coordinator.       Background: Care Coordination Program started: 11/21/2022. Leaving VM x1. Initial assessment completed and patient-centered care plan(s) were developed with participation from patient. Lead CC handed patient off to CHW for continued outreaches.       Assessment: Per chart review, patient outreach completed by CC CHW on 11- laving VM x1.  Patient is actively working to accomplish goal(s). Patient's goal(s) appropriate and relevant at this time. Patient is due for updated Plan of Care.  Assessments will be completed annually or as needed/with change of patient status.      Care Plan: Financial Wellbeing       Problem: Patient expresses financial resource strain       Long-Range Goal: I will apply for MA after I get my citizenship, and apply for [facility].       Start Date: 11/7/2023 Expected End Date: 10/22/2025    This Visit's Progress: 60% Recent Progress: 60%    Priority: High    Note:     Barriers: none identified.  Strengths: motivated.  Patient expressed understanding of goal: yes  Action steps to achieve this goal:  1. I will apply for programs if eligible.   2. I will complete any paper work.  3. I will report progress towards this goal at scheduled outreach telephone calls from the CCC team.    Discussed 10-  Has to add up her income to see if she is eligible for MA                            Care Plan: Mental Health       Problem: Mood/Psychosocial Concerns       Long-Range Goal: Establish Mental Health Support and reduce depression symptoms.       Start Date: 11/7/2023 Expected End Date: 10/22/2025    This Visit's Progress: 20% Recent Progress: 10%    Priority: High    Note:     Barriers: none noted.   Strengths: motivated to feel better.  Patient expressed understanding of goal: yes  Action steps to achieve this goal:  1. I will schedule and attend appointments with therapist.    2. I  will meet with counselor virtually.  3. I will report progress towards this goal at scheduled outreach telephone calls from the CCC team.    Discussed 10/23/24 had neuro psych testing done on 10-22. Wants to see results before setting up anything else.                             Care Plan: Housing Instability       Problem: SDOH LACK OF STABLE HOUSING       Long-Range Goal: Establish Stable Housing       Start Date: 10/23/2024 Expected End Date: 10/22/2025    Priority: High    Note:     Barriers: living with daughter and her family, crowded. Needs subsidized apartment.   Strengths: motivated.  Patient expressed understanding of goal: yes  Action steps to achieve this goal:  1. I will review options that  CC sends to me.   2. I will call those apartment in [facility] to be near my family.   3. I get on waiting lists.  4. I will report progress towards this goal at scheduled outreach telephone calls from the CCC team.                                     Plan/Recommendations: The patient will continue working with Care Coordination to achieve goal(s) as above. CHW will continue outreaches at minimum every 30 days and will involve Lead CC as needed or if patient is ready to move to Maintenance. Lead CC will continue to monitor CHW outreaches and patient's progress to goal(s) every 6 weeks.     Plan of Care updated and sent to patient: No

## 2024-12-04 ENCOUNTER — OFFICE VISIT (OUTPATIENT)
Dept: INTERNAL MEDICINE | Facility: CLINIC | Age: 69
End: 2024-12-04
Payer: COMMERCIAL

## 2024-12-04 ENCOUNTER — ANCILLARY PROCEDURE (OUTPATIENT)
Dept: GENERAL RADIOLOGY | Facility: CLINIC | Age: 69
End: 2024-12-04
Attending: INTERNAL MEDICINE
Payer: COMMERCIAL

## 2024-12-04 VITALS
SYSTOLIC BLOOD PRESSURE: 134 MMHG | TEMPERATURE: 98.2 F | WEIGHT: 216.7 LBS | DIASTOLIC BLOOD PRESSURE: 72 MMHG | HEART RATE: 77 BPM | BODY MASS INDEX: 39.88 KG/M2 | OXYGEN SATURATION: 95 % | HEIGHT: 62 IN | RESPIRATION RATE: 13 BRPM

## 2024-12-04 DIAGNOSIS — M76.51 PATELLAR TENDONITIS OF RIGHT KNEE: ICD-10-CM

## 2024-12-04 DIAGNOSIS — G89.29 CHRONIC PAIN OF RIGHT KNEE: Primary | ICD-10-CM

## 2024-12-04 DIAGNOSIS — R80.9 TYPE 2 DIABETES MELLITUS WITH DIABETIC MICROALBUMINURIA, WITHOUT LONG-TERM CURRENT USE OF INSULIN (H): ICD-10-CM

## 2024-12-04 DIAGNOSIS — E11.29 TYPE 2 DIABETES MELLITUS WITH DIABETIC MICROALBUMINURIA, WITHOUT LONG-TERM CURRENT USE OF INSULIN (H): ICD-10-CM

## 2024-12-04 DIAGNOSIS — M25.561 CHRONIC PAIN OF RIGHT KNEE: Primary | ICD-10-CM

## 2024-12-04 DIAGNOSIS — M25.561 CHRONIC PAIN OF RIGHT KNEE: ICD-10-CM

## 2024-12-04 DIAGNOSIS — I10 ESSENTIAL HYPERTENSION: ICD-10-CM

## 2024-12-04 DIAGNOSIS — G89.29 CHRONIC PAIN OF RIGHT KNEE: ICD-10-CM

## 2024-12-04 PROCEDURE — G2211 COMPLEX E/M VISIT ADD ON: HCPCS | Performed by: INTERNAL MEDICINE

## 2024-12-04 PROCEDURE — 99214 OFFICE O/P EST MOD 30 MIN: CPT | Performed by: INTERNAL MEDICINE

## 2024-12-04 PROCEDURE — 73562 X-RAY EXAM OF KNEE 3: CPT | Mod: TC | Performed by: RADIOLOGY

## 2024-12-04 RX ORDER — CELECOXIB 100 MG/1
100 CAPSULE ORAL 2 TIMES DAILY
Qty: 60 CAPSULE | Refills: 1 | Status: SHIPPED | OUTPATIENT
Start: 2024-12-04

## 2024-12-04 ASSESSMENT — ANXIETY QUESTIONNAIRES
5. BEING SO RESTLESS THAT IT IS HARD TO SIT STILL: NOT AT ALL
6. BECOMING EASILY ANNOYED OR IRRITABLE: SEVERAL DAYS
IF YOU CHECKED OFF ANY PROBLEMS ON THIS QUESTIONNAIRE, HOW DIFFICULT HAVE THESE PROBLEMS MADE IT FOR YOU TO DO YOUR WORK, TAKE CARE OF THINGS AT HOME, OR GET ALONG WITH OTHER PEOPLE: SOMEWHAT DIFFICULT
3. WORRYING TOO MUCH ABOUT DIFFERENT THINGS: MORE THAN HALF THE DAYS
1. FEELING NERVOUS, ANXIOUS, OR ON EDGE: SEVERAL DAYS
GAD7 TOTAL SCORE: 10
7. FEELING AFRAID AS IF SOMETHING AWFUL MIGHT HAPPEN: NEARLY EVERY DAY
GAD7 TOTAL SCORE: 10
2. NOT BEING ABLE TO STOP OR CONTROL WORRYING: NEARLY EVERY DAY

## 2024-12-04 ASSESSMENT — PAIN SCALES - GENERAL: PAINLEVEL_OUTOF10: EXTREME PAIN (9)

## 2024-12-04 ASSESSMENT — PATIENT HEALTH QUESTIONNAIRE - PHQ9
SUM OF ALL RESPONSES TO PHQ QUESTIONS 1-9: 13
5. POOR APPETITE OR OVEREATING: NOT AT ALL

## 2024-12-04 NOTE — PROGRESS NOTES
"  Office Visit - Follow Up   Isabel Biggs   69 year old female    Date of Visit: 12/4/2024    Chief Complaint   Patient presents with    Knee Pain     Pt reports severe knee pain. Pt reports very painful at night time. Pt reports that this is interfering with her job due to pain.         Assessment and Plan   1. Chronic pain of right knee (Primary)  X-ray from 2023 reviewed shows chronic patellar tendinitis.  Will repeat an x-ray today, have her start some physical therapy and see her orthopedic partners.  Trial of Celebrex as below  - Physical Therapy  Referral; Future  - Orthopedic  Referral; Future  - celecoxib (CELEBREX) 100 MG capsule; Take 1 capsule (100 mg) by mouth 2 times daily.  Dispense: 60 capsule; Refill: 1  - XR Knee Right 3 Views; Future    2. Patellar tendonitis of right knee  - Physical Therapy  Referral; Future  - Orthopedic  Referral; Future  - celecoxib (CELEBREX) 100 MG capsule; Take 1 capsule (100 mg) by mouth 2 times daily.  Dispense: 60 capsule; Refill: 1  - XR Knee Right 3 Views; Future    3. Essential hypertension  Blood pressure little bit elevated and improved on recheck    4. Type 2 diabetes mellitus with diabetic microalbuminuria, without long-term current use of insulin (H)  Has been well-controlled, continue same    Return in about 4 weeks (around 1/1/2025) for Follow up.     History of Present Illness   This 69 year old woman comes in for follow-up.  She has been having more right knee pain anteriorly.  It hurts when she goes up and down stairs.  Finding it difficult to work.  She is use acetaminophen and Voltaren gel.       Physical Exam   General Appearance:   No acute distress    /72   Pulse 77   Temp 98.2  F (36.8  C) (Tympanic)   Resp 13   Ht 1.575 m (5' 2.01\")   Wt 98.3 kg (216 lb 11.2 oz)   LMP 06/08/1996 (Exact Date)   SpO2 95%   BMI 39.62 kg/m      No redness or swelling of the knee tenderness anteriorly over the patellar " tendon     Additional Information   Current Outpatient Medications   Medication Sig Dispense Refill    albuterol (PROAIR HFA/PROVENTIL HFA/VENTOLIN HFA) 108 (90 Base) MCG/ACT inhaler USE 2 INHALATIONS BY MOUTH EVERY 6 HOURS AS NEEDED FOR SHORTNESS  OF BREATH WHEEZING OR COUGH 34 g 2    amitriptyline (ELAVIL) 25 MG tablet Take 1 tablet (25 mg) by mouth at bedtime. 90 tablet 3    amLODIPine (NORVASC) 5 MG tablet Take 1 tablet (5 mg) by mouth daily. 90 tablet 2    aspirin 81 MG EC tablet Take 1 tablet (81 mg) by mouth daily. 90 tablet 3    atorvastatin (LIPITOR) 40 MG tablet Take 1 tablet (40 mg) by mouth daily 90 tablet 4    celecoxib (CELEBREX) 100 MG capsule Take 1 capsule (100 mg) by mouth 2 times daily. 60 capsule 1    Cholecalciferol (D 1000) 25 MCG (1000 UT) CAPS Take 1 capsule by mouth daily. 90 capsule 4    DULoxetine (CYMBALTA) 30 MG capsule Take 1 capsule (30 mg) by mouth daily. 90 capsule 4    empagliflozin (JARDIANCE) 25 MG TABS tablet Take 1 tablet (25 mg) by mouth daily. 90 tablet 4    erythromycin (ROMYCIN) 5 MG/GM ophthalmic ointment Place 0.5 inches Into the left eye 2 times daily 1 g 1    losartan (COZAAR) 100 MG tablet Take 1 tablet (100 mg) by mouth daily 90 tablet 4    meclizine (ANTIVERT) 25 MG tablet Take 1 tablet (25 mg) by mouth 3 times daily. 90 tablet 4    omeprazole (PRILOSEC) 20 MG DR capsule Take 1 capsule (20 mg) by mouth daily. 90 capsule 1    traZODone (DESYREL) 50 MG tablet Take half (25 mg) tablet to one tablet (50 mg) at bedtime 90 tablet 3    triamcinolone (KENALOG) 0.1 % external cream Apply topically 2 times daily. 45 g 0       Time:     The longitudinal plan of care for the diagnosis(es)/condition(s) as documented were addressed during this visit. Due to the added complexity in care, I will continue to support Artesian in the subsequent management and with ongoing continuity of care.     Cj Georges MD

## 2024-12-05 ENCOUNTER — PATIENT OUTREACH (OUTPATIENT)
Dept: CARE COORDINATION | Facility: CLINIC | Age: 69
End: 2024-12-05
Payer: COMMERCIAL

## 2024-12-09 ENCOUNTER — PATIENT OUTREACH (OUTPATIENT)
Dept: CARE COORDINATION | Facility: CLINIC | Age: 69
End: 2024-12-09
Payer: COMMERCIAL

## 2024-12-19 ENCOUNTER — PATIENT OUTREACH (OUTPATIENT)
Dept: CARE COORDINATION | Facility: CLINIC | Age: 69
End: 2024-12-19
Payer: COMMERCIAL

## 2024-12-19 NOTE — PROGRESS NOTES
Clinic Care Coordination Contact  [facility]/Voicemail    Clinical Data: Care Coordinator Outreach    Outreach Documentation Number of Outreach Attempt   11/14/2024   9:54 AM 1   11/27/2024   9:50 AM 1   12/19/2024  10:45 AM 2       Left message on patient's voicemail with call back information and requested return call.      Plan: Care Coordinator will try to reach patient again in 15 business weeks.    Rhonda Ramirez CHW, B.A. [facility] Care Coordination  [facility]:   [facility]  [phone]

## 2024-12-31 ENCOUNTER — THERAPY VISIT (OUTPATIENT)
Dept: PHYSICAL THERAPY | Facility: CLINIC | Age: 69
End: 2024-12-31
Attending: INTERNAL MEDICINE
Payer: COMMERCIAL

## 2024-12-31 DIAGNOSIS — H25.813 COMBINED FORMS OF AGE-RELATED CATARACT OF BOTH EYES: Primary | ICD-10-CM

## 2024-12-31 DIAGNOSIS — G89.29 CHRONIC PAIN OF RIGHT KNEE: Primary | ICD-10-CM

## 2024-12-31 DIAGNOSIS — M25.561 CHRONIC PAIN OF RIGHT KNEE: Primary | ICD-10-CM

## 2024-12-31 PROCEDURE — 97161 PT EVAL LOW COMPLEX 20 MIN: CPT | Mod: GP | Performed by: PHYSICAL THERAPIST

## 2024-12-31 PROCEDURE — 97110 THERAPEUTIC EXERCISES: CPT | Mod: GP | Performed by: PHYSICAL THERAPIST

## 2024-12-31 RX ORDER — MOXIFLOXACIN 5 MG/ML
1 SOLUTION/ DROPS OPHTHALMIC 4 TIMES DAILY
Qty: 3 ML | Refills: 0 | Status: SHIPPED | OUTPATIENT
Start: 2024-12-31 | End: 2025-01-07

## 2024-12-31 RX ORDER — PREDNISOLONE ACETATE 10 MG/ML
SUSPENSION/ DROPS OPHTHALMIC
Qty: 10 ML | Refills: 0 | Status: SHIPPED | OUTPATIENT
Start: 2024-12-31 | End: 2025-01-28

## 2024-12-31 RX ORDER — KETOROLAC TROMETHAMINE 5 MG/ML
SOLUTION OPHTHALMIC
Qty: 10 ML | Refills: 0 | Status: SHIPPED | OUTPATIENT
Start: 2024-12-31 | End: 2025-01-28

## 2024-12-31 NOTE — PROGRESS NOTES
PHYSICAL THERAPY EVALUATION  Type of Visit: Evaluation       Fall Risk Screen:  Fall screen completed by: PT  Have you fallen 2 or more times in the past year?: No  Have you fallen and had an injury in the past year?: No  Is patient a fall risk?: No    Subjective      Condition type:  Chronic (continuous duration <3 months)  Cause of current episode:  Unspecified     Nature of treatment:  Rehabilitative  Functional ability:  Moderate functional limitations  Documented POC (choose all that apply):  Measurable short and long term/discharge treatment goals related to physical and functional deficits.;Frequency of treatment visits and treatment activities to address deficit areas.;Patient agrees to program participation including home program  Briefly describe symptoms:  right medial knee pain that began for unknown reasons that causes her knee to buckle on her with inital walking and when lying in bed at night and straightening her leg.  How did the symptoms start:  unknown  Average pain/intensity last 24 hours:  7/10  Average pain/intensity past week:  7/10  Frequency of Symptoms:  Occasionally (26-50% of the time)  Symptom impact on ADLs:  Moderately  Condition change since eval:  N/A (first visit)  General health reported by patient:  Good      Presenting condition or subjective complaint: over the past 3 month started having severe knee pain, Usure why this started and unable to recall a reason for why this all started.  Date of onset: 10/01/24    Relevant medical history: Overweight; Osteoarthritis; High blood pressure   Dates & types of surgery: right rotator cuff, and hysterectomy    Prior diagnostic imaging/testing results: X-ray Normal joint spaces and alignment. No fracture or joint effusion. Chronic enthesitis of the upper pole of the patella. No new findings.   Prior therapy history for the same diagnosis, illness or injury: No      Prior Level of Function  Transfers:   Ambulation:   ADL:   IADL:     Living  Environment  Social support:     Type of home:     Stairs to enter the home:         Ramp:     Stairs inside the home:         Help at home:    Equipment owned:       Employment: Yes caregiver mainly sitting job  Hobbies/Interests: walk, ,    Patient goals for therapy: get up and walk without pain, and without her leg feeling like it is going to give out on her.    Pain assessment: Pain present     Objective   KNEE EVALUATION  PAIN: Pain Level at Rest: 0/10  Pain Level with Use: 7/10  Pain Location: anterior medial right knee and sometimes posterior knee  Pain Quality: Sharp  Pain Frequency: intermittent  Pain is Exacerbated By: get up to walk, straightening leg at night, getting up after prolonged sitting,   Pain is Relieved By: pain ointment   Pain Progression: Improved  INTEGUMENTARY (edema, incisions):   POSTURE:   GAIT:  Weightbearing Status:   Assistive Device(s):   Gait Deviations:   BALANCE/PROPRIOCEPTION:   WEIGHTBEARING ALIGNMENT:   NON-WEIGHTBEARING ALIGNMENT:   ROM:   (Degrees) Left AROM Left PROM  Right AROM Right PROM   Knee Flexion 118  115    Knee Extension 0 1 0    Pain:   End feel:     STRENGTH:   Pain: - none + mild ++ moderate +++ severe  Strength Scale: 0-5/5 Left Right   Knee Flexion 5 3+, +++ (severe)   Knee Extension 5 4, ++ (mod)   Quad Set 5 5     FLEXIBILITY:   SPECIAL TESTS:    Left Right   Apley's (Meniscus)     Adriane's (Meniscus)     Basil's (ITB/TFL)     Patellar Apprehension Test     Patella Tracking     Ligamentous Stability     Anterior Drawer (ACL)     Posterior Drawer (PCL)     Prone Dial Test at 30 Deg and 90 Deg (PCL/PLC)  Negative,     Valgus Stress Testing at 0 Deg and 30 Deg  Negative,     Varus Stress Testing at 0 Deg and 30 Deg   Negative,       FUNCTIONAL TESTS:   PALPATION:  significant pain with palpation over right medial knee / adductor tubercle region.   JOINT MOBILITY: WNL    Assessment & Plan   CLINICAL IMPRESSIONS  Medical Diagnosis: Chronic pain of right  knee  Patellar tendonitis of right knee    Treatment Diagnosis: chronic right knee pain   Impression/Assessment: Patient is a 69 year old female with chronic right knee pain  complaints.  The following significant findings have been identified: Pain, Decreased ROM/flexibility, Decreased strength, and Decreased activity tolerance. These impairments interfere with their ability to perform self care tasks, work tasks, and recreational activities as compared to previous level of function.     Clinical Decision Making (Complexity):  Clinical Presentation: Stable/Uncomplicated  Clinical Presentation Rationale: based on medical and personal factors listed in PT evaluation  Clinical Decision Making (Complexity): Low complexity    PLAN OF CARE  Treatment Interventions:  Modalities: Ultrasound  Interventions: Manual Therapy, Neuromuscular Re-education, Therapeutic Activity, Therapeutic Exercise, Self-Care/Home Management    Long Term Goals     PT Goal 1  Goal Identifier: strength  Goal Description: pt will improve knee flexion strength to 5/5 with 0/10 PL in order to demonstrate enough strength to walk without knee buckling.  Rationale: to maximize safety and independence with performance of ADLs and functional tasks  Goal Progress: knee flexion 3+/5 with 7/10 PL  Target Date: 02/25/25      Frequency of Treatment: 1 x/ week  Duration of Treatment: 8 weeks    Recommended Referrals to Other Professionals:   Education Assessment:   Learner/Method: Patient;Pictures/Video;No Barriers to Learning    Risks and benefits of evaluation/treatment have been explained.   Patient/Family/caregiver agrees with Plan of Care.     Evaluation Time:     PT Eval, Low Complexity Minutes (83708): 22       Signing Clinician: Miles Kuhn PT        [facility]                                                                                   OUTPATIENT PHYSICAL THERAPY      PLAN OF TREATMENT FOR OUTPATIENT  REHABILITATION   Patient's Last Name, First Name, Isabel Marte YOB: 1955   Provider's Name   [facility]   Medical Record No.  [id number]     Onset Date: 10/01/24  Start of Care Date: 12/31/24     Medical Diagnosis:  Chronic pain of right knee  Patellar tendonitis of right knee      PT Treatment Diagnosis:  chronic right knee pain Plan of Treatment  Frequency/Duration: 1 x/ week/ 8 weeks    Certification date from 12/31/24 to 02/25/25         See note for plan of treatment details and functional goals     Miles Kuhn, PT                         I CERTIFY THE NEED FOR THESE SERVICES FURNISHED UNDER        THIS PLAN OF TREATMENT AND WHILE UNDER MY CARE     (Physician attestation of this document indicates review and certification of the therapy plan).              Referring Provider:  Cj Georges    Initial Assessment  See Epic Evaluation- Start of Care Date: 12/31/24

## 2025-01-06 ENCOUNTER — TELEPHONE (OUTPATIENT)
Dept: OPHTHALMOLOGY | Facility: CLINIC | Age: 70
End: 2025-01-06
Payer: COMMERCIAL

## 2025-01-06 NOTE — TELEPHONE ENCOUNTER
[facility] Call Center    Phone Message    May a detailed message be left on voicemail: yes     Reason for Call: Patient said she received a Voice Message but wasn't Clear on what the message was about, please call patient back to discuss    Thank you,    Action Taken: Message routed to:  [facility]    Travel Screening: Not Applicable     Date of Service:

## 2025-01-07 NOTE — TELEPHONE ENCOUNTER
Unsure who reached out to patient. No call from clinic was placed, possibly call from the surgery center however could not find any recent call (last pre op call was 1/3/25).     Irena Damon, JUSTIN, 8:47 AM 01/07/2025

## 2025-01-08 ENCOUNTER — THERAPY VISIT (OUTPATIENT)
Dept: PHYSICAL THERAPY | Facility: CLINIC | Age: 70
End: 2025-01-08
Payer: COMMERCIAL

## 2025-01-08 ENCOUNTER — PATIENT OUTREACH (OUTPATIENT)
Dept: CARE COORDINATION | Facility: CLINIC | Age: 70
End: 2025-01-08

## 2025-01-08 DIAGNOSIS — G89.29 CHRONIC PAIN OF RIGHT KNEE: Primary | ICD-10-CM

## 2025-01-08 DIAGNOSIS — M25.561 CHRONIC PAIN OF RIGHT KNEE: Primary | ICD-10-CM

## 2025-01-08 PROCEDURE — 97110 THERAPEUTIC EXERCISES: CPT | Mod: GP | Performed by: PHYSICAL THERAPIST

## 2025-01-08 PROCEDURE — 97140 MANUAL THERAPY 1/> REGIONS: CPT | Mod: GP | Performed by: PHYSICAL THERAPIST

## 2025-01-08 NOTE — PROGRESS NOTES
Contact   Chart Review     Situation: Patient chart reviewed by .    Background: enrolled in care coordination.     Assessment: has been unreachable.     Plan/Recommendations: Closing to care coordination. Letter sent. PCP informed.     Call back from patient who left .  She wants to stay in the program.  Did not call her back today as she had PT.      Angela Rogel,   [facility]  [phone]

## 2025-01-08 NOTE — LETTER
[facility] CARE COORDINATION  [facility]    January 8, 2025    Isabel Biggs  [address]    [place] [address]      Dear Isabel,    I have been unsuccessful in reaching you since our last contact. At this time the Care Coordination team will make no further attempts to reach you, however this does not change your ability to continue receiving care from your providers at your primary care clinic. If you need additional support from a care coordinator in the future please contact Angela at [phone].    We are focused on providing you with the highest-quality healthcare experience possible.    Sincerely,    Angela Rogel,   [facility]  [phone]

## 2025-01-09 ENCOUNTER — VIRTUAL VISIT (OUTPATIENT)
Dept: SURGERY | Facility: CLINIC | Age: 70
End: 2025-01-09
Payer: COMMERCIAL

## 2025-01-09 ENCOUNTER — TELEPHONE (OUTPATIENT)
Dept: OPHTHALMOLOGY | Facility: CLINIC | Age: 70
End: 2025-01-09

## 2025-01-09 ENCOUNTER — PRE VISIT (OUTPATIENT)
Dept: SURGERY | Facility: CLINIC | Age: 70
End: 2025-01-09

## 2025-01-09 VITALS — BODY MASS INDEX: 39.75 KG/M2 | WEIGHT: 216 LBS | HEIGHT: 62 IN

## 2025-01-09 DIAGNOSIS — H25.813 COMBINED FORMS OF AGE-RELATED CATARACT OF BOTH EYES: ICD-10-CM

## 2025-01-09 DIAGNOSIS — Z01.818 PREOP EXAMINATION: Primary | ICD-10-CM

## 2025-01-09 ASSESSMENT — ENCOUNTER SYMPTOMS
SEIZURES: 0
DYSRHYTHMIAS: 0

## 2025-01-09 ASSESSMENT — LIFESTYLE VARIABLES: TOBACCO_USE: 0

## 2025-01-09 NOTE — TELEPHONE ENCOUNTER
FUTURE VISIT INFORMATION      SURGERY INFORMATION:  Date: 1/10/25 , 25  Location:  OR   Surgeon:  Kuldeep Vincent MD   Anesthesia Type:  MAC  Procedure: PHACOEMULSIFICATION, CATARACT, WITH STANDARD INTRAOCULAR LENS IMPLANT INSERTION (Right and Left)  Consult: 24    RECORDS REQUESTED FROM:       Primary Care Provider: Cj Georges MD - [facility]     Pertinent Medical History: DMT2, WILFRIDO w/ CPAP, Esstenial Hypertension, Beta Thalassemia     Most recent EKG+ Tracin21    Most recent ECHO:18    Most recent Cardiac Stress Test: 22    Most recent Sleep Study:  23

## 2025-01-09 NOTE — PROGRESS NOTES
Isabel is a 70 year old who is being evaluated via a billable video visit.      How would you like to obtain your AVS? Omarhart          Subjective   Isabel is a 70 year old, presenting for the following health issues:  Pre-Op Exam (/)         ИВАН Pardo LPN

## 2025-01-09 NOTE — TELEPHONE ENCOUNTER
Phone call to patient about pre op physical writer did not receive an answer but did leave a voicemail. Writer reached out to patients daughter who confirmed she will text her mom and inform about pre op appointment         Appointment scheduled for 1/9/25 at 6pm       Kalia Cunningham on 1/9/2025 at 2:04 PM

## 2025-01-09 NOTE — H&P
Pre-Operative H & P     CC:  Preoperative exam to assess for increased cardiopulmonary risk while undergoing surgery and anesthesia.    Date of Encounter: 1/9/2025  Primary Care Physician:  Cj Georges     Reason for visit:   Encounter Diagnoses   Name Primary?    Preop examination Yes    Combined forms of age-related cataract of both eyes        KALIN Biggs is a 70 year old female who presents for pre-operative H & P in preparation for  Procedure Information       Case: [id number] Date/Time: 01/10/25 1120    Procedure: PHACOEMULSIFICATION, CATARACT, WITH STANDARD INTRAOCULAR LENS IMPLANT INSERTION (Right: Eye)    Anesthesia type: MAC    Diagnosis: Combined forms of age-related cataract of both eyes [H25.813]    Pre-op diagnosis: Combined forms of age-related cataract of both eyes [H25.813]    Location:  OR [address] [place] [address] OR    Providers: Kuldeep Vincent MD          Above procedure will be followed by left eye cataract surgery on 1/17/25 at  OR    History is obtained from the patient and chart review    Patient who has been recently followed by Dr. Vincent with findings of bilateral age-related cataracts.  She was counseled for above procedures.    Patient's medical history is otherwise significant for hyperlipidemia, hypertension, WILFRIDO, diabetes 2, beta thalassemia, and depression      Hx of abnormal bleeding or anti-platelet use: No aspirin at this time    Menstrual history: Patient's last menstrual period was 06/08/1996 (exact date).     Past Medical History  Past Medical History:   Diagnosis Date    Anemia     Avitaminosis D     Bronchitis     Cataract     Depression     Diabetes (H)     Glaucoma (increased eye pressure)     Headache     Obesity     Peripheral neuropathy     Rotator cuff tendonitis     Sleep apnea        Past Surgical History  Past Surgical History:   Procedure Laterality Date    ARTHROSCOPY SHOULDER ROTATOR CUFF REPAIR Right     COLONOSCOPY W/ BIOPSIES N/A 6/28/2021     Procedure: COLONOSCOPY with polypectomy ;  Surgeon: Ned Zamudio MD;  Location: [facility] OR;  Service: Gastroenterology    HYSTERECTOMY  1996    with bso, for ovarian cyst    OOPHORECTOMY  1996    [place] TEMPORAL ARTERY LIGATN OR BX Bilateral 1/29/2020    Procedure: BIOPSY, ARTERY, TEMPORAL;  Surgeon: Eirc Alejandro MD;  Location: [facility] OR;  Service: General       Prior to Admission Medications  Current Outpatient Medications   Medication Sig Dispense Refill    albuterol (PROAIR HFA/PROVENTIL HFA/VENTOLIN HFA) 108 (90 Base) MCG/ACT inhaler USE 2 INHALATIONS BY MOUTH EVERY 6 HOURS AS NEEDED FOR SHORTNESS  OF BREATH WHEEZING OR COUGH 34 g 2    amitriptyline (ELAVIL) 25 MG tablet Take 1 tablet (25 mg) by mouth at bedtime. 90 tablet 3    amLODIPine (NORVASC) 5 MG tablet Take 1 tablet (5 mg) by mouth daily. 90 tablet 2    aspirin 81 MG EC tablet Take 1 tablet (81 mg) by mouth daily. 90 tablet 3    atorvastatin (LIPITOR) 40 MG tablet Take 1 tablet (40 mg) by mouth daily 90 tablet 4    celecoxib (CELEBREX) 100 MG capsule Take 1 capsule (100 mg) by mouth 2 times daily. 60 capsule 1    Cholecalciferol (D 1000) 25 MCG (1000 UT) CAPS Take 1 capsule by mouth daily. 90 capsule 4    DULoxetine (CYMBALTA) 30 MG capsule Take 1 capsule (30 mg) by mouth daily. 90 capsule 4    empagliflozin (JARDIANCE) 25 MG TABS tablet Take 1 tablet (25 mg) by mouth daily. 90 tablet 4    losartan (COZAAR) 100 MG tablet Take 1 tablet (100 mg) by mouth daily 90 tablet 4    meclizine (ANTIVERT) 25 MG tablet Take 1 tablet (25 mg) by mouth 3 times daily. 90 tablet 4    omeprazole (PRILOSEC) 20 MG DR capsule Take 1 capsule (20 mg) by mouth daily. 90 capsule 1    prednisoLONE acetate (PRED FORTE) 1 % ophthalmic suspension Place 1 drop into the right eye 4 times daily for 7 days, THEN 1 drop 3 times daily for 7 days, THEN 1 drop 2 times daily for 7 days, THEN 1 drop daily for 7 days. Start after surgery (1/10/25). 10 mL 0    traZODone  (DESYREL) 50 MG tablet Take half (25 mg) tablet to one tablet (50 mg) at bedtime 90 tablet 3    triamcinolone (KENALOG) 0.1 % external cream Apply topically 2 times daily. 45 g 0    erythromycin (ROMYCIN) 5 MG/GM ophthalmic ointment Place 0.5 inches Into the left eye 2 times daily 1 g 1    ketorolac (ACULAR) 0.5 % ophthalmic solution Place 1 drop into the right eye 4 times daily for 7 days, THEN 1 drop 3 times daily for 7 days, THEN 1 drop 2 times daily for 7 days, THEN 1 drop daily for 7 days. Start 2 days prior to surgery (1/8/25). 10 mL 0       Allergies  Allergies   Allergen Reactions    Chloroquine Other (See Comments), Itching and Visual Disturbance     Blurry vision, itchy skin  Blurry vision, itchy skin      Chlorquinaldol Dermatitis and Other (See Comments)     also blurry vision       Social History  Social History     Socioeconomic History    Marital status:      Spouse name: Not on file    Number of children: Not on file    Years of education: Not on file    Highest education level: Not on file   Occupational History    Not on file   Tobacco Use    Smoking status: Never     Passive exposure: Never    Smokeless tobacco: Never   Vaping Use    Vaping status: Never Used   Substance and Sexual Activity    Alcohol use: Never    Drug use: Never    Sexual activity: Not Currently     Partners: Male     Birth control/protection: Abstinence   Other Topics Concern    Not on file   Social History Narrative    She is originally from [place]. 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  She is  and has 7 adult children and many grandchildren.     Social Drivers of Health     Financial Resource Strain: Low Risk  (9/24/2024)    Financial Resource Strain     Within the past 12 months, have you or your family members you  live with been unable to get utilities (heat, electricity) when it was really needed?: No   Food Insecurity: Low Risk  (9/24/2024)    Food Insecurity     Within the past 12 months, did you worry that your food would run out before you got money to buy more?: No     Within the past 12 months, did the food you bought just not last and you didn t have money to get more?: No   Transportation Needs: Low Risk  (9/24/2024)    Transportation Needs     Within the past 12 months, has lack of transportation kept you from medical appointments, getting your medicines, non-medical meetings or appointments, work, or from getting things that you need?: No   Physical Activity: Insufficiently Active (9/24/2024)    Exercise Vital Sign     Days of Exercise per Week: 7 days     Minutes of Exercise per Session: 10 min   Stress: No Stress Concern Present (9/24/2024)    [employer] - Occupational Stress Questionnaire     Feeling of Stress : Not at all   Social Connections: Unknown (9/24/2024)    Social Connection and Isolation Panel [NHANES]     Frequency of Communication with Friends and Family: Not on file     Frequency of Social Gatherings with Friends and Family: More than three times a week     Attends Sikh Services: Not on file     Active Member of Clubs or Organizations: Not on file     Attends Club or Organization Meetings: Not on file     Marital Status: Not on file   Interpersonal Safety: Low Risk  (12/4/2024)    Interpersonal Safety     Do you feel physically and emotionally safe where you currently live?: Yes     Within the past 12 months, have you been hit, slapped, kicked or otherwise physically hurt by someone?: No     Within the past 12 months, have you been humiliated or emotionally abused in other ways by your partner or ex-partner?: No   Housing Stability: Low Risk  (9/24/2024)    Housing Stability     Do you have housing? : Yes     Are you worried about losing your housing?: No        Family History  Family History   Problem Relation Age of Onset    Hypertension Mother     Diabetes Mother     No Known Problems Father         killed in war    No Known Problems Sister         one  sudden death (37), others living    No Known Problems Brother         9 living, 1  diabetes, others  in war    No Known Problems Daughter     No Known Problems Son     Breast Cancer Maternal Aunt 70    Cancer Other     Hereditary Breast and Ovarian Cancer Syndrome No family hx of     Cancer No family hx of     Colon Cancer No family hx of     Endometrial Cancer No family hx of     Ovarian Cancer No family hx of     Anesthesia Reaction No family hx of        Review of Systems  The complete review of systems is negative other than noted in the HPI or here.   Anesthesia Evaluation   Pt has had prior anesthetic. Type: General.    History of anesthetic complications  - .  WILFRIDO.    ROS/MED HX  ENT/Pulmonary:     (+) sleep apnea, doesn't use CPAP,                                   (-) tobacco use and recent URI   Neurologic:    (-) no seizures and no CVA   Cardiovascular:     (+) Dyslipidemia hypertension- -   -  - -                                 Previous cardiac testing   Echo: Date: Results:    Stress Test:  Date:  Results:    ECG Reviewed:  Date:  Results:    Cath:  Date: Results:   (-) taking anticoagulants/antiplatelets, SQUIRES and arrhythmias   METS/Exercise Tolerance: 3 - Able to walk 1-2 blocks without stopping    Hematologic: Comments: Beta thalessemia   (-) history of blood clots   Musculoskeletal:  - neg musculoskeletal ROS     GI/Hepatic:     (+) GERD, Asymptomatic on medication,                  Renal/Genitourinary:  - neg Renal ROS     Endo:     (+) type I DM, type II DM, Last HgA1c: 5.7, date: 24, Not using insulin, - not using insulin pump.         Obesity,       Psychiatric/Substance Use:     (+) psychiatric history depression       Infectious Disease:  - neg infectious disease ROS      Malignancy:  - neg malignancy ROS     Other:  - neg other ROS          Virtual visit -  No vitals were obtained    Physical Exam  Constitutional: Awake, alert, no apparent distress, and appears stated age.  HENT: Normocephalic  Respiratory: non labored breathing: no cough   Neurologic: Oriented to name, place and time.   Neuropsychiatric: Calm, cooperative. Normal affect.      Prior Labs/Diagnostic Studies   All labs and imaging personally reviewed   Lab Results   Component Value Date    WBC 4.2 09/24/2024     Lab Results   Component Value Date    RBC 4.21 09/24/2024     Lab Results   Component Value Date    HGB 10.4 09/24/2024     Lab Results   Component Value Date    HCT 33.6 09/24/2024     Lab Results   Component Value Date    MCV 80 09/24/2024     Lab Results   Component Value Date    MCH 24.7 09/24/2024     Lab Results   Component Value Date    MCHC 31.0 09/24/2024     Lab Results   Component Value Date    RDW 14.4 09/24/2024     Lab Results   Component Value Date     09/24/2024     Last Comprehensive Metabolic Panel:  Sodium   Date Value Ref Range Status   09/24/2024 139 135 - 145 mmol/L Final     Potassium   Date Value Ref Range Status   09/24/2024 4.2 3.4 - 5.3 mmol/L Final   05/23/2022 4.0 3.5 - 5.0 mmol/L Final     Chloride   Date Value Ref Range Status   09/24/2024 103 98 - 107 mmol/L Final   05/23/2022 104 98 - 107 mmol/L Final     Carbon Dioxide (CO2)   Date Value Ref Range Status   09/24/2024 27 22 - 29 mmol/L Final   05/23/2022 29 22 - 31 mmol/L Final     Anion Gap   Date Value Ref Range Status   09/24/2024 9 7 - 15 mmol/L Final   05/23/2022 8 5 - 18 mmol/L Final     Glucose   Date Value Ref Range Status   09/24/2024 98 70 - 99 mg/dL Final   05/23/2022 96 70 - 125 mg/dL Final     Urea Nitrogen   Date Value Ref Range Status   09/24/2024 14.1 8.0 - 23.0 mg/dL Final   05/23/2022 17 8 - 22 mg/dL Final     Creatinine   Date Value Ref Range Status   09/24/2024 0.76 0.51 - 0.95 mg/dL Final     GFR  Estimate   Date Value Ref Range Status   2024 84 >60 mL/min/1.73m2 Final     Comment:     eGFR calculated using  CKD-EPI equation.   2021 >60 >60 mL/min/1.73m2 Final     Calcium   Date Value Ref Range Status   2024 9.6 8.8 - 10.4 mg/dL Final     Comment:     Reference intervals for this test were updated on 2024 to reflect our healthy population more accurately. There may be differences in the flagging of prior results with similar values performed with this method. Those prior results can be interpreted in the context of the updated reference intervals.     Bilirubin Total   Date Value Ref Range Status   2024 0.7 <=1.2 mg/dL Final     Alkaline Phosphatase   Date Value Ref Range Status   2024 92 40 - 150 U/L Final     ALT   Date Value Ref Range Status   2024 8 0 - 50 U/L Final     AST   Date Value Ref Range Status   2024 22 0 - 45 U/L Final       EK Sinus rhythm    Lexiscan stress test     Lexiscan stress ECG negative for ischemia.    The nuclear stress test is negative for inducible myocardial ischemia or infarction.    The left ventricular ejection fraction at stress is greater than 70%.    There is no prior study for comparison.    The patient's records and results personally reviewed by this provider.     Outside records reviewed from: Care Everywhere      Assessment    Isabel Biggs is a 70 year old female seen as a PAC referral for risk assessment and optimization for anesthesia.    Plan/Recommendations  Pt will be optimized for the proposed procedure.  See below for details on the assessment, risk, and preoperative recommendations    NEUROLOGY  - No history of TIA, CVA or seizure    -Post Op delirium risk factors:  No risk identified    ENT  - No current airway concerns.  Will need to be reassessed day of surgery.  Mallampati: Unable to assess  TM: Unable to assess    CARDIAC  HLD. Atorvastatin. HYPERTENSION, on amlodipine, and losartan. Patient  "denies chest pain, irregular heart rate, or dyspnea on exertion.  She is able to walk some distance and run errands without exertional symptoms.  Negative stress test in 2022.  - METS (Metabolic Equivalents)>4    RCRI: 0.4% risk of serious cardiac events    PULMONARY  Denies asthma or cough. Inhaler use as needed  WILFRIDO but is not currently using CPAP  Able to lie flat without difficulty  - Tobacco History    History   Smoking Status    Never   Smokeless Tobacco    Never       GI: GERD controlled with omeprazole  PONV Medium Risk  Total Score: 2           1 AN PONV: Pt is Female    1 AN PONV: Patient is not a current smoker        /RENAL  - Baseline Creatinine  0.76    ENDOCRINE    - BMI: Estimated body mass index is 39.51 kg/m  as calculated from the following:    Height as of this encounter: 1.575 m (5' 2\").    Weight as of this encounter: 98 kg (216 lb).  Obesity (BMI >30)  DIABETES MELLITUS II. Last A1C 5.7. Jardiance has been held for 3 days    HEME  VTE Low Risk 0.26%             Total Score: 0      Denies personal history of blood clots  Denies history of blood transfusion     MSK: Joint pain     PSYCH  - Depression.  Elavil at bedtime    Different anesthesia methods/types have been discussed with the patient, but they are aware that the final plan will be decided by the assigned anesthesia provider on the date of service.    The patient is optimized for their procedure. No further diagnostic testing indicated.    AVS with information on meds given by nursing staff today via [employer] as patient could not be reached for instruction. Additional instructions for arrival tomorrow provided by the [facility] preadmissions team.  Documentation in My Chart      Please refer to the physical examination documented by the anesthesiologist in the anesthesia record on the day of surgery.    Video-Visit Details    Type of service:  Video Visit    Provider received verbal consent for a Video Visit from the patient? Yes "   Video Start Time:  6:01pm  Video End Time: 6:11pm    Originating Location (pt. Location): Home    Distant Location (provider location):  Off-site  Mode of Communication:  Video Conference via [employer]  On the day of service:     Prep time: 13 minutes  Visit time: 10 minutes  Documentation time: 13 minutes  ------------------------------------------  Total time: 36 minutes      DAYSI Woods CNS  Preoperative Assessment Center  [facility]  Clinic and Surgery Center  Phone: [phone]  Fax: [phone]

## 2025-01-10 NOTE — PATIENT INSTRUCTIONS
Name:  Isabel Biggs   MRN:  [id number]   :  1955   Today's Date:  2025         You were seen today for a pre-operative assessment in the:    Pre-operative Anesthesia Assessment Center(PAC)  [facility]  [address] [place] [id number]  phone [phone]      You will be receiving a call with location, date, arrival time and diet instructions from Preadmission Nursing at your surgical site:    -[facility]: [phone]   -[facility]: [phone]  -[facility]: [phone]  -[facility]: [phone]  -[facility]: [phone]  -[facility]: [phone]  -[facility]: [phone]  -[facility]: [phone]        Anesthesia recommendations for medications:    Hold Multivitamins for 7 days before procedure.   Hold Herbal medications and Supplements for 7 days before procedure.  Hold Ibuprofen for 1 day before procedure.   Hold Naproxen for 4 days before procedure.   Hold Aspirin for 7 days before procedure.  Hold Celecoxib (Celebrex) for 3 days before surgery.  Hold Empagliflozin (Jardiance) for 3 days before surgery.      No alcohol or cannabis products for 24 hours before your procedure       Please DO NOT take the following medications the day of procedure:    Vitamin D  Meclizine (Antivert)  Topical medications      Please take these medications the day of procedure:    Albuterol inhaler if needed and bring this to the hospital  Amlodipine (Norvasc)  Duloxetine (Cymbalta)  Eye drops as scheduled  Losartan (Cozaar)  Omeprazole (Prilosec)        How do I prepare myself?  - Please take 2 showers (one the night prior to surgery and one the morning of surgery) using Scrubcare or Hibiclens soap.    Use this soap only from the neck to your toes.     Leave the soap on your skin for one minute--then rinse thoroughly.      You may use your own shampoo and conditioner. No other hair products.   - Please  remove all jewelry and body piercings.  - No lotions, deodorants or fragrance.  - No makeup or fingernail polish.   - Bring your ID and insurance card.    -If you have a Deep Brain Stimulator, a Spinal Cord Stimulator, or any implanted Neuro Device, you must bring the remote to your appointment           For further questions regarding your surgery please call your surgeon's office.

## 2025-01-13 ENCOUNTER — PATIENT OUTREACH (OUTPATIENT)
Dept: CARE COORDINATION | Facility: CLINIC | Age: 70
End: 2025-01-13
Payer: COMMERCIAL

## 2025-01-13 NOTE — PROGRESS NOTES
Clinic Care Coordination Contact  Follow Up Progress Note      Assessment: call from patient who was at work, so didn't have time to address goals. She needs to reschedule her cataract surgery as she did not have her physical done and didn't have a ride.  She has physical set up for 2-4 and would like to have help setting up the surgery on either a Tuesday or Wednesday when her  would be available to drive her and be with her.  Left message on the surgery scheduling VM asking them to call her back.     Care Gaps:    Health Maintenance Due   Topic Date Due    SPIROMETRY  Never done    ZOSTER IMMUNIZATION (1 of 2) Never done    RSV VACCINE (1 - Risk 60-74 years 1-dose series) Never done    DTAP/TDAP/TD IMMUNIZATION (3 - Td or Tdap) 10/23/2023       Scheduled 2-4-2025      Care Plans  Care Plan: Financial Wellbeing       Problem: Patient expresses financial resource strain       Long-Range Goal: I will apply for [place] after I get my citizenship, and apply for [facility].       Start Date: 11/7/2023 Expected End Date: 10/22/2025    This Visit's Progress: 60% Recent Progress: 60%    Priority: High    Note:     Barriers: none identified.  Strengths: motivated.  Patient expressed understanding of goal: yes  Action steps to achieve this goal:  1. I will apply for programs if eligible.   2. I will complete any paper work.  3. I will report progress towards this goal at scheduled outreach telephone calls from the CCC team.    Discussed 10-  Has to add up her income to see if she is eligible for MA                            Care Plan: Mental Health       Problem: Mood/Psychosocial Concerns       Long-Range Goal: Establish Mental Health Support and reduce depression symptoms.       Start Date: 11/7/2023 Expected End Date: 10/22/2025    This Visit's Progress: 20% Recent Progress: 10%    Priority: High    Note:     Barriers: none noted.   Strengths: motivated to feel better.  Patient expressed understanding of goal:  yes  Action steps to achieve this goal:  1. I will schedule and attend appointments with therapist.    2. I will meet with counselor virtually.  3. I will report progress towards this goal at scheduled outreach telephone calls from the CCC team.    Discussed 10/23/24 had neuro psych testing done on 10-22. Wants to see results before setting up anything else.                             Care Plan: Housing Instability       Problem: SDOH LACK OF STABLE HOUSING       Long-Range Goal: Establish Stable Housing       Start Date: 10/23/2024 Expected End Date: 10/22/2025    Priority: High    Note:     Barriers: living with daughter and her family, crowded. Needs subsidized apartment.   Strengths: motivated.  Patient expressed understanding of goal: yes  Action steps to achieve this goal:  1. I will review options that [facility] sends to me.   2. I will call those apartment in [facility] to be near my family.   3. I get on waiting lists.  4. I will report progress towards this goal at scheduled outreach telephone calls from the CCC team.                                  Intervention/Education provided during outreach: support     Outreach Frequency: monthly, more frequently as needed      Plan:   [facility] SW will continue to monitor, support patient with current goals and will be available to assist as needs arise. [facility] CHW will reach out to patient on a monthly basis to discuss progression of goals.      [facility] SW will perform Chart Review in 45 days.     Angela Rogel,   [facility]  [phone]

## 2025-02-04 ENCOUNTER — OFFICE VISIT (OUTPATIENT)
Dept: INTERNAL MEDICINE | Facility: CLINIC | Age: 70
End: 2025-02-04
Payer: COMMERCIAL

## 2025-02-04 VITALS
TEMPERATURE: 98.8 F | RESPIRATION RATE: 18 BRPM | DIASTOLIC BLOOD PRESSURE: 88 MMHG | WEIGHT: 222.1 LBS | HEART RATE: 85 BPM | HEIGHT: 59 IN | OXYGEN SATURATION: 99 % | BODY MASS INDEX: 44.77 KG/M2 | SYSTOLIC BLOOD PRESSURE: 136 MMHG

## 2025-02-04 DIAGNOSIS — M25.561 CHRONIC PAIN OF RIGHT KNEE: ICD-10-CM

## 2025-02-04 DIAGNOSIS — Z00.00 ANNUAL PHYSICAL EXAM: Primary | ICD-10-CM

## 2025-02-04 DIAGNOSIS — E11.42 DIABETIC POLYNEUROPATHY ASSOCIATED WITH TYPE 2 DIABETES MELLITUS (H): ICD-10-CM

## 2025-02-04 DIAGNOSIS — J41.0 SIMPLE CHRONIC BRONCHITIS (H): ICD-10-CM

## 2025-02-04 DIAGNOSIS — I10 ESSENTIAL HYPERTENSION: ICD-10-CM

## 2025-02-04 DIAGNOSIS — E66.01 MORBID OBESITY (H): ICD-10-CM

## 2025-02-04 DIAGNOSIS — E11.29 TYPE 2 DIABETES MELLITUS WITH DIABETIC MICROALBUMINURIA, WITHOUT LONG-TERM CURRENT USE OF INSULIN (H): ICD-10-CM

## 2025-02-04 DIAGNOSIS — R30.0 DYSURIA: ICD-10-CM

## 2025-02-04 DIAGNOSIS — H25.813 COMBINED FORMS OF AGE-RELATED CATARACT OF BOTH EYES: ICD-10-CM

## 2025-02-04 DIAGNOSIS — H81.393 PERIPHERAL VERTIGO OF BOTH EARS: ICD-10-CM

## 2025-02-04 DIAGNOSIS — D56.1 BETA THALASSEMIA (H): ICD-10-CM

## 2025-02-04 DIAGNOSIS — M54.50 CHRONIC BILATERAL LOW BACK PAIN WITHOUT SCIATICA: ICD-10-CM

## 2025-02-04 DIAGNOSIS — F33.1 MODERATE EPISODE OF RECURRENT MAJOR DEPRESSIVE DISORDER (H): ICD-10-CM

## 2025-02-04 DIAGNOSIS — G89.29 CHRONIC PAIN OF RIGHT KNEE: ICD-10-CM

## 2025-02-04 DIAGNOSIS — G89.29 CHRONIC BILATERAL LOW BACK PAIN WITHOUT SCIATICA: ICD-10-CM

## 2025-02-04 DIAGNOSIS — G47.33 OSA (OBSTRUCTIVE SLEEP APNEA): ICD-10-CM

## 2025-02-04 DIAGNOSIS — R80.9 TYPE 2 DIABETES MELLITUS WITH DIABETIC MICROALBUMINURIA, WITHOUT LONG-TERM CURRENT USE OF INSULIN (H): ICD-10-CM

## 2025-02-04 DIAGNOSIS — E78.2 MIXED HYPERLIPIDEMIA: ICD-10-CM

## 2025-02-04 LAB
ALBUMIN UR-MCNC: NEGATIVE MG/DL
ANION GAP SERPL CALCULATED.3IONS-SCNC: 13 MMOL/L (ref 7–15)
APPEARANCE UR: CLEAR
BACTERIA #/AREA URNS HPF: ABNORMAL /HPF
BILIRUB UR QL STRIP: NEGATIVE
BUN SERPL-MCNC: 12 MG/DL (ref 8–23)
CALCIUM SERPL-MCNC: 9.1 MG/DL (ref 8.8–10.4)
CHLORIDE SERPL-SCNC: 104 MMOL/L (ref 98–107)
CHOLEST SERPL-MCNC: 158 MG/DL
COLOR UR AUTO: YELLOW
CREAT SERPL-MCNC: 0.65 MG/DL (ref 0.51–0.95)
CREAT UR-MCNC: 74.2 MG/DL
EGFRCR SERPLBLD CKD-EPI 2021: >90 ML/MIN/1.73M2
ERYTHROCYTE [DISTWIDTH] IN BLOOD BY AUTOMATED COUNT: 13.9 % (ref 10–15)
EST. AVERAGE GLUCOSE BLD GHB EST-MCNC: 126 MG/DL
FASTING STATUS PATIENT QL REPORTED: YES
FASTING STATUS PATIENT QL REPORTED: YES
GLUCOSE SERPL-MCNC: 92 MG/DL (ref 70–99)
GLUCOSE UR STRIP-MCNC: NEGATIVE MG/DL
HBA1C MFR BLD: 6 % (ref 0–5.6)
HCO3 SERPL-SCNC: 25 MMOL/L (ref 22–29)
HCT VFR BLD AUTO: 33.1 % (ref 35–47)
HDLC SERPL-MCNC: 60 MG/DL
HGB BLD-MCNC: 10.6 G/DL (ref 11.7–15.7)
HGB UR QL STRIP: NEGATIVE
KETONES UR STRIP-MCNC: NEGATIVE MG/DL
LDLC SERPL CALC-MCNC: 81 MG/DL
LEUKOCYTE ESTERASE UR QL STRIP: ABNORMAL
MCH RBC QN AUTO: 24.8 PG (ref 26.5–33)
MCHC RBC AUTO-ENTMCNC: 32 G/DL (ref 31.5–36.5)
MCV RBC AUTO: 77 FL (ref 78–100)
MICROALBUMIN UR-MCNC: <12 MG/L
MICROALBUMIN/CREAT UR: NORMAL MG/G{CREAT}
NITRATE UR QL: NEGATIVE
NONHDLC SERPL-MCNC: 98 MG/DL
PH UR STRIP: 7 [PH] (ref 5–8)
PLATELET # BLD AUTO: 291 10E3/UL (ref 150–450)
POTASSIUM SERPL-SCNC: 3.7 MMOL/L (ref 3.4–5.3)
RBC # BLD AUTO: 4.28 10E6/UL (ref 3.8–5.2)
RBC #/AREA URNS AUTO: ABNORMAL /HPF
SODIUM SERPL-SCNC: 142 MMOL/L (ref 135–145)
SP GR UR STRIP: 1.02 (ref 1–1.03)
SQUAMOUS #/AREA URNS AUTO: ABNORMAL /LPF
TRIGL SERPL-MCNC: 85 MG/DL
UROBILINOGEN UR STRIP-ACNC: 0.2 E.U./DL
WBC # BLD AUTO: 4.2 10E3/UL (ref 4–11)
WBC #/AREA URNS AUTO: ABNORMAL /HPF

## 2025-02-04 PROCEDURE — G2211 COMPLEX E/M VISIT ADD ON: HCPCS | Performed by: INTERNAL MEDICINE

## 2025-02-04 PROCEDURE — 80048 BASIC METABOLIC PNL TOTAL CA: CPT | Performed by: INTERNAL MEDICINE

## 2025-02-04 PROCEDURE — 99214 OFFICE O/P EST MOD 30 MIN: CPT | Mod: 25 | Performed by: INTERNAL MEDICINE

## 2025-02-04 PROCEDURE — 82570 ASSAY OF URINE CREATININE: CPT | Performed by: INTERNAL MEDICINE

## 2025-02-04 PROCEDURE — 80061 LIPID PANEL: CPT | Performed by: INTERNAL MEDICINE

## 2025-02-04 PROCEDURE — 82043 UR ALBUMIN QUANTITATIVE: CPT | Performed by: INTERNAL MEDICINE

## 2025-02-04 PROCEDURE — 36415 COLL VENOUS BLD VENIPUNCTURE: CPT | Performed by: INTERNAL MEDICINE

## 2025-02-04 PROCEDURE — 83036 HEMOGLOBIN GLYCOSYLATED A1C: CPT | Performed by: INTERNAL MEDICINE

## 2025-02-04 PROCEDURE — 87086 URINE CULTURE/COLONY COUNT: CPT | Performed by: INTERNAL MEDICINE

## 2025-02-04 PROCEDURE — 85027 COMPLETE CBC AUTOMATED: CPT | Performed by: INTERNAL MEDICINE

## 2025-02-04 PROCEDURE — 99397 PER PM REEVAL EST PAT 65+ YR: CPT | Performed by: INTERNAL MEDICINE

## 2025-02-04 PROCEDURE — 81001 URINALYSIS AUTO W/SCOPE: CPT | Performed by: INTERNAL MEDICINE

## 2025-02-04 RX ORDER — NITROFURANTOIN 25; 75 MG/1; MG/1
100 CAPSULE ORAL 2 TIMES DAILY
Qty: 10 CAPSULE | Refills: 0 | Status: SHIPPED | OUTPATIENT
Start: 2025-02-04 | End: 2025-02-09

## 2025-02-04 SDOH — HEALTH STABILITY: PHYSICAL HEALTH: ON AVERAGE, HOW MANY DAYS PER WEEK DO YOU ENGAGE IN MODERATE TO STRENUOUS EXERCISE (LIKE A BRISK WALK)?: 2 DAYS

## 2025-02-04 SDOH — HEALTH STABILITY: PHYSICAL HEALTH: ON AVERAGE, HOW MANY MINUTES DO YOU ENGAGE IN EXERCISE AT THIS LEVEL?: 20 MIN

## 2025-02-04 ASSESSMENT — SOCIAL DETERMINANTS OF HEALTH (SDOH): HOW OFTEN DO YOU GET TOGETHER WITH FRIENDS OR RELATIVES?: MORE THAN THREE TIMES A WEEK

## 2025-02-04 ASSESSMENT — PATIENT HEALTH QUESTIONNAIRE - PHQ9
SUM OF ALL RESPONSES TO PHQ QUESTIONS 1-9: 6
10. IF YOU CHECKED OFF ANY PROBLEMS, HOW DIFFICULT HAVE THESE PROBLEMS MADE IT FOR YOU TO DO YOUR WORK, TAKE CARE OF THINGS AT HOME, OR GET ALONG WITH OTHER PEOPLE: NOT DIFFICULT AT ALL
SUM OF ALL RESPONSES TO PHQ QUESTIONS 1-9: 6

## 2025-02-04 NOTE — PATIENT INSTRUCTIONS
Patient Education   Preventive Care Advice   This is general advice given by our system to help you stay healthy. However, your care team may have specific advice just for you. Please talk to your care team about your preventive care needs.  Nutrition  Eat 5 or more servings of fruits and vegetables each day.  Try wheat bread, brown rice and whole grain pasta (instead of white bread, rice, and pasta).  Get enough calcium and vitamin D. Check the label on foods and aim for 100% of the RDA (recommended daily allowance).  Lifestyle  Exercise at least 150 minutes each week  (30 minutes a day, 5 days a week).  Do muscle strengthening activities 2 days a week. These help control your weight and prevent disease.  No smoking.  Wear sunscreen to prevent skin cancer.  Have a dental exam and cleaning every 6 months.  Yearly exams  See your health care team every year to talk about:  Any changes in your health.  Any medicines your care team has prescribed.  Preventive care, family planning, and ways to prevent chronic diseases.  Shots (vaccines)   HPV shots (up to age 26), if you've never had them before.  Hepatitis B shots (up to age 59), if you've never had them before.  COVID-19 shot: Get this shot when it's due.  Flu shot: Get a flu shot every year.  Tetanus shot: Get a tetanus shot every 10 years.  Pneumococcal, hepatitis A, and RSV shots: Ask your care team if you need these based on your risk.  Shingles shot (for age 50 and up)  General health tests  Diabetes screening:  Starting at age 35, Get screened for diabetes at least every 3 years.  If you are younger than age 35, ask your care team if you should be screened for diabetes.  Cholesterol test: At age 39, start having a cholesterol test every 5 years, or more often if advised.  Bone density scan (DEXA): At age 50, ask your care team if you should have this scan for osteoporosis (brittle bones).  Hepatitis C: Get tested at least once in your life.  STIs (sexually  transmitted infections)  Before age 24: Ask your care team if you should be screened for STIs.  After age 24: Get screened for STIs if you're at risk. You are at risk for STIs (including HIV) if:  You are sexually active with more than one person.  You don't use condoms every time.  You or a partner was diagnosed with a sexually transmitted infection.  If you are at risk for HIV, ask about PrEP medicine to prevent HIV.  Get tested for HIV at least once in your life, whether you are at risk for HIV or not.  Cancer screening tests  Cervical cancer screening: If you have a cervix, begin getting regular cervical cancer screening tests starting at age 21.  Breast cancer scan (mammogram): If you've ever had breasts, begin having regular mammograms starting at age 40. This is a scan to check for breast cancer.  Colon cancer screening: It is important to start screening for colon cancer at age 45.  Have a colonoscopy test every 10 years (or more often if you're at risk) Or, ask your provider about stool tests like a FIT test every year or Cologuard test every 3 years.  To learn more about your testing options, visit:   .  For help making a decision, visit:   https://bit.ly/qd53919.  Prostate cancer screening test: If you have a prostate, ask your care team if a prostate cancer screening test (PSA) at age 55 is right for you.  Lung cancer screening: If you are a current or former smoker ages 50 to 80, ask your care team if ongoing lung cancer screenings are right for you.  For informational purposes only. Not to replace the advice of your health care provider. Copyright   2023 [facility]. All rights reserved. Clinically reviewed by the [facility]. [employer] [id number] - REV 01/24.  Learning About Activities of Daily Living  What are activities of daily living?     Activities of daily living (ADLs) are the basic self-care tasks you do every day. These include eating, bathing, dressing,  and moving around.  As you age, and if you have health problems, you may find that it's harder to do some of these tasks. If so, your doctor can suggest ideas that may help.  To measure what kind of help you may need, your doctor will ask how well you are able to do ADLs. Let your doctor know if there are any tasks that you are having trouble doing. This is an important first step to getting help. And when you have the help you need, you can stay as independent as possible.  How will a doctor assess your ADLs?  Asking about ADLs is part of a routine health checkup your doctor will likely do as you age. Your health check might be done in a doctor's office, in your home, or at a hospital. The goal is to find out if you are having any problems that could make it hard to care for yourself or that make it unsafe for you to be on your own.  To measure your ADLs, your doctor will ask how hard it is for you to do routine tasks. Your doctor may also want to know if you have changed the way you do a task because of a health problem. Your doctor may watch how you:  Walk back and forth.  Keep your balance while you stand or walk.  Move from sitting to standing or from a bed to a chair.  Button or unbutton a shirt or sweater.  Remove and put on your shoes.  It's common to feel a little worried or anxious if you find you can't do all the things you used to be able to do. Talking with your doctor about ADLs is a way to make sure you're as safe as possible and able to care for yourself as well as you can. You may want to bring a caregiver, friend, or family member to your checkup. They can help you talk to your doctor.  Follow-up care is a key part of your treatment and safety. Be sure to make and go to all appointments, and call your doctor if you are having problems. It's also a good idea to know your test results and keep a list of the medicines you take.  Current as of: October 24, 2023  Content Version: 14.3    2024 [facility]  [employer].   Care instructions adapted under license by your healthcare professional. If you have questions about a medical condition or this instruction, always ask your healthcare professional. [facility] [employer] disclaims any warranty or liability for your use of this information.    Preventing Falls: Care Instructions  Injuries and health problems such as trouble walking or poor eyesight can increase your risk of falling. So can some medicines. But there are things you can do to help prevent falls. You can exercise to get stronger. You can also arrange your home to make it safer.    Talk to your doctor about the medicines you take. Ask if any of them increase the risk of falls and whether they can be changed or stopped.   Try to exercise regularly. It can help improve your strength and balance. This can help lower your risk of falling.         Practice fall safety and prevention.   Wear low-heeled shoes that fit well and give your feet good support. Talk to your doctor if you have foot problems that make this hard.  Carry a cellphone or wear a medical alert device that you can use to call for help.  Use stepladders instead of chairs to reach high objects. Don't climb if you're at risk for falls. Ask for help, if needed.  Wear the correct eyeglasses, if you need them.        Make your home safer.   Remove rugs, cords, clutter, and furniture from walkways.  Keep your house well lit. Use night-lights in hallways and bathrooms.  Install and use sturdy handrails on stairways.  Wear nonskid footwear, even inside. Don't walk barefoot or in socks without shoes.        Be safe outside.   Use handrails, curb cuts, and ramps whenever possible.  Keep your hands free by using a shoulder bag or backpack.  Try to walk in well-lit areas. Watch out for uneven ground, changes in pavement, and debris.  Be careful in the winter. Walk on the grass or gravel when sidewalks are slippery. Use de-icer on steps and walkways.  "Add non-slip devices to shoes.    Put grab bars and nonskid mats in your shower or tub and near the toilet. Try to use a shower chair or bath bench when bathing.   Get into a tub or shower by putting in your weaker leg first. Get out with your strong side first. Have a phone or medical alert device in the bathroom with you.   Where can you learn more?  Go to https://www.[employer]/patiented  Enter G117 in the search box to learn more about \"Preventing Falls: Care Instructions.\"  Current as of: July 31, 2024  Content Version: 14.3    2024 [employer].   Care instructions adapted under license by your healthcare professional. If you have questions about a medical condition or this instruction, always ask your healthcare professional. [employer] disclaims any warranty or liability for your use of this information.    Learning About Stress  What is stress?     Stress is your body's response to a hard situation. Your body can have a physical, emotional, or mental response. Stress is a fact of life for most people, and it affects everyone differently. What causes stress for you may not be stressful for someone else.  A lot of things can cause stress. You may feel stress when you go on a job interview, take a test, or run a race. This kind of short-term stress is normal and even useful. It can help you if you need to work hard or react quickly. For example, stress can help you finish an important job on time.  Long-term stress is caused by ongoing stressful situations or events. Examples of long-term stress include long-term health problems, ongoing problems at work, or conflicts in your family. Long-term stress can harm your health.  How does stress affect your health?  When you are stressed, your body responds as though you are in danger. It makes hormones that speed up your heart, make you breathe faster, and give you a burst of energy. This is called the fight-or-flight stress response. If " the stress is over quickly, your body goes back to normal and no harm is done.  But if stress happens too often or lasts too long, it can have bad effects. Long-term stress can make you more likely to get sick, and it can make symptoms of some diseases worse. If you tense up when you are stressed, you may develop neck, shoulder, or low back pain. Stress is linked to high blood pressure and heart disease.  Stress also harms your emotional health. It can make you oliveira, tense, or depressed. Your relationships may suffer, and you may not do well at work or school.  What can you do to manage stress?  You can try these things to help manage stress:   Do something active. Exercise or activity can help reduce stress. Walking is a great way to get started. Even everyday activities such as housecleaning or yard work can help.  Try yoga or peña chi. These techniques combine exercise and meditation. You may need some training at first to learn them.  Do something you enjoy. For example, listen to music or go to a movie. Practice your hobby or do volunteer work.  Meditate. This can help you relax, because you are not worrying about what happened before or what may happen in the future.  Do guided imagery. Imagine yourself in any setting that helps you feel calm. You can use online videos, books, or a teacher to guide you.  Do breathing exercises. For example:  From a standing position, bend forward from the waist with your knees slightly bent. Let your arms dangle close to the floor.  Breathe in slowly and deeply as you return to a standing position. Roll up slowly and lift your head last.  Hold your breath for just a few seconds in the standing position.  Breathe out slowly and bend forward from the waist.  Let your feelings out. Talk, laugh, cry, and express anger when you need to. Talking with supportive friends or family, a counselor, or a debbie leader about your feelings is a healthy way to relieve stress. Avoid discussing  "your feelings with people who make you feel worse.  Write. It may help to write about things that are bothering you. This helps you find out how much stress you feel and what is causing it. When you know this, you can find better ways to cope.  What can you do to prevent stress?  You might try some of these things to help prevent stress:  Manage your time. This helps you find time to do the things you want and need to do.  Get enough sleep. Your body recovers from the stresses of the day while you are sleeping.  Get support. Your family, friends, and community can make a difference in how you experience stress.  Limit your news feed. Avoid or limit time on social media or news that may make you feel stressed.  Do something active. Exercise or activity can help reduce stress. Walking is a great way to get started.  Where can you learn more?  Go to https://www.[employer]/patiented  Enter N032 in the search box to learn more about \"Learning About Stress.\"  Current as of: October 24, 2023  Content Version: 14.3    2024 [employer].   Care instructions adapted under license by your healthcare professional. If you have questions about a medical condition or this instruction, always ask your healthcare professional. [employer] disclaims any warranty or liability for your use of this information.    Learning About Sleeping Well  What does sleeping well mean?     Sleeping well means getting enough sleep to feel good and stay healthy. How much sleep is enough varies among people.  The number of hours you sleep and how you feel when you wake up are both important. If you do not feel refreshed, you probably need more sleep. Another sign of not getting enough sleep is feeling tired during the day.  Experts recommend that adults get at least 7 or more hours of sleep per day. Children and older adults need more sleep.  Why is getting enough sleep important?  Getting enough quality sleep is a basic part " "of good health. When your sleep suffers, your physical health, mood, and your thoughts can suffer too. You may find yourself feeling more grumpy or stressed. Not getting enough sleep also can lead to serious problems, including injury, accidents, anxiety, and depression.  What might cause poor sleeping?  Many things can cause sleep problems, including:  Changes to your sleep schedule.  Stress. Stress can be caused by fear about a single event, such as giving a speech. Or you may have ongoing stress, such as worry about work or school.  Depression, anxiety, and other mental or emotional conditions.  Changes in your sleep habits or surroundings. This includes changes that happen where you sleep, such as noise, light, or sleeping in a different bed. It also includes changes in your sleep pattern, such as having jet lag or working a late shift.  Health problems, such as pain, breathing problems, and restless legs syndrome.  Lack of regular exercise.  Using alcohol, nicotine, or caffeine before bed.  How can you help yourself?  Here are some tips that may help you sleep more soundly and wake up feeling more refreshed.  Your sleeping area   Use your bedroom only for sleeping and sex. A bit of light reading may help you fall asleep. But if it doesn't, do your reading elsewhere in the house. Try not to use your TV, computer, smartphone, or tablet while you are in bed.  Be sure your bed is big enough to stretch out comfortably, especially if you have a sleep partner.  Keep your bedroom quiet, dark, and cool. Use curtains, blinds, or a sleep mask to block out light. To block out noise, use earplugs, soothing music, or a \"white noise\" machine.  Your evening and bedtime routine   Create a relaxing bedtime routine. You might want to take a warm shower or bath, or listen to soothing music.  Go to bed at the same time every night. And get up at the same time every morning, even if you feel tired.  What to avoid   Limit caffeine " "(coffee, tea, caffeinated sodas) during the day, and don't have any for at least 6 hours before bedtime.  Avoid drinking alcohol before bedtime. Alcohol can cause you to wake up more often during the night.  Try not to smoke or use tobacco, especially in the evening. Nicotine can keep you awake.  Limit naps during the day, especially close to bedtime.  Avoid lying in bed awake for too long. If you can't fall asleep or if you wake up in the middle of the night and can't get back to sleep within about 20 minutes, get out of bed and go to another room until you feel sleepy.  Avoid taking medicine right before bed that may keep you awake or make you feel hyper or energized. Your doctor can tell you if your medicine may do this and if you can take it earlier in the day.  If you can't sleep   Imagine yourself in a peaceful, pleasant scene. Focus on the details and feelings of being in a place that is relaxing.  Get up and do a quiet or boring activity until you feel sleepy.  Avoid drinking any liquids before going to bed to help prevent waking up often to use the bathroom.  Where can you learn more?  Go to https://www.[employer].net/patiented  Enter J942 in the search box to learn more about \"Learning About Sleeping Well.\"  Current as of: July 31, 2024  Content Version: 14.3    2024 [employer].   Care instructions adapted under license by your healthcare professional. If you have questions about a medical condition or this instruction, always ask your healthcare professional. [employer] disclaims any warranty or liability for your use of this information.    Bladder Training: Care Instructions  Your Care Instructions     Bladder training is used to treat urge incontinence and stress incontinence. Urge incontinence means that the need to urinate comes on so fast that you can't get to a toilet in time. Stress incontinence means that you leak urine because of pressure on your bladder. For example, it " may happen when you laugh, cough, or lift something heavy.  Bladder training can increase how long you can wait before you have to urinate. It can also help your bladder hold more urine. And it can give you better control over the urge to urinate.  It is important to remember that bladder training takes a few weeks to a few months to make a difference. You may not see results right away, but don't give up.  Follow-up care is a key part of your treatment and safety. Be sure to make and go to all appointments, and call your doctor if you are having problems. It's also a good idea to know your test results and keep a list of the medicines you take.  How can you care for yourself at home?  Work with your doctor to come up with a bladder training program that is right for you. You may use one or more of the following methods.  Delayed urination  In the beginning, try to keep from urinating for 5 minutes after you first feel the need to go.  While you wait, take deep, slow breaths to relax. Kegel exercises can also help you delay the need to go to the bathroom.  After some practice, when you can easily wait 5 minutes to urinate, try to wait 10 minutes before you urinate.  Slowly increase the waiting period until you are able to control when you have to urinate.  Scheduled urination  Empty your bladder when you first wake up in the morning.  Schedule times throughout the day when you will urinate.  Start by going to the bathroom every hour, even if you don't need to go.  Slowly increase the time between trips to the bathroom.  When you have found a schedule that works well for you, keep doing it.  If you wake up during the night and have to urinate, do it. Apply your schedule to waking hours only.  Kegel exercises  These tighten and strengthen pelvic muscles, which can help you control the flow of urine. (If doing these exercises causes pain, stop doing them and talk with your doctor.) To do Kegel exercises:  Squeeze your  "muscles as if you were trying not to pass gas. Or squeeze your muscles as if you were stopping the flow of urine. Your belly, legs, and buttocks shouldn't move.  Hold the squeeze for 3 seconds, then relax for 5 to 10 seconds.  Start with 3 seconds, then add 1 second each week until you are able to squeeze for 10 seconds.  Repeat the exercise 10 times a session. Do 3 to 8 sessions a day.  When should you call for help?  Watch closely for changes in your health, and be sure to contact your doctor if:    Your incontinence is getting worse.     You do not get better as expected.   Where can you learn more?  Go to https://www.[employer].net/patiented  Enter V684 in the search box to learn more about \"Bladder Training: Care Instructions.\"  Current as of: April 30, 2024  Content Version: 14.3    2024 [employer].   Care instructions adapted under license by your healthcare professional. If you have questions about a medical condition or this instruction, always ask your healthcare professional. [employer] disclaims any warranty or liability for your use of this information.    Learning About Depression Screening  What is depression screening?  Depression screening is a way to see if you have depression symptoms. It may be done by a doctor or counselor. It's often part of a routine checkup. That's because your mental health is just as important as your physical health.  Depression is a mental health condition that affects how you feel, think, and act. You may:  Have less energy.  Lose interest in your daily activities.  Feel sad and grouchy for a long time.  Depression is very common. It affects people of all ages.  Many things can lead to depression. Some people become depressed after they have a stroke or find out they have a major illness like cancer or heart disease. The death of a loved one or a breakup may lead to depression. It can run in families. Most experts believe that a combination of " "inherited genes and stressful life events can cause it.  What happens during screening?  You may be asked to fill out a form about your depression symptoms. You and the doctor will discuss your answers. The doctor may ask you more questions to learn more about how you think, act, and feel.  What happens after screening?  If you have symptoms of depression, your doctor will talk to you about your options.  Doctors usually treat depression with medicines or counseling. Often, combining the two works best. Many people don't get help because they think that they'll get over the depression on their own. But people with depression may not get better unless they get treatment.  The cause of depression is not well understood. There may be many factors involved. But if you have depression, it's not your fault.  A serious symptom of depression is thinking about death or suicide. If you or someone you care about talks about this or about feeling hopeless, get help right away.  It's important to know that depression can be treated. Medicine, counseling, and self-care may help.  Where can you learn more?  Go to https://www.[employer].net/patiented  Enter T185 in the search box to learn more about \"Learning About Depression Screening.\"  Current as of: July 31, 2024  Content Version: 14.3    2024 [employer].   Care instructions adapted under license by your healthcare professional. If you have questions about a medical condition or this instruction, always ask your healthcare professional. [employer] disclaims any warranty or liability for your use of this information.       "

## 2025-02-04 NOTE — PROGRESS NOTES
Preventive Care Visit  [facility]  Cj Georges MD, Internal Medicine  Feb 4, 2025      Assessment & Plan     1. Annual physical exam (Primary)  This is a 70-year-old woman with issues as discussed below.  Ongoing healthy lifestyle discussed and recommended    2. Type 2 diabetes mellitus with diabetic microalbuminuria, without long-term current use of insulin (H)  Has been well-controlled through diet and exercise, continue Jardiance as well and ongoing focus on reduction in calories, carbohydrates regular exercise and modest weight loss  - Hemoglobin A1c; Future  - Albumin Random Urine Quantitative with Creat Ratio; Future  - Hemoglobin A1c  - Albumin Random Urine Quantitative with Creat Ratio    3. Diabetic polyneuropathy associated with type 2 diabetes mellitus (H)  Stable continue duloxetine    4. Essential hypertension  Blood pressure okay continue current medication    5. Mixed hyperlipidemia  Continue atorvastatin  - Basic metabolic panel; Future  - CBC with platelets; Future  - Lipid panel reflex to direct LDL Fasting; Future  - Basic metabolic panel  - CBC with platelets  - Lipid panel reflex to direct LDL Fasting    6. Simple chronic bronchitis (H)  Stable    7. Combined forms of age-related cataract of both eyes  She currently has surgery coming up soon, had a preop at [facility]    8. Chronic pain of right knee  Stable continue same    9. Low back pain   As above    10. Moderate episode of recurrent major depressive disorder (H)  Continue duloxetine and amitriptyline    11. Peripheral vertigo of both ears  Stable    12. Beta thalassemia (H)  Stable    13. Dysuria  - UA Macroscopic with reflex to Microscopic and Culture - Lab Collect; Future  - nitroFURantoin macrocrystal-monohydrate (MACROBID) 100 MG capsule; Take 1 capsule (100 mg) by mouth 2 times daily for 5 days.  Dispense: 10 capsule; Refill: 0  - UA Macroscopic with reflex to Microscopic and Culture - Lab  Collect    14. WILFRIDO occasional CPAP    15. Morbid obesity (H)  Discussed importance of reduction in calories, carbohydrates, regular exercise and modest weight loss    The longitudinal plan of care for the diagnosis(es)/condition(s) as documented were addressed during this visit. Due to the added complexity in care, I will continue to support Isabel in the subsequent management and with ongoing continuity of care.    Counseling  Appropriate preventive services were addressed with this patient via screening, questionnaire, or discussion as appropriate for fall prevention, nutrition, physical activity, Tobacco-use cessation, social engagement, weight loss and cognition.  Checklist reviewing preventive services available has been given to the patient.  Reviewed patient's diet, addressing concerns and/or questions.   She is at risk for lack of exercise and has been provided with information to increase physical activity for the benefit of her well-being.   The patient was instructed to see the dentist every 6 months.   She is at risk for psychosocial distress and has been provided with information to reduce risk.   Discussed possible causes of fatigue. Updated plan of care.  Patient reported difficulty with activities of daily living were addressed today.Information on urinary incontinence and treatment options given to patient.   The patient's PHQ-9 score is consistent with mild depression. She was provided with information regarding depression.       Subjective   Isabel is a 70 year old, presenting for the following:  Annual Visit        2/4/2025     1:58 PM   Additional Questions   Roomed by Juma FARRAR RN           HPI    Health Care Directive  Patient does not have a Health Care Directive: Discussed advance care planning with patient; information given to patient to review.      2/4/2025   General Health   How would you rate your overall physical health? Good   Feel stress (tense, anxious, or unable to sleep) To some  extent   (!) STRESS CONCERN      2/4/2025   Nutrition   Diet: Regular (no restrictions)         2/4/2025   Exercise   Days per week of moderate/strenous exercise 2 days   Average minutes spent exercising at this level 20 min   (!) EXERCISE CONCERN      2/4/2025   Social Factors   Frequency of gathering with friends or relatives More than three times a week   Worry food won't last until get money to buy more Yes   Food not last or not have enough money for food? Yes   Do you have housing? (Housing is defined as stable permanent housing and does not include staying ouside in a car, in a tent, in an abandoned building, in an overnight shelter, or couch-surfing.) Yes   Are you worried about losing your housing? No   Lack of transportation? No   Unable to get utilities (heat,electricity)? No   (!) FOOD SECURITY CONCERN PRESENT      2/4/2025   Fall Risk   Fallen 2 or more times in the past year? No    Trouble with walking or balance? Yes    Gait Speed Test (Document in seconds) 4.69   Gait Speed Test Interpretation Less than or equal to 5.00 seconds - PASS       Proxy-reported          2/4/2025   Activities of Daily Living- Home Safety   Needs help with the following daily activites Shopping    Preparing meals    Housework   Safety concerns in the home None of the above       Multiple values from one day are sorted in reverse-chronological order         2/4/2025   Dental   Dentist two times every year? (!) NO         2/4/2025   Hearing Screening   Hearing concerns? None of the above         2/4/2025   Driving Risk Screening   Patient/family members have concerns about driving No         2/4/2025   General Alertness/Fatigue Screening   Have you been more tired than usual lately? (!) YES         2/4/2025   Urinary Incontinence Screening   Bothered by leaking urine in past 6 months Yes         9/24/2024   TB Screening   Were you born outside of the [place]? Yes       Today's PHQ-9 Score:       2/4/2025     2:02 PM   PHQ-9 SCORE    PHQ-9 Total Score MyChart 6 (Mild depression)   PHQ-9 Total Score 6        Proxy-reported         2/4/2025   Substance Use   Alcohol more than 3/day or more than 7/wk No   Do you have a current opioid prescription? No   How severe/bad is pain from 1 to 10? 0/10 (No Pain)   Do you use any other substances recreationally? No     Social History     Tobacco Use    Smoking status: Never     Passive exposure: Never    Smokeless tobacco: Never   Vaping Use    Vaping status: Never Used   Substance Use Topics    Alcohol use: Never    Drug use: Never           5/21/2024   LAST FHS-7 RESULTS   1st degree relative breast or ovarian cancer No   Any relative bilateral breast cancer Yes   Any male have breast cancer No   Any ONE woman have BOTH breast AND ovarian cancer No   Any woman with breast cancer before 50yrs No   2 or more relatives with breast AND/OR ovarian cancer No   2 or more relatives with breast AND/OR bowel cancer No             ASCVD Risk   The 10-year ASCVD risk score (Mo KWOK, et al., 2019) is: 23.4%    Values used to calculate the score:      Age: 70 years      Sex: Female      Is Non- : Yes      Diabetic: Yes      Tobacco smoker: No      Systolic Blood Pressure: 136 mmHg      Is BP treated: Yes      HDL Cholesterol: 50 mg/dL      Total Cholesterol: 154 mg/dL          Reviewed and updated as needed this visit by Provider                    Current providers sharing in care for this patient include:  Patient Care Team:  Cj Georges MD as PCP - General (Internal Medicine)  Claudia Joseph MD as MD (Hematology & Oncology)  Cj Georges MD as Assigned PCP  Juma Wilkerson OD as MD (Optometry)  Denice Plasencia MD as MD (Neurology)  Angela Rogel LSW as Lead Care Coordinator (Primary Care - CC)  Ariella Miguel OD as MD (Optometry)  Adele Hamilton PA-C as Referring Physician (Family Medicine)  Dwain Santos MD as MD (Ophthalmology)  Don  "MD Reginaldo as MD (Neurology)  Cj Whaley, APRN CNP as Assigned Sleep Provider  Ria Goode MD as MD (Ophthalmology)  Reginaldo Denson DO as Assigned Neuroscience Provider  Kuldeep Vincent MD as Physician (Ophthalmology)  Kuldeep Vincent MD as Assigned Surgical Provider  Ketih Tellez RPH as Pharmacist  Keith Tellez RPH as Assigned MTM Pharmacist  Elaine Knox, PhD as Assigned Behavioral Health Provider  Natalie Cooley CHW as Community Health Worker    The following health maintenance items are reviewed in Epic and correct as of today:  Health Maintenance   Topic Date Due    SPIROMETRY  Never done    ZOSTER IMMUNIZATION (1 of 2) Never done    RSV VACCINE (1 - Risk 60-74 years 1-dose series) Never done    DTAP/TDAP/TD IMMUNIZATION (3 - Td or Tdap) 10/23/2023    ANNUAL REVIEW OF HM ORDERS  05/30/2025    A1C  03/24/2025    COVID-19 Vaccine (7 - 2024-25 season) 03/24/2025    DIABETIC FOOT EXAM  05/30/2025    PHQ-9  08/04/2025    MEDICARE ANNUAL WELLNESS VISIT  09/24/2025    BMP  09/24/2025    LIPID  09/24/2025    MICROALBUMIN  09/24/2025    EYE EXAM  10/15/2025    FALL RISK ASSESSMENT  02/04/2026    MAMMO SCREENING  05/21/2026    ADVANCE CARE PLANNING  09/24/2029    COLORECTAL CANCER SCREENING  06/28/2031    DEXA  10/30/2038    HEPATITIS C SCREENING  Completed    INFLUENZA VACCINE  Completed    Pneumococcal Vaccine: 50+ Years  Completed    COPD ACTION PLAN  Addressed    DEPRESSION ACTION PLAN  Addressed    HPV IMMUNIZATION  Aged Out    MENINGITIS IMMUNIZATION  Aged Out          Objective    Exam  /88 (BP Location: Left arm, Patient Position: Sitting, Cuff Size: Adult Regular)   Pulse 85   Temp 98.8  F (37.1  C) (Tympanic)   Resp 18   Ht 1.501 m (4' 11.1\")   Wt 100.7 kg (222 lb 1.6 oz)   LMP 06/08/1996 (Exact Date)   SpO2 99%   BMI 44.71 kg/m     Estimated body mass index is 44.71 kg/m  as calculated from the following:    Height as of this " "encounter: 1.501 m (4' 11.1\").    Weight as of this encounter: 100.7 kg (222 lb 1.6 oz).    Physical Exam  EYES: Eyelids, conjunctiva, and sclera were normal.   HEAD, EARS, NOSE, MOUTH, AND THROAT: Head and face were normal. Hearing was normal to voice and the ears were normal to external exam. Nose appearance was normal and there was no discharge.   NECK: Neck appearance was normal.   RESPIRATORY: Breathing pattern was normal and the chest moved symmetrically.   Lung sounds were normal and there were no abnormal sounds.  CARDIOVASCULAR: Heart rate and rhythm were normal.  S1 and S2 were normal and there were no extra sounds or murmurs.There was no peripheral edema.  GASTROINTESTINAL: The abdomen was normal in contour.  NEUROLOGIC: The patient was alert and oriented to person, place, time, and circumstance. Speech was normal. Cranial nerves were normal. Motor strength was normal for age. The patient was normally coordinated.  PSYCHIATRIC:  Mood and affect were normal and the patient had normal recent and remote memory. The patient's judgment and insight were normal.      2/4/2025   Mini Cog   Clock Draw Score 0 Abnormal   3 Item Recall 2 objects recalled   Mini Cog Total Score 2              Signed Electronically by: Cj Georges MD    Answers submitted by the patient for this visit:  Patient Health Questionnaire (Submitted on 2/4/2025)  If you checked off any problems, how difficult have these problems made it for you to do your work, take care of things at home, or get along with other people?: Not difficult at all  PHQ9 TOTAL SCORE: 6    "

## 2025-02-05 LAB — BACTERIA UR CULT: NORMAL

## 2025-02-11 DIAGNOSIS — H25.813 COMBINED FORMS OF AGE-RELATED CATARACT OF BOTH EYES: ICD-10-CM

## 2025-02-11 RX ORDER — KETOROLAC TROMETHAMINE 5 MG/ML
SOLUTION OPHTHALMIC
Qty: 10 ML | Refills: 0 | Status: SHIPPED | OUTPATIENT
Start: 2025-02-11 | End: 2025-03-11

## 2025-02-11 RX ORDER — MOXIFLOXACIN 5 MG/ML
1 SOLUTION/ DROPS OPHTHALMIC 4 TIMES DAILY
Qty: 3 ML | Refills: 0 | Status: SHIPPED | OUTPATIENT
Start: 2025-02-11 | End: 2025-02-18

## 2025-02-11 RX ORDER — PREDNISOLONE ACETATE 10 MG/ML
SUSPENSION/ DROPS OPHTHALMIC
Qty: 10 ML | Refills: 0 | Status: SHIPPED | OUTPATIENT
Start: 2025-02-11 | End: 2025-03-11

## 2025-02-20 ENCOUNTER — ANESTHESIA EVENT (OUTPATIENT)
Dept: SURGERY | Facility: AMBULATORY SURGERY CENTER | Age: 70
End: 2025-02-20
Payer: COMMERCIAL

## 2025-02-21 ENCOUNTER — HOSPITAL ENCOUNTER (OUTPATIENT)
Facility: AMBULATORY SURGERY CENTER | Age: 70
Discharge: HOME | End: 2025-02-21
Attending: OPHTHALMOLOGY
Payer: COMMERCIAL

## 2025-02-21 ENCOUNTER — ANESTHESIA (OUTPATIENT)
Dept: SURGERY | Facility: AMBULATORY SURGERY CENTER | Age: 70
End: 2025-02-21
Payer: COMMERCIAL

## 2025-02-21 VITALS
SYSTOLIC BLOOD PRESSURE: 133 MMHG | TEMPERATURE: 97.4 F | OXYGEN SATURATION: 97 % | RESPIRATION RATE: 18 BRPM | WEIGHT: 222.1 LBS | HEART RATE: 68 BPM | DIASTOLIC BLOOD PRESSURE: 52 MMHG | BODY MASS INDEX: 44.71 KG/M2

## 2025-02-21 DIAGNOSIS — H25.811 COMBINED FORM OF AGE-RELATED CATARACT, RIGHT EYE: Primary | ICD-10-CM

## 2025-02-21 LAB — GLUCOSE BLDC GLUCOMTR-MCNC: 86 MG/DL (ref 70–99)

## 2025-02-21 PROCEDURE — 66984 XCAPSL CTRC RMVL W/O ECP: CPT | Mod: RT

## 2025-02-21 PROCEDURE — G8907 PT DOC NO EVENTS ON DISCHARG: HCPCS

## 2025-02-21 PROCEDURE — 66984 XCAPSL CTRC RMVL W/O ECP: CPT | Mod: RT | Performed by: OPHTHALMOLOGY

## 2025-02-21 PROCEDURE — G8918 PT W/O PREOP ORDER IV AB PRO: HCPCS

## 2025-02-21 DEVICE — TECNIS SIMPLICITY TECNIS EYHANCE U 26.0D
Type: IMPLANTABLE DEVICE | Site: EYE | Status: FUNCTIONAL
Brand: TECNIS SIMPLICITY

## 2025-02-21 RX ORDER — CYCLOPENTOLAT/TROPIC/PHENYLEPH 1%-1%-2.5%
1 DROPS (EA) OPHTHALMIC (EYE)
Status: COMPLETED | OUTPATIENT
Start: 2025-02-21 | End: 2025-02-21

## 2025-02-21 RX ORDER — MOXIFLOXACIN IN NACL,ISO-OS/PF 0.3MG/0.3
SYRINGE (ML) INTRAOCULAR PRN
Status: DISCONTINUED | OUTPATIENT
Start: 2025-02-21 | End: 2025-02-21 | Stop reason: HOSPADM

## 2025-02-21 RX ORDER — SODIUM CHLORIDE, SODIUM LACTATE, POTASSIUM CHLORIDE, CALCIUM CHLORIDE 600; 310; 30; 20 MG/100ML; MG/100ML; MG/100ML; MG/100ML
INJECTION, SOLUTION INTRAVENOUS CONTINUOUS
Status: CANCELLED | OUTPATIENT
Start: 2025-02-21

## 2025-02-21 RX ORDER — TETRACAINE HYDROCHLORIDE 5 MG/ML
SOLUTION OPHTHALMIC PRN
Status: DISCONTINUED | OUTPATIENT
Start: 2025-02-21 | End: 2025-02-21 | Stop reason: HOSPADM

## 2025-02-21 RX ORDER — BALANCED SALT SOLUTION 6.4; .75; .48; .3; 3.9; 1.7 MG/ML; MG/ML; MG/ML; MG/ML; MG/ML; MG/ML
SOLUTION OPHTHALMIC PRN
Status: DISCONTINUED | OUTPATIENT
Start: 2025-02-21 | End: 2025-02-21 | Stop reason: HOSPADM

## 2025-02-21 RX ORDER — MOXIFLOXACIN 5 MG/ML
1 SOLUTION/ DROPS OPHTHALMIC
Status: COMPLETED | OUTPATIENT
Start: 2025-02-21 | End: 2025-02-21

## 2025-02-21 RX ORDER — ACETAMINOPHEN 325 MG/1
975 TABLET ORAL ONCE
Status: COMPLETED | OUTPATIENT
Start: 2025-02-21 | End: 2025-02-21

## 2025-02-21 RX ORDER — TIMOLOL MALEATE 5 MG/ML
SOLUTION/ DROPS OPHTHALMIC PRN
Status: DISCONTINUED | OUTPATIENT
Start: 2025-02-21 | End: 2025-02-21 | Stop reason: HOSPADM

## 2025-02-21 RX ORDER — DICLOFENAC SODIUM 1 MG/ML
1 SOLUTION/ DROPS OPHTHALMIC
Status: COMPLETED | OUTPATIENT
Start: 2025-02-21 | End: 2025-02-21

## 2025-02-21 RX ORDER — PROPARACAINE HYDROCHLORIDE 5 MG/ML
1 SOLUTION/ DROPS OPHTHALMIC
Status: DISCONTINUED | OUTPATIENT
Start: 2025-02-21 | End: 2025-02-22 | Stop reason: HOSPADM

## 2025-02-21 RX ORDER — POVIDONE-IODINE 5 %
SOLUTION, NON-ORAL OPHTHALMIC (EYE) PRN
Status: DISCONTINUED | OUTPATIENT
Start: 2025-02-21 | End: 2025-02-21 | Stop reason: HOSPADM

## 2025-02-21 RX ORDER — ACETAZOLAMIDE 500 MG/5ML
500 INJECTION, POWDER, LYOPHILIZED, FOR SOLUTION INTRAVENOUS ONCE
Status: COMPLETED | OUTPATIENT
Start: 2025-02-21 | End: 2025-02-21

## 2025-02-21 RX ADMIN — Medication 1 DROP: at 10:10

## 2025-02-21 RX ADMIN — Medication 1 DROP: at 10:14

## 2025-02-21 RX ADMIN — Medication 1 DROP: at 10:17

## 2025-02-21 RX ADMIN — DICLOFENAC SODIUM 1 DROP: 1 SOLUTION/ DROPS OPHTHALMIC at 10:18

## 2025-02-21 RX ADMIN — MOXIFLOXACIN 1 DROP: 5 SOLUTION/ DROPS OPHTHALMIC at 10:17

## 2025-02-21 RX ADMIN — MOXIFLOXACIN 1 DROP: 5 SOLUTION/ DROPS OPHTHALMIC at 10:09

## 2025-02-21 RX ADMIN — ACETAMINOPHEN 975 MG: 325 TABLET ORAL at 10:08

## 2025-02-21 RX ADMIN — PROPARACAINE HYDROCHLORIDE 1 DROP: 5 SOLUTION/ DROPS OPHTHALMIC at 10:08

## 2025-02-21 RX ADMIN — DICLOFENAC SODIUM 1 DROP: 1 SOLUTION/ DROPS OPHTHALMIC at 10:09

## 2025-02-21 RX ADMIN — ACETAZOLAMIDE 500 MG: 500 INJECTION, POWDER, LYOPHILIZED, FOR SOLUTION INTRAVENOUS at 10:04

## 2025-02-21 RX ADMIN — DICLOFENAC SODIUM 1 DROP: 1 SOLUTION/ DROPS OPHTHALMIC at 10:14

## 2025-02-21 RX ADMIN — MOXIFLOXACIN 1 DROP: 5 SOLUTION/ DROPS OPHTHALMIC at 10:13

## 2025-02-21 NOTE — ANESTHESIA CARE TRANSFER NOTE
Patient: Isabel Biggs    Procedure: Procedure(s):  PHACOEMULSIFICATION, CATARACT, WITH STANDARD INTRAOCULAR LENS IMPLANT INSERTION       Diagnosis: Combined forms of age-related cataract of both eyes [H25.813]  Diagnosis Additional Information: No value filed.    Anesthesia Type:   MAC     Note:    Oropharynx: oropharynx clear of all foreign objects and spontaneously breathing  Level of Consciousness: drowsy  Oxygen Supplementation: room air    Independent Airway: airway patency satisfactory and stable  Dentition: dentition unchanged  Vital Signs Stable: post-procedure vital signs reviewed and stable  Report to RN Given: handoff report given  Patient transferred to: Phase II    Handoff Report: Identifed the Patient, Identified the Reponsible Provider, Reviewed the pertinent medical history, Discussed the surgical course, Reviewed Intra-OP anesthesia mangement and issues during anesthesia, Set expectations for post-procedure period and Allowed opportunity for questions and acknowledgement of understanding  Vitals:  Vitals Value Taken Time   BP     Temp     Pulse     Resp     SpO2         Electronically Signed By: DAYSI Merino CRNA  February 21, 2025  11:19 AM

## 2025-02-21 NOTE — ANESTHESIA PREPROCEDURE EVALUATION
Anesthesia Pre-Procedure Evaluation    Patient: Isabel Biggs   MRN: [id number] : 1955        Procedure : Procedure(s):  PHACOEMULSIFICATION, CATARACT, WITH STANDARD INTRAOCULAR LENS IMPLANT INSERTION          Past Medical History:   Diagnosis Date     Anemia      Avitaminosis D      Bronchitis      Cataract      Depression      Diabetes (H)      Glaucoma (increased eye pressure)      Headache      Obesity      Peripheral neuropathy      Rotator cuff tendonitis      Sleep apnea       Past Surgical History:   Procedure Laterality Date     ARTHROSCOPY SHOULDER ROTATOR CUFF REPAIR Right      COLONOSCOPY W/ BIOPSIES N/A 2021    Procedure: COLONOSCOPY with polypectomy ;  Surgeon: Ned Zamudio MD;  Location: [facility];  Service: Gastroenterology     HYSTERECTOMY      with bso, for ovarian cyst     OOPHORECTOMY       OR TEMPORAL ARTERY LIGATN OR BX Bilateral 2020    Procedure: BIOPSY, ARTERY, TEMPORAL;  Surgeon: Eric Alejandro MD;  Location: [facility];  Service: General      Allergies   Allergen Reactions     Chloroquine Other (See Comments), Itching and Visual Disturbance     Blurry vision, itchy skin  Blurry vision, itchy skin       Chlorquinaldol Dermatitis and Other (See Comments)     also blurry vision      Social History     Tobacco Use     Smoking status: Never     Passive exposure: Never     Smokeless tobacco: Never   Substance Use Topics     Alcohol use: Never      Wt Readings from Last 1 Encounters:   25 100.7 kg (222 lb 1.6 oz)        Anesthesia Evaluation   Pt has had prior anesthetic.     History of anesthetic complications       ROS/MED HX  ENT/Pulmonary:     (+) sleep apnea, uses CPAP,                                      Neurologic: Comment: vertigo      Cardiovascular:     (+) Dyslipidemia hypertension- -   -  - -                                      METS/Exercise Tolerance: 3 - Able to walk 1-2 blocks without stopping    Hematologic: Comments: -Beta  "Thalassemia      Musculoskeletal:   (+)  arthritis,             GI/Hepatic:       Renal/Genitourinary:       Endo:     (+)  type II DM, Last HgA1c: 6.0, date: 02/04/2025, Not using insulin, - not using insulin pump.   Diabetic complications: neuropathy.      Obesity,       Psychiatric/Substance Use:     (+) psychiatric history depression       Infectious Disease:       Malignancy:       Other:            Physical Exam    Airway        Mallampati: II   TM distance: > 3 FB   Neck ROM: full   Mouth opening: > 3 cm    Respiratory Devices and Support         Dental       (+) Minor Abnormalities - some fillings, tiny chips      Cardiovascular   cardiovascular exam normal       Rhythm and rate: regular and normal     Pulmonary   pulmonary exam normal        breath sounds clear to auscultation       OUTSIDE LABS:  CBC:   Lab Results   Component Value Date    WBC 4.2 02/04/2025    WBC 4.2 09/24/2024    HGB 10.6 (L) 02/04/2025    HGB 10.4 (L) 09/24/2024    HCT 33.1 (L) 02/04/2025    HCT 33.6 (L) 09/24/2024     02/04/2025     09/24/2024     BMP:   Lab Results   Component Value Date     02/04/2025     09/24/2024    POTASSIUM 3.7 02/04/2025    POTASSIUM 4.2 09/24/2024    CHLORIDE 104 02/04/2025    CHLORIDE 103 09/24/2024    CO2 25 02/04/2025    CO2 27 09/24/2024    BUN 12.0 02/04/2025    BUN 14.1 09/24/2024    CR 0.65 02/04/2025    CR 0.76 09/24/2024    GLC 92 02/04/2025    GLC 98 09/24/2024     COAGS:   Lab Results   Component Value Date    PTT 28 01/26/2020    INR 0.92 01/26/2020     POC: No results found for: \"BGM\", \"HCG\", \"HCGS\"  HEPATIC:   Lab Results   Component Value Date    ALBUMIN 4.2 09/24/2024    PROTTOTAL 7.5 09/24/2024    ALT 8 09/24/2024    AST 22 09/24/2024    ALKPHOS 92 09/24/2024    BILITOTAL 0.7 09/24/2024     OTHER:   Lab Results   Component Value Date    LACT 0.7 02/15/2023    A1C 6.0 (H) 02/04/2025    MAG 9.1 02/04/2025    TSH 0.89 09/24/2024    CRP 0.3 01/28/2020    SED 29 (H) " 01/28/2020       Anesthesia Plan    ASA Status:  3    NPO Status:  NPO Appropriate    Anesthesia Type: MAC.     - Reason for MAC: immobility needed              Consents    Anesthesia Plan(s) and associated risks, benefits, and realistic alternatives discussed. Questions answered and patient/representative(s) expressed understanding.     - Discussed:     - Discussed with:  Patient      - Extended Intubation/Ventilatory Support Discussed: No.      - Patient is DNR/DNI Status: No     Use of blood products discussed: No .     Postoperative Care    Pain management: Multi-modal analgesia.   PONV prophylaxis: Ondansetron (or other 5HT-3)     Comments:               Fernanda Welch MD    I have reviewed the pertinent notes and labs in the chart from the past 30 days and (re)examined the patient.  Any updates or changes from those notes are reflected in this note.

## 2025-02-21 NOTE — OP NOTE
PREOPERATIVE DIAGNOSIS:   1. Combined form of age-related cataract, right eye     Right eye   POSTOPERATIVE DIAGNOSIS: Same   PROCEDURES:   1. Cataract extraction with intraocular lens implant Right eye.  SURGEON: Kuldeep Vicnent M.D.  INDICATIONS: The patient Isabel Biggs presented to the eye clinic with decreased vision secondary to cataract in the Right eye. The risks, benefits and alternatives to cataract extraction were discussed. The patient elected to proceed. All questions were answered to the patient's satisfaction.   DESCRIPTION OF PROCEDURE:   Prior to the procedure, appropriate cardiac and respiratory monitors were applied to the patient.  The patient was brought to the operating room.  A drop of topical tetracaine was placed in the operative eye.  With adequate anesthesia, the Right eye was prepped and draped in the usual sterile fashion. A lid speculum was placed, and the operating microscope was rotated into position. A surgical pause was carried out to identify with all members of the surgical team the correct surgical site. A paracentesis was created.  Through this limbal paracentesis, the anterior chamber was filled with preservative-free lidocaine with epi shugarcaine followed by viscoelastic.  A temporal wound was created at the limbus using a 2.6 mm blade. A capsulorrhexis was initiated using a bent 25-gauge needle and was completed in continuous and circular fashion using the capsulorrhexis forceps. The lens nucleus was hydrodissected using balanced salt solution.  The lens nucleus was rotated and removed using phacoemulsification in a stop and chop technique.  Residual cortical material was removed using irrigation-aspiration.  The capsular bag was reinflated to its maximal extent with cohesive viscoelastic.  A 26.0 diopter DIB00 was inserted into the capsular bag.  The lens power selected was reviewed using the intraocular lens power measurements that were obtained preoperatively to confirm  that the correct lens was selected for the desired post-operative refractive state. The residual viscoelastic was removed in its entirety, the wounds were hydrated and found to be self-sealing.  Intracameral moxifloxacin was administered. Tactile pressure was confirmed to be in a normal range.  The lid speculum was removed, a drop of timolol was administered, and a shield was applied.  The patient tolerated the procedure well, and there were no complications.    PLAN: The patient will be discharged to home and will follow up today  EBL:  None  Complications:  None  Implant Name Type Inv. Item Serial No.  Lot No. LRB No. Used Action   EYE IMP IOL TECNIS IOL DIB00 26.0 DIB00 26.0 - [id number] Lens/Eye Implant EYE IMP IOL TECNIS IOL DIB00 26.0 DIB00 26.0 [id number] [employer]  Right 1 Implanted

## 2025-02-25 NOTE — ANESTHESIA POSTPROCEDURE EVALUATION
Patient: Isabel Biggs    Procedure: Procedure(s):  PHACOEMULSIFICATION, CATARACT, WITH STANDARD INTRAOCULAR LENS IMPLANT INSERTION       Anesthesia Type:  MAC    Note:  Disposition: Outpatient   Postop Pain Control: Uneventful            Sign Out: Well controlled pain   PONV: No   Neuro/Psych: Uneventful            Sign Out: Acceptable/Baseline neuro status   Airway/Respiratory: Uneventful            Sign Out: Acceptable/Baseline resp. status   CV/Hemodynamics: Uneventful            Sign Out: Acceptable CV status; No obvious hypovolemia; No obvious fluid overload   Other NRE: NONE   DID A NON-ROUTINE EVENT OCCUR?            Last vitals:  Vitals Value Taken Time   /52 02/21/25 1202   Temp 36.3  C (97.4  F) 02/21/25 1119   Pulse 68 02/21/25 1202   Resp 18 02/21/25 1202   SpO2 97 % 02/21/25 1119       Electronically Signed By: Eduardo Rosas MD  February 25, 2025  7:25 AM

## 2025-03-04 ENCOUNTER — OFFICE VISIT (OUTPATIENT)
Dept: OPHTHALMOLOGY | Facility: CLINIC | Age: 70
End: 2025-03-04
Payer: COMMERCIAL

## 2025-03-04 DIAGNOSIS — Z96.1 PSEUDOPHAKIA, RIGHT EYE: Primary | ICD-10-CM

## 2025-03-04 PROCEDURE — 99024 POSTOP FOLLOW-UP VISIT: CPT | Performed by: OPHTHALMOLOGY

## 2025-03-04 ASSESSMENT — EXTERNAL EXAM - RIGHT EYE: OD_EXAM: NORMAL

## 2025-03-04 ASSESSMENT — REFRACTION_MANIFEST
OD_CYLINDER: SPHERE
OD_SPHERE: -0.75

## 2025-03-04 ASSESSMENT — TONOMETRY
OS_IOP_MMHG: 20
IOP_METHOD: TONOPEN
OD_IOP_MMHG: 17

## 2025-03-04 ASSESSMENT — EXTERNAL EXAM - LEFT EYE: OS_EXAM: NORMAL

## 2025-03-04 ASSESSMENT — VISUAL ACUITY
OS_SC: 20/40
METHOD: SNELLEN - LINEAR
OD_SC: 20/40
METHOD_MR_RETINOSCOPY: 1

## 2025-03-04 ASSESSMENT — SLIT LAMP EXAM - LIDS
COMMENTS: NORMAL
COMMENTS: NORMAL

## 2025-03-04 NOTE — PROGRESS NOTES
HPI       Post Op (Ophthalmology) Right Eye    In right eye.  Since onset it is stable.  Associated symptoms include eye pain, tearing, flashes, floaters and foreign body sensation.  Treatments tried include eye drops.             Comments    Here for post op right eye. VA is doing okay. Some pain and tearing. Stable flashes or floaters. Compliant with drops - burning sensation when instilling with ketorolac.     ZAMZAM Perdomo 7:47 AM March 4, 2025             Last edited by Shon Hamilton COMT on 3/4/2025  7:52 AM.           Review of systems for the eyes was negative other than the pertinent positives/negatives listed in the HPI.      Assessment & Plan    HPI:  Isabel Biggs is a 70 year old female with history of HTN, HLD, GERD, hyperopia with astigmatism and presbyopia, narrow angle both eyes s/p laser peripheral iridotomy (LPI) both eyes presents for Postoperative week 1 right eye     POHx: Hyperopia with astigmatism and presbyopia  PMHx:HTN, HLD, GERD  Current Medications:   Current Outpatient Medications   Medication Sig Dispense Refill    albuterol (PROAIR HFA/PROVENTIL HFA/VENTOLIN HFA) 108 (90 Base) MCG/ACT inhaler USE 2 INHALATIONS BY MOUTH EVERY 6 HOURS AS NEEDED FOR SHORTNESS  OF BREATH WHEEZING OR COUGH 34 g 2    amitriptyline (ELAVIL) 25 MG tablet Take 1 tablet (25 mg) by mouth at bedtime. 90 tablet 3    amLODIPine (NORVASC) 5 MG tablet Take 1 tablet (5 mg) by mouth daily. 90 tablet 2    aspirin 81 MG EC tablet Take 1 tablet (81 mg) by mouth daily. 90 tablet 3    atorvastatin (LIPITOR) 40 MG tablet Take 1 tablet (40 mg) by mouth daily 90 tablet 4    celecoxib (CELEBREX) 100 MG capsule Take 1 capsule (100 mg) by mouth 2 times daily. 60 capsule 1    Cholecalciferol (D 1000) 25 MCG (1000 UT) CAPS Take 1 capsule by mouth daily. 90 capsule 4    DULoxetine (CYMBALTA) 30 MG capsule Take 1 capsule (30 mg) by mouth daily. 90 capsule 4    empagliflozin (JARDIANCE) 25 MG TABS tablet Take 1 tablet (25 mg) by mouth  daily. 90 tablet 4    erythromycin (ROMYCIN) 5 MG/GM ophthalmic ointment Place 0.5 inches Into the left eye 2 times daily 1 g 1    ketorolac (ACULAR) 0.5 % ophthalmic solution Place 1 drop into the right eye 4 times daily. Start 2 days prior to surgery four times a day, after surgery: Four times a day x 1 week, three times a day x 1 week, twice a day x 1 week, daily x 1 week then stop 10 mL 0    losartan (COZAAR) 100 MG tablet Take 1 tablet (100 mg) by mouth daily 90 tablet 4    meclizine (ANTIVERT) 25 MG tablet Take 1 tablet (25 mg) by mouth 3 times daily. 90 tablet 4    moxifloxacin (VIGAMOX) 0.5 % ophthalmic solution Place 1 drop into the right eye 4 times daily. Start 2 days prior to surgery four times a day, after surgery: Four times a day x 1 week 3 mL 0    omeprazole (PRILOSEC) 20 MG DR capsule Take 1 capsule (20 mg) by mouth daily. 90 capsule 1    prednisoLONE acetate (PRED FORTE) 1 % ophthalmic suspension Place 1-2 drops into the right eye 4 times daily. after surgery: Four times a day x 1 week, three times a day x 1 week, twice a day x 1 week, daily x 1 week then stop 10 mL 0    traZODone (DESYREL) 50 MG tablet Take half (25 mg) tablet to one tablet (50 mg) at bedtime 90 tablet 3    triamcinolone (KENALOG) 0.1 % external cream Apply topically 2 times daily. 45 g 0     No current facility-administered medications for this visit.     FHx: cataracts  PSHx: Cataract extraction/intraocular lens Vincent right eye 02/21/25       Current Eye Medications:  AT four times a day    pred forte/ketorolac three times a day right eye     Assessment & Plan:    Pseudophakia, right eye  Vincent right eye 02/21/25   Doing well    (H40.003) Glaucoma suspect of both eyes  (primary encounter diagnosis)  (H40.033) Anatomical narrow angle borderline glaucoma of both eyes  Maximum intraocular pressure 18/20  Family history: No  Trauma history: No  Gonioscopy: closed, steep, bowed anteriorly  Pachymetry:   Treatment History:      Today's testing:  Visual field 02/12/24:   Right eye nonspecific defect, enlarged blind spot, nonspecific defect VFI 88%, MD -6.96, PSD 3.66  Left eye nonspecific defect, VFI 92%, MD -5.18, PSD 3.73    OCT nerve fiber layer 02/12/24:   Right eye - G(g) 112 NI (g) 149 TI (g) 172 NS (g) 134 TS (g) 125      Left eye - G(g) 113 NI (g) 151 TI (g) 145 NS (g) 143 TS (g) 132     OCT nerve fiber layer 09/17/24:   Right eye - G(g) 115 NI (g) 160 TI (g) 145 NS (g) 143 TS (g) 127  Left eye - G(g) 124 NI (g) 159 TI (g) 154 NS (g) 170 TS (g) 139    IOP goal: mid teens  Concern for narrow angle with minimal visible ocular structures    (H52.03,  H52.203,  H52.4) Hyperopia of both eyes with astigmatism and presbyopia  Hold pending Cataract extraction/intraocular lens     (H25.813) Combined forms of age-related cataract of both eyes  (primary encounter diagnosis)  Special equipment/needs:  Eye: right  Anesthesia:topical  Dilates to: 6.5  Iris expansion:  possible  Pseudoexfoliation: No  Trypan Blue: No  Trauma: No    Able lay to flat: Yes   Blood Thinner: Yes ASA  Tamsulosin: No  DM: No  Guttae: No    Dominant Eye: right    Plan: [place] both eyes   Proceed with CE/IOL right eye followed by left eye.  Deferred toric due to cost  DIAMOX each eye     Would be great candidate for toric right eye at least, but patient deferred    (H43.811) Posterior vitreous detachment of right eye  Retinal detachment precautions discussed including increased flashes, floaters, or curtain coming down across vision, patient instructed to return to clinic for evaluation       No follow-ups on file.        Kuldeep Vincent MD     Attending Physician Attestation:  Complete documentation of historical and exam elements from today's encounter can be found in the full encounter summary report (not reduplicated in this progress note).  I personally obtained the chief complaint(s) and history of present illness.  I confirmed and edited as necessary the  review of systems, past medical/surgical history, family history, social history, and examination findings as documented by others; and I examined the patient myself.  I personally reviewed the relevant tests, images, and reports as documented above.  I formulated and edited as necessary the assessment and plan and discussed the findings and management plan with the patient and family. - Kuldeep Vincent MD

## 2025-03-18 ENCOUNTER — PATIENT OUTREACH (OUTPATIENT)
Dept: CARE COORDINATION | Facility: CLINIC | Age: 70
End: 2025-03-18

## 2025-03-18 NOTE — PROGRESS NOTES
Contact   Chart Review       Background: enrolled in care coordination.     Assessment: patient left  asking for call back as she is out of some of her medications.      Called her back and she is at an appt with her son and is not able to talk. She will call back.    Plan/Recommendations: Respond to call back or call in 3-5 business days.    Angela Rogel,   [facility]  [phone]

## 2025-03-25 ENCOUNTER — OFFICE VISIT (OUTPATIENT)
Dept: OPHTHALMOLOGY | Facility: CLINIC | Age: 70
End: 2025-03-25
Payer: COMMERCIAL

## 2025-03-25 ENCOUNTER — TELEPHONE (OUTPATIENT)
Dept: OPHTHALMOLOGY | Facility: CLINIC | Age: 70
End: 2025-03-25

## 2025-03-25 ENCOUNTER — PATIENT OUTREACH (OUTPATIENT)
Dept: CARE COORDINATION | Facility: CLINIC | Age: 70
End: 2025-03-25

## 2025-03-25 DIAGNOSIS — H25.812 COMBINED FORM OF AGE-RELATED CATARACT, LEFT EYE: ICD-10-CM

## 2025-03-25 DIAGNOSIS — H25.813 COMBINED FORMS OF AGE-RELATED CATARACT OF BOTH EYES: Primary | ICD-10-CM

## 2025-03-25 DIAGNOSIS — K21.9 GASTROESOPHAGEAL REFLUX DISEASE WITHOUT ESOPHAGITIS: Primary | ICD-10-CM

## 2025-03-25 PROCEDURE — 99024 POSTOP FOLLOW-UP VISIT: CPT | Performed by: OPHTHALMOLOGY

## 2025-03-25 ASSESSMENT — VISUAL ACUITY
OS_SC+: -1
OS_SC: 20/30
OD_SC: 20/30
METHOD: SNELLEN - LINEAR

## 2025-03-25 ASSESSMENT — TONOMETRY
OS_IOP_MMHG: 12
OD_IOP_MMHG: 13
IOP_METHOD: ICARE

## 2025-03-25 ASSESSMENT — EXTERNAL EXAM - RIGHT EYE: OD_EXAM: NORMAL

## 2025-03-25 ASSESSMENT — CONF VISUAL FIELD
OS_INFERIOR_NASAL_RESTRICTION: 0
OS_INFERIOR_TEMPORAL_RESTRICTION: 0
METHOD: COUNTING FINGERS
OD_SUPERIOR_NASAL_RESTRICTION: 0
OS_SUPERIOR_TEMPORAL_RESTRICTION: 0
OD_INFERIOR_TEMPORAL_RESTRICTION: 0
OS_NORMAL: 1
OD_NORMAL: 1
OD_SUPERIOR_TEMPORAL_RESTRICTION: 0
OD_INFERIOR_NASAL_RESTRICTION: 0
OS_SUPERIOR_NASAL_RESTRICTION: 0

## 2025-03-25 ASSESSMENT — SLIT LAMP EXAM - LIDS
COMMENTS: NORMAL
COMMENTS: NORMAL

## 2025-03-25 ASSESSMENT — CUP TO DISC RATIO: OD_RATIO: 0.8

## 2025-03-25 ASSESSMENT — EXTERNAL EXAM - LEFT EYE: OS_EXAM: NORMAL

## 2025-03-25 NOTE — PROGRESS NOTES
Clinic Care Coordination Contact  Follow Up Progress Note      Assessment: Talked to patient who had eye appt today and is to continue with her eye drops.  She doesn't have most of her medications and asked for refills.  Will route to pcp for review.  She did not  the Macrobid that was ordered at her last pcp appt. Needs Duloxetine, aspirin, amitriptyline, trazodone, omeprazole, atorvastatin, losartan.      She has been with her son a lot at his group home.  She continues to work twice a week. Has signed a lease at her old apartment building in [place] and will be moving there later in April.  After that, she will look into [place] to see if she may qualify.  Feeling overwhelmed with things.      Care Gaps:    Health Maintenance Due   Topic Date Due    SPIROMETRY  Never done    ZOSTER IMMUNIZATION (1 of 2) Never done    RSV VACCINE (1 - Risk 60-74 years 1-dose series) Never done    DTAP/TDAP/TD IMMUNIZATION (3 - Td or Tdap) 10/23/2023    COVID-19 Vaccine (7 - 2024-25 season) 03/24/2025       Scheduled in April      Care Plans  Care Plan: Financial Wellbeing       Problem: Patient expresses financial resource strain       Long-Range Goal: I will apply for MA after I get my citizenship, and apply for [facility].       Start Date: 11/7/2023 Expected End Date: 10/22/2025    This Visit's Progress: 60% Recent Progress: 60%    Priority: High    Note:     Barriers: none identified.  Strengths: motivated.  Patient expressed understanding of goal: yes  Action steps to achieve this goal:  1. I will apply for programs if eligible.   2. I will complete any paper work.  3. I will report progress towards this goal at scheduled outreach telephone calls from the [facility] team.  3-25 will wait until she moves  Discussed 10-  Has to add up her income to see if she is eligible for MA                            Care Plan: Mental Health       Problem: Mood/Psychosocial Concerns       Long-Range Goal: Establish Mental  Health Support and reduce depression symptoms.       Start Date: 11/7/2023 Expected End Date: 10/22/2025    This Visit's Progress: 20% Recent Progress: 20%    Priority: High    Note:     Barriers: none noted.   Strengths: motivated to feel better.  Patient expressed understanding of goal: yes  Action steps to achieve this goal:  1. I will schedule and attend appointments with therapist.    2. I will meet with counselor virtually.  3. I will report progress towards this goal at scheduled outreach telephone calls from the [facility] team.  3-25 no progress  Discussed 10/23/24 had neuro psych testing done on 10-22. Wants to see results before setting up anything else.                             Care Plan: Housing Instability       Problem: SDOH LACK OF STABLE HOUSING       Long-Range Goal: Establish Stable Housing       Start Date: 10/23/2024 Expected End Date: 10/22/2025    This Visit's Progress: 80%    Priority: High    Note:     Barriers: living with daughter and her family, crowded. Needs subsidized apartment.   Strengths: motivated.  Patient expressed understanding of goal: yes  Action steps to achieve this goal:  1. I will review options that  CC sends to me.   2. I will call those apartment in [facility] to be near my family.   3. I get on waiting lists.  4. I will report progress towards this goal at scheduled outreach telephone calls from the [facility] team.  3-25  moving to her own place in April                                Intervention/Education provided during outreach: support     Outreach Frequency: monthly, more frequently as needed      Plan:   [facility] SW will continue to monitor, support patient with current goals and will be available to assist as needs arise. [facility] CHW will reach out to patient on a monthly basis to discuss progression of goals.      [facility] SW will perform Chart Review in 45 days.      Angela Rogel,   [facility]  [phone]

## 2025-03-25 NOTE — TELEPHONE ENCOUNTER
Phone call to patient to schedule surgery writer could not leave a voicemail due to mailbox being full  .      [phone]        Kalia Cunningham on 3/25/2025 at 9:06 AM

## 2025-03-25 NOTE — PROGRESS NOTES
HPI       Floaters Right Eye    In right eye.  Characterized as small, large and spots.  Associated symptoms include flashes and blurred vision.             Comments    Here for right eye floaters and flashes. VA is blurry. Notes foreign body sensation and pain. Using drops as instructed.     ZAMZAM Perdomo 8:07 AM March 25, 2025             Last edited by Shon Hamilton COMT on 3/25/2025  8:07 AM.           Review of systems for the eyes was negative other than the pertinent positives/negatives listed in the HPI.      Assessment & Plan    HPI:  Isabel Biggs is a 70 year old female with history of HTN, HLD, GERD, hyperopia with astigmatism and presbyopia, narrow angle both eyes s/p laser peripheral iridotomy (LPI) both eyes presents for Postoperative week 4 right eye. Notes floaters right eye x 1 week. No flashes or curtain. Also notes fbs    POHx: Hyperopia with astigmatism and presbyopia  PMHx:HTN, HLD, GERD  Current Medications:   Current Outpatient Medications   Medication Sig Dispense Refill    albuterol (PROAIR HFA/PROVENTIL HFA/VENTOLIN HFA) 108 (90 Base) MCG/ACT inhaler USE 2 INHALATIONS BY MOUTH EVERY 6 HOURS AS NEEDED FOR SHORTNESS  OF BREATH WHEEZING OR COUGH 34 g 2    amitriptyline (ELAVIL) 25 MG tablet Take 1 tablet (25 mg) by mouth at bedtime. 90 tablet 3    amLODIPine (NORVASC) 5 MG tablet Take 1 tablet (5 mg) by mouth daily. 90 tablet 2    aspirin 81 MG EC tablet Take 1 tablet (81 mg) by mouth daily. 90 tablet 3    atorvastatin (LIPITOR) 40 MG tablet Take 1 tablet (40 mg) by mouth daily 90 tablet 4    celecoxib (CELEBREX) 100 MG capsule Take 1 capsule (100 mg) by mouth 2 times daily. 60 capsule 1    Cholecalciferol (D 1000) 25 MCG (1000 UT) CAPS Take 1 capsule by mouth daily. 90 capsule 4    DULoxetine (CYMBALTA) 30 MG capsule Take 1 capsule (30 mg) by mouth daily. 90 capsule 4    empagliflozin (JARDIANCE) 25 MG TABS tablet Take 1 tablet (25 mg) by mouth daily. 90 tablet 4    erythromycin (ROMYCIN) 5  MG/GM ophthalmic ointment Place 0.5 inches Into the left eye 2 times daily 1 g 1    ketorolac (ACULAR) 0.5 % ophthalmic solution Place 1 drop into the right eye 4 times daily. Start 2 days prior to surgery four times a day, after surgery: Four times a day x 1 week, three times a day x 1 week, twice a day x 1 week, daily x 1 week then stop 10 mL 0    losartan (COZAAR) 100 MG tablet Take 1 tablet (100 mg) by mouth daily 90 tablet 4    meclizine (ANTIVERT) 25 MG tablet Take 1 tablet (25 mg) by mouth 3 times daily. 90 tablet 4    moxifloxacin (VIGAMOX) 0.5 % ophthalmic solution Place 1 drop into the right eye 4 times daily. Start 2 days prior to surgery four times a day, after surgery: Four times a day x 1 week 3 mL 0    omeprazole (PRILOSEC) 20 MG DR capsule Take 1 capsule (20 mg) by mouth daily. 90 capsule 1    prednisoLONE acetate (PRED FORTE) 1 % ophthalmic suspension Place 1-2 drops into the right eye 4 times daily. after surgery: Four times a day x 1 week, three times a day x 1 week, twice a day x 1 week, daily x 1 week then stop 10 mL 0    traZODone (DESYREL) 50 MG tablet Take half (25 mg) tablet to one tablet (50 mg) at bedtime 90 tablet 3    triamcinolone (KENALOG) 0.1 % external cream Apply topically 2 times daily. 45 g 0     No current facility-administered medications for this visit.     FHx: cataracts  PSHx: Cataract extraction/intraocular lens Vincent right eye 02/21/25       Current Eye Medications:  AT four times a day        Assessment & Plan:    Pseudophakia, right eye  Vincent right eye 02/21/25   Doing well  No holes or tears on sd 360  Posterior vitreous detachment (PVD) known previously  Retinal detachment precautions discussed including increased flashes, floaters, or curtain coming down across vision, patient instructed to return to clinic for evaluation     Ok to use artificial tears for Foreign body sensation     (H40.003) Glaucoma suspect of both eyes  (primary encounter  diagnosis)  (H40.033) Anatomical narrow angle borderline glaucoma of both eyes  Maximum intraocular pressure 18/20  Family history: No  Trauma history: No  Gonioscopy: closed, steep, bowed anteriorly  Pachymetry:   Treatment History:     Today's testing:  Visual field 02/12/24:   Right eye nonspecific defect, enlarged blind spot, nonspecific defect VFI 88%, MD -6.96, PSD 3.66  Left eye nonspecific defect, VFI 92%, MD -5.18, PSD 3.73    OCT nerve fiber layer 02/12/24:   Right eye - G(g) 112 NI (g) 149 TI (g) 172 NS (g) 134 TS (g) 125      Left eye - G(g) 113 NI (g) 151 TI (g) 145 NS (g) 143 TS (g) 132     OCT nerve fiber layer 09/17/24:   Right eye - G(g) 115 NI (g) 160 TI (g) 145 NS (g) 143 TS (g) 127  Left eye - G(g) 124 NI (g) 159 TI (g) 154 NS (g) 170 TS (g) 139    IOP goal: mid teens  Concern for narrow angle with minimal visible ocular structures    (H52.03,  H52.203,  H52.4) Hyperopia of both eyes with astigmatism and presbyopia  Hold pending Cataract extraction/intraocular lens     (H25.812) Combined form of age-related cataract, left eye  Special equipment/needs:  Eye: right  Anesthesia:topical  Dilates to: 6.5  Iris expansion:  possible  Pseudoexfoliation: No  Trypan Blue: No  Trauma: No    Able lay to flat: Yes   Blood Thinner: Yes ASA  Tamsulosin: No  DM: No  Guttae: No    Dominant Eye: right    Plan: [place] both eyes   Proceed with CE/IO followed by left eye in June  Deferred toric due to cost  DIAMOX each eye     Would be great candidate for toric right eye at least, but patient deferred    (H43.811) Posterior vitreous detachment of right eye  Retinal detachment precautions discussed including increased flashes, floaters, or curtain coming down across vision, patient instructed to return to clinic for evaluation       Return in about 6 weeks (around 5/6/2025) for v/t/d/MRx.        Kuldeep Vincent MD     Attending Physician Attestation:  Complete documentation of historical and exam elements from  today's encounter can be found in the full encounter summary report (not reduplicated in this progress note).  I personally obtained the chief complaint(s) and history of present illness.  I confirmed and edited as necessary the review of systems, past medical/surgical history, family history, social history, and examination findings as documented by others; and I examined the patient myself.  I personally reviewed the relevant tests, images, and reports as documented above.  I formulated and edited as necessary the assessment and plan and discussed the findings and management plan with the patient and family. - Kuldeep Vincent MD

## 2025-03-25 NOTE — PROGRESS NOTES
Called patient and let her know that all the medications have refills on file except the Omerpazole and I will send to the refills team to refill(see separate encounter).  Patient understood.

## 2025-03-26 RX ORDER — OMEPRAZOLE 20 MG/1
20 CAPSULE, DELAYED RELEASE ORAL DAILY
Qty: 90 CAPSULE | Refills: 1 | Status: SHIPPED | OUTPATIENT
Start: 2025-03-26

## 2025-03-26 NOTE — TELEPHONE ENCOUNTER
Attempted to contact pt. There was no answer and the voicemail was full. Sending gripNote message. Marie Mckeon on 3/26/2025 at 12:33 PM

## 2025-03-27 NOTE — TELEPHONE ENCOUNTER
Phone call to patient to schedule surgery . Patient stated she wanted to call back later .         Kalia Cunningham on 3/27/2025 at 8:20 AM

## 2025-03-29 ENCOUNTER — MYC REFILL (OUTPATIENT)
Dept: PHARMACY | Facility: CLINIC | Age: 70
End: 2025-03-29
Payer: COMMERCIAL

## 2025-03-29 ENCOUNTER — MYC REFILL (OUTPATIENT)
Dept: INTERNAL MEDICINE | Facility: CLINIC | Age: 70
End: 2025-03-29
Payer: COMMERCIAL

## 2025-03-29 DIAGNOSIS — E11.9 TYPE 2 DIABETES MELLITUS WITHOUT COMPLICATION, WITHOUT LONG-TERM CURRENT USE OF INSULIN (H): ICD-10-CM

## 2025-03-29 DIAGNOSIS — I10 ESSENTIAL HYPERTENSION: ICD-10-CM

## 2025-03-29 DIAGNOSIS — R80.9 TYPE 2 DIABETES MELLITUS WITH DIABETIC MICROALBUMINURIA, WITHOUT LONG-TERM CURRENT USE OF INSULIN (H): ICD-10-CM

## 2025-03-29 DIAGNOSIS — G47.09 OTHER INSOMNIA: ICD-10-CM

## 2025-03-29 DIAGNOSIS — E11.29 TYPE 2 DIABETES MELLITUS WITH DIABETIC MICROALBUMINURIA, WITHOUT LONG-TERM CURRENT USE OF INSULIN (H): ICD-10-CM

## 2025-03-31 RX ORDER — AMPICILLIN TRIHYDRATE 500 MG
1 CAPSULE ORAL DAILY
Qty: 90 CAPSULE | Refills: 4 | OUTPATIENT
Start: 2025-03-31

## 2025-03-31 RX ORDER — ASPIRIN 81 MG/1
81 TABLET ORAL DAILY
Qty: 90 TABLET | Refills: 3 | OUTPATIENT
Start: 2025-03-31

## 2025-03-31 RX ORDER — AMLODIPINE BESYLATE 5 MG/1
5 TABLET ORAL DAILY
Qty: 90 TABLET | Refills: 2 | OUTPATIENT
Start: 2025-03-31

## 2025-03-31 RX ORDER — DULOXETIN HYDROCHLORIDE 30 MG/1
30 CAPSULE, DELAYED RELEASE ORAL DAILY
Qty: 90 CAPSULE | Refills: 4 | OUTPATIENT
Start: 2025-03-31

## 2025-03-31 RX ORDER — ATORVASTATIN CALCIUM 40 MG/1
40 TABLET, FILM COATED ORAL DAILY
Qty: 90 TABLET | Refills: 4 | OUTPATIENT
Start: 2025-03-31

## 2025-04-09 ENCOUNTER — TELEPHONE (OUTPATIENT)
Dept: OPHTHALMOLOGY | Facility: CLINIC | Age: 70
End: 2025-04-09

## 2025-04-09 ENCOUNTER — OFFICE VISIT (OUTPATIENT)
Dept: INTERNAL MEDICINE | Facility: CLINIC | Age: 70
End: 2025-04-09
Payer: COMMERCIAL

## 2025-04-09 VITALS
OXYGEN SATURATION: 97 % | SYSTOLIC BLOOD PRESSURE: 138 MMHG | DIASTOLIC BLOOD PRESSURE: 78 MMHG | BODY MASS INDEX: 42.94 KG/M2 | HEIGHT: 59 IN | TEMPERATURE: 97.5 F | RESPIRATION RATE: 14 BRPM | WEIGHT: 213 LBS | HEART RATE: 71 BPM

## 2025-04-09 DIAGNOSIS — E11.29 TYPE 2 DIABETES MELLITUS WITH DIABETIC MICROALBUMINURIA, WITHOUT LONG-TERM CURRENT USE OF INSULIN (H): ICD-10-CM

## 2025-04-09 DIAGNOSIS — E78.2 MIXED HYPERLIPIDEMIA: ICD-10-CM

## 2025-04-09 DIAGNOSIS — E11.42 DIABETIC POLYNEUROPATHY ASSOCIATED WITH TYPE 2 DIABETES MELLITUS (H): ICD-10-CM

## 2025-04-09 DIAGNOSIS — J41.0 SIMPLE CHRONIC BRONCHITIS (H): ICD-10-CM

## 2025-04-09 DIAGNOSIS — I10 ESSENTIAL HYPERTENSION: ICD-10-CM

## 2025-04-09 DIAGNOSIS — R80.9 TYPE 2 DIABETES MELLITUS WITH DIABETIC MICROALBUMINURIA, WITHOUT LONG-TERM CURRENT USE OF INSULIN (H): ICD-10-CM

## 2025-04-09 DIAGNOSIS — Z01.818 PREOPERATIVE EXAMINATION: Primary | ICD-10-CM

## 2025-04-09 DIAGNOSIS — F33.1 MODERATE EPISODE OF RECURRENT MAJOR DEPRESSIVE DISORDER (H): ICD-10-CM

## 2025-04-09 DIAGNOSIS — E66.01 MORBID OBESITY (H): ICD-10-CM

## 2025-04-09 DIAGNOSIS — D56.1 BETA THALASSEMIA (H): ICD-10-CM

## 2025-04-09 DIAGNOSIS — H26.9 CATARACT OF LEFT EYE, UNSPECIFIED CATARACT TYPE: ICD-10-CM

## 2025-04-09 DIAGNOSIS — G47.33 OSA (OBSTRUCTIVE SLEEP APNEA): ICD-10-CM

## 2025-04-09 PROBLEM — H25.813 COMBINED FORMS OF AGE-RELATED CATARACT OF BOTH EYES: Status: RESOLVED | Noted: 2024-11-19 | Resolved: 2025-04-09

## 2025-04-09 PROCEDURE — 1126F AMNT PAIN NOTED NONE PRSNT: CPT | Performed by: INTERNAL MEDICINE

## 2025-04-09 PROCEDURE — 80048 BASIC METABOLIC PNL TOTAL CA: CPT | Performed by: INTERNAL MEDICINE

## 2025-04-09 PROCEDURE — 90480 ADMN SARSCOV2 VAC 1/ONLY CMP: CPT | Performed by: INTERNAL MEDICINE

## 2025-04-09 PROCEDURE — 3075F SYST BP GE 130 - 139MM HG: CPT | Performed by: INTERNAL MEDICINE

## 2025-04-09 PROCEDURE — 91320 SARSCV2 VAC 30MCG TRS-SUC IM: CPT | Performed by: INTERNAL MEDICINE

## 2025-04-09 PROCEDURE — 99214 OFFICE O/P EST MOD 30 MIN: CPT | Mod: 25 | Performed by: INTERNAL MEDICINE

## 2025-04-09 PROCEDURE — G2211 COMPLEX E/M VISIT ADD ON: HCPCS | Performed by: INTERNAL MEDICINE

## 2025-04-09 PROCEDURE — 36415 COLL VENOUS BLD VENIPUNCTURE: CPT | Performed by: INTERNAL MEDICINE

## 2025-04-09 PROCEDURE — 3078F DIAST BP <80 MM HG: CPT | Performed by: INTERNAL MEDICINE

## 2025-04-09 ASSESSMENT — PATIENT HEALTH QUESTIONNAIRE - PHQ9
SUM OF ALL RESPONSES TO PHQ QUESTIONS 1-9: 8
SUM OF ALL RESPONSES TO PHQ QUESTIONS 1-9: 8
10. IF YOU CHECKED OFF ANY PROBLEMS, HOW DIFFICULT HAVE THESE PROBLEMS MADE IT FOR YOU TO DO YOUR WORK, TAKE CARE OF THINGS AT HOME, OR GET ALONG WITH OTHER PEOPLE: SOMEWHAT DIFFICULT

## 2025-04-09 ASSESSMENT — PAIN SCALES - GENERAL: PAINLEVEL_OUTOF10: NO PAIN (0)

## 2025-04-09 NOTE — TELEPHONE ENCOUNTER
Phone call and voicemail from patient to schedule surgery . Patient stated that she needs to look at her calendar first and will call back .         Kalia Cunningham on 4/9/2025 at 2:11 PM

## 2025-04-09 NOTE — PROGRESS NOTES
Preoperative Consultation   Isabel Biggs   70 year old  female    Date of visit: 4/9/2025  Physician: Cj Georges MD    This is a preoperative consultation requested by Dr. Walden in preparation for left cataract surgery on a date TBD at  [facility] .       Assessment and Plan   Isabel Biggs was seen in preoperative consultation in preparation for left cataract surgery.  This is a low risk surgery and the patient has no increased risk for major cardiac complications.  Please note her history of obstructive of sleep apnea.  She does have a history of some chronic bronchitis without a history of smoking and she is currently asymptomatic.  In regards to her diabetes I have asked her to hold Jardiance for 3 doses prior to the procedure.  She has no cardiopulmonary contraindication to the proposed procedure may proceed without any further cardiopulmonary testing.    1. Preoperative examination    2. Cataract of left eye, unspecified cataract type    3. Type 2 diabetes mellitus with diabetic microalbuminuria, without long-term current use of insulin (H)    4. Diabetic polyneuropathy associated with type 2 diabetes mellitus (H)    5. Essential hypertension    6. WILFRIDO occasional CPAP    7. Simple chronic bronchitis (H)    8. Mixed hyperlipidemia    9. Beta thalassemia (H)    10. Moderate episode of recurrent major depressive disorder (H)    11. Morbid obesity (H)         Patient Profile   Social History     Social History Narrative    She is originally from [place]. 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  She is  and has 7 adult children and many grandchildren.        Past Medical History   Patient Active Problem List   Diagnosis    WILFRIDO occasional CPAP    Type 2 diabetes mellitus with diabetic  microalbuminuria, without long-term current use of insulin (H)    Diabetic polyneuropathy associated with type 2 diabetes mellitus (H)    Beta thalassemia (H)    Low back pain     Moderate episode of recurrent major depressive disorder (H)    Morbid obesity (H)    Peripheral vertigo of both ears    Essential hypertension    Mixed hyperlipidemia    Simple chronic bronchitis (H)    Chronic pain of right knee       Past Surgical History  She has a past surgical history that includes Hysterectomy (1996); Oophorectomy (1996); Arthroscopy shoulder rotator cuff repair (Right); Pr Temporal Artery Ligatn Or Bx (Bilateral, 1/29/2020); Colonoscopy W/ Biopsies (N/A, 6/28/2021); and Phacoemulsification with standard intraocular lens implant (Right, 2/21/2025).     History of Present Illness   This 70 year old woman comes in for preoperative valuation in preparation for cataract surgery    Recent antiplatelet use: yes aspirin  Personal or family history of bleeding or clotting disorders: no  Steroid use in the past year: no  Personal or family history of difficulty with anesthesia: no  Current cardiopulmonary symptoms: no  WILFRIDO: not on CPAP    Review of Systems: A comprehensive review of systems was negative except as noted.     Medications and Allergies   Current Outpatient Medications   Medication Sig Dispense Refill    albuterol (PROAIR HFA/PROVENTIL HFA/VENTOLIN HFA) 108 (90 Base) MCG/ACT inhaler USE 2 INHALATIONS BY MOUTH EVERY 6 HOURS AS NEEDED FOR SHORTNESS  OF BREATH WHEEZING OR COUGH 34 g 2    amitriptyline (ELAVIL) 25 MG tablet Take 1 tablet (25 mg) by mouth at bedtime. 90 tablet 3    amLODIPine (NORVASC) 5 MG tablet Take 1 tablet (5 mg) by mouth daily. 90 tablet 2    aspirin 81 MG EC tablet Take 1 tablet (81 mg) by mouth daily. 90 tablet 3    atorvastatin (LIPITOR) 40 MG tablet Take 1 tablet (40 mg) by mouth daily 90 tablet 4    celecoxib (CELEBREX) 100 MG capsule Take 1 capsule (100 mg) by mouth 2 times daily. 60  capsule 1    Cholecalciferol (D 1000) 25 MCG (1000 UT) CAPS Take 1 capsule by mouth daily. 90 capsule 4    DULoxetine (CYMBALTA) 30 MG capsule Take 1 capsule (30 mg) by mouth daily. 90 capsule 4    empagliflozin (JARDIANCE) 25 MG TABS tablet Take 1 tablet (25 mg) by mouth daily. 90 tablet 4    erythromycin (ROMYCIN) 5 MG/GM ophthalmic ointment Place 0.5 inches Into the left eye 2 times daily 1 g 1    ketorolac (ACULAR) 0.5 % ophthalmic solution Place 1 drop into the right eye 4 times daily. Start 2 days prior to surgery four times a day, after surgery: Four times a day x 1 week, three times a day x 1 week, twice a day x 1 week, daily x 1 week then stop 10 mL 0    losartan (COZAAR) 100 MG tablet Take 1 tablet (100 mg) by mouth daily 90 tablet 4    meclizine (ANTIVERT) 25 MG tablet Take 1 tablet (25 mg) by mouth 3 times daily. 90 tablet 4    moxifloxacin (VIGAMOX) 0.5 % ophthalmic solution Place 1 drop into the right eye 4 times daily. Start 2 days prior to surgery four times a day, after surgery: Four times a day x 1 week 3 mL 0    omeprazole (PRILOSEC) 20 MG DR capsule Take 1 capsule (20 mg) by mouth daily. 90 capsule 1    prednisoLONE acetate (PRED FORTE) 1 % ophthalmic suspension Place 1-2 drops into the right eye 4 times daily. after surgery: Four times a day x 1 week, three times a day x 1 week, twice a day x 1 week, daily x 1 week then stop 10 mL 0    traZODone (DESYREL) 50 MG tablet Take half (25 mg) tablet to one tablet (50 mg) at bedtime 90 tablet 3    triamcinolone (KENALOG) 0.1 % external cream Apply topically 2 times daily. 45 g 0     Allergies   Allergen Reactions    Chloroquine Other (See Comments), Itching and Visual Disturbance     Blurry vision, itchy skin  Blurry vision, itchy skin      Chlorquinaldol Dermatitis and Other (See Comments)     also blurry vision        Family and Social History   Family History   Problem Relation Age of Onset    Hypertension Mother     Diabetes Mother     No Known  "Problems Father         killed in war    No Known Problems Sister         one  sudden death (37), others living    No Known Problems Brother         9 living, 1  diabetes, others  in war    No Known Problems Daughter     No Known Problems Son     Breast Cancer Maternal Aunt 70    Cancer Other     Hereditary Breast and Ovarian Cancer Syndrome No family hx of     Cancer No family hx of     Colon Cancer No family hx of     Endometrial Cancer No family hx of     Ovarian Cancer No family hx of     Anesthesia Reaction No family hx of         Social History     Tobacco Use    Smoking status: Never     Passive exposure: Never    Smokeless tobacco: Never   Vaping Use    Vaping status: Never Used   Substance Use Topics    Alcohol use: Never    Drug use: Never        Physical Exam   General Appearance:   No acute distress    BP (!) 141/84 (BP Location: Left arm, Patient Position: Sitting, Cuff Size: Adult Large)   Pulse 71   Temp 97.5  F (36.4  C)   Resp 14   Ht 1.499 m (4' 11\")   Wt 96.6 kg (213 lb)   LMP 1996 (Approximate)   SpO2 97%   Breastfeeding No   BMI 43.02 kg/m      HEAD, EARS, NOSE, MOUTH, AND THROAT: Head and face were normal. Hearing was normal to voice and the ears were normal to external exam. Nose appearance was normal and there was no discharge.  NECK: Neck appearance was normal.   RESPIRATORY: Breathing pattern was normal and the chest moved symmetrically.    Lung sounds were normal and there were no abnormal sounds.  CARDIOVASCULAR: Heart rate and rhythm were normal.  S1 and S2 were normal and there were no extra sounds or murmurs.   There was no peripheral edema.  GASTROINTESTINAL: The abdomen was normal in contour.   NEUROLOGIC: The patient was alert and oriented to person, place, time, and circumstance. Speech was normal. Cranial nerves were normal. Motor strength was normal for age. The patient was normally coordinated.  PSYCHIATRIC:  Mood and affect were normal and the patient " had normal recent and remote memory. The patient's judgment and insight were normal.       Additional Tests   Laboratory: BMP pending    The longitudinal plan of care for the diagnosis(es)/condition(s) as documented were addressed during this visit. Due to the added complexity in care, I will continue to support [place] in the subsequent management and with ongoing continuity of care.       Cj Georges MD  Internal Medicine  Contact me at [phone]

## 2025-04-09 NOTE — NURSING NOTE
Patient tolerated Covid injection without incident. Injection site free from redness, swelling, and burning. Patient discharged in the care of lab. Patient aware of next appointment.

## 2025-04-10 LAB
ANION GAP SERPL CALCULATED.3IONS-SCNC: 8 MMOL/L (ref 7–15)
BUN SERPL-MCNC: 14.7 MG/DL (ref 8–23)
CALCIUM SERPL-MCNC: 9.4 MG/DL (ref 8.8–10.4)
CHLORIDE SERPL-SCNC: 103 MMOL/L (ref 98–107)
CREAT SERPL-MCNC: 0.77 MG/DL (ref 0.51–0.95)
EGFRCR SERPLBLD CKD-EPI 2021: 83 ML/MIN/1.73M2
GLUCOSE SERPL-MCNC: 93 MG/DL (ref 70–99)
HCO3 SERPL-SCNC: 28 MMOL/L (ref 22–29)
POTASSIUM SERPL-SCNC: 3.8 MMOL/L (ref 3.4–5.3)
SODIUM SERPL-SCNC: 139 MMOL/L (ref 135–145)

## 2025-04-17 ENCOUNTER — PATIENT OUTREACH (OUTPATIENT)
Dept: CARE COORDINATION | Facility: CLINIC | Age: 70
End: 2025-04-17
Payer: COMMERCIAL

## 2025-04-17 NOTE — PROGRESS NOTES
Clinic Care Coordination Contact  Community Health Worker Follow Up    Care Gaps:     Health Maintenance Due   Topic Date Due    SPIROMETRY  Never done    ZOSTER IMMUNIZATION (1 of 2) Never done    RSV VACCINE (1 - Risk 60-74 years 1-dose series) Never done    DTAP/TDAP/TD IMMUNIZATION (3 - Td or Tdap) 10/23/2023       Care Gaps Last addressed on 4/17/2025    Care Plan:   Care Plan: Financial Wellbeing       Problem: Patient expresses financial resource strain       Long-Range Goal: I will apply for MA after I get my citizenship, and apply for [facility].       Start Date: 11/7/2023 Expected End Date: 10/22/2025    This Visit's Progress: 70% Recent Progress: 60%    Priority: High    Note:     Barriers: none identified.  Strengths: motivated.  Patient expressed understanding of goal: yes  Action steps to achieve this goal:  1. I will apply for programs if eligible.   2. I will complete any paper work.  3. I will report progress towards this goal at scheduled outreach telephone calls from the [facility] team.    4/17- discussed, planning on moving back to [place] on 4/30- will discuss applying for MA at next months outreach call  3-25 will wait until she moves  Discussed 10-  Has to add up her income to see if she is eligible for MA                            Care Plan: Mental Health       Problem: Mood/Psychosocial Concerns       Long-Range Goal: Establish Mental Health Support and reduce depression symptoms.       Start Date: 11/7/2023 Expected End Date: 10/22/2025    This Visit's Progress: 20% Recent Progress: 20%    Priority: High    Note:     Barriers: none noted.   Strengths: motivated to feel better.  Patient expressed understanding of goal: yes  Action steps to achieve this goal:  1. I will schedule and attend appointments with therapist.    2. I will meet with counselor virtually.  3. I will report progress towards this goal at scheduled outreach telephone calls from the [facility] team.  3-25 no  progress  Discussed 10/23/24 had neuro psych testing done on 10-22. Wants to see results before setting up anything else.                             Care Plan: Housing Instability       Problem: SDOH LACK OF STABLE HOUSING       Long-Range Goal: Establish Stable Housing       Start Date: 10/23/2024 Expected End Date: 10/22/2025    This Visit's Progress: 80%    Priority: High    Note:     Barriers: living with daughter and her family, crowded. Needs subsidized apartment.   Strengths: motivated.  Patient expressed understanding of goal: yes  Action steps to achieve this goal:  1. I will review options that [facility] sends to me.   2. I will call those apartment in [facility] to be near my family.   3. I get on waiting lists.  4. I will report progress towards this goal at scheduled outreach telephone calls from the [facility] team.  3-25  moving to her own place in April                                Intervention and Education during outreach:   Supportive Listening    CHW spoke to [place] today for monthly CC outreach call, patient states she is doing well and denies any urgent needs or concerns at this time.  CC Goals were discussed and updated during today's call.  Patient is planning to moved back to her old apartment complex in [place] on April 30, 2025.  She has completed her eye\ exam on 3/25, has follow up appt scheduled 5/12/25 at 10:30 am.  CHW and patient discussed MA application, will discuss at next month's outreach after patient is settled in with her move.    CHW Plan: Continue with monthly outreach call to discuss, support and update CC goal progression    Next CHW outreach call: 5/16/2025    Anyi URBINA  Community Health Worker  [facility]  Clinic Care Coordination  [email or web address] [email or web address]  Office: [phone]

## 2025-05-13 ENCOUNTER — OFFICE VISIT (OUTPATIENT)
Dept: OPHTHALMOLOGY | Facility: CLINIC | Age: 70
End: 2025-05-13
Payer: COMMERCIAL

## 2025-05-13 DIAGNOSIS — Z96.1 PSEUDOPHAKIA, RIGHT EYE: Primary | ICD-10-CM

## 2025-05-13 DIAGNOSIS — H52.4 PRESBYOPIA: ICD-10-CM

## 2025-05-13 DIAGNOSIS — H52.02 HYPEROPIA OF LEFT EYE: ICD-10-CM

## 2025-05-13 DIAGNOSIS — H52.11 MYOPIA OF RIGHT EYE: ICD-10-CM

## 2025-05-13 PROCEDURE — 92015 DETERMINE REFRACTIVE STATE: CPT | Performed by: OPHTHALMOLOGY

## 2025-05-13 PROCEDURE — 99024 POSTOP FOLLOW-UP VISIT: CPT | Performed by: OPHTHALMOLOGY

## 2025-05-13 ASSESSMENT — REFRACTION_MANIFEST
OD_SPHERE: -0.50
OS_SPHERE: +1.25
OS_CYLINDER: SPHERE
OS_ADD: +2.50
OD_CYLINDER: SPHERE
OD_ADD: +2.50

## 2025-05-13 ASSESSMENT — CONF VISUAL FIELD
OD_SUPERIOR_NASAL_RESTRICTION: 0
METHOD: COUNTING FINGERS
OS_SUPERIOR_NASAL_RESTRICTION: 0
OS_NORMAL: 1
OS_SUPERIOR_TEMPORAL_RESTRICTION: 0
OD_SUPERIOR_TEMPORAL_RESTRICTION: 0
OS_INFERIOR_NASAL_RESTRICTION: 0
OD_INFERIOR_TEMPORAL_RESTRICTION: 0
OD_INFERIOR_NASAL_RESTRICTION: 0
OD_NORMAL: 1
OS_INFERIOR_TEMPORAL_RESTRICTION: 0

## 2025-05-13 ASSESSMENT — TONOMETRY
OS_IOP_MMHG: 18
OD_IOP_MMHG: 19
OD_IOP_MMHG: 12
OS_IOP_MMHG: 20
IOP_METHOD: TONOPEN
IOP_METHOD: APPLANATION

## 2025-05-13 ASSESSMENT — SLIT LAMP EXAM - LIDS
COMMENTS: NORMAL
COMMENTS: NORMAL

## 2025-05-13 ASSESSMENT — VISUAL ACUITY
METHOD: SNELLEN - LINEAR
OS_SC: 20/40
OD_SC+: -1
CORRECTION_TYPE: GLASSES
OS_SC+: -1
OD_SC: 20/40
METHOD_MR_RETINOSCOPY: 1

## 2025-05-13 ASSESSMENT — CUP TO DISC RATIO
OS_RATIO: 0.8
OD_RATIO: 0.8

## 2025-05-13 ASSESSMENT — EXTERNAL EXAM - RIGHT EYE: OD_EXAM: NORMAL

## 2025-05-13 ASSESSMENT — EXTERNAL EXAM - LEFT EYE: OS_EXAM: NORMAL

## 2025-05-13 NOTE — PROGRESS NOTES
HPI       Follow Up    In both eyes.  Associated symptoms include flashes, floaters and itching.  Negative for eye pain.             Comments    Here for follow up. VA is about the same. Increased floaters. Some flashes. Notes itching. No pain.    ZAMZAM Perdomo 12:02 PM May 13, 2025             Last edited by Shon Hamilton COMT on 5/13/2025 12:02 PM.           Review of systems for the eyes was negative other than the pertinent positives/negatives listed in the HPI.      Assessment & Plan    HPI:  Isabel Biggs is a 70 year old female with history of HTN, HLD, GERD, hyperopia with astigmatism and presbyopia, narrow angle both eyes s/p laser peripheral iridotomy (LPI) both eyes presents for Postoperative month 3 right eye. She notes a web/net in her vision right eye.       POHx: Hyperopia with astigmatism and presbyopia  PMHx:HTN, HLD, GERD  Current Medications:   Current Outpatient Medications   Medication Sig Dispense Refill    albuterol (PROAIR HFA/PROVENTIL HFA/VENTOLIN HFA) 108 (90 Base) MCG/ACT inhaler USE 2 INHALATIONS BY MOUTH EVERY 6 HOURS AS NEEDED FOR SHORTNESS  OF BREATH WHEEZING OR COUGH 34 g 2    amitriptyline (ELAVIL) 25 MG tablet Take 1 tablet (25 mg) by mouth at bedtime. 90 tablet 3    amLODIPine (NORVASC) 5 MG tablet Take 1 tablet (5 mg) by mouth daily. 90 tablet 2    aspirin 81 MG EC tablet Take 1 tablet (81 mg) by mouth daily. 90 tablet 3    atorvastatin (LIPITOR) 40 MG tablet Take 1 tablet (40 mg) by mouth daily 90 tablet 4    celecoxib (CELEBREX) 100 MG capsule Take 1 capsule (100 mg) by mouth 2 times daily. 60 capsule 1    Cholecalciferol (D 1000) 25 MCG (1000 UT) CAPS Take 1 capsule by mouth daily. 90 capsule 4    DULoxetine (CYMBALTA) 30 MG capsule Take 1 capsule (30 mg) by mouth daily. 90 capsule 4    empagliflozin (JARDIANCE) 25 MG TABS tablet Take 1 tablet (25 mg) by mouth daily. 90 tablet 4    erythromycin (ROMYCIN) 5 MG/GM ophthalmic ointment Place 0.5 inches Into the left eye 2 times  daily 1 g 1    ketorolac (ACULAR) 0.5 % ophthalmic solution Place 1 drop into the right eye 4 times daily. Start 2 days prior to surgery four times a day, after surgery: Four times a day x 1 week, three times a day x 1 week, twice a day x 1 week, daily x 1 week then stop 10 mL 0    losartan (COZAAR) 100 MG tablet Take 1 tablet (100 mg) by mouth daily 90 tablet 4    meclizine (ANTIVERT) 25 MG tablet Take 1 tablet (25 mg) by mouth 3 times daily. 90 tablet 4    moxifloxacin (VIGAMOX) 0.5 % ophthalmic solution Place 1 drop into the right eye 4 times daily. Start 2 days prior to surgery four times a day, after surgery: Four times a day x 1 week 3 mL 0    omeprazole (PRILOSEC) 20 MG DR capsule Take 1 capsule (20 mg) by mouth daily. 90 capsule 1    prednisoLONE acetate (PRED FORTE) 1 % ophthalmic suspension Place 1-2 drops into the right eye 4 times daily. after surgery: Four times a day x 1 week, three times a day x 1 week, twice a day x 1 week, daily x 1 week then stop 10 mL 0    traZODone (DESYREL) 50 MG tablet Take half (25 mg) tablet to one tablet (50 mg) at bedtime 90 tablet 3    triamcinolone (KENALOG) 0.1 % external cream Apply topically 2 times daily. 45 g 0     No current facility-administered medications for this visit.     FHx: cataracts  PSHx: Cataract extraction/intraocular lens Vincent right eye 02/21/25       Current Eye Medications:  AT four times a day        Assessment & Plan:    Pseudophakia, right eye  Vincent right eye 02/21/25   Doing well  No holes or tears on sd 360  Posterior vitreous detachment (PVD) known previously  Retinal detachment precautions discussed including increased flashes, floaters, or curtain coming down across vision, patient instructed to return to clinic for evaluation     Ok to use artificial tears for Foreign body sensation     (H40.003) Glaucoma suspect of both eyes  (primary encounter diagnosis)  (H40.033) Anatomical narrow angle borderline glaucoma of both eyes  Maximum  intraocular pressure 18/20  Family history: No  Trauma history: No  Gonioscopy: closed, steep, bowed anteriorly  Pachymetry:   Treatment History:     Today's testing:  Visual field 02/12/24:   Right eye nonspecific defect, enlarged blind spot, nonspecific defect VFI 88%, MD -6.96, PSD 3.66  Left eye nonspecific defect, VFI 92%, MD -5.18, PSD 3.73    OCT nerve fiber layer 02/12/24:   Right eye - G(g) 112 NI (g) 149 TI (g) 172 NS (g) 134 TS (g) 125      Left eye - G(g) 113 NI (g) 151 TI (g) 145 NS (g) 143 TS (g) 132     OCT nerve fiber layer 09/17/24:   Right eye - G(g) 115 NI (g) 160 TI (g) 145 NS (g) 143 TS (g) 127  Left eye - G(g) 124 NI (g) 159 TI (g) 154 NS (g) 170 TS (g) 139    IOP goal: mid teens  Concern for narrow angle with minimal visible ocular structures    (H52.11) Myopia of right eye  (H52.02) Hyperopia of left eye  (H52.4) Presbyopia  Patient wants to address Posterior vitreous detachment (PVD) first, may consider filling MRx if desired or use OTC readers for now    (H25.812) Combined form of age-related cataract, left eye  Special equipment/needs:  Eye: right  Anesthesia:topical  Dilates to: 6.5  Iris expansion:  possible  Pseudoexfoliation: No  Trypan Blue: No  Trauma: No    Able lay to flat: Yes   Blood Thinner: Yes ASA  Tamsulosin: No  DM: No  Guttae: No    Dominant Eye: right    Plan: [place] both eyes   Proceed with CE/IOL followed by left eye in June  Deferred toric due to cost  DIAMOX each eye     Would be great candidate for toric right eye at least, but patient deferred    (H43.811) Posterior vitreous detachment of right eye  Retinal detachment precautions discussed including increased flashes, floaters, or curtain coming down across vision, patient instructed to return to clinic for evaluation   Patient interested in YAG vitreolysis  Will refer to Dr. Banda, then consider Cataract extraction/intraocular lens left eye     No follow-ups on file.        Kuldeep Vincent MD     Attending  Physician Attestation:  Complete documentation of historical and exam elements from today's encounter can be found in the full encounter summary report (not reduplicated in this progress note).  I personally obtained the chief complaint(s) and history of present illness.  I confirmed and edited as necessary the review of systems, past medical/surgical history, family history, social history, and examination findings as documented by others; and I examined the patient myself.  I personally reviewed the relevant tests, images, and reports as documented above.  I formulated and edited as necessary the assessment and plan and discussed the findings and management plan with the patient and family. - Kuldeep Vincent MD

## 2025-05-20 ENCOUNTER — PATIENT OUTREACH (OUTPATIENT)
Dept: NURSING | Facility: CLINIC | Age: 70
End: 2025-05-20
Payer: COMMERCIAL

## 2025-05-20 NOTE — PROGRESS NOTES
"Clinic Care Coordination Contact  Follow Up Progress Note      Assessment: Talked to patient who hopes to move this week. She has to work with her family to get help and they keep changing her move in.  She has some furniture and would like to find kitchen chairs and a living room chair. Someone has given her a mattress. Discussed options to find free things and sent email with options. Writer communicated the risks of unencrypted electronic communication and the patient and/or patient representative has agreed to accept the risks and receive unencrypted communication related to the information or resources we have discussed. We reviewed that no PHI will be included.  Email address verified with the patient.  Will include electronic communication form for patient/caregiver to complete.   [employer].  Buy and Sell in [place], [place]  [employer]  Next Door  [place], [place]  News, Crime, Lost Pets, Free Stuff  Freecycle: Search Results for \"kitchen chairs\"    you can search for many different things.      She is not looking for mental health during the move.  She is frustrated that she may need to start over to get on MA.  She can't think about that right now due to the move.     Care Gaps:    Health Maintenance Due   Topic Date Due    SPIROMETRY  Never done    ZOSTER IMMUNIZATION (1 of 2) Never done    RSV VACCINE (1 - Risk 60-74 years 1-dose series) Never done    DTAP/TDAP/TD IMMUNIZATION (3 - Td or Tdap) 10/23/2023    DIABETIC FOOT EXAM  05/30/2025       Postponed to next pcp appt     Care Plans  Care Plan: Financial Wellbeing       Problem: Patient expresses financial resource strain       Long-Range Goal: I will apply for MA after I get my citizenship, and apply for [employer].       Start Date: 11/7/2023 Expected End Date: 10/22/2025    This Visit's Progress: 70% Recent Progress: 70%    Priority: High    Note:     Barriers: none identified.  Strengths: motivated.  Patient " expressed understanding of goal: yes  Action steps to achieve this goal:  1. I will apply for programs if eligible.   2. I will complete any paper work.  3. I will report progress towards this goal at scheduled outreach telephone calls from the [facility] team.    5/16 discussed, waiting until move to apply for county benefits  4/17- discussed, planning on moving back to [place] on 4/30- will discuss applying for MA at next months outreach call  3-25 will wait until she moves  Discussed 10-  Has to add up her income to see if she is eligible for MA                            Care Plan: Mental Health       Problem: Mood/Psychosocial Concerns       Long-Range Goal: Establish Mental Health Support and reduce depression symptoms.       Start Date: 11/7/2023 Expected End Date: 10/22/2025    This Visit's Progress: On Hold Recent Progress: 20%    Priority: High    Note:     Barriers: none noted.   Strengths: motivated to feel better.  Patient expressed understanding of goal: yes  Action steps to achieve this goal:  1. I will schedule and attend appointments with therapist.    2. I will meet with counselor virtually.  3. I will report progress towards this goal at scheduled outreach telephone calls from the [facility] team.    5/16- no update  3-25 no progress  Discussed 10/23/24 had neuro psych testing done on 10-22. Wants to see results before setting up anything else.                             Care Plan: Housing Instability       Problem: SDOH LACK OF STABLE HOUSING       Long-Range Goal: Establish Stable Housing       Start Date: 10/23/2024 Expected End Date: 10/22/2025    This Visit's Progress: 90% Recent Progress: 90%    Priority: High    Note:     Barriers: living with daughter and her family, crowded. Needs subsidized apartment.   Strengths: motivated.  Patient expressed understanding of goal: yes  Action steps to achieve this goal:  1. I will review options that [facility] sends to me.   2. I will call those  apartment in [facility] to be near my family.   3. I get on waiting lists.  4. I will report progress towards this goal at scheduled outreach telephone calls from the [facility] team.    5.16 discussed- not moved yet, will be moved by end of May- CC SW call scheduled to discuss options for furniture assistance 5/20 at 2pm  4/17- discussed, per pt moving to old apartment complex in [facility] on 4/30/25  3-25  moving to her own place in April                                Intervention/Education provided during outreach: support     Outreach Frequency: monthly, more frequently as needed    Plan:   [facility] SW will continue to monitor, support patient with current goals and will be available to assist as needs arise. CCC CHW will reach out to patient on a monthly basis to discuss progression of goals.      [facility] SW will perform Chart Review in 45 days.     Angela Rogel,   [facility]  [phone]

## 2025-05-22 ENCOUNTER — TELEPHONE (OUTPATIENT)
Dept: INTERNAL MEDICINE | Facility: CLINIC | Age: 70
End: 2025-05-22
Payer: COMMERCIAL

## 2025-05-22 NOTE — TELEPHONE ENCOUNTER
Spoke with Isabel Biggs today in regards to losartan. Last filled on 3/29/25 for 30 day supply and due for refill. Per Isabel Biggs, takes daily and is not out of her losartan.    Thank you,    Mary Olson, PharmD, BCPS  Pharmacy

## 2025-06-24 ENCOUNTER — PATIENT OUTREACH (OUTPATIENT)
Dept: CARE COORDINATION | Facility: CLINIC | Age: 70
End: 2025-06-24
Payer: COMMERCIAL

## 2025-06-24 NOTE — PROGRESS NOTES
Clinic Care Coordination Contact  Community Health Worker Follow Up    Care Gaps:     Health Maintenance Due   Topic Date Due    SPIROMETRY  Never done    ZOSTER VACCINE (1 of 2) Never done    RSV VACCINE (1 - Risk 60-74 years 1-dose series) Never done    DTAP/TDAP/TD VACCINE (3 - Td or Tdap) 10/23/2023    DIABETIC FOOT EXAM  05/30/2025       Care Gaps Last addressed on 6/24/2025    Care Plan:   Care Plan: Financial Wellbeing       Problem: Patient expresses financial resource strain       Long-Range Goal: I will apply for MA after I get my citizenship, and apply for [employer] Care.       Start Date: 11/7/2023 Expected End Date: 10/22/2025    This Visit's Progress: 70% Recent Progress: 70%    Priority: High    Note:     Barriers: none identified.  Strengths: motivated.  Patient expressed understanding of goal: yes  Action steps to achieve this goal:  1. I will apply for programs if eligible.   2. I will complete any paper work.  3. I will report progress towards this goal at scheduled outreach telephone calls from the [facility] team.    6/24- plans to do after move, moving 6/28 if things go well  5/16 discussed, waiting until move to apply for [employer] benefits  4/17- discussed, planning on moving back to [place] on 4/30- will discuss applying for MA at next months outreach call  3-25 will wait until she moves  Discussed 10-  Has to add up her income to see if she is eligible for MA                            Care Plan: Mental Health       Problem: Mood/Psychosocial Concerns       Long-Range Goal: Establish Mental Health Support and reduce depression symptoms.       Start Date: 11/7/2023 Expected End Date: 10/22/2025    This Visit's Progress: On Hold Recent Progress: 20%    Priority: High    Note:     Barriers: none noted.   Strengths: motivated to feel better.  Patient expressed understanding of goal: yes  Action steps to achieve this goal:  1. I will schedule and attend appointments with therapist.    2. I  will meet with counselor virtually.  3. I will report progress towards this goal at scheduled outreach telephone calls from the [facility] team.    5/16- no update  3-25 no progress  Discussed 10/23/24 had neuro psych testing done on 10-22. Wants to see results before setting up anything else.                             Care Plan: Housing Instability       Problem: SDOH LACK OF STABLE HOUSING       Long-Range Goal: Establish Stable Housing       Start Date: 10/23/2024 Expected End Date: 10/22/2025    This Visit's Progress: 90% Recent Progress: 90%    Priority: High    Note:     Barriers: living with daughter and her family, crowded. Needs subsidized apartment.   Strengths: motivated.  Patient expressed understanding of goal: yes  Action steps to achieve this goal:  1. I will review options that SW CC sends to me.   2. I will call those apartment in [facility] to be near my family.   3. I get on waiting lists.  4. I will report progress towards this goal at scheduled outreach telephone calls from the [facility] team.    6/24/25 discussed- has not moved as of yet- if things go as planned she will be moving over this weekend 6/28-6/29  5.16 discussed- not moved yet, will be moved by end of May- CC SW call scheduled to discuss options for furniture assistance 5/20 at 2pm  4/17- discussed, per pt moving to old apartment complex in [facility] on 4/30/25  3-25  moving to her own place in April                                Intervention and Education during outreach:   Supportive Listening & encouragement  CHW spoke to [place] today for monthly CC outreach call, patient states things are going ok. Has follow up eye appt on 7/2/2025.  CHW and patient discussed current CC goals, patient informed CHW that she has not moved to [place] as of yet, states if things go well she will be moving 6/28-6/29.  Waiting to move to apply for MA/SNAP    CHW Plan: Continue with monthly outreach call to discuss, support and  update CC goal progression    Next CHW outreach call: 7/15/2025    Anyi URBINA  Community Health Worker  [facility] Care Coordination  [facility], [email or web address]  Office: [phone]

## 2025-06-26 ENCOUNTER — PATIENT OUTREACH (OUTPATIENT)
Dept: CARE COORDINATION | Facility: CLINIC | Age: 70
End: 2025-06-26
Payer: COMMERCIAL

## 2025-06-26 NOTE — LETTER
[facility] CARE COORDINATION  [facility]    June 26, 2025        Isabel Biggs  [address]  [address]  [facility] [address]          Dear Isabel,     Attached is an updated Complex Care Plan for your continued enrollment in Care Coordination. Please let us know if you have additional questions, concerns, or goals that we can assist with.    Sincerely,    Angela Rogel,   [facility]  [phone]        [facility]  Patient Centered Plan of Care  About Me:        Patient Name:  Isabel Biggs    YOB: 1955  Age:         70 year old   [place] MRN:    [id number] Telephone Information:  Home Phone [phone]   Mobile [phone]   Home Phone [phone]       Address:  [address]  [address]  [facility] [place] [facility] [address] Email address:  [email or web address]      Emergency Contact(s)    Name Relationship Lgl Grd Work Phone Home Phone Mobile Phone   1. OTONIEL SCHAFFER Daughter    [phone]   2. PARISA SCHAFFER Daughter   [phone]            Primary language:  English     needed? No   [place] [employer]:  [phone] op. 1  Other communication barriers:English as a second language; Visual impairment    Preferred Method of Communication:     Current living arrangement: I live in a private home with family    Mobility Status/ Medical Equipment: Independent        Health Maintenance  Health Maintenance Reviewed: Due/Overdue   Health Maintenance Due   Topic Date Due    SPIROMETRY  Never done    ZOSTER VACCINE (1 of 2) Never done    RSV VACCINE (1 - Risk 60-74 years 1-dose series) Never done    DTAP/TDAP/TD VACCINE (3 - Td or Tdap) 10/23/2023    DIABETIC FOOT EXAM  05/30/2025           My Access Plan  Medical Emergency 911   Primary Clinic Line [facility] - [phone]   24 Hour Appointment Line [phone] or  [phone]) (toll-free)   24 Hour Nurse Line [phone] (toll-free)   Preferred Urgent Care   [facility], [phone]     [facility] [place]  [phone]     Preferred Pharmacy [id number] [address] AT [facility]     Behavioral Health Crisis Line The National Suicide Prevention Lifeline at 1-828.157.3724 or Text/Call 988           My Care Team Members  Patient Care Team         Relationship Specialty Notifications Start End    Cj Georges MD PCP - General Internal Medicine  12/30/24     Phone: [phone] Fax: [phone] [address] [facility] [address]    Cj Georges MD Assigned PCP   6/16/21     Phone: [phone] Fax: [phone] [address] [facility] [address]    Angela Rogel LSW Lead Care Coordinator Primary Care - CC Admissions 12/1/22     Phone: [phone]         Reginaldo Ochoa MD MD Neurology  2/28/23     Phone: [phone] Fax: [phone]         [address] [place] [address]    Kuldeep Vincent MD Assigned Surgical Provider   2/23/24     Phone: [phone] Fax: [phone] [address] [facility] [address]    Keith Tellez [facility] Assigned MTM Pharmacist   8/23/24     Phone: [phone] Fax: [phone]         [facility] [place] [address]    Elaine Knox, PhD Assigned Behavioral Health Provider   11/23/24     Phone: [phone] Fax: [phone]         [address] [place] [address]    Natalie Cooley CHW Community Health Worker  Admissions 1/10/25                 My Care Plans  Self Management and Treatment Plan    Care Plan  Care Plan: Financial Wellbeing       Problem: Patient expresses financial resource strain       Long-Range Goal: I will apply for MA after I get my citizenship, and apply for [employer] Care.       Start Date: 11/7/2023 Expected End Date: 10/22/2025    This Visit's Progress: 70% Recent Progress: 70%    Priority: High    Note:      Barriers: none identified.  Strengths: motivated.  Patient expressed understanding of goal: yes  Action steps to achieve this goal:  1. I will apply for programs if eligible.   2. I will complete any paper work.  3. I will report progress towards this goal at scheduled outreach telephone calls from the [facility] team.    6/24- plans to do after move, moving 6/28 if things go well  5/16 discussed, waiting until move to apply for county benefits  4/17- discussed, planning on moving back to [place] on 4/30- will discuss applying for MA at next months outreach call  3-25 will wait until she moves  Discussed 10-  Has to add up her income to see if she is eligible for MA                            Care Plan: Mental Health       Problem: Mood/Psychosocial Concerns       Long-Range Goal: Establish Mental Health Support and reduce depression symptoms.       Start Date: 11/7/2023 Expected End Date: 10/22/2025    This Visit's Progress: On Hold Recent Progress: 20%    Priority: High    Note:     Barriers: none noted.   Strengths: motivated to feel better.  Patient expressed understanding of goal: yes  Action steps to achieve this goal:  1. I will schedule and attend appointments with therapist.    2. I will meet with counselor virtually.  3. I will report progress towards this goal at scheduled outreach telephone calls from the [facility] team.    5/16- no update  3-25 no progress  Discussed 10/23/24 had neuro psych testing done on 10-22. Wants to see results before setting up anything else.                             Care Plan: Housing Instability       Problem: SDOH LACK OF STABLE HOUSING       Long-Range Goal: Establish Stable Housing       Start Date: 10/23/2024 Expected End Date: 10/22/2025    This Visit's Progress: 90% Recent Progress: 90%    Priority: High    Note:     Barriers: living with daughter and her family, crowded. Needs subsidized apartment.   Strengths: motivated.  Patient expressed understanding of goal:  yes  Action steps to achieve this goal:  1. I will review options that  CC sends to me.   2. I will call those apartment in [facility] to be near my family.   3. I get on waiting lists.  4. I will report progress towards this goal at scheduled outreach telephone calls from the [facility] team.    6/24/25 discussed- has not moved as of yet- if things go as planned she will be moving over this weekend 6/28-6/29  5.16 discussed- not moved yet, will be moved by end of May- CC  call scheduled to discuss options for furniture assistance 5/20 at 2pm  4/17- discussed, per pt moving to old apartment complex in [facility] on 4/30/25  3-25  moving to her own place in April                              Advance Care Plans/Directives:   Advanced Care Plan/Directives on file: No    Discussed with patient/caregiver(s): Declined Further Information             My Medical and Care Information  Problem List   Patient Active Problem List   Diagnosis    WILFRIDO occasional CPAP    Type 2 diabetes mellitus with diabetic microalbuminuria, without long-term current use of insulin (H)    Diabetic polyneuropathy associated with type 2 diabetes mellitus (H)    Beta thalassemia (H)    Low back pain     Moderate episode of recurrent major depressive disorder (H)    Morbid obesity (H)    Peripheral vertigo of both ears    Essential hypertension    Mixed hyperlipidemia    Simple chronic bronchitis (H)    Chronic pain of right knee      Current Medications:  Please refer to the most recent medication list provided to you by your medical team and reach out to your provider with any questions or to make any corrections.    Care Coordination Start Date: 11/21/2022   Frequency of Care Coordination: monthly, more frequently as needed     Form Last Updated: 06/26/2025

## 2025-06-26 NOTE — PROGRESS NOTES
Care Coordination Clinician Chart Review    Situation: Patient chart reviewed by Care Coordinator.       Background: Care Coordination Program started: 11/21/2022. Initial assessment completed and patient-centered care plan(s) were developed with participation from patient. Lead CC handed patient off to CHW for continued outreaches.       Assessment: Per chart review, patient outreach completed by CC CHW on 6-.  Patient is actively working to accomplish goal(s). Patient's goal(s) appropriate and relevant at this time. Patient is not due for updated Plan of Care.  Assessments will be completed annually or as needed/with change of patient status.      Care Plan: Financial Wellbeing       Problem: Patient expresses financial resource strain       Long-Range Goal: I will apply for MA after I get my citizenship, and apply for [employer] Care.       Start Date: 11/7/2023 Expected End Date: 10/22/2025    This Visit's Progress: 70% Recent Progress: 70%    Priority: High    Note:     Barriers: none identified.  Strengths: motivated.  Patient expressed understanding of goal: yes  Action steps to achieve this goal:  1. I will apply for programs if eligible.   2. I will complete any paper work.  3. I will report progress towards this goal at scheduled outreach telephone calls from the CCC team.    6/24- plans to do after move, moving 6/28 if things go well  5/16 discussed, waiting until move to apply for county benefits  4/17- discussed, planning on moving back to [place] on 4/30- will discuss applying for MA at next months outreach call  3-25 will wait until she moves  Discussed 10-  Has to add up her income to see if she is eligible for MA                            Care Plan: Mental Health       Problem: Mood/Psychosocial Concerns       Long-Range Goal: Establish Mental Health Support and reduce depression symptoms.       Start Date: 11/7/2023 Expected End Date: 10/22/2025    This Visit's Progress: On  Hold Recent Progress: 20%    Priority: High    Note:     Barriers: none noted.   Strengths: motivated to feel better.  Patient expressed understanding of goal: yes  Action steps to achieve this goal:  1. I will schedule and attend appointments with therapist.    2. I will meet with counselor virtually.  3. I will report progress towards this goal at scheduled outreach telephone calls from the [facility] team.    5/16- no update  3-25 no progress  Discussed 10/23/24 had neuro psych testing done on 10-22. Wants to see results before setting up anything else.                             Care Plan: Housing Instability       Problem: SDOH LACK OF STABLE HOUSING       Long-Range Goal: Establish Stable Housing       Start Date: 10/23/2024 Expected End Date: 10/22/2025    This Visit's Progress: 90% Recent Progress: 90%    Priority: High    Note:     Barriers: living with daughter and her family, crowded. Needs subsidized apartment.   Strengths: motivated.  Patient expressed understanding of goal: yes  Action steps to achieve this goal:  1. I will review options that SW CC sends to me.   2. I will call those apartment in [facility] to be near my family.   3. I get on waiting lists.  4. I will report progress towards this goal at scheduled outreach telephone calls from the [facility] team.    6/24/25 discussed- has not moved as of yet- if things go as planned she will be moving over this weekend 6/28-6/29  5.16 discussed- not moved yet, will be moved by end of May- CC SW call scheduled to discuss options for furniture assistance 5/20 at 2pm  4/17- discussed, per pt moving to old apartment complex in [facility] on 4/30/25  3-25  moving to her own place in April                                   Plan/Recommendations: The patient will continue working with Care Coordination to achieve goal(s) as above. CHW will continue outreaches at minimum every 30 days and will involve Lead CC as needed or if patient is ready to  move to [place]. Lead CC will continue to monitor CHW outreaches and patient's progress to goal(s) every 6 weeks.     Plan of Care updated and sent to patient: No

## 2025-07-01 ENCOUNTER — TELEPHONE (OUTPATIENT)
Dept: OPHTHALMOLOGY | Facility: CLINIC | Age: 70
End: 2025-07-01
Payer: COMMERCIAL

## 2025-07-01 NOTE — TELEPHONE ENCOUNTER
Phone call from patient needing to reschedule new patient appointment writer was able to transfer patient to clinic to further assist .         Kalia Cunningham on 7/1/2025 at 3:11 PM

## (undated) RX ORDER — ACETAZOLAMIDE 500 MG/5ML
INJECTION, POWDER, LYOPHILIZED, FOR SOLUTION INTRAVENOUS
Status: DISPENSED
Start: 2025-02-21

## (undated) RX ORDER — ACETAMINOPHEN 325 MG/1
TABLET ORAL
Status: DISPENSED
Start: 2025-02-21

## (undated) RX ORDER — WATER 10 ML/10ML
INJECTION INTRAMUSCULAR; INTRAVENOUS; SUBCUTANEOUS
Status: DISPENSED
Start: 2025-02-21